# Patient Record
Sex: FEMALE | Race: WHITE | NOT HISPANIC OR LATINO | Employment: OTHER | URBAN - NONMETROPOLITAN AREA
[De-identification: names, ages, dates, MRNs, and addresses within clinical notes are randomized per-mention and may not be internally consistent; named-entity substitution may affect disease eponyms.]

---

## 2018-04-16 ENCOUNTER — HOSPITAL ENCOUNTER (OUTPATIENT)
Facility: HOSPITAL | Age: 58
Setting detail: SURGERY ADMIT
End: 2018-04-16
Attending: ORTHOPAEDIC SURGERY | Admitting: ORTHOPAEDIC SURGERY

## 2018-04-23 ENCOUNTER — TELEPHONE (OUTPATIENT)
Dept: VASCULAR SURGERY | Facility: CLINIC | Age: 58
End: 2018-04-23

## 2018-04-23 NOTE — TELEPHONE ENCOUNTER
Spoke with Ms Jiménez and reminded her of her appointment tomorrow at 945 am with Dr Haley, she stated she would be here.

## 2018-04-23 NOTE — PROGRESS NOTES
04/24/2018      LUIS CARLOS Zheng MD  [address] [place] [address]    Arminda Jiménez  1960    Chief Complaint   Patient presents with   • Pre-op Exam     ALIF consult per DR Zheng       Dear LUIS CARLOS Zheng MD:      HPI  I had the pleasure of seeing your patient Arminda Jiménez in the office today.  Thank you kindly for this consultation.  As you recall, Arminda Jiménez is a 57 y.o.  female who you are currently following for chronic back pain.  She complaints of pain to her tailbone, her right leg is burning, and numbness. She is scheduled with Dr. Zheng on 5/7/18.      Past Medical History:   Diagnosis Date   • Arthritis    • Asthma    • Cancer     right lung,abd,and pelvic area   • CHF (congestive heart failure)    • Chronic back pain    • COPD (chronic obstructive pulmonary disease)    • Depression    • Facet arthropathy    • Foraminal stenosis of lumbosacral region    • GERD (gastroesophageal reflux disease)    • H/O degenerative disc disease    • History of blood transfusion    • PVD (peripheral vascular disease)    • Radiculopathy        Past Surgical History:   Procedure Laterality Date   • APPENDECTOMY     • CARPAL TUNNEL RELEASE Bilateral    • OTHER SURGICAL HISTORY Bilateral 2017    Stent implants BLE [place] [facility] [place]       Family History   Problem Relation Age of Onset   • Heart disease Other    • Arthritis Other    • Cancer Other    • Alcohol abuse Other    • Stroke Other    • Migraines Other    • COPD Other    • Heart failure Other    • Depression Other    • Asthma Other        Social History     Social History   • Marital status:      Spouse name: N/A   • Number of children: N/A   • Years of education: N/A     Occupational History   • Not on file.     Social History Main Topics   • Smoking status: Former Smoker   • Smokeless tobacco: Never Used      Comment: quit 2010   • Alcohol use No   • Drug use: No   • Sexual activity: Not on file     Other Topics  "Concern   • Not on file     Social History Narrative   • No narrative on file       Allergies   Allergen Reactions   • Aspirin Nausea And Vomiting     Unknown reaction   • Keflex [Cephalexin] Other (See Comments)     Blisters to nose and mouth       Prior to Admission medications    Not on File       Review of Systems   Constitutional: Negative.    HENT: Negative.    Eyes: Negative.    Respiratory: Negative.    Cardiovascular: Negative.    Gastrointestinal: Negative.    Endocrine: Negative.    Genitourinary: Negative.    Musculoskeletal: Positive for back pain.   Skin: Negative.    Allergic/Immunologic: Negative.    Neurological: Negative.    Hematological: Negative.    Psychiatric/Behavioral: Negative.        /76   Pulse 65   Ht 154.9 cm (61\")   Wt 73 kg (161 lb)   BMI 30.42 kg/m²   Physical Exam   Constitutional: She is oriented to person, place, and time. She appears well-developed and well-nourished. No distress.   HENT:   Head: Normocephalic and atraumatic.   Mouth/Throat: Oropharynx is clear and moist.   Eyes: Pupils are equal, round, and reactive to light. No scleral icterus.   Neck: Normal range of motion. Neck supple. No JVD present. Carotid bruit is not present. No thyromegaly present.   Cardiovascular: Normal rate, regular rhythm, S2 normal, normal heart sounds, intact distal pulses and normal pulses.  Exam reveals no gallop and no friction rub.    No murmur heard.  Pulmonary/Chest: Effort normal and breath sounds normal.   Abdominal: Soft. Normal aorta and bowel sounds are normal. There is no hepatosplenomegaly.   Musculoskeletal:        Lumbar back: She exhibits decreased range of motion and tenderness.   Neurological: She is alert and oriented to person, place, and time. No cranial nerve deficit.   Skin: Skin is warm and dry. She is not diaphoretic.   Psychiatric: She has a normal mood and affect. Her behavior is normal. Judgment and thought content normal.   Nursing note and vitals " reviewed.      Xr Chest Pa & Lateral    Result Date: 4/24/2018  Narrative: XR CHEST PA AND LATERAL- 4/24/2018 9:06 AM CDT  HISTORY: pre op/ hx of lung ca; Z85.118-Personal history of other malignant neoplasm of bronchus and lung   COMPARISON: None.  FINDINGS: Upright frontal and lateral radiographs of the chest were obtained.  The lungs are clear. The cardiomediastinal silhouette and pulmonary vascularity are within normal limits. The osseous structures and surrounding soft tissues demonstrate no acute abnormality.      Impression: 1. No radiographic evidence of acute cardiopulmonary process.   This report was finalized on 04/24/2018 09:14 by Dr. Tay Sanchez MD.      Patient Active Problem List   Diagnosis   • Foraminal stenosis of lumbosacral region   • Chronic back pain   • Radiculopathy         ICD-10-CM ICD-9-CM   1. Foraminal stenosis of lumbosacral region M99.83 724.02   2. Lumbar radiculopathy M54.16 724.4           Plan: After thoroughly evaluating Arminda Jiménez, I believe the best course of action is to proceed with anterior lumbar exposure for anterior lumbar interbody fusion.  Risks of ALIF were discussed and include, but are not limited to, bleeding, infection, nerve damage, vessel damage, bowel damage, ureteral damage, DVT, MI, stroke, and death.  The patient understands these risks and wishes to proceed with procedure.  The patient can continue taking her current medication regimen as previously planned.  This was all discussed in full with complete understanding.    Thank you for allowing me to participate in the care of your patient.  Please do not hesitate with any questions or concerns.  I will keep you aware of any further encounters with Arminda Jiménez.        Sincerely yours,         Chris Haley, DO Bam Landis MD

## 2018-04-24 ENCOUNTER — HOSPITAL ENCOUNTER (OUTPATIENT)
Dept: GENERAL RADIOLOGY | Facility: HOSPITAL | Age: 58
Discharge: HOME | End: 2018-04-24
Admitting: ORTHOPAEDIC SURGERY

## 2018-04-24 ENCOUNTER — OFFICE VISIT (OUTPATIENT)
Dept: VASCULAR SURGERY | Facility: CLINIC | Age: 58
End: 2018-04-24

## 2018-04-24 ENCOUNTER — APPOINTMENT (OUTPATIENT)
Dept: PREADMISSION TESTING | Facility: HOSPITAL | Age: 58
End: 2018-04-24

## 2018-04-24 VITALS
OXYGEN SATURATION: 96 % | BODY MASS INDEX: 29.78 KG/M2 | HEIGHT: 62 IN | WEIGHT: 161.82 LBS | HEART RATE: 72 BPM | RESPIRATION RATE: 16 BRPM | SYSTOLIC BLOOD PRESSURE: 144 MMHG | DIASTOLIC BLOOD PRESSURE: 76 MMHG

## 2018-04-24 VITALS
BODY MASS INDEX: 30.4 KG/M2 | HEIGHT: 61 IN | SYSTOLIC BLOOD PRESSURE: 142 MMHG | HEART RATE: 65 BPM | DIASTOLIC BLOOD PRESSURE: 76 MMHG | WEIGHT: 161 LBS

## 2018-04-24 DIAGNOSIS — M48.07 FORAMINAL STENOSIS OF LUMBOSACRAL REGION: Primary | ICD-10-CM

## 2018-04-24 DIAGNOSIS — M54.16 LUMBAR RADICULOPATHY: ICD-10-CM

## 2018-04-24 LAB
ALBUMIN SERPL-MCNC: 4.3 G/DL (ref 3.5–5)
ALBUMIN/GLOB SERPL: 1.3 G/DL (ref 1.1–2.5)
ALP SERPL-CCNC: 44 U/L (ref 24–120)
ALT SERPL W P-5'-P-CCNC: 25 U/L (ref 0–54)
ANION GAP SERPL CALCULATED.3IONS-SCNC: 9 MMOL/L (ref 4–13)
APTT PPP: 27.5 SECONDS (ref 24.1–34.8)
AST SERPL-CCNC: 27 U/L (ref 7–45)
BASOPHILS # BLD AUTO: 0.09 10*3/MM3 (ref 0–0.2)
BASOPHILS NFR BLD AUTO: 1.2 % (ref 0–2)
BILIRUB SERPL-MCNC: 0.3 MG/DL (ref 0.1–1)
BILIRUB UR QL STRIP: NEGATIVE
BUN BLD-MCNC: 9 MG/DL (ref 5–21)
BUN/CREAT SERPL: 12.3 (ref 7–25)
CALCIUM SPEC-SCNC: 9.4 MG/DL (ref 8.4–10.4)
CHLORIDE SERPL-SCNC: 100 MMOL/L (ref 98–110)
CLARITY UR: CLEAR
CO2 SERPL-SCNC: 28 MMOL/L (ref 24–31)
COLOR UR: YELLOW
CREAT BLD-MCNC: 0.73 MG/DL (ref 0.5–1.4)
DEPRECATED RDW RBC AUTO: 40.7 FL (ref 40–54)
EOSINOPHIL # BLD AUTO: 0.23 10*3/MM3 (ref 0–0.7)
EOSINOPHIL NFR BLD AUTO: 3.1 % (ref 0–4)
ERYTHROCYTE [DISTWIDTH] IN BLOOD BY AUTOMATED COUNT: 12 % (ref 12–15)
GFR SERPL CREATININE-BSD FRML MDRD: 82 ML/MIN/1.73
GLOBULIN UR ELPH-MCNC: 3.4 GM/DL
GLUCOSE BLD-MCNC: 97 MG/DL (ref 70–100)
GLUCOSE UR STRIP-MCNC: NEGATIVE MG/DL
HCT VFR BLD AUTO: 35.9 % (ref 37–47)
HGB BLD-MCNC: 11.8 G/DL (ref 12–16)
HGB UR QL STRIP.AUTO: NEGATIVE
IMM GRANULOCYTES # BLD: 0.03 10*3/MM3 (ref 0–0.03)
IMM GRANULOCYTES NFR BLD: 0.4 % (ref 0–5)
INR PPP: 0.92 (ref 0.91–1.09)
KETONES UR QL STRIP: NEGATIVE
LEUKOCYTE ESTERASE UR QL STRIP.AUTO: NEGATIVE
LYMPHOCYTES # BLD AUTO: 2.59 10*3/MM3 (ref 0.72–4.86)
LYMPHOCYTES NFR BLD AUTO: 34.4 % (ref 15–45)
MCH RBC QN AUTO: 30.3 PG (ref 28–32)
MCHC RBC AUTO-ENTMCNC: 32.9 G/DL (ref 33–36)
MCV RBC AUTO: 92.3 FL (ref 82–98)
MONOCYTES # BLD AUTO: 0.61 10*3/MM3 (ref 0.19–1.3)
MONOCYTES NFR BLD AUTO: 8.1 % (ref 4–12)
NEUTROPHILS # BLD AUTO: 3.97 10*3/MM3 (ref 1.87–8.4)
NEUTROPHILS NFR BLD AUTO: 52.8 % (ref 39–78)
NITRITE UR QL STRIP: NEGATIVE
NRBC BLD MANUAL-RTO: 0 /100 WBC (ref 0–0)
PH UR STRIP.AUTO: 6 [PH] (ref 5–8)
PLATELET # BLD AUTO: 244 10*3/MM3 (ref 130–400)
PMV BLD AUTO: 10.1 FL (ref 6–12)
POTASSIUM BLD-SCNC: 4 MMOL/L (ref 3.5–5.3)
PROT SERPL-MCNC: 7.7 G/DL (ref 6.3–8.7)
PROT UR QL STRIP: NEGATIVE
PROTHROMBIN TIME: 12.6 SECONDS (ref 11.9–14.6)
RBC # BLD AUTO: 3.89 10*6/MM3 (ref 4.2–5.4)
SODIUM BLD-SCNC: 137 MMOL/L (ref 135–145)
SP GR UR STRIP: 1.02 (ref 1–1.03)
UROBILINOGEN UR QL STRIP: NORMAL
WBC NRBC COR # BLD: 7.52 10*3/MM3 (ref 4.8–10.8)

## 2018-04-24 PROCEDURE — 85025 COMPLETE CBC W/AUTO DIFF WBC: CPT | Performed by: ORTHOPAEDIC SURGERY

## 2018-04-24 PROCEDURE — 81003 URINALYSIS AUTO W/O SCOPE: CPT | Performed by: ORTHOPAEDIC SURGERY

## 2018-04-24 PROCEDURE — 80053 COMPREHEN METABOLIC PANEL: CPT | Performed by: ORTHOPAEDIC SURGERY

## 2018-04-24 PROCEDURE — 93010 ELECTROCARDIOGRAM REPORT: CPT | Performed by: INTERNAL MEDICINE

## 2018-04-24 PROCEDURE — 71046 X-RAY EXAM CHEST 2 VIEWS: CPT

## 2018-04-24 PROCEDURE — 99204 OFFICE O/P NEW MOD 45 MIN: CPT | Performed by: SURGERY

## 2018-04-24 PROCEDURE — 85610 PROTHROMBIN TIME: CPT | Performed by: ORTHOPAEDIC SURGERY

## 2018-04-24 PROCEDURE — 85730 THROMBOPLASTIN TIME PARTIAL: CPT | Performed by: ORTHOPAEDIC SURGERY

## 2018-04-24 PROCEDURE — 36415 COLL VENOUS BLD VENIPUNCTURE: CPT

## 2018-04-24 PROCEDURE — 93005 ELECTROCARDIOGRAM TRACING: CPT

## 2018-04-24 RX ORDER — MELATONIN
1000 DAILY
COMMUNITY

## 2018-04-24 NOTE — DISCHARGE INSTRUCTIONS
DAY OF SURGERY INSTRUCTIONS        YOUR SURGEON: ***    PROCEDURE: ***    DATE OF SURGERY: ***    ARRIVAL TIME: AS DIRECTED BY OFFICE    DAY OF SURGERY TAKE ONLY THESE MEDICATIONS: ***        MANAGING PAIN AFTER SURGERY    We know you are probably wondering what your pain will be like after surgery.  Following surgery it is unrealistic to expect you will not have pain.   Pain is how our bodies let us know that something is wrong or cautions us to be careful.  That said, our goal is to make your pain tolerable.    Methods we may use to treat your pain include (oral or IV medications, PCAs, epidurals, nerve blocks, etc.)   While some procedures require IV pain medications for a short time after surgery, transitioning to pain medications by mouth allows for better management of pain.   Your nurse will encourage you to take oral pain medications whenever possible.  IV medications work almost immediately, but only last a short while.  Taking medications by mouth allows for a more constant level of medication in your blood stream for a longer period of time.      Once your pain is out of control it is harder to get back under control.  It is important you are aware when your next dose of pain medication is due.  If you are admitted, your nurse may write the time of your next dose on the white board in your room to help you remember.      We are interested in your pain and encourage you to inform us about aggravating factors during your visit.   Many times a simple repositioning every few hours can make a big difference.    If your physician says it is okay, do not let your pain prevent you from getting out of bed. Be sure to call your nurse for assistance prior to getting up so you do not fall.      Before surgery, please decide your tolerable pain goal.  These faces help describe the pain ratings we use on a 0-10 scale.   Be prepared to tell us your goal and whether or not you take pain or anxiety medications at  home.          BEFORE YOU COME TO THE HOSPITAL  (Pre-op instructions)  • Do not eat, drink, smoke or chew gum after midnight the night before surgery.  This also includes no mints.  • Morning of surgery take only the medicines you have been instructed with a sip of water unless otherwise instructed  by your physician.  • Do not shave, wear makeup or dark nail polish.  • Remove all jewelry including rings.  • Leave anything you consider valuable at home.  • Leave your suitcase in the car until after your surgery.  • Bring the following with you if applicable:  o Picture ID and insurance, Medicare or Medicaid cards  o Co-pay/deductible required by insurance (cash, check, credit card)  o Copy of advance directive, living will or power-of- documents if not brought to PAT  o CPAP or BIPAP mask and tubing  o Relaxation aids (MP3 player, book, magazine)  • On the day of surgery check in at registration located at the main entrance of the hospital.       Outpatient Surgery Guidelines, Adult  Outpatient procedures are those for which the person having the procedure is allowed to go home the same day as the procedure. Various procedures are done on an outpatient basis. You should follow some general guidelines if you will be having an outpatient procedure.  LET YOUR HEALTH CARE PROVIDER KNOW ABOUT:  · Any allergies you have.  · All medicines you are taking, including vitamins, herbs, eye drops, creams, and over-the-counter medicines.  · Previous problems you or members of your family have had with the use of anesthetics.  · Any blood disorders you have.  · Previous surgeries you have had.  · Medical conditions you have.  RISKS AND COMPLICATIONS  Your health care provider will discuss possible risks and complications with you before surgery. Common risks and complications include:    · Problems due to the use of anesthetics.  · Blood loss and replacement (does not apply to minor surgical procedures).  · Temporary  increase in pain due to surgery.  · Uncorrected pain or problems that the surgery was meant to correct.  · Infection.  · New damage.  BEFORE THE PROCEDURE  · Ask your health care provider about changing or stopping your regular medicines. You may need to stop taking certain medicines in the days or weeks before the procedure.  · Stop smoking at least 2 weeks before surgery. This lowers your risk for complications during and after surgery. Ask your health care provider for help with this if needed.  · Eat your usual meals and a light supper the day before surgery. Continue fluid intake. Do not drink alcohol.  · Do not eat or drink after midnight the night before your surgery.   · Arrange for someone to take you home and to stay with you for 24 hours after the procedure. Medicine given for your procedure may affect your ability to drive or to care for yourself.  · Call your health care provider's office if you develop an illness or problem that may prevent you from safely having your procedure.  AFTER THE PROCEDURE  After surgery, you will be taken to a recovery area, where your progress will be monitored. If there are no complications, you will be allowed to go home when you are awake, stable, and taking fluids well. You may have numbness around the surgical site. Healing will take some time. You will have tenderness at the surgical site and may have some swelling and bruising. You may also have some nausea.  HOME CARE INSTRUCTIONS  · Do not drive for 24 hours, or as directed by your health care provider. Do not drive while taking prescription pain medicines.  · Do not drink alcohol for 24 hours.  · Do not make important decisions or sign legal documents for 24 hours.  · You may resume a normal diet and activities as directed.  · Do not lift anything heavier than 10 pounds (4.5 kg) or play contact sports until your health care provider says it is okay.  · Change your bandages (dressings) as directed.  · Only take  over-the-counter or prescription medicines as directed by your health care provider.  · Follow up with your health care provider as directed.  SEEK MEDICAL CARE IF:  · You have increased bleeding (more than a small spot) from the surgical site.  · You have redness, swelling, or increasing pain in the wound.  · You see pus coming from the wound.  · You have a fever.  · You notice a bad smell coming from the wound or dressing.  · You feel lightheaded or faint.  · You develop a rash.  · You have trouble breathing.  · You develop allergies.  MAKE SURE YOU:  · Understand these instructions.  · Will watch your condition.  · Will get help right away if you are not doing well or get worse.     This information is not intended to replace advice given to you by your health care provider. Make sure you discuss any questions you have with your health care provider.     Document Released: 09/12/2002 Document Revised: 05/03/2016 Document Reviewed: 05/22/2014  [employer] Interactive Patient Education ©2016 [employer]       Fall Prevention in Hospitals, Adult  As a hospital patient, your condition and the treatments you receive can increase your risk for falls. Some additional risk factors for falls in a hospital include:  · Being in an unfamiliar environment.  · Being on bed rest.  · Your surgery.  · Taking certain medicines.  · Your tubing requirements, such as intravenous (IV) therapy or catheters.  It is important that you learn how to decrease fall risks while at the hospital. Below are important tips that can help prevent falls.  SAFETY TIPS FOR PREVENTING FALLS  Talk about your risk of falling.  · Ask your health care provider why you are at risk for falling. Is it your medicine, illness, tubing placement, or something else?  · Make a plan with your health care provider to keep you safe from falls.  · Ask your health care provider or pharmacist about side effects of your medicines. Some medicines can make you dizzy or  affect your coordination.  Ask for help.  · Ask for help before getting out of bed. You may need to press your call button.  · Ask for assistance in getting safely to the toilet.  · Ask for a walker or cane to be put at your bedside. Ask that most of the side rails on your bed be placed up before your health care provider leaves the room.  · Ask family or friends to sit with you.  · Ask for things that are out of your reach, such as your glasses, hearing aids, telephone, bedside table, or call button.  Follow these tips to avoid falling:  · Stay lying or seated, rather than standing, while waiting for help.  · Wear rubber-soled slippers or shoes whenever you walk in the hospital.  · Avoid quick, sudden movements.  ¨ Change positions slowly.  ¨ Sit on the side of your bed before standing.  ¨ Stand up slowly and wait before you start to walk.  · Let your health care provider know if there is a spill on the floor.  · Pay careful attention to the medical equipment, electrical cords, and tubes around you.  · When you need help, use your call button by your bed or in the bathroom. Wait for one of your health care providers to help you.  · If you feel dizzy or unsure of your footing, return to bed and wait for assistance.  · Avoid being distracted by the TV, telephone, or another person in your room.  · Do not lean or support yourself on rolling objects, such as IV poles or bedside tables.     This information is not intended to replace advice given to you by your health care provider. Make sure you discuss any questions you have with your health care provider.     Document Released: 12/15/2001 Document Revised: 01/08/2016 Document Reviewed: 08/25/2013  [employer] Interactive Patient Education ©2016 [employer]       Surgical Site Infections FAQs  What is a Surgical Site Infection (SSI)?  A surgical site infection is an infection that occurs after surgery in the part of the body where the surgery took place. Most patients  who have surgery do not develop an infection. However, infections develop in about 1 to 3 out of every 100 patients who have surgery.  Some of the common symptoms of a surgical site infection are:  · Redness and pain around the area where you had surgery  · Drainage of cloudy fluid from your surgical wound  · Fever  Can SSIs be treated?  Yes. Most surgical site infections can be treated with antibiotics. The antibiotic given to you depends on the bacteria (germs) causing the infection. Sometimes patients with SSIs also need another surgery to treat the infection.  What are some of the things that hospitals are doing to prevent SSIs?  To prevent SSIs, doctors, nurses, and other healthcare providers:  · Clean their hands and arms up to their elbows with an antiseptic agent just before the surgery.  · Clean their hands with soap and water or an alcohol-based hand rub before and after caring for each patient.  · May remove some of your hair immediately before your surgery using electric clippers if the hair is in the same area where the procedure will occur. They should not shave you with a razor.  · Wear special hair covers, masks, gowns, and gloves during surgery to keep the surgery area clean.  · Give you antibiotics before your surgery starts. In most cases, you should get antibiotics within 60 minutes before the surgery starts and the antibiotics should be stopped within 24 hours after surgery.  · Clean the skin at the site of your surgery with a special soap that kills germs.  What can I do to help prevent SSIs?  Before your surgery:  · Tell your doctor about other medical problems you may have. Health problems such as allergies, diabetes, and obesity could affect your surgery and your treatment.  · Quit smoking. Patients who smoke get more infections. Talk to your doctor about how you can quit before your surgery.  · Do not shave near where you will have surgery. Shaving with a razor can irritate your skin and  make it easier to develop an infection.  At the time of your surgery:  · Speak up if someone tries to shave you with a razor before surgery. Ask why you need to be shaved and talk with your surgeon if you have any concerns.  · Ask if you will get antibiotics before surgery.  After your surgery:  · Make sure that your healthcare providers clean their hands before examining you, either with soap and water or an alcohol-based hand rub.  · If you do not see your providers clean their hands, please ask them to do so.  · Family and friends who visit you should not touch the surgical wound or dressings.  · Family and friends should clean their hands with soap and water or an alcohol-based hand rub before and after visiting you. If you do not see them clean their hands, ask them to clean their hands.  What do I need to do when I go home from the hospital?  · Before you go home, your doctor or nurse should explain everything you need to know about taking care of your wound. Make sure you understand how to care for your wound before you leave the hospital.  · Always clean your hands before and after caring for your wound.  · Before you go home, make sure you know who to contact if you have questions or problems after you get home.  · If you have any symptoms of an infection, such as redness and pain at the surgery site, drainage, or fever, call your doctor immediately.  If you have additional questions, please ask your doctor or nurse.  Developed and co-sponsored by The Society for Healthcare Epidemiology of [place] (SHEA); Infectious Diseases Society of [place] (IDSA); American Hospital Association; Association for Professionals in Infection Control and Epidemiology (APIC); Centers for Disease Control and Prevention (CDC); and The Joint Commission.     This information is not intended to replace advice given to you by your health care provider. Make sure you discuss any questions you have with your health care  provider.     Document Released: 12/23/2014 Document Revised: 01/08/2016 Document Reviewed: 03/02/2016  [employer] Interactive Patient Education ©2016 [employer].       [facility]  CHG 4% Patient Instruction Sheet    Preparing the Skin Before Surgery  Preparing or “prepping” skin before surgery can reduce the risk of infection at the surgical site. To make the process easier,[facility] has chosen 4% Chlorhexidine Gluconate (CHG) antiseptic solution.   The steps below outline the prepping process and should be carefully followed.                                                                                                                                                      Prep the skin at the following time(s):                                                      We recommend you shower the night before surgery, and again the morning of surgery with the 4% CHG antiseptic solution using half of the bottle and a cloth each time.  Dress in clean clothes/sleepwear after showering.  See instructions below for application.          Do not apply any lotions or moisturizers.       Do not shave the area to be prepped for at least 2 days prior to surgery.    Clipping the hair may be done immediately prior to your surgery at the hospital    if needed.    Directions:  Thoroughly rinse your body with water.  Apply 4% CHG to a cloth and wash skin gently, paying special attention to the operative site.  Rinse again thoroughly.  Once you have begun using this product do not apply anything else to your skin. If itching or redness persists, rinse affected areas and discontinue use.    When using this product:  • Keep out of eyes, ears, and mouth.  • If solution should contact these areas, rinse out promptly and thoroughly with water.  • For external use only.  • Do not use in genital area, on your face or head.      PATIENT/FAMILY/RESPONSIBLE PARTY VERBALIZES UNDERSTANDING OF ABOVE EDUCATION.  COPY OF PAIN SCALE GIVEN AND  REVIEWED WITH VERBALIZED UNDERSTANDING.

## 2018-05-04 ENCOUNTER — ANESTHESIA EVENT (OUTPATIENT)
Dept: PERIOP | Facility: HOSPITAL | Age: 58
End: 2018-05-04

## 2018-05-07 ENCOUNTER — APPOINTMENT (OUTPATIENT)
Dept: GENERAL RADIOLOGY | Facility: HOSPITAL | Age: 58
End: 2018-05-07

## 2018-05-07 ENCOUNTER — ANESTHESIA (OUTPATIENT)
Dept: PERIOP | Facility: HOSPITAL | Age: 58
End: 2018-05-07

## 2018-05-07 ENCOUNTER — HOSPITAL ENCOUNTER (INPATIENT)
Facility: HOSPITAL | Age: 58
LOS: 3 days | Discharge: HOME | End: 2018-05-10
Attending: ORTHOPAEDIC SURGERY | Admitting: ORTHOPAEDIC SURGERY

## 2018-05-07 DIAGNOSIS — Z74.09 IMPAIRED MOBILITY AND ADLS: ICD-10-CM

## 2018-05-07 DIAGNOSIS — Z74.09 IMPAIRED FUNCTIONAL MOBILITY, BALANCE, AND ENDURANCE: ICD-10-CM

## 2018-05-07 DIAGNOSIS — Z78.9 IMPAIRED MOBILITY AND ADLS: ICD-10-CM

## 2018-05-07 PROBLEM — T40.2X5A CONSTIPATION DUE TO OPIOID THERAPY: Chronic | Status: ACTIVE | Noted: 2018-05-07

## 2018-05-07 PROBLEM — J43.1 PANLOBULAR EMPHYSEMA (HCC): Chronic | Status: ACTIVE | Noted: 2018-05-07

## 2018-05-07 PROBLEM — M48.061 LUMBAR STENOSIS: Status: ACTIVE | Noted: 2018-05-07

## 2018-05-07 PROBLEM — M15.9 PRIMARY OSTEOARTHRITIS INVOLVING MULTIPLE JOINTS: Chronic | Status: ACTIVE | Noted: 2018-05-07

## 2018-05-07 PROBLEM — M15.0 PRIMARY OSTEOARTHRITIS INVOLVING MULTIPLE JOINTS: Chronic | Status: ACTIVE | Noted: 2018-05-07

## 2018-05-07 PROBLEM — K21.9 GASTROESOPHAGEAL REFLUX DISEASE WITHOUT ESOPHAGITIS: Chronic | Status: ACTIVE | Noted: 2018-05-07

## 2018-05-07 PROBLEM — K59.03 CONSTIPATION DUE TO OPIOID THERAPY: Chronic | Status: ACTIVE | Noted: 2018-05-07

## 2018-05-07 PROBLEM — I73.9 PAD (PERIPHERAL ARTERY DISEASE): Chronic | Status: ACTIVE | Noted: 2018-05-07

## 2018-05-07 LAB
ABO GROUP BLD: NORMAL
BLD GP AB SCN SERPL QL: NEGATIVE
RH BLD: NEGATIVE
T&S EXPIRATION DATE: NORMAL

## 2018-05-07 PROCEDURE — 0SB40ZZ EXCISION OF LUMBOSACRAL DISC, OPEN APPROACH: ICD-10-PCS | Performed by: ORTHOPAEDIC SURGERY

## 2018-05-07 PROCEDURE — 4A11X4G MONITORING OF PERIPHERAL NERVOUS ELECTRICAL ACTIVITY, INTRAOPERATIVE, EXTERNAL APPROACH: ICD-10-PCS | Performed by: ORTHOPAEDIC SURGERY

## 2018-05-07 PROCEDURE — 25010000002 FENTANYL CITRATE (PF) 250 MCG/5ML SOLUTION: Performed by: NURSE ANESTHETIST, CERTIFIED REGISTERED

## 2018-05-07 PROCEDURE — 25010000002 DEXAMETHASONE PER 1 MG: Performed by: ANESTHESIOLOGY

## 2018-05-07 PROCEDURE — 94799 UNLISTED PULMONARY SVC/PX: CPT

## 2018-05-07 PROCEDURE — 22558 ARTHRD ANT NTRBD MIN DSC LUM: CPT | Performed by: SURGERY

## 2018-05-07 PROCEDURE — G8988 SELF CARE GOAL STATUS: HCPCS | Performed by: OCCUPATIONAL THERAPIST

## 2018-05-07 PROCEDURE — 25010000002 MIDAZOLAM PER 1 MG: Performed by: ANESTHESIOLOGY

## 2018-05-07 PROCEDURE — 86901 BLOOD TYPING SEROLOGIC RH(D): CPT | Performed by: ORTHOPAEDIC SURGERY

## 2018-05-07 PROCEDURE — C1713 ANCHOR/SCREW BN/BN,TIS/BN: HCPCS | Performed by: ORTHOPAEDIC SURGERY

## 2018-05-07 PROCEDURE — L0637 LSO SC R ANT/POS PNL PRE CST: HCPCS

## 2018-05-07 PROCEDURE — 74018 RADEX ABDOMEN 1 VIEW: CPT

## 2018-05-07 PROCEDURE — 97165 OT EVAL LOW COMPLEX 30 MIN: CPT | Performed by: OCCUPATIONAL THERAPIST

## 2018-05-07 PROCEDURE — 25010000002 ONDANSETRON PER 1 MG: Performed by: NURSE ANESTHETIST, CERTIFIED REGISTERED

## 2018-05-07 PROCEDURE — 94760 N-INVAS EAR/PLS OXIMETRY 1: CPT

## 2018-05-07 PROCEDURE — 25010000002 MORPHINE SULFATE (PF) 2 MG/ML SOLUTION: Performed by: ANESTHESIOLOGY

## 2018-05-07 PROCEDURE — 0SG30A0 FUSION OF LUMBOSACRAL JOINT WITH INTERBODY FUSION DEVICE, ANTERIOR APPROACH, ANTERIOR COLUMN, OPEN APPROACH: ICD-10-PCS | Performed by: ORTHOPAEDIC SURGERY

## 2018-05-07 PROCEDURE — 72100 X-RAY EXAM L-S SPINE 2/3 VWS: CPT

## 2018-05-07 PROCEDURE — 25010000002 PROPOFOL 10 MG/ML EMULSION: Performed by: NURSE ANESTHETIST, CERTIFIED REGISTERED

## 2018-05-07 PROCEDURE — 76000 FLUOROSCOPY <1 HR PHYS/QHP: CPT

## 2018-05-07 PROCEDURE — 86900 BLOOD TYPING SEROLOGIC ABO: CPT | Performed by: ORTHOPAEDIC SURGERY

## 2018-05-07 PROCEDURE — 86850 RBC ANTIBODY SCREEN: CPT | Performed by: ORTHOPAEDIC SURGERY

## 2018-05-07 PROCEDURE — 25010000002 DEXAMETHASONE PER 1 MG: Performed by: NURSE ANESTHETIST, CERTIFIED REGISTERED

## 2018-05-07 PROCEDURE — G8987 SELF CARE CURRENT STATUS: HCPCS | Performed by: OCCUPATIONAL THERAPIST

## 2018-05-07 PROCEDURE — 25010000002 NEOSTIGMINE PER 0.5 MG: Performed by: NURSE ANESTHETIST, CERTIFIED REGISTERED

## 2018-05-07 PROCEDURE — 3E0U0GB INTRODUCTION OF RECOMBINANT BONE MORPHOGENETIC PROTEIN INTO JOINTS, OPEN APPROACH: ICD-10-PCS | Performed by: ORTHOPAEDIC SURGERY

## 2018-05-07 DEVICE — IMPLANTABLE DEVICE: Type: IMPLANTABLE DEVICE | Status: FUNCTIONAL

## 2018-05-07 DEVICE — BONE GRAFT KIT 7510400 INFUSE MEDIUM
Type: IMPLANTABLE DEVICE | Status: FUNCTIONAL
Brand: INFUSE® BONE GRAFT

## 2018-05-07 DEVICE — HEMO ABS GELFOAM PWDR PORCN 1GM: Type: IMPLANTABLE DEVICE | Status: FUNCTIONAL

## 2018-05-07 DEVICE — BONE CANC CRUSHED 10CC: Type: IMPLANTABLE DEVICE | Status: FUNCTIONAL

## 2018-05-07 RX ORDER — MEPERIDINE HYDROCHLORIDE 25 MG/ML
12.5 INJECTION INTRAMUSCULAR; INTRAVENOUS; SUBCUTANEOUS
Status: DISCONTINUED | OUTPATIENT
Start: 2018-05-07 | End: 2018-05-07 | Stop reason: HOSPADM

## 2018-05-07 RX ORDER — HYDRALAZINE HYDROCHLORIDE 20 MG/ML
5 INJECTION INTRAMUSCULAR; INTRAVENOUS
Status: DISCONTINUED | OUTPATIENT
Start: 2018-05-07 | End: 2018-05-07 | Stop reason: HOSPADM

## 2018-05-07 RX ORDER — PROPOFOL 10 MG/ML
VIAL (ML) INTRAVENOUS AS NEEDED
Status: DISCONTINUED | OUTPATIENT
Start: 2018-05-07 | End: 2018-05-07 | Stop reason: SURG

## 2018-05-07 RX ORDER — ACETAMINOPHEN 500 MG
1000 TABLET ORAL ONCE
Status: COMPLETED | OUTPATIENT
Start: 2018-05-07 | End: 2018-05-07

## 2018-05-07 RX ORDER — GABAPENTIN 400 MG/1
800 CAPSULE ORAL EVERY 8 HOURS SCHEDULED
Status: DISCONTINUED | OUTPATIENT
Start: 2018-05-07 | End: 2018-05-10 | Stop reason: HOSPADM

## 2018-05-07 RX ORDER — ONDANSETRON 4 MG/1
4 TABLET, FILM COATED ORAL EVERY 6 HOURS PRN
Status: DISCONTINUED | OUTPATIENT
Start: 2018-05-07 | End: 2018-05-09 | Stop reason: SDUPTHER

## 2018-05-07 RX ORDER — CLINDAMYCIN PHOSPHATE 900 MG/50ML
900 INJECTION INTRAVENOUS ONCE
Status: COMPLETED | OUTPATIENT
Start: 2018-05-07 | End: 2018-05-07

## 2018-05-07 RX ORDER — CLINDAMYCIN PHOSPHATE 900 MG/50ML
900 INJECTION INTRAVENOUS EVERY 8 HOURS
Status: DISCONTINUED | OUTPATIENT
Start: 2018-05-07 | End: 2018-05-07

## 2018-05-07 RX ORDER — ONDANSETRON 2 MG/ML
4 INJECTION INTRAMUSCULAR; INTRAVENOUS EVERY 6 HOURS PRN
Status: DISCONTINUED | OUTPATIENT
Start: 2018-05-07 | End: 2018-05-09 | Stop reason: SDUPTHER

## 2018-05-07 RX ORDER — ONDANSETRON 4 MG/1
4 TABLET, ORALLY DISINTEGRATING ORAL EVERY 6 HOURS PRN
Status: DISCONTINUED | OUTPATIENT
Start: 2018-05-07 | End: 2018-05-09 | Stop reason: SDUPTHER

## 2018-05-07 RX ORDER — SODIUM CHLORIDE 0.9 % (FLUSH) 0.9 %
1-10 SYRINGE (ML) INJECTION AS NEEDED
Status: DISCONTINUED | OUTPATIENT
Start: 2018-05-07 | End: 2018-05-10 | Stop reason: SDUPTHER

## 2018-05-07 RX ORDER — MIDAZOLAM HYDROCHLORIDE 1 MG/ML
2 INJECTION INTRAMUSCULAR; INTRAVENOUS
Status: DISCONTINUED | OUTPATIENT
Start: 2018-05-07 | End: 2018-05-09 | Stop reason: HOSPADM

## 2018-05-07 RX ORDER — SODIUM CHLORIDE 9 MG/ML
75 INJECTION, SOLUTION INTRAVENOUS CONTINUOUS
Status: DISPENSED | OUTPATIENT
Start: 2018-05-07 | End: 2018-05-09

## 2018-05-07 RX ORDER — SODIUM CHLORIDE, SODIUM LACTATE, POTASSIUM CHLORIDE, CALCIUM CHLORIDE 600; 310; 30; 20 MG/100ML; MG/100ML; MG/100ML; MG/100ML
30 INJECTION, SOLUTION INTRAVENOUS CONTINUOUS
Status: DISCONTINUED | OUTPATIENT
Start: 2018-05-07 | End: 2018-05-10 | Stop reason: SDUPTHER

## 2018-05-07 RX ORDER — ESCITALOPRAM OXALATE 10 MG/1
10 TABLET ORAL DAILY
Status: DISCONTINUED | OUTPATIENT
Start: 2018-05-07 | End: 2018-05-10 | Stop reason: HOSPADM

## 2018-05-07 RX ORDER — GLYCOPYRROLATE 0.2 MG/ML
INJECTION INTRAMUSCULAR; INTRAVENOUS AS NEEDED
Status: DISCONTINUED | OUTPATIENT
Start: 2018-05-07 | End: 2018-05-07 | Stop reason: SURG

## 2018-05-07 RX ORDER — SODIUM CHLORIDE 0.9 % (FLUSH) 0.9 %
1-10 SYRINGE (ML) INJECTION AS NEEDED
Status: DISCONTINUED | OUTPATIENT
Start: 2018-05-07 | End: 2018-05-09 | Stop reason: HOSPADM

## 2018-05-07 RX ORDER — FENTANYL CITRATE 50 UG/ML
25 INJECTION, SOLUTION INTRAMUSCULAR; INTRAVENOUS AS NEEDED
Status: DISCONTINUED | OUTPATIENT
Start: 2018-05-07 | End: 2018-05-07 | Stop reason: HOSPADM

## 2018-05-07 RX ORDER — FLUMAZENIL 0.1 MG/ML
0.2 INJECTION INTRAVENOUS AS NEEDED
Status: DISCONTINUED | OUTPATIENT
Start: 2018-05-07 | End: 2018-05-07 | Stop reason: HOSPADM

## 2018-05-07 RX ORDER — FAMOTIDINE 10 MG/ML
20 INJECTION, SOLUTION INTRAVENOUS EVERY 12 HOURS SCHEDULED
Status: DISCONTINUED | OUTPATIENT
Start: 2018-05-07 | End: 2018-05-09 | Stop reason: SDUPTHER

## 2018-05-07 RX ORDER — LIDOCAINE HYDROCHLORIDE 20 MG/ML
INJECTION, SOLUTION INFILTRATION; PERINEURAL AS NEEDED
Status: DISCONTINUED | OUTPATIENT
Start: 2018-05-07 | End: 2018-05-07 | Stop reason: SURG

## 2018-05-07 RX ORDER — LABETALOL HYDROCHLORIDE 5 MG/ML
5 INJECTION, SOLUTION INTRAVENOUS
Status: DISCONTINUED | OUTPATIENT
Start: 2018-05-07 | End: 2018-05-07 | Stop reason: HOSPADM

## 2018-05-07 RX ORDER — IPRATROPIUM BROMIDE AND ALBUTEROL SULFATE 2.5; .5 MG/3ML; MG/3ML
3 SOLUTION RESPIRATORY (INHALATION) ONCE AS NEEDED
Status: DISCONTINUED | OUTPATIENT
Start: 2018-05-07 | End: 2018-05-07 | Stop reason: HOSPADM

## 2018-05-07 RX ORDER — SODIUM CHLORIDE, SODIUM LACTATE, POTASSIUM CHLORIDE, CALCIUM CHLORIDE 600; 310; 30; 20 MG/100ML; MG/100ML; MG/100ML; MG/100ML
100 INJECTION, SOLUTION INTRAVENOUS CONTINUOUS
Status: DISCONTINUED | OUTPATIENT
Start: 2018-05-07 | End: 2018-05-09 | Stop reason: SDUPTHER

## 2018-05-07 RX ORDER — FAMOTIDINE 20 MG/1
20 TABLET, FILM COATED ORAL EVERY 12 HOURS SCHEDULED
Status: DISCONTINUED | OUTPATIENT
Start: 2018-05-07 | End: 2018-05-09 | Stop reason: SDUPTHER

## 2018-05-07 RX ORDER — KETAMINE HYDROCHLORIDE 50 MG/ML
INJECTION, SOLUTION, CONCENTRATE INTRAMUSCULAR; INTRAVENOUS AS NEEDED
Status: DISCONTINUED | OUTPATIENT
Start: 2018-05-07 | End: 2018-05-07 | Stop reason: SURG

## 2018-05-07 RX ORDER — DEXAMETHASONE SODIUM PHOSPHATE 4 MG/ML
4 INJECTION, SOLUTION INTRA-ARTICULAR; INTRALESIONAL; INTRAMUSCULAR; INTRAVENOUS; SOFT TISSUE ONCE AS NEEDED
Status: COMPLETED | OUTPATIENT
Start: 2018-05-07 | End: 2018-05-07

## 2018-05-07 RX ORDER — DIPHENHYDRAMINE HCL 25 MG
25 CAPSULE ORAL NIGHTLY PRN
Status: DISCONTINUED | OUTPATIENT
Start: 2018-05-07 | End: 2018-05-09 | Stop reason: SDUPTHER

## 2018-05-07 RX ORDER — ONDANSETRON 2 MG/ML
INJECTION INTRAMUSCULAR; INTRAVENOUS AS NEEDED
Status: DISCONTINUED | OUTPATIENT
Start: 2018-05-07 | End: 2018-05-07 | Stop reason: SURG

## 2018-05-07 RX ORDER — TIZANIDINE 4 MG/1
4 TABLET ORAL EVERY 8 HOURS PRN
Status: DISCONTINUED | OUTPATIENT
Start: 2018-05-07 | End: 2018-05-10 | Stop reason: HOSPADM

## 2018-05-07 RX ORDER — IPRATROPIUM BROMIDE AND ALBUTEROL SULFATE 2.5; .5 MG/3ML; MG/3ML
3 SOLUTION RESPIRATORY (INHALATION) ONCE
Status: COMPLETED | OUTPATIENT
Start: 2018-05-07 | End: 2018-05-07

## 2018-05-07 RX ORDER — ROCURONIUM BROMIDE 10 MG/ML
INJECTION, SOLUTION INTRAVENOUS AS NEEDED
Status: DISCONTINUED | OUTPATIENT
Start: 2018-05-07 | End: 2018-05-07 | Stop reason: SURG

## 2018-05-07 RX ORDER — MIDAZOLAM HYDROCHLORIDE 1 MG/ML
1 INJECTION INTRAMUSCULAR; INTRAVENOUS
Status: DISCONTINUED | OUTPATIENT
Start: 2018-05-07 | End: 2018-05-09 | Stop reason: HOSPADM

## 2018-05-07 RX ORDER — SODIUM CHLORIDE, SODIUM LACTATE, POTASSIUM CHLORIDE, CALCIUM CHLORIDE 600; 310; 30; 20 MG/100ML; MG/100ML; MG/100ML; MG/100ML
1000 INJECTION, SOLUTION INTRAVENOUS CONTINUOUS
Status: DISPENSED | OUTPATIENT
Start: 2018-05-07 | End: 2018-05-08

## 2018-05-07 RX ORDER — CLINDAMYCIN PHOSPHATE 900 MG/50ML
900 INJECTION INTRAVENOUS EVERY 8 HOURS
Status: COMPLETED | OUTPATIENT
Start: 2018-05-07 | End: 2018-05-08

## 2018-05-07 RX ORDER — NALOXONE HCL 0.4 MG/ML
0.04 VIAL (ML) INJECTION AS NEEDED
Status: DISCONTINUED | OUTPATIENT
Start: 2018-05-07 | End: 2018-05-07 | Stop reason: HOSPADM

## 2018-05-07 RX ORDER — OXYCODONE HCL 20 MG/1
20 TABLET, FILM COATED, EXTENDED RELEASE ORAL ONCE
Status: COMPLETED | OUTPATIENT
Start: 2018-05-07 | End: 2018-05-07

## 2018-05-07 RX ORDER — OXYCODONE AND ACETAMINOPHEN 10; 325 MG/1; MG/1
2 TABLET ORAL EVERY 4 HOURS PRN
Status: DISCONTINUED | OUTPATIENT
Start: 2018-05-07 | End: 2018-05-10 | Stop reason: HOSPADM

## 2018-05-07 RX ORDER — DEXAMETHASONE SODIUM PHOSPHATE 4 MG/ML
INJECTION, SOLUTION INTRA-ARTICULAR; INTRALESIONAL; INTRAMUSCULAR; INTRAVENOUS; SOFT TISSUE AS NEEDED
Status: DISCONTINUED | OUTPATIENT
Start: 2018-05-07 | End: 2018-05-07 | Stop reason: SURG

## 2018-05-07 RX ORDER — ONDANSETRON 2 MG/ML
4 INJECTION INTRAMUSCULAR; INTRAVENOUS AS NEEDED
Status: DISCONTINUED | OUTPATIENT
Start: 2018-05-07 | End: 2018-05-07 | Stop reason: HOSPADM

## 2018-05-07 RX ORDER — FENTANYL CITRATE 50 UG/ML
INJECTION, SOLUTION INTRAMUSCULAR; INTRAVENOUS AS NEEDED
Status: DISCONTINUED | OUTPATIENT
Start: 2018-05-07 | End: 2018-05-07 | Stop reason: SURG

## 2018-05-07 RX ORDER — SODIUM CHLORIDE 0.9 % (FLUSH) 0.9 %
3 SYRINGE (ML) INJECTION AS NEEDED
Status: DISCONTINUED | OUTPATIENT
Start: 2018-05-07 | End: 2018-05-09 | Stop reason: HOSPADM

## 2018-05-07 RX ORDER — MORPHINE SULFATE 2 MG/ML
2 INJECTION, SOLUTION INTRAMUSCULAR; INTRAVENOUS
Status: DISCONTINUED | OUTPATIENT
Start: 2018-05-07 | End: 2018-05-07 | Stop reason: HOSPADM

## 2018-05-07 RX ORDER — HYDROMORPHONE HCL 110MG/55ML
1 PATIENT CONTROLLED ANALGESIA SYRINGE INTRAVENOUS
Status: DISCONTINUED | OUTPATIENT
Start: 2018-05-07 | End: 2018-05-10 | Stop reason: RX

## 2018-05-07 RX ORDER — OXYCODONE AND ACETAMINOPHEN 10; 325 MG/1; MG/1
1 TABLET ORAL ONCE AS NEEDED
Status: DISCONTINUED | OUTPATIENT
Start: 2018-05-07 | End: 2018-05-07 | Stop reason: HOSPADM

## 2018-05-07 RX ORDER — SODIUM CHLORIDE 9 MG/ML
75 INJECTION, SOLUTION INTRAVENOUS CONTINUOUS
Status: DISPENSED | OUTPATIENT
Start: 2018-05-07 | End: 2018-05-08

## 2018-05-07 RX ORDER — METOCLOPRAMIDE HYDROCHLORIDE 5 MG/ML
5 INJECTION INTRAMUSCULAR; INTRAVENOUS
Status: DISCONTINUED | OUTPATIENT
Start: 2018-05-07 | End: 2018-05-07 | Stop reason: HOSPADM

## 2018-05-07 RX ADMIN — GABAPENTIN 800 MG: 400 CAPSULE ORAL at 21:06

## 2018-05-07 RX ADMIN — FAMOTIDINE 20 MG: 10 INJECTION INTRAVENOUS at 21:06

## 2018-05-07 RX ADMIN — SODIUM CHLORIDE, POTASSIUM CHLORIDE, SODIUM LACTATE AND CALCIUM CHLORIDE 1000 ML: 600; 310; 30; 20 INJECTION, SOLUTION INTRAVENOUS at 06:02

## 2018-05-07 RX ADMIN — ESCITALOPRAM 10 MG: 10 TABLET, FILM COATED ORAL at 14:27

## 2018-05-07 RX ADMIN — CLINDAMYCIN PHOSPHATE 900 MG: 18 INJECTION, SOLUTION INTRAVENOUS at 15:48

## 2018-05-07 RX ADMIN — OXYCODONE HYDROCHLORIDE AND ACETAMINOPHEN 2 TABLET: 10; 325 TABLET ORAL at 21:12

## 2018-05-07 RX ADMIN — GABAPENTIN 800 MG: 400 CAPSULE ORAL at 14:27

## 2018-05-07 RX ADMIN — FENTANYL CITRATE 250 MCG: 50 INJECTION INTRAMUSCULAR; INTRAVENOUS at 07:25

## 2018-05-07 RX ADMIN — FAMOTIDINE 20 MG: 10 INJECTION INTRAVENOUS at 14:28

## 2018-05-07 RX ADMIN — CLINDAMYCIN PHOSPHATE 900 MG: 18 INJECTION, SOLUTION INTRAVENOUS at 07:30

## 2018-05-07 RX ADMIN — DEXAMETHASONE SODIUM PHOSPHATE 4 MG: 4 INJECTION, SOLUTION INTRAMUSCULAR; INTRAVENOUS at 09:40

## 2018-05-07 RX ADMIN — ONDANSETRON HYDROCHLORIDE 4 MG: 2 SOLUTION INTRAMUSCULAR; INTRAVENOUS at 09:40

## 2018-05-07 RX ADMIN — PROPOFOL 50 MG: 10 INJECTION, EMULSION INTRAVENOUS at 07:25

## 2018-05-07 RX ADMIN — OXYCODONE HYDROCHLORIDE AND ACETAMINOPHEN 2 TABLET: 10; 325 TABLET ORAL at 14:34

## 2018-05-07 RX ADMIN — KETAMINE HYDROCHLORIDE 100 MG: 50 INJECTION, SOLUTION INTRAMUSCULAR; INTRAVENOUS at 07:25

## 2018-05-07 RX ADMIN — LIDOCAINE HYDROCHLORIDE 0.5 ML: 10 INJECTION, SOLUTION EPIDURAL; INFILTRATION; INTRACAUDAL; PERINEURAL at 06:03

## 2018-05-07 RX ADMIN — SODIUM CHLORIDE 75 ML/HR: 9 INJECTION, SOLUTION INTRAVENOUS at 13:41

## 2018-05-07 RX ADMIN — FENTANYL CITRATE 250 MCG: 50 INJECTION INTRAMUSCULAR; INTRAVENOUS at 08:30

## 2018-05-07 RX ADMIN — SODIUM CHLORIDE, POTASSIUM CHLORIDE, SODIUM LACTATE AND CALCIUM CHLORIDE 30 ML/HR: 600; 310; 30; 20 INJECTION, SOLUTION INTRAVENOUS at 06:03

## 2018-05-07 RX ADMIN — DEXAMETHASONE SODIUM PHOSPHATE 4 MG: 4 INJECTION, SOLUTION INTRA-ARTICULAR; INTRALESIONAL; INTRAMUSCULAR; INTRAVENOUS; SOFT TISSUE at 06:35

## 2018-05-07 RX ADMIN — CLINDAMYCIN PHOSPHATE 900 MG: 18 INJECTION, SOLUTION INTRAVENOUS at 23:05

## 2018-05-07 RX ADMIN — MORPHINE SULFATE 2 MG: 2 INJECTION, SOLUTION INTRAMUSCULAR; INTRAVENOUS at 11:30

## 2018-05-07 RX ADMIN — ROCURONIUM BROMIDE 50 MG: 10 INJECTION INTRAVENOUS at 07:25

## 2018-05-07 RX ADMIN — ACETAMINOPHEN 1000 MG: 500 TABLET, FILM COATED ORAL at 06:35

## 2018-05-07 RX ADMIN — IPRATROPIUM BROMIDE AND ALBUTEROL SULFATE 3 ML: 2.5; .5 SOLUTION RESPIRATORY (INHALATION) at 06:35

## 2018-05-07 RX ADMIN — OXYCODONE HYDROCHLORIDE 20 MG: 20 TABLET, FILM COATED, EXTENDED RELEASE ORAL at 06:01

## 2018-05-07 RX ADMIN — ROCURONIUM BROMIDE 50 MG: 10 INJECTION INTRAVENOUS at 08:30

## 2018-05-07 RX ADMIN — GLYCOPYRROLATE 0.4 MG: 0.2 INJECTION, SOLUTION INTRAMUSCULAR; INTRAVENOUS at 09:45

## 2018-05-07 RX ADMIN — LIDOCAINE HYDROCHLORIDE 100 MG: 20 INJECTION, SOLUTION INFILTRATION; PERINEURAL at 07:25

## 2018-05-07 RX ADMIN — EPHEDRINE SULFATE 15 MG: 50 INJECTION INTRAMUSCULAR; INTRAVENOUS; SUBCUTANEOUS at 07:50

## 2018-05-07 RX ADMIN — Medication 5 MG: at 09:45

## 2018-05-07 RX ADMIN — MIDAZOLAM HYDROCHLORIDE 2 MG: 1 INJECTION, SOLUTION INTRAMUSCULAR; INTRAVENOUS at 06:35

## 2018-05-07 RX ADMIN — MORPHINE SULFATE 2 MG: 2 INJECTION, SOLUTION INTRAMUSCULAR; INTRAVENOUS at 11:20

## 2018-05-07 NOTE — OP NOTE
Arminda Jiménez  5/7/2018     PREOPERATIVE DIAGNOSIS: M54.16     POSTOPERATIVE DIAGNOSIS: same     PROCEDURE PERFORMED:   1.  Anterior lumbar interbody fusion of L5-S1 with instrumentation     SURGEON: Chris Haley DO   COSURGEON: Kevon Zheng MD    ANESTHESIA: General.    PREPARATION: Routine.    STAFF: Circulator: Jose Farah RN  Physician Assistant: Iftikhar Ochoa PA-C  Scrub Person: Henna Valero; Dominic Wood  Vendor Representative: Shaan Preston    ESTIMATED BLOOD LOSS: 50ml    SPECIMENS: None    COMPLICATIONS: None    INDICATIONS: Arminda Jiménez is a 57 y.o. female who has chronic back pain.  She complaints of pain to her tailbone, her right leg is burning, and numbness. She is scheduled with Dr. Zheng on 5/7/18.  The indications, risks, and possible complications of the procedure were explained to the patient, who voiced understanding and wished to proceed with surgery.     PROCEDURE IN DETAIL: The patient was taken to the operating room and placed on the operating table in a supine position. After general anesthesia was obtained, the abdomen was prepped and draped in a standard sterile fashion.  A transverse incision was then made in the left lower quadrant.  Careful dissection was made down through the subcutaneous tissues using the Bovie cautery to ensure hemostasis.  The rectus fascia was quickly identified.  It was incised with the Bovie cautery.  Kandice clamps were placed on each side of the rectus fascia and subfascial planes were established.  This was done in a cephalad and caudad direction.  Once the subfascial planes were established with the Bovie cautery and blunt finger dissection the attention was then turned to the left rectus muscle.  Blunt mobilization of the left rectus muscle including its blood supply was performed medially and to the right.  Once it was fully mobilized the attention was then turned laterally to the peritoneal reflection.  Entrance  into the retroperitoneal space was established with the use of Bovie cautery and sponge stick.  Continued blunt mobilization was made of the peritoneal sac and the sacral fat pad using a finger sweeping motion moving the contents medially and to the right.  Once it was fully mobilized the Brau-Villela retractor system was set up.  The retractor blades were set in place.  The left iliac vein was very carefully dissected free with the right angle and [place].  One small side branch was ligated with medium hemoclips.  Once the vein was fully mobilized it was placed safely behind the retractor blades.  The sacral vessels were then taken down with hemoclips as well.  At this point full exposure was safely established of the L5-S1 disc space.  The next part of the case will be dictated by Dr. Zheng.  Upon completion of Dr. Zheng's part of the case the wound bed was irrigated with antibiotic saline and hemostasis was observed.  The retractor blades were then carefully taken out one at a time placing the structures back in the normal anatomic positions.  The rectus fascia was then closed with a #1 PDS to meet in the midline.  Archana's fascia was closed with a 2-0 Vicryl in a running fashion.  The subcutaneous layers were closed with a 3-0 Vicryl in a running fashion.  The skin was then reapproximated using a 4-0 Monocryl in a subcuticular fashion.  The wound was then cleaned.  Sterile dressings were applied. The patient tolerated the procedure well. Sponge and needle counts were correct. The patient was then awakened and extubated in the operating room and taken to the recovery room in good condition.  Chris Haley DO  Date: 5/7/2018 Time: 9:58 AM     CC:Bam Landis MD

## 2018-05-07 NOTE — ANESTHESIA POSTPROCEDURE EVALUATION
Patient: Arminda Jiménez    Procedure Summary     Date:  05/07/18 Room / Location:  [facility] OR  /  [facility] OR    Anesthesia Start:  0723 Anesthesia Stop:  1010    Procedures:       ANTERIOR DECOMPRESSION, ANTERIOR LUMBAR INTERBODY FUSION WITH INSTRUMENTATION L5-S1 (N/A Spine Lumbar)      ANTERIOR LUMBAR EXPOSURE (N/A Abdomen) Diagnosis:  (M54.16)    Surgeon:  LUIS CARLOS Zheng MD; Chris Haley DO Provider:  TL Stahl CRNA    Anesthesia Type:  general ASA Status:  3          Anesthesia Type: general  Last vitals  BP   (!) 87/64 (05/07/18 1110)   Temp   97.3 °F (36.3 °C) (05/07/18 1055)   Pulse   82 (05/07/18 1110)   Resp   12 (05/07/18 1110)     SpO2   94 % (05/07/18 1110)     Post Anesthesia Care and Evaluation    Patient location during evaluation: PACU  Patient participation: complete - patient participated  Level of consciousness: awake and alert  Pain score: 0  Pain management: adequate  Airway patency: patent  Anesthetic complications: No anesthetic complications    Cardiovascular status: acceptable and stable  Respiratory status: acceptable and unassisted  Hydration status: acceptable

## 2018-05-07 NOTE — PLAN OF CARE
Problem: Fall Risk (Adult)  Goal: Identify Related Risk Factors and Signs and Symptoms  Outcome: Outcome(s) achieved Date Met: 05/07/18 05/07/18 1419   Fall Risk (Adult)   Related Risk Factors (Fall Risk) fear of falling;environment unfamiliar   Signs and Symptoms (Fall Risk) presence of risk factors       Problem: Laminectomy/Foraminotomy/Discectomy (Adult)  Goal: Signs and Symptoms of Listed Potential Problems Will be Absent, Minimized or Managed (Laminectomy/Foraminotomy/Discectomy)  Outcome: Outcome(s) achieved Date Met: 05/07/18 05/07/18 1419   Goal/Outcome Evaluation   Problems Assessed (Laminectomy/Laminotomy/Discectomy) pain   Problems Present (Laminectomy/otomy) pain

## 2018-05-07 NOTE — ANESTHESIA PREPROCEDURE EVALUATION
Anesthesia Evaluation     Patient summary reviewed   no history of anesthetic complications:  NPO Solid Status: > 8 hours  NPO Liquid Status: > 2 hours           Airway   Mallampati: II  TM distance: >3 FB  Neck ROM: full  Dental          Pulmonary - normal exam    breath sounds clear to auscultation  (+) a smoker (quit 2010) Former, COPD, asthma,   (-) recent URI  Cardiovascular - normal exam  Exercise tolerance: poor (<4 METS) (Limited by back and leg pain, some SOB, but denies chest pain)    ECG reviewed  Rhythm: regular  Rate: normal    (+) CHF (2010, during bout with cancer),   (-) pacemaker, hypertension, past MI, cardiac stents      Neuro/Psych  (+) TIA (2010),     (-) seizures, CVA  GI/Hepatic/Renal/Endo    (+)  GERD,    (-) liver disease, no renal disease, diabetes, hypothyroidism, hyperthyroidism    Musculoskeletal     Abdominal    Substance History      OB/GYN          Other      history of cancer (Per patient, unknown primary in 2010, reports it was in her right lung, abdominal, and pelvic areas, underwent chemotherapy, no further issues)                  Anesthesia Plan    ASA 3     general     intravenous induction   Anesthetic plan and risks discussed with patient.

## 2018-05-07 NOTE — ANESTHESIA PROCEDURE NOTES
Airway  Urgency: elective    Airway not difficult    General Information and Staff    Patient location during procedure: OR  CRNA: TL GANDHI    Indications and Patient Condition  Indications for airway management: airway protection    Preoxygenated: yes  MILS maintained throughout  Mask difficulty assessment: 1 - vent by mask    Final Airway Details  Final airway type: endotracheal airway      Successful airway: ETT  Cuffed: yes   Successful intubation technique: direct laryngoscopy  Facilitating devices/methods: intubating stylet  Endotracheal tube insertion site: oral  Blade: [place]  Blade size: #2  ETT size: 8.0 mm  Cormack-Lehane Classification: grade I - full view of glottis  Placement verified by: chest auscultation and capnometry   Measured from: lips  ETT to lips (cm): 21  Number of attempts at approach: 1

## 2018-05-07 NOTE — THERAPY EVALUATION
Acute Care - Occupational Therapy Initial Evaluation  [facility]     Patient Name: Arminda Jiménez  : 1960  MRN: [id number]  Today's Date: 2018  Onset of Illness/Injury or Date of Surgery: 18  Date of Referral to OT: 18  Referring Physician: Dr. Zheng    Admit Date: 2018       ICD-10-CM ICD-9-CM   1. Impaired mobility and ADLs Z74.09 799.89     Patient Active Problem List   Diagnosis   • Foraminal stenosis of lumbosacral region   • Chronic back pain   • Radiculopathy   • Lumbar stenosis     Past Medical History:   Diagnosis Date   • Arthritis    • Asthma    • Cancer     right lung,abd,and pelvic area   • CHF (congestive heart failure)    • Chronic back pain    • COPD (chronic obstructive pulmonary disease)    • Depression    • Facet arthropathy    • Foraminal stenosis of lumbosacral region    • GERD (gastroesophageal reflux disease)    • H/O degenerative disc disease    • History of blood transfusion    • PVD (peripheral vascular disease)    • Radiculopathy      Past Surgical History:   Procedure Laterality Date   • APPENDECTOMY     • CARPAL TUNNEL RELEASE Bilateral    • OTHER SURGICAL HISTORY Bilateral 2017    Stent implants [facility] [address] [place]          OT ASSESSMENT FLOWSHEET (last 72 hours)      Occupational Therapy Evaluation     Row Name 18 1445                   OT Evaluation Time/Intention    Subjective Information complains of;pain;fatigue;weakness  -MM        Document Type evaluation   see MAR  -MM        Mode of Treatment occupational therapy  -MM           General Information    Patient Profile Reviewed? yes  -MM        Onset of Illness/Injury or Date of Surgery 18  -MM        Referring Physician Dr. Zheng  -MM        Patient Observations alert;agree to therapy;cooperative  -MM        Patient/Family Observations no family present  -MM        General Observations of Patient sitting EOB, brace donned, capps, IV  -MM        Prior Level of Function  independent:;all household mobility;community mobility;transfer;bed mobility;ADL's;driving;cleaning;cooking;home management   limited by pain at times  -MM        Equipment Currently Used at Home none  -MM        Pertinent History of Current Functional Problem Increasing back and radicular pain R >L. 5/7 s/p discectomy with fusion L5-S1. 2nd part sx scheduled for Wednesday 5/9.  -MM        Existing Precautions/Restrictions brace worn when out of bed;fall;spinal  -MM        Risks Reviewed patient:;LOB;nausea/vomiting;dizziness;increased discomfort  -MM        Benefits Reviewed patient:;improve function;increase independence;increase strength;increase balance;decrease pain  -MM        Barriers to Rehab physical barrier  -MM           Relationship/Environment    Lives With alone  -MM        Family Caregiver if Needed child(leno), adult   daughter will be staying with pt at d/c  -MM           Resource/Environmental Concerns    Current Living Arrangements home/apartment/condo  -MM        Resource/Environmental Concerns home accessibility  -MM           Home Main Entrance    Number of Stairs, Main Entrance one  -MM        Stair Railings, Main Entrance none  -MM           Cognitive Assessment/Interventions    Additional Documentation Cognitive Assessment/Intervention (Group)  -MM           Cognitive Assessment/Intervention- PT/OT    Affect/Mental Status (Cognitive) WNL  -MM        Orientation Status (Cognition) oriented x 4  -MM        Follows Commands (Cognition) WFL  -MM        Cognitive Function (Cognitive) WFL  -MM        Personal Safety Interventions fall prevention program maintained;muscle strengthening facilitated;nonskid shoes/slippers when out of bed;supervised activity  -MM           Bed Mobility Assessment/Treatment    Bed Mobility Assessment/Treatment supine-sit;sit-supine  -MM        Supine-Sit [place] (Bed Mobility) not tested   sitting EOB upon entering room  -MM        Sit-Supine [place] (Bed  "Mobility) supervision  -MM        Bed Mobility, Safety Issues impaired trunk control for bed mobility  -MM        Assistive Device (Bed Mobility) head of bed elevated  -MM           Functional Mobility    Functional Mobility- Ind. Level contact guard assist  -MM        Functional Mobility- Device --   HHA x1`  -MM        Functional Mobility- Comment to BR door and back to bed  -MM           Transfer Assessment/Treatment    Transfer Assessment/Treatment sit-stand transfer;stand-sit transfer  -MM        Sit-Stand [place] (Transfers) contact guard  -MM        Stand-Sit [place] (Transfers) contact guard  -MM           ADL Assessment/Intervention    BADL Assessment/Intervention upper body dressing  -MM           Upper Body Dressing Assessment/Training    Upper Body Dressing [place] Level doff;maximum assist (25% patient effort)   LSO  -MM        Upper Body Dressing Position edge of bed sitting  -MM        Comment (Upper Body Dressing) while education was being completed  -MM           BADL Safety/Performance    Impairments, BADL Safety/Performance pain  -MM           Sensory Assessment/Intervention    Sensory General Assessment no sensation deficits identified  -MM           Positioning and Restraints    Pre-Treatment Position in bed  -MM        Post Treatment Position bed  -MM        In Bed fowlers;call light within reach;encouraged to call for assist;side rails up x2  -MM           Pain Assessment    Additional Documentation Pain Scale: FACES Pre/Post-Treatment (Group)  -MM           Pain Scale: Numbers Pre/Post-Treatment    Pain Location - Side Left  -MM        Pain Location hip  -MM        Pain Intervention(s) Repositioned;Ambulation/increased activity   brace  -MM           Pain Scale: FACES Pre/Post-Treatment    Pain: FACES Scale, Pretreatment 4-->hurts little more  -MM        Pain: FACES Scale, Post-Treatment 4-->hurts little more  -MM        Pre/Post Treatment Pain Comment \"stiffness\"  -MM        "    Orthotics & Prosthetics Management    Orthosis Location spinal orthosis  -MM        Additional Documentation Orthosis Location (Row)  -MM           Spinal Orthosis Management    Type (Spinal Orthosis) LSO (lumbar sacral orthosis)  -MM        Functional Design (Spinal Orthosis) static orthosis  -MM        Therapeutic Indications (Spinal Orthosis) post-op positioning/protection;rest/inflammation reduction  -MM        Wearing Schedule (Spinal Orthosis) wear when out of bed only  -MM        Orthosis Training (Spinal Orthosis) patient;activity limitations/precautions;donning/doffing orthosis;purpose/goals of orthosis  -MM        Compliance/Wearing Issues (Spinal Orthosis) follow-up training required  -MM           Wound 05/07/18 0946 Other (See comments) abdomen incision    Wound - Properties Group Date first assessed: 05/07/18  -RS Time first assessed: 0946  -RS Side: Other (See comments)  -RS Location: abdomen  -RS Type: incision  -RS       Plan of Care Review    Plan of Care Reviewed With patient  -MM           Clinical Impression (OT)    Date of Referral to OT 05/07/18  -MM        OT Diagnosis impaired mobility and adls  -MM        Functional Level at Time of Evaluation (OT Eval) good  -MM        Patient/Family Goals Statement (OT Eval) regain independence  -MM        Criteria for Skilled Therapeutic Interventions Met (OT Eval) yes;treatment indicated  -MM        Rehab Potential (OT Eval) good, to achieve stated therapy goals  -MM        Therapy Frequency (OT Eval) daily  -MM        Predicted Duration of Therapy Intervention (OT Eval) until dc  -MM        Care Plan Review (OT) evaluation/treatment results reviewed;care plan/treatment goals reviewed;risks/benefits reviewed;current/potential barriers reviewed;patient/other agree to care plan  -MM        Anticipated Discharge Disposition (OT) home or self care  -MM           Vital Signs    Pre SpO2 (%) 96  -MM        O2 Delivery Pre Treatment supplemental O2  -MM         Post SpO2 (%) 92  -MM        O2 Delivery Post Treatment room air   supplemental donned after reading was taken  -MM        Pre Patient Position Sitting  -MM        Intra Patient Position Standing  -MM        Post Patient Position Sitting  -MM           Planned OT Interventions    Planned Therapy Interventions (OT Eval) activity tolerance training;adaptive equipment training;BADL retraining;functional balance retraining;occupation/activity based interventions;orthotic fabrication/fitting/training;patient/caregiver education/training;transfer/mobility retraining;ROM/therapeutic exercise  -MM           OT Goals    Bed Mobility Goal Selection (OT) bed mobility, OT goal 1  -MM        Dressing Goal Selection (OT) dressing, OT goal 1;dressing, OT goal 2  -MM           Bed Mobility Goal 1 (OT)    Activity/Assistive Device (Bed Mobility Goal 1, OT) sit to supine/supine to sit  -MM        [place] Level/Cues Needed (Bed Mobility Goal 1, OT) independent   with log rolling technique  -MM        Time Frame (Bed Mobility Goal 1, OT) 10 days  -MM        Progress/Outcomes (Bed Mobility Goal 1, OT) goal ongoing  -MM           Dressing Goal 1 (OT)    Activity/Assistive Device (Dressing Goal 1, OT) upper body dressing  -MM        [place]/Cues Needed (Dressing Goal 1, OT) set-up required;conditional independence  -MM        Time Frame (Dressing Goal 1, OT) 10 days  -MM        Barriers (Dressing Goal 1, OT) LSO  -MM        Progress/Outcome (Dressing Goal 1, OT) goal ongoing  -MM           Dressing Goal 2 (OT)    Activity/Assistive Device (Dressing Goal 2, OT) lower body dressing   AE PRN  -MM        [place]/Cues Needed (Dressing Goal 2, OT) conditional independence;set-up required  -MM        Time Frame (Dressing Goal 2, OT) 10 days  -MM        Progress/Outcome (Dressing Goal 2, OT) goal ongoing  -MM           Patient Education Goal (OT)    Activity (Patient Education Goal, OT) spinal precautions, log rolling, bracing   -MM        [place]/Cues/Accuracy (Memory Goal 2, OT) demonstrates adequately;independent;verbalizes understanding  -MM        Time Frame (Patient Education Goal, OT) 10 days  -MM        Progress/Outcome (Patient Education Goal, OT) goal ongoing  -MM           Living Environment    Home Accessibility stairs to enter home;tub/shower is not walk in   walk in shower and bath tub  -MM          User Key  (r) = Recorded By, (t) = Taken By, (c) = Cosigned By    Initials Name Effective Dates    RS Jose Farah RN 07/26/16 -     MM Sascha Martin OTR/ABDOULAYE 04/03/18 -            Occupational Therapy Education     Title: PT OT SLP Therapies (Active)     Topic: Occupational Therapy (Active)     Point: ADL training (Active)     Description: Instruct learner(s) on proper safety adaptation and remediation techniques during self care or transfers.   Instruct in proper use of assistive devices.   Learning Progress Summary     Learner Status Readiness Method Response Comment Documented by    Patient Active Acceptance E NR OT role, benefits, POC, bracing and spinal precatuions, home safety  05/07/18 1632          Point: Precautions (Active)     Description: Instruct learner(s) on prescribed precautions during self-care and functional transfers.   Learning Progress Summary     Learner Status Readiness Method Response Comment Documented by    Patient Active Acceptance E NR OT role, benefits, POC, bracing and spinal precatuions, home safety  05/07/18 1632                      User Key     Initials Effective Dates Name Provider Type Discipline     04/03/18 -  Sascha Martin OTR/L Occupational Therapist OT                  OT Recommendation and Plan  Outcome Summary/Treatment Plan (OT)  Anticipated Discharge Disposition (OT): home or self care  Planned Therapy Interventions (OT Eval): activity tolerance training, adaptive equipment training, BADL retraining, functional balance retraining, occupation/activity based  "interventions, orthotic fabrication/fitting/training, patient/caregiver education/training, transfer/mobility retraining, ROM/therapeutic exercise  Therapy Frequency (OT Eval): daily  Plan of Care Review  Plan of Care Reviewed With: patient  Plan of Care Reviewed With: patient  Outcome Summary: OT eval completed. Pt reports mild \"stiffness in L hip\". Pt sitting EOB in brace upon entering the room. Pt was supervision to CGA for sit to supine, t/f and functional mobility within room. pt was max A to doff LSO while education was being completed. Pt would benefit from continued OT for education, adls and functional mobilty. Recommended d/c home with assist pending pt progress after 2nd sx.          Outcome Measures     Row Name 05/07/18 1600             How much help from another is currently needed...    Putting on and taking off regular lower body clothing? 2  -MM      Bathing (including washing, rinsing, and drying) 2  -MM      Toileting (which includes using toilet bed pan or urinal) 2  -MM      Putting on and taking off regular upper body clothing 2  -MM      Taking care of personal grooming (such as brushing teeth) 3  -MM      Eating meals 3  -MM      Score 14  -MM         Functional Assessment    Outcome Measure Options AM-PAC 6 Clicks Daily Activity (OT)  -MM        User Key  (r) = Recorded By, (t) = Taken By, (c) = Cosigned By    Initials Name Provider Type    MICHELLE Martin OTR/L Occupational Therapist          Time Calculation:   OT Start Time: 1445  OT Stop Time: 1524  OT Time Calculation (min): 39 min    Therapy Charges for Today     Code Description Service Date Service Provider Modifiers Qty    [id number]  OT SELFCARE CURRENT 5/7/2018 BACILIO Weber/L GO, CK 1    [id number] HC OT SELFCARE PROJECTED 5/7/2018 BACILIO Weber/L GO, CI 1    [id number]  OT EVAL LOW COMPLEXITY 3 5/7/2018 BACILIO Weber/L GO, KX 1          OT G-codes  OT Professional Judgement Used?: Yes  OT " Functional Scales Options: AM-PAC 6 Clicks Daily Activity (OT)  Score: 14  Functional Limitation: Self care  Self Care Current Status (): At least 40 percent but less than 60 percent impaired, limited or restricted  Self Care Goal Status (): At least 1 percent but less than 20 percent impaired, limited or restricted    Sascha Martin, OTR/L  5/7/2018

## 2018-05-07 NOTE — PLAN OF CARE
"Problem: Patient Care Overview  Goal: Plan of Care Review  Outcome: Ongoing (interventions implemented as appropriate)   05/07/18 3106   Coping/Psychosocial   Plan of Care Reviewed With patient   Plan of Care Review   Progress no change   OTHER   Outcome Summary OT eval completed. Pt reports mild \"stiffness in L hip\". Pt sitting EOB in brace upon entering the room. Pt was supervision to CGA for sit to supine, t/f and functional mobility within room. pt was max A to doff LSO while education was being completed. Pt would benefit from continued OT for education, adls and functional mobilty. Recommended d/c home with assist pending pt progress after 2nd sx.         "

## 2018-05-07 NOTE — BRIEF OP NOTE
ANTERIOR LUMBAR EXPOSURE  Progress Note    Arminda Jiménez  5/7/2018    Pre-op Diagnosis:   M54.16       Post-Op Diagnosis Codes:   same    Procedure/CPT® Codes:      Procedure(s):  ANTERIOR DECOMPRESSION, ANTERIOR LUMBAR INTERBODY FUSION WITH INSTRUMENTATION L5-S1  ANTERIOR LUMBAR EXPOSURE    Surgeon(s):  MD Chris Goins DO Griffin K Bicking, DO    Anesthesia: Choice    Staff:   Circulator: Jose Farah RN  Physician Assistant: Iftikhar Ochoa PA-C  Scrub Person: Henna Valero; Dominic Wood  Vendor Representative: Shaan Preston    Estimated Blood Loss: 50ml    Urine Voided: * No values recorded between 5/7/2018  7:21 AM and 5/7/2018  9:58 AM *    Specimens:                None      Complications: none      Chris Haley DO     Date: 5/7/2018  Time: 9:58 AM

## 2018-05-08 ENCOUNTER — ANESTHESIA EVENT (OUTPATIENT)
Dept: PERIOP | Facility: HOSPITAL | Age: 58
End: 2018-05-08

## 2018-05-08 LAB
ANION GAP SERPL CALCULATED.3IONS-SCNC: 11 MMOL/L (ref 4–13)
BASOPHILS # BLD AUTO: 0.03 10*3/MM3 (ref 0–0.2)
BASOPHILS NFR BLD AUTO: 0.2 % (ref 0–2)
BUN BLD-MCNC: 10 MG/DL (ref 5–21)
BUN/CREAT SERPL: 16.1 (ref 7–25)
CALCIUM SPEC-SCNC: 8.1 MG/DL (ref 8.4–10.4)
CHLORIDE SERPL-SCNC: 105 MMOL/L (ref 98–110)
CO2 SERPL-SCNC: 25 MMOL/L (ref 24–31)
CREAT BLD-MCNC: 0.62 MG/DL (ref 0.5–1.4)
DEPRECATED RDW RBC AUTO: 42.3 FL (ref 40–54)
EOSINOPHIL # BLD AUTO: 0.01 10*3/MM3 (ref 0–0.7)
EOSINOPHIL NFR BLD AUTO: 0.1 % (ref 0–4)
ERYTHROCYTE [DISTWIDTH] IN BLOOD BY AUTOMATED COUNT: 12.3 % (ref 12–15)
GFR SERPL CREATININE-BSD FRML MDRD: 99 ML/MIN/1.73
GLUCOSE BLD-MCNC: 128 MG/DL (ref 70–100)
HCT VFR BLD AUTO: 29.7 % (ref 37–47)
HGB BLD-MCNC: 9.8 G/DL (ref 12–16)
IMM GRANULOCYTES # BLD: 0.04 10*3/MM3 (ref 0–0.03)
IMM GRANULOCYTES NFR BLD: 0.3 % (ref 0–5)
LYMPHOCYTES # BLD AUTO: 2.06 10*3/MM3 (ref 0.72–4.86)
LYMPHOCYTES NFR BLD AUTO: 15.5 % (ref 15–45)
MCH RBC QN AUTO: 30.6 PG (ref 28–32)
MCHC RBC AUTO-ENTMCNC: 33 G/DL (ref 33–36)
MCV RBC AUTO: 92.8 FL (ref 82–98)
MONOCYTES # BLD AUTO: 0.99 10*3/MM3 (ref 0.19–1.3)
MONOCYTES NFR BLD AUTO: 7.4 % (ref 4–12)
NEUTROPHILS # BLD AUTO: 10.18 10*3/MM3 (ref 1.87–8.4)
NEUTROPHILS NFR BLD AUTO: 76.5 % (ref 39–78)
NRBC BLD MANUAL-RTO: 0 /100 WBC (ref 0–0)
PLATELET # BLD AUTO: 196 10*3/MM3 (ref 130–400)
PMV BLD AUTO: 10.1 FL (ref 6–12)
POTASSIUM BLD-SCNC: 4 MMOL/L (ref 3.5–5.3)
RBC # BLD AUTO: 3.2 10*6/MM3 (ref 4.2–5.4)
SODIUM BLD-SCNC: 141 MMOL/L (ref 135–145)
WBC NRBC COR # BLD: 13.31 10*3/MM3 (ref 4.8–10.8)

## 2018-05-08 PROCEDURE — 97161 PT EVAL LOW COMPLEX 20 MIN: CPT | Performed by: PHYSICAL THERAPIST

## 2018-05-08 PROCEDURE — 94799 UNLISTED PULMONARY SVC/PX: CPT

## 2018-05-08 PROCEDURE — 94640 AIRWAY INHALATION TREATMENT: CPT

## 2018-05-08 PROCEDURE — 85025 COMPLETE CBC W/AUTO DIFF WBC: CPT | Performed by: ORTHOPAEDIC SURGERY

## 2018-05-08 PROCEDURE — G8979 MOBILITY GOAL STATUS: HCPCS | Performed by: PHYSICAL THERAPIST

## 2018-05-08 PROCEDURE — G8978 MOBILITY CURRENT STATUS: HCPCS | Performed by: PHYSICAL THERAPIST

## 2018-05-08 PROCEDURE — 94760 N-INVAS EAR/PLS OXIMETRY 1: CPT

## 2018-05-08 PROCEDURE — 97116 GAIT TRAINING THERAPY: CPT

## 2018-05-08 PROCEDURE — 80048 BASIC METABOLIC PNL TOTAL CA: CPT | Performed by: ORTHOPAEDIC SURGERY

## 2018-05-08 RX ORDER — CLINDAMYCIN PHOSPHATE 900 MG/50ML
900 INJECTION INTRAVENOUS ONCE
Status: COMPLETED | OUTPATIENT
Start: 2018-05-08 | End: 2018-05-09

## 2018-05-08 RX ORDER — IPRATROPIUM BROMIDE AND ALBUTEROL SULFATE 2.5; .5 MG/3ML; MG/3ML
3 SOLUTION RESPIRATORY (INHALATION)
Status: DISCONTINUED | OUTPATIENT
Start: 2018-05-08 | End: 2018-05-10 | Stop reason: HOSPADM

## 2018-05-08 RX ADMIN — IPRATROPIUM BROMIDE AND ALBUTEROL SULFATE 3 ML: 2.5; .5 SOLUTION RESPIRATORY (INHALATION) at 07:20

## 2018-05-08 RX ADMIN — CLINDAMYCIN PHOSPHATE 900 MG: 18 INJECTION, SOLUTION INTRAVENOUS at 06:52

## 2018-05-08 RX ADMIN — FAMOTIDINE 20 MG: 20 TABLET, FILM COATED ORAL at 21:11

## 2018-05-08 RX ADMIN — ESCITALOPRAM 10 MG: 10 TABLET, FILM COATED ORAL at 10:28

## 2018-05-08 RX ADMIN — GABAPENTIN 800 MG: 400 CAPSULE ORAL at 21:11

## 2018-05-08 RX ADMIN — IPRATROPIUM BROMIDE AND ALBUTEROL SULFATE 3 ML: 2.5; .5 SOLUTION RESPIRATORY (INHALATION) at 11:25

## 2018-05-08 RX ADMIN — POLYETHYLENE GLYCOL (3350) 17 G: 17 POWDER, FOR SOLUTION ORAL at 10:28

## 2018-05-08 RX ADMIN — POLYETHYLENE GLYCOL (3350) 17 G: 17 POWDER, FOR SOLUTION ORAL at 21:11

## 2018-05-08 RX ADMIN — OXYCODONE HYDROCHLORIDE AND ACETAMINOPHEN 2 TABLET: 10; 325 TABLET ORAL at 03:53

## 2018-05-08 RX ADMIN — OXYCODONE HYDROCHLORIDE AND ACETAMINOPHEN 2 TABLET: 10; 325 TABLET ORAL at 21:11

## 2018-05-08 RX ADMIN — OXYCODONE HYDROCHLORIDE AND ACETAMINOPHEN 1 TABLET: 10; 325 TABLET ORAL at 11:19

## 2018-05-08 RX ADMIN — SODIUM CHLORIDE 75 ML/HR: 9 INJECTION, SOLUTION INTRAVENOUS at 04:52

## 2018-05-08 RX ADMIN — GABAPENTIN 800 MG: 400 CAPSULE ORAL at 14:44

## 2018-05-08 RX ADMIN — GABAPENTIN 800 MG: 400 CAPSULE ORAL at 06:52

## 2018-05-08 RX ADMIN — OXYCODONE HYDROCHLORIDE AND ACETAMINOPHEN 1 TABLET: 10; 325 TABLET ORAL at 15:42

## 2018-05-08 RX ADMIN — FAMOTIDINE 20 MG: 20 TABLET, FILM COATED ORAL at 10:28

## 2018-05-08 NOTE — THERAPY TREATMENT NOTE
Acute Care - Physical Therapy Treatment Note  [facility]     Patient Name: Arminda Jiménez  : 1960  MRN: [id number]  Today's Date: 2018  Onset of Illness/Injury or Date of Surgery: 18  Date of Referral to PT: 18  Referring Physician: Dr. Zheng    Admit Date: 2018    Visit Dx:    ICD-10-CM ICD-9-CM   1. Impaired mobility and ADLs Z74.09 799.89   2. Impaired functional mobility, balance, and endurance Z74.09 V49.89     Patient Active Problem List   Diagnosis   • Foraminal stenosis of lumbosacral region   • Chronic back pain   • Radiculopathy   • Lumbar stenosis   • Panlobular emphysema   • Gastroesophageal reflux disease without esophagitis   • Constipation due to opioid therapy   • PAD (peripheral artery disease)   • Primary osteoarthritis involving multiple joints       Therapy Treatment          Rehabilitation Treatment Summary     Row Name 18 1300 18 0918          Treatment Time/Intention    Discipline physical therapy assistant  - --  -TS     Document Type therapy note (daily note)  - --  -TS     Subjective Information complains of;pain  -  --     Existing Precautions/Restrictions fall;spinal;brace worn when out of bed  -  --     Recorded by [] Alexandra Odonnell, SUSI 18 1321 18 1321 [TS] FELIZ Ramsey 18 0919 18 0919     Row Name 18 1300             Bed Mobility Assessment/Treatment    Bed Mobility Assessment/Treatment sidelying-sit;sit-sidelying  -      Sidelying-Sit [place] (Bed Mobility) contact guard;minimum assist (75% patient effort);verbal cues  -      Sit-Sidelying [place] (Bed Mobility) contact guard;minimum assist (75% patient effort);verbal cues  -      Recorded by [] Alexandra Odonnell, SUSI 18 1321 18 1321      Row Name 18 1300             Transfer Assessment/Treatment    Sit-Stand [place] (Transfers) contact guard;verbal cues  -      Stand-Sit [place] (Transfers)  contact guard;verbal cues  -      Recorded by [] Alexandra Odonnell, PTA 05/08/18 1321 05/08/18 1321      Row Name 05/08/18 1300             Gait/Stairs Assessment/Training    [place] Level (Gait) contact guard  -      Assistive Device (Gait) --   HHA  -      Distance in Feet (Gait) 125  -      Recorded by [] Alexandra Odonnell, PTA 05/08/18 1321 05/08/18 1321      Row Name 05/08/18 1300             Positioning and Restraints    Pre-Treatment Position in bed  -      Post Treatment Position bed  -AH      In Bed fowlers;call light within reach;encouraged to call for assist;SCD pump applied;with family/caregiver  -      Recorded by [] Alexandra Odonnell, [facility] 05/08/18 1321 05/08/18 132      Row Name 05/08/18 1300             Pain Assessment    Additional Documentation Pain Scale: Numbers Pre/Post-Treatment (Group)  -      Recorded by [] Alexandra Odonnell, [facility] 05/08/18 132 05/08/18 132      Row Name 05/08/18 1300             Pain Scale: Numbers Pre/Post-Treatment    Pain Scale: Numbers, Pretreatment 8/10  -      Pain Location abdomen  -      Pain Intervention(s) Medication (See MAR);Repositioned;Ambulation/increased activity  -      Recorded by [] Alexandra Odonnell, PTA 05/08/18 1321 05/08/18 1321      Row Name 05/08/18 1300             Spinal Orthosis Management    Type (Spinal Orthosis) LSO (lumbar sacral orthosis)  -      Wearing Schedule (Spinal Orthosis) wear when out of bed only  -      Recorded by [] Alexandra Odonnell, PTA 05/08/18 1321 05/08/18 1321      Row Name                Wound 05/07/18 0946 Other (See comments) abdomen incision    Wound - Properties Group Date first assessed: 05/07/18 [RS] Time first assessed: 0946 [RS] Side: Other (See comments) [RS] Location: abdomen [RS] Type: incision [RS] Recorded by:  [RS] Jose Farah RN 05/07/18 0946 05/07/18 0946      User Key  (r) = Recorded By, (t) = Taken By, (c) = Cosigned By    Initials Name Effective Dates Discipline      Alexandra Odonnell, PTA 08/02/16 -  PT    TS Stacy Ogden, MCELROY/L 08/02/16 -  OT    RS Jose Farah, RN 07/26/16 -  Nurse          Wound 05/07/18 0946 Other (See comments) abdomen incision (Active)   Dressing Appearance dry;intact 5/8/2018 10:00 AM   Dressing Care, Wound gauze;transparent film 5/8/2018 10:00 AM               PT Rehab Goals     Row Name 05/08/18 1000             Bed Mobility Goal 1 (PT)    Activity/Assistive Device (Bed Mobility Goal 1, PT) bed mobility activities, all  -MS      [place] Level/Cues Needed (Bed Mobility Goal 1, PT) independent  -MS      Time Frame (Bed Mobility Goal 1, PT) long term goal (LTG);by discharge  -MS      Progress/Outcomes (Bed Mobility Goal 1, PT) goal ongoing  -MS         Transfer Goal 1 (PT)    Activity/Assistive Device (Transfer Goal 1, PT) transfers, all  -MS      [place] Level/Cues Needed (Transfer Goal 1, PT) independent  -MS      Time Frame (Transfer Goal 1, PT) long term goal (LTG);by discharge  -MS      Progress/Outcome (Transfer Goal 1, PT) goal ongoing  -MS         Gait Training Goal 1 (PT)    Activity/Assistive Device (Gait Training Goal 1, PT) gait (walking locomotion)  -MS      [place] Level (Gait Training Goal 1, PT) independent  -MS      Distance (Gait Goal 1, PT) 200  -MS      Time Frame (Gait Training Goal 1, PT) long term goal (LTG);by discharge  -MS      Progress/Outcome (Gait Training Goal 1, PT) goal ongoing  -MS        User Key  (r) = Recorded By, (t) = Taken By, (c) = Cosigned By    Initials Name Provider Type Discipline    MS Betty Kuhn, PT DPT Physical Therapist PT          Physical Therapy Education     Title: PT OT SLP Therapies (Active)     Topic: Physical Therapy (Active)     Point: Precautions (Done)    Learning Progress Summary     Learner Status Readiness Method Response Comment Documented by    Patient Done Eager TB VU surgery precautions, use of brace MS 05/08/18 1051                      User Key     Initials  Effective Dates Name Provider Type Discipline    MS 08/02/16 -  Betty Kuhn, PT DPT Physical Therapist PT                    PT Recommendation and Plan                Outcome Measures     Row Name 05/08/18 1000 05/07/18 1600          How much help from another person do you currently need...    Turning from your back to your side while in flat bed without using bedrails? 3  -MS  --     Moving from lying on back to sitting on the side of a flat bed without bedrails? 3  -MS  --     Moving to and from a bed to a chair (including a wheelchair)? 3  -MS  --     Standing up from a chair using your arms (e.g., wheelchair, bedside chair)? 3  -MS  --     Climbing 3-5 steps with a railing? 3  -MS  --     To walk in hospital room? 3  -MS  --     AM-PAC 6 Clicks Score 18  -MS  --        How much help from another is currently needed...    Putting on and taking off regular lower body clothing?  -- 2  -MM     Bathing (including washing, rinsing, and drying)  -- 2  -MM     Toileting (which includes using toilet bed pan or urinal)  -- 2  -MM     Putting on and taking off regular upper body clothing  -- 2  -MM     Taking care of personal grooming (such as brushing teeth)  -- 3  -MM     Eating meals  -- 3  -MM     Score  -- 14  -MM        Functional Assessment    Outcome Measure Options AM-PAC 6 Clicks Basic Mobility (PT)  -MS AM-PAC 6 Clicks Daily Activity (OT)  -MM       User Key  (r) = Recorded By, (t) = Taken By, (c) = Cosigned By    Initials Name Provider Type    MS Betty Kuhn, PT DPT Physical Therapist    MM Sascha Martin, OTR/L Occupational Therapist           Time Calculation:         PT Charges     Row Name 05/08/18 1322 05/08/18 1056          Time Calculation    Start Time 1300  -AH 1000  -MS     Stop Time 1323  -AH 1025  -MS     Time Calculation (min) 23 min  -AH 25 min  -MS     PT Received On  -- 05/08/18  -MS     PT Goal Re-Cert Due Date  -- 05/18/18  -MS        Time Calculation- PT    Total Timed Code  Minutes- PT 23 minute(s)  -  --       User Key  (r) = Recorded By, (t) = Taken By, (c) = Cosigned By    Initials Name Provider Type     Alexandra Odonnell PTA Physical Therapy Assistant    MS Betty Kuhn, PT DPT Physical Therapist          Therapy Charges for Today     Code Description Service Date Service Provider Modifiers Qty    14386497002 HC GAIT TRAINING EA 15 MIN 5/8/2018 Alexandra Odonnell PTA GP, KX 2          PT G-Codes  Outcome Measure Options: AM-PAC 6 Clicks Basic Mobility (PT)  Score: 18  Functional Limitation: Mobility: Walking and moving around  Mobility: Walking and Moving Around Current Status (): At least 40 percent but less than 60 percent impaired, limited or restricted  Mobility: Walking and Moving Around Goal Status (): At least 20 percent but less than 40 percent impaired, limited or restricted    Alexandra Odonnell PTA  5/8/2018

## 2018-05-08 NOTE — PLAN OF CARE
Problem: Patient Care Overview  Goal: Plan of Care Review  Outcome: Ongoing (interventions implemented as appropriate)   05/08/18 0359   Coping/Psychosocial   Plan of Care Reviewed With patient   Plan of Care Review   Progress no change   OTHER   Outcome Summary Pain 8 abdomen, back and buttocks. Pain pills given, tolerated and help her pain. Pleasant and cooperative.        Problem: Fall Risk (Adult)  Goal: Absence of Fall  Outcome: Ongoing (interventions implemented as appropriate)      Problem: Laminectomy/Foraminotomy/Discectomy (Adult)  Goal: Anesthesia/Sedation Recovery  Outcome: Outcome(s) achieved Date Met: 05/08/18

## 2018-05-08 NOTE — THERAPY EVALUATION
Acute Care - Physical Therapy Initial Evaluation  [facility]     Patient Name: Arminda Jiménez  : 1960  MRN: [id number]  Today's Date: 2018   Onset of Illness/Injury or Date of Surgery: 18  Date of Referral to PT: 18  Referring Physician: Dr. Zheng      Admit Date: 2018    Visit Dx:     ICD-10-CM ICD-9-CM   1. Impaired mobility and ADLs Z74.09 799.89   2. Impaired functional mobility, balance, and endurance Z74.09 V49.89     Patient Active Problem List   Diagnosis   • Foraminal stenosis of lumbosacral region   • Chronic back pain   • Radiculopathy   • Lumbar stenosis   • Panlobular emphysema   • Gastroesophageal reflux disease without esophagitis   • Constipation due to opioid therapy   • PAD (peripheral artery disease)   • Primary osteoarthritis involving multiple joints     Past Medical History:   Diagnosis Date   • Arthritis    • Asthma    • Cancer     right lung,abd,and pelvic area   • CHF (congestive heart failure)    • Chronic back pain    • COPD (chronic obstructive pulmonary disease)    • Depression    • Facet arthropathy    • Foraminal stenosis of lumbosacral region    • GERD (gastroesophageal reflux disease)    • H/O degenerative disc disease    • History of blood transfusion    • PVD (peripheral vascular disease)    • Radiculopathy      Past Surgical History:   Procedure Laterality Date   • APPENDECTOMY     • CARPAL TUNNEL RELEASE Bilateral    • OTHER SURGICAL HISTORY Bilateral 2017    Stent implants [facility]        PT ASSESSMENT (last 12 hours)      Physical Therapy Evaluation     Row Name 18 1000 18 0920       PT Evaluation Time/Intention    Subjective Information complains of;dizziness  -MS  --    Document Type evaluation  -MS evaluation  -MS    Mode of Treatment physical therapy  -MS  --    Row Name 18 1000          General Information    Patient Profile Reviewed? yes  -MS     Onset of Illness/Injury or Date of Surgery 18  -MS      Referring Physician Dr. Zheng  -MS     Patient Observations alert;cooperative;agree to therapy  -MS     Patient/Family Observations  in room  -MS     General Observations of Patient pt sitting EOB, in no apparent distress  -MS     Prior Level of Function independent:;all household mobility;community mobility;ADL's  -MS     Pertinent History of Current Functional Problem Increasing back and radicular pain R >L. 5/7 s/p discectomy with fusion L5-S1. 2nd part sx scheduled for Wednesday 5/9.  -MS     Existing Precautions/Restrictions spinal  -MS     Risks Reviewed patient:;LOB;nausea/vomiting;dizziness;increased discomfort  -MS     Benefits Reviewed patient:;improve function;increase independence;increase strength;decrease pain;increase balance  -MS     Barriers to Rehab physical barrier  -MS     Row Name 05/08/18 1000          Cognitive Assessment/Intervention- PT/OT    Orientation Status (Cognition) oriented x 4  -MS     Row Name 05/08/18 1000          Bed Mobility Assessment/Treatment    Bed Mobility Assessment/Treatment sit-supine  -MS     Supine-Sit [place] (Bed Mobility) supervision  -MS     Assistive Device (Bed Mobility) head of bed elevated;bed rails  -MS     Row Name 05/08/18 1000          Transfer Assessment/Treatment    Transfer Assessment/Treatment sit-stand transfer  -MS     Sit-Stand [place] (Transfers) contact guard  -MS     Row Name 05/08/18 1000          Sit-Stand Transfer    Assistive Device (Sit-Stand Transfers) --   HHA  -MS     Row Name 05/08/18 1000          Gait/Stairs Assessment/Training    [place] Level (Gait) contact guard  -MS     Assistive Device (Gait) --   HHA  -MS     Distance in Feet (Gait) 100  -MS     Row Name 05/08/18 1000          General ROM    GENERAL ROM COMMENTS all WFL  -MS     Row Name 05/08/18 1000          General Assessment (Manual Muscle Testing)    Comment, General Manual Muscle Testing (MMT) Assessment grossly 5/5  -MS     Row Name 05/08/18 1000           Pain Assessment    Additional Documentation Pain Scale: Numbers Pre/Post-Treatment (Group)  -MS     Row Name 05/08/18 1000          Pain Scale: Numbers Pre/Post-Treatment    Pain Scale: Numbers, Pretreatment 6/10  -MS     Pain Scale: Numbers, Post-Treatment 6/10  -MS     Pain Location back  -MS     Row Name 05/08/18 1000          Orthotics & Prosthetics Management    Orthosis Location spinal orthosis  -MS     Row Name 05/08/18 1000          Spinal Orthosis Management    Type (Spinal Orthosis) LSO (lumbar sacral orthosis)  -MS     Functional Design (Spinal Orthosis) static orthosis  -MS     Therapeutic Indications (Spinal Orthosis) pain management;rest/inflammation reduction  -MS     Wearing Schedule (Spinal Orthosis) wear when out of bed only  -MS     Orthosis Training (Spinal Orthosis) patient;donning/doffing orthosis;purpose/goals of orthosis;able to verbalize training;able to teach back training  -MS     Compliance/Wearing Issues (Spinal Orthosis) patient/caregiver comprehend strategies  -MS     Row Name 05/08/18 1000          Health Promotion    Additional Documentation Plan of Care Review (Group)  -MS     Row Name             Wound 05/07/18 0946 Other (See comments) abdomen incision    Wound - Properties Group Date first assessed: 05/07/18  -RS Time first assessed: 0946  -RS Side: Other (See comments)  -RS Location: abdomen  -RS Type: incision  -RS    Row Name 05/08/18 1000          Plan of Care Review    Plan of Care Reviewed With patient  -MS     Row Name 05/08/18 1000          Physical Therapy Clinical Impression    Date of Referral to PT 05/07/18  -MS     Patient/Family Goals Statement (PT Clinical Impression) walk   -MS     Criteria for Skilled Interventions Met (PT Clinical Impression) yes;treatment indicated  -MS     Pathology/Pathophysiology Noted (Describe Specifically for Each System) musculoskeletal  -MS     Impairments Found (describe specific impairments) gait, locomotion, and balance  -MS      Rehab Potential (PT Clinical Summary) good, to achieve stated therapy goals  -MS     Predicted Duration of Therapy (PT) until discharge  -MS     Care Plan Review (PT) evaluation/treatment results reviewed;care plan/treatment goals reviewed;risks/benefits reviewed;current/potential barriers reviewed;patient/other agree to care plan  -MS     Row Name 05/08/18 1000          Vital Signs    Pre SpO2 (%) 98  -MS     O2 Delivery Pre Treatment supplemental O2  -MS     Post SpO2 (%) 98  -MS     O2 Delivery Post Treatment room air  -MS     Pre Patient Position Sitting  -MS     Post Patient Position Sitting  -MS     Row Name 05/08/18 1000          Physical Therapy Goals    Bed Mobility Goal Selection (PT) bed mobility, PT goal 1  -MS     Transfer Goal Selection (PT) transfer, PT goal 1  -MS     Gait Training Goal Selection (PT) gait training, PT goal 1  -MS     Row Name 05/08/18 1000          Bed Mobility Goal 1 (PT)    Activity/Assistive Device (Bed Mobility Goal 1, PT) bed mobility activities, all  -MS     [place] Level/Cues Needed (Bed Mobility Goal 1, PT) independent  -MS     Time Frame (Bed Mobility Goal 1, PT) long term goal (LTG);by discharge  -MS     Progress/Outcomes (Bed Mobility Goal 1, PT) goal ongoing  -MS     Row Name 05/08/18 1000          Transfer Goal 1 (PT)    Activity/Assistive Device (Transfer Goal 1, PT) transfers, all  -MS     [place] Level/Cues Needed (Transfer Goal 1, PT) independent  -MS     Time Frame (Transfer Goal 1, PT) long term goal (LTG);by discharge  -MS     Progress/Outcome (Transfer Goal 1, PT) goal ongoing  -MS     Row Name 05/08/18 1000          Gait Training Goal 1 (PT)    Activity/Assistive Device (Gait Training Goal 1, PT) gait (walking locomotion)  -MS     [place] Level (Gait Training Goal 1, PT) independent  -MS     Distance (Gait Goal 1, PT) 200  -MS     Time Frame (Gait Training Goal 1, PT) long term goal (LTG);by discharge  -MS     Progress/Outcome (Gait Training  Goal 1, PT) goal ongoing  -MS     Row Name 05/08/18 1000          Positioning and Restraints    Post Treatment Position bathroom  -MS     Bathroom --   with PTA  -MS       User Key  (r) = Recorded By, (t) = Taken By, (c) = Cosigned By    Initials Name Provider Type    MS Betty Kuhn, PT DPT Physical Therapist    THALIA Farah RN Registered Nurse          Physical Therapy Education     Title: PT OT SLP Therapies (Active)     Topic: Physical Therapy (Active)     Point: Precautions (Done)    Learning Progress Summary     Learner Status Readiness Method Response Comment Documented by    Patient Done Eager TB VU surgery precautions, use of brace MS 05/08/18 1051                      User Key     Initials Effective Dates Name Provider Type Discipline    MS 08/02/16 -  Betty Kuhn, PT DPT Physical Therapist PT                PT Recommendation and Plan  Anticipated Discharge Disposition (PT): home or self care  Planned Therapy Interventions (PT Eval): balance training, bed mobility training, gait training, orthotic fitting/training, patient/family education, transfer training  Therapy Frequency (PT Clinical Impression): 2 times/day  Outcome Summary/Treatment Plan (PT)  Anticipated Discharge Disposition (PT): home or self care  Plan of Care Reviewed With: patient  Progress: improving  Outcome Summary: PT evaluation completed. The patient reportsback pain from surgery. She requries CGA for most mobility at this time. PT will continue to work with the patient to improve her balance so she can safetly return home at discharge.           Outcome Measures     Row Name 05/08/18 1000 05/07/18 1600          How much help from another person do you currently need...    Turning from your back to your side while in flat bed without using bedrails? 3  -MS  --     Moving from lying on back to sitting on the side of a flat bed without bedrails? 3  -MS  --     Moving to and from a bed to a chair (including a wheelchair)?  3  -MS  --     Standing up from a chair using your arms (e.g., wheelchair, bedside chair)? 3  -MS  --     Climbing 3-5 steps with a railing? 3  -MS  --     To walk in hospital room? 3  -MS  --     AM-PAC 6 Clicks Score 18  -MS  --        How much help from another is currently needed...    Putting on and taking off regular lower body clothing?  -- 2  -MM     Bathing (including washing, rinsing, and drying)  -- 2  -MM     Toileting (which includes using toilet bed pan or urinal)  -- 2  -MM     Putting on and taking off regular upper body clothing  -- 2  -MM     Taking care of personal grooming (such as brushing teeth)  -- 3  -MM     Eating meals  -- 3  -MM     Score  -- 14  -MM        Functional Assessment    Outcome Measure Options AM-PAC 6 Clicks Basic Mobility (PT)  -MS AM-PAC 6 Clicks Daily Activity (OT)  -MM       User Key  (r) = Recorded By, (t) = Taken By, (c) = Cosigned By    Initials Name Provider Type    MS Betty Kuhn, PT DPT Physical Therapist    MM Sascha Martin, OTR/L Occupational Therapist           Time Calculation:         PT Charges     Row Name 05/08/18 1056             Time Calculation    Start Time 1000  -MS      Stop Time 1025  -MS      Time Calculation (min) 25 min  -MS      PT Received On 05/08/18  -MS      PT Goal Re-Cert Due Date 05/18/18  -MS        User Key  (r) = Recorded By, (t) = Taken By, (c) = Cosigned By    Initials Name Provider Type    MS Betty Kuhn, PT DPT Physical Therapist          Therapy Charges for Today     Code Description Service Date Service Provider Modifiers Qty    66362783816 HC PT MOBILITY CURRENT 5/8/2018 Betty Kuhn, PT DPT GP, CK 1    77836169516 HC PT MOBILITY PROJECTED 5/8/2018 Betty Kuhn, PT DPT GP, CJ 1    [id number] HC PT EVAL LOW COMPLEXITY 2 5/8/2018 Betty Kuhn, PT DPT GP, KX 1          PT G-Codes  Outcome Measure Options: AM-PAC 6 Clicks Basic Mobility (PT)  Score: 18  Functional Limitation: Mobility: Walking and moving  around  Mobility: Walking and Moving Around Current Status (): At least 40 percent but less than 60 percent impaired, limited or restricted  Mobility: Walking and Moving Around Goal Status (): At least 20 percent but less than 40 percent impaired, limited or restricted      Betty Kuhn, PT DPT  5/8/2018

## 2018-05-08 NOTE — PROGRESS NOTES
Discharge Planning Assessment  [facility]     Patient Name: Arminda Jiménez  MRN: [id number]  Today's Date: 5/8/2018    Admit Date: 5/7/2018          Discharge Needs Assessment     Row Name 05/08/18 1129       Living Environment    Lives With child(leno), adult;grandchild(leno)    Name(s) of Who Lives With Patient Anay daughter/ grandson, adult    Current Living Arrangements home/apartment/condo    Primary Care Provided by child(leno)    Provides Primary Care For no one, unable/limited ability to care for self    Family Caregiver if Needed none    Quality of Family Relationships helpful;involved;supportive    Able to Return to Prior Arrangements yes       Resource/Environmental Concerns    Resource/Environmental Concerns none       Transition Planning    Patient/Family Anticipated Services at Transition none    Transportation Anticipated family or friend will provide       Discharge Needs Assessment    Readmission Within the Last 30 Days no previous admission in last 30 days    Concerns to be Addressed no discharge needs identified    Equipment Currently Used at Home none    Anticipated Changes Related to Illness inability to care for self   family to assist during rehab s/p surgery    Equipment Needed After Discharge none    Discharge Coordination/Progress Plan for home with family to assist. Hopes to use outpatient therapy if needed. not using DME at present. Back brace in room. No needs at this time. Denies need for HH.             Discharge Plan    No documentation.       Destination     No service coordination in this encounter.      Durable Medical Equipment     No service coordination in this encounter.      Dialysis/Infusion     No service coordination in this encounter.      Home Medical Care     No service coordination in this encounter.      Social Care     No service coordination in this encounter.                Demographic Summary    No documentation.           Functional Status    No  documentation.           Psychosocial    No documentation.           Abuse/Neglect    No documentation.           Legal    No documentation.           Substance Abuse    No documentation.           Patient Forms    No documentation.         Traci Albert RN

## 2018-05-08 NOTE — PLAN OF CARE
Problem: Patient Care Overview  Goal: Plan of Care Review  Outcome: Ongoing (interventions implemented as appropriate)   05/08/18 1052   Coping/Psychosocial   Plan of Care Reviewed With patient   Plan of Care Review   Progress improving   OTHER   Outcome Summary PT evaluation completed. The patient reportsback pain from surgery. She requries CGA for most mobility at this time. PT will continue to work with the patient to improve her balance so she can safetly return home at discharge.

## 2018-05-08 NOTE — CONSULTS
Consults       Referring Provider: Dr. Zheng  Reason for Consultation: Medical management    Patient Care Team:  Bam Landis MD as PCP - General (Family Medicine)    Chief complaint back pain    Subjective .     History of present illness:  The patient presents today for surgical correction after failing conservative management.  They have been through anti-inflammatories, muscle relaxers, and pain medication.  The pain is progressed to the point in time where it is affecting their activities of daily living and after being explained all their options and elected to undergo surgical correction.  Their primary care physician does not attend here [facility]; therefore, I have been asked to take care of their primary medical needs in the perioperative period.  Postoperative pain is as expected.  There are no other precipitating or relieving factors.      REVIEW OF SYSTEMS:    CONSTITUTIONAL:  Negative for anorexia, chills, fevers, night sweats and weight loss  EYES:  negative for eye dryness, icterus and redness  HEENT:   negative for dental problems, epistaxis, facial trauma and thrush  RESPIRATORY:  negative for chest tightness, cough, dyspnea on exertion, pneumonia and sputum  CARDIOVASCULAR: negative for chest pain, dyspnea, exertional chest pressure/discomfort, irregular heart beat, palpitations, paroxysmal nocturnal dyspnea and syncope  GASTROINTESTINAL:  negative for abdominal pain, hematemesis, jaundice, melena and rectal bleeding  MUSCULOSKELETAL:  negative for muscle weakness, myalgias and neck pain  NEUROLOGICAL:   negative for dizziness, headaches, seizures, speech problems, tremors and vertigo  INTEGUMENT: negative for pruritus, rash, skin color change and skin lesion(s)         History    Past Medical History:   Diagnosis Date   • Arthritis    • Asthma    • Cancer     right lung,abd,and pelvic area   • CHF (congestive heart failure)    • Chronic back pain    • COPD (chronic obstructive  pulmonary disease)    • Depression    • Facet arthropathy    • Foraminal stenosis of lumbosacral region    • GERD (gastroesophageal reflux disease)    • H/O degenerative disc disease    • History of blood transfusion    • PVD (peripheral vascular disease)    • Radiculopathy      Past Surgical History:   Procedure Laterality Date   • APPENDECTOMY     • CARPAL TUNNEL RELEASE Bilateral    • OTHER SURGICAL HISTORY Bilateral 2017    Stent implants [facility] Dr Alvarado     Family History   Problem Relation Age of Onset   • Heart disease Other    • Arthritis Other    • Cancer Other    • Alcohol abuse Other    • Stroke Other    • Migraines Other    • COPD Other    • Heart failure Other    • Depression Other    • Asthma Other    • Heart disease Mother    • Kidney disease Mother    • Heart disease Father    • Heart disease Sister    • Kidney disease Sister    • Stroke Sister    • Heart disease Sister    • Cancer Brother      Social History   Substance Use Topics   • Smoking status: Former Smoker   • Smokeless tobacco: Never Used      Comment: quit 2010   • Alcohol use No     Prescriptions Prior to Admission   Medication Sig Dispense Refill Last Dose   • Albuterol Sulfate (PROAIR HFA IN) Inhale 2 puffs 4 (Four) Times a Day As Needed.   Past Week at Unknown time   • BREO ELLIPTA 200-25 MCG/INH aerosol powder  Inhale 200 mcg Daily.   5/7/2018 at 0500   • Calcium Citrate 200 MG tablet Take 600 mg by mouth 2 (Two) Times a Day.   5/6/2018 at 0900   • cholecalciferol (VITAMIN D3) 1000 units tablet Take 1,000 Units by mouth 2 (Two) Times a Day.   5/6/2018 at 0900   • escitalopram (LEXAPRO) 10 MG tablet Take 10 mg by mouth Daily.   5/6/2018 at 0900   • estrogen, conjugated,-medroxyprogesterone (PREMPRO) 0.3-1.5 MG per tablet Take 1 tablet/day by mouth Daily.   5/6/2018 at 0900   • gabapentin (NEURONTIN) 800 MG tablet 800 mg 3 (Three) Times a Day. Takes 2 at bedtime   5/7/2018 at 0500   • NEXIUM 40 MG capsule Take 40 mg by mouth 2  (Two) Times a Day As Needed.   5/6/2018 at 0900   • oxyCODONE-acetaminophen (PERCOCET)  MG per tablet Take 1 tablet by mouth Every 4 (Four) Hours As Needed for Moderate Pain .   5/7/2018 at 0230   • tiotropium (SPIRIVA HANDIHALER) 18 MCG per inhalation capsule Place 18 mcg into inhaler and inhale Daily. Takes at 5 pm   5/6/2018 at 1700   • clopidogrel (PLAVIX) 75 MG tablet Take 75 mg by mouth Daily.   4/26/2018       Allergies:  Aspirin and Keflex [cephalexin]    Objective     Vital Signs   Temp:  [97.3 °F (36.3 °C)-98.5 °F (36.9 °C)] 98 °F (36.7 °C)  Heart Rate:  [71-92] 71  Resp:  [10-20] 20  BP: ()/(37-66) 104/54          Physical Exam:  Constitutional: oriented to person, place, and time. appears well-developed.   HEENT:   Head: Normocephalic and atraumatic.   Eyes: Pupils are equal, round, and reactive to light.   Neck: Neck supple.   Cardiovascular: Regular rhythm and normal heart sounds.    Pulmonary/Chest: Effort normal and breath sounds normal. CTAB  Abdominal: Soft. Bowel sounds are normal. she exhibits no distension. There is no tenderness. There is no rebound and no guarding.   Musculoskeletal: Normal range of motion. she exhibits no edema or tenderness.   Neurological: she is alert and oriented to person, place, and time. she has normal reflexes.   Skin: Skin is warm and dry.     Results Review:   I reviewed the patient's new imaging results and agree with the interpretation.      Assessment/Plan     Active Problems:    Lumbar stenosis    Panlobular emphysema    Gastroesophageal reflux disease without esophagitis    Constipation due to opioid therapy    PAD (peripheral artery disease)    Primary osteoarthritis involving multiple joints      COPD/emphysema encourage discontinuation of all tobacco products.  Patient has been on Spiriva and Breo which are currently not available on hospital formulary.  Will initiate duo nebs 4 times a day and aggressive pulmonary toilet.  Anemia post-op  expected-check iron, B12,and folate. Replenish as needed.Transfuse at acceptable levels depending on clinical judgement and comorbidities. Recheck in AM    GERD-exacerbated by pain meds and anesthesia, will add PPI as needed and can step up to Carafate 1 gm po q AC and qhs.        Constipation-start with Miralax 1 capful BID until BM, then decrease to 1 x a day,then can step up to Amitizia 24 mcg po BID which can be used for opiod induced constipation,and ultimately end up with Relistor 12 mcg sub Q q 48 hours to block the effect of narcotics on the gut.        I discussed the patients findings and my recommendations with patient, nursing staff and consulting provider    Lizandro Burger MD  05/08/18  7:04 AM

## 2018-05-08 NOTE — PROGRESS NOTES
Orthopedic Spine Progress Note    Arminda Jiménez  58 y.o.  female  Subjective     Post-Operative Day # 1    Systemic or Specific Complaints:     Sore. Legs better. Ready for tomorrow    Objective     Vital signs in last 24 hours:  Temp:  [97.3 °F (36.3 °C)-98.5 °F (36.9 °C)] 98 °F (36.7 °C)  Heart Rate:  [71-92] 71  Resp:  [10-20] 20  BP: ()/(37-66) 104/54    Physical Exam:    Alert and oriented ×3, no acute distress, grossly neurovascularly intact, vital signs stable, dressing clean dry and intact, moving all extremities without focal deficit    Diagnostic Data:  CBC:  Results from last 7 days  Lab Units 05/08/18  0550   WBC 10*3/mm3 13.31*   RBC 10*6/mm3 3.20*   HEMOGLOBIN g/dL 9.8*   HEMATOCRIT % 29.7*   PLATELETS 10*3/mm3 196          Assessment/Plan     1. Status Post ALIF l5-s1 - 5/7/18    Plan:  1. Plan second stage surgery tomorrow  2. Npo after midnight  3. periops / PT/OT/Brace today      MIRA Lara    Date: 5/8/2018  Time: 7:22 AM

## 2018-05-09 ENCOUNTER — ANESTHESIA (OUTPATIENT)
Dept: PERIOP | Facility: HOSPITAL | Age: 58
End: 2018-05-09

## 2018-05-09 ENCOUNTER — APPOINTMENT (OUTPATIENT)
Dept: GENERAL RADIOLOGY | Facility: HOSPITAL | Age: 58
End: 2018-05-09

## 2018-05-09 LAB
FOLATE SERPL-MCNC: 5.28 NG/ML
IRON 24H UR-MRATE: 19 MCG/DL (ref 42–180)
IRON SATN MFR SERPL: 7 % (ref 20–45)
TIBC SERPL-MCNC: 254 MCG/DL (ref 225–420)
VIT B12 BLD-MCNC: 430 PG/ML (ref 239–931)

## 2018-05-09 PROCEDURE — 4A11X4G MONITORING OF PERIPHERAL NERVOUS ELECTRICAL ACTIVITY, INTRAOPERATIVE, EXTERNAL APPROACH: ICD-10-PCS | Performed by: ORTHOPAEDIC SURGERY

## 2018-05-09 PROCEDURE — 25010000002 HYDROMORPHONE PER 4 MG: Performed by: ORTHOPAEDIC SURGERY

## 2018-05-09 PROCEDURE — C1713 ANCHOR/SCREW BN/BN,TIS/BN: HCPCS | Performed by: ORTHOPAEDIC SURGERY

## 2018-05-09 PROCEDURE — 94760 N-INVAS EAR/PLS OXIMETRY 1: CPT

## 2018-05-09 PROCEDURE — 83540 ASSAY OF IRON: CPT | Performed by: FAMILY MEDICINE

## 2018-05-09 PROCEDURE — G8987 SELF CARE CURRENT STATUS: HCPCS | Performed by: OCCUPATIONAL THERAPIST

## 2018-05-09 PROCEDURE — 25010000002 ONDANSETRON PER 1 MG: Performed by: ANESTHESIOLOGY

## 2018-05-09 PROCEDURE — 94799 UNLISTED PULMONARY SVC/PX: CPT

## 2018-05-09 PROCEDURE — 97168 OT RE-EVAL EST PLAN CARE: CPT

## 2018-05-09 PROCEDURE — 25010000002 SUCCINYLCHOLINE PER 20 MG: Performed by: NURSE ANESTHETIST, CERTIFIED REGISTERED

## 2018-05-09 PROCEDURE — 72100 X-RAY EXAM L-S SPINE 2/3 VWS: CPT

## 2018-05-09 PROCEDURE — G8988 SELF CARE GOAL STATUS: HCPCS | Performed by: OCCUPATIONAL THERAPIST

## 2018-05-09 PROCEDURE — C1769 GUIDE WIRE: HCPCS | Performed by: ORTHOPAEDIC SURGERY

## 2018-05-09 PROCEDURE — 82746 ASSAY OF FOLIC ACID SERUM: CPT | Performed by: FAMILY MEDICINE

## 2018-05-09 PROCEDURE — 76000 FLUOROSCOPY <1 HR PHYS/QHP: CPT

## 2018-05-09 PROCEDURE — 83550 IRON BINDING TEST: CPT | Performed by: FAMILY MEDICINE

## 2018-05-09 PROCEDURE — 25010000002 PROPOFOL 10 MG/ML EMULSION: Performed by: NURSE ANESTHETIST, CERTIFIED REGISTERED

## 2018-05-09 PROCEDURE — 25010000002 FENTANYL CITRATE (PF) 250 MCG/5ML SOLUTION: Performed by: NURSE ANESTHETIST, CERTIFIED REGISTERED

## 2018-05-09 PROCEDURE — 82607 VITAMIN B-12: CPT | Performed by: FAMILY MEDICINE

## 2018-05-09 PROCEDURE — 0SG3071 FUSION OF LUMBOSACRAL JOINT WITH AUTOLOGOUS TISSUE SUBSTITUTE, POSTERIOR APPROACH, POSTERIOR COLUMN, OPEN APPROACH: ICD-10-PCS | Performed by: ORTHOPAEDIC SURGERY

## 2018-05-09 DEVICE — IMPLANTABLE DEVICE: Type: IMPLANTABLE DEVICE | Site: SPINE LUMBAR | Status: FUNCTIONAL

## 2018-05-09 DEVICE — 5500 SERIES SET SCREW
Type: IMPLANTABLE DEVICE | Site: SPINE LUMBAR | Status: FUNCTIONAL
Brand: TIGER SPINE SYSTEM

## 2018-05-09 RX ORDER — ONDANSETRON 4 MG/1
4 TABLET, FILM COATED ORAL EVERY 6 HOURS PRN
Status: DISCONTINUED | OUTPATIENT
Start: 2018-05-09 | End: 2018-05-10 | Stop reason: HOSPADM

## 2018-05-09 RX ORDER — HYDRALAZINE HYDROCHLORIDE 20 MG/ML
5 INJECTION INTRAMUSCULAR; INTRAVENOUS
Status: DISCONTINUED | OUTPATIENT
Start: 2018-05-09 | End: 2018-05-09 | Stop reason: HOSPADM

## 2018-05-09 RX ORDER — ONDANSETRON 2 MG/ML
4 INJECTION INTRAMUSCULAR; INTRAVENOUS EVERY 6 HOURS PRN
Status: DISCONTINUED | OUTPATIENT
Start: 2018-05-09 | End: 2018-05-09 | Stop reason: SDUPTHER

## 2018-05-09 RX ORDER — IPRATROPIUM BROMIDE AND ALBUTEROL SULFATE 2.5; .5 MG/3ML; MG/3ML
3 SOLUTION RESPIRATORY (INHALATION) ONCE AS NEEDED
Status: DISCONTINUED | OUTPATIENT
Start: 2018-05-09 | End: 2018-05-09 | Stop reason: HOSPADM

## 2018-05-09 RX ORDER — MAGNESIUM HYDROXIDE 1200 MG/15ML
LIQUID ORAL AS NEEDED
Status: DISCONTINUED | OUTPATIENT
Start: 2018-05-09 | End: 2018-05-09 | Stop reason: HOSPADM

## 2018-05-09 RX ORDER — SODIUM CHLORIDE, SODIUM LACTATE, POTASSIUM CHLORIDE, CALCIUM CHLORIDE 600; 310; 30; 20 MG/100ML; MG/100ML; MG/100ML; MG/100ML
100 INJECTION, SOLUTION INTRAVENOUS CONTINUOUS
Status: DISCONTINUED | OUTPATIENT
Start: 2018-05-09 | End: 2018-05-09 | Stop reason: SDUPTHER

## 2018-05-09 RX ORDER — SODIUM CHLORIDE 9 MG/ML
75 INJECTION, SOLUTION INTRAVENOUS CONTINUOUS
Status: DISCONTINUED | OUTPATIENT
Start: 2018-05-09 | End: 2018-05-09 | Stop reason: SDUPTHER

## 2018-05-09 RX ORDER — ONDANSETRON 2 MG/ML
4 INJECTION INTRAMUSCULAR; INTRAVENOUS AS NEEDED
Status: DISCONTINUED | OUTPATIENT
Start: 2018-05-09 | End: 2018-05-09 | Stop reason: HOSPADM

## 2018-05-09 RX ORDER — SODIUM CHLORIDE 0.9 % (FLUSH) 0.9 %
1-10 SYRINGE (ML) INJECTION AS NEEDED
Status: DISCONTINUED | OUTPATIENT
Start: 2018-05-09 | End: 2018-05-09 | Stop reason: HOSPADM

## 2018-05-09 RX ORDER — SUCCINYLCHOLINE CHLORIDE 20 MG/ML
INJECTION INTRAMUSCULAR; INTRAVENOUS AS NEEDED
Status: DISCONTINUED | OUTPATIENT
Start: 2018-05-09 | End: 2018-05-09 | Stop reason: SURG

## 2018-05-09 RX ORDER — FENTANYL CITRATE 50 UG/ML
INJECTION, SOLUTION INTRAMUSCULAR; INTRAVENOUS AS NEEDED
Status: DISCONTINUED | OUTPATIENT
Start: 2018-05-09 | End: 2018-05-09 | Stop reason: SURG

## 2018-05-09 RX ORDER — SODIUM CHLORIDE 9 MG/ML
75 INJECTION, SOLUTION INTRAVENOUS CONTINUOUS
Status: DISCONTINUED | OUTPATIENT
Start: 2018-05-09 | End: 2018-05-10 | Stop reason: HOSPADM

## 2018-05-09 RX ORDER — LIDOCAINE HYDROCHLORIDE 40 MG/ML
SOLUTION TOPICAL AS NEEDED
Status: DISCONTINUED | OUTPATIENT
Start: 2018-05-09 | End: 2018-05-09 | Stop reason: SURG

## 2018-05-09 RX ORDER — IPRATROPIUM BROMIDE AND ALBUTEROL SULFATE 2.5; .5 MG/3ML; MG/3ML
3 SOLUTION RESPIRATORY (INHALATION) ONCE
Status: DISCONTINUED | OUTPATIENT
Start: 2018-05-09 | End: 2018-05-09 | Stop reason: HOSPADM

## 2018-05-09 RX ORDER — FAMOTIDINE 10 MG/ML
20 INJECTION, SOLUTION INTRAVENOUS EVERY 12 HOURS SCHEDULED
Status: DISCONTINUED | OUTPATIENT
Start: 2018-05-09 | End: 2018-05-10 | Stop reason: HOSPADM

## 2018-05-09 RX ORDER — MIDAZOLAM HYDROCHLORIDE 1 MG/ML
1 INJECTION INTRAMUSCULAR; INTRAVENOUS
Status: DISCONTINUED | OUTPATIENT
Start: 2018-05-09 | End: 2018-05-09 | Stop reason: HOSPADM

## 2018-05-09 RX ORDER — FLUMAZENIL 0.1 MG/ML
0.2 INJECTION INTRAVENOUS AS NEEDED
Status: DISCONTINUED | OUTPATIENT
Start: 2018-05-09 | End: 2018-05-09 | Stop reason: HOSPADM

## 2018-05-09 RX ORDER — FENTANYL CITRATE 50 UG/ML
25 INJECTION, SOLUTION INTRAMUSCULAR; INTRAVENOUS AS NEEDED
Status: DISCONTINUED | OUTPATIENT
Start: 2018-05-09 | End: 2018-05-09 | Stop reason: HOSPADM

## 2018-05-09 RX ORDER — METOCLOPRAMIDE HYDROCHLORIDE 5 MG/ML
5 INJECTION INTRAMUSCULAR; INTRAVENOUS
Status: DISCONTINUED | OUTPATIENT
Start: 2018-05-09 | End: 2018-05-09 | Stop reason: HOSPADM

## 2018-05-09 RX ORDER — ONDANSETRON 2 MG/ML
4 INJECTION INTRAMUSCULAR; INTRAVENOUS EVERY 6 HOURS PRN
Status: DISCONTINUED | OUTPATIENT
Start: 2018-05-09 | End: 2018-05-10 | Stop reason: HOSPADM

## 2018-05-09 RX ORDER — LIDOCAINE HYDROCHLORIDE 20 MG/ML
INJECTION, SOLUTION INFILTRATION; PERINEURAL AS NEEDED
Status: DISCONTINUED | OUTPATIENT
Start: 2018-05-09 | End: 2018-05-09 | Stop reason: SURG

## 2018-05-09 RX ORDER — ROCURONIUM BROMIDE 10 MG/ML
INJECTION, SOLUTION INTRAVENOUS AS NEEDED
Status: DISCONTINUED | OUTPATIENT
Start: 2018-05-09 | End: 2018-05-09 | Stop reason: SURG

## 2018-05-09 RX ORDER — ONDANSETRON 4 MG/1
4 TABLET, ORALLY DISINTEGRATING ORAL EVERY 6 HOURS PRN
Status: DISCONTINUED | OUTPATIENT
Start: 2018-05-09 | End: 2018-05-10 | Stop reason: HOSPADM

## 2018-05-09 RX ORDER — LABETALOL HYDROCHLORIDE 5 MG/ML
5 INJECTION, SOLUTION INTRAVENOUS
Status: DISCONTINUED | OUTPATIENT
Start: 2018-05-09 | End: 2018-05-09 | Stop reason: HOSPADM

## 2018-05-09 RX ORDER — FAMOTIDINE 20 MG/1
20 TABLET, FILM COATED ORAL EVERY 12 HOURS SCHEDULED
Status: DISCONTINUED | OUTPATIENT
Start: 2018-05-09 | End: 2018-05-10 | Stop reason: HOSPADM

## 2018-05-09 RX ORDER — MEPERIDINE HYDROCHLORIDE 25 MG/ML
12.5 INJECTION INTRAMUSCULAR; INTRAVENOUS; SUBCUTANEOUS
Status: DISCONTINUED | OUTPATIENT
Start: 2018-05-09 | End: 2018-05-09 | Stop reason: HOSPADM

## 2018-05-09 RX ORDER — MORPHINE SULFATE 2 MG/ML
2 INJECTION, SOLUTION INTRAMUSCULAR; INTRAVENOUS
Status: DISCONTINUED | OUTPATIENT
Start: 2018-05-09 | End: 2018-05-09 | Stop reason: HOSPADM

## 2018-05-09 RX ORDER — CLINDAMYCIN PHOSPHATE 900 MG/50ML
900 INJECTION INTRAVENOUS EVERY 8 HOURS
Status: COMPLETED | OUTPATIENT
Start: 2018-05-09 | End: 2018-05-10

## 2018-05-09 RX ORDER — DIPHENHYDRAMINE HCL 25 MG
25 CAPSULE ORAL NIGHTLY PRN
Status: DISCONTINUED | OUTPATIENT
Start: 2018-05-09 | End: 2018-05-10 | Stop reason: HOSPADM

## 2018-05-09 RX ORDER — ACETAMINOPHEN 500 MG
1000 TABLET ORAL ONCE
Status: DISCONTINUED | OUTPATIENT
Start: 2018-05-09 | End: 2018-05-09 | Stop reason: HOSPADM

## 2018-05-09 RX ORDER — MIDAZOLAM HYDROCHLORIDE 1 MG/ML
2 INJECTION INTRAMUSCULAR; INTRAVENOUS
Status: DISCONTINUED | OUTPATIENT
Start: 2018-05-09 | End: 2018-05-09 | Stop reason: HOSPADM

## 2018-05-09 RX ORDER — OXYCODONE AND ACETAMINOPHEN 10; 325 MG/1; MG/1
1 TABLET ORAL ONCE AS NEEDED
Status: COMPLETED | OUTPATIENT
Start: 2018-05-09 | End: 2018-05-09

## 2018-05-09 RX ORDER — BISACODYL 10 MG
10 SUPPOSITORY, RECTAL RECTAL DAILY PRN
Status: DISCONTINUED | OUTPATIENT
Start: 2018-05-09 | End: 2018-05-10 | Stop reason: HOSPADM

## 2018-05-09 RX ORDER — PROPOFOL 10 MG/ML
VIAL (ML) INTRAVENOUS AS NEEDED
Status: DISCONTINUED | OUTPATIENT
Start: 2018-05-09 | End: 2018-05-09 | Stop reason: SURG

## 2018-05-09 RX ORDER — DEXAMETHASONE SODIUM PHOSPHATE 4 MG/ML
4 INJECTION, SOLUTION INTRA-ARTICULAR; INTRALESIONAL; INTRAMUSCULAR; INTRAVENOUS; SOFT TISSUE ONCE AS NEEDED
Status: DISCONTINUED | OUTPATIENT
Start: 2018-05-09 | End: 2018-05-09 | Stop reason: HOSPADM

## 2018-05-09 RX ORDER — SODIUM CHLORIDE 0.9 % (FLUSH) 0.9 %
1-10 SYRINGE (ML) INJECTION AS NEEDED
Status: DISCONTINUED | OUTPATIENT
Start: 2018-05-09 | End: 2018-05-10 | Stop reason: HOSPADM

## 2018-05-09 RX ORDER — NALOXONE HCL 0.4 MG/ML
0.04 VIAL (ML) INJECTION AS NEEDED
Status: DISCONTINUED | OUTPATIENT
Start: 2018-05-09 | End: 2018-05-09 | Stop reason: HOSPADM

## 2018-05-09 RX ADMIN — PROPOFOL 50 MG: 10 INJECTION, EMULSION INTRAVENOUS at 07:49

## 2018-05-09 RX ADMIN — Medication 1 CAPSULE: at 13:30

## 2018-05-09 RX ADMIN — FENTANYL CITRATE 150 MCG: 50 INJECTION INTRAMUSCULAR; INTRAVENOUS at 07:30

## 2018-05-09 RX ADMIN — LIDOCAINE HYDROCHLORIDE 1 EACH: 40 SOLUTION TOPICAL at 07:34

## 2018-05-09 RX ADMIN — FAMOTIDINE 20 MG: 20 TABLET, FILM COATED ORAL at 21:11

## 2018-05-09 RX ADMIN — OXYCODONE HYDROCHLORIDE AND ACETAMINOPHEN 1 TABLET: 10; 325 TABLET ORAL at 09:10

## 2018-05-09 RX ADMIN — LIDOCAINE HYDROCHLORIDE 50 MG: 20 INJECTION, SOLUTION INFILTRATION; PERINEURAL at 07:32

## 2018-05-09 RX ADMIN — ONDANSETRON 4 MG: 2 INJECTION, SOLUTION INTRAMUSCULAR; INTRAVENOUS at 09:06

## 2018-05-09 RX ADMIN — HYDROMORPHONE HYDROCHLORIDE 1 MG: 2 INJECTION, SOLUTION INTRAMUSCULAR; INTRAVENOUS; SUBCUTANEOUS at 20:00

## 2018-05-09 RX ADMIN — OXYCODONE HYDROCHLORIDE AND ACETAMINOPHEN 1 TABLET: 10; 325 TABLET ORAL at 13:25

## 2018-05-09 RX ADMIN — SODIUM CHLORIDE 75 ML/HR: 9 INJECTION, SOLUTION INTRAVENOUS at 11:25

## 2018-05-09 RX ADMIN — IPRATROPIUM BROMIDE AND ALBUTEROL SULFATE 3 ML: 2.5; .5 SOLUTION RESPIRATORY (INHALATION) at 11:40

## 2018-05-09 RX ADMIN — GABAPENTIN 800 MG: 400 CAPSULE ORAL at 21:11

## 2018-05-09 RX ADMIN — CLINDAMYCIN PHOSPHATE 900 MG: 18 INJECTION, SOLUTION INTRAVENOUS at 07:40

## 2018-05-09 RX ADMIN — CLINDAMYCIN IN 5 PERCENT DEXTROSE 900 MG: 18 INJECTION, SOLUTION INTRAVENOUS at 17:23

## 2018-05-09 RX ADMIN — SUCCINYLCHOLINE CHLORIDE 100 MG: 20 INJECTION, SOLUTION INTRAMUSCULAR; INTRAVENOUS at 07:33

## 2018-05-09 RX ADMIN — SODIUM CHLORIDE, SODIUM LACTATE, POTASSIUM CHLORIDE, AND CALCIUM CHLORIDE 100 ML/HR: 600; 310; 30; 20 INJECTION, SOLUTION INTRAVENOUS at 07:18

## 2018-05-09 RX ADMIN — PROPOFOL 150 MG: 10 INJECTION, EMULSION INTRAVENOUS at 07:33

## 2018-05-09 RX ADMIN — FAMOTIDINE 20 MG: 20 TABLET, FILM COATED ORAL at 12:18

## 2018-05-09 RX ADMIN — OXYCODONE HYDROCHLORIDE AND ACETAMINOPHEN 2 TABLET: 10; 325 TABLET ORAL at 06:06

## 2018-05-09 RX ADMIN — ROCURONIUM BROMIDE 10 MG: 10 INJECTION INTRAVENOUS at 07:32

## 2018-05-09 RX ADMIN — OXYCODONE HYDROCHLORIDE AND ACETAMINOPHEN 2 TABLET: 10; 325 TABLET ORAL at 22:07

## 2018-05-09 RX ADMIN — GABAPENTIN 800 MG: 400 CAPSULE ORAL at 13:30

## 2018-05-09 RX ADMIN — FENTANYL CITRATE 100 MCG: 50 INJECTION INTRAMUSCULAR; INTRAVENOUS at 07:35

## 2018-05-09 RX ADMIN — OXYCODONE HYDROCHLORIDE AND ACETAMINOPHEN 2 TABLET: 10; 325 TABLET ORAL at 17:27

## 2018-05-09 NOTE — PROGRESS NOTES
Arminda Jiménez is a 58 y.o. female patient.  Plan step #2 today  Pain as expected      Current Facility-Administered Medications   Medication Dose Route Frequency Provider Last Rate Last Dose   • acetaminophen (TYLENOL) tablet 1,000 mg  1,000 mg Oral Once Vasile Santana MD       • [MAR Hold] buffered lidocaine syringe 0.5 mL  0.5 mL Injection Once PRN Vasile Santana MD       • buffered lidocaine syringe 0.5 mL  0.5 mL Injection Once PRN Vasile Santana MD       • clindamycin (CLEOCIN) 900 mg in dextrose 5% 50 mL IVPB (premix)  900 mg Intravenous Once MIRA Lara       • dexamethasone (DECADRON) injection 4 mg  4 mg Intravenous Once PRN Vasile Santana MD       • [MAR Hold] diphenhydrAMINE (BENADRYL) capsule 25 mg  25 mg Oral Nightly PRN K Kevon Zheng MD       • [MAR Hold] escitalopram (LEXAPRO) tablet 10 mg  10 mg Oral Daily K Kevon Zheng MD   10 mg at 05/08/18 1028   • famotidine (PEPCID) injection 20 mg  20 mg Intravenous Q12H K Kevon Zheng MD   20 mg at 05/07/18 2106    Or   • famotidine (PEPCID) tablet 20 mg  20 mg Oral Q12H K Kevon Zheng MD   20 mg at 05/08/18 2111   • gabapentin (NEURONTIN) capsule 800 mg  800 mg Oral Q8H K Kevon Zheng MD   800 mg at 05/08/18 2111   • [MAR Hold] HYDROmorphone (DILAUDID) injection 1 mg  1 mg Intravenous Q3H PRN K Kevon Zheng MD       • [MAR Hold] ipratropium-albuterol (DUO-NEB) nebulizer solution 3 mL  3 mL Nebulization 4x Daily - RT Lizandro Burger MD   3 mL at 05/08/18 1125   • ipratropium-albuterol (DUO-NEB) nebulizer solution 3 mL  3 mL Nebulization Once Vasile Santana MD       • lactated ringers infusion  30 mL/hr Intravenous Continuous Vasile Santana MD 0 mL/hr at 05/07/18 0930     • lactated ringers infusion  100 mL/hr Intravenous Continuous Vasile Santana  mL/hr at 05/09/18 0718 100 mL/hr at 05/09/18 0718   • lactated ringers infusion  100 mL/hr Intravenous Continuous Vasile Maradiaga  MD Esther       • [MAR Hold] midazolam (VERSED) injection 1 mg  1 mg Intravenous Q5 Min PRN Vasile Santana MD        Or   • [MAR Hold] midazolam (VERSED) injection 2 mg  2 mg Intravenous Q5 Min PRN Vasile Santana MD   2 mg at 05/07/18 0635   • midazolam (VERSED) injection 1 mg  1 mg Intravenous Q5 Min PRN Vasile Santana MD        Or   • midazolam (VERSED) injection 2 mg  2 mg Intravenous Q5 Min PRN Vasile Santana MD       • [MAR Hold] ondansetron (ZOFRAN) tablet 4 mg  4 mg Oral Q6H PRN K Kevon Zheng MD        Or   • [MAR Hold] ondansetron ODT (ZOFRAN-ODT) disintegrating tablet 4 mg  4 mg Oral Q6H PRN K Kevon Zheng MD        Or   • [MAR Hold] ondansetron (ZOFRAN) injection 4 mg  4 mg Intravenous Q6H PRN K Kevon Zheng MD       • [MAR Hold] ondansetron (ZOFRAN) injection 4 mg  4 mg Intravenous Q6H PRN K Kevon Zheng MD       • [MAR Hold] oxyCODONE-acetaminophen (PERCOCET)  MG per tablet 2 tablet  2 tablet Oral Q4H PRN LUIS CARLOS Zheng MD   2 tablet at 05/09/18 0606   • [MAR Hold] polyethylene glycol 3350 powder (packet)  17 g Oral BID Lizandro Burger MD   17 g at 05/08/18 2111   • sodium chloride (NS) irrigation solution    PRN LUIS CARLOS Zheng MD   2,000 mL at 05/09/18 0743   • [MAR Hold] sodium chloride 0.9 % flush 1-10 mL  1-10 mL Intravenous PRN Vasile Santana MD       • [MAR Hold] sodium chloride 0.9 % flush 1-10 mL  1-10 mL Intravenous PRN Vasile Santana MD       • [MAR Hold] sodium chloride 0.9 % flush 1-10 mL  1-10 mL Intravenous PRN LUIS CARLOS Zheng MD       • sodium chloride 0.9 % flush 1-10 mL  1-10 mL Intravenous PRN Vasile Santana MD       • [MAR Hold] sodium chloride 0.9 % flush 3 mL  3 mL Intravenous PRN K Kevon Zheng MD       • sodium chloride 0.9 % infusion  75 mL/hr Intravenous Continuous K Kevon Zheng MD 75 mL/hr at 05/08/18 0452 75 mL/hr at 05/08/18 0452   • [MAR Hold] tiZANidine (ZANAFLEX) tablet 4 mg  4 mg Oral Q8H  "PRN K Kevon Zheng MD         Facility-Administered Medications Ordered in Other Encounters   Medication Dose Route Frequency Provider Last Rate Last Dose   • FentaNYL Citrate (PF) (SUBLIMAZE) injection    PRN Reymundo Olivares CRNA   150 mcg at 05/09/18 0730   • lidocaine (LTA KIT) 4 % laryngotracheal solution   Topical PRN Reymundo Olivares CRNA   1 each at 05/09/18 0734   • lidocaine (XYLOCAINE) 2% injection   Injection PRN Reymundo Olivares CRNA   50 mg at 05/09/18 0732   • Propofol (DIPRIVAN) injection    PRN Reymundo Olivares CRNA   50 mg at 05/09/18 0749   • rocuronium (ZEMURON) injection   Intravenous PRN Reymundo Olivares CRNA   10 mg at 05/09/18 0732   • succinylcholine (ANECTINE) injection   Intravenous PRN Reymundo Olivares CRNA   100 mg at 05/09/18 0733     ALLERGIES:    Allergies   Allergen Reactions   • Aspirin Nausea And Vomiting     Unknown reaction   • Keflex [Cephalexin] Other (See Comments)     Blisters to nose and mouth     Active Problems:    Lumbar stenosis    Panlobular emphysema    Gastroesophageal reflux disease without esophagitis    Constipation due to opioid therapy    PAD (peripheral artery disease)    Primary osteoarthritis involving multiple joints    Blood pressure 118/57, pulse 90, temperature 99.9 °F (37.7 °C), temperature source Oral, resp. rate 16, height 157.5 cm (62\"), weight 72.6 kg (160 lb 2 oz), SpO2 95 %.      Subjective:  Symptoms:  Stable.  She reports weakness.  No shortness of breath or chest pain.    Diet:  NPO (for step #2).    Activity level: Impaired due to pain.    Pain:  She complains of pain that is moderate.  She reports pain is improving.  Pain is partially controlled.      Review of Systems   Respiratory: Negative for shortness of breath.    Cardiovascular: Negative for chest pain.   Neurological: Positive for weakness.     Objective:  General Appearance:  Comfortable and well-appearing.    Vital signs: (most recent): Blood pressure 118/57, pulse 90, " "temperature 99.9 °F (37.7 °C), temperature source Oral, resp. rate 16, height 157.5 cm (62\"), weight 72.6 kg (160 lb 2 oz), SpO2 95 %.  Vital signs are normal.    Output: Producing urine and minimal stool output.    HEENT: Normal HEENT exam.    Lungs:  Normal effort and normal respiratory rate.    Heart: Normal rate.  Regular rhythm.    Abdomen: Abdomen is soft.  Bowel sounds are normal.   There is no abdominal tenderness.     Extremities: Decreased range of motion.  (In bilateral lower ext)  Pulses: Distal pulses are intact.    Neurological: Patient is alert and oriented to person, place and time.    Pupils:  Pupils are equal, round, and reactive to light.    Skin:  Warm and dry.              Labs:  Lab Results (last 72 hours)     Procedure Component Value Units Date/Time    Vitamin B12 [[id number]]  (Normal) Collected:  05/09/18 0418    Specimen:  Blood Updated:  05/09/18 0602     Vitamin B-12 430 pg/mL     Folate [[id number]] Collected:  05/09/18 0418    Specimen:  Blood Updated:  05/09/18 0602     Folate 5.28 ng/mL     Iron Profile [[id number]]  (Abnormal) Collected:  05/09/18 0418    Specimen:  Blood Updated:  05/09/18 0504     Iron 19 (L) mcg/dL      TIBC 254 mcg/dL      Iron Saturation 7 (L) %     Basic Metabolic Panel [[id number]]  (Abnormal) Collected:  05/08/18 0550    Specimen:  Blood Updated:  05/08/18 0622     Glucose 128 (H) mg/dL      BUN 10 mg/dL      Creatinine 0.62 mg/dL      Sodium 141 mmol/L      Potassium 4.0 mmol/L      Chloride 105 mmol/L      CO2 25.0 mmol/L      Calcium 8.1 (L) mg/dL      eGFR Non African Amer 99 mL/min/1.73      BUN/Creatinine Ratio 16.1     Anion Gap 11.0 mmol/L     Narrative:       GFR Normal >60  Chronic Kidney Disease <60  Kidney Failure <15    CBC & Differential [[id number]] Collected:  05/08/18 0550    Specimen:  Blood Updated:  05/08/18 0601    Narrative:       The following orders were created for panel order CBC & Differential.  Procedure                               " Abnormality         Status                     ---------                               -----------         ------                     CBC Auto Differential[[id number]]        Abnormal            Final result                 Please view results for these tests on the individual orders.    CBC Auto Differential [[id number]]  (Abnormal) Collected:  05/08/18 0550    Specimen:  Blood Updated:  05/08/18 0601     WBC 13.31 (H) 10*3/mm3      RBC 3.20 (L) 10*6/mm3      Hemoglobin 9.8 (L) g/dL      Hematocrit 29.7 (L) %      MCV 92.8 fL      MCH 30.6 pg      MCHC 33.0 g/dL      RDW 12.3 %      RDW-SD 42.3 fl      MPV 10.1 fL      Platelets 196 10*3/mm3      Neutrophil % 76.5 %      Lymphocyte % 15.5 %      Monocyte % 7.4 %      Eosinophil % 0.1 %      Basophil % 0.2 %      Immature Grans % 0.3 %      Neutrophils, Absolute 10.18 (H) 10*3/mm3      Lymphocytes, Absolute 2.06 10*3/mm3      Monocytes, Absolute 0.99 10*3/mm3      Eosinophils, Absolute 0.01 10*3/mm3      Basophils, Absolute 0.03 10*3/mm3      Immature Grans, Absolute 0.04 (H) 10*3/mm3      nRBC 0.0 /100 WBC           Imaging Results (last 72 hours)     Procedure Component Value Units Date/Time    XR Spine Lumbar AP & Lateral [[id number]] Collected:  05/07/18 0950     Updated:  05/07/18 1437    Narrative:       EXAMINATION: XR SPINE LUMBAR AP AND LATERAL-     5/7/2018 8:15 AM CDT     HISTORY: Lumbar fusion.     3 intraoperative spot images document L5-S1 discectomy with anterior  plate and screw fusion.     Fluoroscopy time = 18 seconds.  This report was finalized on 05/07/2018 09:50 by Dr. Venkat Cohen MD.    FL C Arm During Surgery [[id number]] Collected:  05/07/18 0950     Updated:  05/07/18 1437    Narrative:       EXAMINATION: XR SPINE LUMBAR AP AND LATERAL-     5/7/2018 8:15 AM CDT     HISTORY: Lumbar fusion.     3 intraoperative spot images document L5-S1 discectomy with anterior  plate and screw fusion.     Fluoroscopy time = 18 seconds.  This report was  finalized on 05/07/2018 09:50 by Dr. Venkat Cohen MD.    XR Abdomen 1 View [[id number]] Collected:  05/07/18 1006     Updated:  05/07/18 1010    Narrative:       EXAMINATION: XR ABDOMEN 1 VW-     5/7/2018 9:44 AM CDT     HISTORY: Lumbosacral fusion.     Iliac stents noted.  L5-S1 discectomy with plate and screw fusion.     No unexpected foreign body is seen.     This report was finalized on 05/07/2018 10:06 by Dr. Venkat Cohen MD.                Assessment:    Condition: In stable condition.  Improving.   (Patient Active Problem List:     Foraminal stenosis of lumbosacral region     Chronic back pain     Radiculopathy     Lumbar stenosis     Panlobular emphysema     Gastroesophageal reflux disease without esophagitis     Constipation due to opioid therapy     PAD (peripheral artery disease)     Primary osteoarthritis involving multiple joints    ).     Plan:   Encourage ambulation.  (Replace iron orally  Follow after step 2  Medically stable for surgery today.).     Patient Active Problem List   Diagnosis   • Foraminal stenosis of lumbosacral region   • Chronic back pain   • Radiculopathy   • Lumbar stenosis   • Panlobular emphysema   • Gastroesophageal reflux disease without esophagitis   • Constipation due to opioid therapy   • PAD (peripheral artery disease)   • Primary osteoarthritis involving multiple joints     Lizandro Burger MD  5/9/2018

## 2018-05-09 NOTE — OP NOTE
POSTERIOR LUMBAR FUSION  Procedure Note    Arminda Jiménez  5/7/2018 - 5/9/2018    Pre-op Diagnosis:     1.  Increasing chronic back pain  2.  Bilateral buttock, thigh, and leg radiculopathy, right worse than left  3.  Severe neurogenic claudication  4.  Degenerative disc disease L4 to S1, worse L5-S1  5.  Degenerative lumbar scoliosis, concave left L5-S1  6.  Facet arthropathy L4 to S1, worse L5-S1  7.  Central and foraminal stenosis, left L5-S1  8.  Status post anterior decompression, ALIF with instrumentation L5-S1, 5/7/2018    Post-op Diagnosis:    same    Procedure/CPT® Codes:    1.  Posterior spinal fusion L5-S1  2.  Posterior spinal instrumentation L5-S1 (CoreLink pedicle screws and rods)  3.  Use of locally obtained autograft bone for fusion  4.  Use of fluoroscopy for confirmation of surgical level and placement of instrumentation  5.  Intraoperative neural monitoring with pedicle screw stimulation    Anesthesia: General    Surgeon: SHIV Zheng MD    Assistant: José Luis Traore PA-C    Estimated Blood Loss: minimal    Complications: None    Condition: Stable to PACU.    Indications:    The patient is a 57-year-old who sees Dr. Bam Landis for medical issues.  She presented to the office with complaints of increasing chronic back pain as well as symptoms down both lower extremity is, with the right side worse than the left.  Her symptoms were consistent with severe neurogenic claudication.  Imaging studies revealed degenerative disc disease and facet arthropathy from L4 to S1, worse at L5-S1, where there was degenerative lumbar scoliosis concave to the left contributing to central and severe foraminal stenosis.  The L5-S1 level was thought to be contributing to the vast majority of her symptoms.     After failing all conservative measures, it was mutually decided that surgery would be the best option. Risks, benefits, and complications of surgery were discussed in detail. The patient appeared well  informed and wished to proceed. We specifically discussed the risk of infection, blood loss, nerve root injury, CSF leak, and the possibility of incomplete resolution of symptoms. We also discussed the possible risk of a nonunion and the potential need for additional surgery in the event of a pseudoarthrosis or hardware failure.      We elected proceed with a staged operation.  Previously on 5/7/2018, the patient underwent an anterior decompression and fusion with instrumentation of L5-S1.  Today we return to surgery for the second posterior stage of the procedure.     Operative Procedure:    After obtaining informed consent and verifying the correct operative site, the patient was brought to the operating room. A general anesthetic was provided by the anesthesia service with the assistance of an endotracheal tube. Once this was appropriately positioned and secured, the patient was carefully rotated prone onto a Grant frame. All bony processes were well-padded. The lumbar region was prepped and draped in usual sterile fashion. A surgical timeout was taken to confirm this was the correct patient, we were working at the correct levels, and that preoperative antibiotics were given in a timely fashion.    Using fluoroscopy for guidance, a left sided Wiltsey incision was created overlying the L5-S1 segment using a 10 blade scalpel.  Dissection was carried through subcutaneous tissues using Bovie cautery.  The lumbar fascia was divided in line with the incision and blunt dissection was carried between the multifidus and the longissimus down to the facet joint of L5-S1.  Dissection was carried laterally exposing the transverse processes of L5 and the sacral ala.  We then exposed the L5-S1 facet joint.  The capsule was destroyed using Bovie cautery exposing as much bone as possible.  The high-speed bur was then used to decorticate the facet joint, destroying as much of the facet cartilage as possible.  I also decorticated  the lateral pars region and the tranverse processes.  The locally obtained autograft bone was left in situ in the posterolateral gutter.  This constituted a posterior fusion of L5-S1.    Next under fluoroscopic guidance, Jamshidi needles were used to cannulate the pedicles of L5 and S1.  Once the needles were properly positioned in the pedicles, guidewires were placed to maintain position.  Over each of the guidewires, a CoreLink pedicle screw was placed.  We used 6.5 mm x 45 mm screws into each of the pedicles.  I then used a neuro monitoring probe to stimulate the screws themselves confirming appropriate position ensuring no breach of the pedicle.  After confirming the screws were properly positioned, an appropriate-sized sharif was chosen to span the pedicle screws.  The sharif was tightened into position using set screws.  The setscrews were tightened using the appropriate antitorque wrench and torque limiting screwdriver provided by CoreLink.    The wound was then thoroughly irrigated and an inspection was then undertaken to ensure that we had adequate hemostasis.  Bleeding at this point was controlled using thrombin with Gelfoam powder and bipolar cautery.  Final fluoroscopy imaging confirmed adequate position of the newly placed posterior instrumentation.    Wound closure was then accomplished by reapproximating the fascia with a #1 Vicryl area immediate subcutaneous tissues were closed using a 2-0 Vicryl and skin closure was augmented using Mastisol and Steri-Strips.  The incision was then washed and sterilely dressed with a Bioclusive dressing.    The patient was then carefully rotated supine onto a hospital gurney, extubated, and sent to the recovery room in good stable condition.  The patient tolerated the procedure well.  There were no complications.  We estimated blood loss to be minimal.    José Luis Traore PA-C provided critical assistance during the procedure.  His assistance was medically necessary in order  to allow the procedure to occur in the most safe and efficient manner.    SHIV Zheng MD     Date: 5/9/2018  Time: 9:51 AM

## 2018-05-09 NOTE — ANESTHESIA POSTPROCEDURE EVALUATION
"Patient: Arminda Jiménez    Procedure Summary     Date:  05/09/18 Room / Location:   [facility]  /  [facility]    Anesthesia Start:  0726 Anesthesia Stop:  0832    Procedure:  POSTERIOR SPINAL FUSION WITH INSTRUMENTATION L5-S1 (N/A Spine Lumbar) Diagnosis:  (M54.16)    Surgeon:  LUIS CARLOS Zheng MD Provider:  Reymundo Olivares CRNA    Anesthesia Type:  general ASA Status:  3          Anesthesia Type: general  Last vitals  BP   106/55 (05/09/18 1140)   Temp   98.9 °F (37.2 °C) (05/09/18 1140)   Pulse   81 (05/09/18 1146)   Resp   15 (05/09/18 1146)     SpO2   95 % (Simultaneous filing. User may be unaware of other data.) (05/09/18 1140)     Post Anesthesia Care and Evaluation    Patient location during evaluation: PACU  Patient participation: complete - patient participated  Level of consciousness: awake and alert  Pain management: adequate  Airway patency: patent  Anesthetic complications: No anesthetic complications  PONV Status: controlled  Cardiovascular status: acceptable and hemodynamically stable  Respiratory status: acceptable  Hydration status: acceptable    Comments: Patient discharged from PACU prior to anesthesia evaluation based on Chang Score.  For details, see RN note.     /55 (BP Location: Right arm, Patient Position: Lying)   Pulse 81   Temp 98.9 °F (37.2 °C) (Oral)   Resp 15   Ht 157.5 cm (62\")   Wt 72.6 kg (160 lb 2 oz)   LMP  (Within Years)   SpO2 95% Comment: Simultaneous filing. User may be unaware of other data.  BMI 29.29 kg/m²       "

## 2018-05-09 NOTE — ANESTHESIA PROCEDURE NOTES
Airway  Urgency: elective    Date/Time: 5/9/2018 7:34 AM  End Time:5/9/2018 7:34 AM  Airway not difficult    General Information and Staff    Patient location during procedure: OR  CRNA: GOLDEN SÁNCHEZ    Indications and Patient Condition  Indications for airway management: airway protection    Preoxygenated: yes  Mask difficulty assessment: 1 - vent by mask    Final Airway Details  Final airway type: endotracheal airway      Successful airway: ETT  Cuffed: yes   Successful intubation technique: direct laryngoscopy  Endotracheal tube insertion site: oral  Blade: [place]  Blade size: #2  ETT size: 7.5 mm  Cormack-Lehane Classification: grade IIa - partial view of glottis  Placement verified by: capnometry   Cuff volume (mL): 6  Measured from: lips  ETT to lips (cm): 22  Number of attempts at approach: 1

## 2018-05-09 NOTE — PLAN OF CARE
Problem: Patient Care Overview  Goal: Plan of Care Review  Outcome: Ongoing (interventions implemented as appropriate)   05/09/18 2748   Coping/Psychosocial   Plan of Care Reviewed With patient   Plan of Care Review   Progress no change   OTHER   Outcome Summary For second part surgery today. NPO since midnight. Up with minimal assist to bathroom. with back brace on. Pleasand and cooperative       Problem: Fall Risk (Adult)  Goal: Absence of Fall  Outcome: Ongoing (interventions implemented as appropriate)

## 2018-05-09 NOTE — PLAN OF CARE
Problem: Patient Care Overview  Goal: Plan of Care Review  Outcome: Ongoing (interventions implemented as appropriate)   05/09/18 9662   Coping/Psychosocial   Plan of Care Reviewed With patient   Plan of Care Review   Progress improving   OTHER   Outcome Summary Pt received back from surgery today. Pt. c/o pain throughout shift. Medicated with PO pain meds. Safety maintained. Dressing to lumbar CDI. N/v check WNL. Brace at bedside. Cont to monitor.     Goal: Individualization and Mutuality  Outcome: Ongoing (interventions implemented as appropriate)    Goal: Discharge Needs Assessment  Outcome: Ongoing (interventions implemented as appropriate)    Goal: Interprofessional Rounds/Family Conf  Outcome: Ongoing (interventions implemented as appropriate)      Problem: Fall Risk (Adult)  Goal: Absence of Fall  Outcome: Ongoing (interventions implemented as appropriate)

## 2018-05-09 NOTE — THERAPY RE-EVALUATION
Acute Care - Occupational Therapy Re-Evaluation  [facility]     Patient Name: Arminda Jiménez  : 1960  MRN: [id number]  Today's Date: 2018  Onset of Illness/Injury or Date of Surgery: (P) 18  Date of Referral to OT: (P) 18  Referring Physician: (P) José Luis Traore PA-C (Referred 18)    Admit Date: 2018       ICD-10-CM ICD-9-CM   1. Impaired mobility and ADLs Z74.09 799.89   2. Impaired functional mobility, balance, and endurance Z74.09 V49.89     Patient Active Problem List   Diagnosis   • Foraminal stenosis of lumbosacral region   • Chronic back pain   • Radiculopathy   • Lumbar stenosis   • Panlobular emphysema   • Gastroesophageal reflux disease without esophagitis   • Constipation due to opioid therapy   • PAD (peripheral artery disease)   • Primary osteoarthritis involving multiple joints     Past Medical History:   Diagnosis Date   • Arthritis    • Asthma    • Cancer     right lung,abd,and pelvic area   • CHF (congestive heart failure)    • Chronic back pain    • COPD (chronic obstructive pulmonary disease)    • Depression    • Facet arthropathy    • Foraminal stenosis of lumbosacral region    • GERD (gastroesophageal reflux disease)    • H/O degenerative disc disease    • History of blood transfusion    • PVD (peripheral vascular disease)    • Radiculopathy      Past Surgical History:   Procedure Laterality Date   • ANTERIOR LUMBAR EXPOSURE N/A 2018    Procedure: ANTERIOR LUMBAR EXPOSURE;  Surgeon: Chris Haley DO;  Location: [facility] OR;  Service: Vascular   • APPENDECTOMY     • CARPAL TUNNEL RELEASE Bilateral    • LUMBAR FUSION N/A 2018    Procedure: ANTERIOR DECOMPRESSION, ANTERIOR LUMBAR INTERBODY FUSION WITH INSTRUMENTATION L5-S1;  Surgeon: LUIS CARLOS Zheng MD;  Location: [facility] OR;  Service: Orthopedic Spine   • OTHER SURGICAL HISTORY Bilateral 2017    Stent implants BLE [address] [place]          OT ASSESSMENT FLOWSHEET (last 72 hours)       Occupational Therapy Evaluation     Row Name 05/09/18 1317 05/07/18 1445                OT Evaluation Time/Intention    Subjective Information (P)  complains of;pain   pain in L leg is jabbing 6-7/10, stomach is bloated  -KM complains of;pain;fatigue;weakness  -MM       Document Type (P)  re-evaluation   started 2:04  -KM evaluation   see MAR  -MM       Mode of Treatment (P)  occupational therapy   OTS  -KM occupational therapy  -MM       Patient Effort (P)  good  -KM  --          General Information    Patient Profile Reviewed? (P)  yes  -KM yes  -MM       Onset of Illness/Injury or Date of Surgery (P)  05/07/18  -KM 05/07/18  -MM       Referring Physician (P)  José Luis Traore PA-C   Referred 5/9/18  -KM Dr. Zheng  -MM       Patient Observations (P)  cooperative;agree to therapy  -KM alert;agree to therapy;cooperative  -MM       Patient/Family Observations (P)  no family present  -KM no family present  -MM       General Observations of Patient (P)  laying in bed, IV, Ice pack on back  -KM sitting EOB, brace jefry key, ANDREINA  -MM       Prior Level of Function (P)  independent:;all household mobility;community mobility;transfer;bed mobility;ADL's;driving;cleaning;cooking;home management   limited by pain at times   -KM independent:;all household mobility;community mobility;transfer;bed mobility;ADL's;driving;cleaning;cooking;home management   limited by pain at times  -MM       Equipment Currently Used at Home (P)  none  -KM none  -MM       Pertinent History of Current Functional Problem (P)  Increasing back and radicular pain R >L. 5/7 s/p discectomy with fusion L5-S1. 2nd part sx scheduled for Wednesday 5/9.  -KM Increasing back and radicular pain R >L. 5/7 s/p discectomy with fusion L5-S1. 2nd part sx scheduled for Wednesday 5/9.  -MM       Existing Precautions/Restrictions (P)  brace worn when out of bed;fall;spinal  -KM brace worn when out of bed;fall;spinal  -MM       Risks Reviewed (P)   patient:;LOB;nausea/vomiting;dizziness;increased discomfort  -KM patient:;LOB;nausea/vomiting;dizziness;increased discomfort  -MM       Benefits Reviewed (P)  patient:;improve function;increase independence;increase strength;increase balance;decrease pain  -KM patient:;improve function;increase independence;increase strength;increase balance;decrease pain  -MM       Barriers to Rehab (P)  physical barrier  -KM physical barrier  -MM          Relationship/Environment    Primary Source of Support/Comfort (P)  child(leno)  -KM  --       Lives With (P)  alone  -KM alone  -MM       Family Caregiver if Needed (P)  child(leno), adult   daughter will be staying with pt at d/c  -KM child(leno), adult   daughter will be staying with pt at d/c  -MM          Resource/Environmental Concerns    Current Living Arrangements (P)  home/apartment/condo  -KM home/apartment/condo  -MM       Resource/Environmental Concerns (P)  none  -KM home accessibility  -MM          Home Main Entrance    Number of Stairs, Main Entrance (P)  one  -KM one  -MM       Stair Railings, Main Entrance (P)  none  -KM none  -MM          Cognitive Assessment/Interventions    Additional Documentation (P)  Cognitive Assessment/Intervention (Group)  -KM Cognitive Assessment/Intervention (Group)  -MM          Cognitive Assessment/Intervention- PT/OT    Affect/Mental Status (Cognitive)  -- WNL  -MM       Orientation Status (Cognition) (P)  oriented to;person;place;situation  -KM oriented x 4  -MM       Follows Commands (Cognition) (P)  WFL  -KM WFL  -MM       Cognitive Function (Cognitive) (P)  WFL  -KM WFL  -MM       Personal Safety Interventions (P)  fall prevention program maintained;muscle strengthening facilitated;nonskid shoes/slippers when out of bed;supervised activity  -KM fall prevention program maintained;muscle strengthening facilitated;nonskid shoes/slippers when out of bed;supervised activity  -MM          Safety Issues, Functional Mobility    Safety  Issues Affecting Function (Mobility) (P)  other (see comments)   takes small steps  -KM  --       Impairments Affecting Function (Mobility) (P)  endurance/activity tolerance;pain  -KM  --          Bed Mobility Assessment/Treatment    Bed Mobility Assessment/Treatment (P)  rolling right;sidelying-sit-sidelying;other (see comments);rolling left;scooting/bridging  -KM supine-sit;sit-supine  -MM       Rolling Left [place] (Bed Mobility) (P)  supervision  -KM  --       Rolling Right [place] (Bed Mobility) (P)  supervision  -KM  --       Scooting/Bridging [place] (Bed Mobility) (P)  supervision  -KM  --       Supine-Sit [place] (Bed Mobility)  -- not tested   sitting EOB upon entering room  -MM       Sit-Supine [place] (Bed Mobility)  -- supervision  -MM       Sidelying-Sit-Sidelying [place] (Bed Mobility) (P)  contact guard  -KM  --       Bed Mobility, Safety Issues  -- impaired trunk control for bed mobility  -MM       Assistive Device (Bed Mobility) (P)  head of bed elevated;bed rails  -KM head of bed elevated  -MM          Functional Mobility    Functional Mobility- Ind. Level (P)  contact guard assist  -KM contact guard assist  -MM       Functional Mobility- Device  -- --   HHA x1`  -MM       Functional Mobility- Comment (P)  walked from BR to room by elevators in hallway  -KM to BR door and back to bed  -MM          Transfer Assessment/Treatment    Transfer Assessment/Treatment (P)  sit-stand transfer;stand-sit transfer  -KM sit-stand transfer;stand-sit transfer  -MM       Sit-Stand [place] (Transfers) (P)  contact guard  -KM contact guard  -MM       Stand-Sit [place] (Transfers) (P)  contact guard  -KM contact guard  -MM          ADL Assessment/Intervention    BADL Assessment/Intervention (P)  upper body dressing;grooming;lower body dressing  -KM upper body dressing  -MM          Upper Body Dressing Assessment/Training    Upper Body Dressing [place] Level (P)  set  up;minimum assist (75% patient effort);don;pajama/robe  -KM doff;maximum assist (25% patient effort)   LSO  -MM       Upper Body Dressing Position (P)  edge of bed sitting  -KM edge of bed sitting  -MM       Comment (Upper Body Dressing)  -- while education was being completed  -MM          Lower Body Dressing Assessment/Training    Lower Body Dressing [place] Level (P)  maximum assist (25% patient effort);don;socks  -KM  --       Lower Body Dressing Position (P)  edge of bed sitting  -KM  --       Comment (Lower Body Dressing) (P)  Unable to don socks d/t pain  -KM  --          Grooming Assessment/Training    [place] Level (Grooming) (P)  set up;supervision;wash face, hands  -KM  --       Grooming Position (P)  edge of bed sitting  -KM  --          BADL Safety/Performance    Impairments, BADL Safety/Performance (P)  pain  -KM pain  -MM          General ROM    GENERAL ROM COMMENTS (P)  AROM observed WFL in E  -  --          General Assessment (Manual Muscle Testing)    Comment, General Manual Muscle Testing (MMT) Assessment (P)  MMT observed 4+/5 E   -KM  --          Motor Assessment/Interventions    Additional Documentation (P)  Balance (Group)  -KM  --          Balance    Balance (P)  static sitting balance;static standing balance  -KM  --          Static Sitting Balance    Level of [place] (Unsupported Sitting, Static Balance) (P)  supervision  -KM  --       Sitting Position (Unsupported Sitting, Static Balance) (P)  sitting on edge of bed  -KM  --          Static Standing Balance    Level of [place] (Unsupported Standing, Static Balance) (P)  contact guard assist  -KM  --          Sensory Assessment/Intervention    Sensory General Assessment (P)  no sensation deficits identified  -KM no sensation deficits identified  -MM          Positioning and Restraints    Pre-Treatment Position (P)  in bed  -KM in bed  -MM       Post Treatment Position (P)  bed  -KM bed  -MM       In Bed (P)  side  "rails up x2;exit alarm on;notified nsg;supine;call light within reach;encouraged to call for assist;SCD pump applied  -KM fowlers;call light within reach;encouraged to call for assist;side rails up x2  -MM          Pain Assessment    Additional Documentation  -- Pain Scale: FACES Pre/Post-Treatment (Group)  -MM          Pain Scale: Numbers Pre/Post-Treatment    Pain Location - Side  -- Left  -MM       Pain Location  -- hip  -MM       Pain Intervention(s)  -- Repositioned;Ambulation/increased activity   brace  -MM          Pain Scale: FACES Pre/Post-Treatment    Pain: FACES Scale, Pretreatment (P)  6-->hurts even more  -KM 4-->hurts little more  -MM       Pain: FACES Scale, Post-Treatment (P)  6-->hurts even more  -KM 4-->hurts little more  -MM       Pre/Post Treatment Pain Comment (P)  jabbing pain  -KM \"stiffness\"  -MM          Orthotics & Prosthetics Management    Orthosis Location (P)  spinal orthosis  -KM spinal orthosis  -MM       Additional Documentation (P)  Orthosis Location (Row)  -KM Orthosis Location (Row)  -MM          Spinal Orthosis Management    Type (Spinal Orthosis) (P)  LSO (lumbar sacral orthosis)  -KM LSO (lumbar sacral orthosis)  -MM       Fabrication Comment (Spinal Orthosis) (P)  Pt had LSO prior to being seen  -KM  --       Functional Design (Spinal Orthosis) (P)  static orthosis  -KM static orthosis  -MM       Therapeutic Indications (Spinal Orthosis) (P)  stabilization and support;positioning for occupational performance/function;post-op positioning/protection  -KM post-op positioning/protection;rest/inflammation reduction  -MM       Wearing Schedule (Spinal Orthosis) (P)  wear when out of bed only;remove for hygiene/bathing  -KM wear when out of bed only  -MM       Orthosis Training (Spinal Orthosis) (P)  patient;donning/doffing orthosis;orthosis adjustment;purpose/goals of orthosis;wearing schedule;meets goals/outcomes  -KM patient;activity limitations/precautions;donning/doffing " orthosis;purpose/goals of orthosis  -MM       Compliance/Wearing Issues (Spinal Orthosis) (P)  patient/caregiver comprehend strategies;patient/caregiver comprehend rationale for orthosis  -KM follow-up training required  -MM          Wound 05/07/18 0946 Other (See comments) abdomen incision    Wound - Properties Group Date first assessed: 05/07/18  -RS Time first assessed: 0946  -RS Side: Other (See comments)  -RS Location: abdomen  -RS Type: incision  -RS       Wound 05/09/18 0752 Other (See comments) back incision    Wound - Properties Group Date first assessed: 05/09/18  -BC Time first assessed: 0752  -BC Side: Other (See comments)  -BC Location: back  -BC Type: incision  -BC       Coping    Observed Emotional State (P)  cooperative;accepting  -KM  --       Verbalized Emotional State (P)  acceptance  -KM  --          Plan of Care Review    Plan of Care Reviewed With (P)  patient  -KM patient  -MM          Clinical Impression (OT)    Date of Referral to OT (P)  05/09/18  -KM 05/07/18  -MM       OT Diagnosis (P)  impaired mobility and ADLs  -KM impaired mobility and adls  -MM       Functional Level at Time of Evaluation (OT Eval) (P)  good  -KM good  -MM       Patient/Family Goals Statement (OT Eval) (P)  to walk  -KM regain independence  -MM       Criteria for Skilled Therapeutic Interventions Met (OT Eval) (P)  yes;treatment indicated  -KM yes;treatment indicated  -MM       Rehab Potential (OT Eval) (P)  good, to achieve stated therapy goals  -KM good, to achieve stated therapy goals  -MM       Therapy Frequency (OT Eval) (P)  daily  -KM daily  -MM       Predicted Duration of Therapy Intervention (OT Eval) (P)  until dc  -KM until dc  -MM       Care Plan Review (OT) (P)  evaluation/treatment results reviewed;care plan/treatment goals reviewed;risks/benefits reviewed  -KM evaluation/treatment results reviewed;care plan/treatment goals reviewed;risks/benefits reviewed;current/potential barriers  reviewed;patient/other agree to care plan  -MM       Anticipated Discharge Disposition (OT) (P)  home or self care  -KM home or self care  -MM          Vital Signs    Pre SpO2 (%)  -- 96  -MM       O2 Delivery Pre Treatment  -- supplemental O2  -MM       Post SpO2 (%)  -- 92  -MM       O2 Delivery Post Treatment  -- room air   supplemental donned after reading was taken  -MM       Pre Patient Position  -- Sitting  -MM       Intra Patient Position  -- Standing  -MM       Post Patient Position  -- Sitting  -MM          Planned OT Interventions    Planned Therapy Interventions (OT Eval) (P)  activity tolerance training;adaptive equipment training;BADL retraining;functional balance retraining;occupation/activity based interventions;orthotic fabrication/fitting/training;patient/caregiver education/training;ROM/therapeutic exercise  -KM activity tolerance training;adaptive equipment training;BADL retraining;functional balance retraining;occupation/activity based interventions;orthotic fabrication/fitting/training;patient/caregiver education/training;transfer/mobility retraining;ROM/therapeutic exercise  -MM          OT Goals    Bed Mobility Goal Selection (OT)  -- bed mobility, OT goal 1  -MM       Dressing Goal Selection (OT)  -- dressing, OT goal 1;dressing, OT goal 2  -MM          Bed Mobility Goal 1 (OT)    Activity/Assistive Device (Bed Mobility Goal 1, OT) sit to supine/supine to sit  -CH sit to supine/supine to sit  -MM       [place] Level/Cues Needed (Bed Mobility Goal 1, OT) independent  -CH independent   with log rolling technique  -MM       Time Frame (Bed Mobility Goal 1, OT) 10 days  -CH 10 days  -MM       Barriers (Bed Mobility Goal 1, OT) .  -CH  --       Progress/Outcomes (Bed Mobility Goal 1, OT)  -- goal ongoing  -MM          Dressing Goal 1 (OT)    Activity/Assistive Device (Dressing Goal 1, OT) upper body dressing  -CH upper body dressing  -MM       [place]/Cues Needed (Dressing Goal 1, OT)  set-up required;conditional independence  -CH set-up required;conditional independence  -MM       Time Frame (Dressing Goal 1, OT) 10 days  -CH 10 days  -MM       Barriers (Dressing Goal 1, OT) LSO  -CH LSO  -MM       Progress/Outcome (Dressing Goal 1, OT)  -- goal ongoing  -MM          Dressing Goal 2 (OT)    Activity/Assistive Device (Dressing Goal 2, OT) lower body dressing  -CH lower body dressing   AE PRN  -MM       [place]/Cues Needed (Dressing Goal 2, OT) conditional independence;set-up required  -CH conditional independence;set-up required  -MM       Time Frame (Dressing Goal 2, OT) 10 days  -CH 10 days  -MM       Barriers (Dressing Goal 2, OT) .  -CH  --       Progress/Outcome (Dressing Goal 2, OT)  -- goal ongoing  -MM          Patient Education Goal (OT)    Activity (Patient Education Goal, OT) spinal precautions, log rolling, bracing  -CH spinal precautions, log rolling, bracing  -MM       [place]/Cues/Accuracy (Memory Goal 2, OT) demonstrates adequately;independent;verbalizes understanding  -CH demonstrates adequately;independent;verbalizes understanding  -MM       Time Frame (Patient Education Goal, OT) 10 days  -CH 10 days  -MM       Barriers (Patient Education Goal, OT) .  -CH  --       Progress/Outcome (Patient Education Goal, OT)  -- goal ongoing  -MM          Living Environment    Home Accessibility (P)  stairs to enter home;tub/shower is not walk in   walk in shower and bath tub  -KM stairs to enter home;tub/shower is not walk in   walk in shower and bath tub  -MM         User Key  (r) = Recorded By, (t) = Taken By, (c) = Cosigned By    Initials Name Effective Dates    CH Rajwinder Kincaid, OTR/L 08/02/16 -     BC Leland Ventura, RN 08/02/16 -     RS Jose Farah, RN 07/26/16 -     MM Sascha Martin, OTR/L 04/03/18 -     KM Lizzette Cartagena OT Student 04/05/18 -            Occupational Therapy Education     Title: PT OT SLP Therapies (Active)     Topic: Occupational Therapy (Active)      Point: ADL training (Done)     Description: Instruct learner(s) on proper safety adaptation and remediation techniques during self care or transfers.   Instruct in proper use of assistive devices.   Learning Progress Summary     Learner Status Readiness Method Response Comment Documented by    Patient Done Acceptance E VU,NR Pt educated on spinal precautions and body mechanics. Needs reinforcement with spinal precautions.  05/09/18 1404     Active Acceptance E NR OT role, benefits, POC, bracing and spinal precatuions, home safety  05/07/18 1632          Point: Precautions (Done)     Description: Instruct learner(s) on prescribed precautions during self-care and functional transfers.   Learning Progress Summary     Learner Status Readiness Method Response Comment Documented by    Patient Done Acceptance E VU,NR Pt educated on spinal precautions and body mechanics. Needs reinforcement with spinal precautions.  05/09/18 1404     Active Acceptance E NR OT role, benefits, POC, bracing and spinal precatuions, home safety  05/07/18 1632          Point: Body mechanics (Done)     Description: Instruct learner(s) on proper positioning and spine alignment during self-care, functional mobility activities and/or exercises.   Learning Progress Summary     Learner Status Readiness Method Response Comment Documented by    Patient Done Acceptance E VU,NR Pt educated on spinal precautions and body mechanics. Needs reinforcement with spinal precautions.  05/09/18 1404                      User Key     Initials Effective Dates Name Provider Type Discipline     04/03/18 -  Sascha Martin, OTR/L Occupational Therapist OT     04/05/18 -  Lizzette Cartagena OT Student OT Student OT                  OT Recommendation and Plan  Outcome Summary/Treatment Plan (OT)  Anticipated Discharge Disposition (OT): (P) home or self care  Planned Therapy Interventions (OT Eval): (P) activity tolerance training, adaptive equipment  training, BADL retraining, functional balance retraining, occupation/activity based interventions, orthotic fabrication/fitting/training, patient/caregiver education/training, ROM/therapeutic exercise  Therapy Frequency (OT Eval): (P) daily  Plan of Care Review  Plan of Care Reviewed With: (P) patient  Plan of Care Reviewed With: (P) patient  Outcome Summary: (P) OT re-eval complete. Pt reported 6-7 out of 10 pain and was agreeable to therapy. Pt was supervision with rolling and scooting in bed and CGA  for sideline <> sit. Pt donned/doffed brace with supervision. Pt walked from room to hallway with CGA. Pt was fatigued with walking d/t pain. Pt could not recall spinal precautions and was educated on them. Skilled services will be continued to decrease pain and address activity tolerance for engaging in functional tasks and ADLs. Recommended dc at this time is inpatient rehab or home with home health services pending progress.           Outcome Measures     Row Name 05/09/18 1500 05/08/18 1000 05/07/18 1600       How much help from another person do you currently need...    Turning from your back to your side while in flat bed without using bedrails?  -- 3  -MS  --    Moving from lying on back to sitting on the side of a flat bed without bedrails?  -- 3  -MS  --    Moving to and from a bed to a chair (including a wheelchair)?  -- 3  -MS  --    Standing up from a chair using your arms (e.g., wheelchair, bedside chair)?  -- 3  -MS  --    Climbing 3-5 steps with a railing?  -- 3  -MS  --    To walk in hospital room?  -- 3  -MS  --    AM-PAC 6 Clicks Score  -- 18  -MS  --       How much help from another is currently needed...    Putting on and taking off regular lower body clothing? (P)  1  -KM  -- 2  -MM    Bathing (including washing, rinsing, and drying) (P)  2  -KM  -- 2  -MM    Toileting (which includes using toilet bed pan or urinal) (P)  2  -KM  -- 2  -MM    Putting on and taking off regular upper body clothing  (P)  3  -KM  -- 2  -MM    Taking care of personal grooming (such as brushing teeth) (P)  3  -KM  -- 3  -MM    Eating meals (P)  4  -KM  -- 3  -MM    Score (P)  15  -KM  -- 14  -MM       Functional Assessment    Outcome Measure Options (P)  AM-PAC 6 Clicks Daily Activity (OT)  -KM AM-PAC 6 Clicks Basic Mobility (PT)  -MS AM-PAC 6 Clicks Daily Activity (OT)  -MM      User Key  (r) = Recorded By, (t) = Taken By, (c) = Cosigned By    Initials Name Provider Type    MS Betty Kuhn, PT DPT Physical Therapist    MM Sascha Martin, OTR/L Occupational Therapist     Lizzette Cartagena, OT Student OT Student          Time Calculation:   OT Start Time: (P) 1404 (Chart Review 9 minutes)  OT Stop Time: (P) 1427  OT Time Calculation (min): (P) 23 min    Therapy Charges for Today     Code Description Service Date Service Provider Modifiers Qty    [id number]  OT RE-EVAL 2 5/9/2018 Lizzette Cartagena, OT Student GO, KX 1          OT G-codes  OT Professional Judgement Used?: (P) Yes  OT Functional Scales Options: (P) AM-PAC 6 Clicks Daily Activity (OT)  Score: (P) 15  Functional Limitation: (P) Self care  Self Care Current Status (): (P) At least 40 percent but less than 60 percent impaired, limited or restricted  Self Care Goal Status (): (P) At least 20 percent but less than 40 percent impaired, limited or restricted    Lizzette Cartagena OT Student  5/9/2018

## 2018-05-09 NOTE — PLAN OF CARE
Problem: Patient Care Overview  Goal: Plan of Care Review   05/09/18 8251   Coping/Psychosocial   Plan of Care Reviewed With patient   Plan of Care Review   Progress no change   OTHER   Outcome Summary OT re-eval complete. Pt reported 6-7 out of 10 pain and was agreeable to therapy. Pt was supervision with rolling and scooting in bed and CGA for sideline <> sit. Pt donned/doffed brace with supervision. Pt walked from room to hallway with CGA. Pt was fatigued with walking d/t pain. Pt could not recall spinal precautions and was educated on them. Skilled services will be continued to decrease pain and address activity tolerance for engaging in functional tasks and ADLs. Recommended dc at this time is inpatient rehab or home with home health services pending progress.

## 2018-05-10 VITALS
BODY MASS INDEX: 29.47 KG/M2 | HEART RATE: 83 BPM | RESPIRATION RATE: 20 BRPM | SYSTOLIC BLOOD PRESSURE: 116 MMHG | HEIGHT: 62 IN | DIASTOLIC BLOOD PRESSURE: 64 MMHG | TEMPERATURE: 98.9 F | OXYGEN SATURATION: 96 % | WEIGHT: 160.13 LBS

## 2018-05-10 LAB
ANION GAP SERPL CALCULATED.3IONS-SCNC: 5 MMOL/L (ref 4–13)
BASOPHILS # BLD AUTO: 0.05 10*3/MM3 (ref 0–0.2)
BASOPHILS NFR BLD AUTO: 0.5 % (ref 0–2)
BUN BLD-MCNC: 9 MG/DL (ref 5–21)
BUN/CREAT SERPL: 13.8 (ref 7–25)
CALCIUM SPEC-SCNC: 8.1 MG/DL (ref 8.4–10.4)
CHLORIDE SERPL-SCNC: 101 MMOL/L (ref 98–110)
CO2 SERPL-SCNC: 29 MMOL/L (ref 24–31)
CREAT BLD-MCNC: 0.65 MG/DL (ref 0.5–1.4)
DEPRECATED RDW RBC AUTO: 43.6 FL (ref 40–54)
EOSINOPHIL # BLD AUTO: 0.24 10*3/MM3 (ref 0–0.7)
EOSINOPHIL NFR BLD AUTO: 2.4 % (ref 0–4)
ERYTHROCYTE [DISTWIDTH] IN BLOOD BY AUTOMATED COUNT: 12.4 % (ref 12–15)
GFR SERPL CREATININE-BSD FRML MDRD: 94 ML/MIN/1.73
GLUCOSE BLD-MCNC: 102 MG/DL (ref 70–100)
HCT VFR BLD AUTO: 28.6 % (ref 37–47)
HGB BLD-MCNC: 9.1 G/DL (ref 12–16)
IMM GRANULOCYTES # BLD: 0.06 10*3/MM3 (ref 0–0.03)
IMM GRANULOCYTES NFR BLD: 0.6 % (ref 0–5)
LYMPHOCYTES # BLD AUTO: 1.74 10*3/MM3 (ref 0.72–4.86)
LYMPHOCYTES NFR BLD AUTO: 17.5 % (ref 15–45)
MCH RBC QN AUTO: 30.2 PG (ref 28–32)
MCHC RBC AUTO-ENTMCNC: 31.8 G/DL (ref 33–36)
MCV RBC AUTO: 95 FL (ref 82–98)
MONOCYTES # BLD AUTO: 0.78 10*3/MM3 (ref 0.19–1.3)
MONOCYTES NFR BLD AUTO: 7.9 % (ref 4–12)
NEUTROPHILS # BLD AUTO: 7.06 10*3/MM3 (ref 1.87–8.4)
NEUTROPHILS NFR BLD AUTO: 71.1 % (ref 39–78)
NRBC BLD MANUAL-RTO: 0 /100 WBC (ref 0–0)
PLATELET # BLD AUTO: 170 10*3/MM3 (ref 130–400)
PMV BLD AUTO: 10.7 FL (ref 6–12)
POTASSIUM BLD-SCNC: 3.6 MMOL/L (ref 3.5–5.3)
RBC # BLD AUTO: 3.01 10*6/MM3 (ref 4.2–5.4)
SODIUM BLD-SCNC: 135 MMOL/L (ref 135–145)
WBC NRBC COR # BLD: 9.93 10*3/MM3 (ref 4.8–10.8)

## 2018-05-10 PROCEDURE — 80048 BASIC METABOLIC PNL TOTAL CA: CPT | Performed by: PHYSICIAN ASSISTANT

## 2018-05-10 PROCEDURE — 85025 COMPLETE CBC W/AUTO DIFF WBC: CPT | Performed by: PHYSICIAN ASSISTANT

## 2018-05-10 RX ORDER — BISACODYL 10 MG
10 SUPPOSITORY, RECTAL RECTAL DAILY
Status: DISCONTINUED | OUTPATIENT
Start: 2018-05-10 | End: 2018-05-10 | Stop reason: HOSPADM

## 2018-05-10 RX ORDER — OXYCODONE AND ACETAMINOPHEN 10; 325 MG/1; MG/1
2 TABLET ORAL EVERY 4 HOURS PRN
Start: 2018-05-10 | End: 2018-05-17

## 2018-05-10 RX ADMIN — OXYCODONE HYDROCHLORIDE AND ACETAMINOPHEN 2 TABLET: 10; 325 TABLET ORAL at 08:54

## 2018-05-10 RX ADMIN — CLINDAMYCIN IN 5 PERCENT DEXTROSE 900 MG: 18 INJECTION, SOLUTION INTRAVENOUS at 08:55

## 2018-05-10 RX ADMIN — CLINDAMYCIN IN 5 PERCENT DEXTROSE 900 MG: 18 INJECTION, SOLUTION INTRAVENOUS at 00:50

## 2018-05-10 RX ADMIN — GABAPENTIN 800 MG: 400 CAPSULE ORAL at 05:50

## 2018-05-10 RX ADMIN — BISACODYL 10 MG: 10 SUPPOSITORY RECTAL at 07:51

## 2018-05-10 RX ADMIN — SODIUM CHLORIDE 75 ML/HR: 9 INJECTION, SOLUTION INTRAVENOUS at 00:50

## 2018-05-10 RX ADMIN — ESCITALOPRAM 10 MG: 10 TABLET, FILM COATED ORAL at 08:54

## 2018-05-10 RX ADMIN — Medication 1 CAPSULE: at 08:54

## 2018-05-10 RX ADMIN — FAMOTIDINE 20 MG: 20 TABLET, FILM COATED ORAL at 08:54

## 2018-05-10 RX ADMIN — OXYCODONE HYDROCHLORIDE AND ACETAMINOPHEN 2 TABLET: 10; 325 TABLET ORAL at 04:03

## 2018-05-10 NOTE — PROGRESS NOTES
Arminda Jiménez is a 58 y.o. female patient.  Doing well after 2 nd step  Pain as expected      Current Facility-Administered Medications   Medication Dose Route Frequency Provider Last Rate Last Dose   • bisacodyl (DULCOLAX) suppository 10 mg  10 mg Rectal Daily PRN Lizandro Burger MD       • clindamycin (CLEOCIN) 900 mg in dextrose 5% 50 mL IVPB (premix)  900 mg Intravenous Q8H José Luis Traore PA-C 50 mL/hr at 05/10/18 0050 900 mg at 05/10/18 0050   • diphenhydrAMINE (BENADRYL) capsule 25 mg  25 mg Oral Nightly PRN José Luis Traore PA-C       • escitalopram (LEXAPRO) tablet 10 mg  10 mg Oral Daily K Kevon Zheng MD   10 mg at 05/08/18 1028   • famotidine (PEPCID) injection 20 mg  20 mg Intravenous Q12H José Luis Traore PA-C        Or   • famotidine (PEPCID) tablet 20 mg  20 mg Oral Q12H José Luis Traore PA-C   20 mg at 05/09/18 2111   • gabapentin (NEURONTIN) capsule 800 mg  800 mg Oral Q8H LUIS CARLOS Zheng MD   800 mg at 05/10/18 0550   • HYDROmorphone (DILAUDID) injection 1 mg  1 mg Intravenous Q3H PRN K Kevon Zheng MD   1 mg at 05/09/18 2000   • ipratropium-albuterol (DUO-NEB) nebulizer solution 3 mL  3 mL Nebulization 4x Daily - RT Lizandro Burger MD   3 mL at 05/09/18 1140   • iron polysacch complex-B12-FA (FERREX 150 FORTE) capsule 1 capsule  1 capsule Oral Daily Lizandro Burger MD   1 capsule at 05/09/18 1330   • lactated ringers infusion  30 mL/hr Intravenous Continuous Vasile Santana MD   Stopped at 05/09/18 1127   • ondansetron (ZOFRAN) tablet 4 mg  4 mg Oral Q6H PRN José Luis Traore PA-C        Or   • ondansetron ODT (ZOFRAN-ODT) disintegrating tablet 4 mg  4 mg Oral Q6H PRN José Luis Traore PA-C        Or   • ondansetron (ZOFRAN) injection 4 mg  4 mg Intravenous Q6H PRN José Luis Traore PA-C       • oxyCODONE-acetaminophen (PERCOCET)  MG per tablet 2 tablet  2 tablet Oral Q4H PRN K Kevon Zheng MD   2 tablet at 05/10/18 3649   • polyethylene glycol 3350  "powder (packet)  17 g Oral BID Lizandro Burger MD   17 g at 05/08/18 2111   • sodium chloride 0.9 % flush 1-10 mL  1-10 mL Intravenous PRN Vasile Santana MD       • sodium chloride 0.9 % flush 1-10 mL  1-10 mL Intravenous PRN LUIS CARLOS Zheng MD       • sodium chloride 0.9 % flush 1-10 mL  1-10 mL Intravenous PRN José Luis Traore PA-C       • sodium chloride 0.9 % infusion  75 mL/hr Intravenous Continuous José Luis Traore PA-C 75 mL/hr at 05/10/18 0050 75 mL/hr at 05/10/18 0050   • tiZANidine (ZANAFLEX) tablet 4 mg  4 mg Oral Q8H PRN K Kevon Zheng MD         ALLERGIES:    Allergies   Allergen Reactions   • Aspirin Nausea And Vomiting     Unknown reaction   • Keflex [Cephalexin] Other (See Comments)     Blisters to nose and mouth     Active Problems:    Lumbar stenosis    Panlobular emphysema    Gastroesophageal reflux disease without esophagitis    Constipation due to opioid therapy    PAD (peripheral artery disease)    Primary osteoarthritis involving multiple joints    Blood pressure 101/56, pulse 81, temperature 98.1 °F (36.7 °C), temperature source Oral, resp. rate 18, height 157.5 cm (62\"), weight 72.6 kg (160 lb 2 oz), SpO2 96 %.      Subjective:  Symptoms:  Stable.  She reports weakness.  No shortness of breath or chest pain.    Diet:  Adequate intake.    Activity level: Returning to normal.    Pain:  She complains of pain that is moderate.  She reports pain is improving.  Pain is requiring pain medication.      Review of Systems   Respiratory: Negative for shortness of breath.    Cardiovascular: Negative for chest pain.   Neurological: Positive for weakness.     Objective:  General Appearance:  Comfortable and well-appearing.    Vital signs: (most recent): Blood pressure 101/56, pulse 81, temperature 98.1 °F (36.7 °C), temperature source Oral, resp. rate 18, height 157.5 cm (62\"), weight 72.6 kg (160 lb 2 oz), SpO2 96 %.  Vital signs are normal.    Output: Producing urine and producing " stool.    HEENT: Normal HEENT exam.    Lungs:  Normal effort and normal respiratory rate.    Heart: Normal rate.  Regular rhythm.    Abdomen: Abdomen is soft.  Bowel sounds are normal.     Extremities: Decreased range of motion.    Pupils:  Pupils are equal, round, and reactive to light.    Skin:  Warm and dry.              Labs:  Lab Results (last 72 hours)     Procedure Component Value Units Date/Time    CBC & Differential [[id number]] Collected:  05/10/18 0616    Specimen:  Blood Updated:  05/10/18 0648    Narrative:       The following orders were created for panel order CBC & Differential.  Procedure                               Abnormality         Status                     ---------                               -----------         ------                     CBC Auto Differential[[id number]]        Abnormal            Final result                 Please view results for these tests on the individual orders.    CBC Auto Differential [[id number]]  (Abnormal) Collected:  05/10/18 0616    Specimen:  Blood Updated:  05/10/18 0648     WBC 9.93 10*3/mm3      RBC 3.01 (L) 10*6/mm3      Hemoglobin 9.1 (L) g/dL      Hematocrit 28.6 (L) %      MCV 95.0 fL      MCH 30.2 pg      MCHC 31.8 (L) g/dL      RDW 12.4 %      RDW-SD 43.6 fl      MPV 10.7 fL      Platelets 170 10*3/mm3      Neutrophil % 71.1 %      Lymphocyte % 17.5 %      Monocyte % 7.9 %      Eosinophil % 2.4 %      Basophil % 0.5 %      Immature Grans % 0.6 %      Neutrophils, Absolute 7.06 10*3/mm3      Lymphocytes, Absolute 1.74 10*3/mm3      Monocytes, Absolute 0.78 10*3/mm3      Eosinophils, Absolute 0.24 10*3/mm3      Basophils, Absolute 0.05 10*3/mm3      Immature Grans, Absolute 0.06 (H) 10*3/mm3      nRBC 0.0 /100 WBC     Basic Metabolic Panel [[id number]] Collected:  05/10/18 0616    Specimen:  Blood Updated:  05/10/18 0643    Vitamin B12 [[id number]]  (Normal) Collected:  05/09/18 0418    Specimen:  Blood Updated:  05/09/18 0602     Vitamin B-12 430 pg/mL      Folate [[id number]] Collected:  05/09/18 0418    Specimen:  Blood Updated:  05/09/18 0602     Folate 5.28 ng/mL     Iron Profile [[id number]]  (Abnormal) Collected:  05/09/18 0418    Specimen:  Blood Updated:  05/09/18 0504     Iron 19 (L) mcg/dL      TIBC 254 mcg/dL      Iron Saturation 7 (L) %     Basic Metabolic Panel [[id number]]  (Abnormal) Collected:  05/08/18 0550    Specimen:  Blood Updated:  05/08/18 0622     Glucose 128 (H) mg/dL      BUN 10 mg/dL      Creatinine 0.62 mg/dL      Sodium 141 mmol/L      Potassium 4.0 mmol/L      Chloride 105 mmol/L      CO2 25.0 mmol/L      Calcium 8.1 (L) mg/dL      eGFR Non African Amer 99 mL/min/1.73      BUN/Creatinine Ratio 16.1     Anion Gap 11.0 mmol/L     Narrative:       GFR Normal >60  Chronic Kidney Disease <60  Kidney Failure <15    CBC & Differential [[id number]] Collected:  05/08/18 0550    Specimen:  Blood Updated:  05/08/18 0601    Narrative:       The following orders were created for panel order CBC & Differential.  Procedure                               Abnormality         Status                     ---------                               -----------         ------                     CBC Auto Differential[[id number]]        Abnormal            Final result                 Please view results for these tests on the individual orders.    CBC Auto Differential [[id number]]  (Abnormal) Collected:  05/08/18 0550    Specimen:  Blood Updated:  05/08/18 0601     WBC 13.31 (H) 10*3/mm3      RBC 3.20 (L) 10*6/mm3      Hemoglobin 9.8 (L) g/dL      Hematocrit 29.7 (L) %      MCV 92.8 fL      MCH 30.6 pg      MCHC 33.0 g/dL      RDW 12.3 %      RDW-SD 42.3 fl      MPV 10.1 fL      Platelets 196 10*3/mm3      Neutrophil % 76.5 %      Lymphocyte % 15.5 %      Monocyte % 7.4 %      Eosinophil % 0.1 %      Basophil % 0.2 %      Immature Grans % 0.3 %      Neutrophils, Absolute 10.18 (H) 10*3/mm3      Lymphocytes, Absolute 2.06 10*3/mm3      Monocytes, Absolute 0.99  10*3/mm3      Eosinophils, Absolute 0.01 10*3/mm3      Basophils, Absolute 0.03 10*3/mm3      Immature Grans, Absolute 0.04 (H) 10*3/mm3      nRBC 0.0 /100 WBC           Imaging Results (last 72 hours)     Procedure Component Value Units Date/Time    XR Spine Lumbar AP & Lateral [[id number]] Collected:  05/09/18 1011     Updated:  05/09/18 1454    Narrative:       XR SPINE LUMBAR AP AND LATERAL- 5/9/2018 7:35 AM CDT     HISTORY: lumbar fusion; Z74.09-Other reduced mobility; Z74.09-Other  reduced mobility     COMPARISON: None     FLUOROSCOPY TIME: 27 seconds     NUMBER OF IMAGES: 3       Impression:          Intraoperative fluoroscopic images during lumbar fusion.     Please refer to the operative note for more details.  This report was finalized on 05/09/2018 10:11 by Dr Iftikhar Felix, .    FL C Arm During Surgery [[id number]] Collected:  05/09/18 1011     Updated:  05/09/18 1454    Narrative:       XR SPINE LUMBAR AP AND LATERAL- 5/9/2018 7:35 AM CDT     HISTORY: lumbar fusion; Z74.09-Other reduced mobility; Z74.09-Other  reduced mobility     COMPARISON: None     FLUOROSCOPY TIME: 27 seconds     NUMBER OF IMAGES: 3       Impression:          Intraoperative fluoroscopic images during lumbar fusion.     Please refer to the operative note for more details.  This report was finalized on 05/09/2018 10:11 by Dr Iftikhar Felix, .    XR Spine Lumbar AP & Lateral [[id number]] Collected:  05/07/18 0950     Updated:  05/07/18 1437    Narrative:       EXAMINATION: XR SPINE LUMBAR AP AND LATERAL-     5/7/2018 8:15 AM CDT     HISTORY: Lumbar fusion.     3 intraoperative spot images document L5-S1 discectomy with anterior  plate and screw fusion.     Fluoroscopy time = 18 seconds.  This report was finalized on 05/07/2018 09:50 by Dr. Venkat Cohen MD.    FL C Arm During Surgery [[id number]] Collected:  05/07/18 0950     Updated:  05/07/18 1437    Narrative:       EXAMINATION: XR SPINE LUMBAR AP AND LATERAL-     5/7/2018 8:15 AM CDT      HISTORY: Lumbar fusion.     3 intraoperative spot images document L5-S1 discectomy with anterior  plate and screw fusion.     Fluoroscopy time = 18 seconds.  This report was finalized on 05/07/2018 09:50 by Dr. Venkat Cohen MD.    XR Abdomen 1 View [[id number]] Collected:  05/07/18 1006     Updated:  05/07/18 1010    Narrative:       EXAMINATION: XR ABDOMEN 1 VW-     5/7/2018 9:44 AM CDT     HISTORY: Lumbosacral fusion.     Iliac stents noted.  L5-S1 discectomy with plate and screw fusion.     No unexpected foreign body is seen.     This report was finalized on 05/07/2018 10:06 by Dr. Venkat Cohen MD.                Assessment:    Condition: In stable condition.  Improving.   (Patient Active Problem List:     Foraminal stenosis of lumbosacral region     Chronic back pain     Radiculopathy     Lumbar stenosis     Panlobular emphysema     Gastroesophageal reflux disease without esophagitis     Constipation due to opioid therapy     PAD (peripheral artery disease)     Primary osteoarthritis involving multiple joints    ).     Plan:   Discharge home (when cleared by spine surgery).  Encourage ambulation and per physical therapy.  Regular diet.  (Explained to patient and staff that we were consulted for medical management during their acute care hospitalization. They need to f/u with their regular primary care provider concerning any further treatment and review of abnormalities found during their hospitalization at [facility] and they agree with that treatment plan.     Iron deficiency anemia-replace iron orally.  Medically stable for discharge home when cleared by spine.).     Patient Active Problem List   Diagnosis   • Foraminal stenosis of lumbosacral region   • Chronic back pain   • Radiculopathy   • Lumbar stenosis   • Panlobular emphysema   • Gastroesophageal reflux disease without esophagitis   • Constipation due to opioid therapy   • PAD (peripheral artery disease)   • Primary osteoarthritis  involving multiple joints     Lizandro Burger MD  5/10/2018

## 2018-05-10 NOTE — PLAN OF CARE
Problem: Patient Care Overview  Goal: Plan of Care Review  Outcome: Ongoing (interventions implemented as appropriate)   05/10/18 0606   Coping/Psychosocial   Plan of Care Reviewed With patient   Plan of Care Review   Progress no change   OTHER   Outcome Summary c/o back pain, prn PO pain meds given with relief, states baseline neuropathy bilateral feet, no new numbness or tingling, dressing D/I, hemovac in place with small amount sanguinous drainage       Problem: Fall Risk (Adult)  Goal: Absence of Fall  Outcome: Ongoing (interventions implemented as appropriate)

## 2018-05-10 NOTE — PROGRESS NOTES
Continued Stay Note  [facility]     Patient Name: Arminda Jiménez  MRN: [id number]  Today's Date: 5/10/2018    Admit Date: 5/7/2018          Discharge Plan     Row Name 05/10/18 0817       Plan    Plan Home    Patient/Family in Agreement with Plan yes    Final Discharge Disposition Code 01 - home or self-care    Final Note Patient is discharged home with no discharge planning needs / orders.             Expected Discharge Date and Time     Expected Discharge Date Expected Discharge Time    May 10, 2018             MARY Reyes

## 2018-05-10 NOTE — ACP (ADVANCE CARE PLANNING)
Date of First Steps ACP interview: 5/10/2018  Location of interview: Pt's room  First Steps ACP Facilitator: Lauren Yanez RN  Referral Source: Nurse  Present for facilitation: Patient    SUMMARY OF ADVANCE CARE PLANNING DISCUSSION:  Arminda visited  for First Steps facilitation. We reviewed purpose and goals for advance care planning.    Arminda states she has a Healthcare POA already completed and is kept at home. Discussed importance of having a copy here in her medical records. She states she will attempt to get a copy here.      Advance care/living will document finished during this facilitation? no    Time spent on preparation, facilitation and documentation was under 30 minutes.    RECOMMENDATIONS/FOLLOW-UP:  Encouraged pt to bring copy of completed document to be entered into her EMR.    CONSULT/NOTE ROUTED  yes    Lauren Yanez, RN

## 2018-05-10 NOTE — THERAPY DISCHARGE NOTE
Acute Care - Physical Therapy Discharge Summary  [facility]       Patient Name: Arminda Jiménez  : 1960  MRN: [id number]    Today's Date: 5/10/2018  Onset of Illness/Injury or Date of Surgery: 18    Date of Referral to PT: 18  Referring Physician: José Luis Traore PA-C (Referred 18)      Admit Date: 2018      PT Recommendation and Plan    Visit Dx:    ICD-10-CM ICD-9-CM   1. Impaired mobility and ADLs Z74.09 799.89   2. Impaired functional mobility, balance, and endurance Z74.09 V49.89             Outcome Measures     Row Name 18 1500 18 1000          How much help from another person do you currently need...    Turning from your back to your side while in flat bed without using bedrails?  -- 3  -MS     Moving from lying on back to sitting on the side of a flat bed without bedrails?  -- 3  -MS     Moving to and from a bed to a chair (including a wheelchair)?  -- 3  -MS     Standing up from a chair using your arms (e.g., wheelchair, bedside chair)?  -- 3  -MS     Climbing 3-5 steps with a railing?  -- 3  -MS     To walk in hospital room?  -- 3  -MS     AM-PAC 6 Clicks Score  -- 18  -MS        How much help from another is currently needed...    Putting on and taking off regular lower body clothing? 1  -CH (r) KM (t) CH (c)  --     Bathing (including washing, rinsing, and drying) 2  -CH (r) KM (t) CH (c)  --     Toileting (which includes using toilet bed pan or urinal) 2  -CH (r) KM (t) CH (c)  --     Putting on and taking off regular upper body clothing 3  -CH (r) KM (t) CH (c)  --     Taking care of personal grooming (such as brushing teeth) 3  -CH (r) KM (t) CH (c)  --     Eating meals 4  -CH (r) KM (t) CH (c)  --     Score 15  -CH (r) KM (t)  --        Functional Assessment    Outcome Measure Options AM-PAC 6 Clicks Daily Activity (OT)  -CH (r) KM (t) CH (c) AM-PAC 6 Clicks Basic Mobility (PT)  -MS       User Key  (r) = Recorded By, (t) = Taken By, (c) = Cosigned By     Initials Name Provider Type     Rajwinder Kincaid, OTR/L Occupational Therapist    MS Betty Kuhn, PT DPT Physical Therapist    SHANE Cartagena, OT Student OT Student                      PT Rehab Goals     Row Name 05/10/18 1555             Bed Mobility Goal 1 (PT)    Activity/Assistive Device (Bed Mobility Goal 1, PT) bed mobility activities, all  -AH      [place] Level/Cues Needed (Bed Mobility Goal 1, PT) independent  -AH      Time Frame (Bed Mobility Goal 1, PT) long term goal (LTG);by discharge  -AH      Progress/Outcomes (Bed Mobility Goal 1, PT) goal not met  -AH         Transfer Goal 1 (PT)    Activity/Assistive Device (Transfer Goal 1, PT) transfers, all  -AH      [place] Level/Cues Needed (Transfer Goal 1, PT) independent  -AH      Time Frame (Transfer Goal 1, PT) long term goal (LTG);by discharge  -AH      Progress/Outcome (Transfer Goal 1, PT) goal not met  -AH         Gait Training Goal 1 (PT)    Activity/Assistive Device (Gait Training Goal 1, PT) gait (walking locomotion)  -AH      [place] Level (Gait Training Goal 1, PT) independent  -AH      Distance (Gait Goal 1, PT) 200  -AH      Time Frame (Gait Training Goal 1, PT) long term goal (LTG);by discharge  -AH      Progress/Outcome (Gait Training Goal 1, PT) goal not met  -AH        User Key  (r) = Recorded By, (t) = Taken By, (c) = Cosigned By    Initials Name Provider Type [facility] Alexandra Odonnell, PTA Physical Therapy Assistant PT              PT Discharge Summary  Reason for Discharge: Discharge from facility  Outcomes Achieved: Refer to plan of care for updates on goals achieved  Discharge Destination: Home      Alexandra Odonnell PTA   5/10/2018

## 2018-05-10 NOTE — PLAN OF CARE
Problem: Patient Care Overview  Goal: Individualization and Mutuality  Outcome: Outcome(s) achieved Date Met: 05/10/18   05/10/18 1100   Individualization   Patient Specific Preferences Patient likes to sit on side of bed.   Patient Specific Goals (Include Timeframe) Discharge home today   Patient Specific Interventions Education on s/s infection   Mutuality/Individual Preferences   What Anxieties, Fears, Concerns, or Questions Do You Have About Your Care? None   What Information Would Help Us Give You More Personalized Care? None   How Would You and/or Your Support Person Like to Participate in Your Care? None   Mutuality/Individual Preferences   How to Address Anxieties/Fears None     Goal: Discharge Needs Assessment  Outcome: Outcome(s) achieved Date Met: 05/10/18    Goal: Interprofessional Rounds/Family Conf  Outcome: Outcome(s) achieved Date Met: 05/10/18      Problem: Fall Risk (Adult)  Goal: Absence of Fall  Outcome: Outcome(s) achieved Date Met: 05/10/18

## 2018-05-10 NOTE — DISCHARGE SUMMARY
Date of Discharge:  5/10/2018    Admission Diagnosis: M54.16    Discharge Diagnosis:   1.  Increasing chronic back pain  2.  Bilateral buttock, thigh, and leg radiculopathy, right worse than left  3.  Severe neurogenic claudication  4.  Degenerative disc disease L4 to S1, worse L5-S1  5.  Degenerative lumbar scoliosis, concave left L5-S1  6.  Facet arthropathy L4 to S1, worse L5-S1  7.  Central and foraminal stenosis, left L5-S1  8.  Status post anterior decompression, ALIF with instrumentation L5-S1, 5/7/2018  9. Status post Posterior spinal fusion L5-S1, Posterior spinal instrumentation L5-S1 (CoreLink pedicle screws and rods)-5/9/18  Consults During Admission: none      Hospital Course  Patient is a 58 y.o. female Known to our practice. Admitted for the above lumbar intervention.  This has been well tolerated and the patient will be discharged home today in good stable condition with instructions for brace when out of bed.  No driving until directed.  Patient will follow-up with Dr. Zheng's clinic in two weeks. They will call if problems arise.     Condition on Discharge:  STABLE    Vital Signs  Temp:  [98.1 °F (36.7 °C)-99.4 °F (37.4 °C)] 98.9 °F (37.2 °C)  Heart Rate:  [81-99] 83  Resp:  [11-20] 20  BP: ()/(47-64) 116/64    Physical Exam:   Alert and oriented ×3, no acute distress, grossly neurovascularly intact, vital signs stable, dressing clean dry and intact, moving all extremities without focal deficit    Discharge Disposition  Home or Self Care    Discharge Medications   Arminda Jiménez   Home Medication Instructions TUYET:[id number]    Printed on:05/10/18 0739   Medication Information                      Albuterol Sulfate (PROAIR HFA IN)  Inhale 2 puffs 4 (Four) Times a Day As Needed.             BREO ELLIPTA 200-25 MCG/INH aerosol powder   Inhale 200 mcg Daily.             Calcium Citrate 200 MG tablet  Take 600 mg by mouth 2 (Two) Times a Day.             cholecalciferol (VITAMIN D3)  1000 units tablet  Take 1,000 Units by mouth 2 (Two) Times a Day.             clopidogrel (PLAVIX) 75 MG tablet  Take 75 mg by mouth Daily.             escitalopram (LEXAPRO) 10 MG tablet  Take 10 mg by mouth Daily.             estrogen, conjugated,-medroxyprogesterone (PREMPRO) 0.3-1.5 MG per tablet  Take 1 tablet/day by mouth Daily.             gabapentin (NEURONTIN) 800 MG tablet  800 mg 3 (Three) Times a Day. Takes 2 at bedtime             NEXIUM 40 MG capsule  Take 40 mg by mouth 2 (Two) Times a Day As Needed.             oxyCODONE-acetaminophen (PERCOCET)  MG per tablet  Take 2 tablets by mouth Every 4 (Four) Hours As Needed for Severe Pain  for up to 7 days.             tiotropium (SPIRIVA HANDIHALER) 18 MCG per inhalation capsule  Place 18 mcg into inhaler and inhale Daily. Takes at 5 pm                 Discharge Diet: Resume Home diet, advance as tolerated    Activity at Discharge: Resume home activity advace as tolerated, no lifting, no twisting, no bending, brace as directed, no driving until directed.     Follow-up Appointments  Followup with PCP within one week  Followup [facility] at 2weeks post-op         MIRA Lara  05/10/18  7:39 AM

## 2018-05-10 NOTE — PLAN OF CARE
Problem: Patient Care Overview  Goal: Plan of Care Review   05/10/18 0646   OTHER   Outcome Summary previously noted that patient had hemovac, this patient does not have a hemovac

## 2018-05-10 NOTE — THERAPY DISCHARGE NOTE
Acute Care - Occupational Therapy Discharge Summary  [facility]     Patient Name: Arminda Jiménez  : 1960  MRN: [id number]    Today's Date: 5/10/2018  Onset of Illness/Injury or Date of Surgery: 18    Date of Referral to OT: 18  Referring Physician: José Luis Traore PA-C (Referred 18)      Admit Date: 2018        OT Recommendation and Plan    Visit Dx:    ICD-10-CM ICD-9-CM   1. Impaired mobility and ADLs Z74.09 799.89   2. Impaired functional mobility, balance, and endurance Z74.09 V49.89                     OT Rehab Goals     Row Name 05/10/18 1448             Bed Mobility Goal 1 (OT)    Activity/Assistive Device (Bed Mobility Goal 1, OT) sit to supine/supine to sit  -TS      Towner Level/Cues Needed (Bed Mobility Goal 1, OT) independent  -TS      Time Frame (Bed Mobility Goal 1, OT) 10 days  -TS      Progress/Outcomes (Bed Mobility Goal 1, OT) goal not met  -TS         Dressing Goal 1 (OT)    Activity/Assistive Device (Dressing Goal 1, OT) upper body dressing  -TS      Towner/Cues Needed (Dressing Goal 1, OT) set-up required;conditional independence  -TS      Time Frame (Dressing Goal 1, OT) 10 days  -TS      Progress/Outcome (Dressing Goal 1, OT) goal not met  -TS         Dressing Goal 2 (OT)    Activity/Assistive Device (Dressing Goal 2, OT) lower body dressing  -TS      Towner/Cues Needed (Dressing Goal 2, OT) conditional independence;set-up required  -TS      Time Frame (Dressing Goal 2, OT) 10 days  -TS      Progress/Outcome (Dressing Goal 2, OT) goal not met  -TS         Patient Education Goal (OT)    Activity (Patient Education Goal, OT) spinal precautions, log rolling, bracing  -TS      Towner/Cues/Accuracy (Memory Goal 2, OT) demonstrates adequately;independent;verbalizes understanding  -TS      Time Frame (Patient Education Goal, OT) 10 days  -TS      Progress/Outcome (Patient Education Goal, OT) goal not met  -TS        User Key  (r) = Recorded By,  (t) = Taken By, (c) = Cosigned By    Initials Name Provider Type Discipline     FELIZ Ramsey Occupational Therapy Assistant OT                Outcome Measures     Row Name 05/09/18 1500 05/08/18 1000 05/07/18 1600       How much help from another person do you currently need...    Turning from your back to your side while in flat bed without using bedrails?  -- 3  -MS  --    Moving from lying on back to sitting on the side of a flat bed without bedrails?  -- 3  -MS  --    Moving to and from a bed to a chair (including a wheelchair)?  -- 3  -MS  --    Standing up from a chair using your arms (e.g., wheelchair, bedside chair)?  -- 3  -MS  --    Climbing 3-5 steps with a railing?  -- 3  -MS  --    To walk in hospital room?  -- 3  -MS  --    AM-PAC 6 Clicks Score  -- 18  -MS  --       How much help from another is currently needed...    Putting on and taking off regular lower body clothing? 1  -CH (r) KM (t) CH (c)  -- 2  -MM    Bathing (including washing, rinsing, and drying) 2  -CH (r) KM (t) CH (c)  -- 2  -MM    Toileting (which includes using toilet bed pan or urinal) 2  -CH (r) KM (t) CH (c)  -- 2  -MM    Putting on and taking off regular upper body clothing 3  -CH (r) KM (t) CH (c)  -- 2  -MM    Taking care of personal grooming (such as brushing teeth) 3  -CH (r) KM (t) CH (c)  -- 3  -MM    Eating meals 4  -CH (r) KM (t) CH (c)  -- 3  -MM    Score 15  -CH (r) KM (t)  -- 14  -MM       Functional Assessment    Outcome Measure Options AM-PAC 6 Clicks Daily Activity (OT)  -CH (r) KM (t) CH (c) AM-PAC 6 Clicks Basic Mobility (PT)  -MS AM-PAC 6 Clicks Daily Activity (OT)  -MM      User Key  (r) = Recorded By, (t) = Taken By, (c) = Cosigned By    Initials Name Provider Type    CH Rajwinder Kincaid, OTR/L Occupational Therapist    MS Betty Kuhn, PT DPT Physical Therapist    MM Sascha Martin, OTR/L Occupational Therapist    SHANE Cartagena, OT Student OT Student              OT Discharge  Summary  Reason for Discharge: Discharge from facility  Outcomes Achieved: Refer to plan of care for updates on goals achieved  Discharge Destination: Home with assist      NAVI Murphy/ABDOULAYE  5/10/2018

## 2018-11-14 ENCOUNTER — TRANSCRIBE ORDERS (OUTPATIENT)
Dept: ADMINISTRATIVE | Facility: HOSPITAL | Age: 58
End: 2018-11-14

## 2018-11-14 DIAGNOSIS — M54.5 LOW BACK PAIN, UNSPECIFIED BACK PAIN LATERALITY, UNSPECIFIED CHRONICITY, WITH SCIATICA PRESENCE UNSPECIFIED: Primary | ICD-10-CM

## 2018-11-19 ENCOUNTER — APPOINTMENT (OUTPATIENT)
Dept: CT IMAGING | Facility: HOSPITAL | Age: 58
End: 2018-11-19
Attending: PHYSICIAN ASSISTANT

## 2018-11-21 ENCOUNTER — HOSPITAL ENCOUNTER (OUTPATIENT)
Dept: CT IMAGING | Facility: HOSPITAL | Age: 58
Discharge: HOME | End: 2018-11-21
Attending: PHYSICIAN ASSISTANT | Admitting: PHYSICIAN ASSISTANT

## 2018-11-21 DIAGNOSIS — M54.5 LOW BACK PAIN, UNSPECIFIED BACK PAIN LATERALITY, UNSPECIFIED CHRONICITY, WITH SCIATICA PRESENCE UNSPECIFIED: ICD-10-CM

## 2018-11-21 PROCEDURE — 72131 CT LUMBAR SPINE W/O DYE: CPT

## 2018-12-21 ENCOUNTER — HOSPITAL ENCOUNTER (OUTPATIENT)
Facility: HOSPITAL | Age: 58
Setting detail: SURGERY ADMIT
End: 2018-12-21
Attending: ORTHOPAEDIC SURGERY | Admitting: ORTHOPAEDIC SURGERY

## 2018-12-26 ENCOUNTER — HOSPITAL ENCOUNTER (OUTPATIENT)
Dept: GENERAL RADIOLOGY | Facility: HOSPITAL | Age: 58
Discharge: HOME | End: 2018-12-26
Admitting: ORTHOPAEDIC SURGERY

## 2018-12-26 ENCOUNTER — APPOINTMENT (OUTPATIENT)
Dept: PREADMISSION TESTING | Facility: HOSPITAL | Age: 58
End: 2018-12-26

## 2018-12-26 VITALS
BODY MASS INDEX: 30.43 KG/M2 | OXYGEN SATURATION: 97 % | HEART RATE: 70 BPM | WEIGHT: 165.34 LBS | HEIGHT: 62 IN | DIASTOLIC BLOOD PRESSURE: 65 MMHG | SYSTOLIC BLOOD PRESSURE: 108 MMHG | RESPIRATION RATE: 18 BRPM

## 2018-12-26 LAB
ALBUMIN SERPL-MCNC: 4.3 G/DL (ref 3.5–5)
ALBUMIN/GLOB SERPL: 1.3 G/DL (ref 1.1–2.5)
ALP SERPL-CCNC: 45 U/L (ref 24–120)
ALT SERPL W P-5'-P-CCNC: 21 U/L (ref 0–54)
ANION GAP SERPL CALCULATED.3IONS-SCNC: 10 MMOL/L (ref 4–13)
APTT PPP: 28.5 SECONDS (ref 24.1–34.8)
AST SERPL-CCNC: 31 U/L (ref 7–45)
BASOPHILS # BLD AUTO: 0.09 10*3/MM3 (ref 0–0.2)
BASOPHILS NFR BLD AUTO: 1.1 % (ref 0–2)
BILIRUB SERPL-MCNC: 0.3 MG/DL (ref 0.1–1)
BILIRUB UR QL STRIP: NEGATIVE
BUN BLD-MCNC: 11 MG/DL (ref 5–21)
BUN/CREAT SERPL: 16.9 (ref 7–25)
CALCIUM SPEC-SCNC: 9.5 MG/DL (ref 8.4–10.4)
CHLORIDE SERPL-SCNC: 99 MMOL/L (ref 98–110)
CLARITY UR: CLEAR
CO2 SERPL-SCNC: 30 MMOL/L (ref 24–31)
COLOR UR: YELLOW
CREAT BLD-MCNC: 0.65 MG/DL (ref 0.5–1.4)
DEPRECATED RDW RBC AUTO: 41.5 FL (ref 40–54)
EOSINOPHIL # BLD AUTO: 0.24 10*3/MM3 (ref 0–0.7)
EOSINOPHIL NFR BLD AUTO: 3 % (ref 0–4)
ERYTHROCYTE [DISTWIDTH] IN BLOOD BY AUTOMATED COUNT: 12.2 % (ref 12–15)
GFR SERPL CREATININE-BSD FRML MDRD: 94 ML/MIN/1.73
GLOBULIN UR ELPH-MCNC: 3.4 GM/DL
GLUCOSE BLD-MCNC: 89 MG/DL (ref 70–100)
GLUCOSE UR STRIP-MCNC: NEGATIVE MG/DL
HCT VFR BLD AUTO: 36.2 % (ref 37–47)
HGB BLD-MCNC: 11.6 G/DL (ref 12–16)
HGB UR QL STRIP.AUTO: NEGATIVE
IMM GRANULOCYTES # BLD AUTO: 0.02 10*3/MM3 (ref 0–0.03)
IMM GRANULOCYTES NFR BLD AUTO: 0.3 % (ref 0–5)
INR PPP: 0.9 (ref 0.91–1.09)
KETONES UR QL STRIP: ABNORMAL
LEUKOCYTE ESTERASE UR QL STRIP.AUTO: NEGATIVE
LYMPHOCYTES # BLD AUTO: 3.3 10*3/MM3 (ref 0.72–4.86)
LYMPHOCYTES NFR BLD AUTO: 41.5 % (ref 15–45)
MCH RBC QN AUTO: 30 PG (ref 28–32)
MCHC RBC AUTO-ENTMCNC: 32 G/DL (ref 33–36)
MCV RBC AUTO: 93.5 FL (ref 82–98)
MONOCYTES # BLD AUTO: 0.62 10*3/MM3 (ref 0.19–1.3)
MONOCYTES NFR BLD AUTO: 7.8 % (ref 4–12)
NEUTROPHILS # BLD AUTO: 3.68 10*3/MM3 (ref 1.87–8.4)
NEUTROPHILS NFR BLD AUTO: 46.3 % (ref 39–78)
NITRITE UR QL STRIP: NEGATIVE
NRBC BLD AUTO-RTO: 0 /100 WBC (ref 0–0)
PH UR STRIP.AUTO: 7.5 [PH] (ref 5–8)
PLATELET # BLD AUTO: 258 10*3/MM3 (ref 130–400)
PMV BLD AUTO: 10.6 FL (ref 6–12)
POTASSIUM BLD-SCNC: 4.3 MMOL/L (ref 3.5–5.3)
PROT SERPL-MCNC: 7.7 G/DL (ref 6.3–8.7)
PROT UR QL STRIP: NEGATIVE
PROTHROMBIN TIME: 12.4 SECONDS (ref 11.9–14.6)
RBC # BLD AUTO: 3.87 10*6/MM3 (ref 4.2–5.4)
SODIUM BLD-SCNC: 139 MMOL/L (ref 135–145)
SP GR UR STRIP: 1.02 (ref 1–1.03)
UROBILINOGEN UR QL STRIP: ABNORMAL
WBC NRBC COR # BLD: 7.95 10*3/MM3 (ref 4.8–10.8)

## 2018-12-26 PROCEDURE — 71046 X-RAY EXAM CHEST 2 VIEWS: CPT

## 2018-12-26 PROCEDURE — 93010 ELECTROCARDIOGRAM REPORT: CPT | Performed by: INTERNAL MEDICINE

## 2018-12-26 PROCEDURE — 85730 THROMBOPLASTIN TIME PARTIAL: CPT | Performed by: ORTHOPAEDIC SURGERY

## 2018-12-26 PROCEDURE — 85610 PROTHROMBIN TIME: CPT | Performed by: ORTHOPAEDIC SURGERY

## 2018-12-26 PROCEDURE — 36415 COLL VENOUS BLD VENIPUNCTURE: CPT

## 2018-12-26 PROCEDURE — 81003 URINALYSIS AUTO W/O SCOPE: CPT | Performed by: ORTHOPAEDIC SURGERY

## 2018-12-26 PROCEDURE — 80053 COMPREHEN METABOLIC PANEL: CPT | Performed by: ORTHOPAEDIC SURGERY

## 2018-12-26 PROCEDURE — 85025 COMPLETE CBC W/AUTO DIFF WBC: CPT | Performed by: ORTHOPAEDIC SURGERY

## 2018-12-26 PROCEDURE — 93005 ELECTROCARDIOGRAM TRACING: CPT

## 2018-12-26 NOTE — DISCHARGE INSTRUCTIONS
DAY OF SURGERY INSTRUCTIONS        YOUR SURGEON: JOSTIN DUEÑAS    PROCEDURE: RIGHT LATERAL INTERBODY FUSION LUMBAR 3-5    DATE OF SURGERY: 1/2/2018    ARRIVAL TIME: AS DIRECTED BY OFFICE    YOU MAY TAKE THE FOLLOWING MEDICATION(S) THE MORNING OF SURGERY WITH A SIP OF WATER: NEURONTIN, INHALERS AS NEEDED                MANAGING PAIN AFTER SURGERY    We know you are probably wondering what your pain will be like after surgery.  Following surgery it is unrealistic to expect you will not have pain.   Pain is how our bodies let us know that something is wrong or cautions us to be careful.  That said, our goal is to make your pain tolerable.    Methods we may use to treat your pain include (oral or IV medications, PCAs, epidurals, nerve blocks, etc.)   While some procedures require IV pain medications for a short time after surgery, transitioning to pain medications by mouth allows for better management of pain.   Your nurse will encourage you to take oral pain medications whenever possible.  IV medications work almost immediately, but only last a short while.  Taking medications by mouth allows for a more constant level of medication in your blood stream for a longer period of time.      Once your pain is out of control it is harder to get back under control.  It is important you are aware when your next dose of pain medication is due.  If you are admitted, your nurse may write the time of your next dose on the white board in your room to help you remember.      We are interested in your pain and encourage you to inform us about aggravating factors during your visit.   Many times a simple repositioning every few hours can make a big difference.    If your physician says it is okay, do not let your pain prevent you from getting out of bed. Be sure to call your nurse for assistance prior to getting up so you do not fall.      Before surgery, please decide your tolerable pain goal.  These faces help describe the pain  ratings we use on a 0-10 scale.   Be prepared to tell us your goal and whether or not you take pain or anxiety medications at home.          BEFORE YOU COME TO THE HOSPITAL  (Pre-op instructions)  • Do not eat, drink, smoke or chew gum after midnight the night before surgery.  This also includes no mints.  • Morning of surgery take only the medicines you have been instructed with a sip of water unless otherwise instructed  by your physician.  • Do not shave, wear makeup or dark nail polish.  • Remove all jewelry including rings.  • Leave anything you consider valuable at home.  • Leave your suitcase in the car until after your surgery.  • Bring the following with you if applicable:  o Picture ID and insurance, Medicare or Medicaid cards  o Co-pay/deductible required by insurance (cash, check, credit card)  o Copy of advance directive, living will or power-of- documents if not brought to PAT  o CPAP or BIPAP mask and tubing  o Relaxation aids (MP3 player, book, magazine)  • On the day of surgery check in at registration located at the main entrance of the hospital.       Outpatient Surgery Guidelines, Adult  Outpatient procedures are those for which the person having the procedure is allowed to go home the same day as the procedure. Various procedures are done on an outpatient basis. You should follow some general guidelines if you will be having an outpatient procedure.  LET YOUR HEALTH CARE PROVIDER KNOW ABOUT:  · Any allergies you have.  · All medicines you are taking, including vitamins, herbs, eye drops, creams, and over-the-counter medicines.  · Previous problems you or members of your family have had with the use of anesthetics.  · Any blood disorders you have.  · Previous surgeries you have had.  · Medical conditions you have.  RISKS AND COMPLICATIONS  Your health care provider will discuss possible risks and complications with you before surgery. Common risks and complications include:    · Problems  due to the use of anesthetics.  · Blood loss and replacement (does not apply to minor surgical procedures).  · Temporary increase in pain due to surgery.  · Uncorrected pain or problems that the surgery was meant to correct.  · Infection.  · New damage.  BEFORE THE PROCEDURE  · Ask your health care provider about changing or stopping your regular medicines. You may need to stop taking certain medicines in the days or weeks before the procedure.  · Stop smoking at least 2 weeks before surgery. This lowers your risk for complications during and after surgery. Ask your health care provider for help with this if needed.  · Eat your usual meals and a light supper the day before surgery. Continue fluid intake. Do not drink alcohol.  · Do not eat or drink after midnight the night before your surgery.   · Arrange for someone to take you home and to stay with you for 24 hours after the procedure. Medicine given for your procedure may affect your ability to drive or to care for yourself.  · Call your health care provider's office if you develop an illness or problem that may prevent you from safely having your procedure.  AFTER THE PROCEDURE  After surgery, you will be taken to a recovery area, where your progress will be monitored. If there are no complications, you will be allowed to go home when you are awake, stable, and taking fluids well. You may have numbness around the surgical site. Healing will take some time. You will have tenderness at the surgical site and may have some swelling and bruising. You may also have some nausea.  HOME CARE INSTRUCTIONS  · Do not drive for 24 hours, or as directed by your health care provider. Do not drive while taking prescription pain medicines.  · Do not drink alcohol for 24 hours.  · Do not make important decisions or sign legal documents for 24 hours.  · You may resume a normal diet and activities as directed.  · Do not lift anything heavier than 10 pounds (4.5 kg) or play contact  sports until your health care provider says it is okay.  · Change your bandages (dressings) as directed.  · Only take over-the-counter or prescription medicines as directed by your health care provider.  · Follow up with your health care provider as directed.  SEEK MEDICAL CARE IF:  · You have increased bleeding (more than a small spot) from the surgical site.  · You have redness, swelling, or increasing pain in the wound.  · You see pus coming from the wound.  · You have a fever.  · You notice a bad smell coming from the wound or dressing.  · You feel lightheaded or faint.  · You develop a rash.  · You have trouble breathing.  · You develop allergies.  MAKE SURE YOU:  · Understand these instructions.  · Will watch your condition.  · Will get help right away if you are not doing well or get worse.     This information is not intended to replace advice given to you by your health care provider. Make sure you discuss any questions you have with your health care provider.     Document Released: 09/12/2002 Document Revised: 05/03/2016 Document Reviewed: 05/22/2014  [employer] Interactive Patient Education ©2016 [employer]       Fall Prevention in Hospitals, Adult  As a hospital patient, your condition and the treatments you receive can increase your risk for falls. Some additional risk factors for falls in a hospital include:  · Being in an unfamiliar environment.  · Being on bed rest.  · Your surgery.  · Taking certain medicines.  · Your tubing requirements, such as intravenous (IV) therapy or catheters.  It is important that you learn how to decrease fall risks while at the hospital. Below are important tips that can help prevent falls.  SAFETY TIPS FOR PREVENTING FALLS  Talk about your risk of falling.  · Ask your health care provider why you are at risk for falling. Is it your medicine, illness, tubing placement, or something else?  · Make a plan with your health care provider to keep you safe from falls.  · Ask  your health care provider or pharmacist about side effects of your medicines. Some medicines can make you dizzy or affect your coordination.  Ask for help.  · Ask for help before getting out of bed. You may need to press your call button.  · Ask for assistance in getting safely to the toilet.  · Ask for a walker or cane to be put at your bedside. Ask that most of the side rails on your bed be placed up before your health care provider leaves the room.  · Ask family or friends to sit with you.  · Ask for things that are out of your reach, such as your glasses, hearing aids, telephone, bedside table, or call button.  Follow these tips to avoid falling:  · Stay lying or seated, rather than standing, while waiting for help.  · Wear rubber-soled slippers or shoes whenever you walk in the hospital.  · Avoid quick, sudden movements.  ¨ Change positions slowly.  ¨ Sit on the side of your bed before standing.  ¨ Stand up slowly and wait before you start to walk.  · Let your health care provider know if there is a spill on the floor.  · Pay careful attention to the medical equipment, electrical cords, and tubes around you.  · When you need help, use your call button by your bed or in the bathroom. Wait for one of your health care providers to help you.  · If you feel dizzy or unsure of your footing, return to bed and wait for assistance.  · Avoid being distracted by the TV, telephone, or another person in your room.  · Do not lean or support yourself on rolling objects, such as IV poles or bedside tables.     This information is not intended to replace advice given to you by your health care provider. Make sure you discuss any questions you have with your health care provider.     Document Released: 12/15/2001 Document Revised: 01/08/2016 Document Reviewed: 08/25/2013  [employer] Interactive Patient Education ©2016 [employer]       Surgical Site Infections FAQs  What is a Surgical Site Infection (SSI)?  A surgical site  infection is an infection that occurs after surgery in the part of the body where the surgery took place. Most patients who have surgery do not develop an infection. However, infections develop in about 1 to 3 out of every 100 patients who have surgery.  Some of the common symptoms of a surgical site infection are:  · Redness and pain around the area where you had surgery  · Drainage of cloudy fluid from your surgical wound  · Fever  Can SSIs be treated?  Yes. Most surgical site infections can be treated with antibiotics. The antibiotic given to you depends on the bacteria (germs) causing the infection. Sometimes patients with SSIs also need another surgery to treat the infection.  What are some of the things that hospitals are doing to prevent SSIs?  To prevent SSIs, doctors, nurses, and other healthcare providers:  · Clean their hands and arms up to their elbows with an antiseptic agent just before the surgery.  · Clean their hands with soap and water or an alcohol-based hand rub before and after caring for each patient.  · May remove some of your hair immediately before your surgery using electric clippers if the hair is in the same area where the procedure will occur. They should not shave you with a razor.  · Wear special hair covers, masks, gowns, and gloves during surgery to keep the surgery area clean.  · Give you antibiotics before your surgery starts. In most cases, you should get antibiotics within 60 minutes before the surgery starts and the antibiotics should be stopped within 24 hours after surgery.  · Clean the skin at the site of your surgery with a special soap that kills germs.  What can I do to help prevent SSIs?  Before your surgery:  · Tell your doctor about other medical problems you may have. Health problems such as allergies, diabetes, and obesity could affect your surgery and your treatment.  · Quit smoking. Patients who smoke get more infections. Talk to your doctor about how you can quit  before your surgery.  · Do not shave near where you will have surgery. Shaving with a razor can irritate your skin and make it easier to develop an infection.  At the time of your surgery:  · Speak up if someone tries to shave you with a razor before surgery. Ask why you need to be shaved and talk with your surgeon if you have any concerns.  · Ask if you will get antibiotics before surgery.  After your surgery:  · Make sure that your healthcare providers clean their hands before examining you, either with soap and water or an alcohol-based hand rub.  · If you do not see your providers clean their hands, please ask them to do so.  · Family and friends who visit you should not touch the surgical wound or dressings.  · Family and friends should clean their hands with soap and water or an alcohol-based hand rub before and after visiting you. If you do not see them clean their hands, ask them to clean their hands.  What do I need to do when I go home from the hospital?  · Before you go home, your doctor or nurse should explain everything you need to know about taking care of your wound. Make sure you understand how to care for your wound before you leave the hospital.  · Always clean your hands before and after caring for your wound.  · Before you go home, make sure you know who to contact if you have questions or problems after you get home.  · If you have any symptoms of an infection, such as redness and pain at the surgery site, drainage, or fever, call your doctor immediately.  If you have additional questions, please ask your doctor or nurse.  Developed and co-sponsored by The Society for Healthcare Epidemiology of [place] (SHEA); Infectious Diseases Society of [place] (IDSA); American Hospital Association; Association for Professionals in Infection Control and Epidemiology (APIC); Centers for Disease Control and Prevention (CDC); and The Joint Commission.     This information is not intended to replace advice given  to you by your health care provider. Make sure you discuss any questions you have with your health care provider.     Document Released: 12/23/2014 Document Revised: 01/08/2016 Document Reviewed: 03/02/2016  [employer] Interactive Patient Education ©2016 [employer]       [facility]  CHG 4% Patient Instruction Sheet    Preparing the Skin Before Surgery  Preparing or “prepping” skin before surgery can reduce the risk of infection at the surgical site. To make the process easier,[facility] has chosen 4% Chlorhexidine Gluconate (CHG) antiseptic solution.   The steps below outline the prepping process and should be carefully followed.                                                                                                                                                      Prep the skin at the following time(s):                                                      We recommend you shower the night before surgery, and again the morning of surgery with the 4% CHG antiseptic solution using half of the bottle and a cloth each time.  Dress in clean clothes/sleepwear after showering.  See instructions below for application.          Do not apply any lotions or moisturizers.       Do not shave the area to be prepped for at least 2 days prior to surgery.    Clipping the hair may be done immediately prior to your surgery at the hospital    if needed.    Directions:  Thoroughly rinse your body with water.  Apply 4% CHG to a cloth and wash skin gently, paying special attention to the operative site.  Rinse again thoroughly.  Once you have begun using this product do not apply anything else to your skin. If itching or redness persists, rinse affected areas and discontinue use.    When using this product:  • Keep out of eyes, ears, and mouth.  • If solution should contact these areas, rinse out promptly and thoroughly with water.  • For external use only.  • Do not use in genital area, on your face or  head.      PATIENT/FAMILY/RESPONSIBLE PARTY VERBALIZES UNDERSTANDING OF ABOVE EDUCATION.  COPY OF PAIN SCALE GIVEN AND REVIEWED WITH VERBALIZED UNDERSTANDING.

## 2018-12-28 ENCOUNTER — TRANSCRIBE ORDERS (OUTPATIENT)
Dept: GENERAL RADIOLOGY | Facility: HOSPITAL | Age: 58
End: 2018-12-28

## 2018-12-28 ENCOUNTER — HOSPITAL ENCOUNTER (OUTPATIENT)
Dept: GENERAL RADIOLOGY | Facility: HOSPITAL | Age: 58
Discharge: HOME | End: 2018-12-28
Attending: ORTHOPAEDIC SURGERY | Admitting: ORTHOPAEDIC SURGERY

## 2018-12-28 DIAGNOSIS — M54.5 LOW BACK PAIN, UNSPECIFIED BACK PAIN LATERALITY, UNSPECIFIED CHRONICITY, WITH SCIATICA PRESENCE UNSPECIFIED: Primary | ICD-10-CM

## 2018-12-28 PROCEDURE — 71046 X-RAY EXAM CHEST 2 VIEWS: CPT

## 2018-12-31 ENCOUNTER — ANESTHESIA EVENT (OUTPATIENT)
Dept: PERIOP | Facility: HOSPITAL | Age: 58
End: 2018-12-31

## 2018-12-31 RX ORDER — SODIUM CHLORIDE 0.9 % (FLUSH) 0.9 %
3-10 SYRINGE (ML) INJECTION AS NEEDED
Status: DISCONTINUED | OUTPATIENT
Start: 2018-12-31 | End: 2019-01-02

## 2018-12-31 RX ORDER — SODIUM CHLORIDE, SODIUM LACTATE, POTASSIUM CHLORIDE, CALCIUM CHLORIDE 600; 310; 30; 20 MG/100ML; MG/100ML; MG/100ML; MG/100ML
30 INJECTION, SOLUTION INTRAVENOUS CONTINUOUS
Status: DISCONTINUED | OUTPATIENT
Start: 2018-12-31 | End: 2019-01-02

## 2018-12-31 RX ORDER — SODIUM CHLORIDE 0.9 % (FLUSH) 0.9 %
3 SYRINGE (ML) INJECTION EVERY 12 HOURS SCHEDULED
Status: DISCONTINUED | OUTPATIENT
Start: 2018-12-31 | End: 2019-01-02

## 2019-01-02 ENCOUNTER — HOSPITAL ENCOUNTER (INPATIENT)
Facility: HOSPITAL | Age: 59
LOS: 6 days | Discharge: NURSING, REHAB OR LONG-TERM CARE | End: 2019-01-08
Attending: ORTHOPAEDIC SURGERY | Admitting: ORTHOPAEDIC SURGERY

## 2019-01-02 ENCOUNTER — APPOINTMENT (OUTPATIENT)
Dept: GENERAL RADIOLOGY | Facility: HOSPITAL | Age: 59
End: 2019-01-02

## 2019-01-02 ENCOUNTER — ANESTHESIA (OUTPATIENT)
Dept: PERIOP | Facility: HOSPITAL | Age: 59
End: 2019-01-02

## 2019-01-02 DIAGNOSIS — Z74.09 IMPAIRED FUNCTIONAL MOBILITY, BALANCE, GAIT, AND ENDURANCE: ICD-10-CM

## 2019-01-02 DIAGNOSIS — Z78.9 DECREASED ACTIVITIES OF DAILY LIVING (ADL): ICD-10-CM

## 2019-01-02 PROCEDURE — 25010000002 ONDANSETRON PER 1 MG: Performed by: ANESTHESIOLOGY

## 2019-01-02 PROCEDURE — 25010000002 SUCCINYLCHOLINE PER 20 MG: Performed by: NURSE ANESTHETIST, CERTIFIED REGISTERED

## 2019-01-02 PROCEDURE — 25010000002 MIDAZOLAM PER 1 MG: Performed by: ANESTHESIOLOGY

## 2019-01-02 PROCEDURE — 25010000002 PROPOFOL 10 MG/ML EMULSION: Performed by: NURSE ANESTHETIST, CERTIFIED REGISTERED

## 2019-01-02 PROCEDURE — 25010000002 HEPARIN (PORCINE) PER 1000 UNITS: Performed by: ORTHOPAEDIC SURGERY

## 2019-01-02 PROCEDURE — 25010000002 HYDROMORPHONE 1 MG/ML SOLUTION: Performed by: ORTHOPAEDIC SURGERY

## 2019-01-02 PROCEDURE — C1713 ANCHOR/SCREW BN/BN,TIS/BN: HCPCS | Performed by: ORTHOPAEDIC SURGERY

## 2019-01-02 PROCEDURE — 0SG10A0 FUSION OF 2 OR MORE LUMBAR VERTEBRAL JOINTS WITH INTERBODY FUSION DEVICE, ANTERIOR APPROACH, ANTERIOR COLUMN, OPEN APPROACH: ICD-10-PCS | Performed by: ORTHOPAEDIC SURGERY

## 2019-01-02 PROCEDURE — 94799 UNLISTED PULMONARY SVC/PX: CPT

## 2019-01-02 PROCEDURE — 07DR3ZZ EXTRACTION OF ILIAC BONE MARROW, PERCUTANEOUS APPROACH: ICD-10-PCS | Performed by: ORTHOPAEDIC SURGERY

## 2019-01-02 PROCEDURE — 94640 AIRWAY INHALATION TREATMENT: CPT

## 2019-01-02 PROCEDURE — 86850 RBC ANTIBODY SCREEN: CPT | Performed by: ORTHOPAEDIC SURGERY

## 2019-01-02 PROCEDURE — 86900 BLOOD TYPING SEROLOGIC ABO: CPT | Performed by: ORTHOPAEDIC SURGERY

## 2019-01-02 PROCEDURE — 97165 OT EVAL LOW COMPLEX 30 MIN: CPT | Performed by: OCCUPATIONAL THERAPIST

## 2019-01-02 PROCEDURE — 0ST20ZZ RESECTION OF LUMBAR VERTEBRAL DISC, OPEN APPROACH: ICD-10-PCS | Performed by: ORTHOPAEDIC SURGERY

## 2019-01-02 PROCEDURE — 86901 BLOOD TYPING SEROLOGIC RH(D): CPT | Performed by: ORTHOPAEDIC SURGERY

## 2019-01-02 PROCEDURE — 25010000002 FENTANYL CITRATE (PF) 100 MCG/2ML SOLUTION: Performed by: ANESTHESIOLOGY

## 2019-01-02 PROCEDURE — 72100 X-RAY EXAM L-S SPINE 2/3 VWS: CPT

## 2019-01-02 PROCEDURE — 76000 FLUOROSCOPY <1 HR PHYS/QHP: CPT

## 2019-01-02 DEVICE — LLIF AMP, 5.5MM X 35MM BONE SCREW
Type: IMPLANTABLE DEVICE | Status: FUNCTIONAL
Brand: AMP

## 2019-01-02 DEVICE — LLIF AMP, CENTER SCREW, 15MM
Type: IMPLANTABLE DEVICE | Status: FUNCTIONAL
Brand: AMP

## 2019-01-02 DEVICE — KT HEMOST ABS SURGIFOAM PWDR 1GRAM: Type: IMPLANTABLE DEVICE | Status: FUNCTIONAL

## 2019-01-02 DEVICE — LLIF AMP, 5.5MM X 50MM BONE SCREW
Type: IMPLANTABLE DEVICE | Status: FUNCTIONAL
Brand: AMP

## 2019-01-02 DEVICE — LLIF AMP, TWO SCREW PLATE, 08
Type: IMPLANTABLE DEVICE | Status: FUNCTIONAL
Brand: AMP

## 2019-01-02 DEVICE — BATTALION LATERAL SPACER, PEEK, 10°, 22 MM WIDE, 10 X 55 MM
Type: IMPLANTABLE DEVICE | Status: FUNCTIONAL
Brand: BATTALION

## 2019-01-02 DEVICE — BONE FILLER VOID NANOSS BIOACTIVE 3D 20CC: Type: IMPLANTABLE DEVICE | Status: FUNCTIONAL

## 2019-01-02 RX ORDER — IPRATROPIUM BROMIDE AND ALBUTEROL SULFATE 2.5; .5 MG/3ML; MG/3ML
3 SOLUTION RESPIRATORY (INHALATION) ONCE AS NEEDED
Status: DISCONTINUED | OUTPATIENT
Start: 2019-01-02 | End: 2019-01-02 | Stop reason: HOSPADM

## 2019-01-02 RX ORDER — MIDAZOLAM HYDROCHLORIDE 1 MG/ML
1 INJECTION INTRAMUSCULAR; INTRAVENOUS
Status: DISCONTINUED | OUTPATIENT
Start: 2019-01-02 | End: 2019-01-02

## 2019-01-02 RX ORDER — FLUMAZENIL 0.1 MG/ML
0.2 INJECTION INTRAVENOUS AS NEEDED
Status: DISCONTINUED | OUTPATIENT
Start: 2019-01-02 | End: 2019-01-02 | Stop reason: HOSPADM

## 2019-01-02 RX ORDER — ROCURONIUM BROMIDE 10 MG/ML
INJECTION, SOLUTION INTRAVENOUS AS NEEDED
Status: DISCONTINUED | OUTPATIENT
Start: 2019-01-02 | End: 2019-01-02 | Stop reason: SURG

## 2019-01-02 RX ORDER — SODIUM CHLORIDE 9 MG/ML
75 INJECTION, SOLUTION INTRAVENOUS CONTINUOUS
Status: DISPENSED | OUTPATIENT
Start: 2019-01-02 | End: 2019-01-03

## 2019-01-02 RX ORDER — GABAPENTIN 400 MG/1
1600 CAPSULE ORAL NIGHTLY
Status: DISCONTINUED | OUTPATIENT
Start: 2019-01-02 | End: 2019-01-08 | Stop reason: HOSPADM

## 2019-01-02 RX ORDER — SUFENTANIL CITRATE 50 UG/ML
INJECTION EPIDURAL; INTRAVENOUS AS NEEDED
Status: DISCONTINUED | OUTPATIENT
Start: 2019-01-02 | End: 2019-01-02 | Stop reason: SURG

## 2019-01-02 RX ORDER — OXYCODONE AND ACETAMINOPHEN 10; 325 MG/1; MG/1
1 TABLET ORAL ONCE AS NEEDED
Status: COMPLETED | OUTPATIENT
Start: 2019-01-02 | End: 2019-01-02

## 2019-01-02 RX ORDER — ALBUTEROL SULFATE 2.5 MG/3ML
2.5 SOLUTION RESPIRATORY (INHALATION) EVERY 4 HOURS PRN
Status: DISCONTINUED | OUTPATIENT
Start: 2019-01-02 | End: 2019-01-05 | Stop reason: SDUPTHER

## 2019-01-02 RX ORDER — BUDESONIDE AND FORMOTEROL FUMARATE DIHYDRATE 80; 4.5 UG/1; UG/1
2 AEROSOL RESPIRATORY (INHALATION)
Status: DISCONTINUED | OUTPATIENT
Start: 2019-01-03 | End: 2019-01-05

## 2019-01-02 RX ORDER — ONDANSETRON 4 MG/1
4 TABLET, ORALLY DISINTEGRATING ORAL EVERY 6 HOURS PRN
Status: DISCONTINUED | OUTPATIENT
Start: 2019-01-02 | End: 2019-01-08 | Stop reason: HOSPADM

## 2019-01-02 RX ORDER — GABAPENTIN 400 MG/1
800 CAPSULE ORAL 2 TIMES DAILY
Status: DISCONTINUED | OUTPATIENT
Start: 2019-01-02 | End: 2019-01-08 | Stop reason: HOSPADM

## 2019-01-02 RX ORDER — MIDAZOLAM HYDROCHLORIDE 1 MG/ML
2 INJECTION INTRAMUSCULAR; INTRAVENOUS
Status: DISCONTINUED | OUTPATIENT
Start: 2019-01-02 | End: 2019-01-02

## 2019-01-02 RX ORDER — ONDANSETRON 2 MG/ML
4 INJECTION INTRAMUSCULAR; INTRAVENOUS AS NEEDED
Status: DISCONTINUED | OUTPATIENT
Start: 2019-01-02 | End: 2019-01-02 | Stop reason: HOSPADM

## 2019-01-02 RX ORDER — HEPARIN SODIUM 10000 [USP'U]/ML
INJECTION, SOLUTION INTRAVENOUS; SUBCUTANEOUS AS NEEDED
Status: DISCONTINUED | OUTPATIENT
Start: 2019-01-02 | End: 2019-01-02 | Stop reason: HOSPADM

## 2019-01-02 RX ORDER — OXYCODONE AND ACETAMINOPHEN 10; 325 MG/1; MG/1
1 TABLET ORAL EVERY 4 HOURS PRN
COMMUNITY
End: 2019-01-08 | Stop reason: HOSPADM

## 2019-01-02 RX ORDER — OXYCODONE AND ACETAMINOPHEN 10; 325 MG/1; MG/1
2 TABLET ORAL EVERY 4 HOURS PRN
Status: DISCONTINUED | OUTPATIENT
Start: 2019-01-02 | End: 2019-01-08 | Stop reason: HOSPADM

## 2019-01-02 RX ORDER — PROPOFOL 10 MG/ML
VIAL (ML) INTRAVENOUS AS NEEDED
Status: DISCONTINUED | OUTPATIENT
Start: 2019-01-02 | End: 2019-01-02 | Stop reason: SURG

## 2019-01-02 RX ORDER — SUCCINYLCHOLINE CHLORIDE 20 MG/ML
INJECTION INTRAMUSCULAR; INTRAVENOUS AS NEEDED
Status: DISCONTINUED | OUTPATIENT
Start: 2019-01-02 | End: 2019-01-02 | Stop reason: SURG

## 2019-01-02 RX ORDER — CLINDAMYCIN PHOSPHATE 900 MG/50ML
900 INJECTION INTRAVENOUS EVERY 8 HOURS
Status: COMPLETED | OUTPATIENT
Start: 2019-01-02 | End: 2019-01-03

## 2019-01-02 RX ORDER — FENTANYL CITRATE 50 UG/ML
25 INJECTION, SOLUTION INTRAMUSCULAR; INTRAVENOUS AS NEEDED
Status: DISCONTINUED | OUTPATIENT
Start: 2019-01-02 | End: 2019-01-02 | Stop reason: HOSPADM

## 2019-01-02 RX ORDER — SODIUM CHLORIDE 0.9 % (FLUSH) 0.9 %
3-10 SYRINGE (ML) INJECTION AS NEEDED
Status: DISCONTINUED | OUTPATIENT
Start: 2019-01-02 | End: 2019-01-08 | Stop reason: HOSPADM

## 2019-01-02 RX ORDER — SODIUM CHLORIDE 9 MG/ML
75 INJECTION, SOLUTION INTRAVENOUS CONTINUOUS
Status: DISPENSED | OUTPATIENT
Start: 2019-01-02 | End: 2019-01-04

## 2019-01-02 RX ORDER — NALOXONE HCL 0.4 MG/ML
0.04 VIAL (ML) INJECTION AS NEEDED
Status: DISCONTINUED | OUTPATIENT
Start: 2019-01-02 | End: 2019-01-02 | Stop reason: HOSPADM

## 2019-01-02 RX ORDER — MEPERIDINE HYDROCHLORIDE 25 MG/ML
12.5 INJECTION INTRAMUSCULAR; INTRAVENOUS; SUBCUTANEOUS
Status: DISCONTINUED | OUTPATIENT
Start: 2019-01-02 | End: 2019-01-02 | Stop reason: HOSPADM

## 2019-01-02 RX ORDER — SODIUM CHLORIDE 0.9 % (FLUSH) 0.9 %
1-10 SYRINGE (ML) INJECTION AS NEEDED
Status: DISCONTINUED | OUTPATIENT
Start: 2019-01-02 | End: 2019-01-02

## 2019-01-02 RX ORDER — SODIUM CHLORIDE, SODIUM LACTATE, POTASSIUM CHLORIDE, CALCIUM CHLORIDE 600; 310; 30; 20 MG/100ML; MG/100ML; MG/100ML; MG/100ML
30 INJECTION, SOLUTION INTRAVENOUS CONTINUOUS
Status: DISCONTINUED | OUTPATIENT
Start: 2019-01-02 | End: 2019-01-07

## 2019-01-02 RX ORDER — LIDOCAINE HYDROCHLORIDE 20 MG/ML
INJECTION, SOLUTION INFILTRATION; PERINEURAL AS NEEDED
Status: DISCONTINUED | OUTPATIENT
Start: 2019-01-02 | End: 2019-01-02 | Stop reason: SURG

## 2019-01-02 RX ORDER — LABETALOL HYDROCHLORIDE 5 MG/ML
5 INJECTION, SOLUTION INTRAVENOUS
Status: DISCONTINUED | OUTPATIENT
Start: 2019-01-02 | End: 2019-01-02 | Stop reason: HOSPADM

## 2019-01-02 RX ORDER — FAMOTIDINE 10 MG/ML
20 INJECTION, SOLUTION INTRAVENOUS EVERY 12 HOURS SCHEDULED
Status: DISCONTINUED | OUTPATIENT
Start: 2019-01-02 | End: 2019-01-06

## 2019-01-02 RX ORDER — MAGNESIUM HYDROXIDE 1200 MG/15ML
LIQUID ORAL AS NEEDED
Status: DISCONTINUED | OUTPATIENT
Start: 2019-01-02 | End: 2019-01-02 | Stop reason: HOSPADM

## 2019-01-02 RX ORDER — ONDANSETRON 2 MG/ML
4 INJECTION INTRAMUSCULAR; INTRAVENOUS EVERY 6 HOURS PRN
Status: DISCONTINUED | OUTPATIENT
Start: 2019-01-02 | End: 2019-01-08 | Stop reason: HOSPADM

## 2019-01-02 RX ORDER — BUDESONIDE AND FORMOTEROL FUMARATE DIHYDRATE 80; 4.5 UG/1; UG/1
2 AEROSOL RESPIRATORY (INHALATION)
Status: DISCONTINUED | OUTPATIENT
Start: 2019-01-02 | End: 2019-01-02 | Stop reason: SDUPTHER

## 2019-01-02 RX ORDER — SODIUM CHLORIDE 0.9 % (FLUSH) 0.9 %
3 SYRINGE (ML) INJECTION EVERY 12 HOURS SCHEDULED
Status: DISCONTINUED | OUTPATIENT
Start: 2019-01-02 | End: 2019-01-02

## 2019-01-02 RX ORDER — ESCITALOPRAM OXALATE 10 MG/1
20 TABLET ORAL DAILY
Status: DISCONTINUED | OUTPATIENT
Start: 2019-01-02 | End: 2019-01-08 | Stop reason: HOSPADM

## 2019-01-02 RX ORDER — HYDRALAZINE HYDROCHLORIDE 20 MG/ML
5 INJECTION INTRAMUSCULAR; INTRAVENOUS
Status: DISCONTINUED | OUTPATIENT
Start: 2019-01-02 | End: 2019-01-02 | Stop reason: HOSPADM

## 2019-01-02 RX ORDER — ACETAMINOPHEN 500 MG
1000 TABLET ORAL ONCE
Status: COMPLETED | OUTPATIENT
Start: 2019-01-02 | End: 2019-01-02

## 2019-01-02 RX ORDER — DIPHENHYDRAMINE HCL 25 MG
25 CAPSULE ORAL NIGHTLY PRN
Status: DISCONTINUED | OUTPATIENT
Start: 2019-01-02 | End: 2019-01-08 | Stop reason: HOSPADM

## 2019-01-02 RX ORDER — MORPHINE SULFATE 2 MG/ML
2 INJECTION, SOLUTION INTRAMUSCULAR; INTRAVENOUS
Status: DISCONTINUED | OUTPATIENT
Start: 2019-01-02 | End: 2019-01-02 | Stop reason: HOSPADM

## 2019-01-02 RX ORDER — ESCITALOPRAM OXALATE 20 MG/1
20 TABLET ORAL DAILY
COMMUNITY

## 2019-01-02 RX ORDER — FAMOTIDINE 20 MG/1
20 TABLET, FILM COATED ORAL EVERY 12 HOURS SCHEDULED
Status: DISCONTINUED | OUTPATIENT
Start: 2019-01-02 | End: 2019-01-06

## 2019-01-02 RX ORDER — SODIUM CHLORIDE 0.9 % (FLUSH) 0.9 %
3 SYRINGE (ML) INJECTION EVERY 12 HOURS SCHEDULED
Status: DISCONTINUED | OUTPATIENT
Start: 2019-01-02 | End: 2019-01-08 | Stop reason: HOSPADM

## 2019-01-02 RX ORDER — ALBUTEROL SULFATE 90 UG/1
2 AEROSOL, METERED RESPIRATORY (INHALATION) EVERY 4 HOURS PRN
Status: DISCONTINUED | OUTPATIENT
Start: 2019-01-02 | End: 2019-01-02 | Stop reason: SDUPTHER

## 2019-01-02 RX ORDER — LIDOCAINE HYDROCHLORIDE 40 MG/ML
SOLUTION TOPICAL AS NEEDED
Status: DISCONTINUED | OUTPATIENT
Start: 2019-01-02 | End: 2019-01-02 | Stop reason: SURG

## 2019-01-02 RX ORDER — TIZANIDINE 4 MG/1
4 TABLET ORAL EVERY 8 HOURS PRN
Status: DISCONTINUED | OUTPATIENT
Start: 2019-01-02 | End: 2019-01-08 | Stop reason: HOSPADM

## 2019-01-02 RX ORDER — METOCLOPRAMIDE HYDROCHLORIDE 5 MG/ML
5 INJECTION INTRAMUSCULAR; INTRAVENOUS
Status: DISCONTINUED | OUTPATIENT
Start: 2019-01-02 | End: 2019-01-02 | Stop reason: HOSPADM

## 2019-01-02 RX ORDER — CLINDAMYCIN PHOSPHATE 900 MG/50ML
900 INJECTION INTRAVENOUS ONCE
Status: COMPLETED | OUTPATIENT
Start: 2019-01-02 | End: 2019-01-02

## 2019-01-02 RX ORDER — ONDANSETRON 4 MG/1
4 TABLET, FILM COATED ORAL EVERY 6 HOURS PRN
Status: DISCONTINUED | OUTPATIENT
Start: 2019-01-02 | End: 2019-01-08 | Stop reason: HOSPADM

## 2019-01-02 RX ORDER — ONDANSETRON 2 MG/ML
4 INJECTION INTRAMUSCULAR; INTRAVENOUS EVERY 6 HOURS PRN
Status: DISCONTINUED | OUTPATIENT
Start: 2019-01-02 | End: 2019-01-02 | Stop reason: SDUPTHER

## 2019-01-02 RX ORDER — PHENYLEPHRINE HCL IN 0.9% NACL 0.8MG/10ML
SYRINGE (ML) INTRAVENOUS AS NEEDED
Status: DISCONTINUED | OUTPATIENT
Start: 2019-01-02 | End: 2019-01-02 | Stop reason: SURG

## 2019-01-02 RX ORDER — SODIUM CHLORIDE, SODIUM LACTATE, POTASSIUM CHLORIDE, CALCIUM CHLORIDE 600; 310; 30; 20 MG/100ML; MG/100ML; MG/100ML; MG/100ML
100 INJECTION, SOLUTION INTRAVENOUS CONTINUOUS
Status: DISCONTINUED | OUTPATIENT
Start: 2019-01-02 | End: 2019-01-02

## 2019-01-02 RX ORDER — OXYCODONE HCL 20 MG/1
20 TABLET, FILM COATED, EXTENDED RELEASE ORAL ONCE
Status: COMPLETED | OUTPATIENT
Start: 2019-01-02 | End: 2019-01-02

## 2019-01-02 RX ADMIN — Medication 160 MCG: at 09:40

## 2019-01-02 RX ADMIN — HYDROMORPHONE HYDROCHLORIDE 1 MG: 1 INJECTION, SOLUTION INTRAMUSCULAR; INTRAVENOUS; SUBCUTANEOUS at 13:00

## 2019-01-02 RX ADMIN — OXYCODONE HYDROCHLORIDE AND ACETAMINOPHEN 1 TABLET: 10; 325 TABLET ORAL at 20:57

## 2019-01-02 RX ADMIN — ESCITALOPRAM 20 MG: 10 TABLET, FILM COATED ORAL at 15:14

## 2019-01-02 RX ADMIN — ONDANSETRON 4 MG: 2 INJECTION INTRAMUSCULAR; INTRAVENOUS at 11:05

## 2019-01-02 RX ADMIN — CLINDAMYCIN IN 5 PERCENT DEXTROSE 900 MG: 18 INJECTION, SOLUTION INTRAVENOUS at 17:30

## 2019-01-02 RX ADMIN — OXYCODONE HYDROCHLORIDE AND ACETAMINOPHEN 1 TABLET: 10; 325 TABLET ORAL at 11:20

## 2019-01-02 RX ADMIN — LIDOCAINE HYDROCHLORIDE 1 EACH: 40 SOLUTION TOPICAL at 09:11

## 2019-01-02 RX ADMIN — LIDOCAINE HYDROCHLORIDE 0.5 ML: 10 INJECTION, SOLUTION EPIDURAL; INFILTRATION; INTRACAUDAL; PERINEURAL at 07:05

## 2019-01-02 RX ADMIN — MIDAZOLAM HYDROCHLORIDE 2 MG: 1 INJECTION, SOLUTION INTRAMUSCULAR; INTRAVENOUS at 08:51

## 2019-01-02 RX ADMIN — ACETAMINOPHEN 1000 MG: 500 TABLET ORAL at 08:50

## 2019-01-02 RX ADMIN — GABAPENTIN 1600 MG: 400 CAPSULE ORAL at 22:53

## 2019-01-02 RX ADMIN — Medication 160 MCG: at 10:39

## 2019-01-02 RX ADMIN — FAMOTIDINE 20 MG: 20 TABLET, FILM COATED ORAL at 20:56

## 2019-01-02 RX ADMIN — SODIUM CHLORIDE, POTASSIUM CHLORIDE, SODIUM LACTATE AND CALCIUM CHLORIDE 30 ML/HR: 600; 310; 30; 20 INJECTION, SOLUTION INTRAVENOUS at 11:48

## 2019-01-02 RX ADMIN — Medication 160 MCG: at 10:06

## 2019-01-02 RX ADMIN — SUFENTANIL CITRATE 50 MCG: 50 INJECTION, SOLUTION EPIDURAL; INTRAVENOUS at 09:10

## 2019-01-02 RX ADMIN — Medication 160 MCG: at 09:46

## 2019-01-02 RX ADMIN — FENTANYL CITRATE 25 MCG: 50 INJECTION, SOLUTION INTRAMUSCULAR; INTRAVENOUS at 11:06

## 2019-01-02 RX ADMIN — IPRATROPIUM BROMIDE 0.5 MG: 0.5 SOLUTION RESPIRATORY (INHALATION) at 15:36

## 2019-01-02 RX ADMIN — GABAPENTIN 800 MG: 400 CAPSULE ORAL at 15:14

## 2019-01-02 RX ADMIN — Medication 160 MCG: at 10:31

## 2019-01-02 RX ADMIN — Medication 1 TABLET: at 17:43

## 2019-01-02 RX ADMIN — CLINDAMYCIN PHOSPHATE 900 MG: 18 INJECTION, SOLUTION INTRAVENOUS at 09:18

## 2019-01-02 RX ADMIN — Medication 80 MCG: at 09:30

## 2019-01-02 RX ADMIN — FENTANYL CITRATE 25 MCG: 50 INJECTION, SOLUTION INTRAMUSCULAR; INTRAVENOUS at 11:07

## 2019-01-02 RX ADMIN — OXYCODONE HYDROCHLORIDE AND ACETAMINOPHEN 1 TABLET: 10; 325 TABLET ORAL at 22:52

## 2019-01-02 RX ADMIN — ROCURONIUM BROMIDE 10 MG: 10 INJECTION INTRAVENOUS at 09:10

## 2019-01-02 RX ADMIN — OXYCODONE HYDROCHLORIDE 20 MG: 20 TABLET, FILM COATED, EXTENDED RELEASE ORAL at 07:28

## 2019-01-02 RX ADMIN — SODIUM CHLORIDE 75 ML/HR: 9 INJECTION, SOLUTION INTRAVENOUS at 12:44

## 2019-01-02 RX ADMIN — IPRATROPIUM BROMIDE 0.5 MG: 0.5 SOLUTION RESPIRATORY (INHALATION) at 20:06

## 2019-01-02 RX ADMIN — FENTANYL CITRATE 25 MCG: 50 INJECTION, SOLUTION INTRAMUSCULAR; INTRAVENOUS at 11:08

## 2019-01-02 RX ADMIN — PROPOFOL 20 MG: 10 INJECTION, EMULSION INTRAVENOUS at 09:10

## 2019-01-02 RX ADMIN — SUCCINYLCHOLINE CHLORIDE 120 MG: 20 INJECTION, SOLUTION INTRAMUSCULAR; INTRAVENOUS at 09:10

## 2019-01-02 RX ADMIN — Medication 160 MCG: at 10:45

## 2019-01-02 RX ADMIN — SODIUM CHLORIDE, POTASSIUM CHLORIDE, SODIUM LACTATE AND CALCIUM CHLORIDE 30 ML/HR: 600; 310; 30; 20 INJECTION, SOLUTION INTRAVENOUS at 07:15

## 2019-01-02 RX ADMIN — OXYCODONE HYDROCHLORIDE AND ACETAMINOPHEN 2 TABLET: 10; 325 TABLET ORAL at 15:48

## 2019-01-02 RX ADMIN — SODIUM CHLORIDE, PRESERVATIVE FREE 3 ML: 5 INJECTION INTRAVENOUS at 15:51

## 2019-01-02 RX ADMIN — SODIUM CHLORIDE, POTASSIUM CHLORIDE, SODIUM LACTATE AND CALCIUM CHLORIDE 100 ML/HR: 600; 310; 30; 20 INJECTION, SOLUTION INTRAVENOUS at 07:05

## 2019-01-02 RX ADMIN — SODIUM CHLORIDE, PRESERVATIVE FREE 3 ML: 5 INJECTION INTRAVENOUS at 20:58

## 2019-01-02 RX ADMIN — FENTANYL CITRATE 25 MCG: 50 INJECTION, SOLUTION INTRAMUSCULAR; INTRAVENOUS at 11:05

## 2019-01-02 RX ADMIN — LIDOCAINE HYDROCHLORIDE 100 MG: 20 INJECTION, SOLUTION INFILTRATION; PERINEURAL at 09:10

## 2019-01-02 NOTE — PLAN OF CARE
Problem: Patient Care Overview  Goal: Plan of Care Review  Outcome: Ongoing (interventions implemented as appropriate)   01/02/19 5204   Coping/Psychosocial   Plan of Care Reviewed With patient   Plan of Care Review   Progress improving   OTHER   Outcome Summary OT eval completed. Pt educated on spinal precautions, log rolling technqiue, and LSO wear schedule/fitting/mgt. Pt able to complete bed mobility while demo'ing log rolling with Mod I. Donned LSO independently while seated at EOB. Pt required Max A for donning socks. Sit <> stand t/f from EOB with CGA. Amb from bed to bathroom and back to bed with CGA and straight cane. Pt able to complete all aspects of toileting with CGA. Pt would benefit from skilled OT services for continued education on LSO wear and spinal precuations, increase independent with adls and mobility. OT recommends home with assist upon d/c from facility.

## 2019-01-02 NOTE — ANESTHESIA PREPROCEDURE EVALUATION
Anesthesia Evaluation     Patient summary reviewed and Nursing notes reviewed   no history of anesthetic complications:  NPO Solid Status: > 8 hours  NPO Liquid Status: > 8 hours           Airway   Mallampati: II  TM distance: >3 FB  Neck ROM: full  No difficulty expected  Dental    (+) upper dentures    Pulmonary     breath sounds clear to auscultation  (+) a smoker (quit 2010) Former, COPD, asthma,   Cardiovascular   Exercise tolerance: poor (<4 METS)    ECG reviewed  Rhythm: regular  Rate: normal    (+) CHF (no echo available for review. Pt reports echo was done in 2010. No recent issues), PVD (stents in legs, on plavix),       Neuro/Psych  (+) TIA, psychiatric history Depression,     (-) seizures, CVA  GI/Hepatic/Renal/Endo    (+) obesity,  GERD,    (-) no renal disease, diabetes    Musculoskeletal     Abdominal    Substance History      OB/GYN          Other   (+) arthritis   history of cancer (cancer of unknown primary lung, abdomen, pelvis, in remisison with chemo) remission                    Anesthesia Plan    ASA 3     general     intravenous induction   Anesthetic plan, all risks, benefits, and alternatives have been provided, discussed and informed consent has been obtained with: patient.

## 2019-01-02 NOTE — ANESTHESIA PROCEDURE NOTES
ANESTHESIA INTUBATION  Urgency: elective    Airway not difficult    General Information and Staff    Patient location during procedure: OR  CRNA: Agustin Otto CRNA    Indications and Patient Condition  Indications for airway management: airway protection    Preoxygenated: yes  Mask difficulty assessment: 1 - vent by mask    Final Airway Details  Final airway type: endotracheal airway      Successful airway: ETT  Cuffed: yes   Successful intubation technique: direct laryngoscopy  Facilitating devices/methods: intubating stylet  Endotracheal tube insertion site: oral  Blade: [place]  Blade size: 2  ETT size (mm): 7.5  Cormack-Lehane Classification: grade I - full view of glottis  Placement verified by: chest auscultation and capnometry   Cuff volume (mL): 8  Measured from: teeth  ETT to teeth (cm): 21  Number of attempts at approach: 1

## 2019-01-02 NOTE — ANESTHESIA POSTPROCEDURE EVALUATION
"Patient: Arminda Jiménez    Procedure Summary     Date:  01/02/19 Room / Location:   [facility]  /  [facility]    Anesthesia Start:  0908 Anesthesia Stop:  1102    Procedure:  RIGHT LATERAL LUMBAR INTERBODY FUSION L3-5 (Right Spine Lumbar) Diagnosis:  (M54.16)    Surgeon:  LUIS CARLOS Zheng MD Provider:  Agustin Otto CRNA    Anesthesia Type:  general ASA Status:  3          Anesthesia Type: general  Last vitals  BP   122/65 (01/02/19 1200)   Temp   97.4 °F (36.3 °C) (01/02/19 1145)   Pulse   75 (01/02/19 1200)   Resp   14 (01/02/19 1200)     SpO2   100 % (01/02/19 1200)     Post Anesthesia Care and Evaluation    Patient location during evaluation: PACU  Patient participation: complete - patient participated  Level of consciousness: awake and alert  Pain management: adequate  Airway patency: patent  Anesthetic complications: No anesthetic complications  PONV Status: none  Cardiovascular status: acceptable and hemodynamically stable  Respiratory status: acceptable  Hydration status: acceptable    Comments: Blood pressure 122/65, pulse 75, temperature 97.4 °F (36.3 °C), temperature source Temporal, resp. rate 14, height 157.5 cm (62.01\"), weight 75 kg (165 lb 5.5 oz), SpO2 100 %.    Patient discharged from PACU based upon Chang score. Please see RN notes for further details      "

## 2019-01-02 NOTE — THERAPY EVALUATION
Acute Care - Occupational Therapy Initial Evaluation  [facility]     Patient Name: Arminda Jiménez  : 1960  MRN: [id number]  Today's Date: 2019  Onset of Illness/Injury or Date of Surgery: 19  Date of Referral to OT: 19  Referring Physician: Dr. Zheng    Admit Date: 2019       ICD-10-CM ICD-9-CM   1. Decreased activities of daily living (ADL) R68.89 780.99     Patient Active Problem List   Diagnosis   • Foraminal stenosis of lumbosacral region   • Chronic back pain   • Radiculopathy   • Lumbar stenosis   • Panlobular emphysema (CMS/HCC)   • Gastroesophageal reflux disease without esophagitis   • Constipation due to opioid therapy   • PAD (peripheral artery disease) (CMS/HCC)   • Primary osteoarthritis involving multiple joints     Past Medical History:   Diagnosis Date   • Arthritis    • Asthma    • Cancer (CMS/HCC)     right lung,abd,and pelvic area   • CHF (congestive heart failure) (CMS/HCC)    • Chronic back pain    • COPD (chronic obstructive pulmonary disease) (CMS/HCC)    • Depression    • Facet arthropathy    • Foraminal stenosis of lumbosacral region    • GERD (gastroesophageal reflux disease)    • H/O degenerative disc disease    • History of blood transfusion    • PVD (peripheral vascular disease) (CMS/[facility])    • Radiculopathy      Past Surgical History:   Procedure Laterality Date   • ANTERIOR LUMBAR EXPOSURE N/A 2018    Procedure: ANTERIOR LUMBAR EXPOSURE;  Surgeon: Chris Haley DO;  Location: [facility] OR;  Service: Vascular   • APPENDECTOMY     • CARPAL TUNNEL RELEASE Bilateral    • LUMBAR FUSION N/A 2018    Procedure: ANTERIOR DECOMPRESSION, ANTERIOR LUMBAR INTERBODY FUSION WITH INSTRUMENTATION L5-S1;  Surgeon: LUIS CARLOS Zheng MD;  Location:  [facility] OR;  Service: Orthopedic Spine   • LUMBAR LAMINECTOMY WITH FUSION N/A 2018    Procedure: POSTERIOR SPINAL FUSION WITH INSTRUMENTATION L5-S1;  Surgeon: LUIS CARLOS Zheng MD;  Location:  [facility] OR;  Service:  Orthopedic Spine   • OTHER SURGICAL HISTORY Bilateral 2017    Stent implants BLE [place] [address] [place]          OT ASSESSMENT FLOWSHEET (last 72 hours)      Occupational Therapy Evaluation     Row Name 01/02/19 2074                   OT Evaluation Time/Intention    Subjective Information  complains of;fatigue;pain;dizziness tingling in R foot   -JJ        Document Type  evaluation see MAR  -J        Mode of Treatment  occupational therapy  -JJ        Patient Effort  good  -JJ           General Information    Patient Profile Reviewed?  yes  -JJ        Onset of Illness/Injury or Date of Surgery  01/02/19  -JJ        Referring Physician  Dr. Zheng  -JJ        Patient Observations  alert;cooperative;agree to therapy  -JJ        Patient/Family Observations  sister at bedside   -JJ        General Observations of Patient  sitting up in bed, IV infusing, no apparent distress.   -JJ        Prior Level of Function  independent:;all household mobility;transfer;bed mobility;ADL's;driving cane for mobility   -JJ        Equipment Currently Used at Home  cane, straight  -JJ        Pertinent History of Current Functional Problem  Pt s/p R LLIF at L3-5 on 1/2/19. Dx: Lumbar stenosis. Second sx scheduled for 1/4.   -JJ        Existing Precautions/Restrictions  spinal;fall;brace worn when out of bed  -JJ        Equipment Issued to Patient  gait belt  -JJ        Risks Reviewed  patient:;LOB;nausea/vomiting;increased discomfort;dizziness;change in vital signs;increased drainage;lines disloged  -JJ        Benefits Reviewed  patient:;improve function;increase independence;increase strength;increase balance;decrease pain;decrease risk of DVT;improve skin integrity;increase knowledge  -JJ           Relationship/Environment    Primary Source of Support/Comfort  child(leno)  -JJ        Lives With  alone  -JJ        Family Caregiver if Needed  child(leno), adult  -JJ        Primary Roles/Responsibilities  disabled  -JJ            Resource/Environmental Concerns    Current Living Arrangements  home/apartment/condo  -        Resource/Environmental Concerns  none  -           Home Main Entrance    Number of Stairs, Main Entrance  two  -        Stair Railings, Main Entrance  railing on right side (ascending)  -           Cognitive Assessment/Interventions    Additional Documentation  Cognitive Assessment/Intervention (Group)  -           Cognitive Assessment/Intervention- PT/OT    Affect/Mental Status (Cognitive)  [facility]  -        Orientation Status (Cognition)  oriented x 4  -        Follows Commands (Cognition)  WFL  -        Cognitive Function (Cognitive)  [facility]  -        Personal Safety Interventions  fall prevention program maintained;gait belt;muscle strengthening facilitated;nonskid shoes/slippers when out of bed;supervised activity  -           Safety Issues, Functional Mobility    Impairments Affecting Function (Mobility)  balance;pain  -           Bed Mobility Assessment/Treatment    Bed Mobility Assessment/Treatment  rolling right;sidelying-sit;sit-sidelying  -        Rolling Right [place] (Bed Mobility)  independent  -        Sidelying-Sit [place] (Bed Mobility)  independent  -        Sit-Sidelying [place] (Bed Mobility)  independent  -        Bed Mobility, Safety Issues  decreased use of arms for pushing/pulling  -        Assistive Device (Bed Mobility)  bed rails  -           Functional Mobility    Functional Mobility- Ind. Level  contact guard assist  -        Functional Mobility- Device  straight cane  -        Functional Mobility- Comment  Amb from bed to bathroom and back to bed with CGA and cane.   -           Transfer Assessment/Treatment    Transfer Assessment/Treatment  sit-stand transfer;stand-sit transfer;toilet transfer  -           Sit-Stand Transfer    Sit-Stand [place] (Transfers)  contact guard  -        Assistive Device (Sit-Stand Transfers)  cane, straight   -J           Stand-Sit Transfer    Stand-Sit [place] (Transfers)  contact guard  -        Assistive Device (Stand-Sit Transfers)  cane, straight  -JJ           Toilet Transfer    Type (Toilet Transfer)  sit-stand;stand-sit  -        [place] Level (Toilet Transfer)  contact guard  -J        Assistive Device (Toilet Transfer)  cane, straight  -JJ           ADL Assessment/Intervention    BADL Assessment/Intervention  lower body dressing;toileting  -           Lower Body Dressing Assessment/Training    Lower Body Dressing [place] Level  don;socks;maximum assist (25% patient effort)  -        Lower Body Dressing Position  edge of bed sitting  -           Toileting Assessment/Training    [place] Level (Toileting)  toileting skills;adjust/manage clothing;perform perineal hygiene;contact guard assist  -        Assistive Devices (Toileting)  commode  -        Toileting Position  supported sitting  -           BADL Safety/Performance    Impairments, BADL Safety/Performance  balance;pain  -        Skilled BADL Treatment/Intervention  BADL process/adaptation training;adaptive equipment training  -           General ROM    GENERAL ROM COMMENTS  BUE and BLE AROM WFL   -           MMT (Manual Muscle Testing)    General MMT Comments  BUE strength functionally 4+/5, BLE strength functionally 4/5  -           Motor Assessment/Interventions    Additional Documentation  Balance (Group)  -           Balance    Balance  static sitting balance;static standing balance;dynamic sitting balance  -           Static Sitting Balance    Level of [place] (Unsupported Sitting, Static Balance)  independent  -        Sitting Position (Unsupported Sitting, Static Balance)  sitting on edge of bed  -           Dynamic Sitting Balance    Level of [place], Reaches Outside Midline (Sitting, Dynamic Balance)  contact guard assist  -        Sitting Position, Reaches Outside Midline (Sitting,  Dynamic Balance)  sitting on edge of bed  -JJ           Static Standing Balance    Level of [place] (Supported Standing, Static Balance)  contact guard assist  -JJ        Assistive Device Utilized (Supported Standing, Static Balance)  straight cane  -JJ           Sensory Assessment/Intervention    Sensory General Assessment  other (see comments) R foot tingling   -JJ           Positioning and Restraints    Pre-Treatment Position  in bed  -JJ        Post Treatment Position  bed  -JJ        In Bed  fowlers;call light within reach;encouraged to call for assist;exit alarm on;with family/caregiver;side rails up x2;SCD pump applied  -JJ           Pain Assessment    Additional Documentation  Pain Scale: Numbers Pre/Post-Treatment (Group)  -J           Pain Scale: Numbers Pre/Post-Treatment    Pain Scale: Numbers, Pretreatment  4/10  -JJ        Pain Scale: Numbers, Post-Treatment  3/10  -JJ        Pain Location  back  -JJ        Pain Intervention(s)  Medication (See MAR);Repositioned;Ambulation/increased activity  -JJ           Orthotics & Prosthetics Management    Orthosis Location  spinal orthosis  -JJ        Additional Documentation  Orthosis Location (Row)  -J           Spinal Orthosis Management    Type (Spinal Orthosis)  LSO (lumbar sacral orthosis)  -JJ        Functional Design (Spinal Orthosis)  static orthosis  -J        Therapeutic Indications (Spinal Orthosis)  post-op positioning/protection;rest/inflammation reduction;stabilization and support  -JJ        Wearing Schedule (Spinal Orthosis)  wear when out of bed only  -JJ        Orthosis Training (Spinal Orthosis)  patient;all orthosis skills  -J        Compliance/Wearing Issues (Spinal Orthosis)  patient/caregiver comprehend strategies  -JJ           Wound 01/02/19 1013 Right flank incision    Wound - Properties Group Date first assessed: 01/02/19  -JR Time first assessed: 1013  -JR Side: Right  -JR Location: flank  -JR Type: incision  -JR       Coping     Verbalized Emotional State  acceptance  -J           Plan of Care Review    Plan of Care Reviewed With  patient;sibling  -J           Clinical Impression (OT)    Date of Referral to OT  01/02/19  -JJ        OT Diagnosis  decreased adls   -JJ        Prognosis (OT Eval)  good  -JJ        Patient/Family Goals Statement (OT Eval)  go home   -J        Criteria for Skilled Therapeutic Interventions Met (OT Eval)  yes;treatment indicated  -JJ        Rehab Potential (OT Eval)  good, to achieve stated therapy goals  -JJ        Therapy Frequency (OT Eval)  5 times/wk  -JJ        Predicted Duration of Therapy Intervention (Therapy Eval)  until d/c from facility   -J        Care Plan Review (OT)  evaluation/treatment results reviewed;care plan/treatment goals reviewed;risks/benefits reviewed;current/potential barriers reviewed;patient/other agree to care plan  -J        Anticipated Discharge Disposition (OT)  home with assist  -J           Vital Signs    Pre SpO2 (%)  91  -JJ        Post SpO2 (%)  84  -JJ        Pre Patient Position  -- fowlers   -JJ        Post Patient Position  -- [place]   -JJ           Planned OT Interventions    Planned Therapy Interventions (OT Eval)  adaptive equipment training;BADL retraining;functional balance retraining;occupation/activity based interventions;patient/caregiver education/training  -JJ           OT Goals    Dressing Goal Selection (OT)  dressing, OT goal 1  -JJ           Dressing Goal 1 (OT)    Activity/Assistive Device (Dressing Goal 1, OT)  dressing skills, all;lower body dressing  -JJ        [place]/Cues Needed (Dressing Goal 1, OT)  conditional independence  -JJ        Time Frame (Dressing Goal 1, OT)  long term goal (LTG);by discharge  -JJ        Progress/Outcome (Dressing Goal 1, OT)  goal ongoing  -JJ           Patient Education Goal (OT)    Activity (Patient Education Goal, OT)  LSO wear schedule/fitting/mgt, spinal precuations.   -JJ         [place]/Cues/Accuracy (Memory Goal 2, OT)  verbalizes understanding  -J        Time Frame (Patient Education Goal, OT)  long term goal (LTG);by discharge  -        Progress/Outcome (Patient Education Goal, OT)  goal ongoing  -           Living Environment    Home Accessibility  stairs to enter home walk-in shower   -          User Key  (r) = Recorded By, (t) = Taken By, (c) = Cosigned By    Initials Name Effective Dates    Betsy Benavides RN 08/02/16 -     JBetsy Vidal OTR/L 10/12/18 -          Occupational Therapy Education     Title: PT OT SLP Therapies (Not Started)     Topic: Occupational Therapy (In Progress)     Point: ADL training (Done)     Description: Instruct learner(s) on proper safety adaptation and remediation techniques during self care or transfers.   Instruct in proper use of assistive devices.    Learning Progress Summary           Patient Acceptance, E, VU by JOSE at 1/2/2019  2:43 PM    Comment:  Pt educated on the benefits and role of OT, POC, spinal precuations, LSO wear schedule/fitting/mgt, adl modifications, t/fs, bed mobility, assistive device use.                   Point: Precautions (Done)     Description: Instruct learner(s) on prescribed precautions during self-care and functional transfers.    Learning Progress Summary           Patient Acceptance, E, VU by JOSE at 1/2/2019  2:43 PM    Comment:  Pt educated on the benefits and role of OT, POC, spinal precuations, LSO wear schedule/fitting/mgt, adl modifications, t/fs, bed mobility, assistive device use.                   Point: Body mechanics (Done)     Description: Instruct learner(s) on proper positioning and spine alignment during self-care, functional mobility activities and/or exercises.    Learning Progress Summary           Patient Acceptance, E, VU by JOSE at 1/2/2019  2:43 PM    Comment:  Pt educated on the benefits and role of OT, POC, spinal precuations, LSO wear schedule/fitting/mgt, adl  modifications, t/fs, bed mobility, assistive device use.                               User Key     Initials Effective Dates Name Provider Type Discipline     10/12/18 -  Betsy Renee OTR/L Occupational Therapist OT                  OT Recommendation and Plan  Outcome Summary/Treatment Plan (OT)  Anticipated Discharge Disposition (OT): home with assist  Planned Therapy Interventions (OT Eval): adaptive equipment training, BADL retraining, functional balance retraining, occupation/activity based interventions, patient/caregiver education/training  Therapy Frequency (OT Eval): 5 times/wk  Plan of Care Review  Plan of Care Reviewed With: patient  Plan of Care Reviewed With: patient  Outcome Summary: OT eval completed. Pt educated on spinal precautions, log rolling technqiue, and LSO wear schedule/fitting/mgt. Pt able to complete bed mobility while demo'ing log rolling with Mod I. Donned LSO independently while seated at EOB. Pt required Max A for donning socks. Sit <> stand t/f from EOB with CGA. Amb from bed to bathroom and back to bed with CGA and straight cane. Pt able to complete all aspects of toileting with CGA. Pt would benefit from skilled OT services for continued education on LSO wear and spinal precuations, increase independent with adls and mobility. OT recommends home with assist upon d/c from facility.     Outcome Measures     Row Name 01/02/19 1400             How much help from another is currently needed...    Putting on and taking off regular lower body clothing?  2  -JJ      Bathing (including washing, rinsing, and drying)  3  -JJ      Toileting (which includes using toilet bed pan or urinal)  4  -JJ      Putting on and taking off regular upper body clothing  4  -JJ      Taking care of personal grooming (such as brushing teeth)  4  -JJ      Eating meals  4  -JJ      Score  21  -JJ         Functional Assessment    Outcome Measure Options  AM-PAC 6 Clicks Daily Activity (OT)  -JJ        User  Key  (r) = Recorded By, (t) = Taken By, (c) = Cosigned By    Initials Name Provider Type    Betsy Rodriguez OTR/L Occupational Therapist          Time Calculation:   Time Calculation- OT     Row Name 01/02/19 1442             Time Calculation- OT    OT Start Time  1345  -      OT Stop Time  1441  -      OT Time Calculation (min)  56 min  -      OT Received On  01/02/19  -      OT Goal Re-Cert Due Date  01/12/19  -        User Key  (r) = Recorded By, (t) = Taken By, (c) = Cosigned By    Initials Name Provider Type    Betsy Rodriguez OTR/L Occupational Therapist        Therapy Suggested Charges     Code   Minutes Charges    None           Therapy Charges for Today     Code Description Service Date Service Provider Modifiers Qty    [id number] HC OT EVAL LOW COMPLEXITY 4 1/2/2019 Betsy Renee OTR/L GO 1               BACILIO Lam/ABDOULAYE  1/2/2019

## 2019-01-02 NOTE — OP NOTE
Lateral lumbar interbody fusion Procedure Note    Arminda Jiménez  1/2/2019    Pre-op Diagnosis:     1.  Status post anterior decompression, ALIF with instrumentation L5-S1, 5/7/2018  2.  Status post PSF with instrumentation left L5-S1, 5/9/2018  3.  Recurrent back pain  4.  Bilateral buttock, thigh, and leg radiculopathy, right worse than left  5.  Recurrent neurogenic claudication  6.  Adjacent level degenerative disc disease L3 to 5  7.  Degenerative lumbar scoliosis L3 to 5  8.  Facet arthropathy L3 to 5  9.  Central and bilateral foraminal stenosis L3 to 5, worse right L3-4  10.  Osteoporosis, T score -2.5, 4/11/2018    Post-op Diagnosis:    same    Procedure/CPT® Codes:    1.  Right Lateral Lumbar Interbody Fusion L3-4, L4-5  2.  Anterior spinal instrumentation L3-4 (ATEC lateral plate and screws)  3.  Use of PEEK interbody biomechanical device for fusion L3-4, L4-5 (ATEC PEEK spacer x 2)  4.  Use of bone marrow aspirate obtained through separate incision for fusion  5.  Use of allograft bone chips for fusion  6.  Use of fluoroscopy for confirmation of surgical level, placement of PEEK spacers and instrumentation  7.  Intraoperative neural monitoring    Anesthesia: General    Surgeon: SHIV Zheng MD    Assistant: José Luis Traore PA-C    Estimated Blood Loss: Minimal    Complications: None    Condition: Stable to PACU.    Indications:    The patient is a 58-year-old who sees Dr. Bam Landis for medical issues.  She had a decompression and fusion of L5-S1 performed in a staged fashion in May 2018.  Unfortunately, she has had continued complaints of back pain as well as symptoms down both lower extremities, with the right side worse than the left.  She continued to have complaints consistent with neurogenic claudication.  Repeat imaging studies revealed adjacent level degenerative disc disease at both L3-4 and L4-5, where there was a degenerative lumbar scoliosis and facet arthropathy causing central  and bilateral foraminal stenosis.  While we were hopeful that surgery only at L5-S1 would alleviate the vast majority of her symptoms, unfortunately at this point, it appeared the degenerative changes and stenosis from L3 to 5 will also require surgery.     After failing conservative measures, it was mutually decided that surgery would be the best option. Risks, benefits, and complications of surgery were discussed with the patient. The patient appeared well informed and wished to proceed. We specifically discussed the risk of infection, blood loss, nerve root injury, CSF leak, and the possibility of incomplete resolution of symptoms. We also discussed the possible risk of a nonunion and the potential need for additional surgery in the event of a pseudoarthrosis or hardware failure.     We elected proceed with a staged operation.  It is planned that we will be returning to surgery for a second posterior procedure at a later date.    Operative Procedure:    After obtaining informed consent and verifying the correct operative site, the patient was brought to the operating room and placed supine on operating table.  A general anesthetic was provided by the anesthesia service with the assistance of an endotracheal tube.  Once this was appropriately positioned and secured, the patient was carefully rotated into the lateral decubitus position with the right side up.  All bony prominences were well-padded.  3 inch wide cloth tape was used to maintain the patient's position.  It was also used to tip open the pelvis slightly allowing better access to the lumbar spine through a lateral approach.  Fluoroscopy was then used to identify the L3-4 and L4-5 levels, and the skin was marked for our planned incision.  The right flank was then prepped and draped in the usual sterile fashion.  A surgical timeout was taken to confirm this was the correct patient, we were working at the correct level, and that preoperative antibiotics  were given in a timely fashion.    A small stab incision was created over the right anterior iliac crest using a 15 blade scalpel.  Through this stab incision, a Jamshidi needle was advanced into the iliac crest.  Through the needle we aspirated approximately 50-60 mL of bone marrow from the iliac crest.  This bone marrow aspirate was then passed off in a sterile fashion for processing and concentration to be later mixed with allograft bone chips to assist with obtaining a fusion.    An oblique incision was created of the right flank using a 10 blade scalpel directly centered between the L3-4 and L4-5 segments. Dissection was carried bluntly through subcutaneous tissues. Blunt dissection was also carried between the external oblique and internal oblique musculature. The transversalis fascia was pierced allowing access to the retro-peroneal space. At this point, a series of neuro monitoring probes were used to safely access the L4-5 level. We used stimulated and free run EMG for this purpose. Once nerve roots were confirmed to be out of harm's way, self retaining retractors were placed for continuous exposure.     An annulotomy was then created at the L4-5 area using a 15 blade scalpel on a long handle. A Zapata elevator was used to remove disc material off of the endplates. Disc material was removed using Kerrisons, pituitaries, and forward angled curettes. Once all disc material had been retrieved, I used the Zapata elevator to divide the contralateral annulus. This assisted with mobilization of the vertebral bodies. We then used a series of endplate scrapers to prepare the endplates for interbody fusion. Trial spacers from [facility] were malleted into position and for the disc space it was felt that a 10 mm x 55 mm implant would be the best fit to restore disc height. The disc space was then thoroughly irrigated with saline solution.     A PEEK spacer from the [facility] instrumentation set measuring 10 mm x 55 mm x 22 mm was  then packed as tightly as possible with a combination of the previously obtained bone marrow aspirate and allograft bone chips. This PEEK spacer was then malleted into the L4-5 disc space under fluoroscopic guidance. It was placed as a PEEK interbody biomechanical device to assist with interbody fusion.  After confirming the spacer was properly positioned in both the AP and lateral projections, next attention was turned to the L3-4 segment.    The neuro monitoring probes were once again used this time to access L3-4.  Once nerve roots were confirmed to be out of harm's way, self retaining retractors were placed for continuous exposure of L3-4.  This disc space was prepared in an identical fashion as L4-5.  We used first the 15 blade scalpel to create an annulotomy.  A Zapata elevators were used to remove disc material off of the endplates.  Disc material was removed using Kerrisons, pituitaries, and forward angled curettes.  Endplate scrapers were used to prepare the endplates for interbody fusion and the contralateral annulus was divided with the Zapata elevator.  Trial spacers were then malleted into position across L3-4.  A second PEEK spacer was then chosen matching the final trial.  In this case again we used a 10 mm x 55 mm implant.  This spacer was packed as tightly as possible with a combination of allograft bone and bone marrow aspirate.  This spacer was malleted into the L3-4 disc space under fluoroscopic guidance as a PEEK interbody biomechanical device to assist with interbody fusion.    Once this spacer was confirmed to be properly positioned, I chose a 2-hole plate to help augment the fusion of L3-4. This plate was held into position using screws. I placed a 50 mm screw into L3 and a shorter 35 mm screw into L4.     The wound was then irrigated thoroughly. A thorough inspection was then undertaken to ensure that we had adequate hemostasis. Bleeding at this point was controlled using FloSeal and bipolar  cautery. Closure was then accomplished with a #1 Vicryl reapproximating the transversalis fascia as well as the internal and external oblique musculature. Immediate subcutaneous tissues were closed with a 3-0 Vicryl and skin closure was accomplished using Mastisol and Steri-Strips. The wound was then sterilely dressed. The patient was then carefully rotated supine onto a hospital gurney, extubated, and sent to the recovery room in good stable condition.     The patient tolerated the procedure well. There were no complications. We estimated blood loss to be minimal. The patient remained hemodynamically stable.     José Luis Traore PA-C provided critical assistance during the procedure. His assistance was medically necessary in order to allow the procedure to occur in the most safe and efficient manner.    SHIV Zheng MD     Date: 1/2/2019  Time: 11:45 AM

## 2019-01-03 LAB
ANION GAP SERPL CALCULATED.3IONS-SCNC: 8 MMOL/L (ref 4–13)
BASOPHILS # BLD AUTO: 0.05 10*3/MM3 (ref 0–0.2)
BASOPHILS NFR BLD AUTO: 0.4 % (ref 0–2)
BUN BLD-MCNC: 14 MG/DL (ref 5–21)
BUN/CREAT SERPL: 18.7 (ref 7–25)
CALCIUM SPEC-SCNC: 8 MG/DL (ref 8.4–10.4)
CHLORIDE SERPL-SCNC: 99 MMOL/L (ref 98–110)
CO2 SERPL-SCNC: 28 MMOL/L (ref 24–31)
CREAT BLD-MCNC: 0.75 MG/DL (ref 0.5–1.4)
DEPRECATED RDW RBC AUTO: 42.2 FL (ref 40–54)
EOSINOPHIL # BLD AUTO: 0.18 10*3/MM3 (ref 0–0.7)
EOSINOPHIL NFR BLD AUTO: 1.5 % (ref 0–4)
ERYTHROCYTE [DISTWIDTH] IN BLOOD BY AUTOMATED COUNT: 12.1 % (ref 12–15)
GFR SERPL CREATININE-BSD FRML MDRD: 79 ML/MIN/1.73
GLUCOSE BLD-MCNC: 103 MG/DL (ref 70–100)
HCT VFR BLD AUTO: 29.9 % (ref 37–47)
HGB BLD-MCNC: 9.7 G/DL (ref 12–16)
IMM GRANULOCYTES # BLD AUTO: 0.05 10*3/MM3 (ref 0–0.03)
IMM GRANULOCYTES NFR BLD AUTO: 0.4 % (ref 0–5)
LYMPHOCYTES # BLD AUTO: 2.09 10*3/MM3 (ref 0.72–4.86)
LYMPHOCYTES NFR BLD AUTO: 17.6 % (ref 15–45)
MCH RBC QN AUTO: 31.2 PG (ref 28–32)
MCHC RBC AUTO-ENTMCNC: 32.4 G/DL (ref 33–36)
MCV RBC AUTO: 96.1 FL (ref 82–98)
MONOCYTES # BLD AUTO: 0.77 10*3/MM3 (ref 0.19–1.3)
MONOCYTES NFR BLD AUTO: 6.5 % (ref 4–12)
NEUTROPHILS # BLD AUTO: 8.75 10*3/MM3 (ref 1.87–8.4)
NEUTROPHILS NFR BLD AUTO: 73.6 % (ref 39–78)
NRBC BLD AUTO-RTO: 0 /100 WBC (ref 0–0)
PLATELET # BLD AUTO: 167 10*3/MM3 (ref 130–400)
PMV BLD AUTO: 10.5 FL (ref 6–12)
POTASSIUM BLD-SCNC: 4.5 MMOL/L (ref 3.5–5.3)
RBC # BLD AUTO: 3.11 10*6/MM3 (ref 4.2–5.4)
SODIUM BLD-SCNC: 135 MMOL/L (ref 135–145)
WBC NRBC COR # BLD: 11.89 10*3/MM3 (ref 4.8–10.8)

## 2019-01-03 PROCEDURE — 25010000002 HYDROMORPHONE 1 MG/ML SOLUTION: Performed by: ORTHOPAEDIC SURGERY

## 2019-01-03 PROCEDURE — 94799 UNLISTED PULMONARY SVC/PX: CPT

## 2019-01-03 PROCEDURE — 97162 PT EVAL MOD COMPLEX 30 MIN: CPT

## 2019-01-03 PROCEDURE — 85025 COMPLETE CBC W/AUTO DIFF WBC: CPT | Performed by: ORTHOPAEDIC SURGERY

## 2019-01-03 PROCEDURE — 80048 BASIC METABOLIC PNL TOTAL CA: CPT | Performed by: ORTHOPAEDIC SURGERY

## 2019-01-03 PROCEDURE — 97535 SELF CARE MNGMENT TRAINING: CPT

## 2019-01-03 RX ORDER — FLUCONAZOLE 150 MG/1
150 TABLET ORAL DAILY PRN
Status: ACTIVE | OUTPATIENT
Start: 2019-01-03 | End: 2019-01-06

## 2019-01-03 RX ORDER — CLINDAMYCIN PHOSPHATE 900 MG/50ML
900 INJECTION INTRAVENOUS ONCE
Status: COMPLETED | OUTPATIENT
Start: 2019-01-04 | End: 2019-01-04

## 2019-01-03 RX ADMIN — Medication 1 TABLET: at 17:14

## 2019-01-03 RX ADMIN — POLYETHYLENE GLYCOL (3350) 17 G: 17 POWDER, FOR SOLUTION ORAL at 08:35

## 2019-01-03 RX ADMIN — CLINDAMYCIN IN 5 PERCENT DEXTROSE 900 MG: 18 INJECTION, SOLUTION INTRAVENOUS at 08:35

## 2019-01-03 RX ADMIN — GABAPENTIN 1600 MG: 400 CAPSULE ORAL at 22:12

## 2019-01-03 RX ADMIN — OXYCODONE HYDROCHLORIDE AND ACETAMINOPHEN 2 TABLET: 10; 325 TABLET ORAL at 13:19

## 2019-01-03 RX ADMIN — FAMOTIDINE 20 MG: 20 TABLET, FILM COATED ORAL at 22:11

## 2019-01-03 RX ADMIN — OXYCODONE HYDROCHLORIDE AND ACETAMINOPHEN 2 TABLET: 10; 325 TABLET ORAL at 08:34

## 2019-01-03 RX ADMIN — SODIUM CHLORIDE, PRESERVATIVE FREE 3 ML: 5 INJECTION INTRAVENOUS at 22:12

## 2019-01-03 RX ADMIN — OXYCODONE HYDROCHLORIDE AND ACETAMINOPHEN 2 TABLET: 10; 325 TABLET ORAL at 22:13

## 2019-01-03 RX ADMIN — GABAPENTIN 800 MG: 400 CAPSULE ORAL at 13:19

## 2019-01-03 RX ADMIN — CLINDAMYCIN IN 5 PERCENT DEXTROSE 900 MG: 18 INJECTION, SOLUTION INTRAVENOUS at 00:01

## 2019-01-03 RX ADMIN — SODIUM CHLORIDE, PRESERVATIVE FREE 3 ML: 5 INJECTION INTRAVENOUS at 08:36

## 2019-01-03 RX ADMIN — Medication 1 TABLET: at 08:36

## 2019-01-03 RX ADMIN — OXYCODONE HYDROCHLORIDE AND ACETAMINOPHEN 2 TABLET: 10; 325 TABLET ORAL at 17:14

## 2019-01-03 RX ADMIN — HYDROMORPHONE HYDROCHLORIDE 1 MG: 1 INJECTION, SOLUTION INTRAMUSCULAR; INTRAVENOUS; SUBCUTANEOUS at 00:13

## 2019-01-03 RX ADMIN — OXYCODONE HYDROCHLORIDE AND ACETAMINOPHEN 2 TABLET: 10; 325 TABLET ORAL at 04:25

## 2019-01-03 RX ADMIN — ESCITALOPRAM 10 MG: 10 TABLET, FILM COATED ORAL at 08:34

## 2019-01-03 RX ADMIN — GABAPENTIN 800 MG: 400 CAPSULE ORAL at 05:52

## 2019-01-03 RX ADMIN — SODIUM CHLORIDE 75 ML/HR: 9 INJECTION, SOLUTION INTRAVENOUS at 03:09

## 2019-01-03 RX ADMIN — POLYETHYLENE GLYCOL (3350) 17 G: 17 POWDER, FOR SOLUTION ORAL at 22:12

## 2019-01-03 RX ADMIN — FAMOTIDINE 20 MG: 20 TABLET, FILM COATED ORAL at 08:34

## 2019-01-03 NOTE — PLAN OF CARE
Problem: Patient Care Overview  Goal: Plan of Care Review   01/03/19 1137   Coping/Psychosocial   Plan of Care Reviewed With patient   Plan of Care Review   Progress improving   OTHER   Outcome Summary Pt a& o x4. Continues to have c/o mod to severe pain. Relief noted with prn pain medication. Up with assist x1. LSO brace when up. VSS. Will have part II of surgery tomorrow.

## 2019-01-03 NOTE — PLAN OF CARE
Problem: Patient Care Overview  Goal: Plan of Care Review  Outcome: Ongoing (interventions implemented as appropriate)   01/02/19 8412   Coping/Psychosocial   Plan of Care Reviewed With patient   Plan of Care Review   Progress no change   OTHER   Outcome Summary Pt a& o x4. s/p lumbar joiner part I today. Medicated for c/o low back pain. Dressing to right flank CDI. VSS. Will cont to monitor.        Problem: Laminectomy/Foraminotomy/Discectomy (Adult)  Goal: Signs and Symptoms of Listed Potential Problems Will be Absent, Minimized or Managed (Laminectomy/Foraminotomy/Discectomy)  Outcome: Ongoing (interventions implemented as appropriate)    Goal: Anesthesia/Sedation Recovery  Outcome: Ongoing (interventions implemented as appropriate)

## 2019-01-03 NOTE — CONSULTS
Referring Provider: Dr. Zheng  Reason for Consultation: Medical management    Patient Care Team:  Bam Landis MD as PCP - General (Family Medicine)    Chief complaint low back pain    Subjective .     History of present illness:  The patient presents today for surgical correction after failing conservative management.  They have been through anti-inflammatories, muscle relaxers, and pain medication.  The pain has progressed to the point in time where it is affecting their activities of daily living and after being explained all their options, elected to undergo surgical correction.  Their primary care physician does not attend here at [facility]; therefore, I have been asked to take care of their primary medical needs in the perioperative period.  Postoperative pain is as expected.  There are no other precipitating or relieving factors.    Patient had a similar procedure done in May 2018 on level L5/S1.  She was initially pain-free.  Her pain returned to where she was taking Percocet 10 4 times a day.  She is now currently admitted for surgical correction L4-5 and L3 -4      REVIEW OF SYSTEMS:    CONSTITUTIONAL:  Negative for anorexia, chills, fevers, night sweats and weight loss  EYES:  negative for eye dryness, icterus and redness  HEENT:   negative for dental problems, epistaxis, facial trauma and thrush  RESPIRATORY:  negative for chest tightness, cough, dyspnea on exertion, pneumonia and sputum  CARDIOVASCULAR: negative for chest pain, dyspnea, exertional chest pressure/discomfort, irregular heart beat, palpitations, paroxysmal nocturnal dyspnea and syncope  GASTROINTESTINAL:  negative for abdominal pain, hematemesis, jaundice, melena and rectal bleeding  MUSCULOSKELETAL:  negative for muscle weakness, myalgias and neck pain  NEUROLOGICAL:   negative for dizziness, headaches, seizures, speech problems, tremors and vertigo  INTEGUMENT: negative for pruritus, rash, skin color change and  skin lesion(s)         History    Past Medical History:   Diagnosis Date   • Arthritis    • Asthma    • Cancer (CMS/HCC)     right lung,abd,and pelvic area   • CHF (congestive heart failure) (CMS/HCC)    • Chronic back pain    • COPD (chronic obstructive pulmonary disease) (CMS/HCC)    • Depression    • Facet arthropathy    • Foraminal stenosis of lumbosacral region    • GERD (gastroesophageal reflux disease)    • H/O degenerative disc disease    • History of blood transfusion    • PVD (peripheral vascular disease) (CMS/[facility])    • Radiculopathy      Past Surgical History:   Procedure Laterality Date   • ANTERIOR LUMBAR EXPOSURE N/A 5/7/2018    Procedure: ANTERIOR LUMBAR EXPOSURE;  Surgeon: Chris Haley DO;  Location:  [facility] OR;  Service: Vascular   • APPENDECTOMY     • CARPAL TUNNEL RELEASE Bilateral    • LUMBAR FUSION N/A 5/7/2018    Procedure: ANTERIOR DECOMPRESSION, ANTERIOR LUMBAR INTERBODY FUSION WITH INSTRUMENTATION L5-S1;  Surgeon: LUIS CARLOS Zheng MD;  Location:  [facility] OR;  Service: Orthopedic Spine   • LUMBAR LAMINECTOMY WITH FUSION N/A 5/9/2018    Procedure: POSTERIOR SPINAL FUSION WITH INSTRUMENTATION L5-S1;  Surgeon: LUIS CARLOS Zheng MD;  Location:  [facility] OR;  Service: Orthopedic Spine   • OTHER SURGICAL HISTORY Bilateral 2017    Stent implants [facility] Dr Alvarado     Family History   Problem Relation Age of Onset   • Heart disease Other    • Arthritis Other    • Cancer Other    • Alcohol abuse Other    • Stroke Other    • Migraines Other    • COPD Other    • Heart failure Other    • Depression Other    • Asthma Other    • Heart disease Mother    • Kidney disease Mother    • Heart disease Father    • Heart disease Sister    • Kidney disease Sister    • Stroke Sister    • Heart disease Sister    • Cancer Brother      Social History     Tobacco Use   • Smoking status: Former Smoker   • Smokeless tobacco: Never Used   • Tobacco comment: quit 2010   Substance Use Topics   • Alcohol use: No   •  Drug use: No     Medications Prior to Admission   Medication Sig Dispense Refill Last Dose   • BREO ELLIPTA 200-25 MCG/INH aerosol powder  Inhale 200 mcg Daily.   1/2/2019 at 0500   • cholecalciferol (VITAMIN D3) 1000 units tablet Take 1,000 Units by mouth 2 (Two) Times a Day.   1/1/2019 at 2130   • escitalopram (LEXAPRO) 20 MG tablet Take 20 mg by mouth Daily.   1/1/2019 at 0900   • estrogen, conjugated,-medroxyprogesterone (PREMPRO) 0.3-1.5 MG per tablet Take 1 tablet/day by mouth Daily.   1/1/2019 at 0900   • gabapentin (NEURONTIN) 800 MG tablet 800 mg 3 (Three) Times a Day. Takes 2 at bedtime   1/2/2019 at 0500   • NEXIUM 40 MG capsule Take 40 mg by mouth 2 (Two) Times a Day As Needed.   1/1/2019 at 0900   • oxyCODONE-acetaminophen (PERCOCET)  MG per tablet Take 1 tablet by mouth Every 4 (Four) Hours As Needed for Moderate Pain .   1/2/2019 at 0330   • tiotropium (SPIRIVA HANDIHALER) 18 MCG per inhalation capsule Place 18 mcg into inhaler and inhale Daily. Takes at 5 pm   1/1/2019 at 1800   • Unable to find 1 each 1 (One) Time. Med Name: Calcium Citrate 600 mg, Take 1 tablet by mouth twice daily.   1/1/2019 at 2130   • Albuterol Sulfate (PROAIR HFA IN) Inhale 2 puffs 4 (Four) Times a Day As Needed (Wheezing/Shortness of breath).   12/26/2018 at Unknown Time   • clopidogrel (PLAVIX) 75 MG tablet Take 75 mg by mouth Daily. HOLD STARTING 12/23/18 12/21/2018       Allergies:  Aspirin and Keflex [cephalexin]    Objective     Vital Signs   Temp:  [97.4 °F (36.3 °C)-100.5 °F (38.1 °C)] 99.1 °F (37.3 °C)  Heart Rate:  [65-91] 88  Resp:  [14-20] 18  BP: ()/(47-71) 99/63          Physical Exam:  Constitutional: oriented to person, place, and time. appears well-developed.   HEENT:   Head: Normocephalic and atraumatic.   Eyes: Pupils are equal, round, and reactive to light.   Neck: Neck supple.   Cardiovascular: Regular rhythm and normal heart sounds.    Pulmonary/Chest: Effort normal and breath sounds normal.  CTAB  Abdominal: Soft. Bowel sounds are normal. she exhibits no distension. There is no tenderness. There is no rebound and no guarding.   Musculoskeletal: Normal range of motion. she exhibits no edema or tenderness.   Neurological: He is alert and oriented to person, place, and time. she has normal reflexes.   Skin: Skin is warm and dry.     Results Review:   I reviewed the patient's new imaging results and agree with the interpretation.      Assessment/Plan       Lumbar stenosis    Panlobular emphysema (CMS/HCC)    Gastroesophageal reflux disease without esophagitis    Constipation due to opioid therapy    PAD (peripheral artery disease) (CMS/HCC)    Primary osteoarthritis involving multiple joints      Constipation-start with Miralax 1 capful BID until BM, then decrease to 1 x a day,then can step up to Amitiza 24 mcg po BID which can be used for opioid induced constipation,and ultimately end up with Relistor 12 mcg subq every 48 hours to block the effect of narcotics on the gut.      Anemia post-op expected-check iron, B12,and folate. Replenish as needed.Transfuse at acceptable levels depending on clinical judgement and comorbidities. Recheck in AM      Hypertension-review pre and post BP's,will restart home BP meds when BP allows. Add clonidine 0.1 mg every 4 hours prn if bp> 140/90,monitor BP and adjust meds as necessary.    Peripheral arterial disease-on Plavix.  She is not on statin therapy.  Defer to her primary.    Patient states that antibiotics always give her a yeast infection.  Will order Diflucan when necessary.      I discussed the patient's findings and my recommendations with patient, family, nursing staff and consulting provider    Lizandro Burger MD  01/03/19  7:18 AM

## 2019-01-03 NOTE — PLAN OF CARE
Problem: Patient Care Overview  Goal: Plan of Care Review  Outcome: Ongoing (interventions implemented as appropriate)   01/03/19 0219   Coping/Psychosocial   Plan of Care Reviewed With patient   Plan of Care Review   Progress no change   OTHER   Outcome Summary Patient c/o pain, prn pain meds given as ordered. dressing to right flank CDI, no neuro changes A&O x4, SANCHEZ, VSS, Safety maintained.       Problem: Laminectomy/Foraminotomy/Discectomy (Adult)  Goal: Signs and Symptoms of Listed Potential Problems Will be Absent, Minimized or Managed (Laminectomy/Foraminotomy/Discectomy)  Outcome: Ongoing (interventions implemented as appropriate)

## 2019-01-03 NOTE — PROGRESS NOTES
Discharge Planning Assessment  [facility]     Patient Name: Arminda Jiménez  MRN: [id number]  Today's Date: 1/3/2019    Admit Date: 1/2/2019    Discharge Needs Assessment     Row Name 01/03/19 1458       Living Environment    Lives With  alone    Current Living Arrangements  home/apartment/condo    Primary Care Provided by  self    Provides Primary Care For  no one, unable/limited ability to care for self    Family Caregiver if Needed  child(leno), adult    Quality of Family Relationships  helpful;involved;stressful    Able to Return to Prior Arrangements  yes       Resource/Environmental Concerns    Resource/Environmental Concerns  none    Transportation Concerns  car, none       Transition Planning    Patient/Family Anticipates Transition to  home    Patient/Family Anticipated Services at Transition  none    Transportation Anticipated  family or friend will provide       Discharge Needs Assessment    Readmission Within the Last 30 Days  no previous admission in last 30 days    Concerns to be Addressed  denies needs/concerns at this time    Equipment Currently Used at Home  cane, straight    Anticipated Changes Related to Illness  none    Equipment Needed After Discharge  commode    Offered/Gave Vendor List  yes        Discharge Plan     Row Name 01/03/19 1459       Plan    Plan  Home    Patient/Family in Agreement with Plan  yes    Plan Comments  PT resides at home and plans to dc to the same. PT has a daughter and grandson who are able to assist daily. PT is requesting an order for a bedside commode for home use upon dc. PT plans ot  DME at [facility] on her way home. PT declines HH at this time. PT has PCP and Rx coverage. PT denies any other needs at this time. Will follow.         Destination      No service coordination in this encounter.      Durable Medical Equipment      No service coordination in this encounter.      Dialysis/Infusion      No service coordination in this encounter.       Home Medical Care      No service coordination in this encounter.      Community Resources      No service coordination in this encounter.          Demographic Summary    No documentation.       Functional Status    No documentation.       Psychosocial    No documentation.       Abuse/Neglect    No documentation.       Legal    No documentation.       Substance Abuse    No documentation.       Patient Forms    No documentation.           WOODROW Lopez

## 2019-01-03 NOTE — PLAN OF CARE
Problem: Patient Care Overview  Goal: Plan of Care Review  Outcome: Ongoing (interventions implemented as appropriate)      Problem: Laminectomy/Foraminotomy/Discectomy (Adult)  Goal: Signs and Symptoms of Listed Potential Problems Will be Absent, Minimized or Managed (Laminectomy/Foraminotomy/Discectomy)  Outcome: Ongoing (interventions implemented as appropriate)    Goal: Anesthesia/Sedation Recovery  Outcome: Ongoing (interventions implemented as appropriate)

## 2019-01-03 NOTE — PLAN OF CARE
Problem: Patient Care Overview  Goal: Plan of Care Review  Outcome: Ongoing (interventions implemented as appropriate)   01/03/19 3901   Coping/Psychosocial   Plan of Care Reviewed With patient   Plan of Care Review   Progress improving   OTHER   Outcome Summary PT eval completed. Pt with reported pain in her back, hips, pelvis, and thighs as 7/10 increasing to reported burning type pain rated 10/10 with gait. Pt sitting up in chair upon therapist entering her room with LSO brace in place. Pt assisted to standing with CGA. Gait with single point cane ~ 90 ft with CGA and assist for proper use of cane with education for proper sequencing. Pt performed gait using a 3 point gait pattern. Transfers stand to sit on edge of bed with min to CGA and sit to L sidelying with CGA 2 and logroll with SB to CGA. Reported decrease in pain from 10 to 7/10 upon return to bed. Pt will benefit from continued PT services to improve knowledge of spinal precautions, to ensure I with rich/doffing and proper use of LSO brace, to improve I with bed mobility utilizing logroll and to improve I with transfers and gait improving balance and reducing fall risk. Pt motivated to improve, however gaurded due to pain. Pt has family that can assist her at home upon discharge. If pt continues to progress with mobility, anticipate discharge home with assist and possibly  services to ensure knowledge of safety in the home. However will follow for progress with a 2nd surgery scheduled for 1/4/18.

## 2019-01-03 NOTE — THERAPY TREATMENT NOTE
Acute Care - Occupational Therapy Treatment Note  [facility]     Patient Name: Arminda Jiménez  : 1960  MRN: [id number]  Today's Date: 1/3/2019  Onset of Illness/Injury or Date of Surgery: 19  Date of Referral to OT: 19  Referring Physician: Dr. Zheng    Admit Date: 2019       ICD-10-CM ICD-9-CM   1. Decreased activities of daily living (ADL) R68.89 780.99   2. Impaired functional mobility, balance, gait, and endurance Z74.09 V49.89     Patient Active Problem List   Diagnosis   • Foraminal stenosis of lumbosacral region   • Chronic back pain   • Radiculopathy   • Lumbar stenosis   • Panlobular emphysema (CMS/HCC)   • Gastroesophageal reflux disease without esophagitis   • Constipation due to opioid therapy   • PAD (peripheral artery disease) (CMS/[facility])   • Primary osteoarthritis involving multiple joints     Past Medical History:   Diagnosis Date   • Arthritis    • Asthma    • Cancer (CMS/HCC)     right lung,abd,and pelvic area   • CHF (congestive heart failure) (CMS/HCC)    • Chronic back pain    • COPD (chronic obstructive pulmonary disease) (CMS/HCC)    • Depression    • Facet arthropathy    • Foraminal stenosis of lumbosacral region    • GERD (gastroesophageal reflux disease)    • H/O degenerative disc disease    • History of blood transfusion    • PVD (peripheral vascular disease) (CMS/[facility])    • Radiculopathy      Past Surgical History:   Procedure Laterality Date   • ANTERIOR LUMBAR EXPOSURE N/A 2018    Procedure: ANTERIOR LUMBAR EXPOSURE;  Surgeon: Chris Haley DO;  Location: [facility] OR;  Service: Vascular   • APPENDECTOMY     • CARPAL TUNNEL RELEASE Bilateral    • LUMBAR FUSION N/A 2018    Procedure: ANTERIOR DECOMPRESSION, ANTERIOR LUMBAR INTERBODY FUSION WITH INSTRUMENTATION L5-S1;  Surgeon: LUIS CARLOS Zheng MD;  Location: [facility] OR;  Service: Orthopedic Spine   • LUMBAR LAMINECTOMY WITH FUSION N/A 2018    Procedure: POSTERIOR SPINAL FUSION WITH  INSTRUMENTATION L5-S1;  Surgeon: LUIS CARLOS Zheng MD;  Location: [facility] OR;  Service: Orthopedic Spine   • OTHER SURGICAL HISTORY Bilateral 2017    Stent implants BLE [place] [address] [place]       Therapy Treatment    Rehabilitation Treatment Summary     Row Name 01/03/19 1003             Treatment Time/Intention    Discipline  occupational therapy assistant  -TS      Document Type  therapy note (daily note)  -TS      Subjective Information  complains of;pain  -TS2      Patient/Family Observations  sister present   -TS2      Patient Effort  good  -TS2      Comment  3rd sx 1/4  -TS2      Existing Precautions/Restrictions  brace worn when out of bed;spinal;fall  -TS2      Recorded by [TS] Stacy Ogden COTA/L 01/03/19 1003  [TS2] Stacy Ogden COTA/L 01/03/19 1314      Row Name 01/03/19 1003             Cognitive Assessment/Intervention- PT/OT    Personal Safety Interventions  fall prevention program maintained;gait belt;nonskid shoes/slippers when out of bed  -TS      Recorded by [TS] Stacy Ogden COTA/L 01/03/19 1314      Row Name 01/03/19 1003             Bed Mobility Assessment/Treatment    Sidelying-Sit [place] (Bed Mobility)  conditional independence  -TS      Assistive Device (Bed Mobility)  bed rails  -TS      Recorded by [TS] Stacy Ogden COTA/L 01/03/19 1314      Row Name 01/03/19 1003             Functional Mobility    Functional Mobility- Ind. Level  -- SBA  -TS      Functional Mobility- Device  straight cane  -TS      Functional Mobility- Comment  in room  -TS      Recorded by [TS] Stacy Ogden COTA/L 01/03/19 1314      Row Name 01/03/19 1003             Transfer Assessment/Treatment    Transfer Assessment/Treatment  sit-stand transfer;stand-sit transfer  -TS      Comment (Transfers)  discussed shower chair for home safety along [place] [facility] [place] increased independence and safety with commode transfer  -TS      Recorded by [TS] Stacy Ogden COTA/ABDOULAYE  01/03/19 1314      Row Name 01/03/19 1003             Sit-Stand Transfer    Sit-Stand [place] (Transfers)  stand by assist  -TS      Assistive Device (Sit-Stand Transfers)  cane, straight  -TS      Recorded by [TS] Stacy Ogden COTA/L 01/03/19 1314      Row Name 01/03/19 1003             Stand-Sit Transfer    Stand-Sit [place] (Transfers)  stand by assist  -TS      Assistive Device (Stand-Sit Transfers)  cane straight  -TS      Recorded by [TS] Stacy Ogden COTA/L 01/03/19 1314      Row Name 01/03/19 1003             ADL Assessment/Intervention    BADL Assessment/Intervention  lower body dressing;upper body dressing  -TS      Recorded by [TS] Stacy Ogden COTA/L 01/03/19 1314      Row Name 01/03/19 1003             Upper Body Dressing Assessment/Training    Upper Body Dressing [place] Level  don;independent LSO  -TS      Upper Body Dressing Position  edge of bed sitting  -TS      Recorded by [TS] Stacy Ogden COTA/L 01/03/19 1314      Row Name 01/03/19 1003             Lower Body Dressing Assessment/Training    Comment (Lower Body Dressing)  pt states she was able to don/doff pants maintaining precautions with previous back surgery  -TS      Recorded by [TS] Stacy Ogden COTA/L 01/03/19 1314      Row Name 01/03/19 1003             Positioning and Restraints    Pre-Treatment Position  in bed  -TS      Post Treatment Position  chair  -TS      In Chair  sitting;call light within reach;encouraged to call for assist;with family/caregiver;with brace  -TS      Recorded by [TS] Stacy Ogden COTA/L 01/03/19 1314      Row Name 01/03/19 1003             Pain Scale: Numbers Pre/Post-Treatment    Pain Scale: Numbers, Pretreatment  4/10  -TS      Pain Location  back  -TS      Pain Intervention(s)  Repositioned  -TS      Recorded by [TS] Stacy Ogden MCELROY/L 01/03/19 1314      Row Name                Wound 01/02/19 1013 Right flank incision    Wound -  Properties Group Date first assessed: 01/02/19 [JR] Time first assessed: 1013 [JR] Side: Right [JR] Location: flank [JR] Type: incision [JR] Recorded by:  [JR] Betsy Chin RN 01/02/19 1013    Row Name 01/03/19 1003             Outcome Summary/Treatment Plan (OT)    Daily Summary of Progress (OT)  progress towards functional goals is fair  -TS      Recorded by [TS] Stacy Ogden MCELROY/L 01/03/19 1314        User Key  (r) = Recorded By, (t) = Taken By, (c) = Cosigned By    Initials Name Effective Dates Discipline    TS Stacy Ogden MCELROY/L 08/02/16 -  OT    JR Betsy Chin RN 08/02/16 -  Nurse        Wound 01/02/19 1013 Right flank incision (Active)   Dressing Appearance dry;intact;no drainage 1/3/2019  8:34 AM   Closure IVAN 1/3/2019  8:34 AM   Base dressing in place, unable to visualize 1/3/2019  8:34 AM   Drainage Amount none 1/3/2019  8:34 AM   Dressing Care, Wound foam 1/3/2019  8:34 AM     Rehab Goal Summary     Row Name 01/03/19 1043             Physical Therapy Goals    Bed Mobility Goal Selection (PT)  bed mobility, PT goal 1  -JE      Transfer Goal Selection (PT)  transfer, PT goal 1  -JE      Gait Training Goal Selection (PT)  gait training, PT goal 1  -JE      Stairs Goal Selection (PT)  stairs, PT goal 1  -JE      Precaution Goal Selection (PT)  precaution, PT goal 1  -JE         Bed Mobility Goal 1 (PT)    Activity/Assistive Device (Bed Mobility Goal 1, PT)  rolling to left;rolling to right;scooting;sit to supine/supine to sit  -JE      [place] Level/Cues Needed (Bed Mobility Goal 1, PT)  standby assist  -JE      Time Frame (Bed Mobility Goal 1, PT)  long term goal (LTG);10 days  -      Progress/Outcomes (Bed Mobility Goal 1, PT)  goal ongoing  -JE         Transfer Goal 1 (PT)    Activity/Assistive Device (Transfer Goal 1, PT)  sit-to-stand/stand-to-sit;bed-to-chair/chair-to-bed;other (see comments) with LSO brace donned  -JE      [place] Level/Cues Needed  (Transfer Goal 1, PT)  standby assist  -JE      Time Frame (Transfer Goal 1, PT)  long term goal (LTG);10 days  -JE      Progress/Outcome (Transfer Goal 1, PT)  goal ongoing  -JE         Gait Training Goal 1 (PT)    Activity/Assistive Device (Gait Training Goal 1, PT)  gait (walking locomotion);decrease fall risk;forward stepping;backward stepping;improve balance and speed;increase endurance/gait distance;normalize weight shifts;other (see comments) with LSO brace donned  -JE      [place] Level (Gait Training Goal 1, PT)  standby assist  -JE      Distance (Gait Goal 1, PT)  200 ft  -JE      Time Frame (Gait Training Goal 1, PT)  long term goal (LTG);10 days  -JE         Stairs Goal 1 (PT)    Activity/Assistive Device (Stairs Goal 1, PT)  ascending stairs;descending stairs;using handrail, right;step-to-step;decrease fall risk;improve balance and speed  -JE      [place] Level/Cues Needed (Stairs Goal 1, PT)  contact guard assist  -JE      Number of Stairs (Stairs Goal 1, PT)  2  -JE      Time Frame (Stairs Goal 1, PT)  long term goal (LTG);10 days  -JE      Progress/Outcome (Stairs Goal 1, PT)  goal ongoing  -JE         Precaution Goal 1 (PT)    Activity (Precaution Goal 1, PT)  spinal precautions;demonstrates compliance;demonstrates understanding;during mobility tasks  -JE      [place] Level/Cues Needed (Precautions Goal 1, PT)  independently  -JE      Time Frame (Precaution Goal 1, PT)  long term goal (LTG);other (see comments) 10 days  -JE      Progress/Outcome (Precaution Goal 1, PT)  goal ongoing  -JE         Patient Education Goal (PT)    Activity (Patient Education Goal, PT)  safety/falls prevention  -JE      [place]/Cues/Accuracy (Memory Goal 2, PT)  demonstrates adequately;verbalizes understanding  -JE      Time Frame (Patient Education Goal, PT)  long term goal (LTG);10 days  -JE      Progress/Outcome (Patient Education Goal, PT)  goal ongoing  -JE        User Key  (r) = Recorded By, (t)  = Taken By, (c) = Cosigned By    Initials Name Provider Type Discipline    Marimar Christensen, PT Physical Therapist PT        Occupational Therapy Education     Title: PT OT SLP Therapies (Not Started)     Topic: Occupational Therapy (In Progress)     Point: ADL training (Done)     Description: Instruct learner(s) on proper safety adaptation and remediation techniques during self care or transfers.   Instruct in proper use of assistive devices.    Learning Progress Summary           Patient Acceptance, E, VU by JODI at 1/3/2019  1:14 PM    Comment:  transfers, back precautions, ADL modification, DME    Acceptance, E, VU by JOSE at 1/2/2019  2:43 PM    Comment:  Pt educated on the benefits and role of OT, POC, spinal precuations, LSO wear schedule/fitting/mgt, adl modifications, t/fs, bed mobility, assistive device use.                   Point: Precautions (Done)     Description: Instruct learner(s) on prescribed precautions during self-care and functional transfers.    Learning Progress Summary           Patient Acceptance, E, VU by JODI at 1/3/2019  1:14 PM    Comment:  transfers, back precautions, ADL modification, DME    Acceptance, E, VU by JOSE at 1/2/2019  2:43 PM    Comment:  Pt educated on the benefits and role of OT, POC, spinal precuations, LSO wear schedule/fitting/mgt, adl modifications, t/fs, bed mobility, assistive device use.                   Point: Body mechanics (Done)     Description: Instruct learner(s) on proper positioning and spine alignment during self-care, functional mobility activities and/or exercises.    Learning Progress Summary           Patient Acceptance, E, VU by JOSE at 1/2/2019  2:43 PM    Comment:  Pt educated on the benefits and role of OT, POC, spinal precuations, LSO wear schedule/fitting/mgt, adl modifications, t/fs, bed mobility, assistive device use.                               User Key     Initials Effective Dates Name Provider Type Discipline     08/02/16 -  Alin  NAVI Garg/L Occupational Therapy Assistant ALANA GARCIA 10/12/18 -  Betsy Renee OTR/L Occupational Therapist OT                OT Recommendation and Plan  Outcome Summary/Treatment Plan (OT)  Daily Summary of Progress (OT): progress towards functional goals is fair  Daily Summary of Progress (OT): progress towards functional goals is fair  Outcome Measures     Row Name 01/03/19 1314 01/03/19 1300 01/02/19 1400       How much help from another person do you currently need...    Turning from your back to your side while in flat bed without using bedrails?  --  3  -JE  --    Moving from lying on back to sitting on the side of a flat bed without bedrails?  --  3  -JE  --    Moving to and from a bed to a chair (including a wheelchair)?  --  3  -JE  --    Standing up from a chair using your arms (e.g., wheelchair, bedside chair)?  --  3  -JE  --    Climbing 3-5 steps with a railing?  --  3  -JE  --    To walk in hospital room?  --  3  -JE  --    AM-PAC 6 Clicks Score  --  18  -JE  --       How much help from another is currently needed...    Putting on and taking off regular lower body clothing?  2  -TS  --  2  -JJ    Bathing (including washing, rinsing, and drying)  3  -TS  --  3  -JJ    Toileting (which includes using toilet bed pan or urinal)  4  -TS  --  4  -JJ    Putting on and taking off regular upper body clothing  4  -TS  --  4  -JJ    Taking care of personal grooming (such as brushing teeth)  4  -TS  --  4  -JJ    Eating meals  4  -TS  --  4  -JJ    Score  21  -TS  --  21  -JJ       Functional Assessment    Outcome Measure Options  AM-PAC 6 Clicks Daily Activity (OT)  -TS  AM-PAC 6 Clicks Basic Mobility (PT)  -JE  AM-PAC 6 Clicks Daily Activity (OT)  -JJ      User Key  (r) = Recorded By, (t) = Taken By, (c) = Cosigned By    Initials Name Provider Type    Stacy Kimble MCELROY/L Occupational Therapy Assistant    Marimar Christensen, PT Physical Therapist    Betsy Rodriguez OTR/ABDOULAYE  Occupational Therapist           Time Calculation:   Time Calculation- OT     Row Name 01/03/19 1315             Time Calculation- OT    OT Start Time  1003  -TS      OT Stop Time  1030  -TS      OT Time Calculation (min)  27 min  -TS      Total Timed Code Minutes- OT  27 minute(s)  -TS      OT Goal Re-Cert Due Date  01/03/19  -TS         Timed Charges    49688 - OT Self Care/Mgmt Minutes  27  -TS        User Key  (r) = Recorded By, (t) = Taken By, (c) = Cosigned By    Initials Name Provider Type    TS Stacy Ogden COTA/L Occupational Therapy Assistant           Therapy Suggested Charges     Code   Minutes Charges    63125 (CPT®) Hc Ot Neuromusc Re Education Ea 15 Min      13955 (CPT®) Hc Ot Ther Proc Ea 15 Min      53209 (CPT®) Hc Ot Therapeutic Act Ea 15 Min      22196 (CPT®) Hc Ot Manual Therapy Ea 15 Min      33965 (CPT®) Hc Ot Iontophoresis Ea 15 Min      38910 (CPT®) Hc Ot Elec Stim Ea-Per 15 Min      67690 (CPT®) Hc Ot Ultrasound Ea 15 Min      26368 (CPT®) Hc Ot Self Care/Mgmt/Train Ea 15 Min 27 2    Total  27 2        Therapy Charges for Today     Code Description Service Date Service Provider Modifiers Qty    [id number] HC OT SELF CARE/MGMT/TRAIN EA 15 MIN 1/3/2019 Stacy Ogden COTA/L GO 2               Stacy ESTES. FELIZ Ogden  1/3/2019

## 2019-01-03 NOTE — THERAPY EVALUATION
Acute Care - Physical Therapy Initial Evaluation  [facility]     Patient Name: Arminda Jiménez  : 1960  MRN: [id number]  Today's Date: 1/3/2019   Onset of Illness/Injury or Date of Surgery: 19  Date of Referral to PT: 19  Referring Physician: Dr. Zheng      Admit Date: 2019    Visit Dx:     ICD-10-CM ICD-9-CM   1. Decreased activities of daily living (ADL) R68.89 780.99   2. Impaired functional mobility, balance, gait, and endurance Z74.09 V49.89     Patient Active Problem List   Diagnosis   • Foraminal stenosis of lumbosacral region   • Chronic back pain   • Radiculopathy   • Lumbar stenosis   • Panlobular emphysema (CMS/HCC)   • Gastroesophageal reflux disease without esophagitis   • Constipation due to opioid therapy   • PAD (peripheral artery disease) (CMS/[facility])   • Primary osteoarthritis involving multiple joints     Past Medical History:   Diagnosis Date   • Arthritis    • Asthma    • Cancer (CMS/HCC)     right lung,abd,and pelvic area   • CHF (congestive heart failure) (CMS/HCC)    • Chronic back pain    • COPD (chronic obstructive pulmonary disease) (CMS/[facility])    • Depression    • Facet arthropathy    • Foraminal stenosis of lumbosacral region    • GERD (gastroesophageal reflux disease)    • H/O degenerative disc disease    • History of blood transfusion    • PVD (peripheral vascular disease) (CMS/[facility])    • Radiculopathy      Past Surgical History:   Procedure Laterality Date   • ANTERIOR LUMBAR EXPOSURE N/A 2018    Procedure: ANTERIOR LUMBAR EXPOSURE;  Surgeon: Crhis Haley DO;  Location: [facility] OR;  Service: Vascular   • APPENDECTOMY     • CARPAL TUNNEL RELEASE Bilateral    • LUMBAR FUSION N/A 2018    Procedure: ANTERIOR DECOMPRESSION, ANTERIOR LUMBAR INTERBODY FUSION WITH INSTRUMENTATION L5-S1;  Surgeon: LUIS CARLOS Zheng MD;  Location: [facility] OR;  Service: Orthopedic Spine   • LUMBAR LAMINECTOMY WITH FUSION N/A 2018    Procedure: POSTERIOR SPINAL FUSION WITH  INSTRUMENTATION L5-S1;  Surgeon: LUIS CARLOS Zheng MD;  Location: [facility] OR;  Service: Orthopedic Spine   • OTHER SURGICAL HISTORY Bilateral 2017    Stent implants BLE [place] [address] [place]        PT ASSESSMENT (last 12 hours)      Physical Therapy Evaluation     Row Name 01/03/19 1043          PT Evaluation Time/Intention    Subjective Information  complains of;pain  -JE     Document Type  evaluation;other (see comments) see MAR  -JE     Mode of Treatment  physical therapy  -JE     Patient Effort  good  -JE     Symptoms Noted During/After Treatment  increased pain;fatigue  -JE     Comment  2nd surgery scheduled for tomorrow 1/4/18  -JE     Row Name 01/03/19 1043          General Information    Patient Profile Reviewed?  yes  -JE     Onset of Illness/Injury or Date of Surgery  01/02/19  -JE     Referring Physician  Dr. Zheng  -JE     Patient Observations  alert;cooperative;agree to therapy  -JE     Patient/Family Observations  sister present  -JE     General Observations of Patient  sitting up in chair, IV infusing, LSO brace present, eyeglasses in place  -JE     Prior Level of Function  independent:;all household mobility;transfer;bed mobility;ADL's;driving  -JE     Equipment Currently Used at Home  cane, straight  -JE     Pertinent History of Current Functional Problem  Pt s/p anterior decompression ALIF w/ instrumentation L5-S1 5/7/18, and PSF w/ instrumentation L5- S1 5/9/18, now with recurrent back pain, B buttock, thigh, and leg radiculopathy, recurrent neurogenic claudication, adjacent level DDD L3-5, deg lumbar scoliosis L3-5, facet arthropathy L3-5, central and B foraminal stenosis L3-5 worse R L3-4.  S/p R lateral lumbar interbody fusion L3-5 1/2/18.  -JE     Existing Precautions/Restrictions  spinal;fall;brace worn when out of bed  -JE     Limitations/Impairments  other (see comments) pain  -JE     Equipment Issued to Patient  gait belt  -JE     Risks Reviewed  patient and  family:;LOB;nausea/vomiting;dizziness;increased discomfort;increased drainage;lines disloged;other (comment) fall risk, spinal precautions  -     Benefits Reviewed  patient and family:;improve function;increase independence;increase balance;increase strength;decrease pain;decrease risk of DVT;improve skin integrity;increase knowledge;other (comment) safety/falls prevention  -     Barriers to Rehab  none identified  -     Row Name 01/03/19 1043          Relationship/Environment    Primary Source of Support/Comfort  child(leno);sibling(s)  -     Lives With  -- dtr will assist upon returning home  -     Family Caregiver if Needed  child(leno), adult;sibling(s)  -     Row Name 01/03/19 1043          Resource/Environmental Concerns    Current Living Arrangements  home/apartment/condo  -     Row Name 01/03/19 1043          Home Main Entrance    Number of Stairs, Main Entrance  two  -     Stair Railings, Main Entrance  railing on right side (ascending)  -     Row Name 01/03/19 1043          Cognitive Assessment/Intervention- PT/OT    Orientation Status (Cognition)  oriented x 4  -     Follows Commands (Cognition)  WNL  -     Cognitive Function (Cognitive)  WNL  -     Safety Deficit (Cognitive)  safety precautions awareness  -     Personal Safety Interventions  fall prevention program maintained;gait belt;muscle strengthening facilitated;nonskid shoes/slippers when out of bed;supervised activity  -     Row Name 01/03/19 1043          Safety Issues, Functional Mobility    Safety Issues Affecting Function (Mobility)  safety precaution awareness;friction/shear risk  -     Impairments Affecting Function (Mobility)  balance;endurance/activity tolerance;pain;range of motion (ROM);strength  -     Row Name 01/03/19 1043          Bed Mobility Assessment/Treatment    Bed Mobility Assessment/Treatment  rolling right;sit-supine  -     Rolling Right [place] (Bed Mobility)  other (see  comments);contact guard;verbal cues SBA  -     Sit-Supine [place] (Bed Mobility)  contact guard;2 person assist;verbal cues to L sidelying  -     Bed Mobility, Safety Issues  decreased use of arms for pushing/pulling  -     Assistive Device (Bed Mobility)  bed rails  -     Row Name 01/03/19 1043          Transfer Assessment/Treatment    Transfer Assessment/Treatment  sit-stand transfer;stand-sit transfer  -     Sit-Stand [place] (Transfers)  contact guard;verbal cues  -     Stand-Sit [place] (Transfers)  minimum assist (75% patient effort);contact guard;verbal cues  -     Row Name 01/03/19 1043          Gait/Stairs Assessment/Training    Gait/Stairs Assessment/Training  gait/ambulation independence;gait/ambulation assistive device;distance ambulated;gait pattern;gait deviations  -     [place] Level (Gait)  contact guard;2 person assist  -     Assistive Device (Gait)  cane, straight  -     Distance in Feet (Gait)  90 ft  -     Pattern (Gait)  step-through;3-point  -     Deviations/Abnormal Patterns (Gait)  base of support, wide;gait speed decreased;stride length decreased  -     Bilateral Gait Deviations  forward flexed posture;weight shift ability decreased  -     Comment (Gait/Stairs)  decrease HS B, required constant Verbal and tactile cues for gait sequencing  -     Row Name 01/03/19 1043          General ROM    GENERAL ROM COMMENTS  AROM all 4 extremities WFLS  -[facility] Name 01/03/19 1043          MMT (Manual Muscle Testing)    General MMT Comments  no formal assessment due to recent surgery, however, pt able to move all 4 extremities without difficulty against gravity  -[facility] Name 01/03/19 1043          Motor Assessment/Intervention    Additional Documentation  Balance (Group)  -[facility] Name 01/03/19 1043          Balance    Balance  static sitting balance;static standing balance  -[facility] Name 01/03/19 1043          Static Sitting Balance    Level  "of [place] (Supported Sitting, Static Balance)  supervision  -     Sitting Position (Supported Sitting, Static Balance)  sitting on edge of bed;sitting in chair  -     Row Name 01/03/19 1043          Static Standing Balance    Level of [place] (Supported Standing, Static Balance)  contact guard assist  -     Assistive Device Utilized (Supported Standing, Static Balance)  straight cane  -     Row Name 01/03/19 1043          Sensory Assessment/Intervention    Sensory General Assessment  other (see comments) R leg goes numb \"comes/goes\", neuropathy B LEs  -     Row Name 01/03/19 1043          Pain Assessment    Additional Documentation  Pain Scale: Numbers Pre/Post-Treatment (Group)  -     Row Name 01/03/19 1043          Pain Scale: Numbers Pre/Post-Treatment    Pain Scale: Numbers, Pretreatment  7/10  -     Pain Scale: Numbers, Post-Treatment  7/10  -     Pain Location  back hips, pelvis, thighs  -     Pre/Post Treatment Pain Comment  reported increase in pain to 10/10 with gait  -     Pain Intervention(s)  Medication (See MAR);Repositioned;Ambulation/increased activity;Rest  -     Row Name 01/03/19 1043          Orthotics & Prosthetics Management    Orthosis Location  spinal orthosis  -     Row Name 01/03/19 1043          Spinal Orthosis Management    Type (Spinal Orthosis)  LSO (lumbar sacral orthosis)  -     Functional Design (Spinal Orthosis)  static orthosis  -     Therapeutic Indications (Spinal Orthosis)  pain management;post-op positioning/protection;rest/inflammation reduction;stabilization and support  -     Wearing Schedule (Spinal Orthosis)  wear when out of bed only  -     Orthosis Training (Spinal Orthosis)  patient;activity limitations/precautions;donning/doffing orthosis;purpose/goals of orthosis;wearing schedule;requires cues  -     Compliance/Wearing Issues (Spinal Orthosis)  patient/caregiver comprehend strategies;follow-up training required  -     Row " Name             Wound 01/02/19 1013 Right flank incision    Wound - Properties Group Date first assessed: 01/02/19  -JR Time first assessed: 1013  -JR Side: Right  -JR Location: flank  -JR Type: incision  -JR    Row Name 01/03/19 1043          Coping    Observed Emotional State  accepting;calm;cooperative  -     Verbalized Emotional State  acceptance  -     Row Name 01/03/19 1043          Plan of Care Review    Plan of Care Reviewed With  patient  -     Row Name 01/03/19 1043          Physical Therapy Clinical Impression    Date of Referral to PT  01/02/19  -     Patient/Family Goals Statement (PT Clinical Impression)  improve mobility and decrease pain  -     Criteria for Skilled Interventions Met (PT Clinical Impression)  yes;treatment indicated;other (see comments) increase pain with decrease I with mobility  -     Rehab Potential (PT Clinical Summary)  good, to achieve stated therapy goals  -     Predicted Duration of Therapy (PT)  until discharge or goals achieved  -     Care Plan Review (PT)  evaluation/treatment results reviewed;care plan/treatment goals reviewed;risks/benefits reviewed;patient/other agree to care plan  -     Care Plan Review, Other Participant (PT Clinical Impression)  sibling  -MARTINA     Row Name 01/03/19 1043          Physical Therapy Goals    Bed Mobility Goal Selection (PT)  bed mobility, PT goal 1  -     Transfer Goal Selection (PT)  transfer, PT goal 1  -     Gait Training Goal Selection (PT)  gait training, PT goal 1  -     Stairs Goal Selection (PT)  stairs, PT goal 1  -     Precaution Goal Selection (PT)  precaution, PT goal 1  -     Additional Documentation  Precaution Goal Selection (PT) (Row);Stairs Goal Selection (PT) (Row)  -     Row Name 01/03/19 1043          Bed Mobility Goal 1 (PT)    Activity/Assistive Device (Bed Mobility Goal 1, PT)  rolling to left;rolling to right;scooting;sit to supine/supine to sit  -     [place] Level/Cues Needed  (Bed Mobility Goal 1, PT)  standby assist  -JE     Time Frame (Bed Mobility Goal 1, PT)  long term goal (LTG);10 days  -JE     Progress/Outcomes (Bed Mobility Goal 1, PT)  goal ongoing  -     Row Name 01/03/19 1043          Transfer Goal 1 (PT)    Activity/Assistive Device (Transfer Goal 1, PT)  sit-to-stand/stand-to-sit;bed-to-chair/chair-to-bed;other (see comments) with LSO brace donned  -JE     [place] Level/Cues Needed (Transfer Goal 1, PT)  standby assist  -JE     Time Frame (Transfer Goal 1, PT)  long term goal (LTG);10 days  -JE     Progress/Outcome (Transfer Goal 1, PT)  goal ongoing  -     Row Name 01/03/19 1043          Gait Training Goal 1 (PT)    Activity/Assistive Device (Gait Training Goal 1, PT)  gait (walking locomotion);decrease fall risk;forward stepping;backward stepping;improve balance and speed;increase endurance/gait distance;normalize weight shifts;other (see comments) with LSO brace donned  -JE     [place] Level (Gait Training Goal 1, PT)  standby assist  -JE     Distance (Gait Goal 1, PT)  200 ft  -JE     Time Frame (Gait Training Goal 1, PT)  long term goal (LTG);10 days  -JE     Row Name 01/03/19 1043          Stairs Goal 1 (PT)    Activity/Assistive Device (Stairs Goal 1, PT)  ascending stairs;descending stairs;using handrail, right;step-to-step;decrease fall risk;improve balance and speed  -JE     [place] Level/Cues Needed (Stairs Goal 1, PT)  contact guard assist  -JE     Number of Stairs (Stairs Goal 1, PT)  2  -JE     Time Frame (Stairs Goal 1, PT)  long term goal (LTG);10 days  -JE     Progress/Outcome (Stairs Goal 1, PT)  goal ongoing  -     Row Name 01/03/19 1043          Precaution Goal 1 (PT)    Activity (Precaution Goal 1, PT)  spinal precautions;demonstrates compliance;demonstrates understanding;during mobility tasks  -JE     [place] Level/Cues Needed (Precautions Goal 1, PT)  independently  -JE     Time Frame (Precaution Goal 1, PT)  long term goal  (LTG);other (see comments) 10 days  -JE     Progress/Outcome (Precaution Goal 1, PT)  goal ongoing  -MARTINA     Row Name 01/03/19 1043          Patient Education Goal (PT)    Activity (Patient Education Goal, PT)  safety/falls prevention  -JE     [place]/Cues/Accuracy (Memory Goal 2, PT)  demonstrates adequately;verbalizes understanding  -JE     Time Frame (Patient Education Goal, PT)  long term goal (LTG);10 days  -JE     Progress/Outcome (Patient Education Goal, PT)  goal ongoing  -MARTINA     Row Name 01/03/19 1043          Positioning and Restraints    Post Treatment Position  bed  -JE     In Bed  fowlers;call light within reach;encouraged to call for assist;with family/caregiver;side rails up x2;SCD pump applied;legs elevated  -MARTINA     Row Name 01/03/19 1043          Living Environment    Home Accessibility  stairs to enter home  -       User Key  (r) = Recorded By, (t) = Taken By, (c) = Cosigned By    Initials Name Provider Type    Betsy Benavides, RN Registered Nurse    Marimar Christensen, PT Physical Therapist          PT Recommendation and Plan  Anticipated Discharge Disposition (PT): home with assist, home with home health  Planned Therapy Interventions (PT Eval): balance training, bed mobility training, gait training, patient/family education, postural re-education, stair training, strengthening, transfer training, other (see comments)(safety/falls prevention)  Therapy Frequency (PT Clinical Impression): 2 times/day  Outcome Summary/Treatment Plan (PT)  Anticipated Discharge Disposition (PT): home with assist, home with home health  Plan of Care Reviewed With: patient  Progress: improving  Outcome Summary: PT eval completed.  Pt with reported pain in her back, hips, pelvis, and thighs as 7/10 increasing to reported burning type pain rated 10/10 with gait.  Pt sitting up in chair upon therapist entering her room with LSO brace in place.  Pt assisted to standing with CGA.  Gait with single point cane ~  90 ft with CGA and assist for proper use of cane with education for proper sequencing.  Pt performed gait using a 3 point gait pattern.  Transfers stand to sit on edge of bed with CGA and sit to L sidelying with CGA 2 and logroll with SB to CGA.  Reported decrease in pain from 10 to 7/10 upon return to bed.  Pt will benefit from continued PT services to improve knowledge of spinal precautions, to ensure I with rich/doffing and proper use of LSO brace, to improve I with bed mobility utilizing logroll and to improve I with transfers and gait improving balance and reducing fall risk.  Pt motivated to improve, however gaurded due to pain.  Pt has family that can assist her at home upon discharge.  If pt continues to progress with mobility, anticipate discharge home with assist and possibly HH services to ensure knowledge of safety in the home.  However will follow for progress with a 2nd surgery scheduled for 1/4/18.    Outcome Measures     Row Name 01/03/19 1314 01/03/19 1300 01/02/19 1400       How much help from another person do you currently need...    Turning from your back to your side while in flat bed without using bedrails?  --  3  -JE  --    Moving from lying on back to sitting on the side of a flat bed without bedrails?  --  3  -JE  --    Moving to and from a bed to a chair (including a wheelchair)?  --  3  -JE  --    Standing up from a chair using your arms (e.g., wheelchair, bedside chair)?  --  3  -JE  --    Climbing 3-5 steps with a railing?  --  3  -JE  --    To walk in hospital room?  --  3  -JE  --    AM-PAC 6 Clicks Score  --  18  -JE  --       How much help from another is currently needed...    Putting on and taking off regular lower body clothing?  2  -TS  --  2  -JJ    Bathing (including washing, rinsing, and drying)  3  -TS  --  3  -JJ    Toileting (which includes using toilet bed pan or urinal)  4  -TS  --  4  -JJ    Putting on and taking off regular upper body clothing  4  -TS  --  4  -JJ     Taking care of personal grooming (such as brushing teeth)  4  -TS  --  4  -JJ    Eating meals  4  -TS  --  4  -JJ    Score  21  -TS  --  21  -JJ       Functional Assessment    Outcome Measure Options  AM-PAC 6 Clicks Daily Activity (OT)  -  AM-PAC 6 Clicks Basic Mobility (PT)  -JE  AM-PAC 6 Clicks Daily Activity (OT)  -JJ      User Key  (r) = Recorded By, (t) = Taken By, (c) = Cosigned By    Initials Name Provider Type    Stacy Kimble, MCELROY/L Occupational Therapy Assistant    Marimar Christensen, PT Physical Therapist    Betsy Rodriguez, OTR/L Occupational Therapist         Time Calculation:   PT Charges     Row Name 01/03/19 1310             Time Calculation    Start Time  1043  -      Stop Time  1108  -      Time Calculation (min)  25 min  -      PT Received On  01/03/19  -      PT Goal Re-Cert Due Date  01/13/19  -        User Key  (r) = Recorded By, (t) = Taken By, (c) = Cosigned By    Initials Name Provider Type    Marimar Christensen, PT Physical Therapist        Therapy Suggested Charges     Code   Minutes Charges    None           Therapy Charges for Today     Code Description Service Date Service Provider Modifiers Qty    88892592152 HC PT MOBILITY CURRENT 1/3/2019 Marimar Mcclure, PT  1    01033649122 HC PT MOBILITY PROJECTED 1/3/2019 Marimar Mcclure, PT  1    09449678584 HC PT EVAL MOD COMPLEXITY 2 1/3/2019 Marimar Mcclure, PT GP 1          PT G-Codes  PT Professional Judgement Used?: Yes  Outcome Measure Options: AM-PAC 6 Clicks Daily Activity (OT)  AM-PAC 6 Clicks Score: 18  Score: 21  Functional Limitation: Mobility: Walking and moving around  Mobility: Walking and Moving Around Current Status (): At least 40 percent but less than 60 percent impaired, limited or restricted  Mobility: Walking and Moving Around Goal Status (): At least 20 percent but less than 40 percent impaired, limited or restricted      Marimar Mcclure, PT  1/3/2019

## 2019-01-03 NOTE — PROGRESS NOTES
"Orthopedic Spine Progress Note    Arminda Jiménez  58 y.o.  female  Subjective     Post-Operative Day # 1    Systemic or Specific Complaints:     Sore, havent walked yet. Pain medicine helping, \"ready for tomorrow\"    Objective     Vital signs in last 24 hours:  Temp:  [97.4 °F (36.3 °C)-100.5 °F (38.1 °C)] 99.1 °F (37.3 °C)  Heart Rate:  [65-91] 88  Resp:  [14-20] 18  BP: ()/(47-71) 99/63    Physical Exam:    Alert and oriented ×3, no acute distress, grossly neurovascularly intact, vital signs stable, dressing clean dry and intact, moving all extremities with some mild hip flexor weakness    Diagnostic Data:  CBC:  Results from last 7 days   Lab Units  01/03/19   0508   WBC 10*3/mm3  11.89*   RBC 10*6/mm3  3.11*   HEMOGLOBIN g/dL  9.7*   HEMATOCRIT %  29.9*   PLATELETS 10*3/mm3  167          Assessment/Plan     1. Status Post Right Lateral Lumbar Interbody Fusion L3-4, L4-5 - 1/2/19          Plan:  1. Plan second stage surgery tomorrow  2. Npo after midnight  3. periops / PT/OT/Brace today      MIRA Lara    Date: 1/3/2019  Time: 7:28 AM  "

## 2019-01-04 ENCOUNTER — ANESTHESIA (OUTPATIENT)
Dept: PERIOP | Facility: HOSPITAL | Age: 59
End: 2019-01-04

## 2019-01-04 ENCOUNTER — ANESTHESIA EVENT (OUTPATIENT)
Dept: PERIOP | Facility: HOSPITAL | Age: 59
End: 2019-01-04

## 2019-01-04 ENCOUNTER — APPOINTMENT (OUTPATIENT)
Dept: GENERAL RADIOLOGY | Facility: HOSPITAL | Age: 59
End: 2019-01-04

## 2019-01-04 PROBLEM — I50.32 CHRONIC DIASTOLIC CHF (CONGESTIVE HEART FAILURE) (HCC): Chronic | Status: ACTIVE | Noted: 2019-01-04

## 2019-01-04 LAB
IRON 24H UR-MRATE: <10 MCG/DL (ref 42–180)
VIT B12 BLD-MCNC: 428 PG/ML (ref 239–931)

## 2019-01-04 PROCEDURE — 83540 ASSAY OF IRON: CPT | Performed by: FAMILY MEDICINE

## 2019-01-04 PROCEDURE — C1713 ANCHOR/SCREW BN/BN,TIS/BN: HCPCS | Performed by: ORTHOPAEDIC SURGERY

## 2019-01-04 PROCEDURE — 94799 UNLISTED PULMONARY SVC/PX: CPT

## 2019-01-04 PROCEDURE — 25010000002 MORPHINE PER 10 MG: Performed by: ANESTHESIOLOGY

## 2019-01-04 PROCEDURE — 25010000002 FENTANYL CITRATE (PF) 100 MCG/2ML SOLUTION: Performed by: ANESTHESIOLOGY

## 2019-01-04 PROCEDURE — 25010000002 MIDAZOLAM PER 1 MG: Performed by: ANESTHESIOLOGY

## 2019-01-04 PROCEDURE — 94760 N-INVAS EAR/PLS OXIMETRY 1: CPT

## 2019-01-04 PROCEDURE — 76000 FLUOROSCOPY <1 HR PHYS/QHP: CPT

## 2019-01-04 PROCEDURE — 82607 VITAMIN B-12: CPT | Performed by: FAMILY MEDICINE

## 2019-01-04 PROCEDURE — C1769 GUIDE WIRE: HCPCS | Performed by: ORTHOPAEDIC SURGERY

## 2019-01-04 PROCEDURE — 25010000002 PROPOFOL 10 MG/ML EMULSION: Performed by: NURSE ANESTHETIST, CERTIFIED REGISTERED

## 2019-01-04 PROCEDURE — 0SG3071 FUSION OF LUMBOSACRAL JOINT WITH AUTOLOGOUS TISSUE SUBSTITUTE, POSTERIOR APPROACH, POSTERIOR COLUMN, OPEN APPROACH: ICD-10-PCS | Performed by: ORTHOPAEDIC SURGERY

## 2019-01-04 PROCEDURE — 72100 X-RAY EXAM L-S SPINE 2/3 VWS: CPT

## 2019-01-04 PROCEDURE — 0SG1071 FUSION OF 2 OR MORE LUMBAR VERTEBRAL JOINTS WITH AUTOLOGOUS TISSUE SUBSTITUTE, POSTERIOR APPROACH, POSTERIOR COLUMN, OPEN APPROACH: ICD-10-PCS | Performed by: ORTHOPAEDIC SURGERY

## 2019-01-04 PROCEDURE — 25010000002 FENTANYL CITRATE (PF) 250 MCG/5ML SOLUTION: Performed by: NURSE ANESTHETIST, CERTIFIED REGISTERED

## 2019-01-04 DEVICE — CANNULATED EXTENDED TAB POLYAXIAL REDUCTION SCREW, 6.5 MM X 45 MM
Type: IMPLANTABLE DEVICE | Status: FUNCTIONAL
Brand: INVICTUS

## 2019-01-04 DEVICE — SET SCREW
Type: IMPLANTABLE DEVICE | Status: FUNCTIONAL
Brand: INVICTUS

## 2019-01-04 DEVICE — TI MIS LORDOTIC ROD, 5.5 MM X 90 MM
Type: IMPLANTABLE DEVICE | Status: FUNCTIONAL
Brand: INVICTUS

## 2019-01-04 RX ORDER — SODIUM CHLORIDE 0.9 % (FLUSH) 0.9 %
3 SYRINGE (ML) INJECTION EVERY 12 HOURS SCHEDULED
Status: DISCONTINUED | OUTPATIENT
Start: 2019-01-04 | End: 2019-01-04 | Stop reason: HOSPADM

## 2019-01-04 RX ORDER — IPRATROPIUM BROMIDE AND ALBUTEROL SULFATE 2.5; .5 MG/3ML; MG/3ML
3 SOLUTION RESPIRATORY (INHALATION) ONCE AS NEEDED
Status: DISCONTINUED | OUTPATIENT
Start: 2019-01-04 | End: 2019-01-04 | Stop reason: HOSPADM

## 2019-01-04 RX ORDER — METOCLOPRAMIDE HYDROCHLORIDE 5 MG/ML
5 INJECTION INTRAMUSCULAR; INTRAVENOUS
Status: DISCONTINUED | OUTPATIENT
Start: 2019-01-04 | End: 2019-01-04 | Stop reason: HOSPADM

## 2019-01-04 RX ORDER — MIDAZOLAM HYDROCHLORIDE 1 MG/ML
1 INJECTION INTRAMUSCULAR; INTRAVENOUS
Status: DISCONTINUED | OUTPATIENT
Start: 2019-01-04 | End: 2019-01-04 | Stop reason: HOSPADM

## 2019-01-04 RX ORDER — MIDAZOLAM HYDROCHLORIDE 1 MG/ML
2 INJECTION INTRAMUSCULAR; INTRAVENOUS
Status: DISCONTINUED | OUTPATIENT
Start: 2019-01-04 | End: 2019-01-04 | Stop reason: HOSPADM

## 2019-01-04 RX ORDER — LIDOCAINE HYDROCHLORIDE 20 MG/ML
INJECTION, SOLUTION INFILTRATION; PERINEURAL AS NEEDED
Status: DISCONTINUED | OUTPATIENT
Start: 2019-01-04 | End: 2019-01-04 | Stop reason: SURG

## 2019-01-04 RX ORDER — LABETALOL HYDROCHLORIDE 5 MG/ML
5 INJECTION, SOLUTION INTRAVENOUS
Status: DISCONTINUED | OUTPATIENT
Start: 2019-01-04 | End: 2019-01-04 | Stop reason: HOSPADM

## 2019-01-04 RX ORDER — FENTANYL CITRATE 50 UG/ML
INJECTION, SOLUTION INTRAMUSCULAR; INTRAVENOUS AS NEEDED
Status: DISCONTINUED | OUTPATIENT
Start: 2019-01-04 | End: 2019-01-04 | Stop reason: SURG

## 2019-01-04 RX ORDER — FENTANYL CITRATE 50 UG/ML
25 INJECTION, SOLUTION INTRAMUSCULAR; INTRAVENOUS AS NEEDED
Status: DISCONTINUED | OUTPATIENT
Start: 2019-01-04 | End: 2019-01-04 | Stop reason: HOSPADM

## 2019-01-04 RX ORDER — MORPHINE SULFATE 2 MG/ML
2 INJECTION, SOLUTION INTRAMUSCULAR; INTRAVENOUS
Status: DISCONTINUED | OUTPATIENT
Start: 2019-01-04 | End: 2019-01-04 | Stop reason: HOSPADM

## 2019-01-04 RX ORDER — SODIUM CHLORIDE, SODIUM LACTATE, POTASSIUM CHLORIDE, CALCIUM CHLORIDE 600; 310; 30; 20 MG/100ML; MG/100ML; MG/100ML; MG/100ML
100 INJECTION, SOLUTION INTRAVENOUS CONTINUOUS
Status: DISCONTINUED | OUTPATIENT
Start: 2019-01-04 | End: 2019-01-04 | Stop reason: HOSPADM

## 2019-01-04 RX ORDER — IPRATROPIUM BROMIDE AND ALBUTEROL SULFATE 2.5; .5 MG/3ML; MG/3ML
3 SOLUTION RESPIRATORY (INHALATION) ONCE
Status: COMPLETED | OUTPATIENT
Start: 2019-01-04 | End: 2019-01-04

## 2019-01-04 RX ORDER — HYDRALAZINE HYDROCHLORIDE 20 MG/ML
5 INJECTION INTRAMUSCULAR; INTRAVENOUS
Status: DISCONTINUED | OUTPATIENT
Start: 2019-01-04 | End: 2019-01-04 | Stop reason: HOSPADM

## 2019-01-04 RX ORDER — VASOPRESSIN 20 U/ML
INJECTION PARENTERAL AS NEEDED
Status: DISCONTINUED | OUTPATIENT
Start: 2019-01-04 | End: 2019-01-04 | Stop reason: SURG

## 2019-01-04 RX ORDER — ONDANSETRON 2 MG/ML
4 INJECTION INTRAMUSCULAR; INTRAVENOUS AS NEEDED
Status: DISCONTINUED | OUTPATIENT
Start: 2019-01-04 | End: 2019-01-04 | Stop reason: HOSPADM

## 2019-01-04 RX ORDER — OXYCODONE AND ACETAMINOPHEN 10; 325 MG/1; MG/1
1 TABLET ORAL ONCE AS NEEDED
Status: DISCONTINUED | OUTPATIENT
Start: 2019-01-04 | End: 2019-01-04 | Stop reason: HOSPADM

## 2019-01-04 RX ORDER — SODIUM CHLORIDE 0.9 % (FLUSH) 0.9 %
1-10 SYRINGE (ML) INJECTION AS NEEDED
Status: DISCONTINUED | OUTPATIENT
Start: 2019-01-04 | End: 2019-01-04 | Stop reason: HOSPADM

## 2019-01-04 RX ORDER — OXYCODONE HCL 20 MG/1
20 TABLET, FILM COATED, EXTENDED RELEASE ORAL ONCE
Status: COMPLETED | OUTPATIENT
Start: 2019-01-04 | End: 2019-01-04

## 2019-01-04 RX ORDER — ROCURONIUM BROMIDE 10 MG/ML
INJECTION, SOLUTION INTRAVENOUS AS NEEDED
Status: DISCONTINUED | OUTPATIENT
Start: 2019-01-04 | End: 2019-01-04 | Stop reason: SURG

## 2019-01-04 RX ORDER — PROPOFOL 10 MG/ML
VIAL (ML) INTRAVENOUS AS NEEDED
Status: DISCONTINUED | OUTPATIENT
Start: 2019-01-04 | End: 2019-01-04 | Stop reason: SURG

## 2019-01-04 RX ORDER — SODIUM CHLORIDE 9 MG/ML
75 INJECTION, SOLUTION INTRAVENOUS CONTINUOUS
Status: DISPENSED | OUTPATIENT
Start: 2019-01-04 | End: 2019-01-05

## 2019-01-04 RX ORDER — MEPERIDINE HYDROCHLORIDE 25 MG/ML
12.5 INJECTION INTRAMUSCULAR; INTRAVENOUS; SUBCUTANEOUS
Status: DISCONTINUED | OUTPATIENT
Start: 2019-01-04 | End: 2019-01-04 | Stop reason: HOSPADM

## 2019-01-04 RX ORDER — MAGNESIUM HYDROXIDE 1200 MG/15ML
LIQUID ORAL AS NEEDED
Status: DISCONTINUED | OUTPATIENT
Start: 2019-01-04 | End: 2019-01-04 | Stop reason: HOSPADM

## 2019-01-04 RX ORDER — FLUMAZENIL 0.1 MG/ML
0.2 INJECTION INTRAVENOUS AS NEEDED
Status: DISCONTINUED | OUTPATIENT
Start: 2019-01-04 | End: 2019-01-04 | Stop reason: HOSPADM

## 2019-01-04 RX ORDER — SODIUM CHLORIDE, SODIUM LACTATE, POTASSIUM CHLORIDE, CALCIUM CHLORIDE 600; 310; 30; 20 MG/100ML; MG/100ML; MG/100ML; MG/100ML
100 INJECTION, SOLUTION INTRAVENOUS CONTINUOUS
Status: DISCONTINUED | OUTPATIENT
Start: 2019-01-04 | End: 2019-01-07

## 2019-01-04 RX ORDER — GABAPENTIN 800 MG/1
1600 TABLET ORAL NIGHTLY
Status: ON HOLD | COMMUNITY
End: 2022-05-12

## 2019-01-04 RX ORDER — NALOXONE HCL 0.4 MG/ML
0.04 VIAL (ML) INJECTION AS NEEDED
Status: DISCONTINUED | OUTPATIENT
Start: 2019-01-04 | End: 2019-01-04 | Stop reason: HOSPADM

## 2019-01-04 RX ORDER — CLINDAMYCIN PHOSPHATE 900 MG/50ML
900 INJECTION INTRAVENOUS EVERY 8 HOURS
Status: DISCONTINUED | OUTPATIENT
Start: 2019-01-04 | End: 2019-01-05

## 2019-01-04 RX ADMIN — SODIUM CHLORIDE, POTASSIUM CHLORIDE, SODIUM LACTATE AND CALCIUM CHLORIDE 100 ML/HR: 600; 310; 30; 20 INJECTION, SOLUTION INTRAVENOUS at 09:56

## 2019-01-04 RX ADMIN — VASOPRESSIN 0.5 UNITS: 20 INJECTION INTRAVENOUS at 11:35

## 2019-01-04 RX ADMIN — FAMOTIDINE 20 MG: 20 TABLET, FILM COATED ORAL at 21:52

## 2019-01-04 RX ADMIN — CLINDAMYCIN PHOSPHATE 900 MG: 18 INJECTION, SOLUTION INTRAVENOUS at 10:50

## 2019-01-04 RX ADMIN — MIDAZOLAM HYDROCHLORIDE 2 MG: 1 INJECTION, SOLUTION INTRAMUSCULAR; INTRAVENOUS at 09:40

## 2019-01-04 RX ADMIN — ROCURONIUM BROMIDE 20 MG: 10 INJECTION INTRAVENOUS at 10:41

## 2019-01-04 RX ADMIN — POLYETHYLENE GLYCOL (3350) 17 G: 17 POWDER, FOR SOLUTION ORAL at 21:52

## 2019-01-04 RX ADMIN — FENTANYL CITRATE 25 MCG: 50 INJECTION, SOLUTION INTRAMUSCULAR; INTRAVENOUS at 12:17

## 2019-01-04 RX ADMIN — OXYCODONE HYDROCHLORIDE AND ACETAMINOPHEN 2 TABLET: 10; 325 TABLET ORAL at 04:21

## 2019-01-04 RX ADMIN — CLINDAMYCIN IN 5 PERCENT DEXTROSE 900 MG: 18 INJECTION, SOLUTION INTRAVENOUS at 18:21

## 2019-01-04 RX ADMIN — ROCURONIUM BROMIDE 10 MG: 10 INJECTION INTRAVENOUS at 11:05

## 2019-01-04 RX ADMIN — LIDOCAINE HYDROCHLORIDE 80 MG: 20 INJECTION, SOLUTION INFILTRATION; PERINEURAL at 10:41

## 2019-01-04 RX ADMIN — Medication 1 TABLET: at 18:21

## 2019-01-04 RX ADMIN — SODIUM CHLORIDE, POTASSIUM CHLORIDE, SODIUM LACTATE AND CALCIUM CHLORIDE 100 ML/HR: 600; 310; 30; 20 INJECTION, SOLUTION INTRAVENOUS at 15:15

## 2019-01-04 RX ADMIN — MORPHINE SULFATE 2 MG: 2 INJECTION, SOLUTION INTRAMUSCULAR; INTRAVENOUS at 12:26

## 2019-01-04 RX ADMIN — GABAPENTIN 1600 MG: 400 CAPSULE ORAL at 21:52

## 2019-01-04 RX ADMIN — GABAPENTIN 800 MG: 400 CAPSULE ORAL at 16:40

## 2019-01-04 RX ADMIN — SUGAMMADEX 200 MG: 100 INJECTION, SOLUTION INTRAVENOUS at 11:22

## 2019-01-04 RX ADMIN — FENTANYL CITRATE 250 MCG: 50 INJECTION INTRAMUSCULAR; INTRAVENOUS at 10:41

## 2019-01-04 RX ADMIN — IPRATROPIUM BROMIDE AND ALBUTEROL SULFATE 3 ML: 2.5; .5 SOLUTION RESPIRATORY (INHALATION) at 09:40

## 2019-01-04 RX ADMIN — IPRATROPIUM BROMIDE 0.5 MG: 0.5 SOLUTION RESPIRATORY (INHALATION) at 14:21

## 2019-01-04 RX ADMIN — OXYCODONE HYDROCHLORIDE AND ACETAMINOPHEN 2 TABLET: 10; 325 TABLET ORAL at 15:15

## 2019-01-04 RX ADMIN — FENTANYL CITRATE 25 MCG: 50 INJECTION, SOLUTION INTRAMUSCULAR; INTRAVENOUS at 12:40

## 2019-01-04 RX ADMIN — FENTANYL CITRATE 25 MCG: 50 INJECTION, SOLUTION INTRAMUSCULAR; INTRAVENOUS at 12:35

## 2019-01-04 RX ADMIN — MORPHINE SULFATE 2 MG: 2 INJECTION, SOLUTION INTRAMUSCULAR; INTRAVENOUS at 12:16

## 2019-01-04 RX ADMIN — OXYCODONE HYDROCHLORIDE AND ACETAMINOPHEN 2 TABLET: 10; 325 TABLET ORAL at 23:57

## 2019-01-04 RX ADMIN — OXYCODONE HYDROCHLORIDE AND ACETAMINOPHEN 1 TABLET: 10; 325 TABLET ORAL at 19:40

## 2019-01-04 RX ADMIN — LIDOCAINE HYDROCHLORIDE 0.5 ML: 10 INJECTION, SOLUTION EPIDURAL; INFILTRATION; INTRACAUDAL; PERINEURAL at 09:55

## 2019-01-04 RX ADMIN — FENTANYL CITRATE 25 MCG: 50 INJECTION, SOLUTION INTRAMUSCULAR; INTRAVENOUS at 12:20

## 2019-01-04 RX ADMIN — OXYCODONE HYDROCHLORIDE 20 MG: 20 TABLET, FILM COATED, EXTENDED RELEASE ORAL at 09:40

## 2019-01-04 RX ADMIN — PROPOFOL 100 MG: 10 INJECTION, EMULSION INTRAVENOUS at 10:41

## 2019-01-04 NOTE — PLAN OF CARE
Problem: Patient Care Overview  Goal: Plan of Care Review  Outcome: Ongoing (interventions implemented as appropriate)   01/04/19 6757   Coping/Psychosocial   Plan of Care Reviewed With patient;family   Plan of Care Review   Progress no change   OTHER   Outcome Summary SECOND PART OF SURGERY DONE TODAY, PAIN CONTROL ONLY ISSUE, REQUIRING OXYGEN TO MAINTAIN OXYGEN SATURATION, NO OTHER ISSUES.      Goal: Individualization and Mutuality  Outcome: Ongoing (interventions implemented as appropriate)    Goal: Discharge Needs Assessment  Outcome: Ongoing (interventions implemented as appropriate)    Goal: Interprofessional Rounds/Family Conf  Outcome: Ongoing (interventions implemented as appropriate)      Problem: Laminectomy/Foraminotomy/Discectomy (Adult)  Goal: Signs and Symptoms of Listed Potential Problems Will be Absent, Minimized or Managed (Laminectomy/Foraminotomy/Discectomy)  Outcome: Ongoing (interventions implemented as appropriate)      Problem: Fall Risk (Adult)  Goal: Identify Related Risk Factors and Signs and Symptoms  Outcome: Outcome(s) achieved Date Met: 01/04/19    Goal: Absence of Fall  Outcome: Ongoing (interventions implemented as appropriate)

## 2019-01-04 NOTE — PLAN OF CARE
Problem: Patient Care Overview  Goal: Plan of Care Review  Outcome: Ongoing (interventions implemented as appropriate)   01/04/19 0333   Coping/Psychosocial   Plan of Care Reviewed With patient   Plan of Care Review   Progress improving   OTHER   Outcome Summary AAOX4. SANCHEZ's. Medicated for pain as needed. Up with assist x1 with LSO brace on. VSS NPO for OR today. will cont. to monitor.       Problem: Laminectomy/Foraminotomy/Discectomy (Adult)  Goal: Signs and Symptoms of Listed Potential Problems Will be Absent, Minimized or Managed (Laminectomy/Foraminotomy/Discectomy)  Outcome: Ongoing (interventions implemented as appropriate)    Goal: Anesthesia/Sedation Recovery  Outcome: Outcome(s) achieved Date Met: 01/04/19

## 2019-01-04 NOTE — OP NOTE
LUMBAR LAMINECTOMY POSTERIOR LUMBAR INTERBODY FUSION  Procedure Note    Arminda Jiménez  1/2/2019 - 1/4/2019    Pre-op Diagnosis:     1.  Status post anterior decompression, ALIF with instrumentation L5-S1, 5/7/2018  2.  Status post PSF with instrumentation left L5-S1, 5/9/2018  3.  Recurrent back pain  4.  Bilateral buttock, thigh, and leg radiculopathy, right worse than left  5.  Recurrent neurogenic claudication  6.  Adjacent level degenerative disc disease L3 to 5  7.  Degenerative lumbar scoliosis L3 to 5  8.  Facet arthropathy L3 to 5  9.  Central and bilateral foraminal stenosis L3 to 5, worse right L3-4  10.  Osteoporosis, T score -2.5, 4/11/2018  11.  Status post right LLIF L3 to 5 with instrumentation L3-4, 1/2/2019    Post-op Diagnosis:    same     Procedure/CPT® Codes:     1.  Posterior spinal fusion L3-S1  2.  Posterior spinal instrumentation right L3-S1 (ATEC pedicle screws and sharif)  3.  Use of locally obtained autograft bone for fusion  4.  Use of fluoroscopy for confirmation of surgical level and placement of instrumentation  5.  Intraoperative neural monitoring with pedicle screw stimulation     Anesthesia: General     Surgeon: SHIV Zheng MD     Assistant:  José Luis Traore PA-C     Estimated Blood Loss: minimal     Complications: None     Condition: Stable to PACU.     Indications:     The patient is a 58-year-old who sees Dr. Bam Landis for medical issues.  She had a decompression and fusion of L5-S1 performed in a staged fashion in May 2018.  Unfortunately, she has had continued complaints of back pain as well as symptoms down both lower extremities, with the right side worse than the left.  She continued to have complaints consistent with neurogenic claudication.  Repeat imaging studies revealed adjacent level degenerative disc disease at both L3-4 and L4-5, where there was a degenerative lumbar scoliosis and facet arthropathy causing central and bilateral foraminal stenosis.  While  we were hopeful that surgery only at L5-S1 would alleviate the vast majority of her symptoms, unfortunately at this point, it appeared the degenerative changes and stenosis from L3 to 5 will also require surgery.      After failing conservative measures, it was mutually decided that surgery would be his best option. Risks, benefits, and complications of surgery were discussed with the patient. The patient appeared well informed and wished to proceed. We specifically discussed the risk of infection, blood loss, nerve root injury, CSF leak, and the possibility of incomplete resolution of symptoms. We also discussed the possible risk of a nonunion and the potential need for additional surgery in the event of a pseudoarthrosis or hardware failure.      We elected proceed with a staged operation.  Previously on 1/2/2019, the patient underwent a right lateral fusion from L3 to 5 with instrumentation at L3-4.  Today we return to surgery for the second posterior stage the procedure.     Operative Procedure:     After obtaining informed consent and verifying the correct operative site, the patient was brought to the operating room. A general anesthetic was provided by the anesthesia service with the assistance of an endotracheal tube. Once this was appropriately positioned and secured, the patient was carefully rotated prone onto a Grant frame. All bony processes were well-padded. The lumbar region was prepped and draped in usual sterile fashion. A surgical timeout was taken to confirm this was the correct patient, we were working at the correct levels, and that preoperative antibiotics were given in a timely fashion.      Using fluoroscopy for guidance, a right sided Wiltsey incision was created overlying L3 to S1 using a 10 blade scalpel.  Dissection was carried through subcutaneous tissues using Bovie cautery.  The lumbar fascia was divided in line with the incision and blunt dissection was carried between the  multifidus and the longissimus down to the facet joints of L3-4, L4-5 and L5-S1.  Dissection was carried laterally exposing the transverse processes of L3, L4, L5, and the sacral ala.  We then exposed the facet joints further.  The capsules were destroyed using Bovie cautery exposing as much bone as possible.  The high-speed bur was then used to decorticate the facet joints, destroying as much of the facet cartilage as possible.  I also decorticated the lateral pars region and the tranverse processes.  The locally obtained autograft bone was left in situ in the posterolateral gutter.  This constituted a posterior fusion of L3-4, L4-5 and L5-S1.      Next under fluoroscopic guidance, Jamshidi needles were used to cannulate the pedicles of L3, L5, and S1.  Once the needles were properly positioned in the pedicles, guidewires were placed to maintain position.  Over each of the guidewires, an [facility] pedicle screw was placed.  We used 6.5 mm x 45 mm screws into each of the pedicles of L3, L5, and S1.  No screw was placed into L4 due to the lateral instrumentation placed previously obscuring the pedicle.  I then used a neuro monitoring probe to stimulate the screws themselves confirming appropriate position ensuring no breach of the pedicles.  After confirming the screws were properly positioned, an appropriate-sized sharif was chosen to span the pedicle screws.  The sharif was tightened into position using set screws.  The setscrews were tightened using the appropriate antitorque wrench and torque limiting screwdriver provided by [facility].      The wound was then thoroughly irrigated and an inspection was then undertaken to ensure that we had adequate hemostasis.  Bleeding at this point was controlled using thrombin with Gelfoam powder and bipolar cautery.  Final fluoroscopy imaging confirmed adequate position of the newly placed posterior instrumentation spanning L3 to S1.      Wound closure was then accomplished by reapproximating  the fascia with a #1 Vicryl area immediate subcutaneous tissues were closed using a 2-0 Vicryl and skin closure was augmented using Mastisol and Steri-Strips.  The incision was then washed and sterilely dressed with Bioclusive dressings.      The patient was then carefully rotated supine onto a hospital gurney, extubated, and sent to the recovery room in good stable condition.  The patient tolerated the procedure well.  There were no complications.  We estimated blood loss to be minimal.      José Luis Traore PA-C provided critical assistance during the procedure.  His assistance was medically necessary in order to allow the procedure to occur in the most safe and efficient manner.    SHIV Zheng MD     Date: 1/4/2019  Time: 11:40 AM

## 2019-01-04 NOTE — ANESTHESIA PROCEDURE NOTES
ANESTHESIA INTUBATION  Urgency: elective    Airway not difficult    General Information and Staff    Patient location during procedure: OR  CRNA: Luis Eduardo Evans CRNA    Indications and Patient Condition  Indications for airway management: airway protection    Preoxygenated: yes  MILS maintained throughout  Mask difficulty assessment: 1 - vent by mask    Final Airway Details  Final airway type: endotracheal airway      Successful airway: ETT  Cuffed: yes   Successful intubation technique: direct laryngoscopy  Facilitating devices/methods: intubating stylet  Endotracheal tube insertion site: oral  Blade: Rama  Blade size: 3  ETT size (mm): 7.0  Cormack-Lehane Classification: grade I - full view of glottis  Placement verified by: chest auscultation and capnometry   Cuff volume (mL): 8  Measured from: lips  ETT to lips (cm): 21  Number of attempts at approach: 1

## 2019-01-04 NOTE — ANESTHESIA POSTPROCEDURE EVALUATION
"Patient: Arminda Jiménez    Procedure Summary     Date:  01/04/19 Room / Location:   [facility]  /  [facility]    Anesthesia Start:  1038 Anesthesia Stop:  1206    Procedure:  POSTERIOR SPINAL FUSION WITH INSTRUMENTATION RIGHT L3-S1 (Right Spine Lumbar) Diagnosis:  (M54.16)    Surgeon:  LUIS CARLOS Zheng MD Provider:  Luis Eduardo Evans CRNA    Anesthesia Type:  general ASA Status:  3          Anesthesia Type: general  Last vitals  BP   108/55 (01/04/19 1401)   Temp   98.4 °F (36.9 °C) (01/04/19 1401)   Pulse   87 (01/04/19 1401)   Resp   16 (01/04/19 1401)     SpO2   95 % (01/04/19 1401)     Post Anesthesia Care and Evaluation    Patient location during evaluation: PACU  Patient participation: complete - patient participated  Level of consciousness: awake and alert  Pain management: adequate  Airway patency: patent  Anesthetic complications: No anesthetic complications  PONV Status: controlled  Cardiovascular status: acceptable and hemodynamically stable  Respiratory status: acceptable  Hydration status: acceptable    Comments: Patient discharged from PACU prior to anesthesia evaluation based on Chang Score.  For details, see RN note.     /55 (BP Location: Right arm, Patient Position: Lying)   Pulse 87   Temp 98.4 °F (36.9 °C) (Oral)   Resp 16   Ht 157.5 cm (62.01\")   Wt 75 kg (165 lb 5.5 oz)   SpO2 95%   BMI 30.23 kg/m²       "

## 2019-01-04 NOTE — PROGRESS NOTES
Arminda Jiménez is a 58 y.o. female patient.   Plan on step m#2 today.      Current Facility-Administered Medications   Medication Dose Route Frequency Provider Last Rate Last Dose   • albuterol (PROVENTIL) nebulizer solution 0.083% 2.5 mg/3mL  2.5 mg Nebulization Q4H PRN José Luis Traore PA-C       • budesonide-formoterol (SYMBICORT) 80-4.5 MCG/ACT inhaler 2 puff  2 puff Inhalation BID - RT José Luis Traore PA-C   Stopped at 01/03/19 2011   • calcium carb-cholecalciferol 600-800 MG-UNIT tablet 1 tablet  1 tablet Oral BID With Meals LUIS CARLOS Zheng MD   1 tablet at 01/03/19 1714   • clindamycin (CLEOCIN) 900 mg in dextrose 5% 50 mL IVPB (premix)  900 mg Intravenous Once David Morales PA       • diphenhydrAMINE (BENADRYL) capsule 25 mg  25 mg Oral Nightly PRN LUIS CARLOS Zheng MD       • escitalopram (LEXAPRO) tablet 20 mg  20 mg Oral Daily LUIS CARLOS Zheng MD   10 mg at 01/03/19 0834   • estrogen (conjugated)-medroxyprogesterone (PREMPRO) 0.3-1.5 MG per tablet 1 tablet  1 tablet Oral Daily José Luis Traore PA-C       • famotidine (PEPCID) injection 20 mg  20 mg Intravenous Q12H LUIS CARLOS Zheng MD        Or   • famotidine (PEPCID) tablet 20 mg  20 mg Oral Q12H LUIS CARLOS Zheng MD   20 mg at 01/03/19 2211   • fluconazole (DIFLUCAN) tablet 150 mg  150 mg Oral Daily PRN Lizandro Burger MD       • gabapentin (NEURONTIN) capsule 1,600 mg  1,600 mg Oral Nightly José Luis Traore PA-C   1,600 mg at 01/03/19 2212   • gabapentin (NEURONTIN) capsule 800 mg  800 mg Oral BID LUIS CARLOS Zheng MD   800 mg at 01/03/19 1319   • Influenza Vac Subunit Quad (FLUCELVAX) injection 0.5 mL  0.5 mL Intramuscular During Hospitalization LUIS CARLOS Zheng MD       • ipratropium (ATROVENT) nebulizer solution 0.5 mg  0.5 mg Nebulization 4x Daily - RT LUIS CARLOS Zheng MD   Stopped at 01/03/19 2011   • lactated ringers infusion  30 mL/hr Intravenous Continuous Julieta Burns MD 30 mL/hr at 01/02/19  "1148 30 mL/hr at 01/02/19 1148   • ondansetron (ZOFRAN) tablet 4 mg  4 mg Oral Q6H PRN LUIS CARLOS Zheng MD        Or   • ondansetron ODT (ZOFRAN-ODT) disintegrating tablet 4 mg  4 mg Oral Q6H PRN LUIS CARLOS Zheng MD        Or   • ondansetron (ZOFRAN) injection 4 mg  4 mg Intravenous Q6H PRN LUIS CARLOS Zheng MD       • oxyCODONE-acetaminophen (PERCOCET)  MG per tablet 2 tablet  2 tablet Oral Q4H PRN LUIS CARLOS Zheng MD   2 tablet at 01/04/19 0421   • polyethylene glycol 3350 powder (packet)  17 g Oral BID Lizandro Burger MD   17 g at 01/03/19 2212   • sodium chloride 0.9 % flush 3 mL  3 mL Intravenous Q12H LUIS CARLOS Zheng MD   3 mL at 01/03/19 2212   • sodium chloride 0.9 % flush 3-10 mL  3-10 mL Intravenous PRN LUIS CARLOS Zheng MD       • Sodium chloride 0.9 % infusion  75 mL/hr Intravenous Continuous LUIS CARLOS Zheng MD       • tiZANidine (ZANAFLEX) tablet 4 mg  4 mg Oral Q8H PRN LUIS CARLOS Zheng MD         ALLERGIES:    Allergies   Allergen Reactions   • Aspirin Nausea And Vomiting     Unknown reaction   • Keflex [Cephalexin] Other (See Comments)     Blisters to nose and mouth       Lumbar stenosis    Panlobular emphysema (CMS/HCC)    Gastroesophageal reflux disease without esophagitis    Constipation due to opioid therapy    PAD (peripheral artery disease) (CMS/HCC)    Primary osteoarthritis involving multiple joints    Blood pressure 90/60, pulse 92, temperature 98.7 °F (37.1 °C), temperature source Oral, resp. rate 20, height 157.5 cm (62.01\"), weight 75 kg (165 lb 5.5 oz), SpO2 94 %.      Subjective:  Symptoms:  Stable.  She reports anxiety.  No shortness of breath, chest pain or chest pressure.    Diet:  NPO.    Activity level: Impaired due to pain.    Pain:  She complains of pain that is moderate.  She reports pain is improving.  Pain is partially controlled.      Review of Systems   Respiratory: Negative for shortness of breath.    Cardiovascular: Negative for chest " "pain.     Objective:  General Appearance:  Comfortable and well-appearing.    Vital signs: (most recent): Blood pressure 90/60, pulse 92, temperature 98.7 °F (37.1 °C), temperature source Oral, resp. rate 20, height 157.5 cm (62.01\"), weight 75 kg (165 lb 5.5 oz), SpO2 94 %.  Vital signs are normal.    Output: Producing urine and minimal stool output.    HEENT: Normal HEENT exam.    Lungs:  Normal effort and normal respiratory rate.    Heart: Normal rate.  Regular rhythm.    Abdomen: Abdomen is soft.  Hypoactive bowel sounds.   There is generalized tenderness.     Neurological: Patient is alert and oriented to person, place and time.    Skin:  Warm and dry.              Labs:  Lab Results (last 72 hours)     Procedure Component Value Units Date/Time    Iron [[id number]] Collected:  01/04/19 0645    Specimen:  Blood Updated:  01/04/19 0707    Vitamin B12 [[id number]] Collected:  01/04/19 0645    Specimen:  Blood Updated:  01/04/19 0707    Basic Metabolic Panel [[id number]]  (Abnormal) Collected:  01/03/19 0508    Specimen:  Blood Updated:  01/03/19 0540     Glucose 103 mg/dL      BUN 14 mg/dL      Creatinine 0.75 mg/dL      Sodium 135 mmol/L      Potassium 4.5 mmol/L      Chloride 99 mmol/L      CO2 28.0 mmol/L      Calcium 8.0 mg/dL      eGFR Non African Amer 79 mL/min/1.73      BUN/Creatinine Ratio 18.7     Anion Gap 8.0 mmol/L     Narrative:       GFR Normal >60  Chronic Kidney Disease <60  Kidney Failure <15    CBC & Differential [[id number]] Collected:  01/03/19 0508    Specimen:  Blood Updated:  01/03/19 0522    Narrative:       The following orders were created for panel order CBC & Differential.  Procedure                               Abnormality         Status                     ---------                               -----------         ------                     CBC Auto Differential[[id number]]        Abnormal            Final result                 Please view results for these tests on the individual " orders.    CBC Auto Differential [[id number]]  (Abnormal) Collected:  01/03/19 0508    Specimen:  Blood Updated:  01/03/19 0522     WBC 11.89 10*3/mm3      RBC 3.11 10*6/mm3      Hemoglobin 9.7 g/dL      Hematocrit 29.9 %      MCV 96.1 fL      MCH 31.2 pg      MCHC 32.4 g/dL      RDW 12.1 %      RDW-SD 42.2 fl      MPV 10.5 fL      Platelets 167 10*3/mm3      Neutrophil % 73.6 %      Lymphocyte % 17.6 %      Monocyte % 6.5 %      Eosinophil % 1.5 %      Basophil % 0.4 %      Immature Grans % 0.4 %      Neutrophils, Absolute 8.75 10*3/mm3      Lymphocytes, Absolute 2.09 10*3/mm3      Monocytes, Absolute 0.77 10*3/mm3      Eosinophils, Absolute 0.18 10*3/mm3      Basophils, Absolute 0.05 10*3/mm3      Immature Grans, Absolute 0.05 10*3/mm3      nRBC 0.0 /100 WBC           Imaging Results (last 72 hours)     Procedure Component Value Units Date/Time    XR Spine Lumbar AP & Lateral [[id number]] Collected:  01/02/19 1222     Updated:  01/02/19 1307    Narrative:       EXAMINATION:  XR SPINE LUMBAR AP AND LATERAL-  1/2/2019 9:20 AM CST     HISTORY: RLIF      COMPARISON: No comparison study.     TECHNIQUE:      Image number: [id number]     Fluoroscopy time: 44 seconds     Multi projection C-arm images were obtained in the operating room to  assist the surgeon in interbody and lateral fusion.     Postoperative devices appear well-positioned without hardware or  postprocedural complications.     The examination is available for review.  This report was finalized on 01/02/2019 12:24 by Dr. Silver Sarah MD.    FL C Arm During Surgery [[id number]] Collected:  01/02/19 1222     Updated:  01/02/19 1307    Narrative:       EXAMINATION:  XR SPINE LUMBAR AP AND LATERAL-  1/2/2019 9:20 AM CST     HISTORY: RLIF      COMPARISON: No comparison study.     TECHNIQUE:      Image number: 6     Fluoroscopy time: 44 seconds     Multi projection C-arm images were obtained in the operating room to  assist the surgeon in interbody and lateral fusion.      Postoperative devices appear well-positioned without hardware or  postprocedural complications.     The examination is available for review.  This report was finalized on 01/02/2019 12:24 by Dr. Silver Sarah MD.                Assessment:    Condition: In stable condition.  Improving.   (Plan to OR for step 2 today.  Constipation-start with Miralax 1 capful BID until BM, then decrease to 1 x a day,then can step up to Amitiza 24 mcg po BID which can be used for opioid induced constipation,and ultimately end up with Relistor 12 mcg subq every 48 hours to block the effect of narcotics on the gut.  Pain as expected  Plan discharge home over the weekend if medically stable.  Follow-up with her primary care doctor 1-2 weeks.    Explained to patient and staff that we were consulted for medical management during their acute care hospitalization. They need to f/u with their regular primary care provider concerning any further treatment and review of abnormalities found during their hospitalization at [facility] and they agree with that treatment plan. ).     Patient Active Problem List   Diagnosis   • Foraminal stenosis of lumbosacral region   • Chronic back pain   • Radiculopathy   • Lumbar stenosis   • Panlobular emphysema (CMS/HCC)   • Gastroesophageal reflux disease without esophagitis   • Constipation due to opioid therapy   • PAD (peripheral artery disease) (CMS/HCC)   • Primary osteoarthritis involving multiple joints     Lizandro Burger MD  1/4/2019

## 2019-01-05 PROBLEM — F33.9 RECURRENT MAJOR DEPRESSIVE DISORDER (HCC): Status: ACTIVE | Noted: 2019-01-05

## 2019-01-05 LAB
BASOPHILS # BLD AUTO: 0.04 10*3/MM3 (ref 0–0.2)
BASOPHILS NFR BLD AUTO: 0.4 % (ref 0–2)
DEPRECATED RDW RBC AUTO: 43.2 FL (ref 40–54)
EOSINOPHIL # BLD AUTO: 0.32 10*3/MM3 (ref 0–0.7)
EOSINOPHIL NFR BLD AUTO: 3 % (ref 0–4)
ERYTHROCYTE [DISTWIDTH] IN BLOOD BY AUTOMATED COUNT: 12.4 % (ref 12–15)
HCT VFR BLD AUTO: 26.4 % (ref 37–47)
HGB BLD-MCNC: 8.6 G/DL (ref 12–16)
IMM GRANULOCYTES # BLD AUTO: 0.06 10*3/MM3 (ref 0–0.03)
IMM GRANULOCYTES NFR BLD AUTO: 0.6 % (ref 0–5)
LYMPHOCYTES # BLD AUTO: 1.97 10*3/MM3 (ref 0.72–4.86)
LYMPHOCYTES NFR BLD AUTO: 18.4 % (ref 15–45)
MCH RBC QN AUTO: 30.7 PG (ref 28–32)
MCHC RBC AUTO-ENTMCNC: 32.6 G/DL (ref 33–36)
MCV RBC AUTO: 94.3 FL (ref 82–98)
MONOCYTES # BLD AUTO: 0.89 10*3/MM3 (ref 0.19–1.3)
MONOCYTES NFR BLD AUTO: 8.3 % (ref 4–12)
NEUTROPHILS # BLD AUTO: 7.44 10*3/MM3 (ref 1.87–8.4)
NEUTROPHILS NFR BLD AUTO: 69.3 % (ref 39–78)
NRBC BLD AUTO-RTO: 0 /100 WBC (ref 0–0)
PLATELET # BLD AUTO: 147 10*3/MM3 (ref 130–400)
PMV BLD AUTO: 10.7 FL (ref 6–12)
RBC # BLD AUTO: 2.8 10*6/MM3 (ref 4.2–5.4)
WBC NRBC COR # BLD: 10.72 10*3/MM3 (ref 4.8–10.8)

## 2019-01-05 PROCEDURE — 97164 PT RE-EVAL EST PLAN CARE: CPT

## 2019-01-05 PROCEDURE — 85025 COMPLETE CBC W/AUTO DIFF WBC: CPT | Performed by: INTERNAL MEDICINE

## 2019-01-05 PROCEDURE — 97116 GAIT TRAINING THERAPY: CPT

## 2019-01-05 PROCEDURE — 94799 UNLISTED PULMONARY SVC/PX: CPT

## 2019-01-05 PROCEDURE — 97166 OT EVAL MOD COMPLEX 45 MIN: CPT | Performed by: OCCUPATIONAL THERAPIST

## 2019-01-05 RX ORDER — ALBUTEROL SULFATE 90 UG/1
2 AEROSOL, METERED RESPIRATORY (INHALATION) EVERY 4 HOURS PRN
Status: DISCONTINUED | OUTPATIENT
Start: 2019-01-05 | End: 2019-01-08 | Stop reason: HOSPADM

## 2019-01-05 RX ORDER — BISACODYL 10 MG
10 SUPPOSITORY, RECTAL RECTAL DAILY PRN
Status: DISCONTINUED | OUTPATIENT
Start: 2019-01-05 | End: 2019-01-08 | Stop reason: HOSPADM

## 2019-01-05 RX ORDER — CLINDAMYCIN HYDROCHLORIDE 150 MG/1
300 CAPSULE ORAL ONCE
Status: COMPLETED | OUTPATIENT
Start: 2019-01-05 | End: 2019-01-05

## 2019-01-05 RX ADMIN — SODIUM CHLORIDE, PRESERVATIVE FREE 3 ML: 5 INJECTION INTRAVENOUS at 09:23

## 2019-01-05 RX ADMIN — OXYCODONE HYDROCHLORIDE AND ACETAMINOPHEN 2 TABLET: 10; 325 TABLET ORAL at 17:55

## 2019-01-05 RX ADMIN — FAMOTIDINE 20 MG: 20 TABLET, FILM COATED ORAL at 20:18

## 2019-01-05 RX ADMIN — OXYCODONE HYDROCHLORIDE AND ACETAMINOPHEN 2 TABLET: 10; 325 TABLET ORAL at 09:22

## 2019-01-05 RX ADMIN — POLYETHYLENE GLYCOL (3350) 17 G: 17 POWDER, FOR SOLUTION ORAL at 09:23

## 2019-01-05 RX ADMIN — GABAPENTIN 800 MG: 400 CAPSULE ORAL at 07:58

## 2019-01-05 RX ADMIN — POLYETHYLENE GLYCOL (3350) 17 G: 17 POWDER, FOR SOLUTION ORAL at 20:18

## 2019-01-05 RX ADMIN — GABAPENTIN 800 MG: 400 CAPSULE ORAL at 14:02

## 2019-01-05 RX ADMIN — ESCITALOPRAM 20 MG: 10 TABLET, FILM COATED ORAL at 09:23

## 2019-01-05 RX ADMIN — CLINDAMYCIN HYDROCHLORIDE 300 MG: 150 CAPSULE ORAL at 10:48

## 2019-01-05 RX ADMIN — CLINDAMYCIN IN 5 PERCENT DEXTROSE 900 MG: 18 INJECTION, SOLUTION INTRAVENOUS at 01:42

## 2019-01-05 RX ADMIN — BISACODYL 10 MG: 10 SUPPOSITORY RECTAL at 10:48

## 2019-01-05 RX ADMIN — GABAPENTIN 1600 MG: 400 CAPSULE ORAL at 20:18

## 2019-01-05 RX ADMIN — Medication 1 TABLET: at 17:03

## 2019-01-05 RX ADMIN — Medication 1 TABLET: at 07:58

## 2019-01-05 RX ADMIN — OXYCODONE HYDROCHLORIDE AND ACETAMINOPHEN 2 TABLET: 10; 325 TABLET ORAL at 14:00

## 2019-01-05 RX ADMIN — OXYCODONE HYDROCHLORIDE AND ACETAMINOPHEN 2 TABLET: 10; 325 TABLET ORAL at 04:11

## 2019-01-05 RX ADMIN — FAMOTIDINE 20 MG: 20 TABLET, FILM COATED ORAL at 09:23

## 2019-01-05 NOTE — PLAN OF CARE
Problem: Patient Care Overview  Goal: Plan of Care Review  Outcome: Ongoing (interventions implemented as appropriate)   01/05/19 0937   Coping/Psychosocial   Plan of Care Reviewed With patient   Plan of Care Review   Progress improving   OTHER   Outcome Summary OT eval completed. Pt CGA for rolling and sidelying to sit and Mod A for sit to sidelying. Pt able to sit <> stand t/f with CGA and rwx. Amb from bed to hallway and back to bed with CGA and rwx. Pt reports radiating from R knee to back with amb. Pt reports new onset tingling in R knee to foot. Max A required for donning socks. Pt would benefit from continued skilled OT services to increase adl and mobility independence. Recommend home with assist, dependent on pt progress, when d/c'd from facility.

## 2019-01-05 NOTE — THERAPY RE-EVALUATION
Acute Care - Occupational Therapy Re-Evaluation  [facility]     Patient Name: Arminda Jiménez  : 1960  MRN: [id number]  Today's Date: 2019  Onset of Illness/Injury or Date of Surgery: 19  Date of Referral to OT: 19  Referring Physician: Dr. Zheng    Admit Date: 2019       ICD-10-CM ICD-9-CM   1. Decreased activities of daily living (ADL) R68.89 780.99   2. Impaired functional mobility, balance, gait, and endurance Z74.09 V49.89     Patient Active Problem List   Diagnosis   • Foraminal stenosis of lumbosacral region   • Chronic back pain   • Radiculopathy   • Lumbar stenosis   • Panlobular emphysema (CMS/HCC)   • Gastroesophageal reflux disease without esophagitis   • Constipation due to opioid therapy   • PAD (peripheral artery disease) (CMS/HCC)   • Primary osteoarthritis involving multiple joints   • Chronic diastolic CHF (congestive heart failure) (CMS/HCC)   • Recurrent major depressive disorder (CMS/HCC)     Past Medical History:   Diagnosis Date   • Arthritis    • Asthma    • Cancer (CMS/HCC)     right lung,abd,and pelvic area   • CHF (congestive heart failure) (CMS/HCC)    • Chronic back pain    • COPD (chronic obstructive pulmonary disease) (CMS/HCC)    • Depression    • Facet arthropathy    • Foraminal stenosis of lumbosacral region    • GERD (gastroesophageal reflux disease)    • H/O degenerative disc disease    • History of blood transfusion    • PVD (peripheral vascular disease) (CMS/HCC)    • Radiculopathy      Past Surgical History:   Procedure Laterality Date   • ANTERIOR LUMBAR EXPOSURE N/A 2018    Procedure: ANTERIOR LUMBAR EXPOSURE;  Surgeon: Chris Haley DO;  Location: [facility] OR;  Service: Vascular   • APPENDECTOMY     • CARPAL TUNNEL RELEASE Bilateral    • LUMBAR FUSION N/A 2018    Procedure: ANTERIOR DECOMPRESSION, ANTERIOR LUMBAR INTERBODY FUSION WITH INSTRUMENTATION L5-S1;  Surgeon: LUIS CARLOS Zheng MD;  Location: [facility] OR;  Service: Orthopedic  Spine   • LUMBAR LAMINECTOMY WITH FUSION N/A 5/9/2018    Procedure: POSTERIOR SPINAL FUSION WITH INSTRUMENTATION L5-S1;  Surgeon: LUIS CARLOS Zheng MD;  Location: [facility] OR;  Service: Orthopedic Spine   • OTHER SURGICAL HISTORY Bilateral 2017    Stent implants BLE [place] [address] [place]          OT ASSESSMENT FLOWSHEET (last 72 hours)      Occupational Therapy Evaluation     Row Name 01/05/19 0817 01/04/19 1431 01/02/19 1354             OT Evaluation Time/Intention    Subjective Information  complains of;pain tingling in RLE from knee to foot.   -JJ  --  complains of;fatigue;pain;dizziness tingling in R foot   -J      Document Type  re-evaluation see MAR  -JJ  --  evaluation see MAR  -J      Mode of Treatment  occupational therapy;concurrent therapy  -J  --  occupational therapy  -      Evaluation Not Performed  --  patient/family decline, not feeling well  -JJ  --      Comment: Evaluation Not Performed  --  Pt declined eval d/t pain and nausea, will check back on 1/5.   -JJ  --      Patient Effort  good  -J  --  good  -         General Information    Patient Profile Reviewed?  yes  -JJ  --  yes  -J      Onset of Illness/Injury or Date of Surgery  01/04/19  -JJ  --  01/02/19  -      Referring Physician  Dr. Zheng  -  --  Dr. Zheng  -      Patient Observations  alert;cooperative;agree to therapy  -JJ  --  alert;cooperative;agree to therapy  -JJ      Patient/Family Observations  sister present in room   -J  --  sister at bedside   -J      General Observations of Patient  sitting up in bed, SCDs, IV infusing, pulse ox, no apparent distress.   -JJ  --  sitting up in bed, IV infusing, no apparent distress.   -J      Prior Level of Function  independent:;all household mobility;community mobility;transfer;bed mobility;ADL's cane for mobility   -JJ  --  independent:;all household mobility;transfer;bed mobility;ADL's;driving cane for mobility   -J      Equipment Currently Used at Home  cane, straight  -JJ   --  cane, straight  -JJ      Pertinent History of Current Functional Problem  s/p 1/4 PSF L3-S1.  s/p R LLIF at L3-5 on 1/2/19  -JJ  --  Pt s/p R LLIF at L3-5 on 1/2/19. Dx: Lumbar stenosis. Second sx scheduled for 1/4.   -JJ      Existing Precautions/Restrictions  fall;brace worn when out of bed;spinal  -JJ  --  spinal;fall;brace worn when out of bed  -JJ      Equipment Issued to Patient  gait belt;walker, front wheeled  -JJ  --  gait belt  -JJ      Risks Reviewed  patient and family:;LOB;nausea/vomiting;dizziness;increased discomfort;change in vital signs;increased drainage;lines disloged  -JJ  --  patient:;LOB;nausea/vomiting;increased discomfort;dizziness;change in vital signs;increased drainage;lines disloged  -JJ      Benefits Reviewed  patient and family:;improve function;increase independence;increase strength;increase balance;decrease pain;decrease risk of DVT;improve skin integrity;increase knowledge  -JJ  --  patient:;improve function;increase independence;increase strength;increase balance;decrease pain;decrease risk of DVT;improve skin integrity;increase knowledge  -JJ      Barriers to Rehab  none identified  -JJ  --  --         Relationship/Environment    Primary Source of Support/Comfort  child(leno);sibling(s)  -JJ  --  child(leno)  -JJ      Lives With  alone  -JJ  --  alone  -JJ      Family Caregiver if Needed  child(leno), adult  -JJ  --  child(leno), adult  -JJ      Primary Roles/Responsibilities  disabled  -JJ  --  disabled  -JJ         Resource/Environmental Concerns    Current Living Arrangements  home/apartment/condo  -JJ  --  home/apartment/condo  -JJ      Resource/Environmental Concerns  none  -JJ  --  none  -JJ      Transportation Concerns  car, none  -JJ  --  --         Home Main Entrance    Number of Stairs, Main Entrance  two  -JJ  --  two  -JJ      Stair Railings, Main Entrance  railing on right side (ascending)  -JJ  --  railing on right side (ascending)  -JJ         Cognitive  Assessment/Interventions    Additional Documentation  Cognitive Assessment/Intervention (Group)  -JJ  --  Cognitive Assessment/Intervention (Group)  -         Cognitive Assessment/Intervention- PT/OT    Affect/Mental Status (Cognitive)  WFL  -JJ  --  WFL  -JJ      Orientation Status (Cognition)  oriented x 4  -JJ  --  oriented x 4  -JJ      Follows Commands (Cognition)  WNL  -JJ  --  WFL  -JJ      Cognitive Function (Cognitive)  WNL  -JJ  --  WFL  -JJ      Personal Safety Interventions  fall prevention program maintained;gait belt;muscle strengthening facilitated;nonskid shoes/slippers when out of bed;supervised activity  -  --  fall prevention program maintained;gait belt;muscle strengthening facilitated;nonskid shoes/slippers when out of bed;supervised activity  -         Safety Issues, Functional Mobility    Impairments Affecting Function (Mobility)  --  --  balance;pain  -         Bed Mobility Assessment/Treatment    Bed Mobility Assessment/Treatment  rolling right;sidelying-sit;sit-sidelying  -  --  rolling right;sidelying-sit;sit-sidelying  -      Rolling Right [place] (Bed Mobility)  contact guard;verbal cues  -  --  independent  -      Sidelying-Sit [place] (Bed Mobility)  contact guard;verbal cues  -  --  independent  -      Sit-Sidelying [place] (Bed Mobility)  moderate assist (50% patient effort);verbal cues  -  --  independent  -      Bed Mobility, Safety Issues  decreased use of arms for pushing/pulling;decreased use of legs for bridging/pushing;impaired trunk control for bed mobility  -  --  decreased use of arms for pushing/pulling  -      Assistive Device (Bed Mobility)  bed rails  -  --  bed rails  -         Functional Mobility    Functional Mobility- Ind. Level  contact guard assist  -  --  contact guard assist  -      Functional Mobility- Device  rolling walker  -  --  straight cane  -      Functional Mobility- Comment  Amb from bed to hallway  and back to bed with CGA and rwx. Increased pain radiating from knee to back with amb.   -JJ  --  Amb from bed to bathroom and back to bed with CGA and cane.   -JJ         Transfer Assessment/Treatment    Transfer Assessment/Treatment  sit-stand transfer;stand-sit transfer  -JJ  --  sit-stand transfer;stand-sit transfer;toilet transfer  -J         Sit-Stand Transfer    Sit-Stand [place] (Transfers)  contact guard;verbal cues  -JJ  --  contact guard  -JJ      Assistive Device (Sit-Stand Transfers)  walker, front-wheeled  -JJ  --  cane, straight  -JJ         Stand-Sit Transfer    Stand-Sit [place] (Transfers)  contact guard  -JJ  --  contact guard  -JJ      Assistive Device (Stand-Sit Transfers)  walker, front-wheeled  -JJ  --  cane, straight  -JJ         Toilet Transfer    Type (Toilet Transfer)  --  --  sit-stand;stand-sit  -      [place] Level (Toilet Transfer)  --  --  contact guard  -JJ      Assistive Device (Toilet Transfer)  --  --  cane, straight  -JJ         ADL Assessment/Intervention    BADL Assessment/Intervention  lower body dressing;upper body dressing  -  --  lower body dressing;toileting  -         Upper Body Dressing Assessment/Training    Upper Body Dressing [place] Level  don;doff;pajama/robe;minimum assist (75% patient effort) LSO  -  --  --      Upper Body Dressing Position  edge of bed sitting  -  --  --         Lower Body Dressing Assessment/Training    Lower Body Dressing [place] Level  don;socks;maximum assist (25% patient effort)  -JJ  --  don;socks;maximum assist (25% patient effort)  -      Lower Body Dressing Position  supine  -  --  edge of bed sitting  -         Toileting Assessment/Training    [place] Level (Toileting)  --  --  toileting skills;adjust/manage clothing;perform perineal hygiene;contact guard assist  -      Assistive Devices (Toileting)  --  --  commode  -JJ      Toileting Position  --  --  supported sitting  -         BADL  Safety/Performance    Impairments, BADL Safety/Performance  endurance/activity tolerance;pain;range of motion;strength;sensory awareness  -  --  balance;pain  -      Skilled BADL Treatment/Intervention  adaptive equipment training;BADL process/adaptation training;environmental modifications  -J  --  BADL process/adaptation training;adaptive equipment training  -         General ROM    GENERAL ROM COMMENTS  BUE AROM WFL   -JJ  --  BUE and BLE AROM WFL   -J         MMT (Manual Muscle Testing)    General MMT Comments  BUE strength functionally 4/5  -JJ  --  BUE strength functionally 4+/5, BLE strength functionally 4/5  -J         Motor Assessment/Interventions    Additional Documentation  Balance (Group)  -J  --  Balance (Group)  -         Balance    Balance  static sitting balance;static standing balance  -  --  static sitting balance;static standing balance;dynamic sitting balance  -         Static Sitting Balance    Level of [place] (Unsupported Sitting, Static Balance)  supervision  -  --  independent  -      Sitting Position (Unsupported Sitting, Static Balance)  sitting on edge of bed  -  --  sitting on edge of bed  -         Dynamic Sitting Balance    Level of [place], Reaches Outside Midline (Sitting, Dynamic Balance)  --  --  contact guard assist  -      Sitting Position, Reaches Outside Midline (Sitting, Dynamic Balance)  --  --  sitting on edge of bed  -         Static Standing Balance    Level of [place] (Supported Standing, Static Balance)  contact guard assist  -  --  contact guard assist  -      Assistive Device Utilized (Supported Standing, Static Balance)  rolling walker  -  --  straight cane  -         Sensory Assessment/Intervention    Sensory General Assessment  light touch sensation deficits identified  -  --  other (see comments) R foot tingling   -         Light Touch Sensation Assessment    Right Lower Extremity: Light Touch Sensation  "Assessment  other (see comments) tingling reports from R knee to foot.   -JJ  --  --         Positioning and Restraints    Pre-Treatment Position  in bed  -JJ  --  in bed  -J      Post Treatment Position  bed  -JJ  --  bed  -JJ      In Bed  supine;call light within reach;encouraged to call for assist;with family/caregiver;side rails up x2;SCD pump applied  -J  --  fowlers;call light within reach;encouraged to call for assist;exit alarm on;with family/caregiver;side rails up x2;SCD pump applied  -         Pain Assessment    Additional Documentation  Pain Scale: Numbers Pre/Post-Treatment (Group)  -JJ  --  Pain Scale: Numbers Pre/Post-Treatment (Group)  -JJ         Pain Scale: Numbers Pre/Post-Treatment    Pain Scale: Numbers, Pretreatment  10/10  -JJ  --  4/10  -JJ      Pain Scale: Numbers, Post-Treatment  10/10  -JJ  --  3/10  -JJ      Pain Location - Side  Right  -J  --  --      Pain Location - Orientation  lower  -J  --  --      Pain Location  back leg  -J  --  back  -      Pre/Post Treatment Pain Comment  increased pain with amb \"25\"  -J  --  --      Pain Intervention(s)  Medication (See MAR);Repositioned;Ambulation/increased activity  -  --  Medication (See MAR);Repositioned;Ambulation/increased activity  -         Orthotics & Prosthetics Management    Orthosis Location  spinal orthosis  -J  --  spinal orthosis  -J      Additional Documentation  Orthosis Location (Row)  -  --  Orthosis Location (Row)  -         Spinal Orthosis Management    Type (Spinal Orthosis)  LSO (lumbar sacral orthosis)  -J  --  LSO (lumbar sacral orthosis)  -      Functional Design (Spinal Orthosis)  static orthosis  -  --  static orthosis  -      Therapeutic Indications (Spinal Orthosis)  pain management;post-op positioning/protection;rest/inflammation reduction;stabilization and support  -J  --  post-op positioning/protection;rest/inflammation reduction;stabilization and support  -      Wearing Schedule " (Spinal Orthosis)  wear when out of bed only  -JJ  --  wear when out of bed only  -JJ      Orthosis Training (Spinal Orthosis)  patient;all orthosis skills  -JJ  --  patient;all orthosis skills  -JJ      Compliance/Wearing Issues (Spinal Orthosis)  patient/caregiver comprehend strategies  -JJ  --  patient/caregiver comprehend strategies  -JJ         Wound 01/02/19 1013 Right flank incision    Wound - Properties Group Date first assessed: 01/02/19  -JR Time first assessed: 1013  -JR Side: Right  -JR Location: flank  -JR Type: incision  -JR       Wound 01/04/19 1143 Bilateral back incision    Wound - Properties Group Date first assessed: 01/04/19  -RS Time first assessed: 1143  -RS Side: Bilateral  -RS Location: back  -RS Type: incision  -RS       Coping    Verbalized Emotional State  --  --  acceptance  -JJ         Plan of Care Review    Plan of Care Reviewed With  patient  -JJ  --  patient;sibling  -JJ         Clinical Impression (OT)    Date of Referral to OT  01/04/19  -JJ  --  01/02/19  -JJ      OT Diagnosis  decreased adls  -JJ  --  decreased adls   -JJ      Prognosis (OT Eval)  good  -JJ  --  good  -JJ      Patient/Family Goals Statement (OT Eval)  go home  -JJ  --  go home   -JJ      Criteria for Skilled Therapeutic Interventions Met (OT Eval)  yes;treatment indicated  -JJ  --  yes;treatment indicated  -JJ      Rehab Potential (OT Eval)  good, to achieve stated therapy goals  -JJ  --  good, to achieve stated therapy goals  -JJ      Therapy Frequency (OT Eval)  5 times/wk  -JJ  --  5 times/wk  -JJ      Predicted Duration of Therapy Intervention (Therapy Eval)  until d/c from facility   -JJ  --  until d/c from facility   -JJ      Care Plan Review (OT)  evaluation/treatment results reviewed;risks/benefits reviewed;care plan/treatment goals reviewed;current/potential barriers reviewed;patient/other agree to care plan  -JJ  --  evaluation/treatment results reviewed;care plan/treatment goals reviewed;risks/benefits  reviewed;current/potential barriers reviewed;patient/other agree to care plan  -JJ      Anticipated Equipment Needs at Discharge (OT)  front wheeled walker  -JJ  --  --      Anticipated Discharge Disposition (OT)  home with assist  -JJ  --  home with assist  -JJ         Vital Signs    Pre SpO2 (%)  --  --  91  -JJ      Post SpO2 (%)  --  --  84  -JJ      Pre Patient Position  --  --  -- fowlers   -JJ      Post Patient Position  --  --  -- fowlers   -JJ         Planned OT Interventions    Planned Therapy Interventions (OT Eval)  activity tolerance training;adaptive equipment training;BADL retraining;functional balance retraining;occupation/activity based interventions;patient/caregiver education/training;orthotic fabrication/fitting/training;strengthening exercise;transfer/mobility retraining  -JJ  --  adaptive equipment training;BADL retraining;functional balance retraining;occupation/activity based interventions;patient/caregiver education/training  -JJ         OT Goals    Dressing Goal Selection (OT)  dressing, OT goal 1  -JJ  --  dressing, OT goal 1  -JJ         Dressing Goal 1 (OT)    Activity/Assistive Device (Dressing Goal 1, OT)  dressing skills, all;lower body dressing  -JJ  --  dressing skills, all;lower body dressing  -JJ      [place]/Cues Needed (Dressing Goal 1, OT)  minimum assist (75% or more patient effort)  -JJ  --  conditional independence  -JJ      Time Frame (Dressing Goal 1, OT)  long term goal (LTG);by discharge  -JJ  --  long term goal (LTG);by discharge  -JJ      Progress/Outcome (Dressing Goal 1, OT)  goal ongoing  -JJ  --  goal ongoing  -JJ         Patient Education Goal (OT)    Activity (Patient Education Goal, OT)  LSO wear schedule/fitting/mgt, spinal precuations.   -JJ  --  LSO wear schedule/fitting/mgt, spinal precuations.   -JJ      [place]/Cues/Accuracy (Memory Goal 2, OT)  verbalizes understanding  -JJ  --  verbalizes understanding  -JJ      Time Frame (Patient Education  Goal, OT)  long term goal (LTG);by discharge  -J  --  long term goal (LTG);by discharge  -J      Progress/Outcome (Patient Education Goal, OT)  goal ongoing  -J  --  goal ongoing  -J         Living Environment    Home Accessibility  stairs to enter home walk-in shower   -JJ  --  stairs to enter home walk-in shower   -JJ        User Key  (r) = Recorded By, (t) = Taken By, (c) = Cosigned By    Initials Name Effective Dates    JR Betsy Chin RN 08/02/16 -     Jose Thomas RN 07/26/16 -     Betsy Rodriguez OTR/L 10/12/18 -          Occupational Therapy Education     Title: PT OT SLP Therapies (Not Started)     Topic: Occupational Therapy (In Progress)     Point: ADL training (Done)     Description: Instruct learner(s) on proper safety adaptation and remediation techniques during self care or transfers.   Instruct in proper use of assistive devices.    Learning Progress Summary           Patient Acceptance, E, VU by JOSE at 1/5/2019  9:17 AM    Comment:  Pt educated on the benefits and roles of OT, POC, adl modfications, t/fs, spinal preucations, LSO wear schedule.    Acceptance, E, VU by JODI at 1/3/2019  1:14 PM    Comment:  transfers, back precautions, ADL modification, DME    Acceptance, E, VU by JOSE at 1/2/2019  2:43 PM    Comment:  Pt educated on the benefits and role of OT, POC, spinal precuations, LSO wear schedule/fitting/mgt, adl modifications, t/fs, bed mobility, assistive device use.                   Point: Precautions (Done)     Description: Instruct learner(s) on prescribed precautions during self-care and functional transfers.    Learning Progress Summary           Patient Acceptance, E, VU by JOSE at 1/5/2019  9:17 AM    Comment:  Pt educated on the benefits and roles of OT, POC, adl modfications, t/fs, spinal preucations, LSO wear schedule.    Acceptance, E, VU by JODI at 1/3/2019  1:14 PM    Comment:  transfers, back precautions, ADL modification, DME    Acceptance, E, VU by JOSE  at 1/2/2019  2:43 PM    Comment:  Pt educated on the benefits and role of OT, POC, spinal precuations, LSO wear schedule/fitting/mgt, adl modifications, t/fs, bed mobility, assistive device use.                   Point: Body mechanics (Done)     Description: Instruct learner(s) on proper positioning and spine alignment during self-care, functional mobility activities and/or exercises.    Learning Progress Summary           Patient Acceptance, E, VU by JOSE at 1/5/2019  9:17 AM    Comment:  Pt educated on the benefits and roles of OT, POC, adl modfications, t/fs, spinal preucations, LSO wear schedule.    Acceptance, E, VU by JOSE at 1/2/2019  2:43 PM    Comment:  Pt educated on the benefits and role of OT, POC, spinal precuations, LSO wear schedule/fitting/mgt, adl modifications, t/fs, bed mobility, assistive device use.                               User Key     Initials Effective Dates Name Provider Type Discipline     08/02/16 -  Stacy Ogden COTA/L Occupational Therapy Assistant OT    JOSE 10/12/18 -  Betsy Renee OTR/L Occupational Therapist OT                  OT Recommendation and Plan  Outcome Summary/Treatment Plan (OT)  Anticipated Equipment Needs at Discharge (OT): front wheeled walker  Anticipated Discharge Disposition (OT): home with assist  Planned Therapy Interventions (OT Eval): activity tolerance training, adaptive equipment training, BADL retraining, functional balance retraining, occupation/activity based interventions, patient/caregiver education/training, orthotic fabrication/fitting/training, strengthening exercise, transfer/mobility retraining  Therapy Frequency (OT Eval): 5 times/wk  Plan of Care Review  Plan of Care Reviewed With: patient  Plan of Care Reviewed With: patient  Outcome Summary: OT eval completed. Pt CGA for rolling and sidelying to sit and Mod A for sit to sidelying. Pt able to sit <> stand t/f with CGA and rwx. Amb from bed to hallway and back to bed with CGA and  rwx. Pt reports radiating from R knee to back with amb. Pt reports new onset tingling in R knee to foot. Max A required for donning socks. Pt would benefit from continued skilled OT services to increase adl and mobility independence. Recommend home with assist, dependent on pt progress, when d/c'd from facility.     Outcome Measures     Row Name 01/05/19 0900 01/03/19 1314 01/03/19 1300       How much help from another person do you currently need...    Turning from your back to your side while in flat bed without using bedrails?  --  --  3  -JE    Moving from lying on back to sitting on the side of a flat bed without bedrails?  --  --  3  -JE    Moving to and from a bed to a chair (including a wheelchair)?  --  --  3  -JE    Standing up from a chair using your arms (e.g., wheelchair, bedside chair)?  --  --  3  -JE    Climbing 3-5 steps with a railing?  --  --  3  -JE    To walk in hospital room?  --  --  3  -JE    AM-PAC 6 Clicks Score  --  --  18  -JE       How much help from another is currently needed...    Putting on and taking off regular lower body clothing?  2  -JJ  2  -TS  --    Bathing (including washing, rinsing, and drying)  2  -JJ  3  -TS  --    Toileting (which includes using toilet bed pan or urinal)  3  -JJ  4  -TS  --    Putting on and taking off regular upper body clothing  3  -JJ  4  -TS  --    Taking care of personal grooming (such as brushing teeth)  3  -JJ  4  -TS  --    Eating meals  4  -JJ  4  -TS  --    Score  17  -JJ  21  -TS  --       Functional Assessment    Outcome Measure Options  AM-PAC 6 Clicks Daily Activity (OT)  -JJ  AM-PAC 6 Clicks Daily Activity (OT)  -TS  AM-PAC 6 Clicks Basic Mobility (PT)  -JE    Row Name 01/02/19 1400             How much help from another is currently needed...    Putting on and taking off regular lower body clothing?  2  -JJ      Bathing (including washing, rinsing, and drying)  3  -JJ      Toileting (which includes using toilet bed pan or urinal)  4  -JJ       Putting on and taking off regular upper body clothing  4  -JJ      Taking care of personal grooming (such as brushing teeth)  4  -J      Eating meals  4  -      Score  21  -         Functional Assessment    Outcome Measure Options  AM-PAC 6 Clicks Daily Activity (OT)  -        User Key  (r) = Recorded By, (t) = Taken By, (c) = Cosigned By    Initials Name Provider Type    TS Stacy Ogden, MCELROY/L Occupational Therapy Assistant    Marimar Christensen, PT Physical Therapist    Betsy Rodriguez OTR/L Occupational Therapist          Time Calculation:   Time Calculation- OT     Row Name 01/05/19 0912             Time Calculation- OT    OT Start Time  0815 add 5 minutes for chart review; add 8 minutes for attempted eval on 1/4.   -      OT Stop Time  0851  -      OT Time Calculation (min)  36 min  -      OT Received On  01/05/19  -      OT Goal Re-Cert Due Date  01/15/19  -        User Key  (r) = Recorded By, (t) = Taken By, (c) = Cosigned By    Initials Name Provider Type    Betsy Rodriguez OTR/L Occupational Therapist        Therapy Suggested Charges     Code   Minutes Charges    49496 (CPT®) Hc Ot Neuromusc Re Education Ea 15 Min      83613 (CPT®) Hc Ot Ther Proc Ea 15 Min      30728 (CPT®) Hc Ot Therapeutic Act Ea 15 Min      65784 (CPT®) Hc Ot Manual Therapy Ea 15 Min      25364 (CPT®) Hc Ot Iontophoresis Ea 15 Min      52817 (CPT®) Hc Ot Elec Stim Ea-Per 15 Min      83831 (CPT®) Hc Ot Ultrasound Ea 15 Min      28398 (CPT®) Hc Ot Self Care/Mgmt/Train Ea 15 Min 27 2    Total  27 2        Therapy Charges for Today     Code Description Service Date Service Provider Modifiers Qty    68873197846 HC OT EVAL MOD COMPLEXITY 3 1/5/2019 Betsy Renee OTR/L GO 1               BACILIO Lam/ABDOULAYE  1/5/2019

## 2019-01-05 NOTE — PROGRESS NOTES
Orthopedic Spine Progress Note    Arminda Jiménez  58 y.o.  female  Subjective     Post-Operative Day # 3/1  Systemic or Specific Complaints:     Sore, not walked much yet, havent been up to bathroom alone yet, very sore.    Objective     Vital signs in last 24 hours:  Temp:  [97.7 °F (36.5 °C)-99.6 °F (37.6 °C)] 98.7 °F (37.1 °C)  Heart Rate:  [] 86  Resp:  [11-18] 18  BP: ()/() 92/53    Physical Exam:    Alert and oriented ×3, no acute distress, grossly neurovascularly intact, vital signs stable, dressing clean dry and intact, moving all extremities without focal deficit    Diagnostic Data:  CBC:  Results from last 7 days   Lab Units  01/05/19   0556   WBC 10*3/mm3  10.72   RBC 10*6/mm3  2.80*   HEMOGLOBIN g/dL  8.6*   HEMATOCRIT %  26.4*   PLATELETS 10*3/mm3  147          Assessment/Plan     1.  Status post anterior decompression, ALIF with instrumentation L5-S1, 5/7/2018  2.  Status post PSF with instrumentation left L5-S1, 5/9/2018  3.  Recurrent back pain  4.  Bilateral buttock, thigh, and leg radiculopathy, right worse than left  5.  Recurrent neurogenic claudication  6.  Adjacent level degenerative disc disease L3 to 5  7.  Degenerative lumbar scoliosis L3 to 5  8.  Facet arthropathy L3 to 5  9.  Central and bilateral foraminal stenosis L3 to 5, worse right L3-4  10.  Osteoporosis, T score -2.5, 4/11/2018  11.  Status post right LLIF L3 to 5 with instrumentation L3-4, 1/2/2019  12. Status post Posterior spinal fusion L3-S1 - 1/4/19      Plan:  1. PT/OT/Brace  2. Periops  3. Probably home tomorrow    MIRA Lara    Date: 1/5/2019  Time: 7:34 AM

## 2019-01-05 NOTE — PLAN OF CARE
Problem: Patient Care Overview  Goal: Plan of Care Review  Outcome: Ongoing (interventions implemented as appropriate)   01/05/19 0453   Coping/Psychosocial   Plan of Care Reviewed With patient   Plan of Care Review   Progress no change   OTHER   Outcome Summary patient with R flank dressing dry and intact and midline lumbar dressing with scant dried drainage, c/o back and R leg pain, prn pain meds given with relief, up with assist of one and back brace, O2 sat 98% on 2L, BP 92/53, will monitor       Problem: Laminectomy/Foraminotomy/Discectomy (Adult)  Goal: Signs and Symptoms of Listed Potential Problems Will be Absent, Minimized or Managed (Laminectomy/Foraminotomy/Discectomy)  Outcome: Ongoing (interventions implemented as appropriate)      Problem: Fall Risk (Adult)  Goal: Absence of Fall  Outcome: Ongoing (interventions implemented as appropriate)

## 2019-01-05 NOTE — PLAN OF CARE
Problem: Patient Care Overview  Goal: Plan of Care Review  Outcome: Ongoing (interventions implemented as appropriate)   01/05/19 1528   Coping/Psychosocial   Plan of Care Reviewed With patient   Plan of Care Review   Progress improving   OTHER   Outcome Summary A&O X4. Safety maintained. PRN pain medication given upon request with some relief of symptoms. Trying to ween O2. Right flank and lower back dressings changed to tegaderm per order, CDI. Suppository given for BM, effective. Will continue to monitor.      Goal: Individualization and Mutuality  Outcome: Ongoing (interventions implemented as appropriate)    Goal: Discharge Needs Assessment  Outcome: Ongoing (interventions implemented as appropriate)    Goal: Interprofessional Rounds/Family Conf  Outcome: Ongoing (interventions implemented as appropriate)      Problem: Laminectomy/Foraminotomy/Discectomy (Adult)  Goal: Signs and Symptoms of Listed Potential Problems Will be Absent, Minimized or Managed (Laminectomy/Foraminotomy/Discectomy)  Outcome: Ongoing (interventions implemented as appropriate)      Problem: Fall Risk (Adult)  Goal: Absence of Fall  Outcome: Ongoing (interventions implemented as appropriate)

## 2019-01-05 NOTE — PROGRESS NOTES
Medicine Progress Note 1/5/2019    Patient:  Arminda Jiménez  YOB: 1960  MRN: [id number]     Acct: [id number]   Admit date: 1/2/2019    Patient Seen, Chart, Consults notes, Labs, Radiology studies reviewed.    Subjective:   Underwent 2nd part spinal surgery yesterday without complications.  Had some hypoxia afterwards.  Has known COPD & on breo & spiriva at home.  Not on supp O2 at home.  Complains of low back & R hip pain.  Has not started work with PT.  Last BM several days ago.        Medications:   Scheduled Meds:  budesonide-formoterol 2 puff Inhalation BID - RT   calcium carb-cholecalciferol 1 tablet Oral BID With Meals   clindamycin 900 mg Intravenous Q8H   escitalopram 20 mg Oral Daily   estrogen (conjugated)-medroxyprogesterone 1 tablet Oral Daily   famotidine 20 mg Intravenous Q12H   Or      famotidine 20 mg Oral Q12H   gabapentin 1,600 mg Oral Nightly   gabapentin 800 mg Oral BID   ipratropium 0.5 mg Nebulization 4x Daily - RT   polyethylene glycol 17 g Oral BID   sodium chloride 3 mL Intravenous Q12H     Continuous Infusions:  lactated ringers 30 mL/hr Last Rate: 30 mL/hr (01/02/19 1148)   lactated ringers 100 mL/hr Last Rate: 100 mL/hr (01/04/19 1515)   Sodium chloride 75 mL/hr      PRN Meds:   albuterol 2.5 mg Q4H PRN   diphenhydrAMINE 25 mg Nightly PRN   fluconazole 150 mg Daily PRN   influenza vaccine 0.5 mL During Hospitalization   ondansetron 4 mg Q6H PRN   Or     ondansetron ODT 4 mg Q6H PRN   Or     ondansetron 4 mg Q6H PRN   oxyCODONE-acetaminophen 2 tablet Q4H PRN   sodium chloride 3-10 mL PRN   tiZANidine 4 mg Q8H PRN         Objective:   Vitals:   Patient Vitals for the past 24 hrs:   BP Temp Temp src Pulse Resp SpO2   01/05/19 0414 92/53 98.7 °F (37.1 °C) -- 86 18 98 %   01/04/19 2346 98/63 99.2 °F (37.3 °C) Oral 88 17 98 %   01/04/19 2011 -- 97.9 °F (36.6 °C) Oral -- -- --   01/04/19 1956 -- -- -- 85 -- 95 %   01/04/19 1937 97/56 -- Oral 86 18 98 %   01/04/19 1431 109/57  98.5 °F (36.9 °C) Oral 90 16 91 %   01/04/19 1427 -- -- -- 84 18 95 %   01/04/19 1421 -- -- -- 87 16 95 %   01/04/19 1401 108/55 98.4 °F (36.9 °C) Oral 87 16 95 %   01/04/19 1345 110/58 99.6 °F (37.6 °C) Oral 88 16 93 %   01/04/19 1325 (!) 112/35 -- -- 96 14 96 %   01/04/19 1310 111/57 97.7 °F (36.5 °C) Temporal 92 12 95 %   01/04/19 1255 112/60 -- -- 94 12 97 %   01/04/19 1244 -- -- -- -- -- 95 %   01/04/19 1243 109/82 -- -- 95 11 (!) 86 %   01/04/19 1240 128/61 -- -- 96 12 100 %   01/04/19 1230 132/83 -- -- 96 15 100 %   01/04/19 1225 131/79 -- -- 94 13 99 %   01/04/19 1220 137/73 -- -- 103 13 98 %   01/04/19 1215 (!) 126/111 -- -- 99 14 100 %   01/04/19 1210 144/79 98.1 °F (36.7 °C) Temporal 98 17 100 %   01/04/19 0911 -- -- -- 84 -- 94 %   01/04/19 0748 -- -- -- -- -- 93 %   01/04/19 0747 105/57 -- -- 91 -- (!) 88 %   01/04/19 0735 (!) 89/59 98.7 °F (37.1 °C) Oral 92 16 (!) 83 %   01/04/19 0630 -- -- -- 92 20 94 %   01/04/19 0530 -- 98.7 °F (37.1 °C) Oral -- -- --     GEN: Awake, alert, no acute distress.  NC in place.  RESP: Full and symmetric respirations.  Chest clear to auscultation bilaterally, no wheezes, rales, or rhonchi.  CARD: Regular rate and rhythm.  No murmurs, rubs or gallops.  ABD: Normoactive bowel sounds.  Abdomen soft, nontender, and nondistended.  EXT: No cyanosis, clubbing, or edema.  Brace in place.      24 hour intake/output:    Intake/Output Summary (Last 24 hours) at 1/5/2019 0521  Last data filed at 1/5/2019 0142  Gross per 24 hour   Intake 1920 ml   Output --   Net 1920 ml     Last 3 weights:  Wt Readings from Last 3 Encounters:   01/02/19 75 kg (165 lb 5.5 oz)   12/26/18 75 kg (165 lb 5.5 oz)   05/07/18 72.6 kg (160 lb 2 oz)       Labs:  Results from last 7 days   Lab Units  01/03/19   0508   WBC 10*3/mm3  11.89*   HEMOGLOBIN g/dL  9.7*   HEMATOCRIT %  29.9*   PLATELETS 10*3/mm3  167     Results from last 7 days   Lab Units  01/03/19   0508   SODIUM mmol/L  135   POTASSIUM mmol/L  4.5    CHLORIDE mmol/L  99   CO2 mmol/L  28.0   BUN mg/dL  14   CREATININE mg/dL  0.75   CALCIUM mg/dL  8.0*   GLUCOSE mg/dL  103*         Lab Results   Component Value Date    GLUCOSE 103 (H) 01/03/2019    GLUCOSE 89 12/26/2018    GLUCOSE 102 (H) 05/10/2018    GLUCOSE 128 (H) 05/08/2018    GLUCOSE 97 04/24/2018       Radiology:   Imaging Results (last 72 hours)     Procedure Component Value Units Date/Time    XR Spine Lumbar AP & Lateral [[id number]] Collected:  01/04/19 1138     Updated:  01/04/19 1345    Narrative:       EXAMINATION: Seminar 1/4/2019     Fluoroscopy time: 37 seconds. 2 images are submitted for interpretation     FINDINGS: Today's exam is compared to previous study of 2 days earlier.  The patient's undergone previous anterior fusion at the L5-S1 level with  pedicle screws at the L5-S1 level and a lateral fusion plate at the  L3-L4 level.     New pedicles screws have been placed at L3, L5 and S1. There is good  anatomic alignment. The films are available to the OR for review.  This report was finalized on 01/04/2019 11:41 by Dr. Bill Benitez MD.    FL C Arm During Surgery [[id number]] Collected:  01/04/19 1138     Updated:  01/04/19 1345    Narrative:       EXAMINATION: Seminar 1/4/2019     Fluoroscopy time: 37 seconds. 2 images are submitted for interpretation     FINDINGS: Today's exam is compared to previous study of 2 days earlier.  The patient's undergone previous anterior fusion at the L5-S1 level with  pedicle screws at the L5-S1 level and a lateral fusion plate at the  L3-L4 level.     New pedicles screws have been placed at L3, L5 and S1. There is good  anatomic alignment. The films are available to the OR for review.  This report was finalized on 01/04/2019 11:41 by Dr. Bill Benitez MD.    XR Spine Lumbar AP & Lateral [[id number]] Collected:  01/02/19 1222     Updated:  01/02/19 1307    Narrative:       EXAMINATION:  XR SPINE LUMBAR AP AND LATERAL-  1/2/2019 9:20 AM CST     HISTORY:  RLIF      COMPARISON: No comparison study.     TECHNIQUE:      Image number: [id number]     Fluoroscopy time: 44 seconds     Multi projection C-arm images were obtained in the operating room to  assist the surgeon in interbody and lateral fusion.     Postoperative devices appear well-positioned without hardware or  postprocedural complications.     The examination is available for review.  This report was finalized on 01/02/2019 12:24 by Dr. Silver Sarah MD.    FL C Arm During Surgery [[id number]] Collected:  01/02/19 1222     Updated:  01/02/19 1307    Narrative:       EXAMINATION:  XR SPINE LUMBAR AP AND LATERAL-  1/2/2019 9:20 AM CST     HISTORY: RLIF      COMPARISON: No comparison study.     TECHNIQUE:      Image number: [id number]     Fluoroscopy time: 44 seconds     Multi projection C-arm images were obtained in the operating room to  assist the surgeon in interbody and lateral fusion.     Postoperative devices appear well-positioned without hardware or  postprocedural complications.     The examination is available for review.  This report was finalized on 01/02/2019 12:24 by Dr. Silver Sarah MD.            Assessment/Plan:   Principal Problem: Lumbar stenosis  Active Hospital Problems    Diagnosis Date Noted   • **Lumbar stenosis [M48.061] 05/07/2018   • Recurrent major depressive disorder (CMS/Formerly [facility]) [F33.9] 01/05/2019   • Chronic diastolic CHF (congestive heart failure) (CMS/Formerly [facility]) [I50.32] 01/04/2019   • Gastroesophageal reflux disease without esophagitis [K21.9] 05/07/2018   • Constipation due to opioid therapy [K59.03, T40.2X5A] 05/07/2018   • Panlobular emphysema (CMS/Former[facility]) [J43.1] 05/07/2018   • Primary osteoarthritis involving multiple joints [M15.0] 05/07/2018   • PAD (peripheral artery disease) (CMS/Formerly [facility]) [I73.9] 05/07/2018      Resolved Hospital Problems   No resolved problems to display.       PT/OT/pain control per ortho.  Continue COPD controller med (spiriva & breo nonformulary).  Bronchodilators available prn.   Encourage IS use.  Wean O2 as able.  No signs of ongoing blood loss.  Monitor H/H.  Transfusion threshold in this patient without known Coronary Artery Disease is <7.  Continue BM regimen.  Continue gastric protection.  Chronic medical conditions otherwise stable.  Continue current medications & management.    Electronically signed by Kevin Diggs MD on 1/5/2019 at 5:21 AM

## 2019-01-05 NOTE — THERAPY TREATMENT NOTE
Acute Care - Physical Therapy Treatment Note  [facility]     Patient Name: Arminda Jiménez  : 1960  MRN: [id number]  Today's Date: 2019  Onset of Illness/Injury or Date of Surgery: 19  Date of Referral to PT: 19  Referring Physician: Dr. Zheng    Admit Date: 2019    Visit Dx:    ICD-10-CM ICD-9-CM   1. Decreased activities of daily living (ADL) R68.89 780.99   2. Impaired functional mobility, balance, gait, and endurance Z74.09 V49.89     Patient Active Problem List   Diagnosis   • Foraminal stenosis of lumbosacral region   • Chronic back pain   • Radiculopathy   • Lumbar stenosis   • Panlobular emphysema (CMS/HCC)   • Gastroesophageal reflux disease without esophagitis   • Constipation due to opioid therapy   • PAD (peripheral artery disease) (CMS/HCC)   • Primary osteoarthritis involving multiple joints   • Chronic diastolic CHF (congestive heart failure) (CMS/HCC)   • Recurrent major depressive disorder (CMS/HCC)       Therapy Treatment    Rehabilitation Treatment Summary     Row Name 19 1418             Treatment Time/Intention    Discipline  physical therapy assistant  -      Document Type  therapy note (daily note)  -MF2      Subjective Information  complains of;weakness;pain  -MF2      Mode of Treatment  physical therapy  -MF2      Existing Precautions/Restrictions  fall;brace worn when out of bed  -MF2      Recorded by [MF] Mary Jane Juares, PTA 19 1419  [MF2] Mary Jane Juares, PTA 19 1447      Row Name 19 1418             Vital Signs    Pre SpO2 (%)  95  -MF      O2 Delivery Pre Treatment  supplemental O2 1L  -MF      O2 Delivery Intra Treatment  supplemental O2 1L  -MF      Post SpO2 (%)  95  -MF      O2 Delivery Post Treatment  supplemental O2 1L  -MF      Pre Patient Position  Supine  -MF      Intra Patient Position  Standing  -MF      Post Patient Position  Supine  -MF      Recorded by [MF] Mary Jane Juares, PTA 19 1447      Row  Name 01/05/19 1418             Bed Mobility Assessment/Treatment    Rolling Left Seney (Bed Mobility)  verbal cues;contact guard  -MF      Sidelying-Sit Seney (Bed Mobility)  verbal cues;minimum assist (75% patient effort)  -      Sit-Sidelying Seney (Bed Mobility)  verbal cues;moderate assist (50% patient effort);maximum assist (25% patient effort)  -      Assistive Device (Bed Mobility)  bed rails  -MF      Recorded by [] Mary Jane Juares, [facility] 01/05/19 1447      Row Name 01/05/19 1418             Transfer Assessment/Treatment    Comment (Transfers)  very slow to rise   -MF      Recorded by [] Mary Jane Juares, PTA 01/05/19 1447      Row Name 01/05/19 1418             Sit-Stand Transfer    Sit-Stand Seney (Transfers)  verbal cues;contact guard  -MF      Recorded by [] Mary Jane Juares, PTA 01/05/19 1447      Row Name 01/05/19 1418             Stand-Sit Transfer    Stand-Sit Seney (Transfers)  verbal cues;contact guard  -MF      Recorded by [] Mary Jane Juares, [facility] 01/05/19 1447      Row Name 01/05/19 1418             Gait/Stairs Assessment/Training    Seney Level (Gait)  verbal cues;contact guard  -      Assistive Device (Gait)  walker, front-wheeled  -      Distance in Feet (Gait)  60  -MF      Deviations/Abnormal Patterns (Gait)  stride length decreased;rosita decreased  -      Bilateral Gait Deviations  forward flexed posture;heel strike decreased;hip circumduction  -      Comment (Gait/Stairs)  pt.  lacking hip flexion when taking steps. Pt. forward flexed also.   -MF      Recorded by [] Mary Jane Juares, PTA 01/05/19 1447      Row Name 01/05/19 1418             Positioning and Restraints    Pre-Treatment Position  in bed  -MF      Post Treatment Position  bed  -MF      In Bed  fowlers;call light within reach;encouraged to call for assist;side rails up x2;with family/caregiver;legs elevated;SCD pump applied  -MF      Recorded by  [MF] Mary Jane Juares, PTA 01/05/19 1447      Row Name 01/05/19 1418             Pain Scale: Numbers Pre/Post-Treatment    Pain Scale: Numbers, Pretreatment  10/10  -MF      Pain Scale: Numbers, Post-Treatment  10/10  -MF      Pain Location - Side  Right  -MF      Pain Location - Orientation  lower  -MF      Pain Location  back  -MF      Recorded by [MF] Mary Jane Juares, PTA 01/05/19 1447      Row Name                Wound 01/02/19 1013 Right flank incision    Wound - Properties Group Date first assessed: 01/02/19 [JR] Time first assessed: 1013 [JR] Side: Right [JR] Location: flank [JR] Type: incision [JR] Recorded by:  [JR] Betsy Chin RN 01/02/19 1013    Row Name                Wound 01/04/19 1143 Bilateral back incision    Wound - Properties Group Date first assessed: 01/04/19 [RS] Time first assessed: 1143 [RS] Side: Bilateral [RS] Location: back [RS] Type: incision [RS] Recorded by:  [RS] Jose Farah RN 01/04/19 1143      User Key  (r) = Recorded By, (t) = Taken By, (c) = Cosigned By    Initials Name Effective Dates Discipline     Betsy Chin RN 08/02/16 -  Nurse     Mary Jane Juares, PTA 08/02/16 -  PT    RS Jose Farah RN 07/26/16 -  Nurse          Wound 01/02/19 1013 Right flank incision (Active)   Dressing Appearance dry;intact;no drainage 1/5/2019  9:22 AM   Closure IVAN 1/4/2019  8:00 PM   Base dressing in place, unable to visualize 1/4/2019  8:00 PM   Drainage Amount none 1/5/2019  9:22 AM   Dressing Care, Wound dressing changed;transparent film 1/5/2019  9:22 AM       Wound 01/04/19 1143 Bilateral back incision (Active)   Dressing Appearance dry;intact 1/5/2019  9:22 AM   Closure IVAN 1/4/2019  8:00 PM   Base dressing in place, unable to visualize 1/4/2019  8:00 PM   Drainage Characteristics/Odor sanguineous 1/5/2019  9:22 AM   Drainage Amount scant 1/5/2019  9:22 AM   Dressing Care, Wound dressing changed;transparent film 1/5/2019  9:22 AM       Rehab  Goal Summary     Row Name 01/05/19 0817 01/05/19 0816          Bed Mobility Goal 1 (PT)    Progress/Outcomes (Bed Mobility Goal 1, PT)  --  goal not met;goal ongoing  -        Transfer Goal 1 (PT)    Progress/Outcome (Transfer Goal 1, PT)  --  goal not met;goal ongoing  -        Gait Training Goal 1 (PT)    Distance (Gait Goal 1, PT)  --  100 ft  -     Progress/Outcome (Gait Training Goal 1, PT)  --  goal revised this date;goal ongoing  -        Stairs Goal 1 (PT)    Progress/Outcome (Stairs Goal 1, PT)  --  goal not met;goal ongoing  -        Precaution Goal 1 (PT)    Progress/Outcome (Precaution Goal 1, PT)  --  goal not met;goal ongoing  -        Patient Education Goal (PT)    Progress/Outcome (Patient Education Goal, PT)  --  goal not met;goal ongoing  -        Occupational Therapy Goals    Dressing Goal Selection (OT)  dressing, OT goal 1  -JJ  --        Dressing Goal 1 (OT)    Activity/Assistive Device (Dressing Goal 1, OT)  dressing skills, all;lower body dressing  -JJ  --     [place]/Cues Needed (Dressing Goal 1, OT)  minimum assist (75% or more patient effort)  -JJ  --     Time Frame (Dressing Goal 1, OT)  long term goal (LTG);by discharge  -JJ  --     Progress/Outcome (Dressing Goal 1, OT)  goal ongoing  -JJ  --        Patient Education Goal (OT)    Activity (Patient Education Goal, OT)  LSO wear schedule/fitting/mgt, spinal precuations.   -JJ  --     [place]/Cues/Accuracy (Memory Goal 2, OT)  verbalizes understanding  -JJ  --     Time Frame (Patient Education Goal, OT)  long term goal (LTG);by discharge  -JJ  --     Progress/Outcome (Patient Education Goal, OT)  goal ongoing  -JJ  --       User Key  (r) = Recorded By, (t) = Taken By, (c) = Cosigned By    Initials Name Provider Type Discipline    Marimar Christensen, PT Physical Therapist PT    Betsy Rodriguez, OTR/L Occupational Therapist OT              PT Recommendation and Plan     Plan of Care Reviewed With:  patient  Progress: no change  Outcome Summary: Pt. continues to required a good deal of assistance with bed mobility and is limited in overall functional mobility. PTA recommended further therapy after discharge. Pt. states she would consider this if she could go to [facility], other than that she would return home. Pt. only walking about 60' with RWX and has impaired gait pattern due to weakness and pain. Will continue to work with her while she is here.   Outcome Measures     Row Name 01/05/19 0929 01/05/19 0900 01/03/19 1314       How much help from another person do you currently need...    Turning from your back to your side while in flat bed without using bedrails?  3  -JE  --  --    Moving from lying on back to sitting on the side of a flat bed without bedrails?  3  -JE  --  --    Moving to and from a bed to a chair (including a wheelchair)?  3  -JE  --  --    Standing up from a chair using your arms (e.g., wheelchair, bedside chair)?  3  -JE  --  --    Climbing 3-5 steps with a railing?  3  -JE  --  --    To walk in hospital room?  3  -JE  --  --    AM-PAC 6 Clicks Score  18  -JE  --  --       How much help from another is currently needed...    Putting on and taking off regular lower body clothing?  --  2  -JJ  2  -TS    Bathing (including washing, rinsing, and drying)  --  2  -JJ  3  -TS    Toileting (which includes using toilet bed pan or urinal)  --  3  -JJ  4  -TS    Putting on and taking off regular upper body clothing  --  3  -JJ  4  -TS    Taking care of personal grooming (such as brushing teeth)  --  3  -JJ  4  -TS    Eating meals  --  4  -JJ  4  -TS    Score  --  17  -JJ  21  -TS       Functional Assessment    Outcome Measure Options  AM-PAC 6 Clicks Basic Mobility (PT)  -JE  AM-PAC 6 Clicks Daily Activity (OT)  -JJ  AM-PAC 6 Clicks Daily Activity (OT)  -TS    Row Name 01/03/19 1300             How much help from another person do you currently need...    Turning from your back to  your side while in flat bed without using bedrails?  3  -JE      Moving from lying on back to sitting on the side of a flat bed without bedrails?  3  -JE      Moving to and from a bed to a chair (including a wheelchair)?  3  -JE      Standing up from a chair using your arms (e.g., wheelchair, bedside chair)?  3  -JE      Climbing 3-5 steps with a railing?  3  -JE      To walk in hospital room?  3  -JE      AM-PAC 6 Clicks Score  18  -JE         Functional Assessment    Outcome Measure Options  AM-PAC 6 Clicks Basic Mobility (PT)  -JE        User Key  (r) = Recorded By, (t) = Taken By, (c) = Cosigned By    Initials Name Provider Type    TS Stacy Ogden, MCELROY/L Occupational Therapy Assistant    Marimar Christensen, PT Physical Therapist    Betsy Rodriguez, OTR/L Occupational Therapist         Time Calculation:   PT Charges     Row Name 01/05/19 1449             Time Calculation    Start Time  1418  -MF      Stop Time  1444  -MF      Time Calculation (min)  26 min  -MF      PT Received On  01/05/19  -      PT Goal Re-Cert Due Date  01/13/19  -MF         Time Calculation- PT    Total Timed Code Minutes- PT  26 minute(s)  -MF         Timed Charges    62973 - Gait Training Minutes   26  -MF        User Key  (r) = Recorded By, (t) = Taken By, (c) = Cosigned By    Initials Name Provider Type    Mary Jane Dawson PTA Physical Therapy Assistant        Therapy Suggested Charges     Code   Minutes Charges    13294 (CPT®) Hc Pt Neuromusc Re Education Ea 15 Min      50602 (CPT®) Hc Pt Ther Proc Ea 15 Min      71954 (CPT®) Hc Gait Training Ea 15 Min 26 2    48325 (CPT®) Hc Pt Therapeutic Act Ea 15 Min      87766 (CPT®) Hc Pt Manual Therapy Ea 15 Min      79579 (CPT®) Hc Pt Iontophoresis Ea 15 Min      76839 (CPT®) Hc Pt Elec Stim Ea-Per 15 Min      11106 (CPT®) Hc Pt Ultrasound Ea 15 Min      68143 (CPT®) Hc Pt Self Care/Mgmt/Train Ea 15 Min      27653 (CPT®) Hc Pt Prosthetic (S) Train Initial Encounter, Each  15 Min      41903 (CPT®) Hc Pt Orthotic(S)/Prosthetic(S) Encounter, Each 15 Min      10756 (CPT®) Hc Orthotic(S) Mgmt/Train Initial Encounter, Each 15min      Total  26 2        Therapy Charges for Today     Code Description Service Date Service Provider Modifiers Qty    83013190584 HC GAIT TRAINING EA 15 MIN 1/5/2019 Mary Jane Juares, PTA GP 2          PT G-Codes  PT Professional Judgement Used?: Yes  Outcome Measure Options: AM-PAC 6 Clicks Basic Mobility (PT)  AM-PAC 6 Clicks Score: 18  Score: 17  Functional Limitation: Mobility: Walking and moving around  Mobility: Walking and Moving Around Current Status (): At least 40 percent but less than 60 percent impaired, limited or restricted  Mobility: Walking and Moving Around Goal Status (): At least 20 percent but less than 40 percent impaired, limited or restricted    Mary Jane Juares, PTA  1/5/2019

## 2019-01-06 PROCEDURE — 94799 UNLISTED PULMONARY SVC/PX: CPT

## 2019-01-06 PROCEDURE — 94760 N-INVAS EAR/PLS OXIMETRY 1: CPT

## 2019-01-06 PROCEDURE — 97116 GAIT TRAINING THERAPY: CPT

## 2019-01-06 RX ORDER — OXYCODONE HCL 10 MG/1
10 TABLET, FILM COATED, EXTENDED RELEASE ORAL EVERY 12 HOURS SCHEDULED
Status: DISCONTINUED | OUTPATIENT
Start: 2019-01-06 | End: 2019-01-08 | Stop reason: HOSPADM

## 2019-01-06 RX ORDER — PANTOPRAZOLE SODIUM 40 MG/1
40 TABLET, DELAYED RELEASE ORAL EVERY MORNING
Status: DISCONTINUED | OUTPATIENT
Start: 2019-01-06 | End: 2019-01-08 | Stop reason: HOSPADM

## 2019-01-06 RX ORDER — FUROSEMIDE 40 MG/1
40 TABLET ORAL ONCE
Status: COMPLETED | OUTPATIENT
Start: 2019-01-06 | End: 2019-01-06

## 2019-01-06 RX ORDER — PANTOPRAZOLE SODIUM 40 MG/1
40 TABLET, DELAYED RELEASE ORAL EVERY MORNING
Status: DISCONTINUED | OUTPATIENT
Start: 2019-01-07 | End: 2019-01-06 | Stop reason: SDUPTHER

## 2019-01-06 RX ADMIN — GABAPENTIN 800 MG: 400 CAPSULE ORAL at 06:01

## 2019-01-06 RX ADMIN — PANTOPRAZOLE SODIUM 40 MG: 40 TABLET, DELAYED RELEASE ORAL at 09:18

## 2019-01-06 RX ADMIN — GABAPENTIN 1600 MG: 400 CAPSULE ORAL at 21:21

## 2019-01-06 RX ADMIN — OXYCODONE HYDROCHLORIDE AND ACETAMINOPHEN 2 TABLET: 10; 325 TABLET ORAL at 18:53

## 2019-01-06 RX ADMIN — ESCITALOPRAM 20 MG: 10 TABLET, FILM COATED ORAL at 09:16

## 2019-01-06 RX ADMIN — OXYCODONE HYDROCHLORIDE AND ACETAMINOPHEN 2 TABLET: 10; 325 TABLET ORAL at 06:01

## 2019-01-06 RX ADMIN — POLYETHYLENE GLYCOL (3350) 17 G: 17 POWDER, FOR SOLUTION ORAL at 09:17

## 2019-01-06 RX ADMIN — Medication 1 TABLET: at 09:16

## 2019-01-06 RX ADMIN — ALBUTEROL SULFATE 2 PUFF: 90 AEROSOL, METERED RESPIRATORY (INHALATION) at 12:41

## 2019-01-06 RX ADMIN — GABAPENTIN 800 MG: 400 CAPSULE ORAL at 14:36

## 2019-01-06 RX ADMIN — OXYCODONE HYDROCHLORIDE AND ACETAMINOPHEN 2 TABLET: 10; 325 TABLET ORAL at 10:28

## 2019-01-06 RX ADMIN — OXYCODONE HYDROCHLORIDE AND ACETAMINOPHEN 2 TABLET: 10; 325 TABLET ORAL at 23:53

## 2019-01-06 RX ADMIN — OXYCODONE HYDROCHLORIDE AND ACETAMINOPHEN 2 TABLET: 10; 325 TABLET ORAL at 14:35

## 2019-01-06 RX ADMIN — OXYCODONE HYDROCHLORIDE 10 MG: 10 TABLET, FILM COATED, EXTENDED RELEASE ORAL at 21:21

## 2019-01-06 RX ADMIN — Medication 1 TABLET: at 17:42

## 2019-01-06 RX ADMIN — FUROSEMIDE 40 MG: 40 TABLET ORAL at 21:21

## 2019-01-06 NOTE — PROGRESS NOTES
"Orthopedic Spine Progress Note    Arminda Jiménez  58 y.o.  female  Subjective     Post-Operative Day # 4/2  Systemic or Specific Complaints:     Sore, not walked much yet, havent been up to bathroom alone yet, very sore. \"want to go to rehab at [facility]\"  Objective     Vital signs in last 24 hours:  Temp:  [97.9 °F (36.6 °C)-98.7 °F (37.1 °C)] 98.3 °F (36.8 °C)  Heart Rate:  [89-94] 91  Resp:  [16] 16  BP: (101-105)/(51-66) 102/60    Physical Exam:    Alert and oriented ×3, no acute distress, grossly neurovascularly intact, vital signs stable, dressing clean dry and intact, moving all extremities without focal deficit    Diagnostic Data:  CBC:  Results from last 7 days   Lab Units  01/05/19   0556   WBC 10*3/mm3  10.72   RBC 10*6/mm3  2.80*   HEMOGLOBIN g/dL  8.6*   HEMATOCRIT %  26.4*   PLATELETS 10*3/mm3  147          Assessment/Plan     1.  Status post anterior decompression, ALIF with instrumentation L5-S1, 5/7/2018  2.  Status post PSF with instrumentation left L5-S1, 5/9/2018  3.  Recurrent back pain  4.  Bilateral buttock, thigh, and leg radiculopathy, right worse than left  5.  Recurrent neurogenic claudication  6.  Adjacent level degenerative disc disease L3 to 5  7.  Degenerative lumbar scoliosis L3 to 5  8.  Facet arthropathy L3 to 5  9.  Central and bilateral foraminal stenosis L3 to 5, worse right L3-4  10.  Osteoporosis, T score -2.5, 4/11/2018  11.  Status post right LLIF L3 to 5 with instrumentation L3-4, 1/2/2019  12. Status post Posterior spinal fusion L3-S1 - 1/4/19      Plan:  1. PT/OT/Brace  2. Periops  3. SS/ wants rehab bed at [facility]     MIRA Lara    Date: 1/6/2019  Time: 3:01 PM  "

## 2019-01-06 NOTE — THERAPY RE-EVALUATION
Acute Care - Physical Therapy Re-Evaluation  [facility]     Patient Name: Arminda Jiménez  : 1960  MRN: [id number]  Today's Date: 2019   Onset of Illness/Injury or Date of Surgery: 19  Date of Referral to PT: 19  Referring Physician: Dr. Zheng      Admit Date: 2019    Visit Dx:     ICD-10-CM ICD-9-CM   1. Decreased activities of daily living (ADL) R68.89 780.99   2. Impaired functional mobility, balance, gait, and endurance Z74.09 V49.89     Patient Active Problem List   Diagnosis   • Foraminal stenosis of lumbosacral region   • Chronic back pain   • Radiculopathy   • Lumbar stenosis   • Panlobular emphysema (CMS/HCC)   • Gastroesophageal reflux disease without esophagitis   • Constipation due to opioid therapy   • PAD (peripheral artery disease) (CMS/HCC)   • Primary osteoarthritis involving multiple joints   • Chronic diastolic CHF (congestive heart failure) (CMS/HCC)   • Recurrent major depressive disorder (CMS/HCC)     Past Medical History:   Diagnosis Date   • Arthritis    • Asthma    • Cancer (CMS/HCC)     right lung,abd,and pelvic area   • CHF (congestive heart failure) (CMS/HCC)    • Chronic back pain    • COPD (chronic obstructive pulmonary disease) (CMS/HCC)    • Depression    • Facet arthropathy    • Foraminal stenosis of lumbosacral region    • GERD (gastroesophageal reflux disease)    • H/O degenerative disc disease    • History of blood transfusion    • PVD (peripheral vascular disease) (CMS/HCC)    • Radiculopathy      Past Surgical History:   Procedure Laterality Date   • ANTERIOR LUMBAR EXPOSURE N/A 2018    Procedure: ANTERIOR LUMBAR EXPOSURE;  Surgeon: Chris Haley DO;  Location: [facility] OR;  Service: Vascular   • APPENDECTOMY     • CARPAL TUNNEL RELEASE Bilateral    • LUMBAR FUSION N/A 2018    Procedure: ANTERIOR DECOMPRESSION, ANTERIOR LUMBAR INTERBODY FUSION WITH INSTRUMENTATION L5-S1;  Surgeon: LUIS CARLOS Zheng MD;  Location: [facility] OR;  Service:  Orthopedic Spine   • LUMBAR LAMINECTOMY WITH FUSION N/A 5/9/2018    Procedure: POSTERIOR SPINAL FUSION WITH INSTRUMENTATION L5-S1;  Surgeon: LUIS CARLOS Zheng MD;  Location: [facility] OR;  Service: Orthopedic Spine   • OTHER SURGICAL HISTORY Bilateral 2017    Stent implants BLE [address] [place]        PT ASSESSMENT (last 12 hours)      Physical Therapy Evaluation     Row Name 01/05/19 0816          PT Evaluation Time/Intention    Subjective Information  complains of tingling R LE from knee to foot new since last surgery  -JE     Document Type  re-evaluation;other (see comments) see MAR  -JE     Mode of Treatment  physical therapy  -JE     Patient Effort  good  -JE     Symptoms Noted During/After Treatment  increased pain  -JE     Row Name 01/05/19 0816          General Information    Patient Profile Reviewed?  yes  -JE     Onset of Illness/Injury or Date of Surgery  01/02/19  -JE     Referring Physician  Dr. Zheng  -JE     Patient Observations  alert;cooperative;agree to therapy  -JE     Patient/Family Observations  sister present  -JE     General Observations of Patient  fowlers in bed, SCDs, pulse ox, O2 per NC  -JE     Prior Level of Function  -- see initial eval  -JE     Equipment Currently Used at Home  cane, straight  -JE     Pertinent History of Current Functional Problem  s/p R lateral lumbar interbody fusion L3-5 on 1/2/19 and 2nd part surgery posterior spinal fusion w/ instrumentation R L3-S1 1/4/19.    -JE     Existing Precautions/Restrictions  fall;spinal;brace worn when out of bed  -JE     Limitations/Impairments  other (see comments) pain  -JE     Equipment Issued to Patient  walker, front wheeled;other (see comments) see prior visits  -JE     Equipment Ordered for Patient  -- wants rwx upon discharge  -JE     Risks Reviewed  patient and family:;LOB;dizziness;increased discomfort;change in vital signs;increased drainage;lines disloged;other (comment) fall risk  -JE     Benefits Reviewed  patient and  family:;improve function;increase independence;increase strength;increase balance;decrease pain;decrease risk of DVT;improve skin integrity;increase knowledge;other (comment) safety/falls prevention  -     Barriers to Rehab  none identified  -     Row Name 01/05/19 0816          Relationship/Environment    Primary Source of Support/Comfort  sibling(s);child(leno)  -     Lives With  alone  -     Family Caregiver if Needed  child(leno), adult;sibling(s)  -     Row Name 01/05/19 0816          Resource/Environmental Concerns    Current Living Arrangements  -- see initial eval  -     Row Name 01/05/19 0816          Home Main Entrance    Number of Stairs, Main Entrance  -- per initial eval  -     Row Name 01/05/19 0816          Cognitive Assessment/Intervention- PT/OT    Orientation Status (Cognition)  oriented x 4  -     Follows Commands (Cognition)  WNL  -     Cognitive Function (Cognitive)  WNL  -     Safety Deficit (Cognitive)  safety precautions awareness  -     Personal Safety Interventions  gait belt;fall prevention program maintained;muscle strengthening facilitated;nonskid shoes/slippers when out of bed;supervised activity  -     Row Name 01/05/19 0816          Safety Issues, Functional Mobility    Safety Issues Affecting Function (Mobility)  safety precaution awareness;friction/shear risk  -     Impairments Affecting Function (Mobility)  balance;endurance/activity tolerance;pain;strength  -     Row Name 01/05/19 0816          Bed Mobility Assessment/Treatment    Bed Mobility Assessment/Treatment  rolling left;sidelying-sit  -     Rolling Left [place] (Bed Mobility)  contact guard;verbal cues  -     Sidelying-Sit [place] (Bed Mobility)  contact guard;verbal cues  -     Sit-Sidelying [place] (Bed Mobility)  moderate assist (50% patient effort);contact guard;2 person assist;verbal cues required assist to lift legs up into bed  -     Bed Mobility, Safety Issues   decreased use of arms for pushing/pulling;decreased use of legs for bridging/pushing;impaired trunk control for bed mobility  -     Assistive Device (Bed Mobility)  bed rails  -     Row Name 01/05/19 0816          Transfer Assessment/Treatment    Transfer Assessment/Treatment  sit-stand transfer;stand-sit transfer  -     Sit-Stand [place] (Transfers)  contact guard;verbal cues  -     Stand-Sit [place] (Transfers)  contact guard;verbal cues  -     Row Name 01/05/19 0816          Sit-Stand Transfer    Assistive Device (Sit-Stand Transfers)  walker, front-wheeled  -     Row Name 01/05/19 0816          Stand-Sit Transfer    Assistive Device (Stand-Sit Transfers)  walker, front-wheeled  -     Row Name 01/05/19 0816          Gait/Stairs Assessment/Training    33939 - Gait Training Minutes   -- 10  -     Gait/Stairs Assessment/Training  gait/ambulation independence;gait/ambulation assistive device;distance ambulated;gait pattern;gait deviations  -     [place] Level (Gait)  contact guard;verbal cues  -     Assistive Device (Gait)  walker, front-wheeled  -     Distance in Feet (Gait)  60 ft  -     Pattern (Gait)  step-through  -     Deviations/Abnormal Patterns (Gait)  base of support, wide;gait speed decreased;stride length decreased  -     Bilateral Gait Deviations  forward flexed posture;heel strike decreased  -     Comment (Gait/Stairs)  decrease knee flexion on R compared to L with swing phase, decrease HS B R worse than L  -     Row Name 01/05/19 0816          General ROM    GENERAL ROM COMMENTS  B UE AROM WFLs, L LE AROM WFLs, R LE AAROM WFLs (  -     Row Name 01/05/19 0816          MMT (Manual Muscle Testing)    General MMT Comments  no formal assessment of B UEs due to recent surgeries, R LE 2/5 grossly throughout; L LE grossly 4+/5 - assessed ms strength in sitting  -     Row Name 01/05/19 0816          Motor Assessment/Intervention    Additional Documentation   Balance (Group)  -     Row Name 01/05/19 0816          Balance    Balance  static sitting balance;static standing balance  -[facility] Name 01/05/19 0816          Static Sitting Balance    Level of [place] (Unsupported Sitting, Static Balance)  supervision  -     Sitting Position (Unsupported Sitting, Static Balance)  sitting on edge of bed  -     Level of [place] (Supported Sitting, Static Balance)  supervision  -     Sitting Position (Supported Sitting, Static Balance)  sitting on edge of bed  -[facility] Name 01/05/19 0816          Static Standing Balance    Level of [place] (Supported Standing, Static Balance)  contact guard assist  -     Assistive Device Utilized (Supported Standing, Static Balance)  rolling walker  -[facility] Name 01/05/19 0816          Sensory Assessment/Intervention    Sensory General Assessment  light touch sensation deficits identified;other (see comments) reported tingling R Lower leg knee to foot  -[facility] Name 01/05/19 0816          Pain Scale: Numbers Pre/Post-Treatment    Pain Scale: Numbers, Pretreatment  10/10  -     Pain Scale: Numbers, Post-Treatment  10/10  -     Pain Location - Side  Right  -     Pain Location - Orientation  lower  -     Pain Location  back R leg  -     Pre/Post Treatment Pain Comment  increase pain R leg, progessing to the L leg and then into the back  -     Pain Intervention(s)  Medication (See MAR);Repositioned;Rest;Ambulation/increased activity  -[facility] Name 01/05/19 0816          Spinal Orthosis Management    Type (Spinal Orthosis)  LSO (lumbar sacral orthosis)  -     Functional Design (Spinal Orthosis)  static orthosis  -     Therapeutic Indications (Spinal Orthosis)  pain management;post-op positioning/protection;rest/inflammation reduction;stabilization and support  -     Wearing Schedule (Spinal Orthosis)  wear when out of bed only  -     Orthosis Training (Spinal Orthosis)  patient;caregiver;activity  limitations/precautions;donning/doffing orthosis;orthosis adjustment;purpose/goals of orthosis;wearing schedule  -     Compliance/Wearing Issues (Spinal Orthosis)  patient/caregiver comprehend strategies  -     Row Name             Wound 01/02/19 1013 Right flank incision    Wound - Properties Group Date first assessed: 01/02/19  -JR Time first assessed: 1013  -JR Side: Right  -JR Location: flank  -JR Type: incision  -JR    Row Name             Wound 01/04/19 1143 Bilateral back incision    Wound - Properties Group Date first assessed: 01/04/19  -RS Time first assessed: 1143  -RS Side: Bilateral  -RS Location: back  -RS Type: incision  -RS    Row Name 01/05/19 0816          Coping    Observed Emotional State  accepting;calm;cooperative  -     Verbalized Emotional State  acceptance  -     Row Name 01/05/19 0816          Plan of Care Review    Plan of Care Reviewed With  patient;caregiver  -     Row Name 01/05/19 0816          Physical Therapy Clinical Impression    Date of Referral to PT  01/04/19  -     Patient/Family Goals Statement (PT Clinical Impression)  improve mobility and decrease pain  -     Criteria for Skilled Interventions Met (PT Clinical Impression)  yes;treatment indicated;other (see comments) cont w/ increase pain and decrease I w/ mobility  -     Rehab Potential (PT Clinical Summary)  good, to achieve stated therapy goals  -     Predicted Duration of Therapy (PT)  until discharge or goals achieved  -     Care Plan Review (PT)  evaluation/treatment results reviewed;care plan/treatment goals reviewed;risks/benefits reviewed;patient/other agree to care plan  -     Care Plan Review, Other Participant (PT Clinical Impression)  sibling  -     Row Name 01/05/19 0816          Bed Mobility Goal 1 (PT)    Progress/Outcomes (Bed Mobility Goal 1, PT)  goal not met;goal ongoing  -     Row Name 01/05/19 0816          Transfer Goal 1 (PT)    Progress/Outcome (Transfer Goal 1, PT)  goal  not met;goal ongoing  -MARTINA     Row Name 01/05/19 0816          Gait Training Goal 1 (PT)    Distance (Gait Goal 1, PT)  100 ft  -MARTINA     Progress/Outcome (Gait Training Goal 1, PT)  goal revised this date;goal ongoing  -MARTINA     Row Name 01/05/19 0816          Stairs Goal 1 (PT)    Progress/Outcome (Stairs Goal 1, PT)  goal not met;goal ongoing  -MARTINA     Row Name 01/05/19 0816          Precaution Goal 1 (PT)    Progress/Outcome (Precaution Goal 1, PT)  goal not met;goal ongoing  -MARTINA     Row Name 01/05/19 0816          Patient Education Goal (PT)    Progress/Outcome (Patient Education Goal, PT)  goal not met;goal ongoing  -MARTINA     Row Name 01/05/19 0816          Positioning and Restraints    Post Treatment Position  bed  -MARTINA     In Bed  fowlers;call light within reach;exit alarm on;with family/caregiver;side rails up x2;SCD pump applied  -MARTINA     Row Name 01/05/19 0816          Living Environment    Home Accessibility  -- see initial eval  -       User Key  (r) = Recorded By, (t) = Taken By, (c) = Cosigned By    Initials Name Provider Type    Betsy Benavides, RN Registered Nurse    Jose Thomas RN Registered Nurse    Marimar Christensen, PT Physical Therapist          PT Recommendation and Plan  Anticipated Discharge Disposition (PT): home with assist(if pt progresses as expected)  Planned Therapy Interventions (PT Eval): patient/family education, balance training, bed mobility training, gait training, motor coordination training, neuromuscular re-education, orthotic fitting/training, ROM (range of motion), strengthening, transfer training, other (see comments)(safety/falls prevention)  Therapy Frequency (PT Clinical Impression): 2 times/day  Outcome Summary/Treatment Plan (PT)  Anticipated Equipment Needs at Discharge (PT): front wheeled walker  Anticipated Discharge Disposition (PT): home with assist(if pt progresses as expected)  Plan of Care Reviewed With: patient, sibling  Progress:  improving  Outcome Summary: PT re-eval completed s/p surgery.  Pt gaurded with increase pain, now with R LE weakness.  Pt reports tingling new in the R lower leg from the knee down.  Pt rolls with CGA, Transfers L sidelying to sit with CGA, Transfers sit to/from stand with CGA.  Pt ambulated with rwx with CGA ~ 60 ft with slowed pace with decrease step length and decrease heel strike R worse than L.  Pt with reported worsening pain with gait initiating in the R LE, moving to the L LE and then into the back.  Pt required CGA to Mod assist to return to bed requiring increase assist for the LE s back up into bed.  Pt will benefit from continued PT services to improve strength, knowledge of spinal precautions, use of LSO brace, to assist with pain management, and to improve I with mobility improving balance/safety awareness reducing fall risk.  Anticipate improved mobility in order for pt to return home with assist from family.  If pt able to return home, she may need  services to continue with skilled services.  However if progress slows and pt's unable to safely assist hers, she may need SNF for continued care.  Will follow for pt progress.  Outcome Measures     Row Name 01/05/19 0929 01/05/19 0900 01/03/19 1314       How much help from another person do you currently need...    Turning from your back to your side while in flat bed without using bedrails?  3  -JE  --  --    Moving from lying on back to sitting on the side of a flat bed without bedrails?  3  -JE  --  --    Moving to and from a bed to a chair (including a wheelchair)?  3  -JE  --  --    Standing up from a chair using your arms (e.g., wheelchair, bedside chair)?  3  -JE  --  --    Climbing 3-5 steps with a railing?  3  -JE  --  --    To walk in hospital room?  3  -JE  --  --    AM-PAC 6 Clicks Score  18  -JE  --  --       How much help from another is currently needed...    Putting on and taking off regular lower body clothing?  --  2  -JOSE  2  -TS     Bathing (including washing, rinsing, and drying)  --  2  -JJ  3  -TS    Toileting (which includes using toilet bed pan or urinal)  --  3  -JJ  4  -TS    Putting on and taking off regular upper body clothing  --  3  -JJ  4  -TS    Taking care of personal grooming (such as brushing teeth)  --  3  -JJ  4  -TS    Eating meals  --  4  -JJ  4  -TS    Score  --  17  -JJ  21  -TS       Functional Assessment    Outcome Measure Options  AM-PAC 6 Clicks Basic Mobility (PT)  -  AM-PAC 6 Clicks Daily Activity (OT)  -  AM-PAC 6 Clicks Daily Activity (OT)  -    Row Name 01/03/19 1300             How much help from another person do you currently need...    Turning from your back to your side while in flat bed without using bedrails?  3  -JE      Moving from lying on back to sitting on the side of a flat bed without bedrails?  3  -JE      Moving to and from a bed to a chair (including a wheelchair)?  3  -JE      Standing up from a chair using your arms (e.g., wheelchair, bedside chair)?  3  -JE      Climbing 3-5 steps with a railing?  3  -JE      To walk in hospital room?  3  -JE      AM-PAC 6 Clicks Score  18  -JE         Functional Assessment    Outcome Measure Options  AM-PAC 6 Clicks Basic Mobility (PT)  -        User Key  (r) = Recorded By, (t) = Taken By, (c) = Cosigned By    Initials Name Provider Type     Stacy Ogden, MCELROY/L Occupational Therapy Assistant    Marimar Christensen, PT Physical Therapist    Betsy Rodriguez, OTR/L Occupational Therapist         Time Calculation:   PT Charges     Row Name 01/05/19 1913 01/05/19 1449 01/05/19 0816       Time Calculation    Start Time  0816  -  1418  -  --    Stop Time  0856  -  1444  -  --    Time Calculation (min)  40 min  -  26 min  -  --    PT Received On  01/05/19  -  01/05/19  -  --    PT Goal Re-Cert Due Date  01/15/19  -  01/13/19  -  --       Time Calculation- PT    Total Timed Code Minutes- PT  --  26 minute(s)  -  --        Timed Charges    05253 - Gait Training Minutes   --  26  -  -- 10  -MARTINA      User Key  (r) = Recorded By, (t) = Taken By, (c) = Cosigned By    Initials Name Provider Type    Mary Jane Dawson, PTA Physical Therapy Assistant    Marimar Christensen, PT Physical Therapist        Therapy Suggested Charges     Code   Minutes Charges    62090 (CPT®) Hc Pt Neuromusc Re Education Ea 15 Min      17289 (CPT®) Hc Pt Ther Proc Ea 15 Min      77385 (CPT®) Hc Gait Training Ea 15 Min 26 2    68075 (CPT®) Hc Pt Therapeutic Act Ea 15 Min      89704 (CPT®) Hc Pt Manual Therapy Ea 15 Min      79353 (CPT®) Hc Pt Iontophoresis Ea 15 Min      50397 (CPT®) Hc Pt Elec Stim Ea-Per 15 Min      89751 (CPT®) Hc Pt Ultrasound Ea 15 Min      71655 (CPT®) Hc Pt Self Care/Mgmt/Train Ea 15 Min      61211 (CPT®) Hc Pt Prosthetic (S) Train Initial Encounter, Each 15 Min      44857 (CPT®) Hc Pt Orthotic(S)/Prosthetic(S) Encounter, Each 15 Min      82618 (CPT®) Hc Orthotic(S) Mgmt/Train Initial Encounter, Each 15min      Total  26 2        Therapy Charges for Today     Code Description Service Date Service Provider Modifiers Qty    84875518026 HC PT RE-EVAL ESTABLISHED PLAN 2 1/5/2019 Marimar Mcclure, PT GP 1    30975616619 HC GAIT TRAINING EA 15 MIN 1/5/2019 Marimar Mcclure, PT GP 1          PT G-Codes  PT Professional Judgement Used?: Yes  Outcome Measure Options: AM-PAC 6 Clicks Basic Mobility (PT)  AM-PAC 6 Clicks Score: 18  Score: 17  Functional Limitation: Mobility: Walking and moving around  Mobility: Walking and Moving Around Current Status (): At least 40 percent but less than 60 percent impaired, limited or restricted  Mobility: Walking and Moving Around Goal Status (): At least 20 percent but less than 40 percent impaired, limited or restricted      Marimar Mcclure, PT  1/5/2019

## 2019-01-06 NOTE — PROGRESS NOTES
Medicine Progress Note 1/6/2019    Patient:  Arminda Jiménez  YOB: 1960  MRN: [id number]     Acct: [id number]   Admit date: 1/2/2019    Patient Seen, Chart, Consults notes, Labs, Radiology studies reviewed.    Subjective:   Having difficulties with pain control, rani R hip & leg pain.  Reports some tingling of this area.  Has started work with PT & OT.  Did have stool output with suppository yesterday.  Reports on nexium at home & famotidine is not addressing her GERD.  Able to wean off O2.      Medications:   Scheduled Meds:    calcium carb-cholecalciferol 1 tablet Oral BID With Meals   escitalopram 20 mg Oral Daily   estrogen (conjugated)-medroxyprogesterone 1 tablet Oral Daily   famotidine 20 mg Intravenous Q12H   Or      famotidine 20 mg Oral Q12H   Fluticasone Furoate-Vilanterol 1 puff Inhalation Daily   gabapentin 1,600 mg Oral Nightly   gabapentin 800 mg Oral BID   polyethylene glycol 17 g Oral BID   sodium chloride 3 mL Intravenous Q12H   tiotropium 1 capsule Inhalation Daily - RT     Continuous Infusions:    lactated ringers 30 mL/hr Last Rate: 30 mL/hr (01/02/19 1148)   lactated ringers 100 mL/hr Last Rate: 100 mL/hr (01/04/19 1515)     PRN Meds:     albuterol sulfate HFA 2 puff Q4H PRN   bisacodyl 10 mg Daily PRN   diphenhydrAMINE 25 mg Nightly PRN   influenza vaccine 0.5 mL During Hospitalization   ondansetron 4 mg Q6H PRN   Or     ondansetron ODT 4 mg Q6H PRN   Or     ondansetron 4 mg Q6H PRN   oxyCODONE-acetaminophen 2 tablet Q4H PRN   sodium chloride 3-10 mL PRN   tiZANidine 4 mg Q8H PRN         Objective:   Vitals:   Patient Vitals for the past 24 hrs:   BP Temp Temp src Pulse Resp SpO2   01/05/19 2130 -- -- -- -- -- 90 %   01/05/19 2008 105/51 97.9 °F (36.6 °C) Oral 94 16 94 %   01/05/19 1700 -- -- -- -- -- 95 %   01/05/19 1554 101/66 98.7 °F (37.1 °C) Oral 89 16 92 %   01/05/19 1327 -- -- -- -- -- 94 %   01/05/19 1212 111/51 98.5 °F (36.9 °C) Oral 98 18 93 %     GEN: Awake,  alert, no acute distress.  NC in place.  RESP: Full and symmetric respirations.  Chest clear to auscultation bilaterally, no wheezes, rales, or rhonchi.  CARD: Regular rate and rhythm.  No murmurs, rubs or gallops.  ABD: Normoactive bowel sounds.  Abdomen soft, nontender, and nondistended.  EXT: No cyanosis, clubbing, or edema.  Brace in place.      24 hour intake/output:    Intake/Output Summary (Last 24 hours) at 1/6/2019 0840  Last data filed at 1/5/2019 1808  Gross per 24 hour   Intake 1010 ml   Output --   Net 1010 ml     Last 3 weights:  Wt Readings from Last 3 Encounters:   01/02/19 75 kg (165 lb 5.5 oz)   12/26/18 75 kg (165 lb 5.5 oz)   05/07/18 72.6 kg (160 lb 2 oz)       Labs:  Results from last 7 days   Lab Units  01/05/19   0556  01/03/19   0508   WBC 10*3/mm3  10.72  11.89*   HEMOGLOBIN g/dL  8.6*  9.7*   HEMATOCRIT %  26.4*  29.9*   PLATELETS 10*3/mm3  147  167     Results from last 7 days   Lab Units  01/03/19   0508   SODIUM mmol/L  135   POTASSIUM mmol/L  4.5   CHLORIDE mmol/L  99   CO2 mmol/L  28.0   BUN mg/dL  14   CREATININE mg/dL  0.75   CALCIUM mg/dL  8.0*   GLUCOSE mg/dL  103*         Lab Results   Component Value Date    GLUCOSE 103 (H) 01/03/2019    GLUCOSE 89 12/26/2018    GLUCOSE 102 (H) 05/10/2018    GLUCOSE 128 (H) 05/08/2018    GLUCOSE 97 04/24/2018       Radiology:   Imaging Results (last 72 hours)     Procedure Component Value Units Date/Time    XR Spine Lumbar AP & Lateral [[id number]] Collected:  01/04/19 1138     Updated:  01/04/19 1345    Narrative:       EXAMINATION: Seminar 1/4/2019     Fluoroscopy time: 37 seconds. 2 images are submitted for interpretation     FINDINGS: Today's exam is compared to previous study of 2 days earlier.  The patient's undergone previous anterior fusion at the L5-S1 level with  pedicle screws at the L5-S1 level and a lateral fusion plate at the  L3-L4 level.     New pedicles screws have been placed at L3, L5 and S1. There is good  anatomic alignment.  The films are available to the OR for review.  This report was finalized on 01/04/2019 11:41 by Dr. Bill Benitez MD.    FL C Arm During Surgery [[id number]] Collected:  01/04/19 1138     Updated:  01/04/19 1345    Narrative:       EXAMINATION: Seminar 1/4/2019     Fluoroscopy time: 37 seconds. 2 images are submitted for interpretation     FINDINGS: Today's exam is compared to previous study of 2 days earlier.  The patient's undergone previous anterior fusion at the L5-S1 level with  pedicle screws at the L5-S1 level and a lateral fusion plate at the  L3-L4 level.     New pedicles screws have been placed at L3, L5 and S1. There is good  anatomic alignment. The films are available to the OR for review.  This report was finalized on 01/04/2019 11:41 by Dr. Bill Benitez MD.    XR Spine Lumbar AP & Lateral [[id number]] Collected:  01/02/19 1222     Updated:  01/02/19 1307    Narrative:       EXAMINATION:  XR SPINE LUMBAR AP AND LATERAL-  1/2/2019 9:20 AM CST     HISTORY: RLIF      COMPARISON: No comparison study.     TECHNIQUE:      Image number: [id number]     Fluoroscopy time: 44 seconds     Multi projection C-arm images were obtained in the operating room to  assist the surgeon in interbody and lateral fusion.     Postoperative devices appear well-positioned without hardware or  postprocedural complications.     The examination is available for review.  This report was finalized on 01/02/2019 12:24 by Dr. Silver Sarah MD.    FL C Arm During Surgery [[id number]] Collected:  01/02/19 1222     Updated:  01/02/19 1307    Narrative:       EXAMINATION:  XR SPINE LUMBAR AP AND LATERAL-  1/2/2019 9:20 AM CST     HISTORY: RLIF      COMPARISON: No comparison study.     TECHNIQUE:      Image number: 6     Fluoroscopy time: 44 seconds     Multi projection C-arm images were obtained in the operating room to  assist the surgeon in interbody and lateral fusion.     Postoperative devices appear well-positioned without hardware  or  postprocedural complications.     The examination is available for review.  This report was finalized on 01/02/2019 12:24 by Dr. Silver Sarah MD.            Assessment/Plan:   Principal Problem: Lumbar stenosis  Active Hospital Problems    Diagnosis Date Noted   • **Lumbar stenosis [M48.061] 05/07/2018   • Recurrent major depressive disorder (CMS/[facility]) [F33.9] 01/05/2019   • Chronic diastolic CHF (congestive heart failure) (CMS/[facility]) [I50.32] 01/04/2019   • Gastroesophageal reflux disease without esophagitis [K21.9] 05/07/2018   • Constipation due to opioid therapy [K59.03, T40.2X5A] 05/07/2018   • Panlobular emphysema (CMS/[facility]) [J43.1] 05/07/2018   • Primary osteoarthritis involving multiple joints [M15.0] 05/07/2018   • PAD (peripheral artery disease) (CMS/[facility]) [I73.9] 05/07/2018      Resolved Hospital Problems   No resolved problems to display.       PT/OT/pain control per ortho.  Continue COPD controller med (spiriva & breo nonformulary) & bronchodilators available prn.  Encourage IS use.   No signs of ongoing blood loss.  Monitor H/H.  Transfusion threshold in this patient without known Coronary Artery Disease is <7.  Continue BM regimen.  Escalate H2 blocker to PPI.  Chronic medical conditions otherwise stable.  Continue current medications & management.  Needs ongoing PT/OT after discharge.  Poss HH but may actually need more intensive rehab.  Will ask SW to assist.    Electronically signed by Kevin Diggs MD on 1/6/2019 at 8:40 AM

## 2019-01-06 NOTE — PROGRESS NOTES
Medicine Progress Note 1/6/2019    Patient:  Arminda Jiménez  YOB: 1960  MRN: [id number]     Acct: [id number]   Admit date: 1/2/2019    Patient Seen, Chart, Consults notes, Labs, Radiology studies reviewed.    Subjective:   Pain relatively well controlled.  Has started work with PT & OT.  Did have stool output with regimen yesterday.      Medications:   Scheduled Meds:    calcium carb-cholecalciferol 1 tablet Oral BID With Meals   escitalopram 20 mg Oral Daily   estrogen (conjugated)-medroxyprogesterone 1 tablet Oral Daily   famotidine 20 mg Intravenous Q12H   Or      famotidine 20 mg Oral Q12H   Fluticasone Furoate-Vilanterol 1 puff Inhalation Daily   gabapentin 1,600 mg Oral Nightly   gabapentin 800 mg Oral BID   polyethylene glycol 17 g Oral BID   sodium chloride 3 mL Intravenous Q12H   tiotropium 1 capsule Inhalation Daily - RT     Continuous Infusions:    lactated ringers 30 mL/hr Last Rate: 30 mL/hr (01/02/19 1148)   lactated ringers 100 mL/hr Last Rate: 100 mL/hr (01/04/19 1515)     PRN Meds:     albuterol sulfate HFA 2 puff Q4H PRN   bisacodyl 10 mg Daily PRN   diphenhydrAMINE 25 mg Nightly PRN   influenza vaccine 0.5 mL During Hospitalization   ondansetron 4 mg Q6H PRN   Or     ondansetron ODT 4 mg Q6H PRN   Or     ondansetron 4 mg Q6H PRN   oxyCODONE-acetaminophen 2 tablet Q4H PRN   sodium chloride 3-10 mL PRN   tiZANidine 4 mg Q8H PRN         Objective:   Vitals:   Patient Vitals for the past 24 hrs:   BP Temp Temp src Pulse Resp SpO2   01/05/19 2130 -- -- -- -- -- 90 %   01/05/19 2008 105/51 97.9 °F (36.6 °C) Oral 94 16 94 %   01/05/19 1700 -- -- -- -- -- 95 %   01/05/19 1554 101/66 98.7 °F (37.1 °C) Oral 89 16 92 %   01/05/19 1327 -- -- -- -- -- 94 %   01/05/19 1212 111/51 98.5 °F (36.9 °C) Oral 98 18 93 %     GEN: Awake, alert, no acute distress.  NC in place.  RESP: Full and symmetric respirations.  Chest clear to auscultation bilaterally, no wheezes, rales, or rhonchi.  CARD:  Regular rate and rhythm.  No murmurs, rubs or gallops.  ABD: Normoactive bowel sounds.  Abdomen soft, nontender, and nondistended.  EXT: No cyanosis, clubbing, or edema.  Brace in place.      24 hour intake/output:    Intake/Output Summary (Last 24 hours) at 1/6/2019 0810  Last data filed at 1/5/2019 1808  Gross per 24 hour   Intake 1010 ml   Output --   Net 1010 ml     Last 3 weights:  Wt Readings from Last 3 Encounters:   01/02/19 75 kg (165 lb 5.5 oz)   12/26/18 75 kg (165 lb 5.5 oz)   05/07/18 72.6 kg (160 lb 2 oz)       Labs:  Results from last 7 days   Lab Units  01/05/19   0556  01/03/19   0508   WBC 10*3/mm3  10.72  11.89*   HEMOGLOBIN g/dL  8.6*  9.7*   HEMATOCRIT %  26.4*  29.9*   PLATELETS 10*3/mm3  147  167     Results from last 7 days   Lab Units  01/03/19   0508   SODIUM mmol/L  135   POTASSIUM mmol/L  4.5   CHLORIDE mmol/L  99   CO2 mmol/L  28.0   BUN mg/dL  14   CREATININE mg/dL  0.75   CALCIUM mg/dL  8.0*   GLUCOSE mg/dL  103*         Lab Results   Component Value Date    GLUCOSE 103 (H) 01/03/2019    GLUCOSE 89 12/26/2018    GLUCOSE 102 (H) 05/10/2018    GLUCOSE 128 (H) 05/08/2018    GLUCOSE 97 04/24/2018       Radiology:   Imaging Results (last 72 hours)     Procedure Component Value Units Date/Time    XR Spine Lumbar AP & Lateral [[id number]] Collected:  01/04/19 1138     Updated:  01/04/19 1345    Narrative:       EXAMINATION: Seminar 1/4/2019     Fluoroscopy time: 37 seconds. 2 images are submitted for interpretation     FINDINGS: Today's exam is compared to previous study of 2 days earlier.  The patient's undergone previous anterior fusion at the L5-S1 level with  pedicle screws at the L5-S1 level and a lateral fusion plate at the  L3-L4 level.     New pedicles screws have been placed at L3, L5 and S1. There is good  anatomic alignment. The films are available to the OR for review.  This report was finalized on 01/04/2019 11:41 by Dr. Bill Benitez MD.    FL C Arm During Surgery [[id number]]  Collected:  01/04/19 1138     Updated:  01/04/19 1345    Narrative:       EXAMINATION: Seminar 1/4/2019     Fluoroscopy time: 37 seconds. 2 images are submitted for interpretation     FINDINGS: Today's exam is compared to previous study of 2 days earlier.  The patient's undergone previous anterior fusion at the L5-S1 level with  pedicle screws at the L5-S1 level and a lateral fusion plate at the  L3-L4 level.     New pedicles screws have been placed at L3, L5 and S1. There is good  anatomic alignment. The films are available to the OR for review.  This report was finalized on 01/04/2019 11:41 by Dr. Bill Benitez MD.    XR Spine Lumbar AP & Lateral [[id number]] Collected:  01/02/19 1222     Updated:  01/02/19 1307    Narrative:       EXAMINATION:  XR SPINE LUMBAR AP AND LATERAL-  1/2/2019 9:20 AM CST     HISTORY: RLIF      COMPARISON: No comparison study.     TECHNIQUE:      Image number: [id number]     Fluoroscopy time: 44 seconds     Multi projection C-arm images were obtained in the operating room to  assist the surgeon in interbody and lateral fusion.     Postoperative devices appear well-positioned without hardware or  postprocedural complications.     The examination is available for review.  This report was finalized on 01/02/2019 12:24 by Dr. Silver Sarah MD.    FL C Arm During Surgery [[id number]] Collected:  01/02/19 1222     Updated:  01/02/19 1307    Narrative:       EXAMINATION:  XR SPINE LUMBAR AP AND LATERAL-  1/2/2019 9:20 AM CST     HISTORY: RLIF      COMPARISON: No comparison study.     TECHNIQUE:      Image number: [id number]     Fluoroscopy time: 44 seconds     Multi projection C-arm images were obtained in the operating room to  assist the surgeon in interbody and lateral fusion.     Postoperative devices appear well-positioned without hardware or  postprocedural complications.     The examination is available for review.  This report was finalized on 01/02/2019 12:24 by Dr. Silver Sarah MD.             Assessment/Plan:   Principal Problem: Lumbar stenosis  Active Hospital Problems    Diagnosis Date Noted   • **Lumbar stenosis [M48.061] 05/07/2018   • Recurrent major depressive disorder (CMS/[facility]) [F33.9] 01/05/2019   • Chronic diastolic CHF (congestive heart failure) (CMS/[facility]) [I50.32] 01/04/2019   • Gastroesophageal reflux disease without esophagitis [K21.9] 05/07/2018   • Constipation due to opioid therapy [K59.03, T40.2X5A] 05/07/2018   • Panlobular emphysema (CMS/[facility]) [J43.1] 05/07/2018   • Primary osteoarthritis involving multiple joints [M15.0] 05/07/2018   • PAD (peripheral artery disease) (CMS/[facility]) [I73.9] 05/07/2018      Resolved Hospital Problems   No resolved problems to display.       PT/OT/pain control per ortho.  Continue COPD controller med (spiriva & breo nonformulary).  Bronchodilators available prn.  Encourage IS use.  Wean O2 as able.  No signs of ongoing blood loss.  Monitor H/H.  Transfusion threshold in this patient without known Coronary Artery Disease is <7.  Continue BM regimen.  Continue gastric protection.  Chronic medical conditions otherwise stable.  Continue current medications & management.  Likely needs ongoing PT/OT after discharge.  Poss HH but may actually need more intensive rehab.    Electronically signed by Kevin Diggs MD on 1/6/2019 at 8:10 AM

## 2019-01-06 NOTE — THERAPY TREATMENT NOTE
Acute Care - Physical Therapy Treatment Note  [facility]     Patient Name: Arminda Jiménez  : 1960  MRN: [id number]  Today's Date: 2019  Onset of Illness/Injury or Date of Surgery: 19  Date of Referral to PT: 19  Referring Physician: Dr. Zheng    Admit Date: 2019    Visit Dx:    ICD-10-CM ICD-9-CM   1. Decreased activities of daily living (ADL) R68.89 780.99   2. Impaired functional mobility, balance, gait, and endurance Z74.09 V49.89     Patient Active Problem List   Diagnosis   • Foraminal stenosis of lumbosacral region   • Chronic back pain   • Radiculopathy   • Lumbar stenosis   • Panlobular emphysema (CMS/HCC)   • Gastroesophageal reflux disease without esophagitis   • Constipation due to opioid therapy   • PAD (peripheral artery disease) (CMS/HCC)   • Primary osteoarthritis involving multiple joints   • Chronic diastolic CHF (congestive heart failure) (CMS/HCC)   • Recurrent major depressive disorder (CMS/HCC)       Therapy Treatment    Rehabilitation Treatment Summary     Row Name 19 1028             Treatment Time/Intention    Discipline  physical therapy assistant  -      Document Type  therapy note (daily note)  -MF2      Subjective Information  complains of;pain  -MF2      Mode of Treatment  physical therapy  -MF2      Existing Precautions/Restrictions  fall;brace worn when out of bed  -MF2      Recorded by [MF] Mary Jane Juares, PTA 19 1028  [MF2] Mary Jane Juares, SUSI 19 1123      Row Name 19 1028             Vital Signs    Pre SpO2 (%)  90  -MF      O2 Delivery Pre Treatment  room air  -MF      O2 Delivery Intra Treatment  room air  -MF      Post SpO2 (%)  95  -MF      O2 Delivery Post Treatment  room air  -MF      Pre Patient Position  Supine  -MF      Intra Patient Position  Sitting  -MF      Post Patient Position  Supine  -MF      Recorded by [] Mary Jane Juares, PTA 19 1123      Row Name 19 1028             Bed  Mobility Assessment/Treatment    Sidelying-Sit [place] (Bed Mobility)  verbal cues;contact guard;minimum assist (75% patient effort)  -      Sit-Sidelying [place] (Bed Mobility)  verbal cues;minimum assist (75% patient effort)  -      Bed Mobility, Safety Issues  decreased use of legs for bridging/pushing  -      Assistive Device (Bed Mobility)  bed rails;head of bed elevated  -      Comment (Bed Mobility)  pt. refused to have the HOB let down.   -      Recorded by [MF] Mary Jane Juares, PTA 01/06/19 1123      Row Name 01/06/19 1028             Sit-Stand Transfer    Sit-Stand [place] (Transfers)  contact guard;verbal cues  -      Recorded by [MF] Mary Jane Juares, PTA 01/06/19 1123      Row Name 01/06/19 1028             Stand-Sit Transfer    Stand-Sit [place] (Transfers)  verbal cues;contact guard  -      Recorded by [] Mary Jane Juares, PTA 01/06/19 1123      Row Name 01/06/19 1028             Toilet Transfer    Type (Toilet Transfer)  sit-stand;stand-sit  -      [place] Level (Toilet Transfer)  contact guard  -      Assistive Device (Toilet Transfer)  commode;grab bars/safety frame  -      Recorded by [MF] Mary Jane Juares, PTA 01/06/19 1123      Row Name 01/06/19 1028             Gait/Stairs Assessment/Training    [place] Level (Gait)  verbal cues;contact guard  -      Assistive Device (Gait)  walker, front-wheeled  -      Distance in Feet (Gait)  50  -MF      Pattern (Gait)  step-through  -      Deviations/Abnormal Patterns (Gait)  stride length decreased;rosita decreased;antalgic  -MF      Bilateral Gait Deviations  heel strike decreased;forward flexed posture  -      Recorded by [MF] Mary Jane Juares, PTA 01/06/19 1123      Row Name 01/06/19 1028             Positioning and Restraints    Pre-Treatment Position  in bed  -MF      Post Treatment Position  bed  -MF      In Bed  fowlers;call light within reach;encouraged to call for  assist;side rails up x2;legs elevated;SCD pump applied  -MF      Recorded by [MF] Mary Jane Juares, PTA 01/06/19 1123      Row Name 01/06/19 1028             Pain Scale: Numbers Pre/Post-Treatment    Pain Scale: Numbers, Pretreatment  10/10  -MF      Pain Scale: Numbers, Post-Treatment  10/10  -MF      Pain Location - Side  Right  -MF      Pain Location - Orientation  lower  -MF      Pain Location  extremity  -MF      Pain Intervention(s)  Medication (See MAR);Repositioned  -MF      Recorded by [MF] Mary Jane Juares, PTA 01/06/19 1123      Row Name                Wound 01/02/19 1013 Right flank incision    Wound - Properties Group Date first assessed: 01/02/19 [JR] Time first assessed: 1013 [JR] Side: Right [JR] Location: flank [JR] Type: incision [JR] Recorded by:  [JR] Betsy Chin RN 01/02/19 1013    Row Name                Wound 01/04/19 1143 Bilateral back incision    Wound - Properties Group Date first assessed: 01/04/19 [RS] Time first assessed: 1143 [RS] Side: Bilateral [RS] Location: back [RS] Type: incision [RS] Recorded by:  [RS] Jose Farah RN 01/04/19 1143      User Key  (r) = Recorded By, (t) = Taken By, (c) = Cosigned By    Initials Name Effective Dates Discipline    JR Betsy Chin RN 08/02/16 -  Nurse     Mary Jane Juares, PTA 08/02/16 -  PT    RS Jose Farah RN 07/26/16 -  Nurse          Wound 01/02/19 1013 Right flank incision (Active)   Dressing Appearance dry;intact;no drainage 1/5/2019  8:00 PM   Closure IVAN 1/5/2019  8:00 PM   Base dressing in place, unable to visualize 1/5/2019  8:00 PM   Drainage Amount none 1/5/2019  8:00 PM       Wound 01/04/19 1143 Bilateral back incision (Active)   Dressing Appearance dry;intact 1/5/2019  8:00 PM   Closure IVAN 1/5/2019  8:00 PM   Base dressing in place, unable to visualize 1/5/2019  8:00 PM   Drainage Characteristics/Odor sanguineous 1/5/2019  8:00 PM   Drainage Amount scant 1/5/2019  8:00 PM                PT Recommendation and Plan     Plan of Care Reviewed With: patient  Progress: no change  Outcome Summary: Pt. agreeable to therapy, but c/o increased pain in R leg. Pt. was MIN x 1 for bed mobility. Pt. was CGA for transfers and gait. She walked 50' wiith RWX. SHe was ablee to take better steps today, but still antalgic. Will continue to work on mobility.   Outcome Measures     Row Name 01/06/19 1028 01/05/19 0929 01/05/19 0900       How much help from another person do you currently need...    Turning from your back to your side while in flat bed without using bedrails?  3  -  3  -JE  --    Moving from lying on back to sitting on the side of a flat bed without bedrails?  3  -  3  -JE  --    Moving to and from a bed to a chair (including a wheelchair)?  3  -  3  -JE  --    Standing up from a chair using your arms (e.g., wheelchair, bedside chair)?  3  -  3  -JE  --    Climbing 3-5 steps with a railing?  2  -  3  -JE  --    To walk in hospital room?  3  -  3  -JE  --    AM-PAC 6 Clicks Score  17  -MF  18  -JE  --       How much help from another is currently needed...    Putting on and taking off regular lower body clothing?  --  --  2  -JJ    Bathing (including washing, rinsing, and drying)  --  --  2  -JJ    Toileting (which includes using toilet bed pan or urinal)  --  --  3  -JJ    Putting on and taking off regular upper body clothing  --  --  3  -JJ    Taking care of personal grooming (such as brushing teeth)  --  --  3  -JJ    Eating meals  --  --  4  -JJ    Score  --  --  17  -JJ       Functional Assessment    Outcome Measure Options  AM-PAC 6 Clicks Basic Mobility (PT)  -MF  AM-PAC 6 Clicks Basic Mobility (PT)  -JE  AM-PAC 6 Clicks Daily Activity (OT)  -JJ    Row Name 01/03/19 1314 01/03/19 1300          How much help from another person do you currently need...    Turning from your back to your side while in flat bed without using bedrails?  --  3  -JE     Moving from lying on back  to sitting on the side of a flat bed without bedrails?  --  3  -JE     Moving to and from a bed to a chair (including a wheelchair)?  --  3  -JE     Standing up from a chair using your arms (e.g., wheelchair, bedside chair)?  --  3  -JE     Climbing 3-5 steps with a railing?  --  3  -JE     To walk in hospital room?  --  3  -JE     AM-PAC 6 Clicks Score  --  18  -JE        How much help from another is currently needed...    Putting on and taking off regular lower body clothing?  2  -TS  --     Bathing (including washing, rinsing, and drying)  3  -TS  --     Toileting (which includes using toilet bed pan or urinal)  4  -TS  --     Putting on and taking off regular upper body clothing  4  -TS  --     Taking care of personal grooming (such as brushing teeth)  4  -TS  --     Eating meals  4  -TS  --     Score  21  -TS  --        Functional Assessment    Outcome Measure Options  AM-PAC 6 Clicks Daily Activity (OT)  -TS  AM-[facility] 6 Clicks Basic Mobility (PT)  -       User Key  (r) = Recorded By, (t) = Taken By, (c) = Cosigned By    Initials Name Provider Type     Stacy Ogden, MCELROY/L Occupational Therapy Assistant    Mary Jane Dawson PTA Physical Therapy Assistant    Marimar Christensen, PT Physical Therapist    Betsy Rodriguez, OTR/L Occupational Therapist         Time Calculation:   PT Charges     Row Name 01/06/19 1127             Time Calculation    Start Time  1028  -      Stop Time  1053  -      Time Calculation (min)  25 min  -      PT Received On  01/06/19  -      PT Goal Re-Cert Due Date  01/15/19  -         Time Calculation- PT    Total Timed Code Minutes- PT  25 minute(s)  -         Timed Charges    78403 - Gait Training Minutes   25  -        User Key  (r) = Recorded By, (t) = Taken By, (c) = Cosigned By    Initials Name Provider Type    Mary Jane Dawson PTA Physical Therapy Assistant        Therapy Suggested Charges     Code   Minutes Charges    62723 (CPT®)  Pt  Neuromusc Re Education Ea 15 Min      77841 (CPT®) Hc Pt Ther Proc Ea 15 Min      19061 (CPT®) Hc Gait Training Ea 15 Min 25 2    66077 (CPT®) Hc Pt Therapeutic Act Ea 15 Min      90768 (CPT®) Hc Pt Manual Therapy Ea 15 Min      08716 (CPT®) Hc Pt Iontophoresis Ea 15 Min      30635 (CPT®) Hc Pt Elec Stim Ea-Per 15 Min      57915 (CPT®) Hc Pt Ultrasound Ea 15 Min      69580 (CPT®) Hc Pt Self Care/Mgmt/Train Ea 15 Min      19516 (CPT®) Hc Pt Prosthetic (S) Train Initial Encounter, Each 15 Min      71459 (CPT®) Hc Pt Orthotic(S)/Prosthetic(S) Encounter, Each 15 Min      14632 (CPT®) Hc Orthotic(S) Mgmt/Train Initial Encounter, Each 15min      Total  25 2        Therapy Charges for Today     Code Description Service Date Service Provider Modifiers Qty    02090771831 HC GAIT TRAINING EA 15 MIN 1/5/2019 Mary Jane Juares, SUSI GP 2    33969383238 HC GAIT TRAINING EA 15 MIN 1/6/2019 Mary Jane Juares PTA GP 2          PT G-Codes  PT Professional Judgement Used?: Yes  Outcome Measure Options: AM-PAC 6 Clicks Basic Mobility (PT)  AM-PAC 6 Clicks Score: 17  Score: 17  Functional Limitation: Mobility: Walking and moving around  Mobility: Walking and Moving Around Current Status (): At least 40 percent but less than 60 percent impaired, limited or restricted  Mobility: Walking and Moving Around Goal Status (): At least 20 percent but less than 40 percent impaired, limited or restricted    Mary Jane Juares PTA  1/6/2019

## 2019-01-06 NOTE — PLAN OF CARE
Problem: Patient Care Overview  Goal: Plan of Care Review  Outcome: Ongoing (interventions implemented as appropriate)      Problem: Laminectomy/Foraminotomy/Discectomy (Adult)  Goal: Signs and Symptoms of Listed Potential Problems Will be Absent, Minimized or Managed (Laminectomy/Foraminotomy/Discectomy)  Outcome: Ongoing (interventions implemented as appropriate)      Problem: Fall Risk (Adult)  Goal: Absence of Fall  Outcome: Ongoing (interventions implemented as appropriate)

## 2019-01-06 NOTE — PLAN OF CARE
Problem: Patient Care Overview  Goal: Plan of Care Review  Outcome: Ongoing (interventions implemented as appropriate)   01/06/19 1028 01/06/19 1412   Coping/Psychosocial   Plan of Care Reviewed With patient --    Plan of Care Review   Progress --  improving   OTHER   Outcome Summary --  A&O X4. Safety maintained. PRN pain medication given upon request with some relief of symptoms. Right flank tegaderm CDI. Lower back tegaderm has a scant amount of dried drainage. LSO brace worn when out of bed. Trying to ween patient off oxygen. Room air earlier in shift but patient's O2 sats kept dropping, currently 1L O2. Will continue to monitor.      Goal: Individualization and Mutuality  Outcome: Ongoing (interventions implemented as appropriate)    Goal: Discharge Needs Assessment  Outcome: Ongoing (interventions implemented as appropriate)    Goal: Interprofessional Rounds/Family Conf  Outcome: Ongoing (interventions implemented as appropriate)      Problem: Laminectomy/Foraminotomy/Discectomy (Adult)  Goal: Signs and Symptoms of Listed Potential Problems Will be Absent, Minimized or Managed (Laminectomy/Foraminotomy/Discectomy)  Outcome: Ongoing (interventions implemented as appropriate)      Problem: Fall Risk (Adult)  Goal: Absence of Fall  Outcome: Ongoing (interventions implemented as appropriate)

## 2019-01-06 NOTE — PLAN OF CARE
Problem: Patient Care Overview  Goal: Plan of Care Review  Outcome: Ongoing (interventions implemented as appropriate)   01/05/19 1669   Coping/Psychosocial   Plan of Care Reviewed With patient;sibling   Plan of Care Review   Progress improving   OTHER   Outcome Summary PT re-eval completed s/p surgery. Pt gaurded with increase pain, now with R LE weakness. Pt reports tingling new in the R lower leg from the knee down. Pt rolls with CGA, Transfers L sidelying to sit with CGA, Transfers sit to/from stand with CGA. Pt ambulated with rwx with CGA ~ 60 ft with slowed pace with decrease step length and decrease heel strike R worse than L. Pt with reported worsening pain with gait initiating in the R LE, moving to the L LE and then into the back. Pt required CGA to Mod assist to return to bed requiring increase assist for the LE s back up into bed. Pt will benefit from continued PT services to improve strength, knowledge of spinal precautions, use of LSO brace, to assist with pain management, and to improve I with mobility improving balance/safety awareness reducing fall risk. Anticipate improved mobility in order for pt to return home with assist from family. If pt able to return home, she may need  services to continue with skilled services. However if progress slows and pt's unable to safely assist hers, she may need SNF for continued care. Will follow for pt progress.

## 2019-01-06 NOTE — PLAN OF CARE
Problem: Patient Care Overview  Goal: Plan of Care Review  Outcome: Ongoing (interventions implemented as appropriate)   01/06/19 1028   Coping/Psychosocial   Plan of Care Reviewed With patient   Plan of Care Review   Progress no change   OTHER   Outcome Summary Pt. agreeable to therapy, but c/o increased pain in R leg. Pt. was MIN x 1 for bed mobility. Pt. was CGA for transfers and gait. She walked 50' wiith RWX. SHe was ablee to take better steps today, but still antalgic. Will continue to work on mobility.

## 2019-01-07 ENCOUNTER — APPOINTMENT (OUTPATIENT)
Dept: GENERAL RADIOLOGY | Facility: HOSPITAL | Age: 59
End: 2019-01-07

## 2019-01-07 LAB
ANION GAP SERPL CALCULATED.3IONS-SCNC: 11 MMOL/L (ref 4–13)
BASOPHILS # BLD AUTO: 0.04 10*3/MM3 (ref 0–0.2)
BASOPHILS NFR BLD AUTO: 0.6 % (ref 0–2)
BUN BLD-MCNC: 8 MG/DL (ref 5–21)
BUN/CREAT SERPL: 14.5 (ref 7–25)
CALCIUM SPEC-SCNC: 9.1 MG/DL (ref 8.4–10.4)
CHLORIDE SERPL-SCNC: 93 MMOL/L (ref 98–110)
CO2 SERPL-SCNC: 34 MMOL/L (ref 24–31)
CREAT BLD-MCNC: 0.55 MG/DL (ref 0.5–1.4)
DEPRECATED RDW RBC AUTO: 42.3 FL (ref 40–54)
EOSINOPHIL # BLD AUTO: 0.4 10*3/MM3 (ref 0–0.7)
EOSINOPHIL NFR BLD AUTO: 5.8 % (ref 0–4)
ERYTHROCYTE [DISTWIDTH] IN BLOOD BY AUTOMATED COUNT: 12.3 % (ref 12–15)
GFR SERPL CREATININE-BSD FRML MDRD: 114 ML/MIN/1.73
GLUCOSE BLD-MCNC: 103 MG/DL (ref 70–100)
GLUCOSE BLDC GLUCOMTR-MCNC: 120 MG/DL (ref 70–130)
HCT VFR BLD AUTO: 28.6 % (ref 37–47)
HGB BLD-MCNC: 9.4 G/DL (ref 12–16)
IMM GRANULOCYTES # BLD AUTO: 0.02 10*3/MM3 (ref 0–0.03)
IMM GRANULOCYTES NFR BLD AUTO: 0.3 % (ref 0–5)
LYMPHOCYTES # BLD AUTO: 1.75 10*3/MM3 (ref 0.72–4.86)
LYMPHOCYTES NFR BLD AUTO: 25.6 % (ref 15–45)
MCH RBC QN AUTO: 31 PG (ref 28–32)
MCHC RBC AUTO-ENTMCNC: 32.9 G/DL (ref 33–36)
MCV RBC AUTO: 94.4 FL (ref 82–98)
MONOCYTES # BLD AUTO: 0.8 10*3/MM3 (ref 0.19–1.3)
MONOCYTES NFR BLD AUTO: 11.7 % (ref 4–12)
NEUTROPHILS # BLD AUTO: 3.83 10*3/MM3 (ref 1.87–8.4)
NEUTROPHILS NFR BLD AUTO: 56 % (ref 39–78)
NRBC BLD AUTO-RTO: 0 /100 WBC (ref 0–0)
PLATELET # BLD AUTO: 158 10*3/MM3 (ref 130–400)
PMV BLD AUTO: 11.8 FL (ref 6–12)
POTASSIUM BLD-SCNC: 3.9 MMOL/L (ref 3.5–5.3)
RBC # BLD AUTO: 3.03 10*6/MM3 (ref 4.2–5.4)
SODIUM BLD-SCNC: 138 MMOL/L (ref 135–145)
WBC NRBC COR # BLD: 6.84 10*3/MM3 (ref 4.8–10.8)

## 2019-01-07 PROCEDURE — 94799 UNLISTED PULMONARY SVC/PX: CPT

## 2019-01-07 PROCEDURE — 97535 SELF CARE MNGMENT TRAINING: CPT

## 2019-01-07 PROCEDURE — 85025 COMPLETE CBC W/AUTO DIFF WBC: CPT | Performed by: INTERNAL MEDICINE

## 2019-01-07 PROCEDURE — 80048 BASIC METABOLIC PNL TOTAL CA: CPT | Performed by: INTERNAL MEDICINE

## 2019-01-07 PROCEDURE — 82962 GLUCOSE BLOOD TEST: CPT

## 2019-01-07 PROCEDURE — 97116 GAIT TRAINING THERAPY: CPT

## 2019-01-07 PROCEDURE — 71045 X-RAY EXAM CHEST 1 VIEW: CPT

## 2019-01-07 RX ADMIN — ESCITALOPRAM 20 MG: 10 TABLET, FILM COATED ORAL at 09:37

## 2019-01-07 RX ADMIN — OXYCODONE HYDROCHLORIDE AND ACETAMINOPHEN 2 TABLET: 10; 325 TABLET ORAL at 17:03

## 2019-01-07 RX ADMIN — OXYCODONE HYDROCHLORIDE AND ACETAMINOPHEN 2 TABLET: 10; 325 TABLET ORAL at 22:13

## 2019-01-07 RX ADMIN — OXYCODONE HYDROCHLORIDE 10 MG: 10 TABLET, FILM COATED, EXTENDED RELEASE ORAL at 09:37

## 2019-01-07 RX ADMIN — OXYCODONE HYDROCHLORIDE AND ACETAMINOPHEN 2 TABLET: 10; 325 TABLET ORAL at 12:29

## 2019-01-07 RX ADMIN — GABAPENTIN 800 MG: 400 CAPSULE ORAL at 12:29

## 2019-01-07 RX ADMIN — Medication 1 TABLET: at 08:34

## 2019-01-07 RX ADMIN — Medication 1 TABLET: at 17:12

## 2019-01-07 RX ADMIN — GABAPENTIN 800 MG: 400 CAPSULE ORAL at 05:58

## 2019-01-07 RX ADMIN — OXYCODONE HYDROCHLORIDE AND ACETAMINOPHEN 2 TABLET: 10; 325 TABLET ORAL at 03:57

## 2019-01-07 RX ADMIN — SODIUM CHLORIDE, PRESERVATIVE FREE 3 ML: 5 INJECTION INTRAVENOUS at 08:33

## 2019-01-07 RX ADMIN — GABAPENTIN 1600 MG: 400 CAPSULE ORAL at 21:24

## 2019-01-07 RX ADMIN — OXYCODONE HYDROCHLORIDE AND ACETAMINOPHEN 2 TABLET: 10; 325 TABLET ORAL at 08:33

## 2019-01-07 RX ADMIN — OXYCODONE HYDROCHLORIDE 10 MG: 10 TABLET, FILM COATED, EXTENDED RELEASE ORAL at 21:24

## 2019-01-07 RX ADMIN — CHOLECALCIFEROL CAP 125 MCG (5000 UNIT) 5000 UNITS: 125 CAP at 09:40

## 2019-01-07 NOTE — PLAN OF CARE
Problem: Patient Care Overview  Goal: Plan of Care Review  Outcome: Ongoing (interventions implemented as appropriate)   01/07/19 1762   Coping/Psychosocial   Plan of Care Reviewed With patient   Plan of Care Review   Progress no change   OTHER   Outcome Summary Pt A&Ox4, vs stable. pt cont c/o back pain 8/10. pt med as ordered. pt refusing muscle relaxer. incision site C/D/I. no drainage noted.        Problem: Laminectomy/Foraminotomy/Discectomy (Adult)  Goal: Signs and Symptoms of Listed Potential Problems Will be Absent, Minimized or Managed (Laminectomy/Foraminotomy/Discectomy)  Outcome: Ongoing (interventions implemented as appropriate)      Problem: Fall Risk (Adult)  Goal: Absence of Fall  Outcome: Ongoing (interventions implemented as appropriate)

## 2019-01-07 NOTE — DISCHARGE PLACEMENT REQUEST
"Call Back: Tena Panda, [phone]    Riley Coon (58 y.o. Female)     Date of Birth Social Security Number Address Home Phone MRN    1960  [address] [place] [address] [address] [place] [address] [phone] [id number]    Taoism Marital Status          Spiritism        Admission Date Admission Type Admitting Provider Attending Provider Department, Room/Bed    1/2/19 Elective LUIS CARLOS Zheng MD Strenge, K Brandon, MD [facility] 3A, 350/1    Discharge Date Discharge Disposition Discharge Destination                       Attending Provider:  LUIS CARLOS Zheng MD    Allergies:  Aspirin, Keflex [Cephalexin]    Isolation:  None   Infection:  None   Code Status:  CPR    Ht:  157.5 cm (62.01\")   Wt:  75 kg (165 lb 5.5 oz)    Admission Cmt:  None   Principal Problem:  Lumbar stenosis [M48.061]                 Active Insurance as of 1/2/2019     Primary Coverage     Payor Plan Insurance Group Employer/Plan Group    MEDICARE MEDICARE A & B      Payor Plan Address Payor Plan Phone Number Payor Plan Fax Number Effective Dates    [address] [phone]  3/1/2013 - None Entered    [place] [address]       Subscriber Name Subscriber Birth Date Member ID       RILEY COON 1960 [id number]                 Emergency Contacts      (Rel.) Home Phone Work Phone Mobile Phone    Anay Dunham (Daughter) [phone] -- --            Insurance Information                MEDICARE/MEDICARE A & B Phone: [phone]    Subscriber: Riley Coon Subscriber#: [id number]    Group#:  Precert#:              History & Physical      LUIS CARLOS Zheng MD at 1/2/2019  6:39 AM          H&P reviewed. The patient was examined and there are no changes to the H&P.          Electronically signed by LUIS CARLOS Zheng MD at 1/2/2019  6:39 AM   Source Note         Scan on 1/2/2019: SPENSER - 12/27/2018 (below)            Electronically signed by Interface, Scans Incoming at " 12/28/2018  8:15 AM             H&P filed by New Onbase, [place] at 12/28/2018  8:33 AM     Scan on 1/2/2019: HISTORY /ORTHOPAEDIC/SPENSER TAMAYO /1/4/19 (below)            Electronically signed by Interface, Scans Incoming at 12/28/2018  8:33 AM     H&P filed by New Onbase, [place] at 12/28/2018  8:15 AM     Scan on 1/2/2019: SPENSER - 12/27/2018 (below)            Electronically signed by Interface, Scans Incoming at 12/28/2018  8:15 AM       Prior to Admission Medications     Prescriptions Last Dose Informant Patient Reported? Taking?    BREO ELLIPTA 200-25 MCG/INH aerosol powder  1/2/2019 Pharmacy Yes Yes    Inhale 200 mcg Daily.    calcium citrate-vitamin d (CITRACAL) 200-250 MG-UNIT tablet tablet   Yes Yes    Take 1 tablet by mouth 2 (Two) Times a Day With Meals.    cholecalciferol (VITAMIN D3) 1000 units tablet 1/1/2019 Pharmacy Yes Yes    Take 1,000 Units by mouth 2 (Two) Times a Day.    escitalopram (LEXAPRO) 20 MG tablet 1/1/2019 Pharmacy Yes Yes    Take 20 mg by mouth Daily.    estrogen, conjugated,-medroxyprogesterone (PREMPRO) 0.3-1.5 MG per tablet 1/1/2019 Pharmacy Yes Yes    Take 1 tablet/day by mouth Daily.    gabapentin (NEURONTIN) 800 MG tablet 1/2/2019 Pharmacy Yes Yes    800 mg 3 (Three) Times a Day.    gabapentin (NEURONTIN) 800 MG tablet   Yes Yes    Take 1,600 mg by mouth Every Night.    NEXIUM 40 MG capsule 1/1/2019 Pharmacy Yes Yes    Take 40 mg by mouth 2 (Two) Times a Day As Needed.    oxyCODONE-acetaminophen (PERCOCET)  MG per tablet 1/2/2019 Pharmacy Yes Yes    Take 1 tablet by mouth Every 4 (Four) Hours As Needed for Moderate Pain .    tiotropium (SPIRIVA HANDIHALER) 18 MCG per inhalation capsule 1/1/2019 Pharmacy Yes Yes    Place 18 mcg into inhaler and inhale Daily. Takes at 5 pm    Unable to find 1/1/2019 Self Yes Yes    1 each 1 (One) Time. Med Name: Calcium Citrate 600 mg, Take 1 tablet by mouth twice daily.    Albuterol Sulfate (PROAIR HFA IN) 12/26/2018 Pharmacy Yes No     Inhale 2 puffs 4 (Four) Times a Day As Needed (Wheezing/Shortness of breath).    clopidogrel (PLAVIX) 75 MG tablet 12/21/2018 Pharmacy Yes No    Take 75 mg by mouth Daily. HOLD STARTING 12/23/18          Hospital Medications (active)       Dose Frequency Start End    albuterol sulfate HFA (PROVENTIL HFA;VENTOLIN HFA;PROAIR HFA) inhaler 2 puff 2 puff Every 4 Hours PRN 1/5/2019     Sig - Route: Inhale 2 puffs Every 4 (Four) Hours As Needed for Wheezing or Shortness of Air. - Inhalation    Non-formulary Exception Code: Patient supplied medication    bisacodyl (DULCOLAX) suppository 10 mg 10 mg Daily PRN 1/5/2019     Sig - Route: Insert 1 suppository into the rectum Daily As Needed for Constipation. - Rectal    calcium carb-cholecalciferol 600-800 MG-UNIT tablet 1 tablet 1 tablet 2 Times Daily With Meals 1/2/2019     Sig - Route: Take 1 tablet by mouth 2 (Two) Times a Day With Meals. - Oral    diphenhydrAMINE (BENADRYL) capsule 25 mg 25 mg Nightly PRN 1/2/2019     Sig - Route: Take 1 capsule by mouth At Night As Needed for Sleep. - Oral    escitalopram (LEXAPRO) tablet 20 mg 20 mg Daily 1/2/2019     Sig - Route: Take 2 tablets by mouth Daily. - Oral    estrogen (conjugated)-medroxyprogesterone (PREMPRO) 0.3-1.5 MG per tablet 1 tablet 1 tablet Daily 1/7/2019     Sig - Route: Take 1 tablet by mouth Daily. - Oral    Non-formulary Exception Code: Patient supplied medication    Fluticasone Furoate-Vilanterol (BREO ELLIPTA) 200-25 MCG/INH inhaler 1 puff 1 puff Daily 1/6/2019     Sig - Route: Inhale 1 puff Daily. - Inhalation    Non-formulary Exception Code: Patient supplied medication    furosemide (LASIX) tablet 40 mg 40 mg Once 1/6/2019 1/6/2019    Sig - Route: Take 1 tablet by mouth 1 (One) Time. - Oral    gabapentin (NEURONTIN) capsule 1,600 mg 1,600 mg Nightly 1/2/2019     Sig - Route: Take 4 capsules by mouth Every Night. - Oral    gabapentin (NEURONTIN) capsule 800 mg 800 mg 2 Times Daily 1/2/2019     Sig - Route: Take  "2 capsules by mouth 2 (Two) Times a Day. - Oral    Influenza Vac Subunit Quad (FLUCELVAX) injection 0.5 mL 0.5 mL During Hospitalization 1/3/2019     Sig - Route: Inject 0.5 mL into the appropriate muscle as directed by prescriber During Hospitalization for Immunization. - Intramuscular    lactated ringers infusion 30 mL/hr Continuous 1/2/2019     Sig - Route: Infuse 30 mL/hr into a venous catheter Continuous. - Intravenous    Notes to Pharmacy: Pt still in PACU had not been transferred to room yet    lactated ringers infusion 100 mL/hr Continuous 1/4/2019     Sig - Route: Infuse 100 mL/hr into a venous catheter Continuous. - Intravenous    ondansetron (ZOFRAN) injection 4 mg 4 mg Every 6 Hours PRN 1/2/2019     Sig - Route: Infuse 2 mL into a venous catheter Every 6 (Six) Hours As Needed for Nausea or Vomiting. - Intravenous    Linked Group 1:  \"Or\" Linked Group Details        ondansetron (ZOFRAN) tablet 4 mg 4 mg Every 6 Hours PRN 1/2/2019     Sig - Route: Take 1 tablet by mouth Every 6 (Six) Hours As Needed for Nausea or Vomiting. - Oral    Linked Group 1:  \"Or\" Linked Group Details        ondansetron ODT (ZOFRAN-ODT) disintegrating tablet 4 mg 4 mg Every 6 Hours PRN 1/2/2019     Sig - Route: Take 1 tablet by mouth Every 6 (Six) Hours As Needed for Nausea or Vomiting. - Oral    Linked Group 1:  \"Or\" Linked Group Details        oxyCODONE (oxyCONTIN) 12 hr tablet 10 mg 10 mg Every 12 Hours Scheduled 1/6/2019 1/16/2019    Sig - Route: Take 1 tablet by mouth Every 12 (Twelve) Hours. - Oral    oxyCODONE-acetaminophen (PERCOCET)  MG per tablet 2 tablet 2 tablet Every 4 Hours PRN 1/2/2019 1/12/2019    Sig - Route: Take 2 tablets by mouth Every 4 (Four) Hours As Needed for Severe Pain . - Oral    pantoprazole (PROTONIX) EC tablet 40 mg 40 mg Every Morning 1/6/2019     Sig - Route: Take 1 tablet by mouth Every Morning. - Oral    polyethylene glycol 3350 powder (packet) 17 g 2 Times Daily 1/3/2019     Sig - Route: " "Take 17 g by mouth 2 (Two) Times a Day. - Oral    sodium chloride 0.9 % flush 3 mL 3 mL Every 12 Hours Scheduled 1/2/2019     Sig - Route: Infuse 3 mL into a venous catheter Every 12 (Twelve) Hours. - Intravenous    sodium chloride 0.9 % flush 3-10 mL 3-10 mL As Needed 1/2/2019     Sig - Route: Infuse 3-10 mL into a venous catheter As Needed for Line Care. - Intravenous    tiotropium (SPIRIVA) 18 MCG per inhalation capsule 1 capsule 1 capsule Daily - RT 1/5/2019     Sig - Route: Place 1 puff into inhaler and inhale Daily. - Inhalation    Non-formulary Exception Code: Patient supplied medication    tiZANidine (ZANAFLEX) tablet 4 mg 4 mg Every 8 Hours PRN 1/2/2019     Sig - Route: Take 1 tablet by mouth Every 8 (Eight) Hours As Needed for Muscle Spasms. - Oral    vitamin D3 capsule 5,000 Units 5,000 Units Daily 1/7/2019     Sig - Route: Take 1 capsule by mouth Daily. - Oral    estrogen (conjugated)-medroxyprogesterone (PREMPRO) 0.3-1.5 MG per tablet 1 tablet (Discontinued) 1 tablet Daily 1/3/2019 1/6/2019    Sig - Route: Take 1 tablet by mouth Daily. - Oral    famotidine (PEPCID) injection 20 mg (Discontinued) 20 mg Every 12 Hours Scheduled 1/2/2019 1/6/2019    Sig - Route: Infuse 2 mL into a venous catheter Every 12 (Twelve) Hours. - Intravenous    Linked Group 2:  \"Or\" Linked Group Details        famotidine (PEPCID) tablet 20 mg (Discontinued) 20 mg Every 12 Hours Scheduled 1/2/2019 1/6/2019    Sig - Route: Take 1 tablet by mouth Every 12 (Twelve) Hours. - Oral    Linked Group 2:  \"Or\" Linked Group Details        pantoprazole (PROTONIX) EC tablet 40 mg (Discontinued) 40 mg Every Morning 1/7/2019 1/6/2019    Sig - Route: Take 1 tablet by mouth Every Morning. - Oral    Reason for Discontinue: Duplicate order             Physician Progress Notes (most recent note)      Lizandro Burger MD at 1/7/2019  7:53 AM          Arminda Jiménez is a 58 y.o. female patient.  Post operative pain as expected.  Plan on " rehabilitation in [place].  wants vitamin D restarted    Current Facility-Administered Medications   Medication Dose Route Frequency Provider Last Rate Last Dose   • albuterol sulfate HFA (PROVENTIL HFA;VENTOLIN HFA;PROAIR HFA) inhaler 2 puff  2 puff Inhalation Q4H PRN Lizandro Burger MD   2 puff at 01/06/19 1241   • bisacodyl (DULCOLAX) suppository 10 mg  10 mg Rectal Daily PRN Kevin Diggs MD   10 mg at 01/05/19 1048   • calcium carb-cholecalciferol 600-800 MG-UNIT tablet 1 tablet  1 tablet Oral BID With Meals LUIS CARLOS Zheng MD   1 tablet at 01/06/19 1742   • diphenhydrAMINE (BENADRYL) capsule 25 mg  25 mg Oral Nightly PRN LUIS CARLOS Zheng MD       • escitalopram (LEXAPRO) tablet 20 mg  20 mg Oral Daily LUIS CARLOS Zheng MD   20 mg at 01/06/19 0916   • estrogen (conjugated)-medroxyprogesterone (PREMPRO) 0.3-1.5 MG per tablet 1 tablet  1 tablet Oral Daily LUIS CARLOS Zheng MD       • Fluticasone Furoate-Vilanterol (BREO ELLIPTA) 200-25 MCG/INH inhaler 1 puff  1 puff Inhalation Daily LUIS CARLOS Zheng MD   1 puff at 01/06/19 0919   • gabapentin (NEURONTIN) capsule 1,600 mg  1,600 mg Oral Nightly José Luis Traore PA-C   1,600 mg at 01/06/19 2121   • gabapentin (NEURONTIN) capsule 800 mg  800 mg Oral BID LUIS CARLOS Zheng MD   800 mg at 01/07/19 0558   • Influenza Vac Subunit Quad (FLUCELVAX) injection 0.5 mL  0.5 mL Intramuscular During Hospitalization LUIS CARLOS Zheng MD       • lactated ringers infusion  30 mL/hr Intravenous Continuous Julieta Burns MD 30 mL/hr at 01/02/19 1148 30 mL/hr at 01/02/19 1148   • lactated ringers infusion  100 mL/hr Intravenous Continuous LUIS CARLOS Zheng  mL/hr at 01/04/19 1515 100 mL/hr at 01/04/19 1515   • ondansetron (ZOFRAN) tablet 4 mg  4 mg Oral Q6H PRN LUIS CARLOS Zheng MD        Or   • ondansetron ODT (ZOFRAN-ODT) disintegrating tablet 4 mg  4 mg Oral Q6H PRN LUIS CARLOS Zheng MD        Or   • ondansetron (ZOFRAN) injection 4  "mg  4 mg Intravenous Q6H PRN LUIS CARLOS Zheng MD       • oxyCODONE (oxyCONTIN) 12 hr tablet 10 mg  10 mg Oral Q12H LUIS CARLOS Zheng MD   10 mg at 01/06/19 2121   • oxyCODONE-acetaminophen (PERCOCET)  MG per tablet 2 tablet  2 tablet Oral Q4H PRN LUIS CARLOS Zheng MD   2 tablet at 01/07/19 0357   • pantoprazole (PROTONIX) EC tablet 40 mg  40 mg Oral Kevin Bonilla MD   40 mg at 01/06/19 0918   • polyethylene glycol 3350 powder (packet)  17 g Oral BID Lizandro Burger MD   17 g at 01/06/19 0917   • sodium chloride 0.9 % flush 3 mL  3 mL Intravenous Q12H LUIS CARLOS Zheng MD   3 mL at 01/05/19 0923   • sodium chloride 0.9 % flush 3-10 mL  3-10 mL Intravenous PRN LUIS CARLOS Zheng MD       • tiotropium (SPIRIVA) 18 MCG per inhalation capsule 1 capsule  1 capsule Inhalation Daily - RT LUIS CARLOS Zheng MD       • tiZANidine (ZANAFLEX) tablet 4 mg  4 mg Oral Q8H PRN LUIS CARLOS Zheng MD         ALLERGIES:    Allergies   Allergen Reactions   • Aspirin Nausea And Vomiting     Unknown reaction   • Keflex [Cephalexin] Other (See Comments)     Blisters to nose and mouth       Lumbar stenosis    Panlobular emphysema (CMS/HCC)    Gastroesophageal reflux disease without esophagitis    Constipation due to opioid therapy    PAD (peripheral artery disease) (CMS/[facility])    Primary osteoarthritis involving multiple joints    Chronic diastolic CHF (congestive heart failure) (CMS/HCC)    Recurrent major depressive disorder (CMS/HCC)    Blood pressure 95/53, pulse 76, temperature 97.3 °F (36.3 °C), temperature source Axillary, resp. rate 16, height 157.5 cm (62.01\"), weight 75 kg (165 lb 5.5 oz), SpO2 92 %.      Subjective:  Symptoms:  Stable.  No shortness of breath, chest pain or chest pressure.    Diet:  Adequate intake.    Activity level: Impaired due to pain.    Pain:  She complains of pain that is moderate.  She reports pain is improving.  Pain is partially controlled.      Review of Systems " "  Respiratory: Negative for shortness of breath.    Cardiovascular: Negative for chest pain.     Objective:  General Appearance:  Comfortable and well-appearing.    Vital signs: (most recent): Blood pressure 95/53, pulse 76, temperature 97.3 °F (36.3 °C), temperature source Axillary, resp. rate 16, height 157.5 cm (62.01\"), weight 75 kg (165 lb 5.5 oz), SpO2 92 %.  Vital signs are normal.    Output: Producing urine and minimal stool output.    HEENT: Normal HEENT exam.    Lungs:  Normal effort and normal respiratory rate.    Heart: Normal rate.  Regular rhythm.    Abdomen: Abdomen is soft.    Neurological: Patient is alert and oriented to person, place and time.    Skin:  Warm and dry.              Labs:  Lab Results (last 72 hours)     Procedure Component Value Units Date/Time    Basic Metabolic Panel [[id number]]  (Abnormal) Collected:  01/07/19 0444    Specimen:  Blood Updated:  01/07/19 0519     Glucose 103 mg/dL      BUN 8 mg/dL      Creatinine 0.55 mg/dL      Sodium 138 mmol/L      Potassium 3.9 mmol/L      Chloride 93 mmol/L      CO2 34.0 mmol/L      Calcium 9.1 mg/dL      eGFR Non African Amer 114 mL/min/1.73      BUN/Creatinine Ratio 14.5     Anion Gap 11.0 mmol/L     Narrative:       GFR Normal >60  Chronic Kidney Disease <60  Kidney Failure <15    CBC & Differential [[id number]] Collected:  01/07/19 0444    Specimen:  Blood Updated:  01/07/19 0506    Narrative:       The following orders were created for panel order CBC & Differential.  Procedure                               Abnormality         Status                     ---------                               -----------         ------                     CBC Auto Differential[[id number]]        Abnormal            Final result                 Please view results for these tests on the individual orders.    CBC Auto Differential [[id number]]  (Abnormal) Collected:  01/07/19 0444    Specimen:  Blood Updated:  01/07/19 0506     WBC 6.84 10*3/mm3      RBC 3.03 " 10*6/mm3      Hemoglobin 9.4 g/dL      Hematocrit 28.6 %      MCV 94.4 fL      MCH 31.0 pg      MCHC 32.9 g/dL      RDW 12.3 %      RDW-SD 42.3 fl      MPV 11.8 fL      Platelets 158 10*3/mm3      Neutrophil % 56.0 %      Lymphocyte % 25.6 %      Monocyte % 11.7 %      Eosinophil % 5.8 %      Basophil % 0.6 %      Immature Grans % 0.3 %      Neutrophils, Absolute 3.83 10*3/mm3      Lymphocytes, Absolute 1.75 10*3/mm3      Monocytes, Absolute 0.80 10*3/mm3      Eosinophils, Absolute 0.40 10*3/mm3      Basophils, Absolute 0.04 10*3/mm3      Immature Grans, Absolute 0.02 10*3/mm3      nRBC 0.0 /100 WBC     CBC & Differential [[id number]] Collected:  01/05/19 0556    Specimen:  Blood Updated:  01/05/19 0604    Narrative:       The following orders were created for panel order CBC & Differential.  Procedure                               Abnormality         Status                     ---------                               -----------         ------                     CBC Auto Differential[[id number]]        Abnormal            Final result                 Please view results for these tests on the individual orders.    CBC Auto Differential [[id number]]  (Abnormal) Collected:  01/05/19 0556    Specimen:  Blood Updated:  01/05/19 0604     WBC 10.72 10*3/mm3      RBC 2.80 10*6/mm3      Hemoglobin 8.6 g/dL      Hematocrit 26.4 %      MCV 94.3 fL      MCH 30.7 pg      MCHC 32.6 g/dL      RDW 12.4 %      RDW-SD 43.2 fl      MPV 10.7 fL      Platelets 147 10*3/mm3      Neutrophil % 69.3 %      Lymphocyte % 18.4 %      Monocyte % 8.3 %      Eosinophil % 3.0 %      Basophil % 0.4 %      Immature Grans % 0.6 %      Neutrophils, Absolute 7.44 10*3/mm3      Lymphocytes, Absolute 1.97 10*3/mm3      Monocytes, Absolute 0.89 10*3/mm3      Eosinophils, Absolute 0.32 10*3/mm3      Basophils, Absolute 0.04 10*3/mm3      Immature Grans, Absolute 0.06 10*3/mm3      nRBC 0.0 /100 WBC     Vitamin B12 [[id number]]  (Normal) Collected:  01/04/19  0645    Specimen:  Blood Updated:  01/04/19 0857     Vitamin B-12 428 pg/mL     Iron [[id number]]  (Abnormal) Collected:  01/04/19 0645    Specimen:  Blood Updated:  01/04/19 0756     Iron <10 mcg/dL           Imaging Results (last 72 hours)     Procedure Component Value Units Date/Time    XR Chest 1 View [[id number]] Collected:  01/07/19 0711     Updated:  01/07/19 0714    Narrative:       EXAMINATION:   XR CHEST 1 VW-  1/7/2019 7:11 AM CST     HISTORY: Congestion     Frontal upright radiograph of the chest 1/7/2019 4:25 AM CST     COMPARISON: December 28, 2018.     FINDINGS:   The lungs are clear. The cardiomediastinal silhouette and pulmonary  vascularity are within normal limits.      The osseous structures and surrounding soft tissues demonstrate no acute  abnormality.       Impression:       1. No radiographic evidence of acute cardiopulmonary process.        This report was finalized on 01/07/2019 07:11 by Dr. Tay Sanchez MD.    XR Spine Lumbar AP & Lateral [[id number]] Collected:  01/04/19 1138     Updated:  01/04/19 1345    Narrative:       EXAMINATION: Seminar 1/4/2019     Fluoroscopy time: 37 seconds. 2 images are submitted for interpretation     FINDINGS: Today's exam is compared to previous study of 2 days earlier.  The patient's undergone previous anterior fusion at the L5-S1 level with  pedicle screws at the L5-S1 level and a lateral fusion plate at the  L3-L4 level.     New pedicles screws have been placed at L3, L5 and S1. There is good  anatomic alignment. The films are available to the OR for review.  This report was finalized on 01/04/2019 11:41 by Dr. Bill Benitez MD.    FL C Arm During Surgery [[id number]] Collected:  01/04/19 1138     Updated:  01/04/19 1345    Narrative:       EXAMINATION: Seminar 1/4/2019     Fluoroscopy time: 37 seconds. 2 images are submitted for interpretation     FINDINGS: Today's exam is compared to previous study of 2 days earlier.  The patient's undergone previous  anterior fusion at the L5-S1 level with  pedicle screws at the L5-S1 level and a lateral fusion plate at the  L3-L4 level.     New pedicles screws have been placed at L3, L5 and S1. There is good  anatomic alignment. The films are available to the OR for review.  This report was finalized on 2019 11:41 by Dr. Bill Benitez MD.                Assessment:    Condition: In stable condition.  Improving.       Plan:   Transfer Plan: To rehabilitation in [place] when bed available.  Encourage ambulation and per physical therapy.  (Medically stable for discharge to rehabilitation in [place].  Restart vitamin D per patient request.  Keep follow-up with Dr. Landis upon discharge from rehabilitation    Explained to patient and staff that we were consulted for medical management during their acute care hospitalization. They need to f/u with their regular primary care provider concerning any further treatment and review of abnormalities found during their hospitalization at [facility] and they agree with that treatment plan. ).     Patient Active Problem List   Diagnosis   • Foraminal stenosis of lumbosacral region   • Chronic back pain   • Radiculopathy   • Lumbar stenosis   • Panlobular emphysema (CMS/HCC)   • Gastroesophageal reflux disease without esophagitis   • Constipation due to opioid therapy   • PAD (peripheral artery disease) (CMS/HCC)   • Primary osteoarthritis involving multiple joints   • Chronic diastolic CHF (congestive heart failure) (CMS/HCC)   • Recurrent major depressive disorder (CMS/HCC)     Lizandro Burger MD  2019                                                Electronically signed by Lizandro Burger MD at 2019  7:56 AM          Physical Therapy Notes (most recent note)      Mary Jane Juares PTA at 2019 11:28 AM  Version 1 of 1         Acute Care - Physical Therapy Treatment Note  [facility]     Patient Name: Arminda Jiménez  : 1960  MRN:  [id number]  Today's Date: 1/6/2019  Onset of Illness/Injury or Date of Surgery: 01/04/19  Date of Referral to PT: 01/04/19  Referring Physician: Dr. Zheng    Admit Date: 1/2/2019    Visit Dx:    ICD-10-CM ICD-9-CM   1. Decreased activities of daily living (ADL) R68.89 780.99   2. Impaired functional mobility, balance, gait, and endurance Z74.09 V49.89     Patient Active Problem List   Diagnosis   • Foraminal stenosis of lumbosacral region   • Chronic back pain   • Radiculopathy   • Lumbar stenosis   • Panlobular emphysema (CMS/HCC)   • Gastroesophageal reflux disease without esophagitis   • Constipation due to opioid therapy   • PAD (peripheral artery disease) (CMS/HCC)   • Primary osteoarthritis involving multiple joints   • Chronic diastolic CHF (congestive heart failure) (CMS/HCC)   • Recurrent major depressive disorder (CMS/HCC)       Therapy Treatment    Rehabilitation Treatment Summary     Row Name 01/06/19 1028             Treatment Time/Intention    Discipline  physical therapy assistant  -      Document Type  therapy note (daily note)  -MF2      Subjective Information  complains of;pain  -MF2      Mode of Treatment  physical therapy  -2      Existing Precautions/Restrictions  fall;brace worn when out of bed  -MF2      Recorded by [MF] Mary Jane Juares, PTA 01/06/19 1028  [MF2] Mary Jane Juares, PTA 01/06/19 1123      Row Name 01/06/19 1028             Vital Signs    Pre SpO2 (%)  90  -MF      O2 Delivery Pre Treatment  room air  -MF      O2 Delivery Intra Treatment  room air  -MF      Post SpO2 (%)  95  -MF      O2 Delivery Post Treatment  room air  -MF      Pre Patient Position  Supine  -MF      Intra Patient Position  Sitting  -MF      Post Patient Position  Supine  -MF      Recorded by [MF] Mary Jane Juares, PTA 01/06/19 1123      Row Name 01/06/19 1028             Bed Mobility Assessment/Treatment    Sidelying-Sit [place] (Bed Mobility)  verbal cues;contact guard;minimum assist (75%  patient effort)  -      Sit-Sidelying [place] (Bed Mobility)  verbal cues;minimum assist (75% patient effort)  -      Bed Mobility, Safety Issues  decreased use of legs for bridging/pushing  -      Assistive Device (Bed Mobility)  bed rails;head of bed elevated  -      Comment (Bed Mobility)  pt. refused to have the HOB let down.   -MF      Recorded by [MF] Mary Jane Juares, PTA 01/06/19 1123      Row Name 01/06/19 1028             Sit-Stand Transfer    Sit-Stand [place] (Transfers)  contact guard;verbal cues  -MF      Recorded by [MF] Mary Jane Juares, PTA 01/06/19 1123      Row Name 01/06/19 1028             Stand-Sit Transfer    Stand-Sit [place] (Transfers)  verbal cues;contact guard  -MF      Recorded by [MF] Mary Jane Juares, PTA 01/06/19 1123      Row Name 01/06/19 1028             Toilet Transfer    Type (Toilet Transfer)  sit-stand;stand-sit  -      [place] Level (Toilet Transfer)  contact guard  -      Assistive Device (Toilet Transfer)  commode;grab bars/safety frame  -MF      Recorded by [MF] Mary Jane Juares, PTA 01/06/19 1123      Row Name 01/06/19 1028             Gait/Stairs Assessment/Training    [place] Level (Gait)  verbal cues;contact guard  -      Assistive Device (Gait)  walker, front-wheeled  -      Distance in Feet (Gait)  50  -MF      Pattern (Gait)  step-through  -      Deviations/Abnormal Patterns (Gait)  stride length decreased;rosita decreased;antalgic  -MF      Bilateral Gait Deviations  heel strike decreased;forward flexed posture  -MF      Recorded by [MF] Mary Jane Juares, PTA 01/06/19 1123      Row Name 01/06/19 1028             Positioning and Restraints    Pre-Treatment Position  in bed  -MF      Post Treatment Position  bed  -MF      In Bed  fowlers;call light within reach;encouraged to call for assist;side rails up x2;legs elevated;SCD pump applied  -MF      Recorded by [MF] Mary Jane Juares, PTA 01/06/19 1123       Row Name 01/06/19 1028             Pain Scale: Numbers Pre/Post-Treatment    Pain Scale: Numbers, Pretreatment  10/10  -MF      Pain Scale: Numbers, Post-Treatment  10/10  -MF      Pain Location - Side  Right  -MF      Pain Location - Orientation  lower  -MF      Pain Location  extremity  -MF      Pain Intervention(s)  Medication (See MAR);Repositioned  -MF      Recorded by [MF] Mary Jane Juares, PTA 01/06/19 1123      Row Name                Wound 01/02/19 1013 Right flank incision    Wound - Properties Group Date first assessed: 01/02/19 [JR] Time first assessed: 1013 [JR] Side: Right [JR] Location: flank [JR] Type: incision [JR] Recorded by:  [JR] Betsy Chin RN 01/02/19 1013    Row Name                Wound 01/04/19 1143 Bilateral back incision    Wound - Properties Group Date first assessed: 01/04/19 [RS] Time first assessed: 1143 [RS] Side: Bilateral [RS] Location: back [RS] Type: incision [RS] Recorded by:  [RS] Jose Farah RN 01/04/19 1143      User Key  (r) = Recorded By, (t) = Taken By, (c) = Cosigned By    Initials Name Effective Dates Discipline    JR Betsy Chin RN 08/02/16 -  Nurse    MF Mary Jane Juares, PTA 08/02/16 -  PT    RS Jose Farah RN 07/26/16 -  Nurse          Wound 01/02/19 1013 Right flank incision (Active)   Dressing Appearance dry;intact;no drainage 1/5/2019  8:00 PM   Closure IVAN 1/5/2019  8:00 PM   Base dressing in place, unable to visualize 1/5/2019  8:00 PM   Drainage Amount none 1/5/2019  8:00 PM       Wound 01/04/19 1143 Bilateral back incision (Active)   Dressing Appearance dry;intact 1/5/2019  8:00 PM   Closure IVAN 1/5/2019  8:00 PM   Base dressing in place, unable to visualize 1/5/2019  8:00 PM   Drainage Characteristics/Odor sanguineous 1/5/2019  8:00 PM   Drainage Amount scant 1/5/2019  8:00 PM               PT Recommendation and Plan     Plan of Care Reviewed With: patient  Progress: no change  Outcome Summary: Pt. agreeable to  therapy, but c/o increased pain in R leg. Pt. was MIN x 1 for bed mobility. Pt. was CGA for transfers and gait. She walked 50' wiith RWX. SHe was ablee to take better steps today, but still antalgic. Will continue to work on mobility.   Outcome Measures     Row Name 01/06/19 1028 01/05/19 0929 01/05/19 0900       How much help from another person do you currently need...    Turning from your back to your side while in flat bed without using bedrails?  3  -  3  -JE  --    Moving from lying on back to sitting on the side of a flat bed without bedrails?  3  -  3  -JE  --    Moving to and from a bed to a chair (including a wheelchair)?  3  -  3  -JE  --    Standing up from a chair using your arms (e.g., wheelchair, bedside chair)?  3  -  3  -JE  --    Climbing 3-5 steps with a railing?  2  -  3  -JE  --    To walk in hospital room?  3  -  3  -JE  --    AM-PAC 6 Clicks Score  17  -MF  18  -JE  --       How much help from another is currently needed...    Putting on and taking off regular lower body clothing?  --  --  2  -JJ    Bathing (including washing, rinsing, and drying)  --  --  2  -JJ    Toileting (which includes using toilet bed pan or urinal)  --  --  3  -JJ    Putting on and taking off regular upper body clothing  --  --  3  -JJ    Taking care of personal grooming (such as brushing teeth)  --  --  3  -JJ    Eating meals  --  --  4  -JJ    Score  --  --  17  -JJ       Functional Assessment    Outcome Measure Options  AM-PAC 6 Clicks Basic Mobility (PT)  -MF  AM-PAC 6 Clicks Basic Mobility (PT)  -JE  AM-PAC 6 Clicks Daily Activity (OT)  -JJ    Row Name 01/03/19 1314 01/03/19 1300          How much help from another person do you currently need...    Turning from your back to your side while in flat bed without using bedrails?  --  3  -JE     Moving from lying on back to sitting on the side of a flat bed without bedrails?  --  3  -JE     Moving to and from a bed to a chair (including a wheelchair)?  --   3  -JE     Standing up from a chair using your arms (e.g., wheelchair, bedside chair)?  --  3  -JE     Climbing 3-5 steps with a railing?  --  3  -JE     To walk in hospital room?  --  3  -JE     AM-PAC 6 Clicks Score  --  18  -JE        How much help from another is currently needed...    Putting on and taking off regular lower body clothing?  2  -TS  --     Bathing (including washing, rinsing, and drying)  3  -TS  --     Toileting (which includes using toilet bed pan or urinal)  4  -TS  --     Putting on and taking off regular upper body clothing  4  -TS  --     Taking care of personal grooming (such as brushing teeth)  4  -TS  --     Eating meals  4  -TS  --     Score  21  -TS  --        Functional Assessment    Outcome Measure Options  AM-PAC 6 Clicks Daily Activity (OT)  -TS  AM-PAC 6 Clicks Basic Mobility (PT)  -       User Key  (r) = Recorded By, (t) = Taken By, (c) = Cosigned By    Initials Name Provider Type    Stacy Kimble, MCELROY/L Occupational Therapy Assistant    Mary Jane Dawson PTA Physical Therapy Assistant    Marimar Christensen, PT Physical Therapist    Betsy Rodriguez, OTR/L Occupational Therapist         Time Calculation:   PT Charges     Row Name 01/06/19 1127             Time Calculation    Start Time  1028  -      Stop Time  1053  -      Time Calculation (min)  25 min  -      PT Received On  01/06/19  -      PT Goal Re-Cert Due Date  01/15/19  -         Time Calculation- PT    Total Timed Code Minutes- PT  25 minute(s)  -MF         Timed Charges    21823 - Gait Training Minutes   25  -MF        User Key  (r) = Recorded By, (t) = Taken By, (c) = Cosigned By    Initials Name Provider Type    Mary Jane Dawson, SUSI Physical Therapy Assistant        Therapy Suggested Charges     Code   Minutes Charges    00352 (CPT®) Hc Pt Neuromusc Re Education Ea 15 Min      84325 (CPT®) Hc Pt Ther Proc Ea 15 Min      13971 (CPT®) Hc Gait Training Ea 15 Min 25 2    92423  (CPT®) Hc Pt Therapeutic Act Ea 15 Min      87516 (CPT®) Hc Pt Manual Therapy Ea 15 Min      78098 (CPT®) Hc Pt Iontophoresis Ea 15 Min      81645 (CPT®) Hc Pt Elec Stim Ea-Per 15 Min      52972 (CPT®) Hc Pt Ultrasound Ea 15 Min      30757 (CPT®) Hc Pt Self Care/Mgmt/Train Ea 15 Min      43134 (CPT®) Hc Pt Prosthetic (S) Train Initial Encounter, Each 15 Min      72768 (CPT®) Hc Pt Orthotic(S)/Prosthetic(S) Encounter, Each 15 Min      28719 (CPT®) Hc Orthotic(S) Mgmt/Train Initial Encounter, Each 15min      Total  25 2        Therapy Charges for Today     Code Description Service Date Service Provider Modifiers Qty    91381613189 HC GAIT TRAINING EA 15 MIN 2019 Mary Jane Juares PTA GP 2    44016814256 HC GAIT TRAINING EA 15 MIN 2019 Mary Jane Juares PTA GP 2          PT G-Codes  PT Professional Judgement Used?: Yes  Outcome Measure Options: AM-PAC 6 Clicks Basic Mobility (PT)  AM-PAC 6 Clicks Score: 17  Score: 17  Functional Limitation: Mobility: Walking and moving around  Mobility: Walking and Moving Around Current Status (): At least 40 percent but less than 60 percent impaired, limited or restricted  Mobility: Walking and Moving Around Goal Status (): At least 20 percent but less than 40 percent impaired, limited or restricted    Mary Jane Juares PTA  2019         Electronically signed by Mary Jane Juares PTA at 2019 11:28 AM          Occupational Therapy Notes (most recent note)      Betsy Renee OTR/L at 2019  9:25 AM          Acute Care - Occupational Therapy Re-Evaluation  [facility]     Patient Name: Arminda Jiménez  : 1960  MRN: [id number]  Today's Date: 2019  Onset of Illness/Injury or Date of Surgery: 19  Date of Referral to OT: 19  Referring Physician: Dr. Zheng    Admit Date: 2019       ICD-10-CM ICD-9-CM   1. Decreased activities of daily living (ADL) R68.89 780.99   2. Impaired functional mobility, balance, gait, and  endurance Z74.09 V49.89     Patient Active Problem List   Diagnosis   • Foraminal stenosis of lumbosacral region   • Chronic back pain   • Radiculopathy   • Lumbar stenosis   • Panlobular emphysema (CMS/HCC)   • Gastroesophageal reflux disease without esophagitis   • Constipation due to opioid therapy   • PAD (peripheral artery disease) (CMS/HCC)   • Primary osteoarthritis involving multiple joints   • Chronic diastolic CHF (congestive heart failure) (CMS/HCC)   • Recurrent major depressive disorder (CMS/HCC)     Past Medical History:   Diagnosis Date   • Arthritis    • Asthma    • Cancer (CMS/HCC)     right lung,abd,and pelvic area   • CHF (congestive heart failure) (CMS/HCC)    • Chronic back pain    • COPD (chronic obstructive pulmonary disease) (CMS/HCC)    • Depression    • Facet arthropathy    • Foraminal stenosis of lumbosacral region    • GERD (gastroesophageal reflux disease)    • H/O degenerative disc disease    • History of blood transfusion    • PVD (peripheral vascular disease) (CMS/[facility])    • Radiculopathy      Past Surgical History:   Procedure Laterality Date   • ANTERIOR LUMBAR EXPOSURE N/A 5/7/2018    Procedure: ANTERIOR LUMBAR EXPOSURE;  Surgeon: Chris Haley DO;  Location:  [facility] OR;  Service: Vascular   • APPENDECTOMY     • CARPAL TUNNEL RELEASE Bilateral    • LUMBAR FUSION N/A 5/7/2018    Procedure: ANTERIOR DECOMPRESSION, ANTERIOR LUMBAR INTERBODY FUSION WITH INSTRUMENTATION L5-S1;  Surgeon: LUIS CARLOS Zheng MD;  Location:  [facility] OR;  Service: Orthopedic Spine   • LUMBAR LAMINECTOMY WITH FUSION N/A 5/9/2018    Procedure: POSTERIOR SPINAL FUSION WITH INSTRUMENTATION L5-S1;  Surgeon: LUIS CARLOS Zheng MD;  Location:  [facility] OR;  Service: Orthopedic Spine   • OTHER SURGICAL HISTORY Bilateral 2017    Stent implants BLE [place] [facility] [place]          OT ASSESSMENT FLOWSHEET (last 72 hours)      Occupational Therapy Evaluation     Row Name 01/05/19 0817 01/04/19 1431 01/02/19 1354             OT  Evaluation Time/Intention    Subjective Information  complains of;pain tingling in RLE from knee to foot.   -JJ  --  complains of;fatigue;pain;dizziness tingling in R foot   -J      Document Type  re-evaluation see MAR  -JJ  --  evaluation see MAR  -JJ      Mode of Treatment  occupational therapy;concurrent therapy  -JJ  --  occupational therapy  -      Evaluation Not Performed  --  patient/family decline, not feeling well  -JJ  --      Comment: Evaluation Not Performed  --  Pt declined eval d/t pain and nausea, will check back on 1/5.   -JJ  --      Patient Effort  good  -JJ  --  good  -J         General Information    Patient Profile Reviewed?  yes  -JJ  --  yes  -      Onset of Illness/Injury or Date of Surgery  01/04/19  -J  --  01/02/19  -      Referring Physician  Dr. Zheng  -  --  Dr. Zheng  -      Patient Observations  alert;cooperative;agree to therapy  -JJ  --  alert;cooperative;agree to therapy  -J      Patient/Family Observations  sister present in room   -J  --  sister at bedside   -J      General Observations of Patient  sitting up in bed, SCDs, IV infusing, pulse ox, no apparent distress.   -JJ  --  sitting up in bed, IV infusing, no apparent distress.   -J      Prior Level of Function  independent:;all household mobility;community mobility;transfer;bed mobility;ADL's cane for mobility   -JJ  --  independent:;all household mobility;transfer;bed mobility;ADL's;driving cane for mobility   -J      Equipment Currently Used at Home  cane, straight  -JJ  --  cane, straight  -JJ      Pertinent History of Current Functional Problem  s/p 1/4 PSF L3-S1.  s/p R LLIF at L3-5 on 1/2/19  -JJ  --  Pt s/p R LLIF at L3-5 on 1/2/19. Dx: Lumbar stenosis. Second sx scheduled for 1/4.   -J      Existing Precautions/Restrictions  fall;brace worn when out of bed;spinal  -JJ  --  spinal;fall;brace worn when out of bed  -J      Equipment Issued to Patient  gait belt;walker, front wheeled  -JJ  --  gait  belt  -JJ      Risks Reviewed  patient and family:;LOB;nausea/vomiting;dizziness;increased discomfort;change in vital signs;increased drainage;lines disloged  -JJ  --  patient:;LOB;nausea/vomiting;increased discomfort;dizziness;change in vital signs;increased drainage;lines disloged  -JJ      Benefits Reviewed  patient and family:;improve function;increase independence;increase strength;increase balance;decrease pain;decrease risk of DVT;improve skin integrity;increase knowledge  -JJ  --  patient:;improve function;increase independence;increase strength;increase balance;decrease pain;decrease risk of DVT;improve skin integrity;increase knowledge  -JJ      Barriers to Rehab  none identified  -JJ  --  --         Relationship/Environment    Primary Source of Support/Comfort  child(leno);sibling(s)  -JJ  --  child(leno)  -JJ      Lives With  alone  -JJ  --  alone  -JJ      Family Caregiver if Needed  child(leno), adult  -JJ  --  child(leno), adult  -JJ      Primary Roles/Responsibilities  disabled  -JJ  --  disabled  -JJ         Resource/Environmental Concerns    Current Living Arrangements  home/apartment/condo  -JJ  --  home/apartment/condo  -JJ      Resource/Environmental Concerns  none  -JJ  --  none  -JJ      Transportation Concerns  car, none  -JJ  --  --         Home Main Entrance    Number of Stairs, Main Entrance  two  -JJ  --  two  -JJ      Stair Railings, Main Entrance  railing on right side (ascending)  -JJ  --  railing on right side (ascending)  -JJ         Cognitive Assessment/Interventions    Additional Documentation  Cognitive Assessment/Intervention (Group)  -JJ  --  Cognitive Assessment/Intervention (Group)  -J         Cognitive Assessment/Intervention- PT/OT    Affect/Mental Status (Cognitive)  WFL  -JJ  --  WFL  -JJ      Orientation Status (Cognition)  oriented x 4  -JJ  --  oriented x 4  -JJ      Follows Commands (Cognition)  WNL  -JJ  --  WFL  -JJ      Cognitive Function (Cognitive)  WNL  -JJ  --  WFL   -      Personal Safety Interventions  fall prevention program maintained;gait belt;muscle strengthening facilitated;nonskid shoes/slippers when out of bed;supervised activity  -J  --  fall prevention program maintained;gait belt;muscle strengthening facilitated;nonskid shoes/slippers when out of bed;supervised activity  -         Safety Issues, Functional Mobility    Impairments Affecting Function (Mobility)  --  --  balance;pain  -         Bed Mobility Assessment/Treatment    Bed Mobility Assessment/Treatment  rolling right;sidelying-sit;sit-sidelying  -J  --  rolling right;sidelying-sit;sit-sidelying  -      Rolling Right [place] (Bed Mobility)  contact guard;verbal cues  -J  --  independent  -      Sidelying-Sit [place] (Bed Mobility)  contact guard;verbal cues  -JJ  --  independent  -      Sit-Sidelying [place] (Bed Mobility)  moderate assist (50% patient effort);verbal cues  -J  --  independent  -      Bed Mobility, Safety Issues  decreased use of arms for pushing/pulling;decreased use of legs for bridging/pushing;impaired trunk control for bed mobility  -J  --  decreased use of arms for pushing/pulling  -      Assistive Device (Bed Mobility)  bed rails  -JJ  --  bed rails  -         Functional Mobility    Functional Mobility- Ind. Level  contact guard assist  -  --  contact guard assist  -      Functional Mobility- Device  rolling walker  -  --  straight cane  -      Functional Mobility- Comment  Amb from bed to hallway and back to bed with CGA and rwx. Increased pain radiating from knee to back with amb.   -JJ  --  Amb from bed to bathroom and back to bed with CGA and cane.   -J         Transfer Assessment/Treatment    Transfer Assessment/Treatment  sit-stand transfer;stand-sit transfer  -JJ  --  sit-stand transfer;stand-sit transfer;toilet transfer  -         Sit-Stand Transfer    Sit-Stand [place] (Transfers)  contact guard;verbal cues  -J  --  contact  guard  -      Assistive Device (Sit-Stand Transfers)  walker, front-wheeled  -JJ  --  cane, straight  -JJ         Stand-Sit Transfer    Stand-Sit [place] (Transfers)  contact guard  -  --  contact guard  -J      Assistive Device (Stand-Sit Transfers)  walker, front-wheeled  -JJ  --  cane, straight  -JJ         Toilet Transfer    Type (Toilet Transfer)  --  --  sit-stand;stand-sit  -      [place] Level (Toilet Transfer)  --  --  contact guard  -J      Assistive Device (Toilet Transfer)  --  --  cane, straight  -JJ         ADL Assessment/Intervention    BADL Assessment/Intervention  lower body dressing;upper body dressing  -  --  lower body dressing;toileting  -         Upper Body Dressing Assessment/Training    Upper Body Dressing [place] Level  don;doff;pajama/robe;minimum assist (75% patient effort) LSO  -  --  --      Upper Body Dressing Position  edge of bed sitting  -  --  --         Lower Body Dressing Assessment/Training    Lower Body Dressing [place] Level  don;socks;maximum assist (25% patient effort)  [facility]  --  don;socks;maximum assist (25% patient effort)  -      Lower Body Dressing Position  supine  -  --  edge of bed sitting  -         Toileting Assessment/Training    [place] Level (Toileting)  --  --  toileting skills;adjust/manage clothing;perform perineal hygiene;contact guard assist  -      Assistive Devices (Toileting)  --  --  commode  -      Toileting Position  --  --  supported sitting  -         BAD Safety/Performance    Impairments, BADL Safety/Performance  endurance/activity tolerance;pain;range of motion;strength;sensory awareness  -  --  balance;pain  -      Skilled BADL Treatment/Intervention  adaptive equipment training;BADL process/adaptation training;environmental modifications  -J  --  BADL process/adaptation training;adaptive equipment training  -         General ROM    GENERAL ROM COMMENTS  BUE AROM WFL   -  --  BUE and BLE  AROM WFL   -JJ         MMT (Manual Muscle Testing)    General MMT Comments  BUE strength functionally 4/5  -JJ  --  BUE strength functionally 4+/5, BLE strength functionally 4/5  -JJ         Motor Assessment/Interventions    Additional Documentation  Balance (Group)  -JJ  --  Balance (Group)  -JJ         Balance    Balance  static sitting balance;static standing balance  -JJ  --  static sitting balance;static standing balance;dynamic sitting balance  -         Static Sitting Balance    Level of [place] (Unsupported Sitting, Static Balance)  supervision  -J  --  independent  -      Sitting Position (Unsupported Sitting, Static Balance)  sitting on edge of bed  -  --  sitting on edge of bed  -         Dynamic Sitting Balance    Level of [place], Reaches Outside Midline (Sitting, Dynamic Balance)  --  --  contact guard assist  -      Sitting Position, Reaches Outside Midline (Sitting, Dynamic Balance)  --  --  sitting on edge of bed  -         Static Standing Balance    Level of [place] (Supported Standing, Static Balance)  contact guard assist  -  --  contact guard assist  -      Assistive Device Utilized (Supported Standing, Static Balance)  rolling walker  -  --  straight cane  -J         Sensory Assessment/Intervention    Sensory General Assessment  light touch sensation deficits identified  -  --  other (see comments) R foot tingling   -         Light Touch Sensation Assessment    Right Lower Extremity: Light Touch Sensation Assessment  other (see comments) tingling reports from R knee to foot.   -JJ  --  --         Positioning and Restraints    Pre-Treatment Position  in bed  -  --  in bed  -J      Post Treatment Position  bed  -  --  bed  -      In Bed  supine;call light within reach;encouraged to call for assist;with family/caregiver;side rails up x2;SCD pump applied  -  --  fowlers;call light within reach;encouraged to call for assist;exit alarm on;with  "family/caregiver;side rails up x2;SCD pump applied  -J         Pain Assessment    Additional Documentation  Pain Scale: Numbers Pre/Post-Treatment (Group)  -JJ  --  Pain Scale: Numbers Pre/Post-Treatment (Group)  -JJ         Pain Scale: Numbers Pre/Post-Treatment    Pain Scale: Numbers, Pretreatment  10/10  -JJ  --  4/10  -JJ      Pain Scale: Numbers, Post-Treatment  10/10  -JJ  --  3/10  -JJ      Pain Location - Side  Right  -JJ  --  --      Pain Location - Orientation  lower  -JJ  --  --      Pain Location  back leg  -JJ  --  back  -JJ      Pre/Post Treatment Pain Comment  increased pain with amb \"25\"  -JJ  --  --      Pain Intervention(s)  Medication (See MAR);Repositioned;Ambulation/increased activity  -J  --  Medication (See MAR);Repositioned;Ambulation/increased activity  -J         Orthotics & Prosthetics Management    Orthosis Location  spinal orthosis  -J  --  spinal orthosis  -J      Additional Documentation  Orthosis Location (Row)  -J  --  Orthosis Location (Row)  -         Spinal Orthosis Management    Type (Spinal Orthosis)  LSO (lumbar sacral orthosis)  -J  --  LSO (lumbar sacral orthosis)  -J      Functional Design (Spinal Orthosis)  static orthosis  -J  --  static orthosis  -J      Therapeutic Indications (Spinal Orthosis)  pain management;post-op positioning/protection;rest/inflammation reduction;stabilization and support  -JJ  --  post-op positioning/protection;rest/inflammation reduction;stabilization and support  -J      Wearing Schedule (Spinal Orthosis)  wear when out of bed only  -JJ  --  wear when out of bed only  -JJ      Orthosis Training (Spinal Orthosis)  patient;all orthosis skills  -JJ  --  patient;all orthosis skills  -J      Compliance/Wearing Issues (Spinal Orthosis)  patient/caregiver comprehend strategies  -JJ  --  patient/caregiver comprehend strategies  -JJ         Wound 01/02/19 1013 Right flank incision    Wound - Properties Group Date first assessed: 01/02/19  " -JR Time first assessed: 1013  -JR Side: Right  -JR Location: flank  -JR Type: incision  -JR       Wound 01/04/19 1143 Bilateral back incision    Wound - Properties Group Date first assessed: 01/04/19  -RS Time first assessed: 1143  -RS Side: Bilateral  -RS Location: back  -RS Type: incision  -RS       Coping    Verbalized Emotional State  --  --  acceptance  -JJ         Plan of Care Review    Plan of Care Reviewed With  patient  -JJ  --  patient;sibling  -JJ         Clinical Impression (OT)    Date of Referral to OT  01/04/19  -JJ  --  01/02/19  -JJ      OT Diagnosis  decreased adls  -JJ  --  decreased adls   -JJ      Prognosis (OT Eval)  good  -JJ  --  good  -JJ      Patient/Family Goals Statement (OT Eval)  go home  -JJ  --  go home   -JJ      Criteria for Skilled Therapeutic Interventions Met (OT Eval)  yes;treatment indicated  -JJ  --  yes;treatment indicated  -JJ      Rehab Potential (OT Eval)  good, to achieve stated therapy goals  -JJ  --  good, to achieve stated therapy goals  -JJ      Therapy Frequency (OT Eval)  5 times/wk  -JJ  --  5 times/wk  -JJ      Predicted Duration of Therapy Intervention (Therapy Eval)  until d/c from facility   -JJ  --  until d/c from facility   -JJ      Care Plan Review (OT)  evaluation/treatment results reviewed;risks/benefits reviewed;care plan/treatment goals reviewed;current/potential barriers reviewed;patient/other agree to care plan  -JJ  --  evaluation/treatment results reviewed;care plan/treatment goals reviewed;risks/benefits reviewed;current/potential barriers reviewed;patient/other agree to care plan  -JJ      Anticipated Equipment Needs at Discharge (OT)  front wheeled walker  -JJ  --  --      Anticipated Discharge Disposition (OT)  home with assist  -JJ  --  home with assist  -JJ         Vital Signs    Pre SpO2 (%)  --  --  91  -JJ      Post SpO2 (%)  --  --  84  -JJ      Pre Patient Position  --  --  -- fowlers   -JJ      Post Patient Position  --  --  -- fowlers    -JJ         Planned OT Interventions    Planned Therapy Interventions (OT Eval)  activity tolerance training;adaptive equipment training;BADL retraining;functional balance retraining;occupation/activity based interventions;patient/caregiver education/training;orthotic fabrication/fitting/training;strengthening exercise;transfer/mobility retraining  -JJ  --  adaptive equipment training;BADL retraining;functional balance retraining;occupation/activity based interventions;patient/caregiver education/training  -JJ         OT Goals    Dressing Goal Selection (OT)  dressing, OT goal 1  -JJ  --  dressing, OT goal 1  -JJ         Dressing Goal 1 (OT)    Activity/Assistive Device (Dressing Goal 1, OT)  dressing skills, all;lower body dressing  -JJ  --  dressing skills, all;lower body dressing  -JJ      Mason/Cues Needed (Dressing Goal 1, OT)  minimum assist (75% or more patient effort)  -JJ  --  conditional independence  -JJ      Time Frame (Dressing Goal 1, OT)  long term goal (LTG);by discharge  -JJ  --  long term goal (LTG);by discharge  -JJ      Progress/Outcome (Dressing Goal 1, OT)  goal ongoing  -JJ  --  goal ongoing  -JJ         Patient Education Goal (OT)    Activity (Patient Education Goal, OT)  LSO wear schedule/fitting/mgt, spinal precuations.   -JJ  --  LSO wear schedule/fitting/mgt, spinal precuations.   -JJ      Mason/Cues/Accuracy (Memory Goal 2, OT)  verbalizes understanding  -JJ  --  verbalizes understanding  -JJ      Time Frame (Patient Education Goal, OT)  long term goal (LTG);by discharge  -JJ  --  long term goal (LTG);by discharge  -JJ      Progress/Outcome (Patient Education Goal, OT)  goal ongoing  -JJ  --  goal ongoing  -JJ         Living Environment    Home Accessibility  stairs to enter home walk-in shower   -JJ  --  stairs to enter home walk-in shower   -JJ        User Key  (r) = Recorded By, (t) = Taken By, (c) = Cosigned By    Initials Name Effective Dates    Betsy Benavides  RN 08/02/16 -     Jose Thomas, RITCHIE 07/26/16 -     Betsy Rodriguez, OTR/L 10/12/18 -          Occupational Therapy Education     Title: PT OT SLP Therapies (Not Started)     Topic: Occupational Therapy (In Progress)     Point: ADL training (Done)     Description: Instruct learner(s) on proper safety adaptation and remediation techniques during self care or transfers.   Instruct in proper use of assistive devices.    Learning Progress Summary           Patient Acceptance, E, VU by JOSE at 1/5/2019  9:17 AM    Comment:  Pt educated on the benefits and roles of OT, POC, adl modfications, t/fs, spinal preucations, LSO wear schedule.    Acceptance, E, VU by JODI at 1/3/2019  1:14 PM    Comment:  transfers, back precautions, ADL modification, DME    Acceptance, E, VU by JOSE at 1/2/2019  2:43 PM    Comment:  Pt educated on the benefits and role of OT, POC, spinal precuations, LSO wear schedule/fitting/mgt, adl modifications, t/fs, bed mobility, assistive device use.                   Point: Precautions (Done)     Description: Instruct learner(s) on prescribed precautions during self-care and functional transfers.    Learning Progress Summary           Patient Acceptance, E, VU by JOSE at 1/5/2019  9:17 AM    Comment:  Pt educated on the benefits and roles of OT, POC, adl modfications, t/fs, spinal preucations, LSO wear schedule.    Acceptance, E, VU by JODI at 1/3/2019  1:14 PM    Comment:  transfers, back precautions, ADL modification, DME    Acceptance, E, VU by JOSE at 1/2/2019  2:43 PM    Comment:  Pt educated on the benefits and role of OT, POC, spinal precuations, LSO wear schedule/fitting/mgt, adl modifications, t/fs, bed mobility, assistive device use.                   Point: Body mechanics (Done)     Description: Instruct learner(s) on proper positioning and spine alignment during self-care, functional mobility activities and/or exercises.    Learning Progress Summary           Patient Acceptance, E, VU by  JOSE at 1/5/2019  9:17 AM    Comment:  Pt educated on the benefits and roles of OT, POC, adl modfications, t/fs, spinal preucations, LSO wear schedule.    Acceptance, E, VU by JOSE at 1/2/2019  2:43 PM    Comment:  Pt educated on the benefits and role of OT, POC, spinal precuations, LSO wear schedule/fitting/mgt, adl modifications, t/fs, bed mobility, assistive device use.                               User Key     Initials Effective Dates Name Provider Type Discipline    TS 08/02/16 -  Stacy Ogden COTA/L Occupational Therapy Assistant OT    JOSE 10/12/18 -  Betsy Renee OTR/L Occupational Therapist OT                  OT Recommendation and Plan  Outcome Summary/Treatment Plan (OT)  Anticipated Equipment Needs at Discharge (OT): front wheeled walker  Anticipated Discharge Disposition (OT): home with assist  Planned Therapy Interventions (OT Eval): activity tolerance training, adaptive equipment training, BADL retraining, functional balance retraining, occupation/activity based interventions, patient/caregiver education/training, orthotic fabrication/fitting/training, strengthening exercise, transfer/mobility retraining  Therapy Frequency (OT Eval): 5 times/wk  Plan of Care Review  Plan of Care Reviewed With: patient  Plan of Care Reviewed With: patient  Outcome Summary: OT eval completed. Pt CGA for rolling and sidelying to sit and Mod A for sit to sidelying. Pt able to sit <> stand t/f with CGA and rwx. Amb from bed to hallway and back to bed with CGA and rwx. Pt reports radiating from R knee to back with amb. Pt reports new onset tingling in R knee to foot. Max A required for donning socks. Pt would benefit from continued skilled OT services to increase adl and mobility independence. Recommend home with assist, dependent on pt progress, when d/c'd from facility.     Outcome Measures     Row Name 01/05/19 0900 01/03/19 1314 01/03/19 1300       How much help from another person do you currently need...     Turning from your back to your side while in flat bed without using bedrails?  --  --  3  -JE    Moving from lying on back to sitting on the side of a flat bed without bedrails?  --  --  3  -JE    Moving to and from a bed to a chair (including a wheelchair)?  --  --  3  -JE    Standing up from a chair using your arms (e.g., wheelchair, bedside chair)?  --  --  3  -JE    Climbing 3-5 steps with a railing?  --  --  3  -JE    To walk in hospital room?  --  --  3  -JE    AM-PAC 6 Clicks Score  --  --  18  -JE       How much help from another is currently needed...    Putting on and taking off regular lower body clothing?  2  -JJ  2  -TS  --    Bathing (including washing, rinsing, and drying)  2  -JJ  3  -TS  --    Toileting (which includes using toilet bed pan or urinal)  3  -JJ  4  -TS  --    Putting on and taking off regular upper body clothing  3  -JJ  4  -TS  --    Taking care of personal grooming (such as brushing teeth)  3  -JJ  4  -TS  --    Eating meals  4  -JJ  4  -TS  --    Score  17  -JJ  21  -TS  --       Functional Assessment    Outcome Measure Options  AM-PAC 6 Clicks Daily Activity (OT)  -JJ  AM-PAC 6 Clicks Daily Activity (OT)  -TS  AM-PAC 6 Clicks Basic Mobility (PT)  -    Row Name 01/02/19 1400             How much help from another is currently needed...    Putting on and taking off regular lower body clothing?  2  -JJ      Bathing (including washing, rinsing, and drying)  3  -JJ      Toileting (which includes using toilet bed pan or urinal)  4  -JJ      Putting on and taking off regular upper body clothing  4  -JJ      Taking care of personal grooming (such as brushing teeth)  4  -JJ      Eating meals  4  -JJ      Score  21  -JJ         Functional Assessment    Outcome Measure Options  AM-PAC 6 Clicks Daily Activity (OT)  -JJ        User Key  (r) = Recorded By, (t) = Taken By, (c) = Cosigned By    Initials Name Provider Type    Stacy Kimble COTA/L Occupational Therapy Assistant    MARTINA  Marimar Mcclure, PT Physical Therapist    Betsy Rodriguez OTR/L Occupational Therapist          Time Calculation:   Time Calculation- OT     Row Name 01/05/19 0912             Time Calculation- OT    OT Start Time  0815 add 5 minutes for chart review; add 8 minutes for attempted eval on 1/4.   -      OT Stop Time  0851  -      OT Time Calculation (min)  36 min  -      OT Received On  01/05/19  -      OT Goal Re-Cert Due Date  01/15/19  -        User Key  (r) = Recorded By, (t) = Taken By, (c) = Cosigned By    Initials Name Provider Type    Betsy Rodriguez OTR/L Occupational Therapist        Therapy Suggested Charges     Code   Minutes Charges    16004 (CPT®) Hc Ot Neuromusc Re Education Ea 15 Min      71768 (CPT®) Hc Ot Ther Proc Ea 15 Min      22638 (CPT®) Hc Ot Therapeutic Act Ea 15 Min      03306 (CPT®) Hc Ot Manual Therapy Ea 15 Min      00186 (CPT®) Hc Ot Iontophoresis Ea 15 Min      28494 (CPT®) Hc Ot Elec Stim Ea-Per 15 Min      50381 (CPT®) Hc Ot Ultrasound Ea 15 Min      31251 (CPT®) Hc Ot Self Care/Mgmt/Train Ea 15 Min 27 2    Total  27 2        Therapy Charges for Today     Code Description Service Date Service Provider Modifiers Qty    66297485687 HC OT EVAL MOD COMPLEXITY 3 1/5/2019 Betsy Renee OTR/L GO 1               BACILIO Lam/L  1/5/2019    Electronically signed by Betsy Renee OTR/L at 1/5/2019  9:26 AM

## 2019-01-07 NOTE — PROGRESS NOTES
Orthopedic Spine Progress Note    Arminda Jiménez  58 y.o.  female  Subjective     Post-Operative Day # 5/3    Systemic or Specific Complaints:     Pt awaiting rehab bed placement. Pain well controlled with medication. Ambulating reasonably well with PT/OT but OT suggests rehab placement. Pt concerned of having increased pain on dc and asked about increasing medication. Informed her that we would likely not be able to do that. No other complaints at this time.    Objective     Vital signs in last 24 hours:  Temp:  [97.3 °F (36.3 °C)-98.5 °F (36.9 °C)] 97.3 °F (36.3 °C)  Heart Rate:  [76-90] 76  Resp:  [16] 16  BP: ()/(53-57) 95/53    Physical Exam:    Alert and oriented ×3, no acute distress, grossly neurovascularly intact, vital signs stable, dressing clean dry and intact, moving all extremities without focal deficit    Diagnostic Data:  CBC:  Results from last 7 days   Lab Units  01/07/19   0444   WBC 10*3/mm3  6.84   RBC 10*6/mm3  3.03*   HEMOGLOBIN g/dL  9.4*   HEMATOCRIT %  28.6*   PLATELETS 10*3/mm3  158          Assessment/Plan     1.  Status post anterior decompression, ALIF with instrumentation L5-S1, 5/7/2018  2.  Status post PSF with instrumentation left L5-S1, 5/9/2018  3.  Recurrent back pain  4.  Bilateral buttock, thigh, and leg radiculopathy, right worse than left  5.  Recurrent neurogenic claudication  6.  Adjacent level degenerative disc disease L3 to 5  7.  Degenerative lumbar scoliosis L3 to 5  8.  Facet arthropathy L3 to 5  9.  Central and bilateral foraminal stenosis L3 to 5, worse right L3-4  10.  Osteoporosis, T score -2.5, 4/11/2018  11.  Status post right LLIF L3 to 5 with instrumentation L3-4, 1/2/2019  12. Status post PSF L3-S1 - 1/4/19        Plan:  1. PT/OT/Brace  2. Periops  3. [facility]waiting [facility] bed [facility]    Date: 1/7/2019  Time: 3:06 PM

## 2019-01-07 NOTE — THERAPY TREATMENT NOTE
Acute Care - Physical Therapy Treatment Note  Psychiatric     Patient Name: Arminda Jiménez  : 1960  MRN: [id number]  Today's Date: 2019  Onset of Illness/Injury or Date of Surgery: 19  Date of Referral to PT: 19  Referring Physician: Dr. Zheng    Admit Date: 2019    Visit Dx:    ICD-10-CM ICD-9-CM   1. Decreased activities of daily living (ADL) R68.89 780.99   2. Impaired functional mobility, balance, gait, and endurance Z74.09 V49.89     Patient Active Problem List   Diagnosis   • Foraminal stenosis of lumbosacral region   • Chronic back pain   • Radiculopathy   • Lumbar stenosis   • Panlobular emphysema (CMS/HCC)   • Gastroesophageal reflux disease without esophagitis   • Constipation due to opioid therapy   • PAD (peripheral artery disease) (CMS/HCC)   • Primary osteoarthritis involving multiple joints   • Chronic diastolic CHF (congestive heart failure) (CMS/HCC)   • Recurrent major depressive disorder (CMS/HCC)       Therapy Treatment    Rehabilitation Treatment Summary     Row Name 19 1200 19 1144 19 1005       Treatment Time/Intention    Discipline  physical therapy assistant  -KJ  physical therapy assistant  -KJ  occupational therapy assistant  -TS    Document Type  therapy note (daily note)  -KJ  therapy note (daily note)  -KJ2  therapy note (daily note)  -TS    Subjective Information  complains of;pain  -KJ  complains of;pain  -KJ2  --    Mode of Treatment  --  physical therapy  -KJ2  --    Patient Effort  good  -KJ  adequate  -KJ2  --    Comment  LSO  -KJ  LSO  -KJ2  --    Existing Precautions/Restrictions  spinal;fall;brace worn when out of bed  -KJ  fall;spinal;brace worn when out of bed  -KJ2  --    Recorded by [KJ] Shirley Rocha, PTA 19 1208 [KJ] Shirley Rocha, PTA 19 1144  [KJ2] Shirley Rocha, PTA 19 1208 [TS] Stacy Ogden COTA/L 19 1020    Row Name 19 1144             Bed Mobility Assessment/Treatment     Sidelying-Sit [place] (Bed Mobility)  verbal cues;minimum assist (75% patient effort)  -KJ      Bed Mobility, Safety Issues  decreased use of legs for bridging/pushing  -KJ      Recorded by [KJ] Shirley Rocha, [facility] 01/07/19 1208      Row Name 01/07/19 1200             Sit-Stand Transfer    Sit-Stand [place] (Transfers)  verbal cues;contact guard  -KJ      Recorded by [KJ] Shirley Rocha, [facility] 01/07/19 1208      Row Name 01/07/19 1200             Stand-Sit Transfer    Stand-Sit [place] (Transfers)  supervision  -KJ      Recorded by [KJ] Shirley Rocha, [facility] 01/07/19 1208      Row Name 01/07/19 1200             Toilet Transfer    Type (Toilet Transfer)  sit-stand;stand-sit  -KJ      [place] Level (Toilet Transfer)  supervision  -KJ      Recorded by [KJ] Shirley Rocha, [facility] 01/07/19 1208      Row Name 01/07/19 1200             Gait/Stairs Assessment/Training    [place] Level (Gait)  verbal cues;contact guard  -KJ      Assistive Device (Gait)  walker, front-wheeled  -KJ      Distance in Feet (Gait)  75' x 2  -KJ      Pattern (Gait)  step-through  -KJ      Deviations/Abnormal Patterns (Gait)  rosita decreased;gait speed decreased;stride length decreased  -KJ      Comment (Gait/Stairs)  right knee slightly flexed during gait, decreased swing phase  -KJ      Recorded by [KJ] Shirley Rocha, [facility] 01/07/19 1208      Row Name 01/07/19 1200             Positioning and Restraints    Pre-Treatment Position  sitting in chair/recliner  -KJ      Post Treatment Position  bed  -KJ      Recorded by [KJ] Shirley Rocha, [facility] 01/07/19 1208      Row Name 01/07/19 1200             Pain Scale: Numbers Pre/Post-Treatment    Pain Scale: Numbers, Pretreatment  10/10  -KJ      Pain Scale: Numbers, Post-Treatment  10/10  -KJ      Pain Location - Side  Right  -KJ      Pain Location - Orientation  lower  -KJ      Pain Location  extremity  -KJ      Recorded by [KJ] Shirley Rocha, [facility] 01/07/19 1208      Row Name                 Wound 01/02/19 1013 Right flank incision    Wound - Properties Group Date first assessed: 01/02/19 [JR] Time first assessed: 1013 [JR] Side: Right [JR] Location: flank [JR] Type: incision [JR] Recorded by:  [JR] Betsy Chin, RN 01/02/19 1013    Row Name                Wound 01/04/19 1143 Bilateral back incision    Wound - Properties Group Date first assessed: 01/04/19 [RS] Time first assessed: 1143 [RS] Side: Bilateral [RS] Location: back [RS] Type: incision [RS] Recorded by:  [RS] Jose Farah, RITCHIE 01/04/19 1143      User Key  (r) = Recorded By, (t) = Taken By, (c) = Cosigned By    Initials Name Effective Dates Discipline    Shirley Martínez, PTA 08/02/16 -  PT    Stacy Kimble, MCELROY/L 08/02/16 -  OT    Betsy Benavides, RN 08/02/16 -  Nurse    RS Jose Farah RN 07/26/16 -  Nurse          Wound 01/02/19 1013 Right flank incision (Active)   Dressing Appearance dry;intact 1/7/2019  8:25 AM   Base dressing in place, unable to visualize 1/7/2019  8:25 AM   Drainage Amount none 1/6/2019  8:00 PM   Dressing Care, Wound transparent film 1/6/2019  8:00 PM       Wound 01/04/19 1143 Bilateral back incision (Active)   Dressing Appearance intact;dry 1/7/2019  8:25 AM   Drainage Characteristics/Odor sanguineous 1/6/2019  8:00 PM   Drainage Amount scant 1/6/2019  8:00 PM   Dressing Care, Wound transparent film 1/6/2019  8:00 PM               PT Recommendation and Plan     Plan of Care Reviewed With: patient  Progress: improving  Outcome Summary: PT tx completed. Pt sitting in chair c/o RLE pn rates 10/10. CG sit<>stand, amb 75' x 2 with r wx.   Outcome Measures     Row Name 01/06/19 1028 01/05/19 0929 01/05/19 0900       How much help from another person do you currently need...    Turning from your back to your side while in flat bed without using bedrails?  3  -MF  3  -JE  --    Moving from lying on back to sitting on the side of a flat bed without bedrails?  3  -MF  3  -JE   --    Moving to and from a bed to a chair (including a wheelchair)?  3  -  3  -JE  --    Standing up from a chair using your arms (e.g., wheelchair, bedside chair)?  3  -  3  -JE  --    Climbing 3-5 steps with a railing?  2  -  3  -JE  --    To walk in hospital room?  3  -  3  -JE  --    AM-PAC 6 Clicks Score  17  -  18  -JE  --       How much help from another is currently needed...    Putting on and taking off regular lower body clothing?  --  --  2  -JJ    Bathing (including washing, rinsing, and drying)  --  --  2  -JJ    Toileting (which includes using toilet bed pan or urinal)  --  --  3  -JJ    Putting on and taking off regular upper body clothing  --  --  3  -JJ    Taking care of personal grooming (such as brushing teeth)  --  --  3  -JJ    Eating meals  --  --  4  -JJ    Score  --  --  17  -JJ       Functional Assessment    Outcome Measure Options  AM-PAC 6 Clicks Basic Mobility (PT)  -  AM-PAC 6 Clicks Basic Mobility (PT)  -  AM-PAC 6 Clicks Daily Activity (OT)  -JJ      User Key  (r) = Recorded By, (t) = Taken By, (c) = Cosigned By    Initials Name Provider Type    Mary Jane Dawson PTA Physical Therapy Assistant    Marimar Christensen, PT Physical Therapist    Betsy Rodriguez, OTR/L Occupational Therapist         Time Calculation:   PT Charges     Row Name 01/07/19 1209             Time Calculation    Start Time  1144  -KJ      Stop Time  1209  -KJ      Time Calculation (min)  25 min  -KJ      PT Received On  01/07/19  -KJ      PT Goal Re-Cert Due Date  01/15/19  -KJ         Time Calculation- PT    Total Timed Code Minutes- PT  25 minute(s)  -KJ        User Key  (r) = Recorded By, (t) = Taken By, (c) = Cosigned By    Initials Name Provider Type    Shirley Martínez, PTA Physical Therapy Assistant        Therapy Suggested Charges     Code   Minutes Charges    70165 (CPT®) Hc Pt Neuromusc Re Education Ea 15 Min      38212 (CPT®) Hc Pt Ther Proc Ea 15 Min      94764 (CPT®) Hc Gait  Training Ea 15 Min 29 2    06629 (CPT®) Hc Pt Therapeutic Act Ea 15 Min      62019 (CPT®) Hc Pt Manual Therapy Ea 15 Min      85036 (CPT®) Hc Pt Iontophoresis Ea 15 Min      04278 (CPT®) Hc Pt Elec Stim Ea-Per 15 Min      14800 (CPT®) Hc Pt Ultrasound Ea 15 Min      58285 (CPT®) Hc Pt Self Care/Mgmt/Train Ea 15 Min      92338 (CPT®) Hc Pt Prosthetic (S) Train Initial Encounter, Each 15 Min      81515 (CPT®) Hc Pt Orthotic(S)/Prosthetic(S) Encounter, Each 15 Min      82959 (CPT®) Hc Orthotic(S) Mgmt/Train Initial Encounter, Each 15min      Total  29 2        Therapy Charges for Today     Code Description Service Date Service Provider Modifiers Qty    99868521489 HC GAIT TRAINING EA 15 MIN 1/7/2019 Shirley Rocha, PTA GP 2          PT G-Codes  PT Professional Judgement Used?: Yes  Outcome Measure Options: AM-PAC 6 Clicks Basic Mobility (PT)  AM-PAC 6 Clicks Score: 17  Score: 17  Functional Limitation: Mobility: Walking and moving around  Mobility: Walking and Moving Around Current Status (): At least 40 percent but less than 60 percent impaired, limited or restricted  Mobility: Walking and Moving Around Goal Status (): At least 20 percent but less than 40 percent impaired, limited or restricted    Shirley Rocha, PTA  1/7/2019

## 2019-01-07 NOTE — PLAN OF CARE
Problem: Patient Care Overview  Goal: Plan of Care Review  Outcome: Ongoing (interventions implemented as appropriate)   01/07/19 1630   Coping/Psychosocial   Plan of Care Reviewed With patient;family   Plan of Care Review   Progress no change   OTHER   Outcome Summary Pt reports her appetite to be fair, 42% avg of the last 6 documented meals. Family/Friend states she has also brought pt in food. Encouraged intake. Will continue to follow.

## 2019-01-07 NOTE — PROGRESS NOTES
Continued Stay Note   [place]     Patient Name: Arminda Jiménez  MRN: [id number]  Today's Date: 1/7/2019    Admit Date: 1/2/2019    Discharge Plan     Row Name 01/07/19 0822       Plan    Plan  Referral to [facility]    Patient/Family in Agreement with Plan  yes    Plan Comments  Patient now requests referral to [facility]. SW has faxed referral to [facility] at [phone]. Will follow for bed offer.                   LILO ReyesW

## 2019-01-07 NOTE — PROGRESS NOTES
Continued Stay Note   [place]     Patient Name: Arminda Jiménez  MRN: [id number]  Today's Date: 1/7/2019    Admit Date: 1/2/2019    Discharge Plan     Row Name 01/07/19 1416       Plan    Plan  Referral to [facility]    Patient/Family in Agreement with Plan  yes    Plan Comments  Josefina in admissions at [facility] requested updates. SW faxed to her at [phone]. Will follow for bed offer.          LILO ReyesW

## 2019-01-07 NOTE — PROGRESS NOTES
Arminda Jiménez is a 58 y.o. female patient.  Post operative pain as expected.  Plan on rehabilitation in [place].  wants vitamin D restarted    Current Facility-Administered Medications   Medication Dose Route Frequency Provider Last Rate Last Dose   • albuterol sulfate HFA (PROVENTIL HFA;VENTOLIN HFA;PROAIR HFA) inhaler 2 puff  2 puff Inhalation Q4H PRN Lizandro Burger MD   2 puff at 01/06/19 1241   • bisacodyl (DULCOLAX) suppository 10 mg  10 mg Rectal Daily PRN Kevin Diggs MD   10 mg at 01/05/19 1048   • calcium carb-cholecalciferol 600-800 MG-UNIT tablet 1 tablet  1 tablet Oral BID With Meals LUIS CARLOS Zheng MD   1 tablet at 01/06/19 1742   • diphenhydrAMINE (BENADRYL) capsule 25 mg  25 mg Oral Nightly PRN LUIS CARLOS Zheng MD       • escitalopram (LEXAPRO) tablet 20 mg  20 mg Oral Daily LUIS CARLOS Zheng MD   20 mg at 01/06/19 0916   • estrogen (conjugated)-medroxyprogesterone (PREMPRO) 0.3-1.5 MG per tablet 1 tablet  1 tablet Oral Daily LUIS CARLOS Zheng MD       • Fluticasone Furoate-Vilanterol (BREO ELLIPTA) 200-25 MCG/INH inhaler 1 puff  1 puff Inhalation Daily LUIS CARLOS Zheng MD   1 puff at 01/06/19 0919   • gabapentin (NEURONTIN) capsule 1,600 mg  1,600 mg Oral Nightly José Luis Traore PA-C   1,600 mg at 01/06/19 2121   • gabapentin (NEURONTIN) capsule 800 mg  800 mg Oral BID LUIS CARLOS Zheng MD   800 mg at 01/07/19 0558   • Influenza Vac Subunit Quad (FLUCELVAX) injection 0.5 mL  0.5 mL Intramuscular During Hospitalization LUIS CARLOS Zheng MD       • lactated ringers infusion  30 mL/hr Intravenous Continuous Julieta Burns MD 30 mL/hr at 01/02/19 1148 30 mL/hr at 01/02/19 1148   • lactated ringers infusion  100 mL/hr Intravenous Continuous LUIS CARLOS Zheng  mL/hr at 01/04/19 1515 100 mL/hr at 01/04/19 1515   • ondansetron (ZOFRAN) tablet 4 mg  4 mg Oral Q6H PRN LUIS CARLOS Zheng MD        Or   • ondansetron ODT (ZOFRAN-ODT) disintegrating tablet 4  "mg  4 mg Oral Q6H PRN LUIS CARLOS Zheng MD        Or   • ondansetron (ZOFRAN) injection 4 mg  4 mg Intravenous Q6H PRN LUIS CARLOS Zheng MD       • oxyCODONE (oxyCONTIN) 12 hr tablet 10 mg  10 mg Oral Q12H LUIS CARLOS Zheng MD   10 mg at 01/06/19 2121   • oxyCODONE-acetaminophen (PERCOCET)  MG per tablet 2 tablet  2 tablet Oral Q4H PRN LUIS CARLOS Zheng MD   2 tablet at 01/07/19 0357   • pantoprazole (PROTONIX) EC tablet 40 mg  40 mg Oral Kevin Bonilla MD   40 mg at 01/06/19 0918   • polyethylene glycol 3350 powder (packet)  17 g Oral BID Lizandro Burger MD   17 g at 01/06/19 0917   • sodium chloride 0.9 % flush 3 mL  3 mL Intravenous Q12H LUIS CARLOS Zheng MD   3 mL at 01/05/19 0923   • sodium chloride 0.9 % flush 3-10 mL  3-10 mL Intravenous PRN LUIS CARLOS Zheng MD       • tiotropium (SPIRIVA) 18 MCG per inhalation capsule 1 capsule  1 capsule Inhalation Daily - RT LUIS CARLOS Zheng MD       • tiZANidine (ZANAFLEX) tablet 4 mg  4 mg Oral Q8H PRN LUIS CARLOS Zheng MD         ALLERGIES:    Allergies   Allergen Reactions   • Aspirin Nausea And Vomiting     Unknown reaction   • Keflex [Cephalexin] Other (See Comments)     Blisters to nose and mouth       Lumbar stenosis    Panlobular emphysema (CMS/HCC)    Gastroesophageal reflux disease without esophagitis    Constipation due to opioid therapy    PAD (peripheral artery disease) (CMS/HCC)    Primary osteoarthritis involving multiple joints    Chronic diastolic CHF (congestive heart failure) (CMS/HCC)    Recurrent major depressive disorder (CMS/HCC)    Blood pressure 95/53, pulse 76, temperature 97.3 °F (36.3 °C), temperature source Axillary, resp. rate 16, height 157.5 cm (62.01\"), weight 75 kg (165 lb 5.5 oz), SpO2 92 %.      Subjective:  Symptoms:  Stable.  No shortness of breath, chest pain or chest pressure.    Diet:  Adequate intake.    Activity level: Impaired due to pain.    Pain:  She complains of pain that is moderate.  She " "reports pain is improving.  Pain is partially controlled.      Review of Systems   Respiratory: Negative for shortness of breath.    Cardiovascular: Negative for chest pain.     Objective:  General Appearance:  Comfortable and well-appearing.    Vital signs: (most recent): Blood pressure 95/53, pulse 76, temperature 97.3 °F (36.3 °C), temperature source Axillary, resp. rate 16, height 157.5 cm (62.01\"), weight 75 kg (165 lb 5.5 oz), SpO2 92 %.  Vital signs are normal.    Output: Producing urine and minimal stool output.    HEENT: Normal HEENT exam.    Lungs:  Normal effort and normal respiratory rate.    Heart: Normal rate.  Regular rhythm.    Abdomen: Abdomen is soft.    Neurological: Patient is alert and oriented to person, place and time.    Skin:  Warm and dry.              Labs:  Lab Results (last 72 hours)     Procedure Component Value Units Date/Time    Basic Metabolic Panel [[id number]]  (Abnormal) Collected:  01/07/19 0444    Specimen:  Blood Updated:  01/07/19 0519     Glucose 103 mg/dL      BUN 8 mg/dL      Creatinine 0.55 mg/dL      Sodium 138 mmol/L      Potassium 3.9 mmol/L      Chloride 93 mmol/L      CO2 34.0 mmol/L      Calcium 9.1 mg/dL      eGFR Non African Amer 114 mL/min/1.73      BUN/Creatinine Ratio 14.5     Anion Gap 11.0 mmol/L     Narrative:       GFR Normal >60  Chronic Kidney Disease <60  Kidney Failure <15    CBC & Differential [[id number]] Collected:  01/07/19 0444    Specimen:  Blood Updated:  01/07/19 0506    Narrative:       The following orders were created for panel order CBC & Differential.  Procedure                               Abnormality         Status                     ---------                               -----------         ------                     CBC Auto Differential[[id number]]        Abnormal            Final result                 Please view results for these tests on the individual orders.    CBC Auto Differential [[id number]]  (Abnormal) Collected:  01/07/19 " 0444    Specimen:  Blood Updated:  01/07/19 0506     WBC 6.84 10*3/mm3      RBC 3.03 10*6/mm3      Hemoglobin 9.4 g/dL      Hematocrit 28.6 %      MCV 94.4 fL      MCH 31.0 pg      MCHC 32.9 g/dL      RDW 12.3 %      RDW-SD 42.3 fl      MPV 11.8 fL      Platelets 158 10*3/mm3      Neutrophil % 56.0 %      Lymphocyte % 25.6 %      Monocyte % 11.7 %      Eosinophil % 5.8 %      Basophil % 0.6 %      Immature Grans % 0.3 %      Neutrophils, Absolute 3.83 10*3/mm3      Lymphocytes, Absolute 1.75 10*3/mm3      Monocytes, Absolute 0.80 10*3/mm3      Eosinophils, Absolute 0.40 10*3/mm3      Basophils, Absolute 0.04 10*3/mm3      Immature Grans, Absolute 0.02 10*3/mm3      nRBC 0.0 /100 WBC     CBC & Differential [[id number]] Collected:  01/05/19 0556    Specimen:  Blood Updated:  01/05/19 0604    Narrative:       The following orders were created for panel order CBC & Differential.  Procedure                               Abnormality         Status                     ---------                               -----------         ------                     CBC Auto Differential[[id number]]        Abnormal            Final result                 Please view results for these tests on the individual orders.    CBC Auto Differential [[id number]]  (Abnormal) Collected:  01/05/19 0556    Specimen:  Blood Updated:  01/05/19 0604     WBC 10.72 10*3/mm3      RBC 2.80 10*6/mm3      Hemoglobin 8.6 g/dL      Hematocrit 26.4 %      MCV 94.3 fL      MCH 30.7 pg      MCHC 32.6 g/dL      RDW 12.4 %      RDW-SD 43.2 fl      MPV 10.7 fL      Platelets 147 10*3/mm3      Neutrophil % 69.3 %      Lymphocyte % 18.4 %      Monocyte % 8.3 %      Eosinophil % 3.0 %      Basophil % 0.4 %      Immature Grans % 0.6 %      Neutrophils, Absolute 7.44 10*3/mm3      Lymphocytes, Absolute 1.97 10*3/mm3      Monocytes, Absolute 0.89 10*3/mm3      Eosinophils, Absolute 0.32 10*3/mm3      Basophils, Absolute 0.04 10*3/mm3      Immature Grans, Absolute 0.06  10*3/mm3      nRBC 0.0 /100 WBC     Vitamin B12 [[id number]]  (Normal) Collected:  01/04/19 0645    Specimen:  Blood Updated:  01/04/19 0857     Vitamin B-12 428 pg/mL     Iron [[id number]]  (Abnormal) Collected:  01/04/19 0645    Specimen:  Blood Updated:  01/04/19 0756     Iron <10 mcg/dL           Imaging Results (last 72 hours)     Procedure Component Value Units Date/Time    XR Chest 1 View [[id number]] Collected:  01/07/19 0711     Updated:  01/07/19 0714    Narrative:       EXAMINATION:   XR CHEST 1 VW-  1/7/2019 7:11 AM CST     HISTORY: Congestion     Frontal upright radiograph of the chest 1/7/2019 4:25 AM CST     COMPARISON: December 28, 2018.     FINDINGS:   The lungs are clear. The cardiomediastinal silhouette and pulmonary  vascularity are within normal limits.      The osseous structures and surrounding soft tissues demonstrate no acute  abnormality.       Impression:       1. No radiographic evidence of acute cardiopulmonary process.        This report was finalized on 01/07/2019 07:11 by Dr. Tay Sanchez MD.    XR Spine Lumbar AP & Lateral [[id number]] Collected:  01/04/19 1138     Updated:  01/04/19 1345    Narrative:       EXAMINATION: Seminar 1/4/2019     Fluoroscopy time: 37 seconds. 2 images are submitted for interpretation     FINDINGS: Today's exam is compared to previous study of 2 days earlier.  The patient's undergone previous anterior fusion at the L5-S1 level with  pedicle screws at the L5-S1 level and a lateral fusion plate at the  L3-L4 level.     New pedicles screws have been placed at L3, L5 and S1. There is good  anatomic alignment. The films are available to the OR for review.  This report was finalized on 01/04/2019 11:41 by Dr. Bill Benitez MD.    FL C Arm During Surgery [[id number]] Collected:  01/04/19 1138     Updated:  01/04/19 1345    Narrative:       EXAMINATION: Seminar 1/4/2019     Fluoroscopy time: 37 seconds. 2 images are submitted for interpretation     FINDINGS:  Today's exam is compared to previous study of 2 days earlier.  The patient's undergone previous anterior fusion at the L5-S1 level with  pedicle screws at the L5-S1 level and a lateral fusion plate at the  L3-L4 level.     New pedicles screws have been placed at L3, L5 and S1. There is good  anatomic alignment. The films are available to the OR for review.  This report was finalized on 01/04/2019 11:41 by Dr. Bill Benitez MD.                Assessment:    Condition: In stable condition.  Improving.       Plan:   Transfer Plan: To rehabilitation in [place] when bed available.  Encourage ambulation and per physical therapy.  (Medically stable for discharge to rehabilitation in [place].  Restart vitamin D per patient request.  Keep follow-up with Dr. Landis upon discharge from rehabilitation    Explained to patient and staff that we were consulted for medical management during their acute care hospitalization. They need to f/u with their regular primary care provider concerning any further treatment and review of abnormalities found during their hospitalization at [facility] and they agree with that treatment plan. ).     Patient Active Problem List   Diagnosis   • Foraminal stenosis of lumbosacral region   • Chronic back pain   • Radiculopathy   • Lumbar stenosis   • Panlobular emphysema (CMS/HCC)   • Gastroesophageal reflux disease without esophagitis   • Constipation due to opioid therapy   • PAD (peripheral artery disease) (CMS/HCC)   • Primary osteoarthritis involving multiple joints   • Chronic diastolic CHF (congestive heart failure) (CMS/HCC)   • Recurrent major depressive disorder (CMS/HCC)     Lizandro Burger MD  1/7/2019

## 2019-01-07 NOTE — PLAN OF CARE
Problem: Patient Care Overview  Goal: Plan of Care Review  Outcome: Ongoing (interventions implemented as appropriate)   01/07/19 0611   Coping/Psychosocial   Plan of Care Reviewed With patient   Plan of Care Review   Progress no change   OTHER   Outcome Summary patient pain continues, scheduled oxycontin added, dressings dry and intact, using LSO brace when up, D/C tuesday to [facility]       Problem: Laminectomy/Foraminotomy/Discectomy (Adult)  Goal: Signs and Symptoms of Listed Potential Problems Will be Absent, Minimized or Managed (Laminectomy/Foraminotomy/Discectomy)  Outcome: Ongoing (interventions implemented as appropriate)      Problem: Fall Risk (Adult)  Goal: Absence of Fall  Outcome: Ongoing (interventions implemented as appropriate)

## 2019-01-07 NOTE — PLAN OF CARE
Problem: Patient Care Overview  Goal: Plan of Care Review  Outcome: Ongoing (interventions implemented as appropriate)   01/07/19 1322   Coping/Psychosocial   Plan of Care Reviewed With patient   Plan of Care Review   Progress improving   OTHER   Outcome Summary Pt SBA for sidelying to sit with bed rails. Pt transfers with SBA with RW and ambulates with RW with SBA. Pt max A for LB dressing and dons LSO with set up. Pt would benefit from rehab at discharge. Continue OT POC

## 2019-01-07 NOTE — PLAN OF CARE
Problem: Patient Care Overview  Goal: Plan of Care Review  Outcome: Ongoing (interventions implemented as appropriate)   01/07/19 1209   Coping/Psychosocial   Plan of Care Reviewed With patient   Plan of Care Review   Progress improving   OTHER   Outcome Summary PT tx completed. Pt sitting in chair c/o RLE pn rates 10/10. CG sit<>stand, amb 75' x 2 with r wx.

## 2019-01-08 VITALS
WEIGHT: 165.34 LBS | TEMPERATURE: 98.1 F | HEIGHT: 62 IN | BODY MASS INDEX: 30.43 KG/M2 | OXYGEN SATURATION: 93 % | HEART RATE: 77 BPM | DIASTOLIC BLOOD PRESSURE: 49 MMHG | RESPIRATION RATE: 16 BRPM | SYSTOLIC BLOOD PRESSURE: 105 MMHG

## 2019-01-08 PROCEDURE — 25010000002 INFLUENZA VAC SUBUNIT QUAD 0.5 ML SUSPENSION PREFILLED SYRINGE: Performed by: ORTHOPAEDIC SURGERY

## 2019-01-08 PROCEDURE — 97116 GAIT TRAINING THERAPY: CPT

## 2019-01-08 PROCEDURE — G0008 ADMIN INFLUENZA VIRUS VAC: HCPCS | Performed by: ORTHOPAEDIC SURGERY

## 2019-01-08 PROCEDURE — 94760 N-INVAS EAR/PLS OXIMETRY 1: CPT

## 2019-01-08 PROCEDURE — 90661 CCIIV3 VAC ABX FR 0.5 ML IM: CPT | Performed by: ORTHOPAEDIC SURGERY

## 2019-01-08 PROCEDURE — 94799 UNLISTED PULMONARY SVC/PX: CPT

## 2019-01-08 RX ORDER — OXYCODONE AND ACETAMINOPHEN 10; 325 MG/1; MG/1
1 TABLET ORAL EVERY 4 HOURS PRN
Start: 2019-01-08 | End: 2019-01-12

## 2019-01-08 RX ORDER — OXYCODONE HCL 10 MG/1
10 TABLET, FILM COATED, EXTENDED RELEASE ORAL EVERY 12 HOURS SCHEDULED
Qty: 28 TABLET | Refills: 0
Start: 2019-01-08 | End: 2019-01-22

## 2019-01-08 RX ORDER — FUROSEMIDE 20 MG/1
40 TABLET ORAL DAILY PRN
Status: DISCONTINUED | OUTPATIENT
Start: 2019-01-08 | End: 2019-01-08 | Stop reason: HOSPADM

## 2019-01-08 RX ORDER — FLUCONAZOLE 150 MG/1
150 TABLET ORAL ONCE
Status: COMPLETED | OUTPATIENT
Start: 2019-01-08 | End: 2019-01-08

## 2019-01-08 RX ADMIN — OXYCODONE HYDROCHLORIDE AND ACETAMINOPHEN 2 TABLET: 10; 325 TABLET ORAL at 10:00

## 2019-01-08 RX ADMIN — OXYCODONE HYDROCHLORIDE AND ACETAMINOPHEN 2 TABLET: 10; 325 TABLET ORAL at 05:56

## 2019-01-08 RX ADMIN — FUROSEMIDE 40 MG: 20 TABLET ORAL at 09:41

## 2019-01-08 RX ADMIN — FLUCONAZOLE 150 MG: 150 TABLET ORAL at 09:41

## 2019-01-08 RX ADMIN — ESCITALOPRAM 20 MG: 10 TABLET, FILM COATED ORAL at 09:41

## 2019-01-08 RX ADMIN — OXYCODONE HYDROCHLORIDE AND ACETAMINOPHEN 2 TABLET: 10; 325 TABLET ORAL at 14:57

## 2019-01-08 RX ADMIN — PANTOPRAZOLE SODIUM 40 MG: 40 TABLET, DELAYED RELEASE ORAL at 05:56

## 2019-01-08 RX ADMIN — OXYCODONE HYDROCHLORIDE 10 MG: 10 TABLET, FILM COATED, EXTENDED RELEASE ORAL at 09:41

## 2019-01-08 RX ADMIN — CHOLECALCIFEROL CAP 125 MCG (5000 UNIT) 5000 UNITS: 125 CAP at 09:41

## 2019-01-08 RX ADMIN — Medication 1 TABLET: at 09:41

## 2019-01-08 RX ADMIN — INFLUENZA A VIRUS A/SINGAPORE/GP1908/2015 IVR-180 (H1N1) ANTIGEN (MDCK CELL DERIVED, PROPIOLACTONE INACTIVATED), INFLUENZA A VIRUS A/NORTH CAROLINA/04/2016 (H3N2) HEMAGGLUTININ ANTIGEN (MDCK CELL DERIVED, PROPIOLACTONE INACTIVATED), INFLUENZA B VIRUS B/IOWA/06/2017 HEMAGGLUTININ ANTIGEN (MDCK CELL DERIVED, PROPIOLACTONE INACTIVATED), INFLUENZA B VIRUS B/SINGAPORE/INFTT-16-0610/2016 HEMAGGLUTININ ANTIGEN (MDCK CELL DERIVED, PROPIOLACTONE INACTIVATED) 0.5 ML: 15; 15; 15; 15 INJECTION, SUSPENSION INTRAMUSCULAR at 14:57

## 2019-01-08 RX ADMIN — GABAPENTIN 800 MG: 400 CAPSULE ORAL at 14:57

## 2019-01-08 RX ADMIN — GABAPENTIN 800 MG: 400 CAPSULE ORAL at 05:55

## 2019-01-08 NOTE — PROGRESS NOTES
Arminda Jiménez is a 58 y.o. female patient.  Post operative pain as expected.  Plan on rehabilitation in [facility].  wants vitamin D restarted    Current Facility-Administered Medications   Medication Dose Route Frequency Provider Last Rate Last Dose   • albuterol sulfate HFA (PROVENTIL HFA;VENTOLIN HFA;PROAIR HFA) inhaler 2 puff  2 puff Inhalation Q4H PRN Lizandro Burger MD   2 puff at 01/06/19 1241   • bisacodyl (DULCOLAX) suppository 10 mg  10 mg Rectal Daily PRN Kevin Diggs MD   10 mg at 01/05/19 1048   • calcium carb-cholecalciferol 600-800 MG-UNIT tablet 1 tablet  1 tablet Oral BID With Meals LUIS CARLOS Zheng MD   1 tablet at 01/07/19 1712   • diphenhydrAMINE (BENADRYL) capsule 25 mg  25 mg Oral Nightly PRN LUIS CARLOS Zheng MD       • escitalopram (LEXAPRO) tablet 20 mg  20 mg Oral Daily LUIS CARLOS Zheng MD   20 mg at 01/07/19 0937   • estrogen (conjugated)-medroxyprogesterone (PREMPRO) 0.3-1.5 MG per tablet 1 tablet  1 tablet Oral Daily LUIS CARLOS Zheng MD       • Fluticasone Furoate-Vilanterol (BREO ELLIPTA) 200-25 MCG/INH inhaler 1 puff  1 puff Inhalation Daily LUIS CARLOS Zheng MD   1 puff at 01/08/19 0601   • gabapentin (NEURONTIN) capsule 1,600 mg  1,600 mg Oral Nightly José Luis Traore PA-C   1,600 mg at 01/07/19 2124   • gabapentin (NEURONTIN) capsule 800 mg  800 mg Oral BID LUIS CARLOS Zheng MD   800 mg at 01/08/19 0555   • Influenza Vac Subunit Quad (FLUCELVAX) injection 0.5 mL  0.5 mL Intramuscular During Hospitalization LUIS CARLOS Zheng MD       • ondansetron (ZOFRAN) tablet 4 mg  4 mg Oral Q6H PRN LUIS CARLOS Zheng MD        Or   • ondansetron ODT (ZOFRAN-ODT) disintegrating tablet 4 mg  4 mg Oral Q6H PRN LUIS CARLOS Zheng MD        Or   • ondansetron (ZOFRAN) injection 4 mg  4 mg Intravenous Q6H PRN LUIS CARLOS Zheng MD       • oxyCODONE (oxyCONTIN) 12 hr tablet 10 mg  10 mg Oral Q12H LUIS CARLOS Zheng MD   10 mg at 01/07/19 2127   •  "oxyCODONE-acetaminophen (PERCOCET)  MG per tablet 2 tablet  2 tablet Oral Q4H PRN LUIS CARLOS Zheng MD   2 tablet at 01/08/19 0556   • pantoprazole (PROTONIX) EC tablet 40 mg  40 mg Oral Kevin Bonilla MD   40 mg at 01/08/19 0556   • polyethylene glycol 3350 powder (packet)  17 g Oral BID Lizandro Burger MD   17 g at 01/06/19 0917   • sodium chloride 0.9 % flush 3 mL  3 mL Intravenous Q12H LUIS CARLOS Zheng MD   3 mL at 01/07/19 0833   • sodium chloride 0.9 % flush 3-10 mL  3-10 mL Intravenous PRN LUIS CARLOS Zheng MD       • tiotropium (SPIRIVA) 18 MCG per inhalation capsule 1 capsule  1 capsule Inhalation Daily - RT LUIS CARLOS Zheng MD       • tiZANidine (ZANAFLEX) tablet 4 mg  4 mg Oral Q8H PRN LUIS CARLOS Zheng MD       • vitamin D3 capsule 5,000 Units  5,000 Units Oral Daily Lizandro Burger MD   5,000 Units at 01/07/19 0940     ALLERGIES:    Allergies   Allergen Reactions   • Aspirin Nausea And Vomiting     Unknown reaction   • Keflex [Cephalexin] Other (See Comments)     Blisters to nose and mouth       Lumbar stenosis    Panlobular emphysema (CMS/HCC)    Gastroesophageal reflux disease without esophagitis    Constipation due to opioid therapy    PAD (peripheral artery disease) (CMS/HCC)    Primary osteoarthritis involving multiple joints    Chronic diastolic CHF (congestive heart failure) (CMS/HCC)    Recurrent major depressive disorder (CMS/HCC)    Blood pressure 105/49, pulse 83, temperature 98.1 °F (36.7 °C), temperature source Oral, resp. rate 18, height 157.5 cm (62.01\"), weight 75 kg (165 lb 5.5 oz), SpO2 93 %.      Subjective:  Symptoms:  Stable.  No shortness of breath, chest pain or chest pressure.    Diet:  Adequate intake.    Activity level: Impaired due to pain.    Pain:  She complains of pain that is moderate.  She reports pain is improving.  Pain is partially controlled.      Review of Systems   Respiratory: Negative for shortness of breath.    Cardiovascular: " "Negative for chest pain.     Objective:  General Appearance:  Comfortable and well-appearing.    Vital signs: (most recent): Blood pressure 105/49, pulse 83, temperature 98.1 °F (36.7 °C), temperature source Oral, resp. rate 18, height 157.5 cm (62.01\"), weight 75 kg (165 lb 5.5 oz), SpO2 93 %.  Vital signs are normal.    Output: Producing urine and minimal stool output.    HEENT: Normal HEENT exam.    Lungs:  Normal effort and normal respiratory rate.    Heart: Normal rate.  Regular rhythm.    Abdomen: Abdomen is soft.    Neurological: Patient is alert and oriented to person, place and time.    Skin:  Warm and dry.              Labs:  Lab Results (last 72 hours)     Procedure Component Value Units Date/Time    Basic Metabolic Panel [[id number]]  (Abnormal) Collected:  01/07/19 0444    Specimen:  Blood Updated:  01/07/19 0519     Glucose 103 mg/dL      BUN 8 mg/dL      Creatinine 0.55 mg/dL      Sodium 138 mmol/L      Potassium 3.9 mmol/L      Chloride 93 mmol/L      CO2 34.0 mmol/L      Calcium 9.1 mg/dL      eGFR Non African Amer 114 mL/min/1.73      BUN/Creatinine Ratio 14.5     Anion Gap 11.0 mmol/L     Narrative:       GFR Normal >60  Chronic Kidney Disease <60  Kidney Failure <15    CBC & Differential [[id number]] Collected:  01/07/19 0444    Specimen:  Blood Updated:  01/07/19 0506    Narrative:       The following orders were created for panel order CBC & Differential.  Procedure                               Abnormality         Status                     ---------                               -----------         ------                     CBC Auto Differential[[id number]]        Abnormal            Final result                 Please view results for these tests on the individual orders.    CBC Auto Differential [[id number]]  (Abnormal) Collected:  01/07/19 0444    Specimen:  Blood Updated:  01/07/19 0506     WBC 6.84 10*3/mm3      RBC 3.03 10*6/mm3      Hemoglobin 9.4 g/dL      Hematocrit 28.6 %      MCV 94.4 " fL      MCH 31.0 pg      MCHC 32.9 g/dL      RDW 12.3 %      RDW-SD 42.3 fl      MPV 11.8 fL      Platelets 158 10*3/mm3      Neutrophil % 56.0 %      Lymphocyte % 25.6 %      Monocyte % 11.7 %      Eosinophil % 5.8 %      Basophil % 0.6 %      Immature Grans % 0.3 %      Neutrophils, Absolute 3.83 10*3/mm3      Lymphocytes, Absolute 1.75 10*3/mm3      Monocytes, Absolute 0.80 10*3/mm3      Eosinophils, Absolute 0.40 10*3/mm3      Basophils, Absolute 0.04 10*3/mm3      Immature Grans, Absolute 0.02 10*3/mm3      nRBC 0.0 /100 WBC     CBC & Differential [[id number]] Collected:  01/05/19 0556    Specimen:  Blood Updated:  01/05/19 0604    Narrative:       The following orders were created for panel order CBC & Differential.  Procedure                               Abnormality         Status                     ---------                               -----------         ------                     CBC Auto Differential[[id number]]        Abnormal            Final result                 Please view results for these tests on the individual orders.    CBC Auto Differential [[id number]]  (Abnormal) Collected:  01/05/19 0556    Specimen:  Blood Updated:  01/05/19 0604     WBC 10.72 10*3/mm3      RBC 2.80 10*6/mm3      Hemoglobin 8.6 g/dL      Hematocrit 26.4 %      MCV 94.3 fL      MCH 30.7 pg      MCHC 32.6 g/dL      RDW 12.4 %      RDW-SD 43.2 fl      MPV 10.7 fL      Platelets 147 10*3/mm3      Neutrophil % 69.3 %      Lymphocyte % 18.4 %      Monocyte % 8.3 %      Eosinophil % 3.0 %      Basophil % 0.4 %      Immature Grans % 0.6 %      Neutrophils, Absolute 7.44 10*3/mm3      Lymphocytes, Absolute 1.97 10*3/mm3      Monocytes, Absolute 0.89 10*3/mm3      Eosinophils, Absolute 0.32 10*3/mm3      Basophils, Absolute 0.04 10*3/mm3      Immature Grans, Absolute 0.06 10*3/mm3      nRBC 0.0 /100 WBC     Vitamin B12 [[id number]]  (Normal) Collected:  01/04/19 0645    Specimen:  Blood Updated:  01/04/19 0857     Vitamin B-12 428  pg/mL     Iron [[id number]]  (Abnormal) Collected:  01/04/19 0645    Specimen:  Blood Updated:  01/04/19 0756     Iron <10 mcg/dL           Imaging Results (last 72 hours)     Procedure Component Value Units Date/Time    XR Chest 1 View [[id number]] Collected:  01/07/19 0711     Updated:  01/07/19 0714    Narrative:       EXAMINATION:   XR CHEST 1 VW-  1/7/2019 7:11 AM CST     HISTORY: Congestion     Frontal upright radiograph of the chest 1/7/2019 4:25 AM CST     COMPARISON: December 28, 2018.     FINDINGS:   The lungs are clear. The cardiomediastinal silhouette and pulmonary  vascularity are within normal limits.      The osseous structures and surrounding soft tissues demonstrate no acute  abnormality.       Impression:       1. No radiographic evidence of acute cardiopulmonary process.        This report was finalized on 01/07/2019 07:11 by Dr. Tay Sanchez MD.    XR Spine Lumbar AP & Lateral [[id number]] Collected:  01/04/19 1138     Updated:  01/04/19 1345    Narrative:       EXAMINATION: Seminar 1/4/2019     Fluoroscopy time: 37 seconds. 2 images are submitted for interpretation     FINDINGS: Today's exam is compared to previous study of 2 days earlier.  The patient's undergone previous anterior fusion at the L5-S1 level with  pedicle screws at the L5-S1 level and a lateral fusion plate at the  L3-L4 level.     New pedicles screws have been placed at L3, L5 and S1. There is good  anatomic alignment. The films are available to the OR for review.  This report was finalized on 01/04/2019 11:41 by Dr. Bill Benitez MD.    FL C Arm During Surgery [[id number]] Collected:  01/04/19 1138     Updated:  01/04/19 1345    Narrative:       EXAMINATION: Seminar 1/4/2019     Fluoroscopy time: 37 seconds. 2 images are submitted for interpretation     FINDINGS: Today's exam is compared to previous study of 2 days earlier.  The patient's undergone previous anterior fusion at the L5-S1 level with  pedicle screws at the L5-S1  level and a lateral fusion plate at the  L3-L4 level.     New pedicles screws have been placed at L3, L5 and S1. There is good  anatomic alignment. The films are available to the OR for review.  This report was finalized on 01/04/2019 11:41 by Dr. Bill Benitez MD.                Assessment:    Condition: In stable condition.  Improving.       Plan:   Transfer Plan: To rehabilitation in [place] when bed available.  Encourage ambulation and per physical therapy.  (Medically stable for discharge to rehabilitation in [place].  Restart vitamin D per patient request.  Keep follow-up with Dr. Landis upon discharge from rehabilitation    Explained to patient and staff that we were consulted for medical management during their acute care hospitalization. They need to f/u with their regular primary care provider concerning any further treatment and review of abnormalities found during their hospitalization at [facility] and they agree with that treatment plan. ).   Vulvovaginitis-Diflucan orally.  Trace pedal edema-Lasix when necessary.  Medically stable for discharge to [facility].  Patient Active Problem List   Diagnosis   • Foraminal stenosis of lumbosacral region   • Chronic back pain   • Radiculopathy   • Lumbar stenosis   • Panlobular emphysema (CMS/HCC)   • Gastroesophageal reflux disease without esophagitis   • Constipation due to opioid therapy   • PAD (peripheral artery disease) (CMS/HCC)   • Primary osteoarthritis involving multiple joints   • Chronic diastolic CHF (congestive heart failure) (CMS/HCC)   • Recurrent major depressive disorder (CMS/HCC)     Lizandro Burger MD  1/8/2019

## 2019-01-08 NOTE — PLAN OF CARE
Problem: Patient Care Overview  Goal: Plan of Care Review  Outcome: Ongoing (interventions implemented as appropriate)   01/08/19 0850   Coping/Psychosocial   Plan of Care Reviewed With patient   Plan of Care Review   Progress improving   OTHER   Outcome Summary pt trans to EOB cga, pt able to rich LSO, trans sit-stand cga, pt amb 65 feet x 2 cga with rwx,very slow gait. Pt plans for swing bed

## 2019-01-08 NOTE — PLAN OF CARE
Problem: Patient Care Overview  Goal: Plan of Care Review  Outcome: Ongoing (interventions implemented as appropriate)   01/08/19 0353   Coping/Psychosocial   Plan of Care Reviewed With patient   Plan of Care Review   Progress no change   OTHER   Outcome Summary vss; alert and oriented x 4; c/o back pain; prn pain med offered relief; dressings c/d/i; up x 1 with brace to br; pt rested well this shift; safety maintained; awaiting bed at [place] [facility]     Goal: Individualization and Mutuality  Outcome: Ongoing (interventions implemented as appropriate)    Goal: Discharge Needs Assessment  Outcome: Ongoing (interventions implemented as appropriate)    Goal: Interprofessional Rounds/Family Conf  Outcome: Ongoing (interventions implemented as appropriate)      Problem: Laminectomy/Foraminotomy/Discectomy (Adult)  Goal: Signs and Symptoms of Listed Potential Problems Will be Absent, Minimized or Managed (Laminectomy/Foraminotomy/Discectomy)  Outcome: Ongoing (interventions implemented as appropriate)      Problem: Fall Risk (Adult)  Goal: Absence of Fall  Outcome: Ongoing (interventions implemented as appropriate)

## 2019-01-08 NOTE — PROGRESS NOTES
Continued Stay Note  [facility]     Patient Name: Arminda Jiménez  MRN: [id number]  Today's Date: 1/8/2019    Admit Date: 1/2/2019    Discharge Plan     Row Name 01/08/19 1031       Plan    Plan  [facility]    Patient/Family in Agreement with Plan  yes    Final Discharge Disposition Code  61 - hospital-based swing bed    Final Note  Patient has been accepted to [facility] and can admit there today. PA with Dr Zheng has been notified. Discharge summary fax to [phone]. Patient's nurse will call report to [phone]. Plan family transport.       Expected Discharge Date and Time     Expected Discharge Date Expected Discharge Time    Jan 8, 2019             MARY Reyes

## 2019-01-08 NOTE — THERAPY TREATMENT NOTE
Acute Care - Physical Therapy Treatment Note  [facility]     Patient Name: Arminda Jiménez  : 1960  MRN: [id number]  Today's Date: 2019  Onset of Illness/Injury or Date of Surgery: 19  Date of Referral to : 19  Referring Physician: Dr. Zheng    Admit Date: 2019    Visit Dx:    ICD-10-CM ICD-9-CM   1. Decreased activities of daily living (ADL) R68.89 780.99   2. Impaired functional mobility, balance, gait, and endurance Z74.09 V49.89     Patient Active Problem List   Diagnosis   • Foraminal stenosis of lumbosacral region   • Chronic back pain   • Radiculopathy   • Lumbar stenosis   • Panlobular emphysema (CMS/HCC)   • Gastroesophageal reflux disease without esophagitis   • Constipation due to opioid therapy   • PAD (peripheral artery disease) (CMS/HCC)   • Primary osteoarthritis involving multiple joints   • Chronic diastolic CHF (congestive heart failure) (CMS/HCC)   • Recurrent major depressive disorder (CMS/HCC)       Therapy Treatment    Rehabilitation Treatment Summary     Row Name 19             Treatment Time/Intention    Discipline  physical therapy assistant  -      Document Type  therapy note (daily note)  -2      Subjective Information  complains of;pain;weakness  -2      Existing Precautions/Restrictions  fall;spinal;brace worn when out of bed  -2      Recorded by [] Alexandra Odonnell, PTA 19  [2] Alexandra Odonnell, PTA 1949      Row Name 19             Bed Mobility Assessment/Treatment    Sit-Supine Hye (Bed Mobility)  conditional independence  -      Sidelying-Sit Hye (Bed Mobility)  -- chair  -      Assistive Device (Bed Mobility)  bed rails;head of bed elevated  -      Recorded by [] Alexandra Odonnell, PTA 1949      Row Name 19             Sit-Stand Transfer    Sit-Stand Hye (Transfers)  verbal cues;contact guard  -      Recorded by [] Alexandra Odonnell, PTA  01/08/19 0849      Row Name 01/08/19 0821             Stand-Sit Transfer    Stand-Sit Page (Transfers)  supervision;verbal cues  -      Recorded by [] Alexandra Odonnell, [facility] 01/08/19 0849      Row Name 01/08/19 0821             Gait/Stairs Assessment/Training    Page Level (Gait)  contact guard;stand by assist;verbal cues  -      Assistive Device (Gait)  walker, front-wheeled  -      Distance in Feet (Gait)  65 65 x 2  -      Deviations/Abnormal Patterns (Gait)  rosita decreased;gait speed decreased;stride length decreased  (Significant)   -      Recorded by [] Alexandra Odonnell, [facility] 01/08/19 0849      Row Name 01/08/19 0821             Motor Skills Assessment/Interventions    Additional Documentation  Therapeutic Exercise (Group)  -      Recorded by [] Alexandra Odonnell, [facility] 01/08/19 0849      Row Name 01/08/19 0821             Therapeutic Exercise    Therapeutic Exercise  seated, lower extremities  -      Additional Documentation  Therapeutic Exercise (Row)  -      Recorded by [] Alexandra Odonnell, [facility] 01/08/19 0849      Row Name 01/08/19 0821             Lower Extremity Seated Therapeutic Exercise    Performed, Seated Lower Extremity (Therapeutic Exercise)  LAQ (long arc quad), knee extension;ankle dorsiflexion/plantarflexion  -      Exercise Type, Seated Lower Extremity (Therapeutic Exercise)  AROM (active range of motion)  -      Sets/Reps Detail, Seated Lower Extremity (Therapeutic Exercise)  10  -      Recorded by [] Alexandra Odonnell, [facility] 01/08/19 0849      Row Name 01/08/19 0821             Positioning and Restraints    Pre-Treatment Position  in bed  -      Post Treatment Position  chair  -      In Chair  sitting;call light within reach;encouraged to call for assist;notified nsg  -      Recorded by [] Alexandra Odonnell, PTA 01/08/19 0849      Row Name 01/08/19 0821             Pain Scale: Numbers Pre/Post-Treatment    Pain Scale: Numbers, Pretreatment  3/10  -       Pain Location - Side  Right  -      Pain Location - Orientation  lower  -      Pain Location  extremity flank,right side of back  -      Pain Intervention(s)  Medication (See MAR);Repositioned;Ambulation/increased activity  -      Recorded by [] Alexandra Odonnell, PTA 01/08/19 0849      Row Name 01/08/19 0821             Orthotic/Prosthetic Management    Orthosis Location  spinal orthosis  -      Recorded by [] Alexandra Odonnell, PTA 01/08/19 0849      Row Name 01/08/19 0821             Spinal Orthosis Management    Type (Spinal Orthosis)  LSO (lumbar sacral orthosis)  -      Wearing Schedule (Spinal Orthosis)  wear when out of bed only  -      Orthosis Training (Spinal Orthosis)  patient;donning/doffing orthosis;purpose/goals of orthosis;meets goals/outcomes  -      Recorded by [] Alexandra Odonnell, PTA 01/08/19 0849      Row Name                Wound 01/02/19 1013 Right flank incision    Wound - Properties Group Date first assessed: 01/02/19 [JR] Time first assessed: 1013 [JR] Side: Right [JR] Location: flank [JR] Type: incision [JR] Recorded by:  [JR] Betsy Chin RN 01/02/19 1013    Row Name                Wound 01/04/19 1143 Bilateral back incision    Wound - Properties Group Date first assessed: 01/04/19 [RS] Time first assessed: 1143 [RS] Side: Bilateral [RS] Location: back [RS] Type: incision [RS] Recorded by:  [RS] Jose Farah RN 01/04/19 1143      User Key  (r) = Recorded By, (t) = Taken By, (c) = Cosigned By    Initials Name Effective Dates Discipline     Alexandra Odonnell, PTA 08/02/16 -  PT    JR Betsy Chin RN 08/02/16 -  Nurse    RS Jose Farah RN 07/26/16 -  Nurse          Wound 01/02/19 1013 Right flank incision (Active)   Dressing Appearance dry;intact 1/7/2019  9:00 PM   Closure IVAN 1/7/2019  9:00 PM   Base dressing in place, unable to visualize 1/7/2019  9:00 PM   Drainage Amount none 1/7/2019  9:00 PM   Dressing Care, Wound transparent  film 1/7/2019  9:00 PM       Wound 01/04/19 1143 Bilateral back incision (Active)   Dressing Appearance dry;intact 1/7/2019  9:00 PM   Closure IVAN 1/7/2019  9:00 PM   Base dressing in place, unable to visualize 1/7/2019  9:00 PM   Dressing Care, Wound transparent film 1/7/2019  9:00 PM           Physical Therapy Education     Title: PT OT SLP Therapies (Not Started)     Topic: Physical Therapy (In Progress)     Point: Precautions (Done)     Learning Progress Summary           Patient Acceptance, E,TB,D, VU,DU by  at 1/8/2019  8:49 AM    Comment:  rich/doff LSO                               User Key     Initials Effective Dates Name Provider Type Discipline     08/02/16 -  Alexandra Odonnell, PTA Physical Therapy Assistant PT                PT Recommendation and Plan     Plan of Care Reviewed With: patient  Progress: improving  Outcome Summary: pt trans to EOB cga, pt able to rich LSO, trans sit-stand cga, pt amb 65 feet x 2 cga with rwx,very slow gait. Pt plans for swing bed  Outcome Measures     Row Name 01/08/19 0800 01/07/19 1300 01/06/19 1028       How much help from another person do you currently need...    Turning from your back to your side while in flat bed without using bedrails?  3  -  --  3  -MF    Moving from lying on back to sitting on the side of a flat bed without bedrails?  3  -  --  3  -MF    Moving to and from a bed to a chair (including a wheelchair)?  3  -  --  3  -MF    Standing up from a chair using your arms (e.g., wheelchair, bedside chair)?  3  -  --  3  -MF    Climbing 3-5 steps with a railing?  3  -AH  --  2  -MF    To walk in hospital room?  3  -AH  --  3  -MF    AM-PAC 6 Clicks Score  18  -AH  --  17  -MF       How much help from another is currently needed...    Putting on and taking off regular lower body clothing?  --  2  -TS  --    Bathing (including washing, rinsing, and drying)  --  3  -TS  --    Toileting (which includes using toilet bed pan or urinal)  --  3  -TS  --     Putting on and taking off regular upper body clothing  --  3  -TS  --    Taking care of personal grooming (such as brushing teeth)  --  3  -TS  --    Eating meals  --  4  -TS  --    Score  --  18  -TS  --       Functional Assessment    Outcome Measure Options  AM-PAC 6 Clicks Basic Mobility (PT)  -  AM-PAC 6 Clicks Daily Activity (OT)  -  AM-PAC 6 Clicks Basic Mobility (PT)  -    Row Name 01/05/19 0929 01/05/19 0900          How much help from another person do you currently need...    Turning from your back to your side while in flat bed without using bedrails?  3  -JE  --     Moving from lying on back to sitting on the side of a flat bed without bedrails?  3  -JE  --     Moving to and from a bed to a chair (including a wheelchair)?  3  -JE  --     Standing up from a chair using your arms (e.g., wheelchair, bedside chair)?  3  -JE  --     Climbing 3-5 steps with a railing?  3  -JE  --     To walk in hospital room?  3  -JE  --     AM-PAC 6 Clicks Score  18  -JE  --        How much help from another is currently needed...    Putting on and taking off regular lower body clothing?  --  2  -JJ     Bathing (including washing, rinsing, and drying)  --  2  -JJ     Toileting (which includes using toilet bed pan or urinal)  --  3  -JJ     Putting on and taking off regular upper body clothing  --  3  -JJ     Taking care of personal grooming (such as brushing teeth)  --  3  -JJ     Eating meals  --  4  -JJ     Score  --  17  -JJ        Functional Assessment    Outcome Measure Options  AM-PAC 6 Clicks Basic Mobility (PT)  -[facility]-PAC 6 Clicks Daily Activity (OT)  -       User Key  (r) = Recorded By, (t) = Taken By, (c) = Cosigned By    Initials Name Provider Type     Alexandra Odonnell, PTA Physical Therapy Assistant    Stacy Kimble, MCELROY/L Occupational Therapy Assistant    Mary Jane Dawson, PTA Physical Therapy Assistant    Marimar Christensen, PT Physical Therapist    Betsy Rodriguez, OTR/L  Occupational Therapist         Time Calculation:   PT Charges     Row Name 01/08/19 0852             Time Calculation    Start Time  0821  -      Stop Time  0844  -      Time Calculation (min)  23 min  -      PT Received On  01/08/19  -      PT Goal Re-Cert Due Date  01/15/19  -         Time Calculation- PT    Total Timed Code Minutes- PT  23 minute(s)  -         Timed Charges    72705 - Gait Training Minutes   23  -AH        User Key  (r) = Recorded By, (t) = Taken By, (c) = Cosigned By    Initials Name Provider Type     Alexandra Odonnell, PTA Physical Therapy Assistant        Therapy Suggested Charges     Code   Minutes Charges    01751 (CPT®) Hc Pt Neuromusc Re Education Ea 15 Min      52028 (CPT®) Hc Pt Ther Proc Ea 15 Min      50004 (CPT®) Hc Gait Training Ea 15 Min 23 2    13297 (CPT®) Hc Pt Therapeutic Act Ea 15 Min      24578 (CPT®) Hc Pt Manual Therapy Ea 15 Min      05000 (CPT®) Hc Pt Iontophoresis Ea 15 Min      12121 (CPT®) Hc Pt Elec Stim Ea-Per 15 Min      20162 (CPT®) Hc Pt Ultrasound Ea 15 Min      61938 (CPT®) Hc Pt Self Care/Mgmt/Train Ea 15 Min      91165 (CPT®) Hc Pt Prosthetic (S) Train Initial Encounter, Each 15 Min      02325 (CPT®) Hc Pt Orthotic(S)/Prosthetic(S) Encounter, Each 15 Min      66795 (CPT®) Hc Orthotic(S) Mgmt/Train Initial Encounter, Each 15min      Total  23 2        Therapy Charges for Today     Code Description Service Date Service Provider Modifiers Qty    [id number] HC GAIT TRAINING EA 15 MIN 1/8/2019 Alexandra Odonnell PTA GP 2          PT G-Codes  PT Professional Judgement Used?: Yes  Outcome Measure Options: AM-PAC 6 Clicks Basic Mobility (PT)  AM-PAC 6 Clicks Score: 18  Score: 18  Functional Limitation: Mobility: Walking and moving around  Mobility: Walking and Moving Around Current Status (): At least 40 percent but less than 60 percent impaired, limited or restricted  Mobility: Walking and Moving Around Goal Status (): At least 20 percent but less  than 40 percent impaired, limited or restricted    Alexandra Odonnell, PTA  1/8/2019

## 2019-01-08 NOTE — PROGRESS NOTES
Orthopedic Spine Progress Note    Arminda Jiménez  58 y.o.  female  Subjective     Post-Operative Day # 6/4    Systemic or Specific Complaints:     No complaints at this time. Pain well controlled with medication. Pt states that her pain is better than before surgery. Pt has ambulated but not much with PT. Still awaiting swing bed.    Objective     Vital signs in last 24 hours:  Temp:  [98.1 °F (36.7 °C)-98.5 °F (36.9 °C)] 98.1 °F (36.7 °C)  Heart Rate:  [83-90] 83  Resp:  [16-18] 18  BP: (102-105)/(49-50) 105/49    Physical Exam:    Alert and oriented ×3, no acute distress, grossly neurovascularly intact, vital signs stable, dressing clean dry and intact, moving all extremities without focal deficit    Diagnostic Data:  CBC:  Results from last 7 days   Lab Units  01/07/19   0444   WBC 10*3/mm3  6.84   RBC 10*6/mm3  3.03*   HEMOGLOBIN g/dL  9.4*   HEMATOCRIT %  28.6*   PLATELETS 10*3/mm3  158          Assessment/Plan     1.  Status post anterior decompression, ALIF with instrumentation L5-S1, 5/7/2018  2.  Status post PSF with instrumentation left L5-S1, 5/9/2018  3.  Recurrent back pain  4.  Bilateral buttock, thigh, and leg radiculopathy, right worse than left  5.  Recurrent neurogenic claudication  6.  Adjacent level degenerative disc disease L3 to 5  7.  Degenerative lumbar scoliosis L3 to 5  8.  Facet arthropathy L3 to 5  9.  Central and bilateral foraminal stenosis L3 to 5, worse right L3-4  10.  Osteoporosis, T score -2.5, 4/11/2018  11.  Status post right LLIF L3 to 5 with instrumentation L3-4, 1/2/2019  12. Status post PSF L3-S1 - 1/4/19        Plan:  1. PT/OT/Brace  2. Periops  3. Ok to dc when swing bed at [facility]        José Luis Traore PA-C    Date: 1/8/2019  Time: 8:04 AM

## 2019-01-08 NOTE — PROGRESS NOTES
Continued Stay Note   [facility]     Patient Name: Arminda Jiménez  MRN: [id number]  Today's Date: 1/8/2019    Admit Date: 1/2/2019    Discharge Plan     Row Name 01/08/19 0947       Plan    Plan  Referral to [facility]    Patient/Family in Agreement with Plan  yes    Plan Comments  SW faxed therapy updates to Josefina in admissions at [facility]. Josefina states she will have answer shortly on potential admission to [facility] today. Will follow for return call.           Expected Discharge Date and Time     Expected Discharge Date Expected Discharge Time    Jan 8, 2019             MARY Reyes

## 2019-01-08 NOTE — DISCHARGE SUMMARY
Date of Discharge:  1/8/2019    Admission Diagnosis: M54.16    Discharge Diagnosis:   1.  Status post anterior decompression, ALIF with instrumentation L5-S1, 5/7/2018  2.  Status post PSF with instrumentation left L5-S1, 5/9/2018  3.  Recurrent back pain  4.  Bilateral buttock, thigh, and leg radiculopathy, right worse than left  5.  Recurrent neurogenic claudication  6.  Adjacent level degenerative disc disease L3 to 5  7.  Degenerative lumbar scoliosis L3 to 5  8.  Facet arthropathy L3 to 5  9.  Central and bilateral foraminal stenosis L3 to 5, worse right L3-4  10.  Osteoporosis, T score -2.5, 4/11/2018  11.  Status post right LLIF L3 to 5 with instrumentation L3-4, 1/2/2019  12. Status post PSF L3-S1 - 1/4/19    Consults During Admission: Dr. Burger    Hospital Course  Patient is a 58 y.o. female Known to our practice. Admitted for the above staged fusion.  This has been well tolerated and the patient will be discharged home today in good stable condition with instructions for brace when out of bed.  No driving until directed.  Patient will follow-up with Dr. Zheng's clinic in two weeks. They will call if problems arise.       Condition on Discharge:  STABLE    Vital Signs  Temp:  [98.1 °F (36.7 °C)-98.5 °F (36.9 °C)] 98.1 °F (36.7 °C)  Heart Rate:  [83-90] 83  Resp:  [16-18] 18  BP: (102-105)/(49-50) 105/49    Physical Exam:   Alert and oriented ×3, no acute distress, grossly neurovascularly intact, vital signs stable, dressing clean dry and intact, moving all extremities without focal deficit    Discharge Disposition  Rehab Facility or Unit (DC - External)    Discharge Medications     Discharge Medications      New Medications      Instructions Start Date   oxyCODONE 10 MG 12 hr tablet  Commonly known as:  oxyCONTIN   10 mg, Oral, Every 12 Hours Scheduled         Changes to Medications      Instructions Start Date   oxyCODONE-acetaminophen  MG per tablet  Commonly known as:  PERCOCET  What  changed:  reasons to take this   1 tablet, Oral, Every 4 Hours PRN         Continue These Medications      Instructions Start Date   BREO ELLIPTA 200-25 MCG/INH aerosol powder  inhaler  Generic drug:  Fluticasone Furoate-Vilanterol   200 mcg, Inhalation, Daily      calcium citrate-vitamin d 200-250 MG-UNIT tablet tablet  Commonly known as:  CITRACAL   1 tablet, Oral, 2 Times Daily With Meals      cholecalciferol 1000 units tablet  Commonly known as:  VITAMIN D3   1,000 Units, Oral, 2 Times Daily      clopidogrel 75 MG tablet  Commonly known as:  PLAVIX   75 mg, Oral, Daily, HOLD STARTING 12/23/18      escitalopram 20 MG tablet  Commonly known as:  LEXAPRO   20 mg, Oral, Daily      gabapentin 800 MG tablet  Commonly known as:  NEURONTIN   1,600 mg, Oral, Nightly      gabapentin 800 MG tablet  Commonly known as:  NEURONTIN   800 mg, 3 Times Daily      NEXIUM 40 MG capsule  Generic drug:  esomeprazole   40 mg, Oral, 2 Times Daily PRN      PREMPRO 0.3-1.5 MG per tablet  Generic drug:  estrogen (conjugated)-medroxyprogesterone   1 tablet/day, Oral, Daily      PROAIR HFA IN   2 puffs, Inhalation, 4 Times Daily PRN      SPIRIVA HANDIHALER 18 MCG per inhalation capsule  Generic drug:  tiotropium   18 mcg, Inhalation, Daily - RT, Takes at 5 pm      Unable to find   1 each, Once, Med Name: Calcium Citrate 600 mg, Take 1 tablet by mouth twice daily.              Discharge Diet: Resume Home diet, advance as tolerated    Activity at Discharge: Resume home activity advace as tolerated, no lifting, no twisting, no bending, brace as directed, no driving until directed.     Follow-up Appointments  Followup with PCP within one week  Followup [facility] at 2weeks post-op         José Luis Traore PA-C  01/08/19  11:02 AM

## 2019-01-09 NOTE — THERAPY DISCHARGE NOTE
Acute Care - Occupational Therapy Discharge Summary  [facility]     Patient Name: Arminda Jiménez  : 1960  MRN: [id number]    Today's Date: 2019  Onset of Illness/Injury or Date of Surgery: 19    Date of Referral to OT: 19  Referring Physician: Dr. Zheng      Admit Date: 2019        OT Recommendation and Plan    Visit Dx:    ICD-10-CM ICD-9-CM   1. Decreased activities of daily living (ADL) R68.89 780.99   2. Impaired functional mobility, balance, gait, and endurance Z74.09 V49.89               Rehab Goal Summary     Row Name 19 0700             Dressing Goal 1 (OT)    Activity/Assistive Device (Dressing Goal 1, OT)  dressing skills, all;lower body dressing  -TS      [place]/Cues Needed (Dressing Goal 1, OT)  minimum assist (75% or more patient effort)  -TS      Time Frame (Dressing Goal 1, OT)  long term goal (LTG);by discharge  -TS      Progress/Outcome (Dressing Goal 1, OT)  goal not met  -TS         Patient Education Goal (OT)    Activity (Patient Education Goal, OT)  LSO wear schedule/fitting/mgt, spinal precuations.   -TS      [place]/Cues/Accuracy (Memory Goal 2, OT)  verbalizes understanding  -TS      Time Frame (Patient Education Goal, OT)  long term goal (LTG);by discharge  -TS      Progress/Outcome (Patient Education Goal, OT)  goal not met  -TS        User Key  (r) = Recorded By, (t) = Taken By, (c) = Cosigned By    Initials Name Provider Type Discipline    TS Stacy Ogden, NAVI/L Occupational Therapy Assistant OT          Outcome Measures     Row Name 19 0800 19 1300 19 1028       How much help from another person do you currently need...    Turning from your back to your side while in flat bed without using bedrails?  3  -AH  --  3  -MF    Moving from lying on back to sitting on the side of a flat bed without bedrails?  3  -AH  --  3  -MF    Moving to and from a bed to a chair (including a wheelchair)?  3  -AH  --  3  -MF     Standing up from a chair using your arms (e.g., wheelchair, bedside chair)?  3  -AH  --  3  -MF    Climbing 3-5 steps with a railing?  3  -AH  --  2  -MF    To walk in hospital room?  3  -AH  --  3  -MF    AM-PAC 6 Clicks Score  18  -AH  --  17  -MF       How much help from another is currently needed...    Putting on and taking off regular lower body clothing?  --  2  -TS  --    Bathing (including washing, rinsing, and drying)  --  3  -TS  --    Toileting (which includes using toilet bed pan or urinal)  --  3  -TS  --    Putting on and taking off regular upper body clothing  --  3  -TS  --    Taking care of personal grooming (such as brushing teeth)  --  3  -TS  --    Eating meals  --  4  -TS  --    Score  --  18  -TS  --       Functional Assessment    Outcome Measure Options  AM-PAC 6 Clicks Basic Mobility (PT)  -  AM-PAC 6 Clicks Daily Activity (OT)  -  AM-PAC 6 Clicks Basic Mobility (PT)  -      User Key  (r) = Recorded By, (t) = Taken By, (c) = Cosigned By    Initials Name Provider Type     Alexandra Odonnell, PTA Physical Therapy Assistant    Stacy Kimble COTA/L Occupational Therapy Assistant     Mary Jane Juares PTA Physical Therapy Assistant          Therapy Suggested Charges     Code   Minutes Charges    11632 (CPT®) Hc Ot Neuromusc Re Education Ea 15 Min      90570 (CPT®) Hc Ot Ther Proc Ea 15 Min      01320 (CPT®) Hc Ot Therapeutic Act Ea 15 Min      01961 (CPT®) Hc Ot Manual Therapy Ea 15 Min      19048 (CPT®) Hc Ot Iontophoresis Ea 15 Min      12845 (CPT®) Hc Ot Elec Stim Ea-Per 15 Min      16351 (CPT®) Hc Ot Ultrasound Ea 15 Min      19660 (CPT®) Hc Ot Self Care/Mgmt/Train Ea 15 Min 25 2    Total  25 2              OT Discharge Summary  Reason for Discharge: Discharge from facility  Outcomes Achieved: Refer to plan of care for updates on goals achieved  Discharge Destination: other (comment)(swing bed)      FELIZ Murphy  1/9/2019

## 2019-01-09 NOTE — THERAPY DISCHARGE NOTE
Acute Care - Physical Therapy Discharge Summary  [facility]       Patient Name: Arminda Jiménez  : 1960  MRN: [id number]    Today's Date: 2019  Onset of Illness/Injury or Date of Surgery: 19    Date of Referral to PT: 19  Referring Physician: Dr. Zheng      Admit Date: 2019      PT Recommendation and Plan    Visit Dx:    ICD-10-CM ICD-9-CM   1. Decreased activities of daily living (ADL) R68.89 780.99   2. Impaired functional mobility, balance, gait, and endurance Z74.09 V49.89       Outcome Measures     Row Name 19 0800 19 1300          How much help from another person do you currently need...    Turning from your back to your side while in flat bed without using bedrails?  3  -AH  --     Moving from lying on back to sitting on the side of a flat bed without bedrails?  3  -AH  --     Moving to and from a bed to a chair (including a wheelchair)?  3  -AH  --     Standing up from a chair using your arms (e.g., wheelchair, bedside chair)?  3  -AH  --     Climbing 3-5 steps with a railing?  3  -AH  --     To walk in hospital room?  3  -AH  --     AM-PAC 6 Clicks Score  18  -AH  --        How much help from another is currently needed...    Putting on and taking off regular lower body clothing?  --  2  -TS     Bathing (including washing, rinsing, and drying)  --  3  -TS     Toileting (which includes using toilet bed pan or urinal)  --  3  -TS     Putting on and taking off regular upper body clothing  --  3  -TS     Taking care of personal grooming (such as brushing teeth)  --  3  -TS     Eating meals  --  4  -TS     Score  --  18  -TS        Functional Assessment    Outcome Measure Options  AM-PAC 6 Clicks Basic Mobility (PT)  -  AM-PAC 6 Clicks Daily Activity (OT)  -       User Key  (r) = Recorded By, (t) = Taken By, (c) = Cosigned By    Initials Name Provider Type     Alexandra Odonnell, PTA Physical Therapy Assistant     Stacy Ogden, NAVI/ABDOULAYE Occupational  Therapy Assistant            Therapy Suggested Charges     Code   Minutes Charges    09748 (CPT®) Hc Pt Neuromusc Re Education Ea 15 Min      33777 (CPT®) Hc Pt Ther Proc Ea 15 Min      03375 (CPT®) Hc Gait Training Ea 15 Min 23 2    85415 (CPT®) Hc Pt Therapeutic Act Ea 15 Min      71321 (CPT®) Hc Pt Manual Therapy Ea 15 Min      61438 (CPT®) Hc Pt Iontophoresis Ea 15 Min      59615 (CPT®) Hc Pt Elec Stim Ea-Per 15 Min      92712 (CPT®) Hc Pt Ultrasound Ea 15 Min      15391 (CPT®) Hc Pt Self Care/Mgmt/Train Ea 15 Min      86830 (CPT®) Hc Pt Prosthetic (S) Train Initial Encounter, Each 15 Min      92411 (CPT®) Hc Pt Orthotic(S)/Prosthetic(S) Encounter, Each 15 Min      91719 (CPT®) Hc Orthotic(S) Mgmt/Train Initial Encounter, Each 15min      Total  23 2          Rehab Goal Summary     Row Name 01/09/19 1634 01/09/19 0700          Physical Therapy Goals    Bed Mobility Goal Selection (PT)  bed mobility, PT goal 1  -LG  --     Transfer Goal Selection (PT)  transfer, PT goal 1  -LG  --     Gait Training Goal Selection (PT)  gait training, PT goal 1  -LG  --     Stairs Goal Selection (PT)  stairs, PT goal 1  -LG  --        Bed Mobility Goal 1 (PT)    Activity/Assistive Device (Bed Mobility Goal 1, PT)  rolling to left;rolling to right;scooting;sit to supine/supine to sit  -LG  --     [place] Level/Cues Needed (Bed Mobility Goal 1, PT)  standby assist  -LG  --     Time Frame (Bed Mobility Goal 1, PT)  long term goal (LTG);10 days  -LG  --     Progress/Outcomes (Bed Mobility Goal 1, PT)  goal not met  -LG  --        Transfer Goal 1 (PT)    Activity/Assistive Device (Transfer Goal 1, PT)  sit-to-stand/stand-to-sit;bed-to-chair/chair-to-bed;other (see comments) with LSO brace donned  -LG  --     [place] Level/Cues Needed (Transfer Goal 1, PT)  standby assist  -LG  --     Time Frame (Transfer Goal 1, PT)  long term goal (LTG);10 days  -LG  --     Progress/Outcome (Transfer Goal 1, PT)  goal not met  -LG  --         Gait Training Goal 1 (PT)    Activity/Assistive Device (Gait Training Goal 1, PT)  gait (walking locomotion);decrease fall risk;forward stepping;backward stepping;improve balance and speed;increase endurance/gait distance;normalize weight shifts;other (see comments) with LSO brace donned  -LG  --     [place] Level (Gait Training Goal 1, PT)  standby assist  -LG  --     Distance (Gait Goal 1, PT)  200 ft  -LG  --     Time Frame (Gait Training Goal 1, PT)  long term goal (LTG);10 days  -LG  --     Progress/Outcome (Gait Training Goal 1, PT)  goal not met  -LG  --        Stairs Goal 1 (PT)    Activity/Assistive Device (Stairs Goal 1, PT)  ascending stairs;descending stairs;using handrail, right;step-to-step;decrease fall risk;improve balance and speed  -LG  --     [place] Level/Cues Needed (Stairs Goal 1, PT)  contact guard assist  -LG  --     Number of Stairs (Stairs Goal 1, PT)  2  -LG  --     Time Frame (Stairs Goal 1, PT)  long term goal (LTG);10 days  -LG  --     Progress/Outcome (Stairs Goal 1, PT)  goal not met  -LG  --        Precaution Goal 1 (PT)    Progress/Outcome (Precaution Goal 1, PT)  goal not met  -LG  --        Patient Education Goal (PT)    Activity (Patient Education Goal, PT)  safety/falls prevention  -LG  --     [place]/Cues/Accuracy (Memory Goal 2, PT)  demonstrates adequately;verbalizes understanding  -LG  --     Time Frame (Patient Education Goal, PT)  long term goal (LTG);10 days  -LG  --     Progress/Outcome (Patient Education Goal, PT)  goal not met  -LG  --        Dressing Goal 1 (OT)    Activity/Assistive Device (Dressing Goal 1, OT)  --  dressing skills, all;lower body dressing  -TS     [place]/Cues Needed (Dressing Goal 1, OT)  --  minimum assist (75% or more patient effort)  -TS     Time Frame (Dressing Goal 1, OT)  --  long term goal (LTG);by discharge  -TS     Progress/Outcome (Dressing Goal 1, OT)  --  goal not met  -TS        Patient Education Goal (OT)     Activity (Patient Education Goal, OT)  --  LSO wear schedule/fitting/mgt, spinal precuations.   -TS     [place]/Cues/Accuracy (Memory Goal 2, OT)  --  verbalizes understanding  -TS     Time Frame (Patient Education Goal, OT)  --  long term goal (LTG);by discharge  -TS     Progress/Outcome (Patient Education Goal, OT)  --  goal not met  -TS       User Key  (r) = Recorded By, (t) = Taken By, (c) = Cosigned By    Initials Name Provider Type Discipline    LG Gadiel Almonte, PTA Physical Therapy Assistant PT    TS Stacy Ogden, MCELROY/L Occupational Therapy Assistant OT              PT Discharge Summary  Anticipated Discharge Disposition (PT): long term acute care facility  Reason for Discharge: Discharge from facility  Outcomes Achieved: Refer to plan of care for updates on goals achieved  Discharge Destination: other (comment)([facility])      Gadiel Almonte PTA   1/9/2019

## 2019-01-10 ENCOUNTER — TRANSCRIBE ORDERS (OUTPATIENT)
Dept: PHYSICAL THERAPY | Facility: HOSPITAL | Age: 59
End: 2019-01-10

## 2019-01-10 DIAGNOSIS — Z98.1 STATUS POST LUMBAR SPINAL FUSION: ICD-10-CM

## 2019-01-10 DIAGNOSIS — M54.50 LUMBAR PAIN: Primary | ICD-10-CM

## 2019-01-16 ENCOUNTER — APPOINTMENT (OUTPATIENT)
Dept: PHYSICAL THERAPY | Facility: HOSPITAL | Age: 59
End: 2019-01-16

## 2019-01-22 ENCOUNTER — APPOINTMENT (OUTPATIENT)
Dept: PHYSICAL THERAPY | Facility: HOSPITAL | Age: 59
End: 2019-01-22

## 2019-07-24 ENCOUNTER — TRANSCRIBE ORDERS (OUTPATIENT)
Dept: ADMINISTRATIVE | Facility: HOSPITAL | Age: 59
End: 2019-07-24

## 2019-07-24 DIAGNOSIS — M54.5 LOW BACK PAIN, UNSPECIFIED BACK PAIN LATERALITY, UNSPECIFIED CHRONICITY, WITH SCIATICA PRESENCE UNSPECIFIED: Primary | ICD-10-CM

## 2019-07-30 ENCOUNTER — HOSPITAL ENCOUNTER (OUTPATIENT)
Dept: MRI IMAGING | Facility: HOSPITAL | Age: 59
Discharge: HOME | End: 2019-07-30

## 2019-07-30 ENCOUNTER — HOSPITAL ENCOUNTER (OUTPATIENT)
Dept: CT IMAGING | Facility: HOSPITAL | Age: 59
Discharge: HOME | End: 2019-07-30
Admitting: PHYSICIAN ASSISTANT

## 2019-07-30 DIAGNOSIS — M54.5 LOW BACK PAIN, UNSPECIFIED BACK PAIN LATERALITY, UNSPECIFIED CHRONICITY, WITH SCIATICA PRESENCE UNSPECIFIED: ICD-10-CM

## 2019-07-30 PROCEDURE — 72148 MRI LUMBAR SPINE W/O DYE: CPT

## 2019-07-30 PROCEDURE — 72131 CT LUMBAR SPINE W/O DYE: CPT

## 2020-03-18 ENCOUNTER — TRANSCRIBE ORDERS (OUTPATIENT)
Dept: ADMINISTRATIVE | Facility: HOSPITAL | Age: 60
End: 2020-03-18

## 2020-03-18 DIAGNOSIS — M54.41 BILATERAL LOW BACK PAIN WITH BILATERAL SCIATICA, UNSPECIFIED CHRONICITY: Primary | ICD-10-CM

## 2020-03-18 DIAGNOSIS — M54.42 BILATERAL LOW BACK PAIN WITH BILATERAL SCIATICA, UNSPECIFIED CHRONICITY: Primary | ICD-10-CM

## 2020-03-18 DIAGNOSIS — Z96.9 RETAINED ORTHOPEDIC HARDWARE: ICD-10-CM

## 2020-03-24 ENCOUNTER — HOSPITAL ENCOUNTER (OUTPATIENT)
Dept: CT IMAGING | Facility: HOSPITAL | Age: 60
Discharge: HOME | End: 2020-03-24
Admitting: PHYSICIAN ASSISTANT

## 2020-03-24 ENCOUNTER — HOSPITAL ENCOUNTER (OUTPATIENT)
Dept: MRI IMAGING | Facility: HOSPITAL | Age: 60
Discharge: HOME | End: 2020-03-24

## 2020-03-24 PROCEDURE — 72131 CT LUMBAR SPINE W/O DYE: CPT

## 2020-03-24 PROCEDURE — 72148 MRI LUMBAR SPINE W/O DYE: CPT

## 2021-08-11 NOTE — THERAPY TREATMENT NOTE
Acute Care - Occupational Therapy Treatment Note  [facility]     Patient Name: Arminda Jiménez  : 1960  MRN: [id number]  Today's Date: 2019  Onset of Illness/Injury or Date of Surgery: 19  Date of Referral to OT: 19  Referring Physician: Dr. Zheng    Admit Date: 2019       ICD-10-CM ICD-9-CM   1. Decreased activities of daily living (ADL) R68.89 780.99   2. Impaired functional mobility, balance, gait, and endurance Z74.09 V49.89     Patient Active Problem List   Diagnosis   • Foraminal stenosis of lumbosacral region   • Chronic back pain   • Radiculopathy   • Lumbar stenosis   • Panlobular emphysema (CMS/HCC)   • Gastroesophageal reflux disease without esophagitis   • Constipation due to opioid therapy   • PAD (peripheral artery disease) (CMS/HCC)   • Primary osteoarthritis involving multiple joints   • Chronic diastolic CHF (congestive heart failure) (CMS/HCC)   • Recurrent major depressive disorder (CMS/HCC)     Past Medical History:   Diagnosis Date   • Arthritis    • Asthma    • Cancer (CMS/HCC)     right lung,abd,and pelvic area   • CHF (congestive heart failure) (CMS/HCC)    • Chronic back pain    • COPD (chronic obstructive pulmonary disease) (CMS/HCC)    • Depression    • Facet arthropathy    • Foraminal stenosis of lumbosacral region    • GERD (gastroesophageal reflux disease)    • H/O degenerative disc disease    • History of blood transfusion    • PVD (peripheral vascular disease) (CMS/HCC)    • Radiculopathy      Past Surgical History:   Procedure Laterality Date   • ANTERIOR LUMBAR EXPOSURE N/A 2018    Procedure: ANTERIOR LUMBAR EXPOSURE;  Surgeon: Chris Haley DO;  Location: [facility] OR;  Service: Vascular   • APPENDECTOMY     • CARPAL TUNNEL RELEASE Bilateral    • LUMBAR FUSION N/A 2018    Procedure: ANTERIOR DECOMPRESSION, ANTERIOR LUMBAR INTERBODY FUSION WITH INSTRUMENTATION L5-S1;  Surgeon: LUIS CARLOS Zheng MD;  Location: [facility] OR;  Service:  Orthopedic Spine   • LUMBAR LAMINECTOMY WITH FUSION N/A 5/9/2018    Procedure: POSTERIOR SPINAL FUSION WITH INSTRUMENTATION L5-S1;  Surgeon: LUIS CARLOS Zheng MD;  Location: [facility];  Service: Orthopedic Spine   • OTHER SURGICAL HISTORY Bilateral 2017    Stent implants BLE [place]       Therapy Treatment    Rehabilitation Treatment Summary     Row Name 01/07/19 1200 01/07/19 1144 01/07/19 1005       Treatment Time/Intention    Discipline  physical therapy assistant  -KJ  physical therapy assistant  -KJ  occupational therapy assistant  -TS    Document Type  therapy note (daily note)  -KJ  therapy note (daily note)  -KJ2  therapy note (daily note)  -TS    Subjective Information  complains of;pain  -KJ  complains of;pain  -KJ2  complains of;weakness;pain  -TS2    Mode of Treatment  --  physical therapy  -KJ2  --    Patient Effort  good  -KJ  adequate  -KJ2  good  -TS2    Comment  LSO  -KJ  LSO  -KJ2  --    Existing Precautions/Restrictions  spinal;fall;brace worn when out of bed  -KJ  fall;spinal;brace worn when out of bed  -KJ2  fall;brace worn when out of bed;spinal  -TS2    Recorded by [KJ] Shirley Rocha, PTA 01/07/19 1208 [KJ] Shirley Rocha, PTA 01/07/19 1144  [KJ2] Shirley Rocha, PTA 01/07/19 1208 [TS] Stacy Ogden COTA/L 01/07/19 1020  [TS2] Stacy Ogden COTA/L 01/07/19 1321    Row Name 01/07/19 1005             Vital Signs    Intra SpO2 (%)  88  -TS      O2 Delivery Intra Treatment  room air  -TS      Post SpO2 (%)  92  -TS      O2 Delivery Post Treatment  supplemental O2 1L  -TS      Intra Patient Position  Sitting  -TS      Post Patient Position  Sitting  -TS      Recorded by [TS] Stacy Ogden COTA/L 01/07/19 1321      Row Name 01/07/19 1005             Cognitive Assessment/Intervention- PT/OT    Personal Safety Interventions  fall prevention program maintained;gait belt;nonskid shoes/slippers when out of bed  -TS      Recorded by [TS] Stacy Ogden COTA/L  01/07/19 1321      Row Name 01/07/19 1144 01/07/19 1005          Bed Mobility Assessment/Treatment    Sidelying-Sit [place] (Bed Mobility)  verbal cues;minimum assist (75% patient effort)  -KJ  supervision  -TS     Bed Mobility, Safety Issues  decreased use of legs for bridging/pushing  -KJ  --     Assistive Device (Bed Mobility)  --  bed rails;head of bed elevated  -TS     Recorded by [KJ] Shirley Rocha, SUSI 01/07/19 1208 [TS] Stacy Ogden COTA/L 01/07/19 1321     Row Name 01/07/19 1005             Functional Mobility    Functional Mobility- Ind. Level  contact guard assist  -TS      Functional Mobility- Device  rolling walker  -TS      Functional Mobility- Comment  in room, in hallway  -TS      Recorded by [TS] Stacy Ogden COTA/L 01/07/19 1321      Row Name 01/07/19 1005             Transfer Assessment/Treatment    Transfer Assessment/Treatment  sit-stand transfer;stand-sit transfer  -TS      Recorded by [TS] Stacy Ogden COTA/L 01/07/19 1321      Row Name 01/07/19 1200 01/07/19 1005          Sit-Stand Transfer    Sit-Stand [place] (Transfers)  verbal cues;contact guard  -KJ  stand by assist  -TS     Assistive Device (Sit-Stand Transfers)  --  walker, front-wheeled  -TS     Recorded by [KJ] Shirley Rocha, SUSI 01/07/19 1208 [TS] Stacy Ogden COTA/L 01/07/19 1321     Row Name 01/07/19 1200 01/07/19 1005          Stand-Sit Transfer    Stand-Sit [place] (Transfers)  supervision  -KJ  stand by assist  -TS     Assistive Device (Stand-Sit Transfers)  --  walker, front-wheeled  -TS     Recorded by [KJ] Shirley Rocha, SUSI 01/07/19 1208 [TS] Stacy Ogden COTA/L 01/07/19 1321     Row Name 01/07/19 1200             Toilet Transfer    Type (Toilet Transfer)  sit-stand;stand-sit  -KJ      [place] Level (Toilet Transfer)  supervision  -KJ      Recorded by [KJ] Shirley Rocha, PTA 01/07/19 1208      Row Name 01/07/19 1200             Gait/Stairs  Assessment/Training    [place] Level (Gait)  verbal cues;contact guard  -KJ      Assistive Device (Gait)  walker, front-wheeled  -KJ      Distance in Feet (Gait)  75' x 2  -KJ      Pattern (Gait)  step-through  -KJ      Deviations/Abnormal Patterns (Gait)  rosita decreased;gait speed decreased;stride length decreased  -KJ      Comment (Gait/Stairs)  right knee slightly flexed during gait, decreased swing phase  -KJ      Recorded by [KJ] Shirley Rocha, PTA 01/07/19 1208      Row Name 01/07/19 1005             ADL Assessment/Intervention    BADL Assessment/Intervention  upper body dressing;lower body dressing  -TS      Recorded by [TS] Stacy Ogden COTA/L 01/07/19 1321      Row Name 01/07/19 1005             Upper Body Dressing Assessment/Training    Upper Body Dressing [place] Level  don;set up  -TS      Upper Body Dressing Position  edge of bed sitting  -TS      Comment (Upper Body Dressing)  LSO  -TS      Recorded by [TS] Stacy Ogden COTA/L 01/07/19 1321      Row Name 01/07/19 1005             Lower Body Dressing Assessment/Training    Lower Body Dressing [place] Level  don;socks;maximum assist (25% patient effort)  -TS      Lower Body Dressing Position  edge of bed sitting  -TS      Recorded by [TS] Stacy Ogden COTA/L 01/07/19 1321      Row Name 01/07/19 1200 01/07/19 1005          Positioning and Restraints    Pre-Treatment Position  sitting in chair/recliner  -KJ  in bed  -TS     Post Treatment Position  bed  -KJ  chair  -TS     In Chair  --  sitting;call light within reach;encouraged to call for assist;with family/caregiver;with brace  -TS     Recorded by [KJ] Shirley Rocha, PTA 01/07/19 1208 [TS] Stacy Ogden COTA/L 01/07/19 1321     Row Name 01/07/19 1200 01/07/19 1005          Pain Scale: Numbers Pre/Post-Treatment    Pain Scale: Numbers, Pretreatment  10/10  -KJ  4/10  -TS     Pain Scale: Numbers, Post-Treatment  10/10  -KJ  --     Pain Location -  Side  Right  -KJ  Right  -TS     Pain Location - Orientation  lower  -KJ  lower  -TS     Pain Location  extremity  -KJ  extremity  -TS     Recorded by [KJ] Shirley Rocha, PTA 01/07/19 1208 [TS] Stacy Ogden MCELROY/L 01/07/19 1321     Row Name                Wound 01/02/19 1013 Right flank incision    Wound - Properties Group Date first assessed: 01/02/19 [JR] Time first assessed: 1013 [JR] Side: Right [JR] Location: flank [JR] Type: incision [JR] Recorded by:  [JR] Betsy Chin, RN 01/02/19 1013    Row Name                Wound 01/04/19 1143 Bilateral back incision    Wound - Properties Group Date first assessed: 01/04/19 [RS] Time first assessed: 1143 [RS] Side: Bilateral [RS] Location: back [RS] Type: incision [RS] Recorded by:  [RS] Jose Farah RN 01/04/19 1143    Row Name 01/07/19 1005             Outcome Summary/Treatment Plan (OT)    Daily Summary of Progress (OT)  progress toward functional goals is good  -TS      Recorded by [TS] Stacy Ogden MCELROY/L 01/07/19 1321        User Key  (r) = Recorded By, (t) = Taken By, (c) = Cosigned By    Initials Name Effective Dates Discipline     Shirley Rocha, PTA 08/02/16 -  PT    TS Stacy Ogden MCELROY/L 08/02/16 -  OT    JR Betsy Chin RN 08/02/16 -  Nurse    RS Jose Farah RN 07/26/16 -  Nurse        Wound 01/02/19 1013 Right flank incision (Active)   Dressing Appearance dry;intact 1/7/2019  8:25 AM   Base dressing in place, unable to visualize 1/7/2019  8:25 AM   Drainage Amount none 1/6/2019  8:00 PM   Dressing Care, Wound transparent film 1/6/2019  8:00 PM       Wound 01/04/19 1143 Bilateral back incision (Active)   Dressing Appearance intact;dry 1/7/2019  8:25 AM   Drainage Characteristics/Odor sanguineous 1/6/2019  8:00 PM   Drainage Amount scant 1/6/2019  8:00 PM   Dressing Care, Wound transparent film 1/6/2019  8:00 PM       Occupational Therapy Education     Title: PT OT SLP Therapies (Not Started)      Topic: Occupational Therapy (In Progress)     Point: ADL training (Done)     Description: Instruct learner(s) on proper safety adaptation and remediation techniques during self care or transfers.   Instruct in proper use of assistive devices.    Learning Progress Summary           Patient Acceptance, E, VU by JOES at 1/5/2019  9:17 AM    Comment:  Pt educated on the benefits and roles of OT, POC, adl modfications, t/fs, spinal preucations, LSO wear schedule.    Acceptance, E, VU by JODI at 1/3/2019  1:14 PM    Comment:  transfers, back precautions, ADL modification, DME    Acceptance, E, VU by JOSE at 1/2/2019  2:43 PM    Comment:  Pt educated on the benefits and role of OT, POC, spinal precuations, LSO wear schedule/fitting/mgt, adl modifications, t/fs, bed mobility, assistive device use.                   Point: Precautions (Done)     Description: Instruct learner(s) on prescribed precautions during self-care and functional transfers.    Learning Progress Summary           Patient Acceptance, E, VU by JOSE at 1/5/2019  9:17 AM    Comment:  Pt educated on the benefits and roles of OT, POC, adl modfications, t/fs, spinal preucations, LSO wear schedule.    Acceptance, E, VU by JODI at 1/3/2019  1:14 PM    Comment:  transfers, back precautions, ADL modification, DME    Acceptance, E, VU by JOSE at 1/2/2019  2:43 PM    Comment:  Pt educated on the benefits and role of OT, POC, spinal precuations, LSO wear schedule/fitting/mgt, adl modifications, t/fs, bed mobility, assistive device use.                   Point: Body mechanics (Done)     Description: Instruct learner(s) on proper positioning and spine alignment during self-care, functional mobility activities and/or exercises.    Learning Progress Summary           Patient Acceptance, E, VU by JOSE at 1/5/2019  9:17 AM    Comment:  Pt educated on the benefits and roles of OT, POC, adl modfications, t/fs, spinal preucations, LSO wear schedule.    Acceptance, E, VU by JOSE at 1/2/2019   2:43 PM    Comment:  Pt educated on the benefits and role of OT, POC, spinal precuations, LSO wear schedule/fitting/mgt, adl modifications, t/fs, bed mobility, assistive device use.                               User Key     Initials Effective Dates Name Provider Type Discipline    TS 08/02/16 -  Stacy Ogden COTA/L Occupational Therapy Assistant OT    JOSE 10/12/18 -  Betsy Renee OTR/L Occupational Therapist OT                OT Recommendation and Plan  Outcome Summary/Treatment Plan (OT)  Daily Summary of Progress (OT): progress toward functional goals is good  Daily Summary of Progress (OT): progress toward functional goals is good  Plan of Care Review  Plan of Care Reviewed With: patient  Plan of Care Reviewed With: patient  Outcome Summary: Pt SBA for sidelying to sit with bed rails. Pt transfers with SBA with RW and ambulates with RW with SBA. Pt max A for LB dressing and dons LSO with set up. Pt would benefit from rehab at discharge. Continue OT POC   Outcome Measures     Row Name 01/07/19 1300 01/06/19 1028 01/05/19 0929       How much help from another person do you currently need...    Turning from your back to your side while in flat bed without using bedrails?  --  3  -MF  3  -JE    Moving from lying on back to sitting on the side of a flat bed without bedrails?  --  3  -MF  3  -JE    Moving to and from a bed to a chair (including a wheelchair)?  --  3  -MF  3  -JE    Standing up from a chair using your arms (e.g., wheelchair, bedside chair)?  --  3  -MF  3  -JE    Climbing 3-5 steps with a railing?  --  2  -MF  3  -JE    To walk in hospital room?  --  3  -MF  3  -JE    AM-PAC 6 Clicks Score  --  17  -MF  18  -JE       How much help from another is currently needed...    Putting on and taking off regular lower body clothing?  2  -TS  --  --    Bathing (including washing, rinsing, and drying)  3  -TS  --  --    Toileting (which includes using toilet bed pan or urinal)  3  -TS  --  --     Putting on and taking off regular upper body clothing  3  -TS  --  --    Taking care of personal grooming (such as brushing teeth)  3  -TS  --  --    Eating meals  4  -TS  --  --    Score  18  -TS  --  --       Functional Assessment    Outcome Measure Options  AM-PAC 6 Clicks Daily Activity (OT)  -TS  AM-PAC 6 Clicks Basic Mobility (PT)  -  AM-PAC 6 Clicks Basic Mobility (PT)  -    Row Name 01/05/19 0900             How much help from another is currently needed...    Putting on and taking off regular lower body clothing?  2  -JJ      Bathing (including washing, rinsing, and drying)  2  -JJ      Toileting (which includes using toilet bed pan or urinal)  3  -JJ      Putting on and taking off regular upper body clothing  3  -JJ      Taking care of personal grooming (such as brushing teeth)  3  -JJ      Eating meals  4  -JJ      Score  17  -JJ         Functional Assessment    Outcome Measure Options  AM-PAC 6 Clicks Daily Activity (OT)  -JJ        User Key  (r) = Recorded By, (t) = Taken By, (c) = Cosigned By    Initials Name Provider Type    TS Stacy Ogden, MCELROY/L Occupational Therapy Assistant    Mary Jane Dawson, PTA Physical Therapy Assistant    Marimar Christensen, PT Physical Therapist    Betsy Rodriguez, OTR/L Occupational Therapist           Time Calculation:   Time Calculation- OT     Row Name 01/07/19 1321             Time Calculation- OT    OT Start Time  1005  -TS      OT Stop Time  1030  -TS      OT Time Calculation (min)  25 min  -TS      Total Timed Code Minutes- OT  25 minute(s)  -TS      OT Received On  01/07/19  -TS         Timed Charges    44104 - OT Self Care/Mgmt Minutes  25  -TS        User Key  (r) = Recorded By, (t) = Taken By, (c) = Cosigned By    Initials Name Provider Type    Stacy Kimble MCELROY/L Occupational Therapy Assistant           Therapy Suggested Charges     Code   Minutes Charges    04241 (CPT®) Hc Ot Neuromusc Re Education Ea 15 Min      17302  (CPT®) Hc Ot Ther Proc Ea 15 Min      68837 (CPT®) Hc Ot Therapeutic Act Ea 15 Min      69238 (CPT®) Hc Ot Manual Therapy Ea 15 Min      08116 (CPT®) Hc Ot Iontophoresis Ea 15 Min      54400 (CPT®) Hc Ot Elec Stim Ea-Per 15 Min      79905 (CPT®) Hc Ot Ultrasound Ea 15 Min      79712 (CPT®) Hc Ot Self Care/Mgmt/Train Ea 15 Min 25 2    Total  25 2        Therapy Charges for Today     Code Description Service Date Service Provider Modifiers Qty    [id number] HC OT SELF CARE/MGMT/TRAIN EA 15 MIN 1/7/2019 Stacy Ogden COTA/L GO 2               NAVI Murphy/ABDOULAYE  1/7/2019   Stable

## 2022-01-25 ENCOUNTER — OFFICE VISIT (OUTPATIENT)
Dept: FAMILY MEDICINE CLINIC | Age: 62
End: 2022-01-25
Payer: COMMERCIAL

## 2022-01-25 VITALS
SYSTOLIC BLOOD PRESSURE: 134 MMHG | TEMPERATURE: 98.1 F | HEIGHT: 63 IN | BODY MASS INDEX: 27.46 KG/M2 | WEIGHT: 155 LBS | DIASTOLIC BLOOD PRESSURE: 86 MMHG | HEART RATE: 81 BPM | OXYGEN SATURATION: 98 %

## 2022-01-25 DIAGNOSIS — F41.9 ANXIETY: ICD-10-CM

## 2022-01-25 DIAGNOSIS — L29.9 ITCHY SKIN: ICD-10-CM

## 2022-01-25 DIAGNOSIS — Z11.4 ENCOUNTER FOR SCREENING FOR HIV: ICD-10-CM

## 2022-01-25 DIAGNOSIS — Z12.11 COLON CANCER SCREENING: ICD-10-CM

## 2022-01-25 DIAGNOSIS — J42 CHRONIC BRONCHITIS, UNSPECIFIED CHRONIC BRONCHITIS TYPE (HCC): ICD-10-CM

## 2022-01-25 DIAGNOSIS — Z76.89 ENCOUNTER TO ESTABLISH CARE: Primary | ICD-10-CM

## 2022-01-25 DIAGNOSIS — K21.9 GASTROESOPHAGEAL REFLUX DISEASE WITHOUT ESOPHAGITIS: ICD-10-CM

## 2022-01-25 DIAGNOSIS — E78.5 HYPERLIPIDEMIA, UNSPECIFIED HYPERLIPIDEMIA TYPE: ICD-10-CM

## 2022-01-25 DIAGNOSIS — Z12.31 SCREENING MAMMOGRAM FOR BREAST CANCER: ICD-10-CM

## 2022-01-25 DIAGNOSIS — M54.9 CHRONIC BACK PAIN, UNSPECIFIED BACK LOCATION, UNSPECIFIED BACK PAIN LATERALITY: ICD-10-CM

## 2022-01-25 DIAGNOSIS — Z85.118 HISTORY OF LUNG CANCER: ICD-10-CM

## 2022-01-25 DIAGNOSIS — Z11.59 NEED FOR HEPATITIS C SCREENING TEST: ICD-10-CM

## 2022-01-25 DIAGNOSIS — E55.9 VITAMIN D DEFICIENCY: ICD-10-CM

## 2022-01-25 DIAGNOSIS — G89.29 CHRONIC BACK PAIN, UNSPECIFIED BACK LOCATION, UNSPECIFIED BACK PAIN LATERALITY: ICD-10-CM

## 2022-01-25 PROCEDURE — 99204 OFFICE O/P NEW MOD 45 MIN: CPT | Performed by: FAMILY MEDICINE

## 2022-01-25 RX ORDER — GABAPENTIN 800 MG/1
TABLET ORAL
COMMUNITY
Start: 2021-06-21

## 2022-01-25 RX ORDER — ALBUTEROL SULFATE 2.5 MG/3ML
2.5 SOLUTION RESPIRATORY (INHALATION) EVERY 6 HOURS PRN
Qty: 120 EACH | Refills: 3 | Status: SHIPPED | OUTPATIENT
Start: 2022-01-25

## 2022-01-25 RX ORDER — SOY ISOFLAV/BCOHOSH/CISSUS QUA 198 MG
CAPSULE ORAL
COMMUNITY

## 2022-01-25 RX ORDER — ALBUTEROL SULFATE 90 UG/1
AEROSOL, METERED RESPIRATORY (INHALATION)
COMMUNITY
Start: 2021-08-09 | End: 2022-10-10

## 2022-01-25 RX ORDER — ESOMEPRAZOLE MAGNESIUM 40 MG/1
CAPSULE, DELAYED RELEASE ORAL
COMMUNITY
Start: 2022-01-13 | End: 2022-02-11 | Stop reason: SDUPTHER

## 2022-01-25 RX ORDER — OXYCODONE AND ACETAMINOPHEN 10; 325 MG/1; MG/1
TABLET ORAL
COMMUNITY
Start: 2021-07-02

## 2022-01-25 RX ORDER — ESCITALOPRAM OXALATE 20 MG/1
TABLET ORAL
COMMUNITY
Start: 2021-08-11

## 2022-01-25 RX ORDER — BUDESONIDE, GLYCOPYRROLATE, AND FORMOTEROL FUMARATE 160; 9; 4.8 UG/1; UG/1; UG/1
AEROSOL, METERED RESPIRATORY (INHALATION)
COMMUNITY
End: 2022-02-25 | Stop reason: SDUPTHER

## 2022-01-25 ASSESSMENT — PATIENT HEALTH QUESTIONNAIRE - PHQ9
SUM OF ALL RESPONSES TO PHQ QUESTIONS 1-9: 0
SUM OF ALL RESPONSES TO PHQ QUESTIONS 1-9: 0
1. LITTLE INTEREST OR PLEASURE IN DOING THINGS: 0
SUM OF ALL RESPONSES TO PHQ QUESTIONS 1-9: 0
SUM OF ALL RESPONSES TO PHQ QUESTIONS 1-9: 0
SUM OF ALL RESPONSES TO PHQ9 QUESTIONS 1 & 2: 0
2. FEELING DOWN, DEPRESSED OR HOPELESS: 0

## 2022-01-25 ASSESSMENT — ENCOUNTER SYMPTOMS
EYES NEGATIVE: 1
RESPIRATORY NEGATIVE: 1
GASTROINTESTINAL NEGATIVE: 1
ALLERGIC/IMMUNOLOGIC NEGATIVE: 1
BACK PAIN: 1

## 2022-01-25 NOTE — PROGRESS NOTES
SUBJECTIVE:    Patient ID: Francy Corrales is a 64 y. o.female. HPI:   Patient here to establish care  Patient is 57-year-old white female. She complains of itching in both axilla. She have no rash. She never have a rash. She has used multiple steroid creams and antifungal creams. Nothing seems to help. She requesting to be seen by dermatology. She also have history of COPD. She is on triple therapy. She is not on oxygen therapy. She had a refill albuterol solution. She does not use very often. She has history of lung cancer. She was seen by [facility] and she has been declared cured. She is to have follow-up with them no more. She have history of anxiety takes Lexapro. Medication is helpful. Denies medication side effect. He has acid reflux and take a PPI with good results. Denies medication side effect. She have chronic back pain with neuropathy. She sees pain management. She has an appointment near future. She will get her medication for pain control from them. She has been told that her cholesterol is slightly elevated. Health maintenance  Patient is due for colonoscopy mammography.        Past Medical History:   Diagnosis Date    Anxiety     Cancer [facility])     adenocarcirnoma     Chronic back pain     COPD (chronic obstructive pulmonary disease) (HCC)     Neuropathy     Osteoarthritis     Restless legs syndrome      Current Outpatient Medications on File Prior to Visit   Medication Sig Dispense Refill    albuterol sulfate HFA (PROAIR HFA) 108 (90 Base) MCG/ACT inhaler ProAir HFA 90 mcg/actuation aerosol inhaler      Calcium Citrate-Vitamin D 200-250 MG-UNIT TABS Take 1 tablet by mouth 2 times daily (with meals)      escitalopram (LEXAPRO) 20 MG tablet escitalopram 20 mg tablet      esomeprazole (NEXIUM) 40 MG delayed release capsule       gabapentin (NEURONTIN) 800 MG tablet gabapentin 800 mg tablet      oxyCODONE-acetaminophen (PERCOCET)  MG per tablet oxycodone-acetaminophen 10 mg-325 mg tablet      Rhubarb (ESTROVEN COMPLETE) 4 MG TABS Take by mouth      Budeson-Glycopyrrol-Formoterol (BREZTRI AEROSPHERE) 160-9-4.8 MCG/ACT AERO Inhale into the lungs       No current facility-administered medications on file prior to visit. Allergies   Allergen Reactions    Cephalexin Other (See Comments)     Other reaction(s): blisters in mouth and nose  Blisters to nose and mouth  Blisters to nose and mouth      Aspirin Other (See Comments) and Nausea And Vomiting     vomiting  Unknown reaction       Past Surgical History:   Procedure Laterality Date    BACK SURGERY      4     GALLBLADDER SURGERY      HAND SURGERY      Patient has had surgeries on both hands     Family History   Problem Relation Age of Onset    Heart Disease Mother     Cancer Mother     Heart Disease Father     Cancer Father     Heart Disease Sister     Cancer Sister     Heart Disease Brother      Social History     Socioeconomic History    Marital status:      Spouse name: Not on file    Number of children: Not on file    Years of education: Not on file    Highest education level: Not on file   Occupational History    Not on file   Tobacco Use    Smoking status: Former Smoker     Packs/day: 1.50     Years: 30.00     Pack years: 45.00     Quit date:      Years since quittin.0    Smokeless tobacco: Never Used   Substance and Sexual Activity    Alcohol use: Never    Drug use: Never    Sexual activity: Not Currently   Other Topics Concern    Not on file   Social History Narrative    Not on file     Social Determinants of Health     Financial Resource Strain:     Difficulty of Paying Living Expenses: Not on file   Food Insecurity:     Worried About [address] in the Last Year: Not on file    [address] in the Last Year: Not on file   Transportation Needs:     Lack of Transportation (Medical): Not on file    Lack of Transportation (Non-Medical):  Not on file   Physical Activity:     Days of Exercise per Week: Not on file    Minutes of Exercise per Session: Not on file   Stress:     Feeling of Stress : Not on file   Social Connections:     Frequency of Communication with Friends and Family: Not on file    Frequency of Social Gatherings with Friends and Family: Not on file    Attends Spiritism Services: Not on file    Active Member of [address] [place] [address] or Organizations: Not on file    Attends Club or Organization Meetings: Not on file    Marital Status: Not on file   Intimate Partner Violence:     Fear of Current or Ex-Partner: Not on file    Emotionally Abused: Not on file    Physically Abused: Not on file    Sexually Abused: Not on file   Housing Stability:     Unable to Pay for Housing in the Last Year: Not on file    Number of [place] in the Last Year: Not on file    Unstable Housing in the Last Year: Not on file        Review of Systems   Constitutional: Negative. HENT: Negative. Eyes: Negative. Respiratory: Negative. Cardiovascular: Negative. Gastrointestinal: Negative. Endocrine: Negative. Genitourinary: Negative. Musculoskeletal: Positive for back pain. Skin: Negative. Allergic/Immunologic: Negative. Neurological: Negative. Hematological: Negative. Psychiatric/Behavioral: Negative. OBJECTIVE:    Physical Exam  Constitutional:       Appearance: Normal appearance. She is well-developed and well-groomed. HENT:      Head: Normocephalic and atraumatic. Right Ear: Tympanic membrane, ear canal and external ear normal. There is no impacted cerumen. Left Ear: Tympanic membrane, ear canal and external ear normal. There is no impacted cerumen. Nose: Nose normal.      Mouth/Throat:      Lips: Pink. Mouth: Mucous membranes are moist.      Dentition: Normal dentition. Pharynx: Oropharynx is clear. Uvula midline. Eyes:      General: Lids are normal.         Right eye: No discharge. Left eye: No discharge. Extraocular Movements: Extraocular movements intact. Conjunctiva/sclera: Conjunctivae normal.      Right eye: Right conjunctiva is not injected. Left eye: Left conjunctiva is not injected. Pupils: Pupils are equal, round, and reactive to light. Neck:      Thyroid: No thyromegaly. Vascular: No carotid bruit or JVD. Cardiovascular:      Rate and Rhythm: Normal rate and regular rhythm. Pulses: Normal pulses. Carotid pulses are 2+ on the right side and 2+ on the left side. Radial pulses are 2+ on the right side and 2+ on the left side. Heart sounds: Normal heart sounds, S1 normal and S2 normal. No murmur heard. Pulmonary:      Effort: Pulmonary effort is normal.      Breath sounds: Normal breath sounds. Chest:   Breasts:      Right: No supraclavicular adenopathy. Left: No supraclavicular adenopathy. Abdominal:      General: Bowel sounds are normal. There is no distension or abdominal bruit. Palpations: Abdomen is soft. There is no mass. Hernia: No hernia is present. Musculoskeletal:         General: Normal range of motion. Cervical back: Full passive range of motion without pain, normal range of motion and neck supple. Right lower leg: No edema. Left lower leg: No edema. Comments: Walk with a cane   Lymphadenopathy:      Cervical: No cervical adenopathy. Right cervical: No superficial cervical adenopathy. Left cervical: No superficial cervical adenopathy. Upper Body:      Right upper body: No supraclavicular adenopathy. Left upper body: No supraclavicular adenopathy. Skin:     General: Skin is warm and dry. Coloration: Skin is not pale. Findings: No lesion or rash. Nails: There is no clubbing. Neurological:      Mental Status: She is alert and oriented to person, place, and time. Motor: No weakness or tremor.       Coordination: Coordination normal. Deep Tendon Reflexes: Reflexes are normal and symmetric. Psychiatric:         Attention and Perception: Attention normal.         Mood and Affect: Mood normal.         Speech: Speech normal.         Behavior: Behavior normal. Behavior is cooperative. Thought Content: Thought content normal.         Cognition and Memory: Cognition and memory normal.         Judgment: Judgment normal.        /86 (Site: Left Upper Arm, Position: Sitting, Cuff Size: Medium Adult)   Pulse 81   Temp 98.1 °F (36.7 °C) (Temporal)   Ht 5' 3\" (1.6 m)   Wt 155 lb (70.3 kg)   SpO2 98%   BMI 27.46 kg/m²      ASSESSMENT:     Diagnosis Orders   1. Encounter to establish care     2. Itchy skin-etiology unknown External Referral To Dermatology   3. Gastroesophageal reflux disease without esophagitis-stable-    4. Chronic back pain, unspecified back location, unspecified back pain laterality-stable Urinalysis   5. Anxiety-stable CBC    TSH without Reflex    Urinalysis   6. Chronic bronchitis, unspecified chronic bronchitis type (HCC)-stable albuterol (PROVENTIL) (2.5 MG/3ML) 0.083% nebulizer solution   7. History of lung cancer     8. Vitamin D deficiency  Vitamin D 25 Hydroxy   9. Hyperlipidemia, unspecified hyperlipidemia type-diet controlled Comprehensive Metabolic Panel    Lipid Panel    Urinalysis   10. Need for hepatitis C screening test  Hepatitis C Antibody   11. Encounter for screening for HIV  HIV Screen   12. Colon cancer screening  External Referral To Gastroenterology   13. Screening mammogram for breast cancer  KHAI DIGITAL SCREENING AUGMENTED BILATERAL        PLAN:    1. We will see him therapy obtain records from previous PCP. 2.  Refer to dermatology. 3.  Continue PPI  4. [place]. 5.  Continue medication. Blood work  6. Refill butyryl. Continue triple therapy. 7.  No further intervention. 8.  Blood work  9. Blood work  10. Blood work  11. Blood work  12. Set up for colonoscopy.   13.  Set up for

## 2022-02-11 ENCOUNTER — TELEPHONE (OUTPATIENT)
Dept: FAMILY MEDICINE CLINIC | Age: 62
End: 2022-02-11

## 2022-02-11 RX ORDER — ESOMEPRAZOLE MAGNESIUM 40 MG/1
40 CAPSULE, DELAYED RELEASE ORAL
Qty: 30 CAPSULE | Refills: 5 | Status: SHIPPED | OUTPATIENT
Start: 2022-02-11 | End: 2022-10-06 | Stop reason: SDUPTHER

## 2022-02-11 NOTE — TELEPHONE ENCOUNTER
I called and spoke with patient regarding the pain medication. I explained to her that Dr. Kuldeep Mederos does not prescribe chronic controlled substances for new patients and that was discussed at her initial appointment. She stated she was seeing pain management and they were prescribing her the pain medication. She stated she was seeing Dr. Keaton Casanova but it was decided that Dr. Samuel Gao would write all of her prescriptions including the pain medication. I explained Dr. Kuldeep Mederos would not do that and she would need to have Dr. Keaton Casanova write her pain medications. She verbalized understanding and stated she would get in touch with him.

## 2022-02-11 NOTE — TELEPHONE ENCOUNTER
I was noted impression that she will start going to pain management and she will get her medications from them

## 2022-02-11 NOTE — TELEPHONE ENCOUNTER
New established patient requesting refill on Percocet 10 mg to [facility]. She states she wants it written to take every 4 to 6 hours. Please advise.

## 2022-02-14 DIAGNOSIS — Z12.31 ENCOUNTER FOR SCREENING MAMMOGRAM FOR MALIGNANT NEOPLASM OF BREAST: Primary | ICD-10-CM

## 2022-02-16 ENCOUNTER — TELEPHONE (OUTPATIENT)
Dept: FAMILY MEDICINE CLINIC | Age: 62
End: 2022-02-16

## 2022-02-16 DIAGNOSIS — D64.9 ANEMIA, UNSPECIFIED TYPE: Primary | ICD-10-CM

## 2022-02-16 NOTE — TELEPHONE ENCOUNTER
Called patient to discuss her recent lab results. Dr. Wang Kingston is wanting additional labs for anemia. Orders will be faxed to [facility]. Pt is aware.

## 2022-02-25 RX ORDER — BUDESONIDE, GLYCOPYRROLATE, AND FORMOTEROL FUMARATE 160; 9; 4.8 UG/1; UG/1; UG/1
2 AEROSOL, METERED RESPIRATORY (INHALATION) 2 TIMES DAILY
Qty: 1 EACH | Refills: 5 | Status: SHIPPED | OUTPATIENT
Start: 2022-02-25 | End: 2022-09-12

## 2022-03-02 ENCOUNTER — NURSE TRIAGE (OUTPATIENT)
Dept: OTHER | Facility: CLINIC | Age: 62
End: 2022-03-02

## 2022-03-02 NOTE — TELEPHONE ENCOUNTER
Received call from Ana Yung at [facility] with [employer]. Subjective: Caller states \"I'm just having a hard time breathing. Paulino Tracy is not working. \"     Current Symptoms: SOB with exertion worsening since starting Breztri     Onset: Last couple months     Associated Symptoms: NA    Pain Severity: 0/10; N/A; none    Temperature: Denies fever and feeling feverish/chills     What has been tried: Breathing treatments, rescue inhaler     LMP: NA Pregnant: NA    Recommended disposition: Go to [facility]- caller states she already has an appointment with her back doctor today so she can't come in today. Reiterated recommendation and caller verbalizes understanding. Care advice provided, patient verbalizes understanding; denies any other questions or concerns; instructed to call back for any new or worsening symptoms. Spoke to Chante at [facility] who states everyone is a lunch so she is not able to assist. Transferred caller to Edmond Stopover with [facility] for scheduling. Attention Provider: Thank you for allowing me to participate in the care of your patient. The patient was connected to triage in response to information provided to the ECC/PSC. Please do not respond through this encounter as the response is not directed to a shared pool.     Reason for Disposition   MILD difficulty breathing (e.g., minimal/no SOB at rest, SOB with walking, pulse < 100) of new-onset or worse than normal    Protocols used: BREATHING DIFFICULTY-ADULT-OH

## 2022-03-04 ENCOUNTER — HOSPITAL ENCOUNTER (OUTPATIENT)
Dept: GENERAL RADIOLOGY | Age: 62
Discharge: HOME | End: 2022-03-04
Payer: MEDICARE

## 2022-03-04 ENCOUNTER — OFFICE VISIT (OUTPATIENT)
Dept: FAMILY MEDICINE CLINIC | Age: 62
End: 2022-03-04
Payer: MEDICARE

## 2022-03-04 VITALS
WEIGHT: 160 LBS | HEART RATE: 82 BPM | BODY MASS INDEX: 28.35 KG/M2 | HEIGHT: 63 IN | DIASTOLIC BLOOD PRESSURE: 76 MMHG | TEMPERATURE: 97.4 F | OXYGEN SATURATION: 98 % | SYSTOLIC BLOOD PRESSURE: 124 MMHG

## 2022-03-04 DIAGNOSIS — J44.1 COPD WITH ACUTE EXACERBATION (HCC): Primary | ICD-10-CM

## 2022-03-04 DIAGNOSIS — J44.1 COPD WITH ACUTE EXACERBATION (HCC): ICD-10-CM

## 2022-03-04 PROCEDURE — 71046 X-RAY EXAM CHEST 2 VIEWS: CPT

## 2022-03-04 PROCEDURE — 99214 OFFICE O/P EST MOD 30 MIN: CPT | Performed by: FAMILY MEDICINE

## 2022-03-04 RX ORDER — PREDNISONE 20 MG/1
20 TABLET ORAL 2 TIMES DAILY
Qty: 8 TABLET | Refills: 0 | Status: SHIPPED | OUTPATIENT
Start: 2022-03-04 | End: 2022-03-08

## 2022-03-04 RX ORDER — LEVOFLOXACIN 500 MG/1
500 TABLET, FILM COATED ORAL DAILY
Qty: 10 TABLET | Refills: 0 | Status: SHIPPED | OUTPATIENT
Start: 2022-03-04 | End: 2022-03-14

## 2022-03-04 ASSESSMENT — ENCOUNTER SYMPTOMS
EYES NEGATIVE: 1
SHORTNESS OF BREATH: 1
COUGH: 1
ALLERGIC/IMMUNOLOGIC NEGATIVE: 1
WHEEZING: 1
GASTROINTESTINAL NEGATIVE: 1

## 2022-03-04 NOTE — PROGRESS NOTES
SUBJECTIVE:    Patient ID: Ginger Donis is a 64 y. o.female. HPI:   Here for evaluation of COPD. Patient is 79-year-old white male. She have COPD and is on triple therapy. She gets short of breath with minimal exertion. She also have history of adenocarcinoma of the lung. She getting short of breath and wheezing more often than usual.  Cough. No weight loss. No hemoptysis. She does not have a pulmonary doctor. Past Medical History:   Diagnosis Date    Anxiety     Cancer [facility])     adenocarcirnoma     Chronic back pain     COPD (chronic obstructive pulmonary disease) (HCC)     Neuropathy     Osteoarthritis     Restless legs syndrome       Current Outpatient Medications   Medication Sig Dispense Refill    predniSONE (DELTASONE) 20 MG tablet Take 1 tablet by mouth 2 times daily for 4 days 8 tablet 0    levoFLOXacin (LEVAQUIN) 500 MG tablet Take 1 tablet by mouth daily for 10 days 10 tablet 0    Budeson-Glycopyrrol-Formoterol (BREZTRI AEROSPHERE) 160-9-4.8 MCG/ACT AERO Inhale 2 puffs into the lungs in the morning and at bedtime 1 each 5    esomeprazole (NEXIUM) 40 MG delayed release capsule Take 1 capsule by mouth every morning (before breakfast) 30 capsule 5    albuterol sulfate HFA (PROAIR HFA) 108 (90 Base) MCG/ACT inhaler ProAir HFA 90 mcg/actuation aerosol inhaler      Calcium Citrate-Vitamin D 200-250 MG-UNIT TABS Take 1 tablet by mouth 2 times daily (with meals)      escitalopram (LEXAPRO) 20 MG tablet escitalopram 20 mg tablet      gabapentin (NEURONTIN) 800 MG tablet gabapentin 800 mg tablet      oxyCODONE-acetaminophen (PERCOCET)  MG per tablet oxycodone-acetaminophen 10 mg-325 mg tablet      Rhubarb (ESTROVEN COMPLETE) 4 MG TABS Take by mouth      albuterol (PROVENTIL) (2.5 MG/3ML) 0.083% nebulizer solution Take 3 mLs by nebulization every 6 hours as needed for Wheezing 120 each 3     No current facility-administered medications for this visit.       Allergies   Allergen Reactions    Cephalexin Other (See Comments)     Other reaction(s): blisters in mouth and nose  Blisters to nose and mouth  Blisters to nose and mouth      Aspirin Other (See Comments) and Nausea And Vomiting     vomiting  Unknown reaction         Review of Systems   Constitutional: Negative. HENT: Negative. Eyes: Negative. Respiratory: Positive for cough, shortness of breath and wheezing. Cardiovascular: Negative. Gastrointestinal: Negative. Endocrine: Negative. Genitourinary: Negative. Musculoskeletal: Negative. Skin: Negative. Allergic/Immunologic: Negative. Neurological: Negative. Hematological: Negative. Psychiatric/Behavioral: Negative. OBJECTIVE:    Physical Exam  Constitutional:       Appearance: Normal appearance. She is well-developed and well-groomed. HENT:      Head: Normocephalic and atraumatic. Right Ear: Tympanic membrane, ear canal and external ear normal. There is no impacted cerumen. Left Ear: Tympanic membrane, ear canal and external ear normal. There is no impacted cerumen. Nose: Nose normal.      Mouth/Throat:      Lips: Pink. Mouth: Mucous membranes are moist.      Dentition: Normal dentition. Pharynx: Oropharynx is clear. Uvula midline. Eyes:      General: Lids are normal.         Right eye: No discharge. Left eye: No discharge. Extraocular Movements: Extraocular movements intact. Conjunctiva/sclera: Conjunctivae normal.      Right eye: Right conjunctiva is not injected. Left eye: Left conjunctiva is not injected. Pupils: Pupils are equal, round, and reactive to light. Neck:      Thyroid: No thyromegaly. Vascular: No carotid bruit or JVD. Cardiovascular:      Rate and Rhythm: Normal rate and regular rhythm. Pulses: Normal pulses. Carotid pulses are 2+ on the right side and 2+ on the left side. Radial pulses are 2+ on the right side and 2+ on the left side. Heart sounds: Normal heart sounds, S1 normal and S2 normal. No murmur heard. Pulmonary:      Effort: Pulmonary effort is normal.      Breath sounds: Wheezing present. Chest:   Breasts:      Right: No supraclavicular adenopathy. Left: No supraclavicular adenopathy. Abdominal:      General: Bowel sounds are normal. There is no distension or abdominal bruit. Palpations: Abdomen is soft. There is no mass. Hernia: No hernia is present. Musculoskeletal:         General: Normal range of motion. Cervical back: Full passive range of motion without pain, normal range of motion and neck supple. Right lower leg: No edema. Left lower leg: No edema. Lymphadenopathy:      Cervical: No cervical adenopathy. Right cervical: No superficial cervical adenopathy. Left cervical: No superficial cervical adenopathy. Upper Body:      Right upper body: No supraclavicular adenopathy. Left upper body: No supraclavicular adenopathy. Skin:     General: Skin is warm and dry. Coloration: Skin is not pale. Findings: No lesion or rash. Nails: There is no clubbing. Neurological:      Mental Status: She is alert and oriented to person, place, and time. Motor: No weakness or tremor. Coordination: Coordination normal.      Deep Tendon Reflexes: Reflexes are normal and symmetric. Psychiatric:         Attention and Perception: Attention normal.         Mood and Affect: Mood normal.         Speech: Speech normal.         Behavior: Behavior normal. Behavior is cooperative. Thought Content: Thought content normal.         Cognition and Memory: Cognition and memory normal.         Judgment: Judgment normal.        /76 (Site: Left Upper Arm, Position: Sitting, Cuff Size: Medium Adult)   Pulse 82   Temp 97.4 °F (36.3 °C) (Temporal)   Ht 5' 3\" (1.6 m)   Wt 160 lb (72.6 kg)   SpO2 98%   BMI 28.34 kg/m²      ASSESSMENT:     Diagnosis Orders   1.  COPD with acute exacerbation (HCC)  XR CHEST STANDARD (2 VW)    External Referral To Pulmonology    predniSONE (DELTASONE) 20 MG tablet    levoFLOXacin (LEVAQUIN) 500 MG tablet        PLAN:    1. Chest x-ray. Prednisone. Levaquin. Refer to pulmonary.   Follow-up after seen by pulmonary

## 2022-04-04 PROBLEM — Z87.891 PERSONAL HISTORY OF NICOTINE DEPENDENCE: Chronic | Status: ACTIVE | Noted: 2022-04-04

## 2022-04-04 PROBLEM — E66.3 OVERWEIGHT: Chronic | Status: ACTIVE | Noted: 2022-04-04

## 2022-04-04 PROBLEM — M48.061 LUMBAR STENOSIS: Chronic | Status: ACTIVE | Noted: 2018-05-07

## 2022-04-04 PROBLEM — E66.3 OVERWEIGHT: Status: ACTIVE | Noted: 2022-04-04

## 2022-04-04 PROBLEM — C80.1 ADENOCARCINOMA OF UNKNOWN ORIGIN: Status: ACTIVE | Noted: 2022-04-04

## 2022-04-04 PROBLEM — Z87.891 PERSONAL HISTORY OF NICOTINE DEPENDENCE: Status: ACTIVE | Noted: 2022-04-04

## 2022-04-04 NOTE — PROGRESS NOTES
"Chief Complaint  Shortness of Breath, COPD, and panlobular emphysema    Subjective    History of Present Illness     Arminda Jiménez presents to [facility] for:    Management of reported \"COPD\".  This is a new patient referral.  There are no PFTs on file.  She denies any childhood history of lung disease.  History of cancer in her brother although she is uncertain what kind of cancer.  History of cancer in herself.  She has a history of a diagnosis of adenocarcinoma of unknown origin.  Diagnosis made after emergent pericardiocentesis for pericardial effusion identifying adenocarcinoma.  She was followed by [place] [facility] for 10 years years however she was released from their services in 2020. She is a former smoker.  She quit in 2010.  She was a 1.5 pack/day smoker for 30 years.  She has not had CT imaging of her chest in the last 12 months.  She had a chest x-ray in March of this year at [facility] showing no acute pathology of the chest.  She is overweight.  She has recurrent seasonal allergy issues.  She feels her recent complaints were secondary to this.  She indicates her PCP started her on Allegra.  Her symptoms are totally resolved at this time.  She remains on routine Breztri for management of presumed COPD.  It is very helpful.  She seldom has to use her emergency inhaler although she has one available.  She also has a nebulizer.  She has reflux issues.  She remains on Nexium which is keeping her symptoms under good control.        Prior to Admission medications    Medication Sig Start Date End Date Taking? Authorizing Provider   Albuterol Sulfate (PROAIR HFA IN) Inhale 2 puffs 4 (Four) Times a Day As Needed (Wheezing/Shortness of breath). 12/15/15   ProviderMark MD BREO ELLIPTA 200-25 MCG/INH aerosol powder  Inhale 200 mcg Daily. 3/28/18   ProviderMark MD   calcium citrate-vitamin d (CITRACAL) 200-250 MG-UNIT tablet tablet Take 1 " "tablet by mouth 2 (Two) Times a Day With Meals.    Mark Lopez MD   cholecalciferol (VITAMIN D3) 1000 units tablet Take 1,000 Units by mouth 2 (Two) Times a Day.    Mark Lopez MD   clopidogrel (PLAVIX) 75 MG tablet Take 75 mg by mouth Daily. HOLD STARTING 12/23/18 3/5/18   Mark Lopez MD   escitalopram (LEXAPRO) 20 MG tablet Take 20 mg by mouth Daily.    Mark Lopez MD   estrogen, conjugated,-medroxyprogesterone (PREMPRO) 0.3-1.5 MG per tablet Take 1 tablet/day by mouth Daily. 9/13/13   Mark Lopez MD   estrogen, conjugated,-medroxyprogesterone (PREMPRO) 0.3-1.5 MG per tablet Take one tablet by mouth daily 8/20/18   Bam Landis MD   gabapentin (NEURONTIN) 800 MG tablet 800 mg 3 (Three) Times a Day. 3/29/18   Mark Lopez MD   gabapentin (NEURONTIN) 800 MG tablet Take 1,600 mg by mouth Every Night.    Mark Lopez MD   NEXIUM 40 MG capsule Take 40 mg by mouth 2 (Two) Times a Day As Needed. 1/22/18   Mark Lopez MD   tiotropium (SPIRIVA HANDIHALER) 18 MCG per inhalation capsule Place 18 mcg into inhaler and inhale Daily. Takes at 5 pm 12/15/15   Mark Lopez MD   Unable to find 1 each 1 (One) Time. Med Name: Calcium Citrate 600 mg, Take 1 tablet by mouth twice daily.    Mark Lopez MD       Social History     Socioeconomic History   • Marital status:    Tobacco Use   • Smoking status: Former Smoker     Packs/day: 1.50     Years: 30.00     Pack years: 45.00   • Smokeless tobacco: Never Used   • Tobacco comment: quit 2010   Substance and Sexual Activity   • Alcohol use: No   • Drug use: No   • Sexual activity: Defer       Objective   Vital Signs:   /80   Pulse 97   Ht 160 cm (63\")   Wt 70.9 kg (156 lb 3.2 oz)   SpO2 98% Comment: cam  BMI 27.67 kg/m²     Physical Exam  Constitutional:       Appearance: She is overweight.      Interventions: Face mask in place.   Eyes:      Comments: Glasses "   Cardiovascular:      Rate and Rhythm: Normal rate and regular rhythm.      Heart sounds: No murmur heard.  Pulmonary:      Effort: Pulmonary effort is normal.      Breath sounds: Normal breath sounds.   Musculoskeletal:      Right lower leg: No edema.      Left lower leg: No edema.   Neurological:      Mental Status: She is alert and oriented to person, place, and time.        Result Review :      My interpretation of the PFT: None for this visit    No results found for this or any previous visit.      My interpretation of imaging: X-ray imaging at [facility] in March 2022 - for any acute pathology that was    My interpretation of labs: None for this visit      Assessment and Plan     Diagnoses and all orders for this visit:    1. Shortness of breath (Primary)  Comments:  Suspect COPD.  PFTs with follow-up.  Symptoms well controlled currently on Breztri.  Has albuterol HFA/nebs to use as needed.  Call if symptoms recur    2. Personal history of nicotine dependence  Comments:  Continue to avoid smoking.  Meets criteria for low-dose lung cancer imaging.  She is agreeable.  CT ordered.  I will call her with results  Orders:  -      CT Chest Low Dose Cancer Screening WO; Future    3. Adenocarcinoma of unknown origin (HCC)  Comments:  Followed by [facility] x10 years.  Released in 2020.    4. Gastroesophageal reflux disease without esophagitis  Comments:  Well-controlled on Nexium.  Please continue.  She does not need refills    5. Environmental allergies  Comments:  Symptoms much improved since adding Allegra.  Please continue.  If allergies worsen consider nasal sprays and/or allergy work-up        Patient's Body mass index is 27.67 kg/m². indicating that she is overweight (BMI 25-29.9). Educational material provided after visit summary about elevated BMI        ELISA Stone  4/11/2022  14:19 CDT    Follow Up   Return in about 4 months (around 8/11/2022) for ct now, FVL with diffusion with f/u.    Patient was  given instructions and counseling regarding her condition or for health maintenance advice. Please see specific information pulled into the AVS if appropriate.

## 2022-04-11 ENCOUNTER — OFFICE VISIT (OUTPATIENT)
Dept: PULMONOLOGY | Facility: CLINIC | Age: 62
End: 2022-04-11

## 2022-04-11 VITALS
HEART RATE: 97 BPM | DIASTOLIC BLOOD PRESSURE: 80 MMHG | OXYGEN SATURATION: 98 % | HEIGHT: 63 IN | WEIGHT: 156.2 LBS | BODY MASS INDEX: 27.68 KG/M2 | SYSTOLIC BLOOD PRESSURE: 142 MMHG

## 2022-04-11 DIAGNOSIS — Z91.09 ENVIRONMENTAL ALLERGIES: Chronic | ICD-10-CM

## 2022-04-11 DIAGNOSIS — K21.9 GASTROESOPHAGEAL REFLUX DISEASE WITHOUT ESOPHAGITIS: Chronic | ICD-10-CM

## 2022-04-11 DIAGNOSIS — Z87.891 PERSONAL HISTORY OF NICOTINE DEPENDENCE: Chronic | ICD-10-CM

## 2022-04-11 DIAGNOSIS — C80.1 ADENOCARCINOMA OF UNKNOWN ORIGIN: Chronic | ICD-10-CM

## 2022-04-11 DIAGNOSIS — R06.02 SHORTNESS OF BREATH: Primary | Chronic | ICD-10-CM

## 2022-04-11 PROCEDURE — 99203 OFFICE O/P NEW LOW 30 MIN: CPT | Performed by: NURSE PRACTITIONER

## 2022-04-11 RX ORDER — CLOTRIMAZOLE AND BETAMETHASONE DIPROPIONATE 10; .64 MG/G; MG/G
1 CREAM TOPICAL DAILY PRN
COMMUNITY
Start: 2022-01-13 | End: 2022-12-30

## 2022-04-11 RX ORDER — BUDESONIDE, GLYCOPYRROLATE, AND FORMOTEROL FUMARATE 160; 9; 4.8 UG/1; UG/1; UG/1
2 AEROSOL, METERED RESPIRATORY (INHALATION) 2 TIMES DAILY
COMMUNITY
Start: 2022-02-25

## 2022-04-11 RX ORDER — OXYCODONE AND ACETAMINOPHEN 10; 325 MG/1; MG/1
1 TABLET ORAL EVERY 6 HOURS PRN
Status: ON HOLD | COMMUNITY
End: 2022-05-12 | Stop reason: SDUPTHER

## 2022-04-11 RX ORDER — FEXOFENADINE HCL 180 MG/1
180 TABLET ORAL DAILY
Status: ON HOLD | COMMUNITY
End: 2022-05-12

## 2022-04-12 ENCOUNTER — TELEMEDICINE (OUTPATIENT)
Dept: FAMILY MEDICINE CLINIC | Age: 62
End: 2022-04-12
Payer: MEDICARE

## 2022-04-12 DIAGNOSIS — Z12.11 SCREENING FOR COLORECTAL CANCER: ICD-10-CM

## 2022-04-12 DIAGNOSIS — Z12.12 SCREENING FOR COLORECTAL CANCER: ICD-10-CM

## 2022-04-12 DIAGNOSIS — Z00.00 INITIAL MEDICARE ANNUAL WELLNESS VISIT: Primary | ICD-10-CM

## 2022-04-12 PROBLEM — F11.20 OPIOID DEPENDENCE WITH CURRENT USE (HCC): Status: ACTIVE | Noted: 2022-04-12

## 2022-04-12 PROBLEM — R18.8 ASCITES: Status: ACTIVE | Noted: 2022-04-12

## 2022-04-12 PROBLEM — I10 PRIMARY HYPERTENSION: Status: ACTIVE | Noted: 2021-05-26

## 2022-04-12 PROBLEM — T40.2X5A CONSTIPATION DUE TO OPIOID THERAPY: Status: ACTIVE | Noted: 2018-05-07

## 2022-04-12 PROBLEM — M15.9 PRIMARY OSTEOARTHRITIS INVOLVING MULTIPLE JOINTS: Status: ACTIVE | Noted: 2018-05-07

## 2022-04-12 PROBLEM — G89.29 CHRONIC LOW BACK PAIN: Status: ACTIVE | Noted: 2022-04-12

## 2022-04-12 PROBLEM — M48.061 LUMBAR STENOSIS: Status: ACTIVE | Noted: 2018-05-07

## 2022-04-12 PROBLEM — J45.909 ASTHMA: Status: ACTIVE | Noted: 2022-04-12

## 2022-04-12 PROBLEM — M48.00 STENOSIS OF INTERVERTEBRAL FORAMINA: Status: ACTIVE | Noted: 2018-04-04

## 2022-04-12 PROBLEM — R13.19 ESOPHAGEAL DYSPHAGIA: Status: ACTIVE | Noted: 2018-02-08

## 2022-04-12 PROBLEM — I73.9 PERIPHERAL VASCULAR DISEASE (HCC): Status: ACTIVE | Noted: 2022-04-12

## 2022-04-12 PROBLEM — C80.1 ADENOCARCINOMA (HCC): Status: ACTIVE | Noted: 2022-04-12

## 2022-04-12 PROBLEM — E66.3 OVERWEIGHT: Status: ACTIVE | Noted: 2022-04-04

## 2022-04-12 PROBLEM — J43.9 PULMONARY EMPHYSEMA (HCC): Status: ACTIVE | Noted: 2019-10-31

## 2022-04-12 PROBLEM — M54.16 LUMBAR RADICULOPATHY: Status: ACTIVE | Noted: 2018-11-27

## 2022-04-12 PROBLEM — L25.9 CONTACT DERMATITIS: Status: ACTIVE | Noted: 2021-08-25

## 2022-04-12 PROBLEM — K58.9 IRRITABLE BOWEL SYNDROME: Status: ACTIVE | Noted: 2022-04-12

## 2022-04-12 PROBLEM — M51.369 DEGENERATION OF INTERVERTEBRAL DISC OF LUMBAR REGION: Status: ACTIVE | Noted: 2018-04-04

## 2022-04-12 PROBLEM — M51.36 DEGENERATION OF INTERVERTEBRAL DISC OF LUMBAR REGION: Status: ACTIVE | Noted: 2018-04-04

## 2022-04-12 PROBLEM — M54.2 NECK PAIN: Status: ACTIVE | Noted: 2022-04-12

## 2022-04-12 PROBLEM — M15.0 PRIMARY OSTEOARTHRITIS INVOLVING MULTIPLE JOINTS: Status: ACTIVE | Noted: 2018-05-07

## 2022-04-12 PROBLEM — M54.50 CHRONIC LOW BACK PAIN: Status: ACTIVE | Noted: 2022-04-12

## 2022-04-12 PROBLEM — M81.0 SENILE OSTEOPOROSIS: Status: ACTIVE | Noted: 2018-04-11

## 2022-04-12 PROBLEM — S32.000A COMPRESSION FRACTURE OF LUMBAR VERTEBRA (HCC): Status: ACTIVE | Noted: 2019-09-03

## 2022-04-12 PROBLEM — I50.9 CONGESTIVE HEART FAILURE (HCC): Status: ACTIVE | Noted: 2022-04-12

## 2022-04-12 PROBLEM — I50.32 CHRONIC DIASTOLIC CHF (CONGESTIVE HEART FAILURE) (HCC): Status: ACTIVE | Noted: 2019-01-04

## 2022-04-12 PROBLEM — K59.03 CONSTIPATION DUE TO OPIOID THERAPY: Status: ACTIVE | Noted: 2018-05-07

## 2022-04-12 PROBLEM — B37.2 CANDIDIASIS OF SKIN: Status: ACTIVE | Noted: 2021-10-04

## 2022-04-12 PROBLEM — F33.9 RECURRENT MAJOR DEPRESSIVE DISORDER (HCC): Status: ACTIVE | Noted: 2019-01-05

## 2022-04-12 PROBLEM — M25.559 HIP PAIN: Status: ACTIVE | Noted: 2019-10-15

## 2022-04-12 PROBLEM — G62.9 NEUROPATHY: Status: ACTIVE | Noted: 2021-10-04

## 2022-04-12 PROBLEM — M70.60 GREATER TROCHANTERIC BURSITIS: Status: ACTIVE | Noted: 2022-04-12

## 2022-04-12 PROBLEM — I73.9 PAD (PERIPHERAL ARTERY DISEASE) (HCC): Status: ACTIVE | Noted: 2018-05-07

## 2022-04-12 PROBLEM — M25.559 GREATER TROCHANTERIC PAIN SYNDROME: Status: ACTIVE | Noted: 2022-04-12

## 2022-04-12 PROCEDURE — G0438 PPPS, INITIAL VISIT: HCPCS | Performed by: FAMILY MEDICINE

## 2022-04-12 PROCEDURE — 3017F COLORECTAL CA SCREEN DOC REV: CPT | Performed by: FAMILY MEDICINE

## 2022-04-12 RX ORDER — ACETAMINOPHEN 160 MG
TABLET,DISINTEGRATING ORAL
COMMUNITY

## 2022-04-12 RX ORDER — NALOXONE HYDROCHLORIDE 4 MG/.1ML
SPRAY NASAL
COMMUNITY
Start: 2022-02-18

## 2022-04-12 SDOH — ECONOMIC STABILITY: FOOD INSECURITY: WITHIN THE PAST 12 MONTHS, THE FOOD YOU BOUGHT JUST DIDN'T LAST AND YOU DIDN'T HAVE MONEY TO GET MORE.: NEVER TRUE

## 2022-04-12 SDOH — ECONOMIC STABILITY: FOOD INSECURITY: WITHIN THE PAST 12 MONTHS, YOU WORRIED THAT YOUR FOOD WOULD RUN OUT BEFORE YOU GOT MONEY TO BUY MORE.: NEVER TRUE

## 2022-04-12 ASSESSMENT — PATIENT HEALTH QUESTIONNAIRE - PHQ9
SUM OF ALL RESPONSES TO PHQ QUESTIONS 1-9: 0
1. LITTLE INTEREST OR PLEASURE IN DOING THINGS: 0
SUM OF ALL RESPONSES TO PHQ9 QUESTIONS 1 & 2: 0
SUM OF ALL RESPONSES TO PHQ QUESTIONS 1-9: 0
SUM OF ALL RESPONSES TO PHQ QUESTIONS 1-9: 0
2. FEELING DOWN, DEPRESSED OR HOPELESS: 0
SUM OF ALL RESPONSES TO PHQ QUESTIONS 1-9: 0

## 2022-04-12 ASSESSMENT — SOCIAL DETERMINANTS OF HEALTH (SDOH): HOW HARD IS IT FOR YOU TO PAY FOR THE VERY BASICS LIKE FOOD, HOUSING, MEDICAL CARE, AND HEATING?: SOMEWHAT HARD

## 2022-04-12 ASSESSMENT — LIFESTYLE VARIABLES: HOW OFTEN DO YOU HAVE A DRINK CONTAINING ALCOHOL: NEVER

## 2022-04-12 NOTE — PATIENT INSTRUCTIONS
Personalized Preventive Plan for Ricardo Saini - 4/12/2022  Medicare offers a range of preventive health benefits. Some of the tests and screenings are paid in full while other may be subject to a deductible, co-insurance, and/or copay. Some of these benefits include a comprehensive review of your medical history including lifestyle, illnesses that may run in your family, and various assessments and screenings as appropriate. After reviewing your medical record and screening and assessments performed today your provider may have ordered immunizations, labs, imaging, and/or referrals for you. A list of these orders (if applicable) as well as your Preventive Care list are included within your After Visit Summary for your review. Other Preventive Recommendations:    · A preventive eye exam performed by an eye specialist is recommended every 1-2 years to screen for glaucoma; cataracts, macular degeneration, and other eye disorders. · A preventive dental visit is recommended every 6 months. · Try to get at least 150 minutes of exercise per week or 10,000 steps per day on a pedometer . · Order or download the FREE \"Exercise & Physical Activity: Your Everyday Guide\" from The "[employer]. Call 5-968.343.9594 or search [employer] online. · You need 9101-6978 mg of calcium and 3977-9147 IU of vitamin D per day. It is possible to meet your calcium requirement with diet alone, but a vitamin D supplement is usually necessary to meet this goal.  · When exposed to the sun, use a sunscreen that protects against both UVA and UVB radiation with an SPF of 30 or greater. Reapply every 2 to 3 hours or after sweating, drying off with a towel, or swimming. · Always wear a seat belt when traveling in a car. Always wear a helmet when riding a bicycle or motorcycle. Heart-Healthy Diet   Sodium, Fat, and Cholesterol Controlled Diet       What Is a Heart Healthy Diet?    A heart-healthy diet is one that limits sodium , certain types of fat , and cholesterol . This type of diet is recommended for:   People with any form of cardiovascular disease (eg, coronary heart disease , peripheral vascular disease , previous heart attack , previous stroke )   People with risk factors for cardiovascular disease, such as high blood pressure , high cholesterol , or diabetes   Anyone who wants to lower their risk of developing cardiovascular disease   Sodium    Sodium is a mineral found in many foods. In general, most people consume much more sodium than they need. Diets high in sodium can increase blood pressure and lead to edema (water retention). On a heart-healthy diet, you should consume no more than 2,300 mg (milligrams) of sodium per dayabout the amount in one teaspoon of table salt. The foods highest in sodium include table salt (about 50% sodium), processed foods, convenience foods, and preserved foods. Cholesterol    Cholesterol is a fat-like, waxy substance in your blood. Our bodies make some cholesterol. It is also found in animal products, with the highest amounts in fatty meat, egg yolks, whole milk, cheese, shellfish, and organ meats. On a heart-healthy diet, you should limit your cholesterol intake to less than 200 mg per day. It is normal and important to have some cholesterol in your bloodstream. But too much cholesterol can cause plaque to build up within your arteries, which can eventually lead to a heart attack or stroke. The two types of cholesterol that are most commonly referred to are:   Low-density lipoprotein (LDL) cholesterol  Also known as bad cholesterol, this is the cholesterol that tends to build up along your arteries. Bad cholesterol levels are increased by eating fats that are saturated or hydrogenated. Optimal level of this cholesterol is less than 100. Over 130 starts to get risky for heart disease.    High-density lipoprotein (HDL) cholesterol  Also known as good cholesterol, this type of cholesterol actually carries cholesterol away from your arteries and may, therefore, help lower your risk of having a heart attack. You want this level to be high (ideally greater than 60). It is a risk to have a level less than 40. You can raise this good cholesterol by eating olive oil, canola oil, avocados, or nuts. Exercise raises this level, too. Fat    Fat is calorie dense and packs a lot of calories into a small amount of food. Even though fats should be limited due to their high calorie content, not all fats are bad. In fact, some fats are quite healthful. Fat can be broken down into four main types. The good-for-you fats are:   Monounsaturated fat  found in oils such as olive and canola, avocados, and nuts and natural nut butters; can decrease cholesterol levels, while keeping levels of HDL cholesterol high   Polyunsaturated fat  found in oils such as safflower, sunflower, soybean, corn, and sesame; can decrease total cholesterol and LDL cholesterol   Omega-3 fatty acids  particularly those found in fatty fish (such as salmon, trout, tuna, mackerel, herring, and sardines); can decrease risk of arrhythmias, decrease triglyceride levels, and slightly lower blood pressure   The fats that you want to limit are:   Saturated fat  found in animal products, many fast foods, and a few vegetables; increases total blood cholesterol, including LDL levels   Animal fats that are saturated include: butter, lard, whole-milk dairy products, meat fat, and poultry skin   Vegetable fats that are saturated include: hydrogenated shortening, palm oil, coconut oil, cocoa butter   Hydrogenated or trans fat  found in margarine and vegetable shortening, most shelf stable snack foods, and fried foods; increases LDL and decreases HDL     It is generally recommended that you limit your total fat for the day to less than 30% of your total calories.  If you follow an 1800-calorie heart healthy diet, for example, this would mean 60 grams of fat or less per day. Saturated fat and trans fat in your diet raises your blood cholesterol the most, much more than dietary cholesterol does. For this reason, on a heart-healthy diet, less than 7% of your calories should come from saturated fat and ideally 0% from trans fat. On an 1800-calorie diet, this translates into less than 14 grams of saturated fat per day, leaving 46 grams of fat to come from mono- and polyunsaturated fats.    Food Choices on a Heart Healthy Diet   Food Category   Foods Recommended   Foods to Avoid   Grains   Breads and rolls without salted tops Most dry and cooked cereals Unsalted crackers and breadsticks Low-sodium or homemade breadcrumbs or stuffing All rice and pastas   Breads, rolls, and crackers with salted tops High-fat baked goods (eg, muffins, donuts, pastries) Quick breads, self-rising flour, and biscuit mixes Regular bread crumbs Instant hot cereals Commercially prepared rice, pasta, or stuffing mixes   Vegetables   Most fresh, frozen, and low-sodium canned vegetables Low-sodium and salt-free vegetable juices Canned vegetables if unsalted or rinsed   Regular canned vegetables and juices, including sauerkraut and pickled vegetables Frozen vegetables with sauces Commercially prepared potato and vegetable mixes   Fruits   Most fresh, frozen, and canned fruits All fruit juices   Fruits processed with salt or sodium   Milk   Nonfat or low-fat (1%) milk Nonfat or low-fat yogurt Cottage cheese, low-fat ricotta, cheeses labeled as low-fat and low-sodium   Whole milk Reduced-fat (2%) milk Malted and chocolate milk Full fat yogurt Most cheeses (unless low-fat and low salt) Buttermilk (no more than 1 cup per week)   Meats and Beans   Lean cuts of fresh or frozen beef, veal, lamb, or pork (look for the word loin) Fresh or frozen poultry without the skin Fresh or frozen fish and some shellfish Egg whites and egg substitutes (Limit whole eggs to three per week) Tofu Nuts or seeds (unsalted, dry-roasted), low-sodium peanut butter Dried peas, beans, and lentils   Any smoked, cured, salted, or canned meat, fish, or poultry (including lind, chipped beef, cold cuts, hot dogs, sausages, sardines, and anchovies) Poultry skins Breaded and/or fried fish or meats Canned peas, beans, and lentils Salted nuts   Fats and Oils   Olive oil and canola oil Low-sodium, low-fat salad dressings and mayonnaise   Butter, margarine, coconut and palm oils, lind fat   Snacks, Sweets, and Condiments   Low-sodium or unsalted versions of broths, soups, soy sauce, and condiments Pepper, herbs, and spices; vinegar, lemon, or lime juice Low-fat frozen desserts (yogurt, sherbet, fruit bars) Sugar, cocoa powder, honey, syrup, jam, and preserves Low-fat, trans-fat free cookies, cakes, and pies Eleuterio and animal crackers, fig bars, aaron snaps   High-fat desserts Broth, soups, gravies, and sauces, made from instant mixes or other high-sodium ingredients Salted snack foods Canned olives Meat tenderizers, seasoning salt, and most flavored vinegars   Beverages   Low-sodium carbonated beverages Tea and coffee in moderation Soy milk   Commercially softened water   Suggestions   Make whole grains, fruits, and vegetables the base of your diet. Choose heart-healthy fats such as canola, olive, and flaxseed oil, and foods high in heart-healthy fats, such as nuts, seeds, soybeans, tofu, and fish. Eat fish at least twice per week; the fish highest in omega-3 fatty acids and lowest in mercury include salmon, herring, mackerel, sardines, and canned chunk light tuna. If you eat fish less than twice per week or have high triglycerides, talk to your doctor about taking fish oil supplements. Read food labels.    For products low in fat and cholesterol, look for fat free, low-fat, cholesterol free, saturated fat free, and trans fat freeAlso scan the Nutrition Facts Label, which lists saturated fat, trans fat, and cholesterol amounts. For products low in sodium, look for sodium free, very low sodium, low sodium, no added salt, and unsalted   Skip the salt when cooking or at the table; if food needs more flavor, get creative and try out different herbs and spices. Garlic and onion also add substantial flavor to foods. Trim any visible fat off meat and poultry before cooking, and drain the fat off after silva. Use cooking methods that require little or no added fat, such as grilling, boiling, baking, poaching, broiling, roasting, steaming, stir-frying, and sauting. Avoid fast food and convenience food. They tend to be high in saturated and trans fat and have a lot of added salt. Talk to a registered dietitian for individualized diet advice. Last Reviewed: March 2011 Aram Posey MS, MPH, RD   Updated: 3/29/2011   ·     Heart-Healthy Diet   Sodium, Fat, and Cholesterol Controlled Diet       What Is a Heart Healthy Diet? A heart-healthy diet is one that limits sodium , certain types of fat , and cholesterol . This type of diet is recommended for:   People with any form of cardiovascular disease (eg, coronary heart disease , peripheral vascular disease , previous heart attack , previous stroke )   People with risk factors for cardiovascular disease, such as high blood pressure , high cholesterol , or diabetes   Anyone who wants to lower their risk of developing cardiovascular disease   Sodium    Sodium is a mineral found in many foods. In general, most people consume much more sodium than they need. Diets high in sodium can increase blood pressure and lead to edema (water retention). On a heart-healthy diet, you should consume no more than 2,300 mg (milligrams) of sodium per dayabout the amount in one teaspoon of table salt. The foods highest in sodium include table salt (about 50% sodium), processed foods, convenience foods, and preserved foods.    Cholesterol    Cholesterol is a fat-like, waxy substance in your blood. Our bodies make some cholesterol. It is also found in animal products, with the highest amounts in fatty meat, egg yolks, whole milk, cheese, shellfish, and organ meats. On a heart-healthy diet, you should limit your cholesterol intake to less than 200 mg per day. It is normal and important to have some cholesterol in your bloodstream. But too much cholesterol can cause plaque to build up within your arteries, which can eventually lead to a heart attack or stroke. The two types of cholesterol that are most commonly referred to are:   Low-density lipoprotein (LDL) cholesterol  Also known as bad cholesterol, this is the cholesterol that tends to build up along your arteries. Bad cholesterol levels are increased by eating fats that are saturated or hydrogenated. Optimal level of this cholesterol is less than 100. Over 130 starts to get risky for heart disease. High-density lipoprotein (HDL) cholesterol  Also known as good cholesterol, this type of cholesterol actually carries cholesterol away from your arteries and may, therefore, help lower your risk of having a heart attack. You want this level to be high (ideally greater than 60). It is a risk to have a level less than 40. You can raise this good cholesterol by eating olive oil, canola oil, avocados, or nuts. Exercise raises this level, too. Fat    Fat is calorie dense and packs a lot of calories into a small amount of food. Even though fats should be limited due to their high calorie content, not all fats are bad. In fact, some fats are quite healthful. Fat can be broken down into four main types.    The good-for-you fats are:   Monounsaturated fat  found in oils such as olive and canola, avocados, and nuts and natural nut butters; can decrease cholesterol levels, while keeping levels of HDL cholesterol high   Polyunsaturated fat  found in oils such as safflower, sunflower, soybean, corn, and sesame; can decrease total cholesterol and LDL cholesterol   Omega-3 fatty acids  particularly those found in fatty fish (such as salmon, trout, tuna, mackerel, herring, and sardines); can decrease risk of arrhythmias, decrease triglyceride levels, and slightly lower blood pressure   The fats that you want to limit are:   Saturated fat  found in animal products, many fast foods, and a few vegetables; increases total blood cholesterol, including LDL levels   Animal fats that are saturated include: butter, lard, whole-milk dairy products, meat fat, and poultry skin   Vegetable fats that are saturated include: hydrogenated shortening, palm oil, coconut oil, cocoa butter   Hydrogenated or trans fat  found in margarine and vegetable shortening, most shelf stable snack foods, and fried foods; increases LDL and decreases HDL     It is generally recommended that you limit your total fat for the day to less than 30% of your total calories. If you follow an 1800-calorie heart healthy diet, for example, this would mean 60 grams of fat or less per day. Saturated fat and trans fat in your diet raises your blood cholesterol the most, much more than dietary cholesterol does. For this reason, on a heart-healthy diet, less than 7% of your calories should come from saturated fat and ideally 0% from trans fat. On an 1800-calorie diet, this translates into less than 14 grams of saturated fat per day, leaving 46 grams of fat to come from mono- and polyunsaturated fats.    Food Choices on a Heart Healthy Diet   Food Category   Foods Recommended   Foods to Avoid   Grains   Breads and rolls without salted tops Most dry and cooked cereals Unsalted crackers and breadsticks Low-sodium or homemade breadcrumbs or stuffing All rice and pastas   Breads, rolls, and crackers with salted tops High-fat baked goods (eg, muffins, donuts, pastries) Quick breads, self-rising flour, and biscuit mixes Regular bread crumbs Instant hot cereals Commercially prepared rice, pasta, or stuffing mixes Vegetables   Most fresh, frozen, and low-sodium canned vegetables Low-sodium and salt-free vegetable juices Canned vegetables if unsalted or rinsed   Regular canned vegetables and juices, including sauerkraut and pickled vegetables Frozen vegetables with sauces Commercially prepared potato and vegetable mixes   Fruits   Most fresh, frozen, and canned fruits All fruit juices   Fruits processed with salt or sodium   Milk   Nonfat or low-fat (1%) milk Nonfat or low-fat yogurt Cottage cheese, low-fat ricotta, cheeses labeled as low-fat and low-sodium   Whole milk Reduced-fat (2%) milk Malted and chocolate milk Full fat yogurt Most cheeses (unless low-fat and low salt) Buttermilk (no more than 1 cup per week)   Meats and Beans   Lean cuts of fresh or frozen beef, veal, lamb, or pork (look for the word loin) Fresh or frozen poultry without the skin Fresh or frozen fish and some shellfish Egg whites and egg substitutes (Limit whole eggs to three per week) Tofu Nuts or seeds (unsalted, dry-roasted), low-sodium peanut butter Dried peas, beans, and lentils   Any smoked, cured, salted, or canned meat, fish, or poultry (including lind, chipped beef, cold cuts, hot dogs, sausages, sardines, and anchovies) Poultry skins Breaded and/or fried fish or meats Canned peas, beans, and lentils Salted nuts   Fats and Oils   Olive oil and canola oil Low-sodium, low-fat salad dressings and mayonnaise   Butter, margarine, coconut and palm oils, lind fat   Snacks, Sweets, and Condiments   Low-sodium or unsalted versions of broths, soups, soy sauce, and condiments Pepper, herbs, and spices; vinegar, lemon, or lime juice Low-fat frozen desserts (yogurt, sherbet, fruit bars) Sugar, cocoa powder, honey, syrup, jam, and preserves Low-fat, trans-fat free cookies, cakes, and pies Eleuterio and animal crackers, fig bars, aaron snaps   High-fat desserts Broth, soups, gravies, and sauces, made from instant mixes or other high-sodium ingredients Salted snack foods Canned olives Meat tenderizers, seasoning salt, and most flavored vinegars   Beverages   Low-sodium carbonated beverages Tea and coffee in moderation Soy milk   Commercially softened water   Suggestions   Make whole grains, fruits, and vegetables the base of your diet. Choose heart-healthy fats such as canola, olive, and flaxseed oil, and foods high in heart-healthy fats, such as nuts, seeds, soybeans, tofu, and fish. Eat fish at least twice per week; the fish highest in omega-3 fatty acids and lowest in mercury include salmon, herring, mackerel, sardines, and canned chunk light tuna. If you eat fish less than twice per week or have high triglycerides, talk to your doctor about taking fish oil supplements. Read food labels. For products low in fat and cholesterol, look for fat free, low-fat, cholesterol free, saturated fat free, and trans fat freeAlso scan the Nutrition Facts Label, which lists saturated fat, trans fat, and cholesterol amounts. For products low in sodium, look for sodium free, very low sodium, low sodium, no added salt, and unsalted   Skip the salt when cooking or at the table; if food needs more flavor, get creative and try out different herbs and spices. Garlic and onion also add substantial flavor to foods. Trim any visible fat off meat and poultry before cooking, and drain the fat off after silva. Use cooking methods that require little or no added fat, such as grilling, boiling, baking, poaching, broiling, roasting, steaming, stir-frying, and sauting. Avoid fast food and convenience food. They tend to be high in saturated and trans fat and have a lot of added salt. Talk to a registered dietitian for individualized diet advice. Last Reviewed: March 2011 Benedict Nieto MS, MPH, RD   Updated: 3/29/2011   ·     Heart-Healthy Diet   Sodium, Fat, and Cholesterol Controlled Diet       What Is a Heart Healthy Diet?    A heart-healthy diet is one that limits sodium , certain types of fat , and cholesterol . This type of diet is recommended for:   People with any form of cardiovascular disease (eg, coronary heart disease , peripheral vascular disease , previous heart attack , previous stroke )   People with risk factors for cardiovascular disease, such as high blood pressure , high cholesterol , or diabetes   Anyone who wants to lower their risk of developing cardiovascular disease   Sodium    Sodium is a mineral found in many foods. In general, most people consume much more sodium than they need. Diets high in sodium can increase blood pressure and lead to edema (water retention). On a heart-healthy diet, you should consume no more than 2,300 mg (milligrams) of sodium per dayabout the amount in one teaspoon of table salt. The foods highest in sodium include table salt (about 50% sodium), processed foods, convenience foods, and preserved foods. Cholesterol    Cholesterol is a fat-like, waxy substance in your blood. Our bodies make some cholesterol. It is also found in animal products, with the highest amounts in fatty meat, egg yolks, whole milk, cheese, shellfish, and organ meats. On a heart-healthy diet, you should limit your cholesterol intake to less than 200 mg per day. It is normal and important to have some cholesterol in your bloodstream. But too much cholesterol can cause plaque to build up within your arteries, which can eventually lead to a heart attack or stroke. The two types of cholesterol that are most commonly referred to are:   Low-density lipoprotein (LDL) cholesterol  Also known as bad cholesterol, this is the cholesterol that tends to build up along your arteries. Bad cholesterol levels are increased by eating fats that are saturated or hydrogenated. Optimal level of this cholesterol is less than 100. Over 130 starts to get risky for heart disease.    High-density lipoprotein (HDL) cholesterol  Also known as good cholesterol, this type of cholesterol actually carries cholesterol away from your arteries and may, therefore, help lower your risk of having a heart attack. You want this level to be high (ideally greater than 60). It is a risk to have a level less than 40. You can raise this good cholesterol by eating olive oil, canola oil, avocados, or nuts. Exercise raises this level, too. Fat    Fat is calorie dense and packs a lot of calories into a small amount of food. Even though fats should be limited due to their high calorie content, not all fats are bad. In fact, some fats are quite healthful. Fat can be broken down into four main types. The good-for-you fats are:   Monounsaturated fat  found in oils such as olive and canola, avocados, and nuts and natural nut butters; can decrease cholesterol levels, while keeping levels of HDL cholesterol high   Polyunsaturated fat  found in oils such as safflower, sunflower, soybean, corn, and sesame; can decrease total cholesterol and LDL cholesterol   Omega-3 fatty acids  particularly those found in fatty fish (such as salmon, trout, tuna, mackerel, herring, and sardines); can decrease risk of arrhythmias, decrease triglyceride levels, and slightly lower blood pressure   The fats that you want to limit are:   Saturated fat  found in animal products, many fast foods, and a few vegetables; increases total blood cholesterol, including LDL levels   Animal fats that are saturated include: butter, lard, whole-milk dairy products, meat fat, and poultry skin   Vegetable fats that are saturated include: hydrogenated shortening, palm oil, coconut oil, cocoa butter   Hydrogenated or trans fat  found in margarine and vegetable shortening, most shelf stable snack foods, and fried foods; increases LDL and decreases HDL     It is generally recommended that you limit your total fat for the day to less than 30% of your total calories.  If you follow an 1800-calorie heart healthy diet, for example, this would mean 60 grams of fat or less per day. Saturated fat and trans fat in your diet raises your blood cholesterol the most, much more than dietary cholesterol does. For this reason, on a heart-healthy diet, less than 7% of your calories should come from saturated fat and ideally 0% from trans fat. On an 1800-calorie diet, this translates into less than 14 grams of saturated fat per day, leaving 46 grams of fat to come from mono- and polyunsaturated fats.    Food Choices on a Heart Healthy Diet   Food Category   Foods Recommended   Foods to Avoid   Grains   Breads and rolls without salted tops Most dry and cooked cereals Unsalted crackers and breadsticks Low-sodium or homemade breadcrumbs or stuffing All rice and pastas   Breads, rolls, and crackers with salted tops High-fat baked goods (eg, muffins, donuts, pastries) Quick breads, self-rising flour, and biscuit mixes Regular bread crumbs Instant hot cereals Commercially prepared rice, pasta, or stuffing mixes   Vegetables   Most fresh, frozen, and low-sodium canned vegetables Low-sodium and salt-free vegetable juices Canned vegetables if unsalted or rinsed   Regular canned vegetables and juices, including sauerkraut and pickled vegetables Frozen vegetables with sauces Commercially prepared potato and vegetable mixes   Fruits   Most fresh, frozen, and canned fruits All fruit juices   Fruits processed with salt or sodium   Milk   Nonfat or low-fat (1%) milk Nonfat or low-fat yogurt Cottage cheese, low-fat ricotta, cheeses labeled as low-fat and low-sodium   Whole milk Reduced-fat (2%) milk Malted and chocolate milk Full fat yogurt Most cheeses (unless low-fat and low salt) Buttermilk (no more than 1 cup per week)   Meats and Beans   Lean cuts of fresh or frozen beef, veal, lamb, or pork (look for the word loin) Fresh or frozen poultry without the skin Fresh or frozen fish and some shellfish Egg whites and egg substitutes (Limit whole eggs to three per week) Tofu Nuts or seeds (unsalted, dry-roasted), low-sodium peanut butter Dried peas, beans, and lentils   Any smoked, cured, salted, or canned meat, fish, or poultry (including lind, chipped beef, cold cuts, hot dogs, sausages, sardines, and anchovies) Poultry skins Breaded and/or fried fish or meats Canned peas, beans, and lentils Salted nuts   Fats and Oils   Olive oil and canola oil Low-sodium, low-fat salad dressings and mayonnaise   Butter, margarine, coconut and palm oils, lind fat   Snacks, Sweets, and Condiments   Low-sodium or unsalted versions of broths, soups, soy sauce, and condiments Pepper, herbs, and spices; vinegar, lemon, or lime juice Low-fat frozen desserts (yogurt, sherbet, fruit bars) Sugar, cocoa powder, honey, syrup, jam, and preserves Low-fat, trans-fat free cookies, cakes, and pies Eleuterio and animal crackers, fig bars, aaron snaps   High-fat desserts Broth, soups, gravies, and sauces, made from instant mixes or other high-sodium ingredients Salted snack foods Canned olives Meat tenderizers, seasoning salt, and most flavored vinegars   Beverages   Low-sodium carbonated beverages Tea and coffee in moderation Soy milk   Commercially softened water   Suggestions   Make whole grains, fruits, and vegetables the base of your diet. Choose heart-healthy fats such as canola, olive, and flaxseed oil, and foods high in heart-healthy fats, such as nuts, seeds, soybeans, tofu, and fish. Eat fish at least twice per week; the fish highest in omega-3 fatty acids and lowest in mercury include salmon, herring, mackerel, sardines, and canned chunk light tuna. If you eat fish less than twice per week or have high triglycerides, talk to your doctor about taking fish oil supplements. Read food labels. For products low in fat and cholesterol, look for fat free, low-fat, cholesterol free, saturated fat free, and trans fat freeAlso scan the Nutrition Facts Label, which lists saturated fat, trans fat, and cholesterol amounts. For products low in sodium, look for sodium free, very low sodium, low sodium, no added salt, and unsalted   Skip the salt when cooking or at the table; if food needs more flavor, get creative and try out different herbs and spices. Garlic and onion also add substantial flavor to foods. Trim any visible fat off meat and poultry before cooking, and drain the fat off after silva. Use cooking methods that require little or no added fat, such as grilling, boiling, baking, poaching, broiling, roasting, steaming, stir-frying, and sauting. Avoid fast food and convenience food. They tend to be high in saturated and trans fat and have a lot of added salt. Talk to a registered dietitian for individualized diet advice. Last Reviewed: March 2011 Itzel Freeman MS, MPH, RD   Updated: 3/29/2011   ·     Preventing Osteoporosis: After Your Visit  Your Care Instructions  Osteoporosis means the bones are weak and thin enough that they can break easily. The older you are, the more likely you are to get osteoporosis. But with plenty of calcium, vitamin D, and exercise, you can help prevent osteoporosis. The preteen and teen years are a key time for bone building. With the help of calcium, vitamin D, and exercise in those early years and beyond, the bones reach their peak density and strength by age 27. After age 27, your bones naturally start to thin and weaken. The stronger your bones are at around age 27, the lower your risk for osteoporosis. But no matter what your age and risk are, your bones still need calcium, vitamin D, and exercise to stay strong. Also avoid smoking, and limit alcohol. Smoking and heavy alcohol use can make your bones thinner. Talk to your doctor about any special risks you might have, such as having a close relative with osteoporosis or taking a medicine that can weaken bones. Your doctor can tell you the best ways to protect your bones from thinning.   Follow-up care is a key part of your treatment and safety. Be sure to make and go to all appointments, and call your doctor if you are having problems. It's also a good idea to know your test results and keep a list of the medicines you take. How can you care for yourself at home? Get enough calcium and vitamin D. The [place] of [employer] recommends adults younger than age 46 need 1,000 mg of calcium and 600 IU of vitamin D each day. Women ages 46 to 79 need 1,200 mg of calcium and 600 IU of vitamin D each day. Men ages 46 to 79 need 1,000 mg of calcium and 600 IU of vitamin D each day. Adults 71 and older need 1,200 mg of calcium and 800 IU of vitamin D each day. Eat foods rich in calcium, like yogurt, cheese, milk, and dark green vegetables. Eat foods rich in vitamin D, like eggs, fatty fish, cereal, and fortified milk. Get some sunshine. Your body uses sunshine to make its own vitamin D. The safest time to be out in the sun is before 10 a.m. or after 3 p.m. Avoid getting sunburned. Sunburn can increase your risk of skin cancer. Talk to your doctor about taking a calcium plus vitamin D supplement. Ask about what type of calcium is right for you, and how much to take at a time. Adults ages 23 to 48 should not get more than 2,500 mg of calcium and 4,000 IU of vitamin D each day, whether it is from supplements and/or food. Adults ages 46 and older should not get more than 2,000 mg of calcium and 4,000 IU of vitamin D each day from supplements and/or food. Get regular bone-building exercise. Weight-bearing and resistance exercises keep bones healthy by working the muscles and bones against gravity. Start out at an exercise level that feels right for you. Add a little at a time until you can do the following:  Do 30 minutes of weight-bearing exercise on most days of the week. Walking, jogging, stair climbing, and dancing are good choices. Do resistance exercises with weights or elastic bands 2 to 3 days a week. Limit alcohol.  Drink no more than 1 alcohol drink a day if you are a woman. Drink no more than 2 alcohol drinks a day if you are a man. Do not smoke. Smoking can make bones thin faster. If you need help quitting, talk to your doctor about stop-smoking programs and medicines. These can increase your chances of quitting for good. When should you call for help? Watch closely for changes in your health, and be sure to contact your doctor if:  You need help with a healthy eating plan. You need help with an exercise plan    © [id number] Iris [employer]. Care instructions adapted under license by [facility]. This care instruction is for use with your licensed healthcare professional. If you have questions about a medical condition or this instruction, always ask your healthcare professional. [address] any warranty or liability for your use of this information. Content Version: 9.4.62022; Last Revised: June 20, 2011              ·     Keep Your Memory Allen Faria       Many factors can affect your ability to remembera hectic lifestyle, aging, stress, chronic disease, and certain medicines. But, there are steps you can take to sharpen your mind and help preserve your memory. Challenge Your Brain   Regularly challenging your mind may help keeps it in top shape. Good mental exercises include:   Crossword puzzlesUse a dictionary if you need it; you will learn more that way. Brainteasers Try some! Crafts, such as wood working and sewing   Hobbies, such as gardening and building model airplanes   SocializingVisit old friends or join groups to meet new ones.    Reading   Learning a new language   Taking a class, whether it be art history or viral chi   TravelingExperience the food, history, and culture of your destination   Learning to use a computer   Going to museums, the theater, or thought-provoking movies   Changing things in your daily life, such as reversing your pattern in the grocery store or brushing your teeth using your nondominant hand   Use Memory Aids   There is no need to remember every detail on your own. These memory aids can help:   Calendars and day planners   Electronic organizers to store all sorts of helpful informationThese devices can \"beep\" to remind you of appointments. A book of days to record birthdays, anniversaries, and other occasions that occur on the same date every year   Detailed \"to-do\" lists and strategically placed sticky notes   Quick \"study\" sessionsBefore a gathering, review who will be there so their names will be fresh in your mind. Establish routinesFor example, keep your keys, wallet, and umbrella in the same place all the time or take medicine with your 8:00 AM glass of juice   Live a Healthy Life   Many actions that will keep your body strong will do the same for your mind. For example:   Talk to Your Doctor About Herbs and Supplements    Malnutrition and vitamin deficiencies can impair your mental function. For example, vitamin B12 deficiency can cause a range of symptoms, including confusion. But, what if your nutritional needs are being met? Can herbs and supplements still offer a benefit? Researchers have investigated a range of natural remedies, such as ginkgo , ginseng , and the supplement phosphatidylserine (PS). So far, though, the evidence is inconsistent as to whether these products can improve memory or thinking. If you are interested in taking herbs and supplements, talk to your doctor first because they may interact with other medicines that you are taking. Exercise Regularly    Among the many benefits of regular exercise are increased blood flow to the brain and decreased risk of certain diseases that can interfere with memory function. One study found that even moderate exercise has a beneficial effect.  Examples of \"moderate\" exercise include:   Playing 18 holes of golf once a week, without a cart   Playing tennis twice a week   Walking one mile per day   Manage Stress    It can be tough to remember what is important when your mind is cluttered. Make time for relaxation. Choose activities that calm you down, and make it routine. Manage Chronic Conditions    Side effects of high blood pressure , diabetes, and heart disease can interfere with mental function. Many of the lifestyle steps discussed here can help manage these conditions. Strive to eat a healthy diet, exercise regularly, get stress under control, and follow your doctor's advice for your condition. Minimize Medications    Talk to your doctor about the medicines that you take. Some may be unnecessary. Also, healthy lifestyle habits may lower the need for certain drugs. Last Reviewed: April 2010 Maksim Huertas MD   Updated: 4/13/2010   ·     [address]       As we get older, changes in balance, gait, strength, vision, hearing, and cognition make even the most youthful senior more prone to accidents. Falls are one of the leading health risks for older people. This increased risk of falling is related to:   Aging process (eg, decreased muscle strength, slowed reflexes)   Higher incidence of chronic health problems (eg, arthritis, diabetes) that may limit mobility, agility or sensory awareness   Side effects of medicine (eg, dizziness, blurred vision)especially medicines like prescription pain medicines and drugs used to treat mental health conditions   Depending on the brittleness of your bones, the consequences of a fall can be serious and long lasting. Home Life   Research by the Association of Aging [facility]) shows that some home accidents among older adults can be prevented by making simple lifestyle changes and basic modifications and repairs to the home environment. Here are some lifestyle changes that experts recommend:   Have your hearing and vision checked regularly. Be sure to wear prescription glasses that are right for you.    Speak to your doctor or pharmacist about the possible side effects of your medicines. A number of medicines can cause dizziness. If you have problems with sleep, talk to your doctor. Limit your intake of alcohol. If necessary, use a cane or walker to help maintain your balance. Wear supportive, rubber-soled shoes, even at home. If you live in a region that gets wintry weather, you may want to put special cleats on your shoes to prevent you from slipping on the snow and ice. Exercise regularly to help maintain muscle tone, agility, and balance. Always hold the banister when going up or down stairs. Also, use  bars when getting in or out of the bath or shower, or using the toilet. To avoid dizziness, get up slowly from a lying down position. Sit up first, dangling your legs for a minute or two before rising to a standing position. Overall Home Safety Check   According to the Consumer Product Safety Commision's \"Older Consumer Home Safety Checklist,\" it is important to check for potential hazards in each room. And remember, proper lighting is an essential factor in home safety. If you cannot see clearly, you are more likely to fall. Important questions to ask yourself include:   Are lamp, electric, extension, and telephone cords placed out of the flow of traffic and maintained in good condition? Have frayed cords been replaced? Are all small rugs and runners slip resistant? If not, you can secure them to the floor with a special double-sided carpet tape. Are smoke detectors properly locatedone on every floor of your home and one outside of every sleeping area? Are they in good working order? Are batteries replaced at least once a year? Do you have a well-maintained carbon monoxide detector outside every sleeping are in your home? Does your furniture layout leave plenty of space to maneuver between and around chairs, tables, beds, and sofas? Are hallways, stairs and passages between rooms well lit?  Can you reach a lamp without getting out of bed? Are floor surfaces well maintained? Shag rugs, high-pile carpeting, tile floors, and polished wood floors can be particularly slippery. Stairs should always have handrails and be carpeted or fitted with a non-skid tread. Is your telephone easily reachable. Is the cord safely tucked away? Room by Room   According to the Association of Aging, bathrooms and analy are the two most potentially hazardous rooms in your home. In the Kitchen    Be sure your stove is in proper working order and always make sure burners and the oven are off before you go out or go to sleep. Keep pots on the back burners, turn handles away from the front of the stove, and keep stove clean and free of grease build-up. Kitchen ventilation systems and range exhausts should be working properly. Keep flammable objects such as towels and pot holders away from the cooking area except when in use. Make sure kitchen curtains are tied back. Move cords and appliances away from the sink and hot surfaces. If extension cords are needed, install wiring guides so they do not hang over the sink, range, or working areas. Look for coffee pots, kettles and toaster ovens with automatic shut-offs. Keep a mop handy in the kitchen so you can wipe up spills instantly. You should also have a small fire extinguisher. Arrange your kitchen with frequently used items on lower shelves to avoid the need to stand on a stepstool to reach them. Make sure countertops are well-lit to avoid injuries while cutting and preparing food. In the Bathroom    Use a non-slip mat or decals in the tub and shower, since wet, soapy tile or porcelain surfaces are extremely slippery. Make sure bathroom rugs are non-skid or tape them firmly to the floor. Bathtubs should have at least one, preferably two, grab bars, firmly attached to structural supports in the wall.  (Do not use built-in soap holders or glass shower doors as grab bars.) Tub seats fitted with non-slip material on the legs allow you to wash sitting down. For people with limited mobility, bathtub transfer benches allow you to slide safely into the tub. Raised toilet seats and toilet safety rails are helpful for those with knee or hip problems. In the [facility]    Make sure you use a nightlight and that the area around your bed is clear of potential obstacles. Be careful with electric blankets and never go to sleep with a heating pad, which can cause serious burns even if on a low setting. Use fire-resistant mattress covers and pillows, and NEVER smoke in bed. Keep a phone next to the bed that is programmed to dial 911 at the push of a button. If you have a chronic condition, you may want to sign on with an automatic call-in service. Typically the system includes a small pendant that connects directly to an emergency medical voice-response system. You should also make arrangements to stay in contact with someonefriend, neighbor, family memberon a regular schedule. Fire Prevention   According to the [facility] (Smoke Alarms for Every) [address] [place] [address], senior citizens are one of the two highest risk groups for death and serious injuries due to residential fires. When cooking, wear short-sleeved items, never a bulky long-sleeved robe. The [facility]'s Safety Checklist for Older Consumers emphasizes the importance of checking basements, garages, workshops and storage areas for fire hazards, such as volatile liquids, piles of old rags or clothing and overloaded circuits. Never smoke in bed or when lying down on a couch or recliner chair. Small portable electric or kerosene heaters are responsible for many home fires and should be used cautiously if at all. If you do use one, be sure to keep them away from flammable materials. In case of fire, make sure you have a pre-established emergency exit plan.     Have a professional check your fireplace and other fuel-burning appliances yearly. Helping Hands   Baby boomers entering the schmidt years will continue to see the development of new products to help older adults live safely and independently in spite of age-related changes. Making Life More Livable  , by Janel Valladares, lists over 1,000 products for \"living well in the mature years,\" such as bathing and mobility aids, household security devices, ergonomically designed knives and peelers, and faucet valves and knobs for temperature control. Medical supply stores and organizations are good sources of information about products that improve your quality of life and insure your safety. Last Reviewed: November 2009 Sher Barr MD   Updated: 3/7/2011     ·        Advance Care Planning: Care Instructions  Your Care Instructions     It can be hard to live with an illness that cannot be cured. But if your health is getting worse, you may want to make decisions about end-of-life care. Planning for the end of your life does not mean that you are giving up. It is a way to make sure that your wishes are met. Clearly stating your wishes can make it easier for your loved ones. Making plans while you are still able may alsoease your mind and make your final days less stressful and more meaningful. Follow-up care is a key part of your treatment and safety. Be sure to make and go to all appointments, and call your doctor if you are having problems. It's also a good idea to know your test results and keep alist of the medicines you take. What can you do to plan for the end of life? You can bring these issues up with your doctor. You do not need to wait until your doctor starts the conversation. You might start with, \"What makes life worth living for me is. Blanchie Bevels Blanchie Bevels \" And then follow it with, \"I would not be willing to live with . Blanchie Bevels Blanchie Bevels Blanchie Bevels \" When you complete this sentence it helps your doctor understand your wishes. Talk openly and honestly with your doctor.  This is the best way to easily see it. Consider a living will. A living will explains your wishes about life support and other treatments at the end of your life if you become unable to speak for yourself. Living arthur tell doctors to use or not use treatments that would keep you alive. You must have one or two witnesses or a notary present when you sign this form. A living will may be called something else in your state. Consider a medical power of . This form allows you to name a person to make decisions about your care if you are not able to. Most people ask a close friend or family member. Talk to this person about the kinds of treatments you want and those that you do not want. Make sure this person understands your wishes. A medical power of  may be called something else in your state. These legal papers are simple to change. Tell your doctor what you want to change, and ask him or her to make a note in your medical file. Give yourfamily updated copies of the papers. Where can you learn more? Go to https://[employer]139shop.[employer]. org and sign in to your [employer] account. Enter P184 in the [facility] box to learn more about \"Advance Care Planning: Care Instructions. \"     If you do not have an account, please click on the \"Sign Up Now\" link. Current as of: October 18, 2021               Content Version: 13.2  © 5441-6668 Healthwise, Incorporated. Care instructions adapted under license by [facility] ([facility]). If you have questions about a medical condition or this instruction, always ask your healthcare professional. [place] [address] any warranty or liability for your use of this information. ·        Learning About Living Elaubrey Driver  What is a living will? A living will, also called a declaration, is a legal form. It tells your family and your doctor your wishes when you can't speak for yourself.  It's used by the health professionals who will treat you as you near the end of your life or ifyou get seriously hurt or ill. If you put your wishes in writing, your loved ones and others will know what kind of care you want. They won't need to guess. This can ease your mind and behelpful to others. And you can change or cancel your living will at any time. A living will is not the same as an estate or property will. An estate willexplains what you want to happen with your money and property after you die. How do you use it? Keep these facts in mind about how a living will is used. Your living will is used only if you can't speak or make decisions for yourself. Most often, one or more doctors must certify that you can't speak or decide for yourself before your living will takes effect. If you get better and can speak for yourself again, you can accept or refuse any treatment. It doesn't matter what you said in your living will. Some states may limit your right to refuse treatment in certain cases. For example, you may need to clearly state in your living will that you don't want artificial hydration and nutrition, such as being fed through a tube. Is a living will a legal document? A living will is a legal document. Each state has its own laws about livingwills. And a living will may be called something else in your state. Here are some things to know about living arthur. You don't need an  to complete a living will. But legal advice can be helpful if your state's laws are unclear. It can also help if your health history is complicated or your family can't agree on what should be in your living will. You can change your living will at any time. Some people find that their wishes about end-of-life care change as their health changes. If you make big changes to your living will, complete a new form. If you move to another state, make sure that your living will is legal in the state where you now live.  In most cases, doctors will respect your wishes even if you have a form from a different state. You might use a universal form that has been approved by many states. This kind of form can sometimes be filled out and stored online. Your digital copy will then be available wherever you have a connection to the internet. The doctors and nurses who need to treat you can find it right away. Your state may offer an online registry. This is another place where you can store your living will online. It's a good idea to get your living will notarized. This means using a person called a [employer] to watch two people sign, or witness, your living will. What should you know when you create a living will? Here are some questions to ask yourself as you make your living will. Do you know enough about life support methods that might be used? If not, talk to your doctor so you know what might be done if you can't breathe on your own, your heart stops, or you can't swallow. What things would you still want to be able to do after you receive life-support methods? Would you want to be able to walk? To speak? To eat on your own? To live without the help of machines? Do you want certain Synagogue practices performed if you become very ill? If you have a choice, where do you want to be cared for? In your home? At a hospital or nursing home? If you have a choice at the end of your life, where would you prefer to die? At home? In a hospital or nursing home? Somewhere else? Would you prefer to be buried or cremated? Do you want your organs to be donated after you die? What should you do with your living will? Make sure that your family members and your health care agent have copies of your living will (also called a declaration). Give your doctor a copy of your living will. Ask to have it kept as part of your medical record. If you have more than one doctor, make sure that each one has a copy. Put a copy of your living will where it can be easily found.  For example, some people may put a copy on their refrigerator door. If you are using a digital copy, be sure your doctor, family members, and health care agent know how to find and access it. Where can you learn more? Go to [email or web address].[email or web address] org and sign in to your [employer] account. Enter W125 in the [facility] box to learn more about \"Learning About Living Linsey Balderas. \"     If you do not have an account, please click on the \"Sign Up Now\" link. Current as of: October 18, 2021               Content Version: 13.2  © 0793-8645 [employer]. Care instructions adapted under license by [facility] ([facility]). If you have questions about a medical condition or this instruction, always ask your healthcare professional. [address] any warranty or liability for your use of this information. ·        Learning About Medical Power of   What is a medical power of ? A medical power of , also called a durable power of  for health care, is one type of the legal forms called advance directives. It lets you name the person you want to make treatment decisions for you if you can't speak or decide for yourself. The person you choose is called your health care agent. This person is also called a health care proxy or health care surrogate. A medical power of  may be called something else in your state. How do you choose a health care agent? Choose your health care agent carefully. This person may or may not be a familymember. Talk to the person before you make your final decision. Make sure he or she iscomfortable with this responsibility. It's a good idea to choose someone who:  Is at least 25years old. Knows you well and understands what makes life meaningful for you. Understands your Voodoo and moral values. Will do what you want, not what he or she wants. Will be able to make difficult choices at a stressful time.   Will be able to refuse or stop treatment, if that is what you would want, even if you could die. Will be firm and confident with health professionals if needed. Will ask questions to get needed information. Lives near you or agrees to travel to you if needed. Your family may help you make medical decisions while you can still be part of that process. But it's important to choose one person to be your health careagent in case you aren't able to make decisions for yourself. If you don't fill out the legal form and name a health care agent, thedecisions your family can make may be limited. A health care agent may be called something else in your state. Who will make decisions for you if you don't have a health care agent? If you don't have a health care agent or a living will, you may not get the care you want. Decisions may be made by family members who disagree about your medical care. Or decisions may be made by a medical professional who doesn'tknow you well. In some cases, a  makes the decisions. When you name a health care agent, it is very clear who has the power to [place] decisions for you. How do you name a health care agent? You name your health care agent on a legal form. This form is usually called a medical power of . Ask your hospital, state bar association, or officeon aging where to find these forms. You must sign the form to make it legal. Some states require you to get the form notarized. This means that a person called a  watches you sign the form and then he or she signs the form. Some states also require thattwo or more witnesses sign the form. Be sure to tell your family members and doctors who your health care agent is. Where can you learn more? Go to https://chhoma.[email or web address] org and sign in to your [employer] account. Enter [id number] in the [employer] box to learn more about \"Learning About [address]. \"     If you do not have an account, please click on the \"Sign Up Now\" link. Current as of: October 18, 2021               Content Version: 13.2  © 0814-8285 Healthwise[employer]. Care instructions adapted under license by [facility] ([facility]). If you have questions about a medical condition or this instruction, always ask your healthcare professional. [address]  any warranty or liability for your use of this information.     ·

## 2022-04-12 NOTE — PROGRESS NOTES
Medicare Annual Wellness Visit    Alicia Britton is called by phone for Medicare [place]   Initial Medicare annual wellness visit  Screening for colorectal cancer  -     FIT-DNA (Cologuard); Future      Recommendations for Preventive Services Due: see orders and patient instructions/AVS.  Recommended screening schedule for the next 5-10 years is provided to the patient in written form: see Patient Instructions/AVS.     No follow-ups on file. Brii Muhammad is doing fair. She struggles with back pain and radiculopathy. She saw her pulmonary DrJarocho Henderson and is doing well. Patient's complete Health Risk Assessment and screening values have been reviewed and are found in Flowsheets. The following problems were reviewed today and where indicated follow up appointments were made and/or referrals ordered.     Positive Risk Factor Screenings with Interventions:    Fall Risk:  Do you feel unsteady or are you worried about falling? : (!) yes (uses cane as needed.)  2 or more falls in past year?: no  Fall with injury in past year?: no     Fall Risk Interventions:    · Home safety tips provided  · Home exercises provided to promote strength and balance  · Patient declines any further evaluation/treatment for this issue            General Health and ACP:  General  In general, how would you say your health is?: Good  In the past 7 days, have you experienced any of the following: New or Increased Pain, New or Increased Fatigue, Loneliness, Social Isolation, Stress or Anger?: No  Do you get the social and emotional support that you need?: Yes  Do you have a Living Will?: (!) No    Advance Directives     Power of  Living Will ACP-Advance Directive ACP-Power of     Not on File Not on File Not on File Not on File      General Health Risk Interventions:  · No Living Will: Advance Care Planning addressed with patient today    Health Habits/Nutrition:     Physical Activity: Insufficiently Active    Days of Exercise per Week: 3 days    Minutes of Exercise per Session: 10 min     Have you lost any weight without trying in the past 3 months?: No     Have you seen the dentist within the past year?: (!) No    Health Habits/Nutrition Interventions:  · Inadequate physical activity:  educational materials provided to promote increased physical activity, patient is not ready to increase his/her physical activity level at this time  · Dental exam overdue:  patient encouraged to make appointment with his/her dentist    Hearing/Vision:  Do you or your family notice any trouble with your hearing that hasn't been managed with hearing aids?: No  Do you have difficulty driving, watching TV, or doing any of your daily activities because of your eyesight?: No  Have you had an eye exam within the past year?: (!) No  No exam data present    Hearing/Vision Interventions:  · Vision concerns:  patient encouraged to make appointment with his/her eye specialist            Objective      Patient-Reported Vitals  No data recorded       Recent memory is intact. Remote memory is not tested at this time due to the VV per phone. Allergies   Allergen Reactions    Cephalexin Other (See Comments)     Other reaction(s): blisters in mouth and nose  Blisters to nose and mouth  Blisters to nose and mouth      Aspirin Other (See Comments) and Nausea And Vomiting     vomiting  Unknown reaction      Nitrofurantoin Nausea And Vomiting    Creatine Monohydrate Other (See Comments)     Prior to Visit Medications    Medication Sig Taking?  Authorizing Provider   naloxone 4 MG/0.1ML LIQD nasal spray  Yes Historical Provider, MD   Cholecalciferol (VITAMIN D3) 50 MCG (2000 UT) CAPS Take by mouth Yes Historical Provider, MD   Budeson-Glycopyrrol-Formoterol (BREZTRI AEROSPHERE) 160-9-4.8 MCG/ACT AERO Inhale 2 puffs into the lungs in the morning and at bedtime Yes Adele Albert MD   esomeprazole (NEXIUM) 40 MG delayed release capsule Take 1 capsule by mouth every morning (before breakfast) Yes Rhiannon Wolfe MD   albuterol sulfate HFA (PROAIR HFA) 108 (90 Base) MCG/ACT inhaler ProAir HFA 90 mcg/actuation aerosol inhaler Yes Historical Provider, MD   Calcium Citrate-Vitamin D 200-250 MG-UNIT TABS Take 1 tablet by mouth 2 times daily (with meals) Yes Historical Provider, MD   escitalopram (LEXAPRO) 20 MG tablet escitalopram 20 mg tablet Yes Historical Provider, MD   gabapentin (NEURONTIN) 800 MG tablet gabapentin 800 mg tablet Yes Historical Provider, MD   oxyCODONE-acetaminophen (PERCOCET)  MG per tablet oxycodone-acetaminophen 10 mg-325 mg tablet Yes Historical Provider, MD   Rhubarb (ESTROVEN COMPLETE) 4 MG TABS Take by mouth Yes Historical Provider, MD   albuterol (PROVENTIL) (2.5 MG/3ML) 0.083% nebulizer solution Take 3 mLs by nebulization every 6 hours as needed for Wheezing Yes Rhiannon Wolfe MD       [facility] (Including outside providers/suppliers regularly involved in providing care):   Patient Care Team:  Rhiannon Wolfe MD as PCP - General (Family Medicine)  Rhiannon Wolfe MD as PCP - [facility] Provider    Reviewed and updated this visit:  Allergies  Meds       Lab work was completed at [facility] and is in [address] [place] [address] in March 2022. Cologuard is ordered. Health Maintenance is reviewed and updated. Jaimee Hurst, was evaluated through a synchronous (real-time) audio-video encounter. The patient (or guardian if applicable) is aware that this is a billable service, which includes applicable co-pays. This Virtual Visit was conducted with patient's (and/or legal guardian's) consent. The visit was conducted pursuant to the emergency declaration under the [facility], [address] [place] [address] authority and the IfOnly and [employer] General Act. Patient identification was verified, and a caregiver was present when appropriate.  The patient was located at home in a state where the provider was licensed to provide care. Janna Espinoza LPN, 0/24/0564, performed the documented evaluation under the direct supervision of the attending physician.

## 2022-04-20 ENCOUNTER — PATIENT ROUNDING (BHMG ONLY) (OUTPATIENT)
Dept: PULMONOLOGY | Facility: CLINIC | Age: 62
End: 2022-04-20

## 2022-04-20 NOTE — PROGRESS NOTES
April 20, 2022    Hello, may I speak with Arminda Jiménez?    My name is Veronica Villela    I am  with [facility]  [address]  [facility] [address] [phone].    Before we get started may I verify your date of birth? 1960    I am calling to officially welcome you to our practice and ask about your recent visit. Is this a good time to talk? yes    Tell me about your visit with us. What things went well?  Visit went very well.  Staff were wonderful.  Wait time was acceptable.       We're always looking for ways to make our patients' experiences even better. Do you have recommendations on ways we may improve?  no    Overall were you satisfied with your first visit to our practice? yes       I appreciate you taking the time to speak with me today. Is there anything else I can do for you? no      Thank you, and have a great day.

## 2022-04-22 ENCOUNTER — HOSPITAL ENCOUNTER (OUTPATIENT)
Dept: CT IMAGING | Facility: HOSPITAL | Age: 62
Discharge: HOME | End: 2022-04-22
Admitting: NURSE PRACTITIONER

## 2022-04-22 ENCOUNTER — DOCUMENTATION (OUTPATIENT)
Dept: CT IMAGING | Facility: HOSPITAL | Age: 62
End: 2022-04-22

## 2022-04-22 DIAGNOSIS — Z87.891 PERSONAL HISTORY OF NICOTINE DEPENDENCE: Chronic | ICD-10-CM

## 2022-04-22 PROCEDURE — 71271 CT THORAX LUNG CANCER SCR C-: CPT

## 2022-04-22 NOTE — PROGRESS NOTES
Spoke with patient and relayed test results. Patient voiced understanding. Patient will call PCP, report faxed to Dr. Sanchez at [phone].

## 2022-05-02 ENCOUNTER — DOCUMENTATION (OUTPATIENT)
Dept: CT IMAGING | Facility: HOSPITAL | Age: 62
End: 2022-05-02

## 2022-05-07 ENCOUNTER — APPOINTMENT (OUTPATIENT)
Dept: GENERAL RADIOLOGY | Facility: HOSPITAL | Age: 62
End: 2022-05-07

## 2022-05-07 ENCOUNTER — HOSPITAL ENCOUNTER (INPATIENT)
Facility: HOSPITAL | Age: 62
LOS: 1 days | Discharge: HOME | End: 2022-05-09
Attending: STUDENT IN AN ORGANIZED HEALTH CARE EDUCATION/TRAINING PROGRAM | Admitting: ORTHOPAEDIC SURGERY

## 2022-05-07 DIAGNOSIS — M48.07 FORAMINAL STENOSIS OF LUMBOSACRAL REGION: ICD-10-CM
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
## 2022-05-07 DIAGNOSIS — R26.9 GAIT ABNORMALITY: ICD-10-CM

## 2022-05-07 LAB
APTT PPP: 26.1 SECONDS (ref 24.1–35)
BASOPHILS # BLD AUTO: 0.1 10*3/MM3 (ref 0–0.2)
BASOPHILS NFR BLD AUTO: 0.7 % (ref 0–1.5)
DEPRECATED RDW RBC AUTO: 44.2 FL (ref 37–54)
EOSINOPHIL # BLD AUTO: 0.19 10*3/MM3 (ref 0–0.4)
EOSINOPHIL NFR BLD AUTO: 1.4 % (ref 0.3–6.2)
ERYTHROCYTE [DISTWIDTH] IN BLOOD BY AUTOMATED COUNT: 12.2 % (ref 12.3–15.4)
HCT VFR BLD AUTO: 40.4 % (ref 34–46.6)
HGB BLD-MCNC: 12.9 G/DL (ref 12–15.9)
IMM GRANULOCYTES # BLD AUTO: 0.05 10*3/MM3 (ref 0–0.05)
IMM GRANULOCYTES NFR BLD AUTO: 0.4 % (ref 0–0.5)
INR PPP: 0.95 (ref 0.91–1.09)
LYMPHOCYTES # BLD AUTO: 3.81 10*3/MM3 (ref 0.7–3.1)
LYMPHOCYTES NFR BLD AUTO: 28.4 % (ref 19.6–45.3)
MCH RBC QN AUTO: 31.2 PG (ref 26.6–33)
MCHC RBC AUTO-ENTMCNC: 31.9 G/DL (ref 31.5–35.7)
MCV RBC AUTO: 97.8 FL (ref 79–97)
MONOCYTES # BLD AUTO: 1.06 10*3/MM3 (ref 0.1–0.9)
MONOCYTES NFR BLD AUTO: 7.9 % (ref 5–12)
NEUTROPHILS NFR BLD AUTO: 61.2 % (ref 42.7–76)
NEUTROPHILS NFR BLD AUTO: 8.2 10*3/MM3 (ref 1.7–7)
NRBC BLD AUTO-RTO: 0 /100 WBC (ref 0–0.2)
PLATELET # BLD AUTO: 278 10*3/MM3 (ref 140–450)
PMV BLD AUTO: 10.4 FL (ref 6–12)
PROTHROMBIN TIME: 12.3 SECONDS (ref 11.9–14.6)
RBC # BLD AUTO: 4.13 10*6/MM3 (ref 3.77–5.28)
WBC NRBC COR # BLD: 13.41 10*3/MM3 (ref 3.4–10.8)

## 2022-05-07 PROCEDURE — 99285 EMERGENCY DEPT VISIT HI MDM: CPT

## 2022-05-07 PROCEDURE — 85730 THROMBOPLASTIN TIME PARTIAL: CPT | Performed by: STUDENT IN AN ORGANIZED HEALTH CARE EDUCATION/TRAINING PROGRAM

## 2022-05-07 PROCEDURE — 85025 COMPLETE CBC W/AUTO DIFF WBC: CPT | Performed by: STUDENT IN AN ORGANIZED HEALTH CARE EDUCATION/TRAINING PROGRAM

## 2022-05-07 PROCEDURE — 85610 PROTHROMBIN TIME: CPT | Performed by: STUDENT IN AN ORGANIZED HEALTH CARE EDUCATION/TRAINING PROGRAM

## 2022-05-07 PROCEDURE — 93005 ELECTROCARDIOGRAM TRACING: CPT | Performed by: STUDENT IN AN ORGANIZED HEALTH CARE EDUCATION/TRAINING PROGRAM

## 2022-05-07 PROCEDURE — 73600 X-RAY EXAM OF ANKLE: CPT

## 2022-05-07 PROCEDURE — 90715 TDAP VACCINE 7 YRS/> IM: CPT | Performed by: STUDENT IN AN ORGANIZED HEALTH CARE EDUCATION/TRAINING PROGRAM

## 2022-05-07 PROCEDURE — 25010000002 FENTANYL CITRATE (PF) 50 MCG/ML SOLUTION: Performed by: STUDENT IN AN ORGANIZED HEALTH CARE EDUCATION/TRAINING PROGRAM

## 2022-05-07 PROCEDURE — 90471 IMMUNIZATION ADMIN: CPT | Performed by: STUDENT IN AN ORGANIZED HEALTH CARE EDUCATION/TRAINING PROGRAM

## 2022-05-07 PROCEDURE — 83036 HEMOGLOBIN GLYCOSYLATED A1C: CPT | Performed by: INTERNAL MEDICINE

## 2022-05-07 PROCEDURE — 25010000002 TETANUS-DIPHTH-ACELL PERTUSSIS 5-2.5-18.5 LF-MCG/0.5 SUSPENSION PREFILLED SYRINGE: Performed by: STUDENT IN AN ORGANIZED HEALTH CARE EDUCATION/TRAINING PROGRAM

## 2022-05-07 PROCEDURE — 71045 X-RAY EXAM CHEST 1 VIEW: CPT

## 2022-05-07 PROCEDURE — 73590 X-RAY EXAM OF LOWER LEG: CPT

## 2022-05-07 RX ORDER — CLINDAMYCIN PHOSPHATE 900 MG/50ML
900 INJECTION INTRAVENOUS ONCE
Status: COMPLETED | OUTPATIENT
Start: 2022-05-07 | End: 2022-05-08

## 2022-05-07 RX ORDER — KETAMINE HCL IN NACL, ISO-OSM 100MG/10ML
100 SYRINGE (ML) INJECTION ONCE
Status: COMPLETED | OUTPATIENT
Start: 2022-05-07 | End: 2022-05-08

## 2022-05-07 RX ORDER — FENTANYL CITRATE 50 UG/ML
50 INJECTION, SOLUTION INTRAMUSCULAR; INTRAVENOUS ONCE
Status: COMPLETED | OUTPATIENT
Start: 2022-05-07 | End: 2022-05-07

## 2022-05-07 RX ADMIN — TETANUS TOXOID, REDUCED DIPHTHERIA TOXOID AND ACELLULAR PERTUSSIS VACCINE, ADSORBED 0.5 ML: 5; 2.5; 8; 8; 2.5 SUSPENSION INTRAMUSCULAR at 23:46

## 2022-05-07 RX ADMIN — FENTANYL CITRATE 50 MCG: 0.05 INJECTION, SOLUTION INTRAMUSCULAR; INTRAVENOUS at 23:21

## 2022-05-07 RX ADMIN — CLINDAMYCIN IN 5 PERCENT DEXTROSE 900 MG: 18 INJECTION, SOLUTION INTRAVENOUS at 23:38

## 2022-05-08 ENCOUNTER — ANESTHESIA (OUTPATIENT)
Dept: PERIOP | Facility: HOSPITAL | Age: 62
End: 2022-05-08

## 2022-05-08 ENCOUNTER — ANESTHESIA EVENT (OUTPATIENT)
Dept: PERIOP | Facility: HOSPITAL | Age: 62
End: 2022-05-08

## 2022-05-08 ENCOUNTER — APPOINTMENT (OUTPATIENT)
Dept: GENERAL RADIOLOGY | Facility: HOSPITAL | Age: 62
End: 2022-05-08

## 2022-05-08 PROBLEM — M81.0 SENILE OSTEOPOROSIS: Status: ACTIVE | Noted: 2018-04-11

## 2022-05-08 PROBLEM — I10 PRIMARY HYPERTENSION: Status: ACTIVE | Noted: 2021-05-26
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
## 2022-05-08 PROBLEM — S82.892B OPEN LEFT ANKLE FRACTURE: Status: ACTIVE | Noted: 2022-05-08

## 2022-05-08 LAB
ALBUMIN SERPL-MCNC: 3.7 G/DL (ref 3.5–5.2)
ALBUMIN SERPL-MCNC: 4 G/DL (ref 3.5–5.2)
ALBUMIN/GLOB SERPL: 1.2 G/DL
ALBUMIN/GLOB SERPL: 1.5 G/DL
ALP SERPL-CCNC: 54 U/L (ref 39–117)
ALP SERPL-CCNC: 56 U/L (ref 39–117)
ALT SERPL W P-5'-P-CCNC: 16 U/L (ref 1–33)
ALT SERPL W P-5'-P-CCNC: 29 U/L (ref 1–33)
ANION GAP SERPL CALCULATED.3IONS-SCNC: 10 MMOL/L (ref 5–15)
ANION GAP SERPL CALCULATED.3IONS-SCNC: 8 MMOL/L (ref 5–15)
AST SERPL-CCNC: 20 U/L (ref 1–32)
AST SERPL-CCNC: 73 U/L (ref 1–32)
BACTERIA UR QL AUTO: ABNORMAL /HPF
BASOPHILS # BLD AUTO: 0.05 10*3/MM3 (ref 0–0.2)
BASOPHILS NFR BLD AUTO: 0.4 % (ref 0–1.5)
BILIRUB SERPL-MCNC: 0.2 MG/DL (ref 0–1.2)
BILIRUB SERPL-MCNC: <0.2 MG/DL (ref 0–1.2)
BILIRUB UR QL STRIP: NEGATIVE
BUN SERPL-MCNC: 11 MG/DL (ref 8–23)
BUN SERPL-MCNC: 13 MG/DL (ref 8–23)
BUN/CREAT SERPL: 11.5 (ref 7–25)
BUN/CREAT SERPL: 16 (ref 7–25)
CALCIUM SPEC-SCNC: 8.9 MG/DL (ref 8.6–10.5)
CALCIUM SPEC-SCNC: 9.1 MG/DL (ref 8.6–10.5)
CHLORIDE SERPL-SCNC: 102 MMOL/L (ref 98–107)
CHLORIDE SERPL-SCNC: 103 MMOL/L (ref 98–107)
CLARITY UR: CLEAR
CO2 SERPL-SCNC: 27 MMOL/L (ref 22–29)
CO2 SERPL-SCNC: 28 MMOL/L (ref 22–29)
COLOR UR: YELLOW
CREAT SERPL-MCNC: 0.81 MG/DL (ref 0.57–1)
CREAT SERPL-MCNC: 0.96 MG/DL (ref 0.57–1)
DEPRECATED RDW RBC AUTO: 43.7 FL (ref 37–54)
EGFRCR SERPLBLD CKD-EPI 2021: 67 ML/MIN/1.73
EGFRCR SERPLBLD CKD-EPI 2021: 82.2 ML/MIN/1.73
EOSINOPHIL # BLD AUTO: 0.04 10*3/MM3 (ref 0–0.4)
EOSINOPHIL NFR BLD AUTO: 0.3 % (ref 0.3–6.2)
ERYTHROCYTE [DISTWIDTH] IN BLOOD BY AUTOMATED COUNT: 12.3 % (ref 12.3–15.4)
GLOBULIN UR ELPH-MCNC: 2.6 GM/DL
GLOBULIN UR ELPH-MCNC: 3.2 GM/DL
GLUCOSE SERPL-MCNC: 128 MG/DL (ref 65–99)
GLUCOSE SERPL-MCNC: 144 MG/DL (ref 65–99)
GLUCOSE UR STRIP-MCNC: NEGATIVE MG/DL
HBA1C MFR BLD: 5 % (ref 4.8–5.6)
HCT VFR BLD AUTO: 34.4 % (ref 34–46.6)
HGB BLD-MCNC: 10.8 G/DL (ref 12–15.9)
HGB UR QL STRIP.AUTO: NEGATIVE
HYALINE CASTS UR QL AUTO: ABNORMAL /LPF
IMM GRANULOCYTES # BLD AUTO: 0.05 10*3/MM3 (ref 0–0.05)
IMM GRANULOCYTES NFR BLD AUTO: 0.4 % (ref 0–0.5)
KETONES UR QL STRIP: NEGATIVE
LEUKOCYTE ESTERASE UR QL STRIP.AUTO: ABNORMAL
LYMPHOCYTES # BLD AUTO: 2.38 10*3/MM3 (ref 0.7–3.1)
LYMPHOCYTES NFR BLD AUTO: 20.3 % (ref 19.6–45.3)
MAGNESIUM SERPL-MCNC: 1.9 MG/DL (ref 1.6–2.4)
MCH RBC QN AUTO: 30.6 PG (ref 26.6–33)
MCHC RBC AUTO-ENTMCNC: 31.4 G/DL (ref 31.5–35.7)
MCV RBC AUTO: 97.5 FL (ref 79–97)
MONOCYTES # BLD AUTO: 0.97 10*3/MM3 (ref 0.1–0.9)
MONOCYTES NFR BLD AUTO: 8.3 % (ref 5–12)
NEUTROPHILS NFR BLD AUTO: 70.3 % (ref 42.7–76)
NEUTROPHILS NFR BLD AUTO: 8.23 10*3/MM3 (ref 1.7–7)
NITRITE UR QL STRIP: NEGATIVE
NRBC BLD AUTO-RTO: 0 /100 WBC (ref 0–0.2)
PH UR STRIP.AUTO: 6.5 [PH] (ref 5–8)
PLATELET # BLD AUTO: 243 10*3/MM3 (ref 140–450)
PMV BLD AUTO: 10.7 FL (ref 6–12)
POTASSIUM SERPL-SCNC: 4.1 MMOL/L (ref 3.5–5.2)
POTASSIUM SERPL-SCNC: 4.5 MMOL/L (ref 3.5–5.2)
PROT SERPL-MCNC: 6.6 G/DL (ref 6–8.5)
PROT SERPL-MCNC: 6.9 G/DL (ref 6–8.5)
PROT UR QL STRIP: NEGATIVE
QT INTERVAL: 404 MS
QTC INTERVAL: 457 MS
RBC # BLD AUTO: 3.53 10*6/MM3 (ref 3.77–5.28)
RBC # UR STRIP: ABNORMAL /HPF
REF LAB TEST METHOD: ABNORMAL
SARS-COV-2 RNA PNL SPEC NAA+PROBE: NOT DETECTED
SODIUM SERPL-SCNC: 139 MMOL/L (ref 136–145)
SODIUM SERPL-SCNC: 139 MMOL/L (ref 136–145)
SP GR UR STRIP: 1.02 (ref 1–1.03)
SQUAMOUS #/AREA URNS HPF: ABNORMAL /HPF
UROBILINOGEN UR QL STRIP: ABNORMAL
WBC # UR STRIP: ABNORMAL /HPF
WBC NRBC COR # BLD: 11.72 10*3/MM3 (ref 3.4–10.8)

## 2022-05-08 PROCEDURE — C1713 ANCHOR/SCREW BN/BN,TIS/BN: HCPCS | Performed by: ORTHOPAEDIC SURGERY

## 2022-05-08 PROCEDURE — 0QSH04Z REPOSITION LEFT TIBIA WITH INTERNAL FIXATION DEVICE, OPEN APPROACH: ICD-10-PCS | Performed by: ORTHOPAEDIC SURGERY

## 2022-05-08 PROCEDURE — 94640 AIRWAY INHALATION TREATMENT: CPT

## 2022-05-08 PROCEDURE — 25010000002 ONDANSETRON PER 1 MG: Performed by: NURSE ANESTHETIST, CERTIFIED REGISTERED

## 2022-05-08 PROCEDURE — 25010000002 PROPOFOL 10 MG/ML EMULSION: Performed by: NURSE ANESTHETIST, CERTIFIED REGISTERED

## 2022-05-08 PROCEDURE — 76000 FLUOROSCOPY <1 HR PHYS/QHP: CPT

## 2022-05-08 PROCEDURE — C1769 GUIDE WIRE: HCPCS | Performed by: ORTHOPAEDIC SURGERY

## 2022-05-08 PROCEDURE — 85025 COMPLETE CBC W/AUTO DIFF WBC: CPT | Performed by: INTERNAL MEDICINE

## 2022-05-08 PROCEDURE — 94799 UNLISTED PULMONARY SVC/PX: CPT

## 2022-05-08 PROCEDURE — 0QSK04Z REPOSITION LEFT FIBULA WITH INTERNAL FIXATION DEVICE, OPEN APPROACH: ICD-10-PCS | Performed by: ORTHOPAEDIC SURGERY

## 2022-05-08 PROCEDURE — 25010000002 HYDROMORPHONE PER 4 MG: Performed by: NURSE ANESTHETIST, CERTIFIED REGISTERED

## 2022-05-08 PROCEDURE — 73600 X-RAY EXAM OF ANKLE: CPT

## 2022-05-08 PROCEDURE — 25010000002 FENTANYL CITRATE (PF) 50 MCG/ML SOLUTION: Performed by: NURSE ANESTHETIST, CERTIFIED REGISTERED

## 2022-05-08 PROCEDURE — 93010 ELECTROCARDIOGRAM REPORT: CPT | Performed by: EMERGENCY MEDICINE

## 2022-05-08 PROCEDURE — 25010000002 ONDANSETRON PER 1 MG: Performed by: STUDENT IN AN ORGANIZED HEALTH CARE EDUCATION/TRAINING PROGRAM

## 2022-05-08 PROCEDURE — 25010000002 CEFAZOLIN PER 500 MG: Performed by: ORTHOPAEDIC SURGERY

## 2022-05-08 PROCEDURE — 83735 ASSAY OF MAGNESIUM: CPT | Performed by: INTERNAL MEDICINE

## 2022-05-08 PROCEDURE — 80053 COMPREHEN METABOLIC PANEL: CPT | Performed by: STUDENT IN AN ORGANIZED HEALTH CARE EDUCATION/TRAINING PROGRAM

## 2022-05-08 PROCEDURE — 81001 URINALYSIS AUTO W/SCOPE: CPT | Performed by: INTERNAL MEDICINE

## 2022-05-08 PROCEDURE — 25010000002 FENTANYL CITRATE (PF) 100 MCG/2ML SOLUTION: Performed by: NURSE ANESTHETIST, CERTIFIED REGISTERED

## 2022-05-08 PROCEDURE — 87635 SARS-COV-2 COVID-19 AMP PRB: CPT | Performed by: STUDENT IN AN ORGANIZED HEALTH CARE EDUCATION/TRAINING PROGRAM

## 2022-05-08 PROCEDURE — 80053 COMPREHEN METABOLIC PANEL: CPT | Performed by: INTERNAL MEDICINE

## 2022-05-08 DEVICE — SCRW LK ST STRDRV 2.7X16MM: Type: IMPLANTABLE DEVICE | Site: ANKLE | Status: FUNCTIONAL

## 2022-05-08 DEVICE — SCRW CORT S/TAP 3.5X14MM: Type: IMPLANTABLE DEVICE | Site: ANKLE | Status: FUNCTIONAL

## 2022-05-08 DEVICE — SCRW CANN SHRT THRD 1/3 4X30MM: Type: IMPLANTABLE DEVICE | Site: ANKLE | Status: FUNCTIONAL

## 2022-05-08 DEVICE — SCRW CORT S/TAP 3.5X12MM: Type: IMPLANTABLE DEVICE | Site: ANKLE | Status: FUNCTIONAL

## 2022-05-08 DEVICE — SCRW CANN SHRT THRD 1/3 4X36MM: Type: IMPLANTABLE DEVICE | Site: ANKLE | Status: FUNCTIONAL

## 2022-05-08 DEVICE — SCRW LK ST STRDRV 2.7X12MM: Type: IMPLANTABLE DEVICE | Site: ANKLE | Status: FUNCTIONAL

## 2022-05-08 DEVICE — IMPLANTABLE DEVICE: Type: IMPLANTABLE DEVICE | Site: ANKLE | Status: FUNCTIONAL

## 2022-05-08 DEVICE — SCRW CORT S/TAP 3.5X16MM: Type: IMPLANTABLE DEVICE | Site: ANKLE | Status: FUNCTIONAL

## 2022-05-08 DEVICE — SCRW CORT S/TAP 3.5X50MM: Type: IMPLANTABLE DEVICE | Site: ANKLE | Status: FUNCTIONAL

## 2022-05-08 DEVICE — SCRW LK ST STRDRV 2.7X14MM: Type: IMPLANTABLE DEVICE | Site: ANKLE | Status: FUNCTIONAL

## 2022-05-08 RX ORDER — NALOXONE HCL 0.4 MG/ML
0.4 VIAL (ML) INJECTION
Status: DISCONTINUED | OUTPATIENT
Start: 2022-05-08 | End: 2022-05-09 | Stop reason: HOSPADM

## 2022-05-08 RX ORDER — ONDANSETRON 2 MG/ML
4 INJECTION INTRAMUSCULAR; INTRAVENOUS EVERY 6 HOURS PRN
Status: DISCONTINUED | OUTPATIENT
Start: 2022-05-08 | End: 2022-05-09 | Stop reason: HOSPADM

## 2022-05-08 RX ORDER — FENTANYL CITRATE 50 UG/ML
25 INJECTION, SOLUTION INTRAMUSCULAR; INTRAVENOUS
Status: DISCONTINUED | OUTPATIENT
Start: 2022-05-08 | End: 2022-05-08 | Stop reason: HOSPADM

## 2022-05-08 RX ORDER — ONDANSETRON 4 MG/1
4 TABLET, FILM COATED ORAL EVERY 6 HOURS PRN
Status: DISCONTINUED | OUTPATIENT
Start: 2022-05-08 | End: 2022-05-09 | Stop reason: HOSPADM

## 2022-05-08 RX ORDER — ESCITALOPRAM OXALATE 10 MG/1
20 TABLET ORAL DAILY
Status: DISCONTINUED | OUTPATIENT
Start: 2022-05-08 | End: 2022-05-09 | Stop reason: HOSPADM

## 2022-05-08 RX ORDER — OXYCODONE AND ACETAMINOPHEN 10; 325 MG/1; MG/1
1 TABLET ORAL EVERY 4 HOURS PRN
Status: DISCONTINUED | OUTPATIENT
Start: 2022-05-08 | End: 2022-05-09 | Stop reason: HOSPADM

## 2022-05-08 RX ORDER — LIDOCAINE HYDROCHLORIDE 20 MG/ML
INJECTION, SOLUTION EPIDURAL; INFILTRATION; INTRACAUDAL; PERINEURAL AS NEEDED
Status: DISCONTINUED | OUTPATIENT
Start: 2022-05-08 | End: 2022-05-08 | Stop reason: SURG

## 2022-05-08 RX ORDER — ACETAMINOPHEN 650 MG/1
650 SUPPOSITORY RECTAL EVERY 4 HOURS PRN
Status: DISCONTINUED | OUTPATIENT
Start: 2022-05-08 | End: 2022-05-09 | Stop reason: HOSPADM

## 2022-05-08 RX ORDER — LANSOPRAZOLE
30 KIT EVERY MORNING
Status: DISCONTINUED | OUTPATIENT
Start: 2022-05-08 | End: 2022-05-08 | Stop reason: CLARIF

## 2022-05-08 RX ORDER — LIDOCAINE HYDROCHLORIDE 40 MG/ML
SOLUTION TOPICAL AS NEEDED
Status: DISCONTINUED | OUTPATIENT
Start: 2022-05-08 | End: 2022-05-08 | Stop reason: SURG

## 2022-05-08 RX ORDER — LIDOCAINE HYDROCHLORIDE 10 MG/ML
10 INJECTION, SOLUTION INFILTRATION; PERINEURAL ONCE
Status: DISCONTINUED | OUTPATIENT
Start: 2022-05-08 | End: 2022-05-09 | Stop reason: HOSPADM

## 2022-05-08 RX ORDER — MAGNESIUM HYDROXIDE 1200 MG/15ML
LIQUID ORAL AS NEEDED
Status: DISCONTINUED | OUTPATIENT
Start: 2022-05-08 | End: 2022-05-08 | Stop reason: HOSPADM

## 2022-05-08 RX ORDER — IBUPROFEN 600 MG/1
600 TABLET ORAL ONCE AS NEEDED
Status: DISCONTINUED | OUTPATIENT
Start: 2022-05-08 | End: 2022-05-08 | Stop reason: HOSPADM

## 2022-05-08 RX ORDER — PHENYLEPHRINE HCL IN 0.9% NACL 1 MG/10 ML
SYRINGE (ML) INTRAVENOUS AS NEEDED
Status: DISCONTINUED | OUTPATIENT
Start: 2022-05-08 | End: 2022-05-08 | Stop reason: SURG

## 2022-05-08 RX ORDER — PROMETHAZINE HYDROCHLORIDE 25 MG/1
12.5 TABLET ORAL EVERY 4 HOURS PRN
Status: DISCONTINUED | OUTPATIENT
Start: 2022-05-08 | End: 2022-05-09 | Stop reason: HOSPADM

## 2022-05-08 RX ORDER — ONDANSETRON 2 MG/ML
4 INJECTION INTRAMUSCULAR; INTRAVENOUS EVERY 6 HOURS PRN
Status: DISCONTINUED | OUTPATIENT
Start: 2022-05-08 | End: 2022-05-08 | Stop reason: SDUPTHER

## 2022-05-08 RX ORDER — SUCCINYLCHOLINE/SOD CL,ISO/PF 200MG/10ML
SYRINGE (ML) INTRAVENOUS AS NEEDED
Status: DISCONTINUED | OUTPATIENT
Start: 2022-05-08 | End: 2022-05-08 | Stop reason: SURG

## 2022-05-08 RX ORDER — CLINDAMYCIN PHOSPHATE 900 MG/50ML
900 INJECTION INTRAVENOUS EVERY 8 HOURS SCHEDULED
Status: DISCONTINUED | OUTPATIENT
Start: 2022-05-08 | End: 2022-05-09

## 2022-05-08 RX ORDER — ACETAMINOPHEN 325 MG/1
650 TABLET ORAL EVERY 4 HOURS PRN
Status: DISCONTINUED | OUTPATIENT
Start: 2022-05-08 | End: 2022-05-09 | Stop reason: HOSPADM

## 2022-05-08 RX ORDER — LABETALOL HYDROCHLORIDE 5 MG/ML
5 INJECTION, SOLUTION INTRAVENOUS
Status: DISCONTINUED | OUTPATIENT
Start: 2022-05-08 | End: 2022-05-08 | Stop reason: HOSPADM

## 2022-05-08 RX ORDER — ONDANSETRON 2 MG/ML
INJECTION INTRAMUSCULAR; INTRAVENOUS AS NEEDED
Status: DISCONTINUED | OUTPATIENT
Start: 2022-05-08 | End: 2022-05-08 | Stop reason: SURG

## 2022-05-08 RX ORDER — IPRATROPIUM BROMIDE AND ALBUTEROL SULFATE 2.5; .5 MG/3ML; MG/3ML
3 SOLUTION RESPIRATORY (INHALATION) EVERY 6 HOURS PRN
Status: DISCONTINUED | OUTPATIENT
Start: 2022-05-08 | End: 2022-05-09 | Stop reason: HOSPADM

## 2022-05-08 RX ORDER — CETIRIZINE HYDROCHLORIDE 10 MG/1
10 TABLET ORAL DAILY
Status: DISCONTINUED | OUTPATIENT
Start: 2022-05-08 | End: 2022-05-09 | Stop reason: HOSPADM

## 2022-05-08 RX ORDER — FLUMAZENIL 0.1 MG/ML
0.2 INJECTION INTRAVENOUS AS NEEDED
Status: DISCONTINUED | OUTPATIENT
Start: 2022-05-08 | End: 2022-05-08 | Stop reason: HOSPADM

## 2022-05-08 RX ORDER — ACETAMINOPHEN 160 MG/5ML
650 SOLUTION ORAL EVERY 4 HOURS PRN
Status: DISCONTINUED | OUTPATIENT
Start: 2022-05-08 | End: 2022-05-09 | Stop reason: HOSPADM

## 2022-05-08 RX ORDER — DROPERIDOL 2.5 MG/ML
0.62 INJECTION, SOLUTION INTRAMUSCULAR; INTRAVENOUS ONCE AS NEEDED
Status: DISCONTINUED | OUTPATIENT
Start: 2022-05-08 | End: 2022-05-08 | Stop reason: HOSPADM

## 2022-05-08 RX ORDER — FENTANYL CITRATE 50 UG/ML
INJECTION, SOLUTION INTRAMUSCULAR; INTRAVENOUS AS NEEDED
Status: DISCONTINUED | OUTPATIENT
Start: 2022-05-08 | End: 2022-05-08 | Stop reason: SURG

## 2022-05-08 RX ORDER — DIAZEPAM 5 MG/1
5 TABLET ORAL EVERY 6 HOURS PRN
Status: DISCONTINUED | OUTPATIENT
Start: 2022-05-08 | End: 2022-05-09 | Stop reason: HOSPADM

## 2022-05-08 RX ORDER — EPHEDRINE SULFATE 50 MG/ML
INJECTION, SOLUTION INTRAVENOUS AS NEEDED
Status: DISCONTINUED | OUTPATIENT
Start: 2022-05-08 | End: 2022-05-08 | Stop reason: SURG

## 2022-05-08 RX ORDER — PROPOFOL 10 MG/ML
VIAL (ML) INTRAVENOUS AS NEEDED
Status: DISCONTINUED | OUTPATIENT
Start: 2022-05-08 | End: 2022-05-08 | Stop reason: SURG

## 2022-05-08 RX ORDER — MELATONIN
1000 2 TIMES DAILY
Status: DISCONTINUED | OUTPATIENT
Start: 2022-05-08 | End: 2022-05-09 | Stop reason: HOSPADM

## 2022-05-08 RX ORDER — GABAPENTIN 400 MG/1
800 CAPSULE ORAL EVERY 8 HOURS SCHEDULED
Status: DISCONTINUED | OUTPATIENT
Start: 2022-05-08 | End: 2022-05-09 | Stop reason: HOSPADM

## 2022-05-08 RX ORDER — SODIUM CHLORIDE 9 MG/ML
75 INJECTION, SOLUTION INTRAVENOUS CONTINUOUS
Status: DISCONTINUED | OUTPATIENT
Start: 2022-05-08 | End: 2022-05-09 | Stop reason: HOSPADM

## 2022-05-08 RX ORDER — ONDANSETRON 4 MG/1
4 TABLET, FILM COATED ORAL EVERY 6 HOURS PRN
Status: DISCONTINUED | OUTPATIENT
Start: 2022-05-08 | End: 2022-05-08 | Stop reason: SDUPTHER

## 2022-05-08 RX ORDER — HYDROCODONE BITARTRATE AND ACETAMINOPHEN 5; 325 MG/1; MG/1
1 TABLET ORAL EVERY 4 HOURS PRN
Status: DISCONTINUED | OUTPATIENT
Start: 2022-05-08 | End: 2022-05-09 | Stop reason: HOSPADM

## 2022-05-08 RX ORDER — PROMETHAZINE HYDROCHLORIDE 12.5 MG/1
12.5 SUPPOSITORY RECTAL EVERY 4 HOURS PRN
Status: DISCONTINUED | OUTPATIENT
Start: 2022-05-08 | End: 2022-05-09 | Stop reason: HOSPADM

## 2022-05-08 RX ORDER — GABAPENTIN 400 MG/1
800 CAPSULE ORAL 4 TIMES DAILY
Status: DISCONTINUED | OUTPATIENT
Start: 2022-05-08 | End: 2022-05-08

## 2022-05-08 RX ORDER — ROCURONIUM BROMIDE 10 MG/ML
INJECTION, SOLUTION INTRAVENOUS AS NEEDED
Status: DISCONTINUED | OUTPATIENT
Start: 2022-05-08 | End: 2022-05-08 | Stop reason: SURG

## 2022-05-08 RX ORDER — ALBUTEROL SULFATE 90 UG/1
2 AEROSOL, METERED RESPIRATORY (INHALATION) 4 TIMES DAILY PRN
Status: DISCONTINUED | OUTPATIENT
Start: 2022-05-08 | End: 2022-05-08 | Stop reason: ALTCHOICE

## 2022-05-08 RX ORDER — CLOTRIMAZOLE AND BETAMETHASONE DIPROPIONATE 10; .64 MG/G; MG/G
CREAM TOPICAL EVERY 12 HOURS SCHEDULED
Status: DISCONTINUED | OUTPATIENT
Start: 2022-05-08 | End: 2022-05-09 | Stop reason: HOSPADM

## 2022-05-08 RX ORDER — BUDESONIDE AND FORMOTEROL FUMARATE DIHYDRATE 160; 4.5 UG/1; UG/1
2 AEROSOL RESPIRATORY (INHALATION)
Status: DISCONTINUED | OUTPATIENT
Start: 2022-05-08 | End: 2022-05-09 | Stop reason: HOSPADM

## 2022-05-08 RX ORDER — OXYCODONE AND ACETAMINOPHEN 10; 325 MG/1; MG/1
1 TABLET ORAL ONCE AS NEEDED
Status: DISCONTINUED | OUTPATIENT
Start: 2022-05-08 | End: 2022-05-08 | Stop reason: HOSPADM

## 2022-05-08 RX ORDER — SODIUM CHLORIDE, SODIUM LACTATE, POTASSIUM CHLORIDE, CALCIUM CHLORIDE 600; 310; 30; 20 MG/100ML; MG/100ML; MG/100ML; MG/100ML
75 INJECTION, SOLUTION INTRAVENOUS CONTINUOUS
Status: DISCONTINUED | OUTPATIENT
Start: 2022-05-08 | End: 2022-05-08

## 2022-05-08 RX ORDER — NALOXONE HCL 0.4 MG/ML
0.04 VIAL (ML) INJECTION AS NEEDED
Status: DISCONTINUED | OUTPATIENT
Start: 2022-05-08 | End: 2022-05-08 | Stop reason: HOSPADM

## 2022-05-08 RX ORDER — DIPHENHYDRAMINE HCL 25 MG
25 CAPSULE ORAL EVERY 6 HOURS PRN
Status: DISCONTINUED | OUTPATIENT
Start: 2022-05-08 | End: 2022-05-09 | Stop reason: HOSPADM

## 2022-05-08 RX ORDER — SODIUM CHLORIDE 0.9 % (FLUSH) 0.9 %
10 SYRINGE (ML) INJECTION AS NEEDED
Status: DISCONTINUED | OUTPATIENT
Start: 2022-05-08 | End: 2022-05-09 | Stop reason: HOSPADM

## 2022-05-08 RX ORDER — ONDANSETRON 2 MG/ML
4 INJECTION INTRAMUSCULAR; INTRAVENOUS AS NEEDED
Status: DISCONTINUED | OUTPATIENT
Start: 2022-05-08 | End: 2022-05-08 | Stop reason: HOSPADM

## 2022-05-08 RX ORDER — HYDROCODONE BITARTRATE AND ACETAMINOPHEN 10; 325 MG/1; MG/1
1 TABLET ORAL EVERY 4 HOURS PRN
Status: DISCONTINUED | OUTPATIENT
Start: 2022-05-08 | End: 2022-05-09 | Stop reason: HOSPADM

## 2022-05-08 RX ORDER — SODIUM CHLORIDE 0.9 % (FLUSH) 0.9 %
10 SYRINGE (ML) INJECTION EVERY 12 HOURS SCHEDULED
Status: DISCONTINUED | OUTPATIENT
Start: 2022-05-08 | End: 2022-05-09 | Stop reason: HOSPADM

## 2022-05-08 RX ORDER — HYDROMORPHONE HYDROCHLORIDE 1 MG/ML
0.5 INJECTION, SOLUTION INTRAMUSCULAR; INTRAVENOUS; SUBCUTANEOUS
Status: DISCONTINUED | OUTPATIENT
Start: 2022-05-08 | End: 2022-05-08 | Stop reason: HOSPADM

## 2022-05-08 RX ORDER — ONDANSETRON 2 MG/ML
4 INJECTION INTRAMUSCULAR; INTRAVENOUS ONCE
Status: COMPLETED | OUTPATIENT
Start: 2022-05-08 | End: 2022-05-08

## 2022-05-08 RX ORDER — PANTOPRAZOLE SODIUM 40 MG/1
40 TABLET, DELAYED RELEASE ORAL
Status: DISCONTINUED | OUTPATIENT
Start: 2022-05-08 | End: 2022-05-09 | Stop reason: HOSPADM

## 2022-05-08 RX ADMIN — Medication 1000 UNITS: at 20:53

## 2022-05-08 RX ADMIN — GABAPENTIN 800 MG: 400 CAPSULE ORAL at 11:06

## 2022-05-08 RX ADMIN — ESCITALOPRAM 20 MG: 10 TABLET, FILM COATED ORAL at 11:06

## 2022-05-08 RX ADMIN — ROCURONIUM BROMIDE 25 MG: 10 SOLUTION INTRAVENOUS at 06:53

## 2022-05-08 RX ADMIN — CLINDAMYCIN IN 5 PERCENT DEXTROSE 900 MG: 18 INJECTION, SOLUTION INTRAVENOUS at 21:58

## 2022-05-08 RX ADMIN — CLOTRIMAZOLE AND BETAMETHASONE DIPROPIONATE: 10; .5 CREAM TOPICAL at 20:54

## 2022-05-08 RX ADMIN — GABAPENTIN 800 MG: 400 CAPSULE ORAL at 21:57

## 2022-05-08 RX ADMIN — CETIRIZINE HYDROCHLORIDE 10 MG: 10 TABLET ORAL at 11:06

## 2022-05-08 RX ADMIN — CLINDAMYCIN IN 5 PERCENT DEXTROSE 900 MG: 18 INJECTION, SOLUTION INTRAVENOUS at 14:44

## 2022-05-08 RX ADMIN — HYDROMORPHONE HYDROCHLORIDE 1 MG: 1 INJECTION, SOLUTION INTRAMUSCULAR; INTRAVENOUS; SUBCUTANEOUS at 05:33

## 2022-05-08 RX ADMIN — Medication 50 MCG: at 07:34

## 2022-05-08 RX ADMIN — PROPOFOL 140 MG: 10 INJECTION, EMULSION INTRAVENOUS at 06:35

## 2022-05-08 RX ADMIN — Medication 1 TABLET: at 18:04

## 2022-05-08 RX ADMIN — ONDANSETRON 4 MG: 2 INJECTION INTRAMUSCULAR; INTRAVENOUS at 08:14

## 2022-05-08 RX ADMIN — Medication 1000 UNITS: at 11:06

## 2022-05-08 RX ADMIN — FENTANYL CITRATE 100 MCG: 50 INJECTION, SOLUTION INTRAMUSCULAR; INTRAVENOUS at 06:35

## 2022-05-08 RX ADMIN — LIDOCAINE HYDROCHLORIDE 1 EACH: 40 SOLUTION TOPICAL at 06:35

## 2022-05-08 RX ADMIN — ROCURONIUM BROMIDE 5 MG: 10 SOLUTION INTRAVENOUS at 06:35

## 2022-05-08 RX ADMIN — HYDROCODONE BITARTRATE AND ACETAMINOPHEN 1 TABLET: 10; 325 TABLET ORAL at 12:32

## 2022-05-08 RX ADMIN — Medication 50 MG: at 00:42

## 2022-05-08 RX ADMIN — HYDROCODONE BITARTRATE AND ACETAMINOPHEN 1 TABLET: 10; 325 TABLET ORAL at 20:53

## 2022-05-08 RX ADMIN — Medication 160 MG: at 06:35

## 2022-05-08 RX ADMIN — HYDROMORPHONE HYDROCHLORIDE 1 MG: 1 INJECTION, SOLUTION INTRAMUSCULAR; INTRAVENOUS; SUBCUTANEOUS at 13:06

## 2022-05-08 RX ADMIN — CEFAZOLIN SODIUM 2 G: 10 INJECTION, POWDER, FOR SOLUTION INTRAVENOUS at 20:55

## 2022-05-08 RX ADMIN — FENTANYL CITRATE 25 MCG: 0.05 INJECTION, SOLUTION INTRAMUSCULAR; INTRAVENOUS at 10:03

## 2022-05-08 RX ADMIN — CLINDAMYCIN IN 5 PERCENT DEXTROSE 900 MG: 18 INJECTION, SOLUTION INTRAVENOUS at 06:39

## 2022-05-08 RX ADMIN — ONDANSETRON 4 MG: 2 INJECTION INTRAMUSCULAR; INTRAVENOUS at 01:05

## 2022-05-08 RX ADMIN — SODIUM CHLORIDE 100 ML/HR: 9 INJECTION, SOLUTION INTRAVENOUS at 10:42

## 2022-05-08 RX ADMIN — FENTANYL CITRATE 25 MCG: 0.05 INJECTION, SOLUTION INTRAMUSCULAR; INTRAVENOUS at 10:08

## 2022-05-08 RX ADMIN — HYDROMORPHONE HYDROCHLORIDE 1 MG: 1 INJECTION, SOLUTION INTRAMUSCULAR; INTRAVENOUS; SUBCUTANEOUS at 03:00

## 2022-05-08 RX ADMIN — HYDROMORPHONE HYDROCHLORIDE 1 MG: 1 INJECTION, SOLUTION INTRAMUSCULAR; INTRAVENOUS; SUBCUTANEOUS at 15:39

## 2022-05-08 RX ADMIN — BUDESONIDE AND FORMOTEROL FUMARATE DIHYDRATE 2 PUFF: 160; 4.5 AEROSOL RESPIRATORY (INHALATION) at 16:11

## 2022-05-08 RX ADMIN — HYDROMORPHONE HYDROCHLORIDE 0.5 MG: 1 INJECTION, SOLUTION INTRAMUSCULAR; INTRAVENOUS; SUBCUTANEOUS at 10:01

## 2022-05-08 RX ADMIN — FENTANYL CITRATE 100 MCG: 50 INJECTION, SOLUTION INTRAMUSCULAR; INTRAVENOUS at 08:10

## 2022-05-08 RX ADMIN — SODIUM CHLORIDE, POTASSIUM CHLORIDE, SODIUM LACTATE AND CALCIUM CHLORIDE 75 ML/HR: 600; 310; 30; 20 INJECTION, SOLUTION INTRAVENOUS at 02:50

## 2022-05-08 RX ADMIN — Medication 1 TABLET: at 11:06

## 2022-05-08 RX ADMIN — LIDOCAINE HYDROCHLORIDE 100 MG: 20 INJECTION, SOLUTION EPIDURAL; INFILTRATION; INTRACAUDAL; PERINEURAL at 06:35

## 2022-05-08 RX ADMIN — CEFAZOLIN SODIUM 2 G: 10 INJECTION, POWDER, FOR SOLUTION INTRAVENOUS at 13:06

## 2022-05-08 RX ADMIN — Medication 50 MCG: at 07:37

## 2022-05-08 RX ADMIN — Medication 10 ML: at 21:58

## 2022-05-08 RX ADMIN — EPHEDRINE SULFATE 10 MG: 50 INJECTION INTRAVENOUS at 06:45

## 2022-05-08 RX ADMIN — HYDROCODONE BITARTRATE AND ACETAMINOPHEN 1 TABLET: 10; 325 TABLET ORAL at 16:29

## 2022-05-08 NOTE — ANESTHESIA PROCEDURE NOTES
Airway  Urgency: elective    Date/Time: 5/8/2022 6:36 AM  Airway not difficult    General Information and Staff    Patient location during procedure: OR  CRNA/CAA: Chan Lynn CRNA    Indications and Patient Condition  Indications for airway management: airway protection    Preoxygenated: yes  Mask difficulty assessment: 1 - vent by mask    Final Airway Details  Final airway type: endotracheal airway      Successful airway: ETT  Cuffed: yes   Successful intubation technique: direct laryngoscopy  Facilitating devices/methods: intubating stylet  Endotracheal tube insertion site: oral  Blade: Rama  Blade size: 3.5  ETT size (mm): 7.5  Cormack-Lehane Classification: grade I - full view of glottis  Placement verified by: chest auscultation, capnometry and palpation of cuff   Cuff volume (mL): 6  Measured from: lips  ETT/EBT  to lips (cm): 20  Number of attempts at approach: 1  Assessment: lips, teeth, and gum same as pre-op and atraumatic intubation

## 2022-05-08 NOTE — PLAN OF CARE
Goal Outcome Evaluation:  Plan of Care Reviewed With: patient           Outcome Evaluation: Patient admitted to the floor from ER, alert and oriented times 4, LLE with splint in place elevated, complaints of pain to LLE medicated per orders, NPO except ice chips, IV fluids infusing, VSS, , safety maintained.

## 2022-05-08 NOTE — CASE MANAGEMENT/SOCIAL WORK
Continued Stay Note   [place]     Patient Name: Arminda Jiménez  MRN: [id number]  Today's Date: 5/8/2022    Admit Date: 5/7/2022     Discharge Plan     Row Name 05/08/22 0853       Plan    Patient/Family in Agreement with Plan unable to assess    Plan Comments Referral to screen.  Pt was in OR.  SW will attempt to screen later.               Discharge Codes    No documentation.                     MARY Marcum

## 2022-05-08 NOTE — CONSULTS
Orthopedic History and Physical    Pt Name: Arminda Jiménez  MRN: [id number]  YOB: 1960  Date: 5/8/2022      HPI: 62-year-old female fell getting out of her truck and complained of immediate left ankle deformity.  She presented to the emergency department with a left open ankle fracture.  X-rays demonstrated a bimalleolar ankle fracture with a lateral dislocation.  The patient denies any other injuries.  She lives at home with her daughter and is a community ambulator.   She denies use of tobacco.      Past Medical/Surgical History:   Past Medical History:   Diagnosis Date   • Arthritis    • Asthma    • Cancer ([facility])     right lung,abd,and pelvic area   • CHF (congestive heart failure) ([facility])    • Chronic back pain    • COPD (chronic obstructive pulmonary disease) ([facility])    • Depression    • Environmental allergies 4/11/2022   • Facet arthropathy    • Foraminal stenosis of lumbosacral region    • GERD (gastroesophageal reflux disease)    • H/O degenerative disc disease    • History of blood transfusion    • PVD (peripheral vascular disease) ([facility])    • Radiculopathy      Past Surgical History:   Procedure Laterality Date   • ANTERIOR LUMBAR EXPOSURE N/A 5/7/2018    Procedure: ANTERIOR LUMBAR EXPOSURE;  Surgeon: Chris Haley DO;  Location: [facility] OR;  Service: Vascular   • APPENDECTOMY     • CARPAL TUNNEL RELEASE Bilateral    • LUMBAR FUSION N/A 5/7/2018    Procedure: ANTERIOR DECOMPRESSION, ANTERIOR LUMBAR INTERBODY FUSION WITH INSTRUMENTATION L5-S1;  Surgeon: LUIS CARLOS Zheng MD;  Location:  [facility] OR;  Service: Orthopedic Spine   • LUMBAR FUSION Right 1/2/2019    Procedure: RIGHT LATERAL LUMBAR INTERBODY FUSION L3-5;  Surgeon: LUIS CARLOS Zheng MD;  Location:  [facility] OR;  Service: Orthopedic Spine   • LUMBAR LAMINECTOMY WITH FUSION N/A 5/9/2018    Procedure: POSTERIOR SPINAL FUSION WITH INSTRUMENTATION L5-S1;  Surgeon: LUIS CARLOS Zheng MD;  Location:  [facility] OR;  Service: Orthopedic Spine   •  LUMBAR LAMINECTOMY WITH FUSION Right 1/4/2019    Procedure: POSTERIOR SPINAL FUSION WITH INSTRUMENTATION RIGHT L3-S1;  Surgeon: LUIS CARLOS Zheng MD;  Location: [facility];  Service: Orthopedic Spine   • OTHER SURGICAL HISTORY Bilateral 2017    Stent implants BLE [address]         Social History:   Social History     Socioeconomic History   • Marital status:    Tobacco Use   • Smoking status: Former Smoker     Packs/day: 1.50     Years: 30.00     Pack years: 45.00   • Smokeless tobacco: Never Used   • Tobacco comment: quit 2010   Substance and Sexual Activity   • Alcohol use: No   • Drug use: No   • Sexual activity: Defer            Medications:   Current Facility-Administered Medications:   •  acetaminophen (TYLENOL) tablet 650 mg, 650 mg, Oral, Q4H PRN **OR** acetaminophen (TYLENOL) 160 MG/5ML solution 650 mg, 650 mg, Oral, Q4H PRN **OR** acetaminophen (TYLENOL) suppository 650 mg, 650 mg, Rectal, Q4H PRN, Bull Jimenez MD  •  budesonide-formoterol (SYMBICORT) 160-4.5 MCG/ACT inhaler 2 puff, 2 puff, Inhalation, BID - RT, Bull Jimenez MD  •  calcium carb-cholecalciferol 600-800 MG-UNIT tablet 1 tablet, 1 tablet, Oral, BID With Meals, Bull Jimenez MD  •  cetirizine (zyrTEC) tablet 10 mg, 10 mg, Oral, Daily, Bull Jimenez MD  •  cholecalciferol (VITAMIN D3) tablet 1,000 Units, 1,000 Units, Oral, BID, Bull Jimenez MD  •  clindamycin (CLEOCIN) 900 mg in dextrose 5% 50 mL IVPB (premix), 900 mg, Intravenous, Q8H, Bull Jimenez MD  •  clotrimazole-betamethasone (LOTRISONE) 1-0.05 % cream, , Topical, Q12H, Bull Jimenez MD  •  escitalopram (LEXAPRO) tablet 20 mg, 20 mg, Oral, Daily, Bull Jimenez MD  •  estrogen (conjugated)-medroxyprogesterone (PREMPRO) 0.3-1.5 MG per tablet 1 tablet, 1 tablet, Oral, Daily, Bull Jimneez MD  •  gabapentin (NEURONTIN) capsule 800 mg, 800 mg, Oral, 4x Daily, Bull Jimenez MD  •  HYDROmorphone (DILAUDID) injection 1 mg, 1  mg, Intravenous, Q2H PRN, 1 mg at 05/08/22 0533 **AND** naloxone (NARCAN) injection 0.4 mg, 0.4 mg, Intravenous, Q5 Min PRN, Bull Jimenez MD  •  ipratropium-albuterol (DUO-NEB) nebulizer solution 3 mL, 3 mL, Nebulization, Q6H PRN, Bull Jimenez MD  •  lactated ringers infusion, 75 mL/hr, Intravenous, Continuous, Bull Jimenez MD, Last Rate: 75 mL/hr at 05/08/22 0250, 75 mL/hr at 05/08/22 0250  •  lidocaine (XYLOCAINE) 1 % injection 10 mL, 10 mL, Injection, Once, Bull Jimenez MD  •  ondansetron (ZOFRAN) tablet 4 mg, 4 mg, Oral, Q6H PRN **OR** ondansetron (ZOFRAN) injection 4 mg, 4 mg, Intravenous, Q6H PRN, Bull Jimenez MD  •  oxyCODONE-acetaminophen (PERCOCET)  MG per tablet 1 tablet, 1 tablet, Oral, Q4H PRN, Bull Jimenez MD  •  pantoprazole (PROTONIX) EC tablet 40 mg, 40 mg, Oral, Q AM, Bull Jimenez MD  •  sodium chloride 0.9 % flush 10 mL, 10 mL, Intravenous, Q12H, Bull Jimenez MD  •  sodium chloride 0.9 % flush 10 mL, 10 mL, Intravenous, PRN, Bull Jimenez MD    Allergies:   Allergies   Allergen Reactions   • Aspirin Nausea And Vomiting     Unknown reaction   • Keflex [Cephalexin] Other (See Comments)     Blisters to nose and mouth   • Nitrofurantoin GI Intolerance and Nausea And Vomiting   • Creatine Monohydrate Other (See Comments) and Unknown - Low Severity          Review of Systems   Constitutional: Negative.    HENT: Negative.    Eyes: Negative.    Respiratory: Negative.    Cardiovascular: Negative.    Gastrointestinal: Negative.    Genitourinary: Negative.    Skin: Negative.    Allergic/Immunologic: Negative.    Neurological: Negative.    Hematological: Negative.    Psychiatric/Behavioral: Negative.           Physical Exam  Constitutional:       Appearance: She is well-developed.   HENT:      Head: Normocephalic and atraumatic.   Pulmonary:      Effort: Pulmonary effort is normal.   Abdominal:      Palpations: Abdomen is soft.   Musculoskeletal:       Cervical back: Normal range of motion and neck supple.   Skin:     General: Skin is warm and dry.   Neurological:      Mental Status: She is alert and oriented to person, place, and time.   Psychiatric:         Behavior: Behavior normal.         Thought Content: Thought content normal.         Judgment: Judgment normal.         Ortho Exam:  Left lower extremity has a splint today.  Images of the extremity prior to reduction demonstrated a open ankle fracture with a large open area medially with a lateral dislocation.  Pulses are palpable and sensations intact.      Imaging:  Imaging Results (Last 72 Hours)     Procedure Component Value Units Date/Time    XR Ankle 2 View Left [[id number]] Resulted: 05/08/22 0054     Updated: 05/08/22 0054    XR Tibia Fibula 2 View Left [[id number]] Resulted: 05/07/22 2341     Updated: 05/07/22 2343    XR Chest 1 View [[id number]] Resulted: 05/07/22 2341     Updated: 05/07/22 2343    XR Ankle 2 View Left [[id number]] Resulted: 05/07/22 2340     Updated: 05/07/22 2342          Labs:   Lab Results (last 24 hours)     Procedure Component Value Units Date/Time    Urinalysis With Microscopic If Indicated (No Culture) - Urine, Clean Catch [[id number]]  (Abnormal) Collected: 05/08/22 0320    Specimen: Urine, Clean Catch Updated: 05/08/22 0400     Color, UA Yellow     Appearance, UA Clear     pH, UA 6.5     Specific Gravity, UA 1.025     Glucose, UA Negative     Ketones, UA Negative     Bilirubin, UA Negative     Blood, UA Negative     Protein, UA Negative     Leuk Esterase, UA Small (1+)     Nitrite, UA Negative     Urobilinogen, UA 0.2 E.U./dL    Urinalysis, Microscopic Only - Urine, Clean Catch [[id number]]  (Abnormal) Collected: 05/08/22 0320    Specimen: Urine, Clean Catch Updated: 05/08/22 0400     RBC, UA None Seen /HPF      WBC, UA 13-20 /HPF      Bacteria, UA 1+ /HPF      Squamous Epithelial Cells, UA 0-2 /HPF      Hyaline Casts, UA 7-12 /LPF      Methodology Manual Light Microscopy     Hemoglobin A1c [[id number]]  (Normal) Collected: 05/07/22 2318    Specimen: Blood Updated: 05/08/22 0311     Hemoglobin A1C 5.00 %     Narrative:      Hemoglobin A1C Ranges:    Increased Risk for Diabetes  5.7% to 6.4%  Diabetes                     >= 6.5%  Diabetic Goal                < 7.0%    Magnesium [[id number]]  (Normal) Collected: 05/08/22 0005    Specimen: Blood Updated: 05/08/22 0304     Magnesium 1.9 mg/dL     Comprehensive Metabolic Panel [[id number]]  (Abnormal) Collected: 05/08/22 0005    Specimen: Blood Updated: 05/08/22 0058     Glucose 128 mg/dL      BUN 11 mg/dL      Creatinine 0.96 mg/dL      Sodium 139 mmol/L      Potassium 4.1 mmol/L      Comment: Slight hemolysis detected by analyzer. Results may be affected.        Chloride 102 mmol/L      CO2 27.0 mmol/L      Calcium 9.1 mg/dL      Total Protein 6.9 g/dL      Albumin 3.70 g/dL      ALT (SGPT) 16 U/L      AST (SGOT) 20 U/L      Alkaline Phosphatase 54 U/L      Total Bilirubin <0.2 mg/dL      Globulin 3.2 gm/dL      A/G Ratio 1.2 g/dL      BUN/Creatinine Ratio 11.5     Anion Gap 10.0 mmol/L      eGFR 67.0 mL/min/1.73      Comment: National Kidney Foundation and American Society of Nephrology (ASN) Task Force recommended calculation based on the Chronic Kidney Disease Epidemiology Collaboration (CKD-EPI) equation refit without adjustment for race.       Narrative:      GFR Normal >60  Chronic Kidney Disease <60  Kidney Failure <15      COVID PRE-OP / PRE-PROCEDURE SCREENING ORDER (NO ISOLATION) - Swab, Nasal Cavity [[id number]]  (Normal) Collected: 05/08/22 0007    Specimen: Swab from Nasal Cavity Updated: 05/08/22 0048    Narrative:      The following orders were created for panel order COVID PRE-OP / PRE-PROCEDURE SCREENING ORDER (NO ISOLATION) - Swab, Nasal Cavity.  Procedure                               Abnormality         Status                     ---------                               -----------         ------                      COVID-19,[facility]...[[id number]]  Normal              Final result                 Please view results for these tests on the individual orders.    COVID-19,[facility] IN-HOUSE,Nasal Swab No Transport Media 3-4 HR TAT - Swab, Nasal Cavity [[id number]]  (Normal) Collected: 05/08/22 0007    Specimen: Swab from Nasal Cavity Updated: 05/08/22 0048     COVID19 Not Detected    Narrative:      Fact sheet for providers: https://www.fda.gov/media/123925/download     Fact sheet for patients: https://www.fda.gov/media/362436/download    Test performed by PCR.    Consider negative results in combination with clinical observations, patient history, and epidemiological information.    Protime-INR [[id number]]  (Normal) Collected: 05/07/22 2318    Specimen: Blood Updated: 05/07/22 2339     Protime 12.3 Seconds      INR 0.95    aPTT [[id number]]  (Normal) Collected: 05/07/22 2318    Specimen: Blood Updated: 05/07/22 2339     PTT 26.1 seconds     CBC & Differential [[id number]]  (Abnormal) Collected: 05/07/22 2318    Specimen: Blood Updated: 05/07/22 2328    Narrative:      The following orders were created for panel order CBC & Differential.  Procedure                               Abnormality         Status                     ---------                               -----------         ------                     CBC Auto Differential[[id number]]        Abnormal            Final result                 Please view results for these tests on the individual orders.    CBC Auto Differential [[id number]]  (Abnormal) Collected: 05/07/22 2318    Specimen: Blood Updated: 05/07/22 2328     WBC 13.41 10*3/mm3      RBC 4.13 10*6/mm3      Hemoglobin 12.9 g/dL      Hematocrit 40.4 %      MCV 97.8 fL      MCH 31.2 pg      MCHC 31.9 g/dL      RDW 12.2 %      RDW-SD 44.2 fl      MPV 10.4 fL      Platelets 278 10*3/mm3      Neutrophil % 61.2 %      Lymphocyte % 28.4 %      Monocyte % 7.9 %      Eosinophil % 1.4 %      Basophil % 0.7 %      Immature  Grans % 0.4 %      Neutrophils, Absolute 8.20 10*3/mm3      Lymphocytes, Absolute 3.81 10*3/mm3      Monocytes, Absolute 1.06 10*3/mm3      Eosinophils, Absolute 0.19 10*3/mm3      Basophils, Absolute 0.10 10*3/mm3      Immature Grans, Absolute 0.05 10*3/mm3      nRBC 0.0 /100 WBC           Impression: Left open bimalleolar ankle fracture      Surgical Plan: I&D of left open bimalleolar ankle fracture with placement of external fixator and possible placement of wound VAC dressing.      Electronically signed by Leland Aranda MD on 5/8/2022 at 06:10 CDT

## 2022-05-08 NOTE — OP NOTE
Operative Report    Pt Name: Arminda Jiménez  MRN: [id number]  YOB: 1960  Date: 5/8/2022    PREOPERATIVE DIAGNOSIS:  1.   Left open bimalleolar ankle fracture dislocation    POSTOPERATIVE DIAGNOSIS:    1.   Left open bimalleolar ankle fracture dislocation    PROCEDURE:    1.   Open reduction internal fixation of a left open bimalleolar ankle fracture dislocation  2.    Complex wound closure, 15 cm in length 5 cm in width 2 cm in depth, stellate laceration    SURGEON:  Leland Aranda M.D.    ASSISTANT: Pardeep Buitrago PA-C    ANESTHESIA:  General    ESTIMATED BLOOD LOSS: Minimal    COMPLICATIONS:  None.    CONDITION:  Stable.    INDICATION FOR PROCEDURE: The patient is a 62-year-old female who fell getting out of her truck earlier today.  She sustained an external rotation type injury to her ankle.  This resulted in a open bimalleolar ankle fracture dislocation.  She was initially treated in the emergency department with sedation irrigation of the wound as well as a closed reduction with application of a sterile dressing and splint.  Patient was also administered antibiotics.    DESCRIPTION OF PROCEDURE:  The patient was interviewed in the preanesthesia area where the operative extremity was marked with a marking pen.  The patient was then taken to the operative suite where general endotracheal anesthesia was performed per the anesthesia team.  A timeout was called to confirm the patient, the operative site, the planned procedure, and the administration of antibiotics.  The patient was then prepped and draped in a standard sterile fashion using Betadine prep.     Attention was first turned to the open wound.  The wound edges were extended slightly proximal and distal.  I then irrigated the wound with a total of 9 L of pulsatile lavage.  Between each 3 L any devitalized tissue was excised using electrocautery and a 15 blade scalpel.  A small amount of skin fatty tissue fascial tissue muscle were removed.  No  bone was removed.  The wound was clean at the completion of the irrigation and debridement.    The patient did not demonstrate significant swelling.  Also I felt I could get the soft tissue envelope closed.  Based on this I elected to perform an open reduction internal fixation as opposed to placing an external fixator.    Attention was turned distally where an extensile approach was carried out to the lateral malleolus.  The patient had extensive comminution that involve the distal fibula.  Based on this I elected to bridge plate this fracture.  A long Synthes distal fibular locking plate was initially fixed distally.  The assistant surgeon then placed traction across the limb and I used ligamentotaxis to achieve a reduction and restore length.  The fracture was then fixated proximally with 4 bicortical screws.  I was able to restore the length of the fibula and correct rotation restoring a normal ankle mortise.  I then did place three #1 Vicryl sutures around some of the comminuted fracture fragments to hold them in position against the plate.  Due to the poor bone quality and the instability of the fracture I elected to place 1 3.5 mm syndesmotic screw engaging 4 cortices to help stabilize the construct.  The patient's bone quality was very poor.    Attention was then turned to the medial side.  Through the open area I was able to reduce the medial malleolus fracture.  This was held provisionally with K wires.  I then placed a 4.0 x 36 mm partially-threaded cancellous screw and a 4.0 x 33 mm cancellous screw which compressed the fracture site.    Intraoperative fluoroscopy including an anterior posterior lateral and mortise view confirmed restoration of the ankle mortise with appropriate length of the fibula and a reduced ankle joint.    The wounds were once again copiously irrigated with pulsatile lavage.  Attention was then turned to the wound closure medially.  This was somewhat of a stellate wound.  It  measured approximately 15 cm in length and was approximate 5 cm in width.  Using a variation a 2-0 nylon suture I used multiple simple and vertical mattress to close the complex incision.    The medial side was also closed in a layered fashion using 0 Vicryl and a 2-0 nylon suture.  The patient was then placed in a sterile dressing as well as a posterior splint with side bars.  She awoke from anesthesia without difficulty and was transferred to the PACU in stable condition.    Attention was then turned back to the wound.  I was unable to close the wound due to soft tissue tension and poor skin tissue.  Therefore a vacuum-assisted dressing was placed in a standard fashion and set to 125mmHg.  The patient awoke from anesthesia and was transferred to the PACU in stable condition.    Leland Aranda M.D.

## 2022-05-08 NOTE — ANESTHESIA POSTPROCEDURE EVALUATION
Patient: Arminda Jiménez    Procedure Summary     Date: 05/08/22 Room / Location: [facility] OR [address] [place] [address] [facility]    Anesthesia Start: 0629 Anesthesia Stop: 0941    Procedure: Incision and Drainage Left Open Ankle Fracture, Open Reduction Internal Fixation Bimalleolar Ankle Fracture (Left ) Diagnosis:     Surgeons: Leland Aranda MD Provider: Leland Franklin CRNA    Anesthesia Type: general ASA Status: 3 - Emergent          Anesthesia Type: general    Vitals  Vitals Value Taken Time   /74 05/08/22 1004   Temp 97.5 °F (36.4 °C) 05/08/22 0940   Pulse 97 05/08/22 1004   Resp 14 05/08/22 0955   SpO2 97 % 05/08/22 1004   Vitals shown include unvalidated device data.        Post Anesthesia Care and Evaluation    Patient location during evaluation: PHASE II  Patient participation: complete - patient participated  Level of consciousness: awake and alert  Pain score: 0  Pain management: adequate  Airway patency: patent  Anesthetic complications: No anesthetic complications  PONV Status: none  Cardiovascular status: acceptable and stable  Respiratory status: acceptable  Hydration status: acceptable

## 2022-05-08 NOTE — PROGRESS NOTES
[facility] Services  INPATIENT PROGRESS NOTE    Length of Stay: 0  Date of Admission: 5/7/2022  Primary Care Physician: Teddy Sanchez MD    Subjective   Chief Complaint: Follow-up fall with left ankle fracture  HPI   Lying in bed.  She returned from surgery for open reduction internal fixation left open bimalleolar ankle fracture with complex wound closure.  She denied dizziness or lightheadedness prior to fall.  She is complaining of left lower extremity pain level 10 out of 10.  She denies nausea, vomiting or abdominal pain.  Denies chest pain or palpitations.  On oxygen at 3 L after returning from surgery.  She does not wear oxygen at home.  She does not smoke.  She is asking that her left lower extremity be repositioned.  She tells me she does not take Plavix and has not for quite some time.  She denies history of heart failure to me and does not take any medications.  No echocardiogram available.    Review of Systems   Constitutional: Positive for activity change (After fall). Negative for chills, fatigue and fever.   HENT: Negative for congestion and trouble swallowing.    Eyes: Negative for photophobia and visual disturbance.   Respiratory: Negative for cough, shortness of breath and wheezing.    Cardiovascular: Negative for chest pain, palpitations and leg swelling.   Gastrointestinal: Negative for constipation, diarrhea, nausea and vomiting.   Endocrine: Negative for cold intolerance and heat intolerance.   Genitourinary: Negative for dysuria, frequency and urgency.   Musculoskeletal: Positive for gait problem (After fall).   Skin: Positive for wound (Left foot). Negative for color change, pallor and rash.   Allergic/Immunologic: Negative for immunocompromised state.   Neurological: Positive for weakness. Negative for light-headedness.   Hematological: Negative for adenopathy. Does not bruise/bleed easily.   Psychiatric/Behavioral: Negative for agitation,  behavioral problems and confusion.      All pertinent negatives and positives are as above. All other systems have been reviewed and are negative unless otherwise stated.     Objective    Temp:  [97.5 °F (36.4 °C)-98.3 °F (36.8 °C)] 98 °F (36.7 °C)  Heart Rate:  [] 93  Resp:  [10-25] 12  BP: (106-185)/(63-96) 123/73  Physical Exam  Vitals and nursing note reviewed.   Constitutional:       Comments: Lying in bed.  Oxygen at 2 L.  No family in room.   HENT:      Head: Normocephalic and atraumatic.      Nose: No congestion.      Mouth/Throat:      Pharynx: Oropharynx is clear. No oropharyngeal exudate or posterior oropharyngeal erythema.   Eyes:      Extraocular Movements: Extraocular movements intact.   Cardiovascular:      Rate and Rhythm: Normal rate and regular rhythm.      Heart sounds: No murmur heard.  Pulmonary:      Breath sounds: No wheezing, rhonchi or rales.      Comments: Oxygen at 3 L.  Abdominal:      Palpations: Abdomen is soft.      Tenderness: There is no abdominal tenderness.   Genitourinary:     Comments: Voiding.  Musculoskeletal:         General: Tenderness (Left foot.) present.      Cervical back: Normal range of motion and neck supple.      Comments: Left lower extremity with Ace cast.   Skin:     General: Skin is warm and dry.   Neurological:      General: No focal deficit present.      Mental Status: She is alert and oriented to person, place, and time.   Psychiatric:         Mood and Affect: Mood normal.         Behavior: Behavior normal.         Thought Content: Thought content normal.         Judgment: Judgment normal.       Results Review:  I have reviewed the labs, radiology results, and diagnostic studies.    Laboratory Data:    Results from last 7 days   Lab Units 05/08/22  0625 05/07/22  2318   WBC 10*3/mm3 11.72* 13.41*   HEMOGLOBIN g/dL 10.8* 12.9   HEMATOCRIT % 34.4 40.4   PLATELETS 10*3/mm3 243 278     Results from last 7 days   Lab Units 05/08/22  0559 05/08/22  0005    SODIUM mmol/L 139 139   POTASSIUM mmol/L 4.5 4.1   CHLORIDE mmol/L 103 102   CO2 mmol/L 28.0 27.0   BUN mg/dL 13 11   CREATININE mg/dL 0.81 0.96   GLUCOSE mg/dL 144* 128*   CALCIUM mg/dL 8.9 9.1   ALT (SGPT) U/L 29 16     Culture Data:    No results found for: BLOODCX, URINECX, WOUNDCX, MRSACX, RESPCX, STOOLCX    Radiology Data:   Imaging Results (Last 7 Days)     Procedure Component Value Units Date/Time    XR Ankle 2 View Left [[id number]] Collected: 05/08/22 0911     Updated: 05/08/22 0915    Narrative:      EXAMINATION:   XR ANKLE 2 VW LEFT-  5/8/2022 9:11 AM CDT     HISTORY: Fracture or dislocation left ankle     XR ANKLE 2 VW LEFT- 5/8/2022 7:00 AM CDT     HISTORY: surgery; W19.XXXA-Unspecified fall, initial encounter     COMPARISON: None     FLUOROSCOPY TIME: 1 minute 44.1 seconds     FLUOROSCOPY DOSE: 4.13 mGy     NUMBER OF IMAGES: 4       Impression:         Intraoperative fluoroscopic images during ORIF fracture dislocation of  left ankle. Surgical plate and fixation screws are present in the  fibula. Fixation screws present in the medial malleolus..     Please refer to the operative note for more details.  This report was finalized on 05/08/2022 09:12 by Dr. Tay Sanchez MD.    FL C Arm During Surgery [[id number]] Resulted: 05/08/22 0908     Updated: 05/08/22 0909    XR Tibia Fibula 2 View Left [[id number]] Collected: 05/08/22 0722     Updated: 05/08/22 0727    Narrative:      EXAMINATION:   XR TIBIA FIBULA 2 VW LEFT-  5/8/2022 7:22 AM CDT     HISTORY: Patient fell     AP and lateral views of the left leg are obtained. The proximal tibia  and fibula are intact.     In the distal leg there is a comminuted fracture of the distal fibula.  Lateral translation of dome of the talus. There is a transverse fracture  of the medial malleolus. Medial malleolus and lateral malleolus are  relatively aligned with the talus however the talus and malleolus  The dome of the talus displaced lateral to the distal fibula  and tibia.  This may be an open fracture.     IMPRESSION comminuted fracture dislocation left ankle  This report was finalized on 05/08/2022 07:24 by Dr. Tay Sanchez MD.    XR Ankle 2 View Left [[id number]] Collected: 05/08/22 0719     Updated: 05/08/22 0723    Narrative:      EXAMINATION:   XR ANKLE 2 VW LEFT-  5/8/2022 7:19 AM CDT     HISTORY: Postreduction     Comminuted fracture distal fibula and medial malleolus is again noted.  There is improved alignment on this single view. Widening of the ankle  mortise is noted. Soft tissue swelling is present     Air in the soft tissues suggests this is an open fracture.       Impression:      Single view demonstrates improved alignment of the  comminuted fracture fragments.  This report was finalized on 05/08/2022 07:20 by Dr. Tay Sanchez MD.    XR Ankle 2 View Left [[id number]] Collected: 05/08/22 0715     Updated: 05/08/22 0719    Narrative:      EXAMINATION:   XR ANKLE 2 VW LEFT-  5/8/2022 7:15 AM CDT     HISTORY: Patient fell     AP and lateral views of the left ankle is obtained. There is no fracture  or dislocation. There is complete lateral dislocation of the talus with  respect to the tibial plafond. Oblique fracture distal fibula is  present. The dome of the talus is lateral to the fibula. There also is a  fracture of the medial malleolus.       Impression:      Fracture dislocation left ankle  This report was finalized on 05/08/2022 07:16 by Dr. Tay Sanchez MD.    XR Chest 1 View [[id number]] Collected: 05/08/22 0709     Updated: 05/08/22 0712    Narrative:      EXAM: XR CHEST 1 VW- 5/7/2022 11:40 PM CDT     HISTORY: fall, ankle fx       COMPARISON: January 7, 2019.     TECHNIQUE: Frontal radiograph of the chest     FINDINGS:   The lungs are clear. The cardiomediastinal silhouette and pulmonary  vascularity are within normal limits.      The osseous structures and surrounding soft tissues demonstrate no acute  abnormality.          Impression:      1. No  radiographic evidence of acute cardiopulmonary process.        This report was finalized on 05/08/2022 07:09 by Dr. Tay Sanchez MD.          Intake/Output    Intake/Output Summary (Last 24 hours) at 5/8/2022 1341  Last data filed at 5/8/2022 0250  Gross per 24 hour   Intake 1100 ml   Output --   Net 1100 ml     Scheduled Meds  budesonide-formoterol, 2 puff, Inhalation, BID - RT  calcium carb-cholecalciferol, 1 tablet, Oral, BID With Meals  ceFAZolin, 2 g, Intravenous, Q8H  cetirizine, 10 mg, Oral, Daily  cholecalciferol, 1,000 Units, Oral, BID  clindamycin, 900 mg, Intravenous, Q8H  clotrimazole-betamethasone, , Topical, Q12H  escitalopram, 20 mg, Oral, Daily  estrogen (conjugated)-medroxyprogesterone, 1 tablet, Oral, Daily  gabapentin, 800 mg, Oral, 4x Daily  lidocaine, 10 mL, Injection, Once  pantoprazole, 40 mg, Oral, Q AM  [START ON 5/9/2022] rivaroxaban, 10 mg, Oral, Daily  sodium chloride, 10 mL, Intravenous, Q12H      I have reviewed the patient current medications.     Assessment/Plan     Active Hospital Problems    Diagnosis    • **Open left ankle fracture    • Fall    • Primary hypertension    • Panlobular emphysema (HCC)    • Senile osteoporosis      Plan:  1.  To ER 5/7/2022 after fall with left ankle fracture.  Patient was stepping out of her car, had groceries in 1 hand and drink with food and the other hand.  She lost her balance, rolled her left ankle and fell.  She denied loss of consciousness.  She did not hit her head.  She denied dizziness or lightheadedness prior to injury.  She was unable to stand and ambulate.  X-ray showed fracture dislocation left ankle.  X-ray tibia noted comminuted fracture dislocation left ankle.  Status post reduction by ER physician without complications.  Splinted with cast.    2.  Left open bimalleolar ankle fracture.  Open reduction internal fixation of left open bimalleolar ankle fracture dislocation, complex wound closure stellate laceration Dr. Aranda  5/8/2022.    3.  Physical therapy and weightbearing status per Dr. Aranda.    4.  Xarelto 10 mg orally daily beginning 5/9/2022 DVT prophylaxis.    5.  Pain control.  Patient takes Percocet and gabapentin, home medications.    6.  Prophylactic antibiotics with clindamycin started on admission due to open fracture.    7.  Primary hypertension.  Blood pressure 123/73.    8.  Osteoporosis.    9.  Reviewed home medications.  Updated.    10.  CBC and BMP in AM.    CODE STATUS/advance care planning: Full code  Patient surrogate decision-maker is her daughter.    The above documentation resulted from a face-to-face encounter by me Ora PÉREZ, [facility].    Discharge Planning: I expect patient to be discharged to home to be determined.     Electronically signed by ELISA Nathan, 5/8/2022, 13:41 CDT.

## 2022-05-08 NOTE — PLAN OF CARE
Goal Outcome Evaluation:  Plan of Care Reviewed With: patient           Outcome Evaluation: Pt a/o x4, vss, 3l nc. PRN pain meds given throughout the day, pt can feel touch to her left foot. Pt voided. Left leg is casted and aced wrapped. Pt not eating. Family at bedside. Bed locked, lowest position, side rails x3, call light in reach, bed alarm on.

## 2022-05-08 NOTE — ANESTHESIA PREPROCEDURE EVALUATION
Anesthesia Evaluation     Patient summary reviewed and Nursing notes reviewed   NPO Solid Status: > 8 hours  NPO Liquid Status: > 8 hours           Airway   Mallampati: II  TM distance: >3 FB  Neck ROM: full  No difficulty expected  Dental      Pulmonary - normal exam   (+) COPD mild, asthma,  Cardiovascular - normal exam  Exercise tolerance: poor (<4 METS)    (+) hypertension well controlled, CHF , PVD,       Neuro/Psych  (+) numbness, psychiatric history Depression,    GI/Hepatic/Renal/Endo    (+)  GERD well controlled,      Musculoskeletal     (+) back pain,   Abdominal   (+) obese,    Substance History - negative use     OB/GYN negative ob/gyn ROS         Other   arthritis,    history of cancer remission                    Anesthesia Plan    ASA 3 - emergent     general     intravenous induction     Anesthetic plan, all risks, benefits, and alternatives have been provided, discussed and informed consent has been obtained with: patient.  Use of blood products discussed with patient  Consented to blood products.       CODE STATUS:    Level Of Support Discussed With: Patient  Code Status (Patient has no pulse and is not breathing): CPR (Attempt to Resuscitate)  Medical Interventions (Patient has pulse or is breathing): Full Support

## 2022-05-08 NOTE — H&P
[facility] Medicine Services  HISTORY AND PHYSICAL    Date of Admission: 5/7/2022  Primary Care Physician: Teddy Sanchez MD    Subjective     Chief Complaint: Fall left ankle pain    History of Present Illness  Patient is a 62-year-old white female past medical history of adenocarcinoma of unknown primary in remission, arthritis, osteoporosis peripheral vascular disease CHF COPD.  She was in her usual state of health tonight she was stepping out of the car she had groceries in 1 hand and drink her food in the other.  She lost her balance and rolled her left ankle over fell.  She suffered an open ankle fracture she was unable to bear weight.  EMS brings her in no acute distress.  She did not strike her head nor lose consciousness.  She denies any other recent falls.  She denies any other recent complaints.  She is status post reduction by the ER physician without complications.  She is now currently splinted in a cast.  Orthopedics is asked us to facilitate admission for surgical repair.  Reviewing her labs they are relatively unremarkable nonfasting glucose was 128, white count is 13.4 with a normal differential.  Chest x-ray is negative EKG is unremarkable.  Urine has not been ordered yet.        Review of Systems   14 point review of systems negative except for as per HPI    Otherwise complete ROS reviewed and negative except as mentioned in the HPI.    Past Medical History:   Past Medical History:   Diagnosis Date   • Arthritis    • Asthma    • Cancer (HCC)     right lung,abd,and pelvic area   • CHF (congestive heart failure) ([facility])    • Chronic back pain    • COPD (chronic obstructive pulmonary disease) ([facility])    • Depression    • Environmental allergies 4/11/2022   • Facet arthropathy    • Foraminal stenosis of lumbosacral region    • GERD (gastroesophageal reflux disease)    • H/O degenerative disc disease    • History of blood transfusion    • PVD (peripheral vascular  disease) (HCC)    • Radiculopathy      Past Surgical History:  Past Surgical History:   Procedure Laterality Date   • ANTERIOR LUMBAR EXPOSURE N/A 5/7/2018    Procedure: ANTERIOR LUMBAR EXPOSURE;  Surgeon: Chris Haley DO;  Location:  [facility] OR;  Service: Vascular   • APPENDECTOMY     • CARPAL TUNNEL RELEASE Bilateral    • LUMBAR FUSION N/A 5/7/2018    Procedure: ANTERIOR DECOMPRESSION, ANTERIOR LUMBAR INTERBODY FUSION WITH INSTRUMENTATION L5-S1;  Surgeon: LUIS CARLOS Zheng MD;  Location:  [facility] OR;  Service: Orthopedic Spine   • LUMBAR FUSION Right 1/2/2019    Procedure: RIGHT LATERAL LUMBAR INTERBODY FUSION L3-5;  Surgeon: LUIS CARLOS Zheng MD;  Location:  [facility] OR;  Service: Orthopedic Spine   • LUMBAR LAMINECTOMY WITH FUSION N/A 5/9/2018    Procedure: POSTERIOR SPINAL FUSION WITH INSTRUMENTATION L5-S1;  Surgeon: LUIS CARLOS Zheng MD;  Location:  [facility] OR;  Service: Orthopedic Spine   • LUMBAR LAMINECTOMY WITH FUSION Right 1/4/2019    Procedure: POSTERIOR SPINAL FUSION WITH INSTRUMENTATION RIGHT L3-S1;  Surgeon: LUIS CARLOS Zheng MD;  Location:  [facility] OR;  Service: Orthopedic Spine   • OTHER SURGICAL HISTORY Bilateral 2017    Stent implants BLE [address] [place]     Social History:  reports that she has quit smoking. She has a 45.00 pack-year smoking history. She has never used smokeless tobacco. She reports that she does not drink alcohol and does not use drugs.    Family History: family history includes Alcohol abuse in an other family member; Arthritis in an other family member; Asthma in an other family member; COPD in an other family member; Cancer in her brother and another family member; Depression in an other family member; Heart disease in her father, mother, sister, sister, and another family member; Heart failure in an other family member; Kidney disease in her mother and sister; Migraines in an other family member; Stroke in her sister and another family member.       Allergies:  Allergies    Allergen Reactions   • Aspirin Nausea And Vomiting     Unknown reaction   • Keflex [Cephalexin] Other (See Comments)     Blisters to nose and mouth   • Nitrofurantoin GI Intolerance and Nausea And Vomiting   • Creatine Monohydrate Other (See Comments) and Unknown - Low Severity       Medications:  Prior to Admission medications    Medication Sig Start Date End Date Taking? Authorizing Provider   Budeson-Glycopyrrol-Formoterol (Breztri Aerosphere) 160-9-4.8 MCG/ACT aerosol inhaler Inhale 2 puffs. 2/25/22  Yes Mark Lopez MD   calcium citrate-vitamin d (CITRACAL) 200-250 MG-UNIT tablet tablet Take 1 tablet by mouth 2 (Two) Times a Day With Meals.   Yes Mark Lopez MD   cholecalciferol (VITAMIN D3) 1000 units tablet Take 1,000 Units by mouth 2 (Two) Times a Day.   Yes Mark Lopez MD   escitalopram (LEXAPRO) 20 MG tablet Take 20 mg by mouth Daily.   Yes Mark Lopez MD   estrogen, conjugated,-medroxyprogesterone (PREMPRO) 0.3-1.5 MG per tablet Take one tablet by mouth daily 8/20/18  Yes Bam Landis MD   gabapentin (NEURONTIN) 800 MG tablet Take 800 mg by mouth 4 (Four) Times a Day. 3/29/18  Yes Mark Lopez MD   oxyCODONE-acetaminophen (PERCOCET)  MG per tablet oxycodone-acetaminophen 10 mg-325 mg tablet   Yes Mark Lopez MD   Albuterol Sulfate (PROAIR HFA IN) Inhale 2 puffs 4 (Four) Times a Day As Needed (Wheezing/Shortness of breath). 12/15/15   Mark Lopez MD   clopidogrel (PLAVIX) 75 MG tablet Take 75 mg by mouth Daily. HOLD STARTING 12/23/18 3/5/18   Mark Lopez MD   clotrimazole-betamethasone (LOTRISONE) 1-0.05 % cream  1/13/22   Mark Lopez MD   estrogen, conjugated,-medroxyprogesterone (PREMPRO) 0.3-1.5 MG per tablet Take 1 tablet/day by mouth Daily. 9/13/13   Mark Lopez MD   fexofenadine (ALLEGRA) 180 MG tablet Take 180 mg by mouth Daily.    Mark Lopez MD   gabapentin (NEURONTIN) 800 MG  "tablet Take 1,600 mg by mouth Every Night.    ProviderMark MD   NEXIUM 40 MG capsule Take 40 mg by mouth Daily. 1/22/18   ProviderMark MD   Unable to find Take 1 each by mouth 3 (Three) Times a Day. Med Name: Calcium Citrate 600 mg, Take 1 tablet by mouth twice daily.    ProviderMark MD     I have utilized all available immediate resources to obtain, update, and review the patient's current medications.    Objective     Vital Signs: /70 (BP Location: Left arm, Patient Position: Lying)   Pulse 79   Temp 98.1 °F (36.7 °C) (Axillary)   Resp 18   Ht 160 cm (63\")   Wt 76.2 kg (168 lb) Comment: bed scale  SpO2 96%   BMI 29.76 kg/m²   Physical Exam   Gen.:  Well-nourished well-developed white female in no acute distress  HEENT: Atraumatic, normocephalic.  Pupils equal, round, and reactive to light. Extraocular movements intact.  Sclerae anicteric.  External ears negative.  Mucous membranes moist.  Neck is supple without lymphadenopathy.  No JVD is noted.  No carotid bruits are auscultated.  Oropharynx is without erythema or exudate.   Chest: Clear to auscultation and percussion.  CV: Regular rate and rhythm.  Normal S1-S2.  No gallops, murmurs. or rubs.  Abdomen: Soft, nontender, nondistended.  Active bowel sounds,  No hepatosplenomegaly.  No masses.  No hernias.  Extremities: No clubbing, edema, or cyanosis.  Capillary refill is normal.  Pulses are 2+ and symmetric at radial and dorsalis pedis.  Posterior tibial pulses are intact and 2+ palpable.  Left ankle casted.  Toes neurologically intact  Neuro: Patient is awake, alert, and oriented ×3.  Cranial nerves II through XII are grossly intact.  Motor is 5 out of 5 bilaterally.  DTRs are 2+ and symmetric bilaterally. Sensory exam is nonfocal  Skin: Warm, dry, and intact.  No evidence of breakdown.  Sensorium: Normal thought and affect    Nursing notes and vital signs reviewed        Results Reviewed:  Lab Results (last 24 hours)     " Procedure Component Value Units Date/Time    Comprehensive Metabolic Panel [[id number]]  (Abnormal) Collected: 05/08/22 0559    Specimen: Blood Updated: 05/08/22 0651     Glucose 144 mg/dL      BUN 13 mg/dL      Creatinine 0.81 mg/dL      Sodium 139 mmol/L      Potassium 4.5 mmol/L      Chloride 103 mmol/L      CO2 28.0 mmol/L      Calcium 8.9 mg/dL      Total Protein 6.6 g/dL      Albumin 4.00 g/dL      ALT (SGPT) 29 U/L      AST (SGOT) 73 U/L      Alkaline Phosphatase 56 U/L      Total Bilirubin 0.2 mg/dL      Globulin 2.6 gm/dL      A/G Ratio 1.5 g/dL      BUN/Creatinine Ratio 16.0     Anion Gap 8.0 mmol/L      eGFR 82.2 mL/min/1.73      Comment: National Kidney Foundation and American Society of Nephrology (ASN) Task Force recommended calculation based on the Chronic Kidney Disease Epidemiology Collaboration (CKD-EPI) equation refit without adjustment for race.       Narrative:      GFR Normal >60  Chronic Kidney Disease <60  Kidney Failure <15      CBC Auto Differential [[id number]]  (Abnormal) Collected: 05/08/22 0625    Specimen: Blood Updated: 05/08/22 0650     WBC 11.72 10*3/mm3      RBC 3.53 10*6/mm3      Hemoglobin 10.8 g/dL      Hematocrit 34.4 %      MCV 97.5 fL      MCH 30.6 pg      MCHC 31.4 g/dL      RDW 12.3 %      RDW-SD 43.7 fl      MPV 10.7 fL      Platelets 243 10*3/mm3      Neutrophil % 70.3 %      Lymphocyte % 20.3 %      Monocyte % 8.3 %      Eosinophil % 0.3 %      Basophil % 0.4 %      Immature Grans % 0.4 %      Neutrophils, Absolute 8.23 10*3/mm3      Lymphocytes, Absolute 2.38 10*3/mm3      Monocytes, Absolute 0.97 10*3/mm3      Eosinophils, Absolute 0.04 10*3/mm3      Basophils, Absolute 0.05 10*3/mm3      Immature Grans, Absolute 0.05 10*3/mm3      nRBC 0.0 /100 WBC     Urinalysis With Microscopic If Indicated (No Culture) - Urine, Clean Catch [[id number]]  (Abnormal) Collected: 05/08/22 0320    Specimen: Urine, Clean Catch Updated: 05/08/22 0400     Color, UA Yellow     Appearance, UA  Clear     pH, UA 6.5     Specific Gravity, UA 1.025     Glucose, UA Negative     Ketones, UA Negative     Bilirubin, UA Negative     Blood, UA Negative     Protein, UA Negative     Leuk Esterase, UA Small (1+)     Nitrite, UA Negative     Urobilinogen, UA 0.2 E.U./dL    Urinalysis, Microscopic Only - Urine, Clean Catch [[id number]]  (Abnormal) Collected: 05/08/22 0320    Specimen: Urine, Clean Catch Updated: 05/08/22 0400     RBC, UA None Seen /HPF      WBC, UA 13-20 /HPF      Bacteria, UA 1+ /HPF      Squamous Epithelial Cells, UA 0-2 /HPF      Hyaline Casts, UA 7-12 /LPF      Methodology Manual Light Microscopy    Hemoglobin A1c [[id number]]  (Normal) Collected: 05/07/22 2318    Specimen: Blood Updated: 05/08/22 0311     Hemoglobin A1C 5.00 %     Narrative:      Hemoglobin A1C Ranges:    Increased Risk for Diabetes  5.7% to 6.4%  Diabetes                     >= 6.5%  Diabetic Goal                < 7.0%    Magnesium [[id number]]  (Normal) Collected: 05/08/22 0005    Specimen: Blood Updated: 05/08/22 0304     Magnesium 1.9 mg/dL     Comprehensive Metabolic Panel [[id number]]  (Abnormal) Collected: 05/08/22 0005    Specimen: Blood Updated: 05/08/22 0058     Glucose 128 mg/dL      BUN 11 mg/dL      Creatinine 0.96 mg/dL      Sodium 139 mmol/L      Potassium 4.1 mmol/L      Comment: Slight hemolysis detected by analyzer. Results may be affected.        Chloride 102 mmol/L      CO2 27.0 mmol/L      Calcium 9.1 mg/dL      Total Protein 6.9 g/dL      Albumin 3.70 g/dL      ALT (SGPT) 16 U/L      AST (SGOT) 20 U/L      Alkaline Phosphatase 54 U/L      Total Bilirubin <0.2 mg/dL      Globulin 3.2 gm/dL      A/G Ratio 1.2 g/dL      BUN/Creatinine Ratio 11.5     Anion Gap 10.0 mmol/L      eGFR 67.0 mL/min/1.73      Comment: National Kidney Foundation and American Society of Nephrology (ASN) Task Force recommended calculation based on the Chronic Kidney Disease Epidemiology Collaboration (CKD-EPI) equation refit without adjustment  for race.       Narrative:      GFR Normal >60  Chronic Kidney Disease <60  Kidney Failure <15      COVID PRE-OP / PRE-PROCEDURE SCREENING ORDER (NO ISOLATION) - Swab, Nasal Cavity [[id number]]  (Normal) Collected: 05/08/22 0007    Specimen: Swab from Nasal Cavity Updated: 05/08/22 0048    Narrative:      The following orders were created for panel order COVID PRE-OP / PRE-PROCEDURE SCREENING ORDER (NO ISOLATION) - Swab, Nasal Cavity.  Procedure                               Abnormality         Status                     ---------                               -----------         ------                     COVID-19,[facility]...[[id number]]  Normal              Final result                 Please view results for these tests on the individual orders.    COVID-19,[facility] IN-HOUSE,Nasal Swab No Transport Media 3-4 HR TAT - Swab, Nasal Cavity [[id number]]  (Normal) Collected: 05/08/22 0007    Specimen: Swab from Nasal Cavity Updated: 05/08/22 0048     COVID19 Not Detected    Narrative:      Fact sheet for providers: https://www.fda.gov/media/102582/download     Fact sheet for patients: https://www.fda.gov/media/325647/download    Test performed by PCR.    Consider negative results in combination with clinical observations, patient history, and epidemiological information.    Protime-INR [[id number]]  (Normal) Collected: 05/07/22 2318    Specimen: Blood Updated: 05/07/22 2339     Protime 12.3 Seconds      INR 0.95    aPTT [[id number]]  (Normal) Collected: 05/07/22 2318    Specimen: Blood Updated: 05/07/22 2339     PTT 26.1 seconds     CBC & Differential [[id number]]  (Abnormal) Collected: 05/07/22 2318    Specimen: Blood Updated: 05/07/22 2328    Narrative:      The following orders were created for panel order CBC & Differential.  Procedure                               Abnormality         Status                     ---------                               -----------         ------                     CBC Auto  Differential[[id number]]        Abnormal            Final result                 Please view results for these tests on the individual orders.    CBC Auto Differential [[id number]]  (Abnormal) Collected: 05/07/22 2318    Specimen: Blood Updated: 05/07/22 2328     WBC 13.41 10*3/mm3      RBC 4.13 10*6/mm3      Hemoglobin 12.9 g/dL      Hematocrit 40.4 %      MCV 97.8 fL      MCH 31.2 pg      MCHC 31.9 g/dL      RDW 12.2 %      RDW-SD 44.2 fl      MPV 10.4 fL      Platelets 278 10*3/mm3      Neutrophil % 61.2 %      Lymphocyte % 28.4 %      Monocyte % 7.9 %      Eosinophil % 1.4 %      Basophil % 0.7 %      Immature Grans % 0.4 %      Neutrophils, Absolute 8.20 10*3/mm3      Lymphocytes, Absolute 3.81 10*3/mm3      Monocytes, Absolute 1.06 10*3/mm3      Eosinophils, Absolute 0.19 10*3/mm3      Basophils, Absolute 0.10 10*3/mm3      Immature Grans, Absolute 0.05 10*3/mm3      nRBC 0.0 /100 WBC         Imaging Results (Last 24 Hours)     Procedure Component Value Units Date/Time    XR Ankle 2 View Left [[id number]] Resulted: 05/08/22 0054     Updated: 05/08/22 0054    XR Tibia Fibula 2 View Left [[id number]] Resulted: 05/07/22 2341     Updated: 05/07/22 2343    XR Chest 1 View [[id number]] Resulted: 05/07/22 2341     Updated: 05/07/22 2343    XR Ankle 2 View Left [[id number]] Resulted: 05/07/22 2340     Updated: 05/07/22 2342        I have personally reviewed and interpreted the radiology studies and ECG obtained at time of admission.     Assessment / Plan     Assessment:   Active Hospital Problems    Diagnosis    • **Open left ankle fracture    • Fall    • Primary hypertension    • Panlobular emphysema (HCC)    • Senile osteoporosis    1.  Open left ankle fracture plans to admit the patient n.p.o. except for sips and chips.  Hydrate with IV fluids medications for pain control antiemetics.  Consult orthopedic surgery for definitive management.  Consult physical therapy further plans as per orthopedics.  We will also continue  prophylactic antibiotics due to being open fracture with clindamycin.  2.  Fall consult PT.  3.  Hypertension resume home medications monitor BP.  4.  Emphysema currently stable resume inhalers as needed neb treatments for pulmonary toilet incentive spirometry.  5.  Osteoporosis check vitamin D level.  6.  Medications reviewed and resumed as appropriate.    Patient seen after midnight       Code Status/Advanced Care Plan: Full    The patient's surrogate decision maker is the patient's daughter.     I discussed my findings and recommendations with the patient.    Estimated length of stay is greater than 2 nights.     The patient was seen and examined by me on May 8, 2022 at 1:07 AM.    Electronically signed by Bull Jimenez MD, 05/08/22, 06:53 CDT.

## 2022-05-09 ENCOUNTER — READMISSION MANAGEMENT (OUTPATIENT)
Dept: CALL CENTER | Facility: HOSPITAL | Age: 62
End: 2022-05-09

## 2022-05-09 VITALS
SYSTOLIC BLOOD PRESSURE: 101 MMHG | DIASTOLIC BLOOD PRESSURE: 51 MMHG | BODY MASS INDEX: 29.77 KG/M2 | HEIGHT: 63 IN | OXYGEN SATURATION: 94 % | RESPIRATION RATE: 20 BRPM | TEMPERATURE: 100.2 F | WEIGHT: 168 LBS | HEART RATE: 96 BPM

## 2022-05-09 PROBLEM — R26.89 IMPAIRED GAIT AND MOBILITY: Status: ACTIVE | Noted: 2022-05-09

## 2022-05-09 PROBLEM — M79.672 ACUTE FOOT PAIN, LEFT: Status: ACTIVE | Noted: 2022-05-09

## 2022-05-09 PROBLEM — D62 POSTOPERATIVE ANEMIA DUE TO ACUTE BLOOD LOSS: Status: ACTIVE | Noted: 2022-05-09

## 2022-05-09 LAB
ANION GAP SERPL CALCULATED.3IONS-SCNC: 8 MMOL/L (ref 5–15)
BUN SERPL-MCNC: 13 MG/DL (ref 8–23)
BUN/CREAT SERPL: 18.8 (ref 7–25)
CALCIUM SPEC-SCNC: 8.6 MG/DL (ref 8.6–10.5)
CHLORIDE SERPL-SCNC: 103 MMOL/L (ref 98–107)
CO2 SERPL-SCNC: 26 MMOL/L (ref 22–29)
CREAT SERPL-MCNC: 0.69 MG/DL (ref 0.57–1)
DEPRECATED RDW RBC AUTO: 44.9 FL (ref 37–54)
EGFRCR SERPLBLD CKD-EPI 2021: 98.3 ML/MIN/1.73
ERYTHROCYTE [DISTWIDTH] IN BLOOD BY AUTOMATED COUNT: 12.1 % (ref 12.3–15.4)
GLUCOSE SERPL-MCNC: 99 MG/DL (ref 65–99)
HCT VFR BLD AUTO: 30.1 % (ref 34–46.6)
HGB BLD-MCNC: 9.3 G/DL (ref 12–15.9)
MCH RBC QN AUTO: 30.9 PG (ref 26.6–33)
MCHC RBC AUTO-ENTMCNC: 30.9 G/DL (ref 31.5–35.7)
MCV RBC AUTO: 100 FL (ref 79–97)
PLATELET # BLD AUTO: 184 10*3/MM3 (ref 140–450)
PMV BLD AUTO: 10.8 FL (ref 6–12)
POTASSIUM SERPL-SCNC: 4.4 MMOL/L (ref 3.5–5.2)
RBC # BLD AUTO: 3.01 10*6/MM3 (ref 3.77–5.28)
SODIUM SERPL-SCNC: 137 MMOL/L (ref 136–145)
WBC NRBC COR # BLD: 13.37 10*3/MM3 (ref 3.4–10.8)

## 2022-05-09 PROCEDURE — 94799 UNLISTED PULMONARY SVC/PX: CPT

## 2022-05-09 PROCEDURE — 80048 BASIC METABOLIC PNL TOTAL CA: CPT | Performed by: NURSE PRACTITIONER

## 2022-05-09 PROCEDURE — 85027 COMPLETE CBC AUTOMATED: CPT | Performed by: NURSE PRACTITIONER

## 2022-05-09 PROCEDURE — 97165 OT EVAL LOW COMPLEX 30 MIN: CPT

## 2022-05-09 PROCEDURE — 25010000002 CEFAZOLIN PER 500 MG: Performed by: ORTHOPAEDIC SURGERY

## 2022-05-09 PROCEDURE — 97162 PT EVAL MOD COMPLEX 30 MIN: CPT

## 2022-05-09 RX ORDER — DOXYCYCLINE 100 MG/1
100 CAPSULE ORAL EVERY 12 HOURS SCHEDULED
Qty: 12 CAPSULE | Refills: 0 | Status: SHIPPED | OUTPATIENT
Start: 2022-05-09 | End: 2022-05-12 | Stop reason: HOSPADM

## 2022-05-09 RX ORDER — DOXYCYCLINE 100 MG/1
100 TABLET ORAL EVERY 12 HOURS SCHEDULED
Status: DISCONTINUED | OUTPATIENT
Start: 2022-05-09 | End: 2022-05-09 | Stop reason: HOSPADM

## 2022-05-09 RX ADMIN — Medication 1000 UNITS: at 08:38

## 2022-05-09 RX ADMIN — RIVAROXABAN 10 MG: 10 TABLET, FILM COATED ORAL at 08:38

## 2022-05-09 RX ADMIN — Medication 1 TABLET: at 08:38

## 2022-05-09 RX ADMIN — BUDESONIDE AND FORMOTEROL FUMARATE DIHYDRATE 2 PUFF: 160; 4.5 AEROSOL RESPIRATORY (INHALATION) at 07:25

## 2022-05-09 RX ADMIN — HYDROMORPHONE HYDROCHLORIDE 0.5 MG: 1 INJECTION, SOLUTION INTRAMUSCULAR; INTRAVENOUS; SUBCUTANEOUS at 01:27

## 2022-05-09 RX ADMIN — DOXYCYCLINE 100 MG: 100 TABLET, FILM COATED ORAL at 11:00

## 2022-05-09 RX ADMIN — HYDROMORPHONE HYDROCHLORIDE 1 MG: 1 INJECTION, SOLUTION INTRAMUSCULAR; INTRAVENOUS; SUBCUTANEOUS at 14:36

## 2022-05-09 RX ADMIN — HYDROMORPHONE HYDROCHLORIDE 0.5 MG: 1 INJECTION, SOLUTION INTRAMUSCULAR; INTRAVENOUS; SUBCUTANEOUS at 04:04

## 2022-05-09 RX ADMIN — HYDROCODONE BITARTRATE AND ACETAMINOPHEN 1 TABLET: 10; 325 TABLET ORAL at 11:00

## 2022-05-09 RX ADMIN — PANTOPRAZOLE SODIUM 40 MG: 40 TABLET, DELAYED RELEASE ORAL at 05:18

## 2022-05-09 RX ADMIN — CEFAZOLIN SODIUM 2 G: 10 INJECTION, POWDER, FOR SOLUTION INTRAVENOUS at 03:58

## 2022-05-09 RX ADMIN — GABAPENTIN 800 MG: 400 CAPSULE ORAL at 05:18

## 2022-05-09 RX ADMIN — HYDROCODONE BITARTRATE AND ACETAMINOPHEN 1 TABLET: 10; 325 TABLET ORAL at 07:11

## 2022-05-09 RX ADMIN — CLOTRIMAZOLE AND BETAMETHASONE DIPROPIONATE: 10; .5 CREAM TOPICAL at 08:38

## 2022-05-09 RX ADMIN — CETIRIZINE HYDROCHLORIDE 10 MG: 10 TABLET ORAL at 08:38

## 2022-05-09 RX ADMIN — Medication 10 ML: at 08:38

## 2022-05-09 RX ADMIN — GABAPENTIN 800 MG: 400 CAPSULE ORAL at 14:36

## 2022-05-09 RX ADMIN — CLINDAMYCIN IN 5 PERCENT DEXTROSE 900 MG: 18 INJECTION, SOLUTION INTRAVENOUS at 05:21

## 2022-05-09 RX ADMIN — HYDROCODONE BITARTRATE AND ACETAMINOPHEN 1 TABLET: 10; 325 TABLET ORAL at 02:07

## 2022-05-09 RX ADMIN — ESCITALOPRAM 20 MG: 10 TABLET, FILM COATED ORAL at 08:38

## 2022-05-09 NOTE — DISCHARGE PLACEMENT REQUEST
"  KRISTIE CISNEROS [phone]  Riley Coon (62 y.o. Female)             Date of Birth   1960    Social Security Number       Address   [address] [place] [address] [address] [place] [id number]    Home Phone   [phone]    MRN   [id number]       [facility]    Marital Status                               Admission Date   22    Admission Type   Emergency    Admitting Provider   Kofi Andrade MD    Attending Provider   Kofi Andrade MD    Department, Room/Bed   [address], 358/1       Discharge Date       Discharge Disposition   Home or Self Care    Discharge Destination                               Attending Provider: Kofi Andrade MD    Allergies: Aspirin, Keflex [Cephalexin], Nitrofurantoin, Creatine Monohydrate    Isolation: None   Infection: None   Code Status: CPR   Advance Care Planning Activity    Ht: 160 cm (63\")   Wt: 76.2 kg (168 lb)    Admission Cmt: None   Principal Problem: Open left ankle fracture [S82.892B]                 Active Insurance as of 2022     Primary Coverage     Payor Plan Insurance Group Employer/Plan Group    HUMANA MEDICARE REPLACEMENT HUMANA MEDICARE REPLACEMENT [id number]     Payor Plan Address Payor Plan Phone Number Payor Plan Fax Number Effective Dates    [address] [address] [phone]  2022 - None Entered    [facility] [address]       Subscriber Name Subscriber Birth Date Member ID       RILEY COON 1960 [id number]                 Emergency Contacts      (Rel.) Home Phone Work Phone Mobile Phone    Anay Dunham (Daughter) [phone] -- --        [address] [address]  Dept. Phone:  [phone]  Dept. Fax:  [phone] Date Ordered: May 9, 2022         Patient:  Riley Coon MRN:  [id number]   Critical access hospital STATE [address] [facility] [address] :  1960  SSN:    Phone: [phone] Sex:  F     Weight: 76.2 kg (168 lb)         Ht " "Readings from Last 1 Encounters:   22 160 cm (63\")         Standard Wheelchair              (Order ID: [id number])    Diagnosis:  Fall, initial encounter (W19.XXXA [ICD-10-CM] E888.9 [ICD-9-CM])  Foraminal stenosis of lumbosacral region (M48.07 [ICD-10-CM] 724.02 [ICD-9-CM])   Quantity:  1     Wheelchair accessories:  Manual W/C Seat Widths 24-27 inches  Wheelchair accessories:  Elevating Leg Rest (pair)  Wheelchair accessories:  Gen Use W/C Cushion >22\" Gel or Foam  The face to face evaluation was performed on: 2022  Length of Need (99 Months = Lifetime): 12 Months        Authorizing Provider's Phone: [phone]  Verbal Order Mode: Verbal with readback   Authorizing Provider: Leland Aranda MD  Authorizing Provider's NPI: [id number]     Order Entered By: Sara Albert RN 2022 10:03 AM     Electronically signed by:       [address] [address]  Dept. Phone:  [phone]  Dept. Fax:  [phone] Date Ordered: May 9, 2022         Patient:  Arminda Jiménez MRN:  [id number]   [address] [place] [address] [address] [facility] [address] :  1960  SSN:    Phone: [phone] Sex:  F     Weight: 76.2 kg (168 lb)         Ht Readings from Last 1 Encounters:   22 160 cm (63\")         Commode Chair          (Order ID: [id number])    Diagnosis:  Fall, initial encounter (W19.XXXA [ICD-10-CM] E888.9 [ICD-9-CM])  Foraminal stenosis of lumbosacral region (M48.07 [ICD-10-CM] 724.02 [ICD-9-CM])   Quantity:  1     Equipment:  Bedside Commode Chair w/Fixed Arms  Length of Need (99 Months = Lifetime): 99 Months = Lifetime        Authorizing Provider's Phone: [phone]  Verbal Order Mode: Verbal with readback   Authorizing Provider: Leland Aranda MD  Authorizing Provider's NPI: [id number]     Order Entered By: Sara Albert RN 2022 10:04 AM     Electronically signed by:              History & Physical      Bull Jimenez MD at " 05/08/22 0107              [facility] Medicine Services  HISTORY AND PHYSICAL    Date of Admission: 5/7/2022  Primary Care Physician: Teddy Sanchez MD    Subjective     Chief Complaint: Fall left ankle pain    History of Present Illness  Patient is a 62-year-old white female past medical history of adenocarcinoma of unknown primary in remission, arthritis, osteoporosis peripheral vascular disease CHF COPD.  She was in her usual state of health tonight she was stepping out of the car she had groceries in 1 hand and drink her food in the other.  She lost her balance and rolled her left ankle over fell.  She suffered an open ankle fracture she was unable to bear weight.  EMS brings her in no acute distress.  She did not strike her head nor lose consciousness.  She denies any other recent falls.  She denies any other recent complaints.  She is status post reduction by the ER physician without complications.  She is now currently splinted in a cast.  Orthopedics is asked us to facilitate admission for surgical repair.  Reviewing her labs they are relatively unremarkable nonfasting glucose was 128, white count is 13.4 with a normal differential.  Chest x-ray is negative EKG is unremarkable.  Urine has not been ordered yet.        Review of Systems   14 point review of systems negative except for as per HPI    Otherwise complete ROS reviewed and negative except as mentioned in the HPI.    Past Medical History:   Past Medical History:   Diagnosis Date   • Arthritis    • Asthma    • Cancer (HCC)     right lung,abd,and pelvic area   • CHF (congestive heart failure) ([facility])    • Chronic back pain    • COPD (chronic obstructive pulmonary disease) ([facility])    • Depression    • Environmental allergies 4/11/2022   • Facet arthropathy    • Foraminal stenosis of lumbosacral region    • GERD (gastroesophageal reflux disease)    • H/O degenerative disc disease    • History of blood transfusion    •  PVD (peripheral vascular disease) ([facility])    • Radiculopathy      Past Surgical History:  Past Surgical History:   Procedure Laterality Date   • ANTERIOR LUMBAR EXPOSURE N/A 5/7/2018    Procedure: ANTERIOR LUMBAR EXPOSURE;  Surgeon: Chris Haley DO;  Location:  [facility] OR;  Service: Vascular   • APPENDECTOMY     • CARPAL TUNNEL RELEASE Bilateral    • LUMBAR FUSION N/A 5/7/2018    Procedure: ANTERIOR DECOMPRESSION, ANTERIOR LUMBAR INTERBODY FUSION WITH INSTRUMENTATION L5-S1;  Surgeon: LUIS CARLOS Zheng MD;  Location:  [facility] OR;  Service: Orthopedic Spine   • LUMBAR FUSION Right 1/2/2019    Procedure: RIGHT LATERAL LUMBAR INTERBODY FUSION L3-5;  Surgeon: LUIS CARLOS Zheng MD;  Location:  [facility] OR;  Service: Orthopedic Spine   • LUMBAR LAMINECTOMY WITH FUSION N/A 5/9/2018    Procedure: POSTERIOR SPINAL FUSION WITH INSTRUMENTATION L5-S1;  Surgeon: LUIS CARLOS Zheng MD;  Location:  [facility] OR;  Service: Orthopedic Spine   • LUMBAR LAMINECTOMY WITH FUSION Right 1/4/2019    Procedure: POSTERIOR SPINAL FUSION WITH INSTRUMENTATION RIGHT L3-S1;  Surgeon: LUIS CARLOS Zheng MD;  Location:  [facility] OR;  Service: Orthopedic Spine   • OTHER SURGICAL HISTORY Bilateral 2017    Stent implants BLE [place] Alvarado     Social History:  reports that she has quit smoking. She has a 45.00 pack-year smoking history. She has never used smokeless tobacco. She reports that she does not drink alcohol and does not use drugs.    Family History: family history includes Alcohol abuse in an other family member; Arthritis in an other family member; Asthma in an other family member; COPD in an other family member; Cancer in her brother and another family member; Depression in an other family member; Heart disease in her father, mother, sister, sister, and another family member; Heart failure in an other family member; Kidney disease in her mother and sister; Migraines in an other family member; Stroke in her sister and another family member.        Allergies:  Allergies   Allergen Reactions   • Aspirin Nausea And Vomiting     Unknown reaction   • Keflex [Cephalexin] Other (See Comments)     Blisters to nose and mouth   • Nitrofurantoin GI Intolerance and Nausea And Vomiting   • Creatine Monohydrate Other (See Comments) and Unknown - Low Severity       Medications:  Prior to Admission medications    Medication Sig Start Date End Date Taking? Authorizing Provider   Budeson-Glycopyrrol-Formoterol (Breztri Aerosphere) 160-9-4.8 MCG/ACT aerosol inhaler Inhale 2 puffs. 2/25/22  Yes Mark Lopez MD   calcium citrate-vitamin d (CITRACAL) 200-250 MG-UNIT tablet tablet Take 1 tablet by mouth 2 (Two) Times a Day With Meals.   Yes Mark Lopez MD   cholecalciferol (VITAMIN D3) 1000 units tablet Take 1,000 Units by mouth 2 (Two) Times a Day.   Yes Mark Lopez MD   escitalopram (LEXAPRO) 20 MG tablet Take 20 mg by mouth Daily.   Yes Mark Lopez MD   estrogen, conjugated,-medroxyprogesterone (PREMPRO) 0.3-1.5 MG per tablet Take one tablet by mouth daily 8/20/18  Yes Bam Landis MD   gabapentin (NEURONTIN) 800 MG tablet Take 800 mg by mouth 4 (Four) Times a Day. 3/29/18  Yes Mark Lopez MD   oxyCODONE-acetaminophen (PERCOCET)  MG per tablet oxycodone-acetaminophen 10 mg-325 mg tablet   Yes Mark Lopez MD   Albuterol Sulfate (PROAIR HFA IN) Inhale 2 puffs 4 (Four) Times a Day As Needed (Wheezing/Shortness of breath). 12/15/15   Mark Lopez MD   clopidogrel (PLAVIX) 75 MG tablet Take 75 mg by mouth Daily. HOLD STARTING 12/23/18 3/5/18   Mark Lopez MD   clotrimazole-betamethasone (LOTRISONE) 1-0.05 % cream  1/13/22   Mark Lopez MD   estrogen, conjugated,-medroxyprogesterone (PREMPRO) 0.3-1.5 MG per tablet Take 1 tablet/day by mouth Daily. 9/13/13   Mark Lopez MD   fexofenadine (ALLEGRA) 180 MG tablet Take 180 mg by mouth Daily.    Mark Lopez MD  "  gabapentin (NEURONTIN) 800 MG tablet Take 1,600 mg by mouth Every Night.    ProviderMark MD   NEXIUM 40 MG capsule Take 40 mg by mouth Daily. 1/22/18   ProviderMark MD   Unable to find Take 1 each by mouth 3 (Three) Times a Day. Med Name: Calcium Citrate 600 mg, Take 1 tablet by mouth twice daily.    ProviderMark MD     I have utilized all available immediate resources to obtain, update, and review the patient's current medications.    Objective     Vital Signs: /70 (BP Location: Left arm, Patient Position: Lying)   Pulse 79   Temp 98.1 °F (36.7 °C) (Axillary)   Resp 18   Ht 160 cm (63\")   Wt 76.2 kg (168 lb) Comment: bed scale  SpO2 96%   BMI 29.76 kg/m²   Physical Exam   Gen.:  Well-nourished well-developed white female in no acute distress  HEENT: Atraumatic, normocephalic.  Pupils equal, round, and reactive to light. Extraocular movements intact.  Sclerae anicteric.  External ears negative.  Mucous membranes moist.  Neck is supple without lymphadenopathy.  No JVD is noted.  No carotid bruits are auscultated.  Oropharynx is without erythema or exudate.   Chest: Clear to auscultation and percussion.  CV: Regular rate and rhythm.  Normal S1-S2.  No gallops, murmurs. or rubs.  Abdomen: Soft, nontender, nondistended.  Active bowel sounds,  No hepatosplenomegaly.  No masses.  No hernias.  Extremities: No clubbing, edema, or cyanosis.  Capillary refill is normal.  Pulses are 2+ and symmetric at radial and dorsalis pedis.  Posterior tibial pulses are intact and 2+ palpable.  Left ankle casted.  Toes neurologically intact  Neuro: Patient is awake, alert, and oriented ×3.  Cranial nerves II through XII are grossly intact.  Motor is 5 out of 5 bilaterally.  DTRs are 2+ and symmetric bilaterally. Sensory exam is nonfocal  Skin: Warm, dry, and intact.  No evidence of breakdown.  Sensorium: Normal thought and affect    Nursing notes and vital signs reviewed        Results " Reviewed:  Lab Results (last 24 hours)     Procedure Component Value Units Date/Time    Comprehensive Metabolic Panel [[id number]]  (Abnormal) Collected: 05/08/22 0559    Specimen: Blood Updated: 05/08/22 0651     Glucose 144 mg/dL      BUN 13 mg/dL      Creatinine 0.81 mg/dL      Sodium 139 mmol/L      Potassium 4.5 mmol/L      Chloride 103 mmol/L      CO2 28.0 mmol/L      Calcium 8.9 mg/dL      Total Protein 6.6 g/dL      Albumin 4.00 g/dL      ALT (SGPT) 29 U/L      AST (SGOT) 73 U/L      Alkaline Phosphatase 56 U/L      Total Bilirubin 0.2 mg/dL      Globulin 2.6 gm/dL      A/G Ratio 1.5 g/dL      BUN/Creatinine Ratio 16.0     Anion Gap 8.0 mmol/L      eGFR 82.2 mL/min/1.73      Comment: National Kidney Foundation and American Society of Nephrology (ASN) Task Force recommended calculation based on the Chronic Kidney Disease Epidemiology Collaboration (CKD-EPI) equation refit without adjustment for race.       Narrative:      GFR Normal >60  Chronic Kidney Disease <60  Kidney Failure <15      CBC Auto Differential [[id number]]  (Abnormal) Collected: 05/08/22 0625    Specimen: Blood Updated: 05/08/22 0650     WBC 11.72 10*3/mm3      RBC 3.53 10*6/mm3      Hemoglobin 10.8 g/dL      Hematocrit 34.4 %      MCV 97.5 fL      MCH 30.6 pg      MCHC 31.4 g/dL      RDW 12.3 %      RDW-SD 43.7 fl      MPV 10.7 fL      Platelets 243 10*3/mm3      Neutrophil % 70.3 %      Lymphocyte % 20.3 %      Monocyte % 8.3 %      Eosinophil % 0.3 %      Basophil % 0.4 %      Immature Grans % 0.4 %      Neutrophils, Absolute 8.23 10*3/mm3      Lymphocytes, Absolute 2.38 10*3/mm3      Monocytes, Absolute 0.97 10*3/mm3      Eosinophils, Absolute 0.04 10*3/mm3      Basophils, Absolute 0.05 10*3/mm3      Immature Grans, Absolute 0.05 10*3/mm3      nRBC 0.0 /100 WBC     Urinalysis With Microscopic If Indicated (No Culture) - Urine, Clean Catch [[id number]]  (Abnormal) Collected: 05/08/22 0320    Specimen: Urine, Clean Catch Updated: 05/08/22 7384      Color, UA Yellow     Appearance, UA Clear     pH, UA 6.5     Specific Gravity, UA 1.025     Glucose, UA Negative     Ketones, UA Negative     Bilirubin, UA Negative     Blood, UA Negative     Protein, UA Negative     Leuk Esterase, UA Small (1+)     Nitrite, UA Negative     Urobilinogen, UA 0.2 E.U./dL    Urinalysis, Microscopic Only - Urine, Clean Catch [[id number]]  (Abnormal) Collected: 05/08/22 0320    Specimen: Urine, Clean Catch Updated: 05/08/22 0400     RBC, UA None Seen /HPF      WBC, UA 13-20 /HPF      Bacteria, UA 1+ /HPF      Squamous Epithelial Cells, UA 0-2 /HPF      Hyaline Casts, UA 7-12 /LPF      Methodology Manual Light Microscopy    Hemoglobin A1c [[id number]]  (Normal) Collected: 05/07/22 2318    Specimen: Blood Updated: 05/08/22 0311     Hemoglobin A1C 5.00 %     Narrative:      Hemoglobin A1C Ranges:    Increased Risk for Diabetes  5.7% to 6.4%  Diabetes                     >= 6.5%  Diabetic Goal                < 7.0%    Magnesium [[id number]]  (Normal) Collected: 05/08/22 0005    Specimen: Blood Updated: 05/08/22 0304     Magnesium 1.9 mg/dL     Comprehensive Metabolic Panel [[id number]]  (Abnormal) Collected: 05/08/22 0005    Specimen: Blood Updated: 05/08/22 0058     Glucose 128 mg/dL      BUN 11 mg/dL      Creatinine 0.96 mg/dL      Sodium 139 mmol/L      Potassium 4.1 mmol/L      Comment: Slight hemolysis detected by analyzer. Results may be affected.        Chloride 102 mmol/L      CO2 27.0 mmol/L      Calcium 9.1 mg/dL      Total Protein 6.9 g/dL      Albumin 3.70 g/dL      ALT (SGPT) 16 U/L      AST (SGOT) 20 U/L      Alkaline Phosphatase 54 U/L      Total Bilirubin <0.2 mg/dL      Globulin 3.2 gm/dL      A/G Ratio 1.2 g/dL      BUN/Creatinine Ratio 11.5     Anion Gap 10.0 mmol/L      eGFR 67.0 mL/min/1.73      Comment: National Kidney Foundation and American Society of Nephrology (ASN) Task Force recommended calculation based on the Chronic Kidney Disease Epidemiology Collaboration  (CKD-EPI) equation refit without adjustment for race.       Narrative:      GFR Normal >60  Chronic Kidney Disease <60  Kidney Failure <15      COVID PRE-OP / PRE-PROCEDURE SCREENING ORDER (NO ISOLATION) - Swab, Nasal Cavity [[id number]]  (Normal) Collected: 05/08/22 0007    Specimen: Swab from Nasal Cavity Updated: 05/08/22 0048    Narrative:      The following orders were created for panel order COVID PRE-OP / PRE-PROCEDURE SCREENING ORDER (NO ISOLATION) - Swab, Nasal Cavity.  Procedure                               Abnormality         Status                     ---------                               -----------         ------                     COVID-19,[facility] IN-[facility]ENRI[facility]...[[id number]]  Normal              Final result                 Please view results for these tests on the individual orders.    COVID-19,[facility] IN-HOUSE,Nasal Swab No Transport Media 3-4 HR TAT - Swab, Nasal Cavity [[id number]]  (Normal) Collected: 05/08/22 0007    Specimen: Swab from Nasal Cavity Updated: 05/08/22 0048     COVID19 Not Detected    Narrative:      Fact sheet for providers: https://www.fda.gov/media/599611/download     Fact sheet for patients: https://www.fda.gov/media/107071/download    Test performed by PCR.    Consider negative results in combination with clinical observations, patient history, and epidemiological information.    Protime-INR [[id number]]  (Normal) Collected: 05/07/22 2318    Specimen: Blood Updated: 05/07/22 2339     Protime 12.3 Seconds      INR 0.95    aPTT [[id number]]  (Normal) Collected: 05/07/22 2318    Specimen: Blood Updated: 05/07/22 2339     PTT 26.1 seconds     CBC & Differential [[id number]]  (Abnormal) Collected: 05/07/22 2318    Specimen: Blood Updated: 05/07/22 2328    Narrative:      The following orders were created for panel order CBC & Differential.  Procedure                               Abnormality         Status                     ---------                               -----------          ------                     CBC Auto Differential[[id number]]        Abnormal            Final result                 Please view results for these tests on the individual orders.    CBC Auto Differential [[id number]]  (Abnormal) Collected: 05/07/22 2318    Specimen: Blood Updated: 05/07/22 2328     WBC 13.41 10*3/mm3      RBC 4.13 10*6/mm3      Hemoglobin 12.9 g/dL      Hematocrit 40.4 %      MCV 97.8 fL      MCH 31.2 pg      MCHC 31.9 g/dL      RDW 12.2 %      RDW-SD 44.2 fl      MPV 10.4 fL      Platelets 278 10*3/mm3      Neutrophil % 61.2 %      Lymphocyte % 28.4 %      Monocyte % 7.9 %      Eosinophil % 1.4 %      Basophil % 0.7 %      Immature Grans % 0.4 %      Neutrophils, Absolute 8.20 10*3/mm3      Lymphocytes, Absolute 3.81 10*3/mm3      Monocytes, Absolute 1.06 10*3/mm3      Eosinophils, Absolute 0.19 10*3/mm3      Basophils, Absolute 0.10 10*3/mm3      Immature Grans, Absolute 0.05 10*3/mm3      nRBC 0.0 /100 WBC         Imaging Results (Last 24 Hours)     Procedure Component Value Units Date/Time    XR Ankle 2 View Left [[id number]] Resulted: 05/08/22 0054     Updated: 05/08/22 0054    XR Tibia Fibula 2 View Left [[id number]] Resulted: 05/07/22 2341     Updated: 05/07/22 2343    XR Chest 1 View [[id number]] Resulted: 05/07/22 2341     Updated: 05/07/22 2343    XR Ankle 2 View Left [[id number]] Resulted: 05/07/22 2340     Updated: 05/07/22 2342        I have personally reviewed and interpreted the radiology studies and ECG obtained at time of admission.     Assessment / Plan     Assessment:   Active Hospital Problems    Diagnosis    • **Open left ankle fracture    • Fall    • Primary hypertension    • Panlobular emphysema (HCC)    • Senile osteoporosis    1.  Open left ankle fracture plans to admit the patient n.p.o. except for sips and chips.  Hydrate with IV fluids medications for pain control antiemetics.  Consult orthopedic surgery for definitive management.  Consult physical therapy further plans as per  orthopedics.  We will also continue prophylactic antibiotics due to being open fracture with clindamycin.  2.  Fall consult PT.  3.  Hypertension resume home medications monitor BP.  4.  Emphysema currently stable resume inhalers as needed neb treatments for pulmonary toilet incentive spirometry.  5.  Osteoporosis check vitamin D level.  6.  Medications reviewed and resumed as appropriate.    Patient seen after midnight       Code Status/Advanced Care Plan: Full    The patient's surrogate decision maker is the patient's daughter.     I discussed my findings and recommendations with the patient.    Estimated length of stay is greater than 2 nights.     The patient was seen and examined by me on May 8, 2022 at 1:07 AM.    Electronically signed by Bull Jimenez MD, 05/08/22, 06:53 CDT.                Electronically signed by Bull Jimenez MD at 05/08/22 0653          Discharge Summary      Ora Hyatt APRN at 05/09/22 0850              [facility] Medicine Services  DISCHARGE SUMMARY     Date of Admission: 5/7/2022  Date of Discharge:  5/9/2022  Primary Care Physician: Teddy Sanchez MD    Presenting Problem/History of Present Illness:  Open left ankle fracture [S82.892B]     Discharge Diagnoses:  Active Hospital Problems    Diagnosis    • **Open left ankle fracture    • Postoperative anemia due to acute blood loss    • Acute foot pain, left    • Impaired gait and mobility due to left ankle fracture    • Fall    • Panlobular emphysema (HCC)    • Senile osteoporosis      Chief Complaint on Day of Discharge: Left ankle pain  History of Present Illness on Day of Discharge:   Sitting up in bed and up on bedside commode.  Denies nausea, vomiting or abdominal pain.  Denies chest pain or palpitation.  Does not wear oxygen at home.  She tells me she only has a bowel movement 2-3 times a week and she does not need a laxative at this time.    Consults: Dr. Aranda,  orthopedics    Procedures Performed: Open reduction internal fixation left open bimalleolar ankle fracture dislocation.  Complex wound closure stellate laceration 5/8/2022 Dr. Aranda    Pertinent Test Results:      Imaging Results (All)     Procedure Component Value Units Date/Time    XR Ankle 2 View Left [[id number]] Collected: 05/08/22 0911     Updated: 05/09/22 0727    Narrative:      EXAMINATION:   XR ANKLE 2 VW LEFT-  5/8/2022 9:11 AM CDT     HISTORY: Fracture or dislocation left ankle     XR ANKLE 2 VW LEFT- 5/8/2022 7:00 AM CDT     HISTORY: surgery; W19.XXXA-Unspecified fall, initial encounter     COMPARISON: None     FLUOROSCOPY TIME: 1 minute 44.1 seconds     FLUOROSCOPY DOSE: 4.13 mGy     NUMBER OF IMAGES: 4       Impression:         Intraoperative fluoroscopic images during ORIF fracture dislocation of  left ankle. Surgical plate and fixation screws are present in the  fibula. Fixation screws present in the medial malleolus..     Please refer to the operative note for more details.  This report was finalized on 05/08/2022 09:12 by Dr. Tay Sanchez MD.    FL C Arm During Surgery [[id number]] Collected: 05/08/22 0911     Updated: 05/09/22 0727    Narrative:      EXAMINATION:   XR ANKLE 2 VW LEFT-  5/8/2022 9:11 AM CDT     HISTORY: Fracture or dislocation left ankle     XR ANKLE 2 VW LEFT- 5/8/2022 7:00 AM CDT     HISTORY: surgery; W19.XXXA-Unspecified fall, initial encounter     COMPARISON: None     FLUOROSCOPY TIME: 1 minute 44.1 seconds     FLUOROSCOPY DOSE: 4.13 mGy     NUMBER OF IMAGES: 4       Impression:         Intraoperative fluoroscopic images during ORIF fracture dislocation of  left ankle. Surgical plate and fixation screws are present in the  fibula. Fixation screws present in the medial malleolus..     Please refer to the operative note for more details.  This report was finalized on 05/08/2022 09:12 by Dr. Tay Sanchez MD.    XR Tibia Fibula 2 View Left [[id number]] Collected: 05/08/22 0722      Updated: 05/08/22 0727    Narrative:      EXAMINATION:   XR TIBIA FIBULA 2 VW LEFT-  5/8/2022 7:22 AM CDT     HISTORY: Patient fell     AP and lateral views of the left leg are obtained. The proximal tibia  and fibula are intact.     In the distal leg there is a comminuted fracture of the distal fibula.  Lateral translation of dome of the talus. There is a transverse fracture  of the medial malleolus. Medial malleolus and lateral malleolus are  relatively aligned with the talus however the talus and malleolus  The dome of the talus displaced lateral to the distal fibula and tibia.  This may be an open fracture.     IMPRESSION comminuted fracture dislocation left ankle  This report was finalized on 05/08/2022 07:24 by Dr. Tay Sanchez MD.    XR Ankle 2 View Left [[id number]] Collected: 05/08/22 0719     Updated: 05/08/22 0723    Narrative:      EXAMINATION:   XR ANKLE 2 VW LEFT-  5/8/2022 7:19 AM CDT     HISTORY: Postreduction     Comminuted fracture distal fibula and medial malleolus is again noted.  There is improved alignment on this single view. Widening of the ankle  mortise is noted. Soft tissue swelling is present     Air in the soft tissues suggests this is an open fracture.       Impression:      Single view demonstrates improved alignment of the  comminuted fracture fragments.  This report was finalized on 05/08/2022 07:20 by Dr. Tay Sanchez MD.    XR Ankle 2 View Left [[id number]] Collected: 05/08/22 0715     Updated: 05/08/22 0719    Narrative:      EXAMINATION:   XR ANKLE 2 VW LEFT-  5/8/2022 7:15 AM CDT     HISTORY: Patient fell     AP and lateral views of the left ankle is obtained. There is no fracture  or dislocation. There is complete lateral dislocation of the talus with  respect to the tibial plafond. Oblique fracture distal fibula is  present. The dome of the talus is lateral to the fibula. There also is a  fracture of the medial malleolus.       Impression:      Fracture dislocation left  ankle  This report was finalized on 05/08/2022 07:16 by Dr. Tay Sanchez MD.    XR Chest 1 View [[id number]] Collected: 05/08/22 0709     Updated: 05/08/22 0712    Narrative:      EXAM: XR CHEST 1 VW- 5/7/2022 11:40 PM CDT     HISTORY: fall, ankle fx       COMPARISON: January 7, 2019.     TECHNIQUE: Frontal radiograph of the chest     FINDINGS:   The lungs are clear. The cardiomediastinal silhouette and pulmonary  vascularity are within normal limits.      The osseous structures and surrounding soft tissues demonstrate no acute  abnormality.          Impression:      1. No radiographic evidence of acute cardiopulmonary process.        This report was finalized on 05/08/2022 07:09 by Dr. Tay Sanchez MD.        Laboratory Data Last 7 Days:  Results from last 7 days   Lab Units 05/09/22  0805 05/08/22  0625 05/07/22  2318   WBC 10*3/mm3 13.37* 11.72* 13.41*   HEMOGLOBIN g/dL 9.3* 10.8* 12.9   HEMATOCRIT % 30.1* 34.4 40.4   PLATELETS 10*3/mm3 184 243 278     Results from last 7 days   Lab Units 05/09/22  0805 05/08/22  0559 05/08/22  0005   SODIUM mmol/L 137 139 139   POTASSIUM mmol/L 4.4 4.5 4.1   CHLORIDE mmol/L 103 103 102   CO2 mmol/L 26.0 28.0 27.0   BUN mg/dL 13 13 11   CREATININE mg/dL 0.69 0.81 0.96   GLUCOSE mg/dL 99 144* 128*   CALCIUM mg/dL 8.6 8.9 9.1   ALT (SGPT) U/L  --  29 16     Laboratory Data Last 7 Days:    Lab Results (last 7 days)     Procedure Component Value Units Date/Time    Basic Metabolic Panel [[id number]]  (Normal) Collected: 05/09/22 0805    Specimen: Blood Updated: 05/09/22 0850     Glucose 99 mg/dL      BUN 13 mg/dL      Creatinine 0.69 mg/dL      Sodium 137 mmol/L      Potassium 4.4 mmol/L      Chloride 103 mmol/L      CO2 26.0 mmol/L      Calcium 8.6 mg/dL      BUN/Creatinine Ratio 18.8     Anion Gap 8.0 mmol/L      eGFR 98.3 mL/min/1.73      Comment: National Kidney Foundation and American Society of Nephrology (ASN) Task Force recommended calculation based on the Chronic Kidney  Disease Epidemiology Collaboration (CKD-EPI) equation refit without adjustment for race.       Narrative:      GFR Normal >60  Chronic Kidney Disease <60  Kidney Failure <15      CBC (No Diff) [[id number]]  (Abnormal) Collected: 05/09/22 0805    Specimen: Blood Updated: 05/09/22 0829     WBC 13.37 10*3/mm3      RBC 3.01 10*6/mm3      Hemoglobin 9.3 g/dL      Hematocrit 30.1 %      .0 fL      MCH 30.9 pg      MCHC 30.9 g/dL      RDW 12.1 %      RDW-SD 44.9 fl      MPV 10.8 fL      Platelets 184 10*3/mm3     Comprehensive Metabolic Panel [[id number]]  (Abnormal) Collected: 05/08/22 0559    Specimen: Blood Updated: 05/08/22 0651     Glucose 144 mg/dL      BUN 13 mg/dL      Creatinine 0.81 mg/dL      Sodium 139 mmol/L      Potassium 4.5 mmol/L      Chloride 103 mmol/L      CO2 28.0 mmol/L      Calcium 8.9 mg/dL      Total Protein 6.6 g/dL      Albumin 4.00 g/dL      ALT (SGPT) 29 U/L      AST (SGOT) 73 U/L      Alkaline Phosphatase 56 U/L      Total Bilirubin 0.2 mg/dL      Globulin 2.6 gm/dL      A/G Ratio 1.5 g/dL      BUN/Creatinine Ratio 16.0     Anion Gap 8.0 mmol/L      eGFR 82.2 mL/min/1.73      Comment: National Kidney Foundation and American Society of Nephrology (ASN) Task Force recommended calculation based on the Chronic Kidney Disease Epidemiology Collaboration (CKD-EPI) equation refit without adjustment for race.       Narrative:      GFR Normal >60  Chronic Kidney Disease <60  Kidney Failure <15      CBC Auto Differential [[id number]]  (Abnormal) Collected: 05/08/22 0625    Specimen: Blood Updated: 05/08/22 0650     WBC 11.72 10*3/mm3      RBC 3.53 10*6/mm3      Hemoglobin 10.8 g/dL      Hematocrit 34.4 %      MCV 97.5 fL      MCH 30.6 pg      MCHC 31.4 g/dL      RDW 12.3 %      RDW-SD 43.7 fl      MPV 10.7 fL      Platelets 243 10*3/mm3      Neutrophil % 70.3 %      Lymphocyte % 20.3 %      Monocyte % 8.3 %      Eosinophil % 0.3 %      Basophil % 0.4 %      Immature Grans % 0.4 %      Neutrophils,  Absolute 8.23 10*3/mm3      Lymphocytes, Absolute 2.38 10*3/mm3      Monocytes, Absolute 0.97 10*3/mm3      Eosinophils, Absolute 0.04 10*3/mm3      Basophils, Absolute 0.05 10*3/mm3      Immature Grans, Absolute 0.05 10*3/mm3      nRBC 0.0 /100 WBC     Urinalysis With Microscopic If Indicated (No Culture) - Urine, Clean Catch [[id number]]  (Abnormal) Collected: 05/08/22 0320    Specimen: Urine, Clean Catch Updated: 05/08/22 0400     Color, UA Yellow     Appearance, UA Clear     pH, UA 6.5     Specific Gravity, UA 1.025     Glucose, UA Negative     Ketones, UA Negative     Bilirubin, UA Negative     Blood, UA Negative     Protein, UA Negative     Leuk Esterase, UA Small (1+)     Nitrite, UA Negative     Urobilinogen, UA 0.2 E.U./dL    Urinalysis, Microscopic Only - Urine, Clean Catch [[id number]]  (Abnormal) Collected: 05/08/22 0320    Specimen: Urine, Clean Catch Updated: 05/08/22 0400     RBC, UA None Seen /HPF      WBC, UA 13-20 /HPF      Bacteria, UA 1+ /HPF      Squamous Epithelial Cells, UA 0-2 /HPF      Hyaline Casts, UA 7-12 /LPF      Methodology Manual Light Microscopy    Hemoglobin A1c [[id number]]  (Normal) Collected: 05/07/22 2318    Specimen: Blood Updated: 05/08/22 0311     Hemoglobin A1C 5.00 %     Narrative:      Hemoglobin A1C Ranges:    Increased Risk for Diabetes  5.7% to 6.4%  Diabetes                     >= 6.5%  Diabetic Goal                < 7.0%    Magnesium [[id number]]  (Normal) Collected: 05/08/22 0005    Specimen: Blood Updated: 05/08/22 0304     Magnesium 1.9 mg/dL     Comprehensive Metabolic Panel [[id number]]  (Abnormal) Collected: 05/08/22 0005    Specimen: Blood Updated: 05/08/22 0058     Glucose 128 mg/dL      BUN 11 mg/dL      Creatinine 0.96 mg/dL      Sodium 139 mmol/L      Potassium 4.1 mmol/L      Comment: Slight hemolysis detected by analyzer. Results may be affected.        Chloride 102 mmol/L      CO2 27.0 mmol/L      Calcium 9.1 mg/dL      Total Protein 6.9 g/dL      Albumin  3.70 g/dL      ALT (SGPT) 16 U/L      AST (SGOT) 20 U/L      Alkaline Phosphatase 54 U/L      Total Bilirubin <0.2 mg/dL      Globulin 3.2 gm/dL      A/G Ratio 1.2 g/dL      BUN/Creatinine Ratio 11.5     Anion Gap 10.0 mmol/L      eGFR 67.0 mL/min/1.73      Comment: National Kidney Foundation and American Society of Nephrology (ASN) Task Force recommended calculation based on the Chronic Kidney Disease Epidemiology Collaboration (CKD-EPI) equation refit without adjustment for race.       Narrative:      GFR Normal >60  Chronic Kidney Disease <60  Kidney Failure <15      COVID PRE-OP / PRE-PROCEDURE SCREENING ORDER (NO ISOLATION) - Swab, Nasal Cavity [[id number]]  (Normal) Collected: 05/08/22 0007    Specimen: Swab from Nasal Cavity Updated: 05/08/22 0048    Narrative:      The following orders were created for panel order COVID PRE-OP / PRE-PROCEDURE SCREENING ORDER (NO ISOLATION) - Swab, Nasal Cavity.  Procedure                               Abnormality         Status                     ---------                               -----------         ------                     COVID-19,[facility]...[[id number]]  Normal              Final result                 Please view results for these tests on the individual orders.    COVID-19,[facility] IN-HOUSE,Nasal Swab No Transport Media 3-4 HR TAT - Swab, Nasal Cavity [[id number]]  (Normal) Collected: 05/08/22 0007    Specimen: Swab from Nasal Cavity Updated: 05/08/22 0048     COVID19 Not Detected    Narrative:      Fact sheet for providers: https://www.fda.gov/media/969369/download     Fact sheet for patients: https://www.fda.gov/media/839562/download    Test performed by PCR.    Consider negative results in combination with clinical observations, patient history, and epidemiological information.    Protime-INR [[id number]]  (Normal) Collected: 05/07/22 2318    Specimen: Blood Updated: 05/07/22 2339     Protime 12.3 Seconds      INR 0.95    aPTT [[id number]]  (Normal)  Collected: 05/07/22 2318    Specimen: Blood Updated: 05/07/22 2339     PTT 26.1 seconds     CBC & Differential [[id number]]  (Abnormal) Collected: 05/07/22 2318    Specimen: Blood Updated: 05/07/22 2328    Narrative:      The following orders were created for panel order CBC & Differential.  Procedure                               Abnormality         Status                     ---------                               -----------         ------                     CBC Auto Differential[[id number]]        Abnormal            Final result                 Please view results for these tests on the individual orders.    CBC Auto Differential [[id number]]  (Abnormal) Collected: 05/07/22 2318    Specimen: Blood Updated: 05/07/22 2328     WBC 13.41 10*3/mm3      RBC 4.13 10*6/mm3      Hemoglobin 12.9 g/dL      Hematocrit 40.4 %      MCV 97.8 fL      MCH 31.2 pg      MCHC 31.9 g/dL      RDW 12.2 %      RDW-SD 44.2 fl      MPV 10.4 fL      Platelets 278 10*3/mm3      Neutrophil % 61.2 %      Lymphocyte % 28.4 %      Monocyte % 7.9 %      Eosinophil % 1.4 %      Basophil % 0.7 %      Immature Grans % 0.4 %      Neutrophils, Absolute 8.20 10*3/mm3      Lymphocytes, Absolute 3.81 10*3/mm3      Monocytes, Absolute 1.06 10*3/mm3      Eosinophils, Absolute 0.19 10*3/mm3      Basophils, Absolute 0.10 10*3/mm3      Immature Grans, Absolute 0.05 10*3/mm3      nRBC 0.0 /100 WBC         Hospital Course  Patient is a 62 y.o. female presented to [facility] emergency room 5/7/2022 after a fall.  Patient was stepping out of her car and had groceries in 1 hand and a drink and food in the other hand.  She lost her balance, rolled her left ankle and fell.  She denied dizziness, lightheadedness prior to fall.  She did not strike her head or lose consciousness.  Patient denied any recent falls.  She denied any additional complaints.  She experienced immediate pain and was unable to stand and ambulate.  X-ray left ankle show fracture  dislocation left ankle.  X-ray left tibia fibula noted comminuted fracture dislocation left ankle.  Chest x-ray no acute cardiopulmonary process.  Left ankle reduced by emergency room physician without complaints.  Splint cast placed.  Orthopedics consulted and requested admission for surgical repair.  White cell count 13.4.  Urinalysis 13-20 WBC, 1+ bacteria, negative nitrite.  Patient denied any symptoms of dysuria, frequency.  Clindamycin given in ER.    She was admitted to the medical floor with open left ankle fracture reduced and splint cast placed in the emergency room.  She was made nothing by mouth for orthopedic consultation.  She was hydrated with IV fluids, offered pain medication and Zofran for nausea.  Prophylactic antibiotics with clindamycin started on admission due to open fracture.  Dr. Aranda consulted and recommended surgical intervention.  Patient agreeable.  On 5/8/2022 she was taken to surgery for left open bimalleolar ankle fracture.  Open reduction, internal fixation of left open bimalleolar ankle fracture dislocation with complex wound closure stellate laceration per Dr. Aranda.  Postoperatively, she was to remain nonweightbearing left lower extremity per Dr. Aranda.  Xarelto 10 mg orally daily for 6 weeks ordered.  Physical therapy and occupational therapy consulted.  Patient was able to stand and pivot to bedside commode and maintain nonweightbearing status left lower extremity.  Physical therapy recommends bedside commode and wheelchair at discharge as patient already has walker.  Patient will follow-up with Dr. Aranda 4/16/2022 for recheck.  Sutures are to remain in place for 2 to 3 weeks.    Postop blood loss anemia.  Hemoglobin 12.9 on admission 9.3 postop.  No need for transfusion.    Leukocytosis, WBC 13.41 on admission 13.37 at discharge.  Clindamycin ordered on admission due to open fracture.  Temperature max 99.7.  Patient has been encouraged to use incentive spirometry every 2-4 hours  "while awake.  She denies fever or chills.  Denies dysuria.  She received 3 doses of cefazolin.  Clindamycin transitioned to doxycycline for total of 7 days due to open ankle fracture.    She is followed by Dr. Teddy Lazo primary care.  She takes Neurontin and Percocet at home which will be continued at discharge.    On 5/9/2022 she has been evaluated by orthopedics and cleared for discharge.  Patient to remain on Xarelto 10 mg daily for 6 weeks.  She is to remain nonweightbearing left lower extremity until cleared by Dr. Aranda.  Sutures are to remain in place for 2 to 3 weeks.  She will follow-up with Dr. Aranda on 5/16/2022.  Follow-up with Dr. Teddy Lazo 5/17/2022.    Physical Exam on Discharge:  /55 (BP Location: Right arm, Patient Position: Lying)   Pulse 93   Temp 99.7 °F (37.6 °C) (Oral)   Resp 18   Ht 160 cm (63\")   Wt 76.2 kg (168 lb) Comment: bed scale  SpO2 95%   BMI 29.76 kg/m²   Physical Exam  Vitals and nursing note reviewed.   Constitutional:       Comments: Sitting up in bed.  No family in room.  Oxygen removed.   HENT:      Head: Normocephalic and atraumatic.      Nose: No congestion.      Mouth/Throat:      Pharynx: Oropharynx is clear. No oropharyngeal exudate or posterior oropharyngeal erythema.   Eyes:      Extraocular Movements: Extraocular movements intact.      Pupils: Pupils are equal, round, and reactive to light.   Cardiovascular:      Rate and Rhythm: Normal rate and regular rhythm.      Heart sounds: No murmur heard.  Pulmonary:      Breath sounds: No wheezing, rhonchi or rales.      Comments: Oxygen removed.  Saturation 95%.  Abdominal:      Palpations: Abdomen is soft.      Tenderness: There is no abdominal tenderness.   Genitourinary:     Comments: Voiding.  Musculoskeletal:         General: Tenderness (Left lower extremity) present.      Cervical back: Normal range of motion and neck supple.   Skin:     Comments: Ace cast dressing left lower extremity. "   Neurological:      General: No focal deficit present.      Mental Status: She is alert and oriented to person, place, and time.   Psychiatric:         Mood and Affect: Mood normal.         Behavior: Behavior normal.         Thought Content: Thought content normal.         Judgment: Judgment normal.       Condition on Discharge: Stable    Discharge Disposition: Home    Discharge Diet:   Diet Instructions     Advance Diet As Tolerated        As tolerated    Activity at Discharge:   Activity Instructions     Other Activity Instructions      Activity Instructions: Nonweightbearing left lower extremity per Dr. Aranda       Nonweightbearing left lower extremity per Dr. Aranda    Discharge Care Plan / Instructions:   1.  For recurrent fall seek medical attention.  2.  Nonweightbearing left lower extremity per Dr. Aranda.  3.  Follow-up with Dr. Aranda 5/16/2022 for recheck.  Sutures to remain in place 2 to 3 weeks per Dr. Aranda.  4.  Follow-up with Dr. Teddy Lazo  5/17/22  5.  Xarelto 10 mg orally daily for 6 weeks  6.  Doxycycline 100 mg twice daily for 6 days    Discharge Medications:     Discharge Medications      New Medications      Instructions Start Date   doxycycline 100 MG tablet  Commonly known as: ADOXA   100 mg, Oral, Every 12 Hours Scheduled      rivaroxaban 10 MG tablet  Commonly known as: XARELTO   10 mg, Oral, Daily   Start Date: May 10, 2022        Continue These Medications      Instructions Start Date   Agnesjaylonkathrine Aerosphere 160-9-4.8 MCG/ACT aerosol inhaler  Generic drug: Budeson-Glycopyrrol-Formoterol   2 puffs, Inhalation      cholecalciferol 25 MCG (1000 UT) tablet  Commonly known as: VITAMIN D3   1,000 Units, Oral, 2 Times Daily      clotrimazole-betamethasone 1-0.05 % cream  Commonly known as: LOTRISONE   No dose, route, or frequency recorded.      escitalopram 20 MG tablet  Commonly known as: LEXAPRO   20 mg, Oral, Daily      fexofenadine 180 MG tablet  Commonly known as: ALLEGRA   180 mg, Oral,  Daily      gabapentin 800 MG tablet  Commonly known as: NEURONTIN   1,600 mg, Oral, Nightly, Takes at 9pm      gabapentin 800 MG tablet  Commonly known as: NEURONTIN   800 mg, Oral, 2 Times Daily, Takes at 5am and 1pm      nexIUM 40 MG capsule  Generic drug: esomeprazole   40 mg, Oral, Daily      oxyCODONE-acetaminophen  MG per tablet  Commonly known as: PERCOCET   oxycodone-acetaminophen 10 mg-325 mg tablet      PROAIR HFA IN   2 puffs, Inhalation, 4 Times Daily PRN      Unable to find   1 each, Oral, 3 Times Daily, Med Name: Calcium Citrate 600 mg, Take 1 tablet by mouth twice daily.            Follow-up Appointments:    Follow-up Information     Teddy Sanchez MD Follow up on 5/17/2022.    Specialty: Family Medicine  Why: 0930  Contact information:  [address] [place] [address] [phone]             Pardeep Buitrago PA-C Follow up on 5/16/2022.    Specialty: Physician Assistant  Why: 1:10  Contact information:  [address] [facility] [id number] [phone]                       Future Appointments:  Future Appointments   Date Time Provider Department Center   8/22/2022 10:45 AM THERAPIST [facility]ORY [facility]   8/22/2022 11:00 AM Amanda Moreno APRN [facility]       Test Results Pending at Discharge: None    The above documentation resulted from a face-to-face encounter by me Ora PÉREZ, [facility].    Electronically signed by ELISA Nathan, 5/9/2022, 10:10 CDT.    Time: This discharge process required 35 minutes for completion.    Plan discussed with Dr. Andrade, Pardeep Buitrago PA-C, and patient.    Time spent in face-to-face evaluation, chart review, planning and education 35 minutes.          Electronically signed by Ora Hyatt APRN at 05/09/22 1011

## 2022-05-09 NOTE — PLAN OF CARE
Goal Outcome Evaluation:   Call light and belongings in reach, patient edu done for call light and fall risk, patient instructed to call for assistance, every 2 hour checks done

## 2022-05-09 NOTE — DISCHARGE SUMMARY
[facility]  DISCHARGE SUMMARY     Date of Admission: 5/7/2022  Date of Discharge:  5/9/2022  Primary Care Physician: Teddy Sanchez MD    Presenting Problem/History of Present Illness:  Open left ankle fracture [S82.897D]     Discharge Diagnoses:  Active Hospital Problems    Diagnosis    • **Open left ankle fracture    • Postoperative anemia due to acute blood loss    • Acute foot pain, left    • Impaired gait and mobility due to left ankle fracture    • Fall    • Panlobular emphysema (HCC)    • Senile osteoporosis      Chief Complaint on Day of Discharge: Left ankle pain  History of Present Illness on Day of Discharge:   Sitting up in bed and up on bedside commode.  Denies nausea, vomiting or abdominal pain.  Denies chest pain or palpitation.  Does not wear oxygen at home.  She tells me she only has a bowel movement 2-3 times a week and she does not need a laxative at this time.    Consults: Dr. Aranda, orthopedics    Procedures Performed: Open reduction internal fixation left open bimalleolar ankle fracture dislocation.  Complex wound closure stellate laceration 5/8/2022 Dr. Aranda    Pertinent Test Results:      Imaging Results (All)     Procedure Component Value Units Date/Time    XR Ankle 2 View Left [[id number]] Collected: 05/08/22 0911     Updated: 05/09/22 0727    Narrative:      EXAMINATION:   XR ANKLE 2 VW LEFT-  5/8/2022 9:11 AM CDT     HISTORY: Fracture or dislocation left ankle     XR ANKLE 2 VW LEFT- 5/8/2022 7:00 AM CDT     HISTORY: surgery; W19.XXXA-Unspecified fall, initial encounter     COMPARISON: None     FLUOROSCOPY TIME: 1 minute 44.1 seconds     FLUOROSCOPY DOSE: 4.13 mGy     NUMBER OF IMAGES: 4       Impression:         Intraoperative fluoroscopic images during ORIF fracture dislocation of  left ankle. Surgical plate and fixation screws are present in the  fibula. Fixation screws present in the medial malleolus..     Please refer to the  operative note for more details.  This report was finalized on 05/08/2022 09:12 by Dr. Tay Sanchez MD.    FL C Arm During Surgery [[id number]] Collected: 05/08/22 0911     Updated: 05/09/22 0727    Narrative:      EXAMINATION:   XR ANKLE 2 VW LEFT-  5/8/2022 9:11 AM CDT     HISTORY: Fracture or dislocation left ankle     XR ANKLE 2 VW LEFT- 5/8/2022 7:00 AM CDT     HISTORY: surgery; W19.XXXA-Unspecified fall, initial encounter     COMPARISON: None     FLUOROSCOPY TIME: 1 minute 44.1 seconds     FLUOROSCOPY DOSE: 4.13 mGy     NUMBER OF IMAGES: 4       Impression:         Intraoperative fluoroscopic images during ORIF fracture dislocation of  left ankle. Surgical plate and fixation screws are present in the  fibula. Fixation screws present in the medial malleolus..     Please refer to the operative note for more details.  This report was finalized on 05/08/2022 09:12 by Dr. Tay Sanchez MD.    XR Tibia Fibula 2 View Left [[id number]] Collected: 05/08/22 0722     Updated: 05/08/22 0727    Narrative:      EXAMINATION:   XR TIBIA FIBULA 2 VW LEFT-  5/8/2022 7:22 AM CDT     HISTORY: Patient fell     AP and lateral views of the left leg are obtained. The proximal tibia  and fibula are intact.     In the distal leg there is a comminuted fracture of the distal fibula.  Lateral translation of dome of the talus. There is a transverse fracture  of the medial malleolus. Medial malleolus and lateral malleolus are  relatively aligned with the talus however the talus and malleolus  The dome of the talus displaced lateral to the distal fibula and tibia.  This may be an open fracture.     IMPRESSION comminuted fracture dislocation left ankle  This report was finalized on 05/08/2022 07:24 by Dr. Tay Sanchez MD.    XR Ankle 2 View Left [[id number]] Collected: 05/08/22 0719     Updated: 05/08/22 0723    Narrative:      EXAMINATION:   XR ANKLE 2 VW LEFT-  5/8/2022 7:19 AM CDT     HISTORY: Postreduction     Comminuted fracture distal  fibula and medial malleolus is again noted.  There is improved alignment on this single view. Widening of the ankle  mortise is noted. Soft tissue swelling is present     Air in the soft tissues suggests this is an open fracture.       Impression:      Single view demonstrates improved alignment of the  comminuted fracture fragments.  This report was finalized on 05/08/2022 07:20 by Dr. Tay Sanchez MD.    XR Ankle 2 View Left [[id number]] Collected: 05/08/22 0715     Updated: 05/08/22 0719    Narrative:      EXAMINATION:   XR ANKLE 2 VW LEFT-  5/8/2022 7:15 AM CDT     HISTORY: Patient fell     AP and lateral views of the left ankle is obtained. There is no fracture  or dislocation. There is complete lateral dislocation of the talus with  respect to the tibial plafond. Oblique fracture distal fibula is  present. The dome of the talus is lateral to the fibula. There also is a  fracture of the medial malleolus.       Impression:      Fracture dislocation left ankle  This report was finalized on 05/08/2022 07:16 by Dr. Tay Sanchez MD.    XR Chest 1 View [[id number]] Collected: 05/08/22 0709     Updated: 05/08/22 0712    Narrative:      EXAM: XR CHEST 1 VW- 5/7/2022 11:40 PM CDT     HISTORY: fall, ankle fx       COMPARISON: January 7, 2019.     TECHNIQUE: Frontal radiograph of the chest     FINDINGS:   The lungs are clear. The cardiomediastinal silhouette and pulmonary  vascularity are within normal limits.      The osseous structures and surrounding soft tissues demonstrate no acute  abnormality.          Impression:      1. No radiographic evidence of acute cardiopulmonary process.        This report was finalized on 05/08/2022 07:09 by Dr. Tay Sanchez MD.        Laboratory Data Last 7 Days:  Results from last 7 days   Lab Units 05/09/22  0805 05/08/22  0625 05/07/22  2318   WBC 10*3/mm3 13.37* 11.72* 13.41*   HEMOGLOBIN g/dL 9.3* 10.8* 12.9   HEMATOCRIT % 30.1* 34.4 40.4   PLATELETS 10*3/mm3 184 243 278      Results from last 7 days   Lab Units 05/09/22  0805 05/08/22 0559 05/08/22  0005   SODIUM mmol/L 137 139 139   POTASSIUM mmol/L 4.4 4.5 4.1   CHLORIDE mmol/L 103 103 102   CO2 mmol/L 26.0 28.0 27.0   BUN mg/dL 13 13 11   CREATININE mg/dL 0.69 0.81 0.96   GLUCOSE mg/dL 99 144* 128*   CALCIUM mg/dL 8.6 8.9 9.1   ALT (SGPT) U/L  --  29 16     Laboratory Data Last 7 Days:    Lab Results (last 7 days)     Procedure Component Value Units Date/Time    Basic Metabolic Panel [[id number]]  (Normal) Collected: 05/09/22 0805    Specimen: Blood Updated: 05/09/22 0850     Glucose 99 mg/dL      BUN 13 mg/dL      Creatinine 0.69 mg/dL      Sodium 137 mmol/L      Potassium 4.4 mmol/L      Chloride 103 mmol/L      CO2 26.0 mmol/L      Calcium 8.6 mg/dL      BUN/Creatinine Ratio 18.8     Anion Gap 8.0 mmol/L      eGFR 98.3 mL/min/1.73      Comment: National Kidney Foundation and American Society of Nephrology (ASN) Task Force recommended calculation based on the Chronic Kidney Disease Epidemiology Collaboration (CKD-EPI) equation refit without adjustment for race.       Narrative:      GFR Normal >60  Chronic Kidney Disease <60  Kidney Failure <15      CBC (No Diff) [[id number]]  (Abnormal) Collected: 05/09/22 0805    Specimen: Blood Updated: 05/09/22 0829     WBC 13.37 10*3/mm3      RBC 3.01 10*6/mm3      Hemoglobin 9.3 g/dL      Hematocrit 30.1 %      .0 fL      MCH 30.9 pg      MCHC 30.9 g/dL      RDW 12.1 %      RDW-SD 44.9 fl      MPV 10.8 fL      Platelets 184 10*3/mm3     Comprehensive Metabolic Panel [[id number]]  (Abnormal) Collected: 05/08/22 0559    Specimen: Blood Updated: 05/08/22 0651     Glucose 144 mg/dL      BUN 13 mg/dL      Creatinine 0.81 mg/dL      Sodium 139 mmol/L      Potassium 4.5 mmol/L      Chloride 103 mmol/L      CO2 28.0 mmol/L      Calcium 8.9 mg/dL      Total Protein 6.6 g/dL      Albumin 4.00 g/dL      ALT (SGPT) 29 U/L      AST (SGOT) 73 U/L      Alkaline Phosphatase 56 U/L      Total  Bilirubin 0.2 mg/dL      Globulin 2.6 gm/dL      A/G Ratio 1.5 g/dL      BUN/Creatinine Ratio 16.0     Anion Gap 8.0 mmol/L      eGFR 82.2 mL/min/1.73      Comment: National Kidney Foundation and American Society of Nephrology (ASN) Task Force recommended calculation based on the Chronic Kidney Disease Epidemiology Collaboration (CKD-EPI) equation refit without adjustment for race.       Narrative:      GFR Normal >60  Chronic Kidney Disease <60  Kidney Failure <15      CBC Auto Differential [[id number]]  (Abnormal) Collected: 05/08/22 0625    Specimen: Blood Updated: 05/08/22 0650     WBC 11.72 10*3/mm3      RBC 3.53 10*6/mm3      Hemoglobin 10.8 g/dL      Hematocrit 34.4 %      MCV 97.5 fL      MCH 30.6 pg      MCHC 31.4 g/dL      RDW 12.3 %      RDW-SD 43.7 fl      MPV 10.7 fL      Platelets 243 10*3/mm3      Neutrophil % 70.3 %      Lymphocyte % 20.3 %      Monocyte % 8.3 %      Eosinophil % 0.3 %      Basophil % 0.4 %      Immature Grans % 0.4 %      Neutrophils, Absolute 8.23 10*3/mm3      Lymphocytes, Absolute 2.38 10*3/mm3      Monocytes, Absolute 0.97 10*3/mm3      Eosinophils, Absolute 0.04 10*3/mm3      Basophils, Absolute 0.05 10*3/mm3      Immature Grans, Absolute 0.05 10*3/mm3      nRBC 0.0 /100 WBC     Urinalysis With Microscopic If Indicated (No Culture) - Urine, Clean Catch [[id number]]  (Abnormal) Collected: 05/08/22 0320    Specimen: Urine, Clean Catch Updated: 05/08/22 0400     Color, UA Yellow     Appearance, UA Clear     pH, UA 6.5     Specific Gravity, UA 1.025     Glucose, UA Negative     Ketones, UA Negative     Bilirubin, UA Negative     Blood, UA Negative     Protein, UA Negative     Leuk Esterase, UA Small (1+)     Nitrite, UA Negative     Urobilinogen, UA 0.2 E.U./dL    Urinalysis, Microscopic Only - Urine, Clean Catch [[id number]]  (Abnormal) Collected: 05/08/22 0320    Specimen: Urine, Clean Catch Updated: 05/08/22 0400     RBC, UA None Seen /HPF      WBC, UA 13-20 /HPF      Bacteria, UA  1+ /HPF      Squamous Epithelial Cells, UA 0-2 /HPF      Hyaline Casts, UA 7-12 /LPF      Methodology Manual Light Microscopy    Hemoglobin A1c [[id number]]  (Normal) Collected: 05/07/22 2318    Specimen: Blood Updated: 05/08/22 0311     Hemoglobin A1C 5.00 %     Narrative:      Hemoglobin A1C Ranges:    Increased Risk for Diabetes  5.7% to 6.4%  Diabetes                     >= 6.5%  Diabetic Goal                < 7.0%    Magnesium [[id number]]  (Normal) Collected: 05/08/22 0005    Specimen: Blood Updated: 05/08/22 0304     Magnesium 1.9 mg/dL     Comprehensive Metabolic Panel [[id number]]  (Abnormal) Collected: 05/08/22 0005    Specimen: Blood Updated: 05/08/22 0058     Glucose 128 mg/dL      BUN 11 mg/dL      Creatinine 0.96 mg/dL      Sodium 139 mmol/L      Potassium 4.1 mmol/L      Comment: Slight hemolysis detected by analyzer. Results may be affected.        Chloride 102 mmol/L      CO2 27.0 mmol/L      Calcium 9.1 mg/dL      Total Protein 6.9 g/dL      Albumin 3.70 g/dL      ALT (SGPT) 16 U/L      AST (SGOT) 20 U/L      Alkaline Phosphatase 54 U/L      Total Bilirubin <0.2 mg/dL      Globulin 3.2 gm/dL      A/G Ratio 1.2 g/dL      BUN/Creatinine Ratio 11.5     Anion Gap 10.0 mmol/L      eGFR 67.0 mL/min/1.73      Comment: National Kidney Foundation and American Society of Nephrology (ASN) Task Force recommended calculation based on the Chronic Kidney Disease Epidemiology Collaboration (CKD-EPI) equation refit without adjustment for race.       Narrative:      GFR Normal >60  Chronic Kidney Disease <60  Kidney Failure <15      COVID PRE-OP / PRE-PROCEDURE SCREENING ORDER (NO ISOLATION) - Swab, Nasal Cavity [[id number]]  (Normal) Collected: 05/08/22 0007    Specimen: Swab from Nasal Cavity Updated: 05/08/22 0048    Narrative:      The following orders were created for panel order COVID PRE-OP / PRE-PROCEDURE SCREENING ORDER (NO ISOLATION) - Swab, Nasal Cavity.  Procedure                               Abnormality          Status                     ---------                               -----------         ------                     COVID-19,[facility]...[[id number]]  Normal              Final result                 Please view results for these tests on the individual orders.    COVID-19,[facility] IN-HOUSE,Nasal Swab No Transport Media 3-4 HR TAT - Swab, Nasal Cavity [[id number]]  (Normal) Collected: 05/08/22 0007    Specimen: Swab from Nasal Cavity Updated: 05/08/22 0048     COVID19 Not Detected    Narrative:      Fact sheet for providers: https://www.fda.gov/media/562064/download     Fact sheet for patients: https://www.fda.gov/media/530008/download    Test performed by PCR.    Consider negative results in combination with clinical observations, patient history, and epidemiological information.    Protime-INR [[id number]]  (Normal) Collected: 05/07/22 2318    Specimen: Blood Updated: 05/07/22 2339     Protime 12.3 Seconds      INR 0.95    aPTT [[id number]]  (Normal) Collected: 05/07/22 2318    Specimen: Blood Updated: 05/07/22 2339     PTT 26.1 seconds     CBC & Differential [[id number]]  (Abnormal) Collected: 05/07/22 2318    Specimen: Blood Updated: 05/07/22 2328    Narrative:      The following orders were created for panel order CBC & Differential.  Procedure                               Abnormality         Status                     ---------                               -----------         ------                     CBC Auto Differential[[id number]]        Abnormal            Final result                 Please view results for these tests on the individual orders.    CBC Auto Differential [[id number]]  (Abnormal) Collected: 05/07/22 2318    Specimen: Blood Updated: 05/07/22 2328     WBC 13.41 10*3/mm3      RBC 4.13 10*6/mm3      Hemoglobin 12.9 g/dL      Hematocrit 40.4 %      MCV 97.8 fL      MCH 31.2 pg      MCHC 31.9 g/dL      RDW 12.2 %      RDW-SD 44.2 fl      MPV 10.4 fL      Platelets 278 10*3/mm3       Neutrophil % 61.2 %      Lymphocyte % 28.4 %      Monocyte % 7.9 %      Eosinophil % 1.4 %      Basophil % 0.7 %      Immature Grans % 0.4 %      Neutrophils, Absolute 8.20 10*3/mm3      Lymphocytes, Absolute 3.81 10*3/mm3      Monocytes, Absolute 1.06 10*3/mm3      Eosinophils, Absolute 0.19 10*3/mm3      Basophils, Absolute 0.10 10*3/mm3      Immature Grans, Absolute 0.05 10*3/mm3      nRBC 0.0 /100 WBC         Hospital Course  Patient is a 62 y.o. female presented to [facility] emergency room 5/7/2022 after a fall.  Patient was stepping out of her car and had groceries in 1 hand and a drink and food in the other hand.  She lost her balance, rolled her left ankle and fell.  She denied dizziness, lightheadedness prior to fall.  She did not strike her head or lose consciousness.  Patient denied any recent falls.  She denied any additional complaints.  She experienced immediate pain and was unable to stand and ambulate.  X-ray left ankle show fracture dislocation left ankle.  X-ray left tibia fibula noted comminuted fracture dislocation left ankle.  Chest x-ray no acute cardiopulmonary process.  Left ankle reduced by emergency room physician without complaints.  Splint cast placed.  Orthopedics consulted and requested admission for surgical repair.  White cell count 13.4.  Urinalysis 13-20 WBC, 1+ bacteria, negative nitrite.  Patient denied any symptoms of dysuria, frequency.  Clindamycin given in ER.    She was admitted to the medical floor with open left ankle fracture reduced and splint cast placed in the emergency room.  She was made nothing by mouth for orthopedic consultation.  She was hydrated with IV fluids, offered pain medication and Zofran for nausea.  Prophylactic antibiotics with clindamycin started on admission due to open fracture.  Dr. Aranda consulted and recommended surgical intervention.  Patient agreeable.  On 5/8/2022 she was taken to surgery for left open bimalleolar ankle fracture.   "Open reduction, internal fixation of left open bimalleolar ankle fracture dislocation with complex wound closure stellate laceration per Dr. Aranda.  Postoperatively, she was to remain nonweightbearing left lower extremity per Dr. Aranda.  Xarelto 10 mg orally daily for 6 weeks ordered.  Physical therapy and occupational therapy consulted.  Patient was able to stand and pivot to bedside commode and maintain nonweightbearing status left lower extremity.  Physical therapy recommends bedside commode and wheelchair at discharge as patient already has walker.  Patient will follow-up with Dr. Aranda 4/16/2022 for recheck.  Sutures are to remain in place for 2 to 3 weeks.    Postop blood loss anemia.  Hemoglobin 12.9 on admission 9.3 postop.  No need for transfusion.    Leukocytosis, WBC 13.41 on admission 13.37 at discharge.  Clindamycin ordered on admission due to open fracture.  Temperature max 99.7.  Patient has been encouraged to use incentive spirometry every 2-4 hours while awake.  She denies fever or chills.  Denies dysuria.  She received 3 doses of cefazolin.  Clindamycin transitioned to doxycycline for total of 7 days due to open ankle fracture.    She is followed by Dr. Teddy Lazo primary care.  She takes Neurontin and Percocet at home which will be continued at discharge.    On 5/9/2022 she has been evaluated by orthopedics and cleared for discharge.  Patient to remain on Xarelto 10 mg daily for 6 weeks.  She is to remain nonweightbearing left lower extremity until cleared by Dr. Aranda.  Sutures are to remain in place for 2 to 3 weeks.  She will follow-up with Dr. Aranda on 5/16/2022.  Follow-up with Dr. Teddy Lazo 5/17/2022.    Physical Exam on Discharge:  /55 (BP Location: Right arm, Patient Position: Lying)   Pulse 93   Temp 99.7 °F (37.6 °C) (Oral)   Resp 18   Ht 160 cm (63\")   Wt 76.2 kg (168 lb) Comment: bed scale  SpO2 95%   BMI 29.76 kg/m²   Physical Exam  Vitals and nursing note " reviewed.   Constitutional:       Comments: Sitting up in bed.  No family in room.  Oxygen removed.   HENT:      Head: Normocephalic and atraumatic.      Nose: No congestion.      Mouth/Throat:      Pharynx: Oropharynx is clear. No oropharyngeal exudate or posterior oropharyngeal erythema.   Eyes:      Extraocular Movements: Extraocular movements intact.      Pupils: Pupils are equal, round, and reactive to light.   Cardiovascular:      Rate and Rhythm: Normal rate and regular rhythm.      Heart sounds: No murmur heard.  Pulmonary:      Breath sounds: No wheezing, rhonchi or rales.      Comments: Oxygen removed.  Saturation 95%.  Abdominal:      Palpations: Abdomen is soft.      Tenderness: There is no abdominal tenderness.   Genitourinary:     Comments: Voiding.  Musculoskeletal:         General: Tenderness (Left lower extremity) present.      Cervical back: Normal range of motion and neck supple.   Skin:     Comments: Ace cast dressing left lower extremity.   Neurological:      General: No focal deficit present.      Mental Status: She is alert and oriented to person, place, and time.   Psychiatric:         Mood and Affect: Mood normal.         Behavior: Behavior normal.         Thought Content: Thought content normal.         Judgment: Judgment normal.       Condition on Discharge: Stable    Discharge Disposition: Home    Discharge Diet:   Diet Instructions     Advance Diet As Tolerated        As tolerated    Activity at Discharge:   Activity Instructions     Other Activity Instructions      Activity Instructions: Nonweightbearing left lower extremity per Dr. Aranda       Nonweightbearing left lower extremity per Dr. Aranda    Discharge Care Plan / Instructions:   1.  For recurrent fall seek medical attention.  2.  Nonweightbearing left lower extremity per Dr. Aranda.  3.  Follow-up with Dr. Aranda 5/16/2022 for recheck.  Sutures to remain in place 2 to 3 weeks per Dr. Aranda.  4.  Follow-up with Dr. Teddy Lazo   5/17/22  5.  Xarelto 10 mg orally daily for 6 weeks  6.  Doxycycline 100 mg twice daily for 6 days    Discharge Medications:     Discharge Medications      New Medications      Instructions Start Date   doxycycline 100 MG tablet  Commonly known as: ADOXA   100 mg, Oral, Every 12 Hours Scheduled      rivaroxaban 10 MG tablet  Commonly known as: XARELTO   10 mg, Oral, Daily   Start Date: May 10, 2022        Continue These Medications      Instructions Start Date   Brejaylontri Aerosphere 160-9-4.8 MCG/ACT aerosol inhaler  Generic drug: Budeson-Glycopyrrol-Formoterol   2 puffs, Inhalation      cholecalciferol 25 MCG (1000 UT) tablet  Commonly known as: VITAMIN D3   1,000 Units, Oral, 2 Times Daily      clotrimazole-betamethasone 1-0.05 % cream  Commonly known as: LOTRISONE   No dose, route, or frequency recorded.      escitalopram 20 MG tablet  Commonly known as: LEXAPRO   20 mg, Oral, Daily      fexofenadine 180 MG tablet  Commonly known as: ALLEGRA   180 mg, Oral, Daily      gabapentin 800 MG tablet  Commonly known as: NEURONTIN   1,600 mg, Oral, Nightly, Takes at 9pm      gabapentin 800 MG tablet  Commonly known as: NEURONTIN   800 mg, Oral, 2 Times Daily, Takes at 5am and 1pm      nexIUM 40 MG capsule  Generic drug: esomeprazole   40 mg, Oral, Daily      oxyCODONE-acetaminophen  MG per tablet  Commonly known as: PERCOCET   oxycodone-acetaminophen 10 mg-325 mg tablet      PROAIR HFA IN   2 puffs, Inhalation, 4 Times Daily PRN      Unable to find   1 each, Oral, 3 Times Daily, Med Name: Calcium Citrate 600 mg, Take 1 tablet by mouth twice daily.            Follow-up Appointments:    Follow-up Information     Teddy Sanchez MD Follow up on 5/17/2022.    Specialty: Family Medicine  Why: 0930  Contact information:  [address] [facility] [place] [address] [phone]             Pardeep Buitrago PA-C Follow up on 5/16/2022.    Specialty: Physician Assistant  Why: 1:10  Contact information:  [address] [place] [address]  [phone]                       Future Appointments:  Future Appointments   Date Time Provider Department Center   8/22/2022 10:45 AM THERAPIST RESPIRATORY [facility]   8/22/2022 11:00 AM Amanda Moreno APRN [facility]       Test Results Pending at Discharge: None    The above documentation resulted from a face-to-face encounter by me Ora PÉREZ, [facility].    Electronically signed by ELISA Nathan, 5/9/2022, 10:10 CDT.    Time: This discharge process required 35 minutes for completion.    Plan discussed with Dr. Andrade, Pardeep Buitrago PA-C, and patient.    Time spent in face-to-face evaluation, chart review, planning and education 35 minutes.

## 2022-05-09 NOTE — THERAPY DISCHARGE NOTE
Acute Care - Occupational Therapy Initial Evaluation/Discharge  [facility]     Patient Name: Arminda Jiménez  : 1960  MRN: [id number]  Today's Date: 2022  Onset of Illness/Injury or Date of Surgery: 22  Date of Referral to OT: 22  Referring Physician: Dr. Aranda      Admit Date: 2022       ICD-10-CM ICD-9-CM   1. Fall, initial encounter  W19.XXXA E888.9   2. Foraminal stenosis of lumbosacral region  M48.07 724.02   3. Gait abnormality  R26.9 781.2     Patient Active Problem List   Diagnosis   • Foraminal stenosis of lumbosacral region   • Chronic back pain   • Radiculopathy   • Lumbar stenosis   • Panlobular emphysema (HCC)   • Gastroesophageal reflux disease without esophagitis   • Constipation due to opioid therapy   • PAD (peripheral artery disease) ([facility])   • Primary osteoarthritis involving multiple joints   • Recurrent major depressive disorder ([facility])   • Overweight   • Personal history of nicotine dependence   • Adenocarcinoma of unknown origin ([facility])   • Environmental allergies   • Primary hypertension   • Senile osteoporosis   • Open left ankle fracture   • Fall   • Postoperative anemia due to acute blood loss   • Acute foot pain, left   • Impaired gait and mobility due to left ankle fracture     Past Medical History:   Diagnosis Date   • Arthritis    • Asthma    • Cancer ([facility])     right lung,abd,and pelvic area   • Chronic back pain    • COPD (chronic obstructive pulmonary disease) ([facility])    • Depression    • Environmental allergies 2022   • Facet arthropathy    • Foraminal stenosis of lumbosacral region    • GERD (gastroesophageal reflux disease)    • H/O degenerative disc disease    • History of blood transfusion    • PVD (peripheral vascular disease) ([facility])    • Radiculopathy      Past Surgical History:   Procedure Laterality Date   • ANTERIOR LUMBAR EXPOSURE N/A 2018    Procedure: ANTERIOR LUMBAR EXPOSURE;  Surgeon: Chris Haley DO;  Location: [facility];  Service:  Vascular   • APPENDECTOMY     • CARPAL TUNNEL RELEASE Bilateral    • INCISION AND DRAINAGE OF WOUND Left 5/8/2022    Procedure: Incision and Drainage Left Open Ankle Fracture, Open Reduction Internal Fixation Bimalleolar Ankle Fracture;  Surgeon: Leland Aranda MD;  Location:  [facility];  Service: Orthopedics;  Laterality: Left;   • LUMBAR FUSION N/A 5/7/2018    Procedure: ANTERIOR DECOMPRESSION, ANTERIOR LUMBAR INTERBODY FUSION WITH INSTRUMENTATION L5-S1;  Surgeon: LUIS CARLOS Zheng MD;  Location:  [facility];  Service: Orthopedic Spine   • LUMBAR FUSION Right 1/2/2019    Procedure: RIGHT LATERAL LUMBAR INTERBODY FUSION L3-5;  Surgeon: LUIS CARLOS Zheng MD;  Location:  [facility];  Service: Orthopedic Spine   • LUMBAR LAMINECTOMY WITH FUSION N/A 5/9/2018    Procedure: POSTERIOR SPINAL FUSION WITH INSTRUMENTATION L5-S1;  Surgeon: LUIS CARLOS Zheng MD;  Location:  [facility];  Service: Orthopedic Spine   • LUMBAR LAMINECTOMY WITH FUSION Right 1/4/2019    Procedure: POSTERIOR SPINAL FUSION WITH INSTRUMENTATION RIGHT L3-S1;  Surgeon: LUIS CARLOS Zheng MD;  Location:  [facility];  Service: Orthopedic Spine   • OTHER SURGICAL HISTORY Bilateral 2017    Stent implants BLE [address] [place]       OT ASSESSMENT FLOWSHEET (last 12 hours)     OT Evaluation and Treatment     Row Name 05/09/22 0904                   OT Time and Intention    Subjective Information complains of;pain  -AC        Document Type evaluation  -AC        Mode of Treatment occupational therapy  -AC                  General Information    Patient Profile Reviewed yes  -AC        Onset of Illness/Injury or Date of Surgery 05/07/22  -AC        Referring Physician Dr. Aranda  -AC        General Observations of Patient groggy but awake and alerts  -AC        Prior Level of Function independent:;all household mobility;community mobility;gait;transfer;bed mobility;ADL's;home management;cooking;cleaning;driving;shopping  -AC        Equipment Currently Used at Home  "cane, straight;walker, rolling  -AC        Pertinent History of Current Functional Problem fall from standing height, L ankle pain; Dx: L bimalleolar ankle fx dislocation s/p ORIF on 5/8/22; PMH: adenocarcinoma w/unknown primary  -AC        Existing Precautions/Restrictions fall;non-weight bearing   NWB L ankle  -AC        Barriers to Rehab none identified  -AC                  Living Environment    Current Living Arrangements home  walk in shower  -AC        Home Accessibility stairs to enter home  -AC        People in Home child(leno), adult  -AC        Name(s) of People in Home Regina Dunham  daughter able to assist pt 24/7  -                  Home Main Entrance    Number of Stairs, Main Entrance two  -AC        Stair Railings, Main Entrance railing on right side (ascending)  -AC                  Pain Assessment    Pretreatment Pain Rating 4/10  -AC        Posttreatment Pain Rating 4/10  -AC        Pain Location - Side/Orientation Right  -AC        Pain Location lower  -AC        Pain Location - extremity  -AC        Pre/Posttreatment Pain Comment \"ankle and back of knee\"  -AC                  Cognition    Orientation Status (Cognition) oriented x 4  -AC        Follows Commands (Cognition) WNL  -AC        Personal Safety Interventions fall prevention program maintained;gait belt;muscle strengthening facilitated;nonskid shoes/slippers when out of bed;supervised activity  -AC                  Range of Motion Comprehensive    Comment, General Range of Motion WFL AROM BUE except R wrist no AROM d/t prior fusion  -AC                  Strength Comprehensive (MMT)    Comment, General Manual Muscle Testing (MMT) Assessment 4+/5 B shoulders and triceps/biceps  -AC                  Activities of Daily Living    BADL Assessment/Intervention lower body dressing  -AC                  Lower Body Dressing Assessment/Training    Sa[place] Level (Lower Body Dressing) don;socks;standby assist  -AC        Position (Lower Body " Dressing) edge of bed sitting  -AC                  BADL Safety/Performance    Impairments, BADL Safety/Performance balance;endurance/activity tolerance;pain;range of motion  -                  Bed Mobility    Bed Mobility scooting/bridging;supine-sit  -AC        Scooting/Bridging [place] (Bed Mobility) standby assist  -AC        Supine-Sit [place] (Bed Mobility) standby assist  -AC        Assistive Device (Bed Mobility) head of bed elevated  -                  Transfer Assessment/Treatment    Transfers bed-chair transfer;chair-bed transfer  -AC        Maintains Weight-bearing Status (Transfers) able to maintain  -AC                  Transfers    Bed-Chair [place] (Transfers) standby assist  -AC        Chair-Bed [place] (Transfers) standby assist  -AC        Assistive Device (Bed-Chair Transfers) walker, front-wheeled  -                  Chair-Bed Transfer    Assistive Device (Chair-Bed Transfers) walker, front-wheeled  -AC                  Safety Issues, Functional Mobility    Impairments Affecting Function (Mobility) balance;endurance/activity tolerance;pain;range of motion (ROM)  -                  Balance    Balance Assessment sitting static balance;sitting dynamic balance;standing static balance;standing dynamic balance  -AC        Static Sitting Balance independent  -AC        Dynamic Sitting Balance standby assist  -AC        Position, Sitting Balance sitting edge of bed  -AC        Static Standing Balance standby assist  -AC        Dynamic Standing Balance standby assist  -AC        Position/Device Used, Standing Balance walker, front-wheeled  -AC                  Wound 05/08/22 0655 Left lateral ankle Incision    Wound - Properties Group Placement Date: 05/08/22  -JR Placement Time: 0655  -JR Side: Left  -JR Orientation: lateral  -JR Location: ankle  -JR Primary Wound Type: Incision  -JR        Retired Wound - Properties Group Placement Date: 05/08/22  -JR Placement Time: 0655   -JR Side: Left  -JR Orientation: lateral  -JR Location: ankle  -JR Primary Wound Type: Incision  -JR        Retired Wound - Properties Group Date first assessed: 05/08/22  -JR Time first assessed: 0655 -JR Side: Left  -JR Location: ankle  -JR Primary Wound Type: Incision  -JR                  Plan of Care Review    Plan of Care Reviewed With patient  -AC        Progress no change  -AC        Outcome Evaluation OT eval completed.  Pt drowsy but alerts and is oriented x4.  Pt has prior history of R wrist fusion with no active flexion or extension at wrist.  Pt came to EOB with SBA.  SBA for sitting balance.  Pt dons sock on R foot with SBA.  Transfers sit>stand initially with Santosh.  Maintains NWB status well. On subsequent transfers, pt able to transfer with SBA-CGA.  Pt voids and completes hygiene on BSC with set up.  Educated pt at length on transfer safety, DME recs, DVT prevention, breathing exercises.  O2 sat initially 89% on room air, increased to 94% post activity but trends down when pt is seated and not focusing on breathing.  Recommend BSC and w/c, notified .  Pt plans to discharge home with brother.  OT agrees with this plan and pt is safe for discharge.  Pt does not wish to receive HH at discharge.  -AC                  Vital Signs    Pre SpO2 (%) 87  -AC        O2 Delivery Pre Treatment room air  -AC        Intra SpO2 (%) 91  -AC        O2 Delivery Intra Treatment room air  -AC        Post SpO2 (%) 94  -AC        O2 Delivery Post Treatment room air  -AC        Pre Patient Position Supine  -AC        Intra Patient Position Sitting  -AC        Post Patient Position Sitting  -AC                  Positioning and Restraints    Pre-Treatment Position in bed  -AC        Post Treatment Position chair  -AC        In Chair reclined;call light within reach;encouraged to call for assist;legs elevated  -AC                  Therapy Assessment/Plan (OT)    Date of Referral to OT 05/08/22  -        OT  Diagnosis decreased adl  -        Criteria for Skilled Therapeutic Interventions Met (OT) no problems identified which require skilled intervention  -        Therapy Frequency (OT) evaluation only  -              User Key  (r) = Recorded By, (t) = Taken By, (c) = Cosigned By    Initials Name Effective Dates     Slade Green, OTR/LBETZAIDA 04/09/19 -     Haleigh Granados RN 02/17/22 -                 Occupational Therapy Education                 Title: PT OT SLP Therapies (In Progress)     Topic: Occupational Therapy (In Progress)     Point: ADL training (Done)     Description:   Instruct learner(s) on proper safety adaptation and remediation techniques during self care or transfers.   Instruct in proper use of assistive devices.              Learning Progress Summary           Patient Acceptance, E,TB,D, VU,DU by  at 5/9/2022 1019                   Point: Home exercise program (Not Started)     Description:   Instruct learner(s) on appropriate technique for monitoring, assisting and/or progressing therapeutic exercises/activities.              Learner Progress:  Not documented in this visit.          Point: Precautions (Done)     Description:   Instruct learner(s) on prescribed precautions during self-care and functional transfers.              Learning Progress Summary           Patient Acceptance, E,TB,D, VU,DU by  at 5/9/2022 1019                   Point: Body mechanics (Done)     Description:   Instruct learner(s) on proper positioning and spine alignment during self-care, functional mobility activities and/or exercises.              Learning Progress Summary           Patient Acceptance, E,TB,D, VU,DU by  at 5/9/2022 1019                               User Key     Initials Effective Dates Name Provider Type Discipline     04/09/19 -  Slade Green OTR/L, BETZAIDA Occupational Therapist OT                OT Recommendation and Plan  Therapy Frequency (OT): evaluation only  Plan of Care  Review  Plan of Care Reviewed With: patient  Progress: no change  Outcome Evaluation: OT eval completed.  Pt drowsy but alerts and is oriented x4.  Pt has prior history of R wrist fusion with no active flexion or extension at wrist.  Pt came to EOB with SBA.  SBA for sitting balance.  Pt dons sock on R foot with SBA.  Transfers sit>stand initially with Santosh.  Maintains NWB status well. On subsequent transfers, pt able to transfer with SBA-CGA.  Pt voids and completes hygiene on BSC with set up.  Educated pt at length on transfer safety, DME recs, DVT prevention, breathing exercises.  O2 sat initially 89% on room air, increased to 94% post activity but trends down when pt is seated and not focusing on breathing.  Recommend BSC and w/c, notified .  Pt plans to discharge home with brother.  OT agrees with this plan and pt is safe for discharge.  Pt does not wish to receive HH at discharge.  Plan of Care Reviewed With: patient  Outcome Evaluation: OT akosua completed.  Pt drowsy but alerts and is oriented x4.  Pt has prior history of R wrist fusion with no active flexion or extension at wrist.  Pt came to EOB with SBA.  SBA for sitting balance.  Pt dons sock on R foot with SBA.  Transfers sit>stand initially with Santosh.  Maintains NWB status well. On subsequent transfers, pt able to transfer with SBA-CGA.  Pt voids and completes hygiene on BSC with set up.  Educated pt at length on transfer safety, DME recs, DVT prevention, breathing exercises.  O2 sat initially 89% on room air, increased to 94% post activity but trends down when pt is seated and not focusing on breathing.  Recommend BSC and w/c, notified .  Pt plans to discharge home with brother.  OT agrees with this plan and pt is safe for discharge.  Pt does not wish to receive HH at discharge.          Outcome Measures     Row Name 05/09/22 0904             How much help from another is currently needed...    Putting on and taking off regular  lower body clothing? 3  -AC      Bathing (including washing, rinsing, and drying) 3  -AC      Toileting (which includes using toilet bed pan or urinal) 3  -AC      Putting on and taking off regular upper body clothing 4  -AC      Taking care of personal grooming (such as brushing teeth) 4  -AC      Eating meals 4  -AC      AM-PAC 6 Clicks Score (OT) 21  -AC              Functional Assessment    Outcome Measure Options AM-PAC 6 Clicks Daily Activity (OT)  -            User Key  (r) = Recorded By, (t) = Taken By, (c) = Cosigned By    Initials Name Provider Type    Slade Marrufo OTR/L, CNT Occupational Therapist                Time Calculation:    Time Calculation- OT     Row Name 05/09/22 1019             Time Calculation- OT    OT Start Time 0904  -      OT Stop Time 1000  -      OT Time Calculation (min) 56 min  -      OT Received On 05/09/22  -            User Key  (r) = Recorded By, (t) = Taken By, (c) = Cosigned By    Initials Name Provider Type    Slade Marrufo OTR/L, CNT Occupational Therapist                Therapy Charges for Today     Code Description Service Date Service Provider Modifiers Qty    [id number]  OT EVAL LOW COMPLEXITY 4 5/9/2022 Slade Green OTR/L, CNT GO 1               OT Discharge Summary  Anticipated Discharge Disposition (OT): home with assist  Reason for Discharge: Discharge from facility  Outcomes Achieved: Other  Discharge Destination: Home with assist    BACILIO Simeon/L, CNT  5/9/2022

## 2022-05-09 NOTE — THERAPY EVALUATION
Patient Name: Arminda Jiménez  : 1960    MRN: [id number]                              Today's Date: 2022       Admit Date: 2022    Visit Dx:     ICD-10-CM ICD-9-CM   1. Fall, initial encounter  W19.XXXA E888.9   2. Foraminal stenosis of lumbosacral region  M48.07 724.02   3. Gait abnormality  R26.9 781.2     Patient Active Problem List   Diagnosis   • Foraminal stenosis of lumbosacral region   • Chronic back pain   • Radiculopathy   • Lumbar stenosis   • Panlobular emphysema ([facility])   • Gastroesophageal reflux disease without esophagitis   • Constipation due to opioid therapy   • PAD (peripheral artery disease) ([facility])   • Primary osteoarthritis involving multiple joints   • Recurrent major depressive disorder ([facility])   • Overweight   • Personal history of nicotine dependence   • Adenocarcinoma of unknown origin ([facility])   • Environmental allergies   • Primary hypertension   • Senile osteoporosis   • Open left ankle fracture   • Fall   • Postoperative anemia due to acute blood loss   • Acute foot pain, left   • Impaired gait and mobility due to left ankle fracture     Past Medical History:   Diagnosis Date   • Arthritis    • Asthma    • Cancer ([facility])     right lung,abd,and pelvic area   • Chronic back pain    • COPD (chronic obstructive pulmonary disease) ([facility])    • Depression    • Environmental allergies 2022   • Facet arthropathy    • Foraminal stenosis of lumbosacral region    • GERD (gastroesophageal reflux disease)    • H/O degenerative disc disease    • History of blood transfusion    • PVD (peripheral vascular disease) ([facility])    • Radiculopathy      Past Surgical History:   Procedure Laterality Date   • ANTERIOR LUMBAR EXPOSURE N/A 2018    Procedure: ANTERIOR LUMBAR EXPOSURE;  Surgeon: Chris Haley DO;  Location: [facility] OR;  Service: Vascular   • APPENDECTOMY     • CARPAL TUNNEL RELEASE Bilateral    • INCISION AND DRAINAGE OF WOUND Left 2022    Procedure: Incision and Drainage Left  Open Ankle Fracture, Open Reduction Internal Fixation Bimalleolar Ankle Fracture;  Surgeon: Leland Aranda MD;  Location:  [facility] OR;  Service: Orthopedics;  Laterality: Left;   • LUMBAR FUSION N/A 5/7/2018    Procedure: ANTERIOR DECOMPRESSION, ANTERIOR LUMBAR INTERBODY FUSION WITH INSTRUMENTATION L5-S1;  Surgeon: LUIS CARLOS Zheng MD;  Location:  [facility] OR;  Service: Orthopedic Spine   • LUMBAR FUSION Right 1/2/2019    Procedure: RIGHT LATERAL LUMBAR INTERBODY FUSION L3-5;  Surgeon: LUIS CARLOS Zheng MD;  Location:  [facility] OR;  Service: Orthopedic Spine   • LUMBAR LAMINECTOMY WITH FUSION N/A 5/9/2018    Procedure: POSTERIOR SPINAL FUSION WITH INSTRUMENTATION L5-S1;  Surgeon: LUIS CARLOS Zheng MD;  Location:  [facility] OR;  Service: Orthopedic Spine   • LUMBAR LAMINECTOMY WITH FUSION Right 1/4/2019    Procedure: POSTERIOR SPINAL FUSION WITH INSTRUMENTATION RIGHT L3-S1;  Surgeon: LUIS CARLOS Zheng MD;  Location:  [facility] OR;  Service: Orthopedic Spine   • OTHER SURGICAL HISTORY Bilateral 2017    Stent implants [facility] [place]      General Information     Row Name 05/09/22 0916          Physical Therapy Time and Intention    Document Type evaluation;other (see comments)  see MAR  -JE     Mode of Treatment physical therapy  -JE     Row Name 05/09/22 0916          General Information    Patient Profile Reviewed yes  -JE     Prior Level of Function independent:;all household mobility;community mobility;ADL's;home management;driving;shopping;using stairs  -JE     Existing Precautions/Restrictions fall;non-weight bearing;left;other (see comments)   LE  -JE     Barriers to Rehab physical barrier  -JE     Row Name 05/09/22 0916          Living Environment    People in Home child(leno), adult;other (see comments)  plans to go to stay w/ her brother who has a w/c access to enter home  -JE     Name(s) of People in Home Regina Dunham - dtr (per pt dtr able to assist 24/7)  -     Row Name 05/09/22 0916          Home Main  Entrance    Number of Stairs, Main Entrance two  -     Stair Railings, Main Entrance railing on right side (ascending)  -     Row Name 05/09/22 0916          Stairs Within Home, Primary    Number of Stairs, Within Home, Primary none  -     Row Name 05/09/22 0916          Cognition    Orientation Status (Cognition) oriented x 4  -     Row Name 05/09/22 0916          Safety Issues, Functional Mobility    Safety Issues Affecting Function (Mobility) friction/shear risk;safety precaution awareness  -     Impairments Affecting Function (Mobility) balance;endurance/activity tolerance;pain;range of motion (ROM);strength  -           User Key  (r) = Recorded By, (t) = Taken By, (c) = Cosigned By    Initials Name Provider Type    Marimar Christensen, PT Physical Therapist               Mobility     Row Name 05/09/22 0916          Bed Mobility    Bed Mobility scooting/bridging;supine-sit  -     Scooting/Bridging [place] (Bed Mobility) standby assist;verbal cues  -     Supine-Sit [place] (Bed Mobility) standby assist;verbal cues  -     Assistive Device (Bed Mobility) head of bed elevated  -     Row Name 05/09/22 0916          Bed-Chair Transfer    Bed-Chair [place] (Transfers) contact guard;standby assist;verbal cues  CGA for safety only  -     Assistive Device (Bed-Chair Transfers) walker, front-wheeled  -     Row Name 05/09/22 0916          Sit-Stand Transfer    Sit-Stand [place] (Transfers) contact guard;standby assist;verbal cues  CGA for safety only  -     Assistive Device (Sit-Stand Transfers) walker, front-wheeled  -     Row Name 05/09/22 0916          Gait/Stairs (Locomotion)    [place] Level (Gait) contact guard;standby assist;verbal cues  -     Assistive Device (Gait) walker, front-wheeled  -     Distance in Feet (Gait) 3-4 steps bed to chair, chair to BSC and BSC to chair  -     Deviations/Abnormal Patterns (Gait) gait speed decreased  -     Bilateral Gait  Deviations forward flexed posture;heel strike decreased  -     Comment, (Gait/Stairs) NWB L LE  -     Row Name 05/09/22 0916          Mobility    Extremity Weight-bearing Status left lower extremity  -     Left Lower Extremity (Weight-bearing Status) non weight-bearing (NWB)  -           User Key  (r) = Recorded By, (t) = Taken By, (c) = Cosigned By    Initials Name Provider Type     Marimar Mcclure, PT Physical Therapist               Obj/Interventions     Row Name 05/09/22 0916          Range of Motion Comprehensive    Comment, General Range of Motion AROM all 4 extremities WFLS except R wrist w/ hx of fusion and L ankle splinted s/p repair  -     Row Name 05/09/22 0916          Strength Comprehensive (MMT)    Comment, General Manual Muscle Testing (MMT) Assessment defer to OT for formal UE strength assessment, however pt able to move B UEs against gravity w/out difficulty; R LE 4+ to 5/5, L hip flexor 4+/5, able to extend L knee I, guarded w/ mvmt L LE, able to wiggle toes on L  -     Row Name 05/09/22 0916          Balance    Balance Assessment sitting static balance;sitting dynamic balance;sit to stand dynamic balance;standing static balance;standing dynamic balance  -     Static Sitting Balance standby assist;independent  -     Dynamic Sitting Balance standby assist;independent  -     Position, Sitting Balance supported;unsupported;sitting edge of bed;sitting in chair  -     Sit to Stand Dynamic Balance contact guard;standby assist;verbal cues  -     Static Standing Balance contact guard;standby assist;verbal cues  -     Dynamic Standing Balance contact guard;standby assist;verbal cues  -     Position/Device Used, Standing Balance supported;walker, rolling  -     Row Name 05/09/22 0916          Sensory Assessment (Somatosensory)    Sensory Assessment (Somatosensory) sensation intact  -           User Key  (r) = Recorded By, (t) = Taken By, (c) = Cosigned By    Initials Name  Provider Type    Marimar Christensen, PT Physical Therapist               Goals/Plan     Row Name 05/09/22 0916          Bed Mobility Goal 1 (PT)    Activity/Assistive Device (Bed Mobility Goal 1, PT) scooting;sit to supine/supine to sit  -JE     Heard Level/Cues Needed (Bed Mobility Goal 1, PT) independent  -JE     Time Frame (Bed Mobility Goal 1, PT) long term goal (LTG);10 days  -JE     Progress/Outcomes (Bed Mobility Goal 1, PT) goal ongoing  -     Row Name 05/09/22 0916          Transfer Goal 1 (PT)    Activity/Assistive Device (Transfer Goal 1, PT) sit-to-stand/stand-to-sit;bed-to-chair/chair-to-bed;walker, rolling  -JE     Heard Level/Cues Needed (Transfer Goal 1, PT) modified independence  -JE     Time Frame (Transfer Goal 1, PT) long term goal (LTG);10 days  -JE     Progress/Outcome (Transfer Goal 1, PT) goal ongoing  -     Row Name 05/09/22 0916          Gait Training Goal 1 (PT)    Activity/Assistive Device (Gait Training Goal 1, PT) gait (walking locomotion);assistive device use;decrease fall risk;improve balance and speed;increase endurance/gait distance;increase energy conservation;maintain weight-bearing status;walker, rolling  -JE     [place]ard Level (Gait Training Goal 1, PT) standby assist;modified independence  -JE     Distance (Gait Training Goal 1, PT) 12-15 ft  -     Time Frame (Gait Training Goal 1, PT) long term goal (LTG);10 days  -JE     Progress/Outcome (Gait Training Goal 1, PT) goal ongoing  -     Row Name 05/09/22 0916          Problem Specific Goal 1 (PT)    Problem Specific Goal 1 (PT) I w/ wheelchair mgmt and propulsion on level surface ~ 50 ft in/around obstacles  -JE     Time Frame (Problem Specific Goal 1, PT) long-term goal (LTG);other (see comments)  10 days  -JE     Progress/Outcome (Problem Specific Goal 1, PT) goal ongoing  -     Row Name 05/09/22 0916          Patient Education Goal (PT)    Activity (Patient Education Goal, PT) I w/ activities to  "assist w/ edema mgmt, use of IS  -     [place]/Cues/Accuracy (Memory Goal 2, PT) demonstrates adequately;independent;verbalizes understanding  -     Time Frame (Patient Education Goal, PT) long term goal (LTG);10 days  -     Progress/Outcome (Patient Education Goal, PT) goal ongoing  -     Row Name 05/09/22 0916          Therapy Assessment/Plan (PT)    Planned Therapy Interventions (PT) balance training;bed mobility training;gait training;postural re-education;patient/family education;transfer training;wheelchair management/propulsion training;other (see comments)  safety/falls prevention, edema/pain mgmt, NWB L LE  -           User Key  (r) = Recorded By, (t) = Taken By, (c) = Cosigned By    Initials Name Provider Type    Marimar Christensen, PT Physical Therapist               Clinical Impression     Row Name 05/09/22 0916          Pain    Pretreatment Pain Rating 4/10  -     Posttreatment Pain Rating 4/10  -     Pain Location - Side/Orientation Left  -     Pain Location lower  -     Pain Location - extremity  -     Pre/Posttreatment Pain Comment reports pain in \"ankle and back of knee\"  -     Pain Intervention(s) Ambulation/increased activity;Repositioned;Rest  -     Row Name 05/09/22 0916          Plan of Care Review    Plan of Care Reviewed With patient  -     Outcome Evaluation PT eval completed.  Pt pleasant and agreeable to therapy.  Pt education for NWB L LE and use of rwx for out of bed mobility.  Pt performs bed mobility w/ SBA, tfers w/ CGA to SBA and VCs w/ increase time and effort.  Pt took 3-4 steps bed to chair, then chair to BSC, then back to chair w/ CGA to SBA.  CGA for safety only.  Pt demonstrated good control and was able to maintain NWB status L LE.  Discussed needs for home.  Pt reports access to a wx and cane.  Currently recommend BSC and w/c as well as tub tfer bench in addition to current equipment..  Notified SW.  Education for R ankle pumps and use of IS " every hr awake.  Pt returned to bed w/ CGA to SBA.  Pt plans to return home w/ her brother who has a ramp entrance to his home.  Reports dtr is also available to assist her. Discussed HH.  Pt not interested at this [facility].   Will follow for progress and any additional needs.  -     Row Name 05/09/22 0916          Therapy Assessment/Plan (PT)    Patient/Family Therapy Goals Statement (PT) improve mobility, return home  -     Rehab Potential (PT) good, to achieve stated therapy goals  -     Criteria for Skilled Interventions Met (PT) yes;meets criteria;skilled treatment is necessary  -     Therapy Frequency (PT) 2 times/day  -     Predicted Duration of Therapy Intervention (PT) until discharge or goals achieved  -     Row Name 05/09/22 0916          Vital Signs    Posttreatment Heart Rate (beats/min) 90  -JE     Pre SpO2 (%) 87  -JE     O2 Delivery Pre Treatment room air  -JE     Intra SpO2 (%) 91  -JE     O2 Delivery Intra Treatment room air  -JE     Post SpO2 (%) 94  -JE     O2 Delivery Post Treatment room air  -JE     Pre Patient Position Supine  -     Intra Patient Position Standing  -JE     Post Patient Position Sitting  -JE     Row Name 05/09/22 0916          Positioning and Restraints    Pre-Treatment Position in bed  -JE     Post Treatment Position chair  -JE     In Chair notified nsg;reclined;call light within reach;encouraged to call for assist;legs elevated;LLE elevated  -           User Key  (r) = Recorded By, (t) = Taken By, (c) = Cosigned By    Initials Name Provider Type    Marimar Christensen, PT Physical Therapist               Outcome Measures     Row Name 05/09/22 0916          How much help from another person do you currently need...    Turning from your back to your side while in flat bed without using bedrails? 3  -JE     Moving from lying on back to sitting on the side of a flat bed without bedrails? 3  -JE     Moving to and from a bed to a chair (including a wheelchair)? 3  -JE      Standing up from a chair using your arms (e.g., wheelchair, bedside chair)? 3  -JE     Climbing 3-5 steps with a railing? 3  -JE     To walk in hospital room? 3  -JE     AM-PAC 6 Clicks Score (PT) 18  -     Highest level of mobility 6 --> Walked 10 steps or more  -     Row Name 05/09/22 0916 05/09/22 0904       Functional Assessment    Outcome Measure Options AM-PAC 6 Clicks Basic Mobility (PT)  - AM-PAC 6 Clicks Daily Activity (OT)  -AC          User Key  (r) = Recorded By, (t) = Taken By, (c) = Cosigned By    Initials Name Provider Type    AC Slade Green, OTR/L, CNT Occupational Therapist    Marimar Christensen, PT Physical Therapist                             Physical Therapy Education                 Title: PT OT SLP Therapies (In Progress)     Topic: Physical Therapy (In Progress)     Point: Mobility training (Done)     Learning Progress Summary           Patient Acceptance, E,TB,D, VU,DU,NR by  at 5/9/2022 1003    Comment: Education re: purpose of PT/importance of activity, NWB L LE, improved tech w/ bed mob, tfers & gt activity w/ rwx w/ emphasis on safety/falls prevention, frequent aps w/ R ankle and use of IS both every hr awake, L LE elevation to assist w/ edema mgmt                   Point: Home exercise program (Done)     Learning Progress Summary           Patient Acceptance, E,TB,D, VU,DU,NR by  at 5/9/2022 1003    Comment: Education re: purpose of PT/importance of activity, NWB L LE, improved tech w/ bed mob, tfers & gt activity w/ rwx w/ emphasis on safety/falls prevention, frequent aps w/ R ankle and use of IS both every hr awake, L LE elevation to assist w/ edema mgmt                   Point: Body mechanics (Not Started)     Learner Progress:  Not documented in this visit.          Point: Precautions (Done)     Learning Progress Summary           Patient Acceptance, E,TB,D, VU,DU,NR by  at 5/9/2022 1003    Comment: Education re: purpose of PT/importance of activity, NWB L LE,  improved tech w/ bed mob, tfers & gt activity w/ rwx w/ emphasis on safety/falls prevention, frequent aps w/ R ankle and use of IS both every hr awake, L LE elevation to assist w/ edema mgmt                               User Key     Initials Effective Dates Name Provider Type Discipline     08/02/18 -  Marimar Mcclure, PT Physical Therapist PT              PT Recommendation and Plan  Planned Therapy Interventions (PT): balance training, bed mobility training, gait training, postural re-education, patient/family education, transfer training, wheelchair management/propulsion training, other (see comments) (safety/falls prevention, edema/pain mgmt, NWB L LE)  Plan of Care Reviewed With: patient  Outcome Evaluation: PT eval completed.  Pt pleasant and agreeable to therapy.  Pt education for NWB L LE and use of rwx for out of bed mobility.  Pt performs bed mobility w/ SBA, tfers w/ CGA to SBA and VCs w/ increase time and effort.  Pt took 3-4 steps bed to chair, then chair to BSC, then back to chair w/ CGA to SBA.  CGA for safety only.  Pt demonstrated good control and was able to maintain NWB status L LE.  Discussed needs for home.  Pt reports access to a wx and cane.  Currently recommend BSC and w/c as well as tub tfer bench in addition to current equipment..  Notified SW.  Education for R ankle pumps and use of IS every hr awake.  Pt returned to bed w/ CGA to SBA.  Pt plans to return home w/ her brother who has a ramp entrance to his home.  Reports dtr is also available to assist her. Discussed HH.  Pt not interested at this [facility].   Will follow for progress and any additional needs.     Time Calculation:    PT Charges     Row Name 05/09/22 1005             Time Calculation    Start Time 0916  -JE      Stop Time 0955 -JE      Time Calculation (min) 39 min  -JE      PT Received On 05/09/22  -JE      PT Goal Re-Cert Due Date 05/19/22  -JE            User Key  (r) = Recorded By, (t) = Taken By, (c) = Cosigned By     Initials Name Provider Type    JE Marimar Mcclure, PT Physical Therapist              Therapy Charges for Today     Code Description Service Date Service Provider Modifiers Qty    [id number] HC PT EVAL MOD COMPLEXITY 3 5/9/2022 Marimar Mcclure, PT GP 1          PT G-Codes  Outcome Measure Options: AM-PAC 6 Clicks Basic Mobility (PT)  AM-PAC 6 Clicks Score (PT): 18  AM-PAC 6 Clicks Score (OT): 21    Marimar Mcclure, PT  5/9/2022

## 2022-05-09 NOTE — PLAN OF CARE
Goal Outcome Evaluation:  Plan of Care Reviewed With: patient        Progress: no change  Outcome Evaluation: OT eval completed.  Pt drowsy but alerts and is oriented x4.  Pt has prior history of R wrist fusion with no active flexion or extension at wrist.  Pt came to EOB with SBA.  SBA for sitting balance.  Pt dons sock on R foot with SBA.  Transfers sit>stand initially with Santosh.  Maintains NWB status well. On subsequent transfers, pt able to transfer with SBA-CGA.  Pt voids and completes hygiene on BSC with set up.  Educated pt at length on transfer safety, DME recs, DVT prevention, breathing exercises.  O2 sat initially 89% on room air, increased to 94% post activity but trends down when pt is seated and not focusing on breathing.  Recommend BSC and w/c, notified .  Pt plans to discharge home with brother.  OT agrees with this plan and pt is safe for discharge.  Pt does not wish to receive HH at discharge.

## 2022-05-09 NOTE — CASE MANAGEMENT/SOCIAL WORK
Continued Stay Note  [facility]     Patient Name: Arminda Jiménez  MRN: [id number]  Today's Date: 5/9/2022    Admit Date: 5/7/2022     Discharge Plan     Row Name 05/09/22 1059       Plan    Plan Home    Provided Post Acute Provider List? Yes    Post Acute Provider List DME Supplier    Provided Post Acute Provider Quality & Resource List? Yes    Patient/Family in Agreement with Plan yes    Final Discharge Disposition Code 01 - home or self-care    Final Note Pt is being discharged home today.  Pt has order for W/C with elevated legs and BSC. Pt prefers using [facility] for DME, spoke with Lindsay from there [phone] and provided referral. Pt saying she will go by on way home to pick items up.               Discharge Codes    No documentation.               Expected Discharge Date and Time     Expected Discharge Date Expected Discharge Time    May 9, 2022             FLORESITA Carrasquillo

## 2022-05-09 NOTE — PROGRESS NOTES
Orthopaedic Surgery Progress Note      5/9/2022   08:26 CDT    Name:  Arminda Jiménez  MRN:    [id number]     Acct:     [id number]   Room:  [facility] Day: 1     Admit Date: 5/7/2022  PCP: Teddy Sanchez MD        Subjective:     C/C: POD 1  Open reduction internal fixation of a left open bimalleolar ankle fracture dislocation  Complex wound closure, 15 cm in length 5 cm in width 2 cm in depth, stellate laceration    Interval History: Status: improved and remained stable.  Complaining of pain today.  Denies any loss of sensation.    Medications:     Allergies:   Allergies   Allergen Reactions   • Aspirin Nausea And Vomiting     Unknown reaction   • Keflex [Cephalexin] Other (See Comments)     Blisters to nose and mouth   • Nitrofurantoin GI Intolerance and Nausea And Vomiting   • Creatine Monohydrate Other (See Comments) and Unknown - Low Severity       Current Meds:   Current Facility-Administered Medications   Medication Dose Route Frequency Provider Last Rate Last Admin   • acetaminophen (TYLENOL) tablet 650 mg  650 mg Oral Q4H PRN Leland Aranda MD        Or   • acetaminophen (TYLENOL) 160 MG/5ML solution 650 mg  650 mg Oral Q4H PRN Leland Aranda MD        Or   • acetaminophen (TYLENOL) suppository 650 mg  650 mg Rectal Q4H PRN Leland Aranda MD       • budesonide-formoterol (SYMBICORT) 160-4.5 MCG/ACT inhaler 2 puff  2 puff Inhalation BID - RT Leland Aranda MD   2 puff at 05/09/22 0725   • calcium carb-cholecalciferol 600-800 MG-UNIT tablet 1 tablet  1 tablet Oral BID With Meals Leland Aranda MD   1 tablet at 05/08/22 1804   • cetirizine (zyrTEC) tablet 10 mg  10 mg Oral Daily Leland Aranda MD   10 mg at 05/08/22 1106   • cholecalciferol (VITAMIN D3) tablet 1,000 Units  1,000 Units Oral BID Leland Aranda MD   1,000 Units at 05/08/22 2053   • clindamycin (CLEOCIN) 900 mg in dextrose 5% 50 mL IVPB (premix)  900 mg Intravenous Q8H Leland Aranda MD 50 mL/hr at 05/09/22 0521 900 mg at 05/09/22 0521   •  clotrimazole-betamethasone (LOTRISONE) 1-0.05 % cream   Topical Q12H Leland Aranda MD   Given at 05/08/22 2054   • diazePAM (VALIUM) tablet 5 mg  5 mg Oral Q6H PRN Leland Aranda MD       • diphenhydrAMINE (BENADRYL) capsule 25 mg  25 mg Oral Q6H PRN Leland Aranda MD       • escitalopram (LEXAPRO) tablet 20 mg  20 mg Oral Daily Leland Aranda MD   20 mg at 05/08/22 1106   • gabapentin (NEURONTIN) capsule 800 mg  800 mg Oral Q8H Ora Hyatt, APRN   800 mg at 05/09/22 0518   • HYDROcodone-acetaminophen (NORCO)  MG per tablet 1 tablet  1 tablet Oral Q4H PRN Leland Aranda MD   1 tablet at 05/09/22 0711   • HYDROcodone-acetaminophen (NORCO) 5-325 MG per tablet 1 tablet  1 tablet Oral Q4H PRN Leland Aranda MD       • HYDROmorphone (DILAUDID) injection 0.5 mg  0.5 mg Intravenous Q2H PRN Leland Aranda MD   0.5 mg at 05/09/22 0404    And   • naloxone (NARCAN) injection 0.4 mg  0.4 mg Intravenous Q5 Min PRN Leland Aranda MD       • HYDROmorphone (DILAUDID) injection 1 mg  1 mg Intravenous Q2H PRN Leland Aranda MD   1 mg at 05/08/22 1539    And   • naloxone (NARCAN) injection 0.4 mg  0.4 mg Intravenous Q5 Min PRN Leland Aranda MD       • ipratropium-albuterol (DUO-NEB) nebulizer solution 3 mL  3 mL Nebulization Q6H PRN Leland Aranda MD       • lidocaine (XYLOCAINE) 1 % injection 10 mL  10 mL Injection Once Leland Aranda MD       • ondansetron (ZOFRAN) injection 4 mg  4 mg Intravenous Q6H PRN Leland Aranda MD       • ondansetron (ZOFRAN) tablet 4 mg  4 mg Oral Q6H PRN Leland Aranda MD       • oxyCODONE-acetaminophen (PERCOCET)  MG per tablet 1 tablet  1 tablet Oral Q4H PRN Leland Aranda MD       • pantoprazole (PROTONIX) EC tablet 40 mg  40 mg Oral Q AM Leland Aranda MD   40 mg at 05/09/22 0518   • promethazine (PHENERGAN) tablet 12.5 mg  12.5 mg Oral Q4H PRN Leland Aranda MD        Or   • promethazine (PHENERGAN) suppository 12.5 mg  12.5 mg Rectal Q4H PRN Leland Aranda MD       • promethazine (PHENERGAN) tablet 12.5 mg  12.5 mg  "Oral Q4H PRN Leland Aranda MD       • rivaroxaban (XARELTO) tablet 10 mg  10 mg Oral Daily Leland Aranda MD       • sodium chloride 0.9 % flush 10 mL  10 mL Intravenous Q12H Leland Aranda MD   10 mL at 22   • sodium chloride 0.9 % flush 10 mL  10 mL Intravenous PRN Leland Aranda MD       • sodium chloride 0.9 % infusion  75 mL/hr Intravenous Continuous Ora Hyatt APRN 75 mL/hr at 22 1446 75 mL/hr at 22 1446           Data:     Code Status:    Code Status and Medical Interventions:   Ordered at: 22 0201     Level Of Support Discussed With:    Patient     Code Status (Patient has no pulse and is not breathing):    CPR (Attempt to Resuscitate)     Medical Interventions (Patient has pulse or is breathing):    Full Support       Family History   Problem Relation Age of Onset   • Heart disease Other    • Arthritis Other    • Cancer Other    • Alcohol abuse Other    • Stroke Other    • Migraines Other    • COPD Other    • Heart failure Other    • Depression Other    • Asthma Other    • Heart disease Mother    • Kidney disease Mother    • Heart disease Father    • Heart disease Sister    • Kidney disease Sister    • Stroke Sister    • Heart disease Sister    • Cancer Brother        Social History     Socioeconomic History   • Marital status:    Tobacco Use   • Smoking status: Former Smoker     Packs/day: 1.50     Years: 30.00     Pack years: 45.00   • Smokeless tobacco: Never Used   • Tobacco comment: quit    Substance and Sexual Activity   • Alcohol use: No   • Drug use: No   • Sexual activity: Defer       Vitals:  /55 (BP Location: Right arm, Patient Position: Lying)   Pulse 93   Temp 99.7 °F (37.6 °C) (Oral)   Resp 18   Ht 160 cm (63\")   Wt 76.2 kg (168 lb) Comment: bed scale  SpO2 95%   BMI 29.76 kg/m²   Temp (24hrs), Av.4 °F (36.9 °C), Min:97.5 °F (36.4 °C), Max:99.7 °F (37.6 °C)      I/O (24Hr):    Intake/Output Summary (Last 24 hours) at 2022 " 0826  Last data filed at 5/8/2022 1314  Gross per 24 hour   Intake 240 ml   Output --   Net 240 ml       Labs:  Lab Results (last 72 hours)     Procedure Component Value Units Date/Time    Basic Metabolic Panel [[id number]] Collected: 05/09/22 0805    Specimen: Blood Updated: 05/09/22 0825    CBC (No Diff) [[id number]] Collected: 05/09/22 0805    Specimen: Blood Updated: 05/09/22 0825    Comprehensive Metabolic Panel [[id number]]  (Abnormal) Collected: 05/08/22 0559    Specimen: Blood Updated: 05/08/22 0651     Glucose 144 mg/dL      BUN 13 mg/dL      Creatinine 0.81 mg/dL      Sodium 139 mmol/L      Potassium 4.5 mmol/L      Chloride 103 mmol/L      CO2 28.0 mmol/L      Calcium 8.9 mg/dL      Total Protein 6.6 g/dL      Albumin 4.00 g/dL      ALT (SGPT) 29 U/L      AST (SGOT) 73 U/L      Alkaline Phosphatase 56 U/L      Total Bilirubin 0.2 mg/dL      Globulin 2.6 gm/dL      A/G Ratio 1.5 g/dL      BUN/Creatinine Ratio 16.0     Anion Gap 8.0 mmol/L      eGFR 82.2 mL/min/1.73      Comment: National Kidney Foundation and American Society of Nephrology (ASN) Task Force recommended calculation based on the Chronic Kidney Disease Epidemiology Collaboration (CKD-EPI) equation refit without adjustment for race.       Narrative:      GFR Normal >60  Chronic Kidney Disease <60  Kidney Failure <15      CBC Auto Differential [[id number]]  (Abnormal) Collected: 05/08/22 0625    Specimen: Blood Updated: 05/08/22 0650     WBC 11.72 10*3/mm3      RBC 3.53 10*6/mm3      Hemoglobin 10.8 g/dL      Hematocrit 34.4 %      MCV 97.5 fL      MCH 30.6 pg      MCHC 31.4 g/dL      RDW 12.3 %      RDW-SD 43.7 fl      MPV 10.7 fL      Platelets 243 10*3/mm3      Neutrophil % 70.3 %      Lymphocyte % 20.3 %      Monocyte % 8.3 %      Eosinophil % 0.3 %      Basophil % 0.4 %      Immature Grans % 0.4 %      Neutrophils, Absolute 8.23 10*3/mm3      Lymphocytes, Absolute 2.38 10*3/mm3      Monocytes, Absolute 0.97 10*3/mm3      Eosinophils, Absolute  0.04 10*3/mm3      Basophils, Absolute 0.05 10*3/mm3      Immature Grans, Absolute 0.05 10*3/mm3      nRBC 0.0 /100 WBC     Urinalysis With Microscopic If Indicated (No Culture) - Urine, Clean Catch [[id number]]  (Abnormal) Collected: 05/08/22 0320    Specimen: Urine, Clean Catch Updated: 05/08/22 0400     Color, UA Yellow     Appearance, UA Clear     pH, UA 6.5     Specific Gravity, UA 1.025     Glucose, UA Negative     Ketones, UA Negative     Bilirubin, UA Negative     Blood, UA Negative     Protein, UA Negative     Leuk Esterase, UA Small (1+)     Nitrite, UA Negative     Urobilinogen, UA 0.2 E.U./dL    Urinalysis, Microscopic Only - Urine, Clean Catch [[id number]]  (Abnormal) Collected: 05/08/22 0320    Specimen: Urine, Clean Catch Updated: 05/08/22 0400     RBC, UA None Seen /HPF      WBC, UA 13-20 /HPF      Bacteria, UA 1+ /HPF      Squamous Epithelial Cells, UA 0-2 /HPF      Hyaline Casts, UA 7-12 /LPF      Methodology Manual Light Microscopy    Hemoglobin A1c [[id number]]  (Normal) Collected: 05/07/22 2318    Specimen: Blood Updated: 05/08/22 0311     Hemoglobin A1C 5.00 %     Narrative:      Hemoglobin A1C Ranges:    Increased Risk for Diabetes  5.7% to 6.4%  Diabetes                     >= 6.5%  Diabetic Goal                < 7.0%    Magnesium [[id number]]  (Normal) Collected: 05/08/22 0005    Specimen: Blood Updated: 05/08/22 0304     Magnesium 1.9 mg/dL     Comprehensive Metabolic Panel [[id number]]  (Abnormal) Collected: 05/08/22 0005    Specimen: Blood Updated: 05/08/22 0058     Glucose 128 mg/dL      BUN 11 mg/dL      Creatinine 0.96 mg/dL      Sodium 139 mmol/L      Potassium 4.1 mmol/L      Comment: Slight hemolysis detected by analyzer. Results may be affected.        Chloride 102 mmol/L      CO2 27.0 mmol/L      Calcium 9.1 mg/dL      Total Protein 6.9 g/dL      Albumin 3.70 g/dL      ALT (SGPT) 16 U/L      AST (SGOT) 20 U/L      Alkaline Phosphatase 54 U/L      Total Bilirubin <0.2 mg/dL       Globulin 3.2 gm/dL      A/G Ratio 1.2 g/dL      BUN/Creatinine Ratio 11.5     Anion Gap 10.0 mmol/L      eGFR 67.0 mL/min/1.73      Comment: National Kidney Foundation and American Society of Nephrology (ASN) Task Force recommended calculation based on the Chronic Kidney Disease Epidemiology Collaboration (CKD-EPI) equation refit without adjustment for race.       Narrative:      GFR Normal >60  Chronic Kidney Disease <60  Kidney Failure <15      COVID PRE-OP / PRE-PROCEDURE SCREENING ORDER (NO ISOLATION) - Swab, Nasal Cavity [[id number]]  (Normal) Collected: 05/08/22 0007    Specimen: Swab from Nasal Cavity Updated: 05/08/22 0048    Narrative:      The following orders were created for panel order COVID PRE-OP / PRE-PROCEDURE SCREENING ORDER (NO ISOLATION) - Swab, Nasal Cavity.  Procedure                               Abnormality         Status                     ---------                               -----------         ------                     COVID-19,[facility] IN-[facility]...[[id number]]  Normal              Final result                 Please view results for these tests on the individual orders.    COVID-19,[facility] IN-HOUSE,Nasal Swab No Transport Media 3-4 HR TAT - Swab, Nasal Cavity [[id number]]  (Normal) Collected: 05/08/22 0007    Specimen: Swab from Nasal Cavity Updated: 05/08/22 0048     COVID19 Not Detected    Narrative:      Fact sheet for providers: https://www.fda.gov/media/082337/download     Fact sheet for patients: https://www.fda.gov/media/347389/download    Test performed by PCR.    Consider negative results in combination with clinical observations, patient history, and epidemiological information.    Protime-INR [[id number]]  (Normal) Collected: 05/07/22 2318    Specimen: Blood Updated: 05/07/22 2339     Protime 12.3 Seconds      INR 0.95    aPTT [[id number]]  (Normal) Collected: 05/07/22 2318    Specimen: Blood Updated: 05/07/22 2339     PTT 26.1 seconds     CBC & Differential [[id number]]   (Abnormal) Collected: 05/07/22 2318    Specimen: Blood Updated: 05/07/22 2328    Narrative:      The following orders were created for panel order CBC & Differential.  Procedure                               Abnormality         Status                     ---------                               -----------         ------                     CBC Auto Differential[[id number]]        Abnormal            Final result                 Please view results for these tests on the individual orders.    CBC Auto Differential [[id number]]  (Abnormal) Collected: 05/07/22 2318    Specimen: Blood Updated: 05/07/22 2328     WBC 13.41 10*3/mm3      RBC 4.13 10*6/mm3      Hemoglobin 12.9 g/dL      Hematocrit 40.4 %      MCV 97.8 fL      MCH 31.2 pg      MCHC 31.9 g/dL      RDW 12.2 %      RDW-SD 44.2 fl      MPV 10.4 fL      Platelets 278 10*3/mm3      Neutrophil % 61.2 %      Lymphocyte % 28.4 %      Monocyte % 7.9 %      Eosinophil % 1.4 %      Basophil % 0.7 %      Immature Grans % 0.4 %      Neutrophils, Absolute 8.20 10*3/mm3      Lymphocytes, Absolute 3.81 10*3/mm3      Monocytes, Absolute 1.06 10*3/mm3      Eosinophils, Absolute 0.19 10*3/mm3      Basophils, Absolute 0.10 10*3/mm3      Immature Grans, Absolute 0.05 10*3/mm3      nRBC 0.0 /100 WBC               Physical Exam:     Left Ankle: Incision is clean, dry, intact. Dressing is Clean, Dry, Intact. Neurovascular intact distally. Moderate tenderness to palpation, soft throughout. No signs or symptoms of DVT or PE. Splint in place. Cap refill <2.      Assessment:     Primary Problem  Open left ankle fracture  POD 1 Day Post-Op Incision and Drainage Left Open Ankle Fracture, Open Reduction Internal Fixation Bimalleolar Ankle Fracture (Left)       Plan:     1. Sutures to remain in place x 2-3 weeks, will plan for outpatient f/u in 1 week for recheck  2. DVT ppx - xarelto 10mg   3. Discharge plans pending - ok for discharge from our standpoint when medically appropriate  4. NWB  LLE, elevate, ice, continue neurovascular checks and monitoring for increasing pain/swelling      Electronically signed by Pardeep Buitrago PA-C on 5/9/2022 at 08:26 CDT

## 2022-05-09 NOTE — PLAN OF CARE
Goal Outcome Evaluation:  Plan of Care Reviewed With: patient           Outcome Evaluation: PT eval completed.  Pt pleasant and agreeable to therapy.  Pt education for NWB L LE and use of rwx for out of bed mobility.  Pt performs bed mobility w/ SBA, tfers w/ CGA to SBA and VCs w/ increase time and effort.  Pt took 3-4 steps bed to chair, then chair to BSC, then back to chair w/ CGA to SBA.  CGA for safety only.  Pt demonstrated good control and was able to maintain NWB status L LE.  Discussed needs for home.  Pt reports access to a wx and cane.  Currently recommend BSC and w/c as well as tub tfer bench in addition to current equipment..  Notified SW.  Education for R ankle pumps and use of IS every hr awake.  Pt returned to bed w/ CGA to SBA.  Pt plans to return home w/ her brother who has a ramp entrance to his home.  Reports dtr is also available to assist her. Discussed HH.  Pt not interested at this [facility].   Will follow for progress and any additional needs.

## 2022-05-10 ENCOUNTER — TELEPHONE (OUTPATIENT)
Dept: FAMILY MEDICINE CLINIC | Age: 62
End: 2022-05-10

## 2022-05-10 NOTE — OUTREACH NOTE
Prep Survey    Flowsheet Row Responses   [facility] facility patient discharged from? [facility]   Is LACE score < 7 ? No   Emergency Room discharge w/ pulse ox? No   Eligibility Readm Mgmt   Discharge diagnosis **Open left ankle fracture   Does the patient have one of the following disease processes/diagnoses(primary or secondary)? General Surgery   Does the patient have Home health ordered? No   Is there a DME ordered? Yes   What DME was ordered? W/c and BSC per [facility]    Medication alerts for this patient [facility]    Prep survey completed? Yes          ARTUR LAUGHLIN - Registered Nurse

## 2022-05-10 NOTE — PAYOR COMM NOTE
"REF: [id number]    [facility]  [place],  [phone]  OR  FAX  [phone]      Riley Coon (62 y.o. Female)             Date of Birth   1960    Social Security Number       Address   [address] [id number]    Home Phone   [phone]    MRN   [id number]       Episcopal   Jainism    Marital Status                               Admission Date   5/7/22    Admission Type   Emergency    Admitting Provider   Kofi Andrade MD    Attending Provider       Department, Room/Bed   [facility] 3A, 358/1       Discharge Date   5/9/2022    Discharge Disposition   Home or Self Care    Discharge Destination                               Attending Provider: (none)   Allergies: Aspirin, Keflex [Cephalexin], Nitrofurantoin, Creatine Monohydrate    Isolation: None   Infection: None   Code Status: Prior   Advance Care Planning Activity    Ht: 160 cm (63\")   Wt: 76.2 kg (168 lb)    Admission Cmt: None   Principal Problem: Open left ankle fracture [S82.892B]                 Active Insurance as of 5/7/2022     Primary Coverage     Payor Plan Insurance Group Employer/Plan Group    HUMANA MEDICARE REPLACEMENT HUMANA MEDICARE REPLACEMENT [id number]     Payor Plan Address Payor Plan Phone Number Payor Plan Fax Number Effective Dates    [address] [address] [phone]  1/1/2022 - None Entered    [facility] [address]       Subscriber Name Subscriber Birth Date Member ID       RILEY COON 1960 [id number]                 Emergency Contacts      (Rel.) Home Phone Work Phone Mobile Phone    Anay Dunham (Daughter) [phone] -- --               Discharge Summary      Ora Hyatt, ELISA at 05/09/22 0850     Attestation signed by Kofi Andrade MD at 05/09/22 1710    I have reviewed this documentation and agree.    .This visit was performed by both a physician and an APC. I personally evaluated and examined the patient. I performed all aspects " of the MDM as documented.  I have reviewed and agree with the plans.  KT .      Electronically signed by Kofi Andrade MD, 5/9/2022, 17:15 CDT.                       [facility] Medicine Services  DISCHARGE SUMMARY     Date of Admission: 5/7/2022  Date of Discharge:  5/9/2022  Primary Care Physician: Teddy Sanchez MD    Presenting Problem/History of Present Illness:  Open left ankle fracture [S82.894K]     Discharge Diagnoses:  Active Hospital Problems    Diagnosis    • **Open left ankle fracture    • Postoperative anemia due to acute blood loss    • Acute foot pain, left    • Impaired gait and mobility due to left ankle fracture    • Fall    • Panlobular emphysema (HCC)    • Senile osteoporosis      Chief Complaint on Day of Discharge: Left ankle pain  History of Present Illness on Day of Discharge:   Sitting up in bed and up on bedside commode.  Denies nausea, vomiting or abdominal pain.  Denies chest pain or palpitation.  Does not wear oxygen at home.  She tells me she only has a bowel movement 2-3 times a week and she does not need a laxative at this time.    Consults: Dr. Aranda, orthopedics    Procedures Performed: Open reduction internal fixation left open bimalleolar ankle fracture dislocation.  Complex wound closure stellate laceration 5/8/2022 Dr. Aranda    Pertinent Test Results:      Imaging Results (All)     Procedure Component Value Units Date/Time    XR Ankle 2 View Left [[id number]] Collected: 05/08/22 0911     Updated: 05/09/22 0727    Narrative:      EXAMINATION:   XR ANKLE 2 VW LEFT-  5/8/2022 9:11 AM CDT     HISTORY: Fracture or dislocation left ankle     XR ANKLE 2 VW LEFT- 5/8/2022 7:00 AM CDT     HISTORY: surgery; W19.XXXA-Unspecified fall, initial encounter     COMPARISON: None     FLUOROSCOPY TIME: 1 minute 44.1 seconds     FLUOROSCOPY DOSE: 4.13 mGy     NUMBER OF IMAGES: 4       Impression:         Intraoperative fluoroscopic images during ORIF fracture  dislocation of  left ankle. Surgical plate and fixation screws are present in the  fibula. Fixation screws present in the medial malleolus..     Please refer to the operative note for more details.  This report was finalized on 05/08/2022 09:12 by Dr. Tay Sanchez MD.    FL C Arm During Surgery [[id number]] Collected: 05/08/22 0911     Updated: 05/09/22 0727    Narrative:      EXAMINATION:   XR ANKLE 2 VW LEFT-  5/8/2022 9:11 AM CDT     HISTORY: Fracture or dislocation left ankle     XR ANKLE 2 VW LEFT- 5/8/2022 7:00 AM CDT     HISTORY: surgery; W19.XXXA-Unspecified fall, initial encounter     COMPARISON: None     FLUOROSCOPY TIME: 1 minute 44.1 seconds     FLUOROSCOPY DOSE: 4.13 mGy     NUMBER OF IMAGES: 4       Impression:         Intraoperative fluoroscopic images during ORIF fracture dislocation of  left ankle. Surgical plate and fixation screws are present in the  fibula. Fixation screws present in the medial malleolus..     Please refer to the operative note for more details.  This report was finalized on 05/08/2022 09:12 by Dr. Tay Sanchez MD.    XR Tibia Fibula 2 View Left [[id number]] Collected: 05/08/22 0722     Updated: 05/08/22 0727    Narrative:      EXAMINATION:   XR TIBIA FIBULA 2 VW LEFT-  5/8/2022 7:22 AM CDT     HISTORY: Patient fell     AP and lateral views of the left leg are obtained. The proximal tibia  and fibula are intact.     In the distal leg there is a comminuted fracture of the distal fibula.  Lateral translation of dome of the talus. There is a transverse fracture  of the medial malleolus. Medial malleolus and lateral malleolus are  relatively aligned with the talus however the talus and malleolus  The dome of the talus displaced lateral to the distal fibula and tibia.  This may be an open fracture.     IMPRESSION comminuted fracture dislocation left ankle  This report was finalized on 05/08/2022 07:24 by Dr. Tay Sanchez MD.    XR Ankle 2 View Left [[id number]] Collected: 05/08/22  0719     Updated: 05/08/22 0723    Narrative:      EXAMINATION:   XR ANKLE 2 VW LEFT-  5/8/2022 7:19 AM CDT     HISTORY: Postreduction     Comminuted fracture distal fibula and medial malleolus is again noted.  There is improved alignment on this single view. Widening of the ankle  mortise is noted. Soft tissue swelling is present     Air in the soft tissues suggests this is an open fracture.       Impression:      Single view demonstrates improved alignment of the  comminuted fracture fragments.  This report was finalized on 05/08/2022 07:20 by Dr. Tay Sanchez MD.    XR Ankle 2 View Left [[id number]] Collected: 05/08/22 0715     Updated: 05/08/22 0719    Narrative:      EXAMINATION:   XR ANKLE 2 VW LEFT-  5/8/2022 7:15 AM CDT     HISTORY: Patient fell     AP and lateral views of the left ankle is obtained. There is no fracture  or dislocation. There is complete lateral dislocation of the talus with  respect to the tibial plafond. Oblique fracture distal fibula is  present. The dome of the talus is lateral to the fibula. There also is a  fracture of the medial malleolus.       Impression:      Fracture dislocation left ankle  This report was finalized on 05/08/2022 07:16 by Dr. Tay Sanchez MD.    XR Chest 1 View [[id number]] Collected: 05/08/22 0709     Updated: 05/08/22 0712    Narrative:      EXAM: XR CHEST 1 VW- 5/7/2022 11:40 PM CDT     HISTORY: fall, ankle fx       COMPARISON: January 7, 2019.     TECHNIQUE: Frontal radiograph of the chest     FINDINGS:   The lungs are clear. The cardiomediastinal silhouette and pulmonary  vascularity are within normal limits.      The osseous structures and surrounding soft tissues demonstrate no acute  abnormality.          Impression:      1. No radiographic evidence of acute cardiopulmonary process.        This report was finalized on 05/08/2022 07:09 by Dr. Tay Sanchez MD.        Laboratory Data Last 7 Days:  Results from last 7 days   Lab Units 05/09/22  0805  05/08/22  0625 05/07/22  2318   WBC 10*3/mm3 13.37* 11.72* 13.41*   HEMOGLOBIN g/dL 9.3* 10.8* 12.9   HEMATOCRIT % 30.1* 34.4 40.4   PLATELETS 10*3/mm3 184 243 278     Results from last 7 days   Lab Units 05/09/22  0805 05/08/22  0559 05/08/22  0005   SODIUM mmol/L 137 139 139   POTASSIUM mmol/L 4.4 4.5 4.1   CHLORIDE mmol/L 103 103 102   CO2 mmol/L 26.0 28.0 27.0   BUN mg/dL 13 13 11   CREATININE mg/dL 0.69 0.81 0.96   GLUCOSE mg/dL 99 144* 128*   CALCIUM mg/dL 8.6 8.9 9.1   ALT (SGPT) U/L  --  29 16     Laboratory Data Last 7 Days:    Lab Results (last 7 days)     Procedure Component Value Units Date/Time    Basic Metabolic Panel [[id number]]  (Normal) Collected: 05/09/22 0805    Specimen: Blood Updated: 05/09/22 0850     Glucose 99 mg/dL      BUN 13 mg/dL      Creatinine 0.69 mg/dL      Sodium 137 mmol/L      Potassium 4.4 mmol/L      Chloride 103 mmol/L      CO2 26.0 mmol/L      Calcium 8.6 mg/dL      BUN/Creatinine Ratio 18.8     Anion Gap 8.0 mmol/L      eGFR 98.3 mL/min/1.73      Comment: National Kidney Foundation and American Society of Nephrology (ASN) Task Force recommended calculation based on the Chronic Kidney Disease Epidemiology Collaboration (CKD-EPI) equation refit without adjustment for race.       Narrative:      GFR Normal >60  Chronic Kidney Disease <60  Kidney Failure <15      CBC (No Diff) [[id number]]  (Abnormal) Collected: 05/09/22 0805    Specimen: Blood Updated: 05/09/22 0829     WBC 13.37 10*3/mm3      RBC 3.01 10*6/mm3      Hemoglobin 9.3 g/dL      Hematocrit 30.1 %      .0 fL      MCH 30.9 pg      MCHC 30.9 g/dL      RDW 12.1 %      RDW-SD 44.9 fl      MPV 10.8 fL      Platelets 184 10*3/mm3     Comprehensive Metabolic Panel [[id number]]  (Abnormal) Collected: 05/08/22 0559    Specimen: Blood Updated: 05/08/22 0651     Glucose 144 mg/dL      BUN 13 mg/dL      Creatinine 0.81 mg/dL      Sodium 139 mmol/L      Potassium 4.5 mmol/L      Chloride 103 mmol/L      CO2 28.0 mmol/L       Calcium 8.9 mg/dL      Total Protein 6.6 g/dL      Albumin 4.00 g/dL      ALT (SGPT) 29 U/L      AST (SGOT) 73 U/L      Alkaline Phosphatase 56 U/L      Total Bilirubin 0.2 mg/dL      Globulin 2.6 gm/dL      A/G Ratio 1.5 g/dL      BUN/Creatinine Ratio 16.0     Anion Gap 8.0 mmol/L      eGFR 82.2 mL/min/1.73      Comment: National Kidney Foundation and American Society of Nephrology (ASN) Task Force recommended calculation based on the Chronic Kidney Disease Epidemiology Collaboration (CKD-EPI) equation refit without adjustment for race.       Narrative:      GFR Normal >60  Chronic Kidney Disease <60  Kidney Failure <15      CBC Auto Differential [[id number]]  (Abnormal) Collected: 05/08/22 0625    Specimen: Blood Updated: 05/08/22 0650     WBC 11.72 10*3/mm3      RBC 3.53 10*6/mm3      Hemoglobin 10.8 g/dL      Hematocrit 34.4 %      MCV 97.5 fL      MCH 30.6 pg      MCHC 31.4 g/dL      RDW 12.3 %      RDW-SD 43.7 fl      MPV 10.7 fL      Platelets 243 10*3/mm3      Neutrophil % 70.3 %      Lymphocyte % 20.3 %      Monocyte % 8.3 %      Eosinophil % 0.3 %      Basophil % 0.4 %      Immature Grans % 0.4 %      Neutrophils, Absolute 8.23 10*3/mm3      Lymphocytes, Absolute 2.38 10*3/mm3      Monocytes, Absolute 0.97 10*3/mm3      Eosinophils, Absolute 0.04 10*3/mm3      Basophils, Absolute 0.05 10*3/mm3      Immature Grans, Absolute 0.05 10*3/mm3      nRBC 0.0 /100 WBC     Urinalysis With Microscopic If Indicated (No Culture) - Urine, Clean Catch [[id number]]  (Abnormal) Collected: 05/08/22 0320    Specimen: Urine, Clean Catch Updated: 05/08/22 0400     Color, UA Yellow     Appearance, UA Clear     pH, UA 6.5     Specific Gravity, UA 1.025     Glucose, UA Negative     Ketones, UA Negative     Bilirubin, UA Negative     Blood, UA Negative     Protein, UA Negative     Leuk Esterase, UA Small (1+)     Nitrite, UA Negative     Urobilinogen, UA 0.2 E.U./dL    Urinalysis, Microscopic Only - Urine, Clean Catch [[id number]]   (Abnormal) Collected: 05/08/22 0320    Specimen: Urine, Clean Catch Updated: 05/08/22 0400     RBC, UA None Seen /HPF      WBC, UA 13-20 /HPF      Bacteria, UA 1+ /HPF      Squamous Epithelial Cells, UA 0-2 /HPF      Hyaline Casts, UA 7-12 /LPF      Methodology Manual Light Microscopy    Hemoglobin A1c [[id number]]  (Normal) Collected: 05/07/22 2318    Specimen: Blood Updated: 05/08/22 0311     Hemoglobin A1C 5.00 %     Narrative:      Hemoglobin A1C Ranges:    Increased Risk for Diabetes  5.7% to 6.4%  Diabetes                     >= 6.5%  Diabetic Goal                < 7.0%    Magnesium [[id number]]  (Normal) Collected: 05/08/22 0005    Specimen: Blood Updated: 05/08/22 0304     Magnesium 1.9 mg/dL     Comprehensive Metabolic Panel [[id number]]  (Abnormal) Collected: 05/08/22 0005    Specimen: Blood Updated: 05/08/22 0058     Glucose 128 mg/dL      BUN 11 mg/dL      Creatinine 0.96 mg/dL      Sodium 139 mmol/L      Potassium 4.1 mmol/L      Comment: Slight hemolysis detected by analyzer. Results may be affected.        Chloride 102 mmol/L      CO2 27.0 mmol/L      Calcium 9.1 mg/dL      Total Protein 6.9 g/dL      Albumin 3.70 g/dL      ALT (SGPT) 16 U/L      AST (SGOT) 20 U/L      Alkaline Phosphatase 54 U/L      Total Bilirubin <0.2 mg/dL      Globulin 3.2 gm/dL      A/G Ratio 1.2 g/dL      BUN/Creatinine Ratio 11.5     Anion Gap 10.0 mmol/L      eGFR 67.0 mL/min/1.73      Comment: National Kidney Foundation and American Society of Nephrology (ASN) Task Force recommended calculation based on the Chronic Kidney Disease Epidemiology Collaboration (CKD-EPI) equation refit without adjustment for race.       Narrative:      GFR Normal >60  Chronic Kidney Disease <60  Kidney Failure <15      COVID PRE-OP / PRE-PROCEDURE SCREENING ORDER (NO ISOLATION) - Swab, Nasal Cavity [[id number]]  (Normal) Collected: 05/08/22 0007    Specimen: Swab from Nasal Cavity Updated: 05/08/22 0048    Narrative:      The following orders were  created for panel order COVID PRE-OP / PRE-PROCEDURE SCREENING ORDER (NO ISOLATION) - Swab, Nasal Cavity.  Procedure                               Abnormality         Status                     ---------                               -----------         ------                     COVID-19,[facility] IN-[facility]...[[id number]]  Normal              Final result                 Please view results for these tests on the individual orders.    COVID-19,[facility] IN-HOUSE,Nasal Swab No Transport Media 3-4 HR TAT - Swab, Nasal Cavity [[id number]]  (Normal) Collected: 05/08/22 0007    Specimen: Swab from Nasal Cavity Updated: 05/08/22 0048     COVID19 Not Detected    Narrative:      Fact sheet for providers: https://www.fda.gov/media/567411/download     Fact sheet for patients: https://www.fda.gov/media/347470/download    Test performed by PCR.    Consider negative results in combination with clinical observations, patient history, and epidemiological information.    Protime-INR [[id number]]  (Normal) Collected: 05/07/22 2318    Specimen: Blood Updated: 05/07/22 2339     Protime 12.3 Seconds      INR 0.95    aPTT [[id number]]  (Normal) Collected: 05/07/22 2318    Specimen: Blood Updated: 05/07/22 2339     PTT 26.1 seconds     CBC & Differential [[id number]]  (Abnormal) Collected: 05/07/22 2318    Specimen: Blood Updated: 05/07/22 2328    Narrative:      The following orders were created for panel order CBC & Differential.  Procedure                               Abnormality         Status                     ---------                               -----------         ------                     CBC Auto Differential[[id number]]        Abnormal            Final result                 Please view results for these tests on the individual orders.    CBC Auto Differential [[id number]]  (Abnormal) Collected: 05/07/22 2318    Specimen: Blood Updated: 05/07/22 2328     WBC 13.41 10*3/mm3      RBC 4.13 10*6/mm3      Hemoglobin 12.9 g/dL       Hematocrit 40.4 %      MCV 97.8 fL      MCH 31.2 pg      MCHC 31.9 g/dL      RDW 12.2 %      RDW-SD 44.2 fl      MPV 10.4 fL      Platelets 278 10*3/mm3      Neutrophil % 61.2 %      Lymphocyte % 28.4 %      Monocyte % 7.9 %      Eosinophil % 1.4 %      Basophil % 0.7 %      Immature Grans % 0.4 %      Neutrophils, Absolute 8.20 10*3/mm3      Lymphocytes, Absolute 3.81 10*3/mm3      Monocytes, Absolute 1.06 10*3/mm3      Eosinophils, Absolute 0.19 10*3/mm3      Basophils, Absolute 0.10 10*3/mm3      Immature Grans, Absolute 0.05 10*3/mm3      nRBC 0.0 /100 WBC         Hospital Course  Patient is a 62 y.o. female presented to [facility] emergency room 5/7/2022 after a fall.  Patient was stepping out of her car and had groceries in 1 hand and a drink and food in the other hand.  She lost her balance, rolled her left ankle and fell.  She denied dizziness, lightheadedness prior to fall.  She did not strike her head or lose consciousness.  Patient denied any recent falls.  She denied any additional complaints.  She experienced immediate pain and was unable to stand and ambulate.  X-ray left ankle show fracture dislocation left ankle.  X-ray left tibia fibula noted comminuted fracture dislocation left ankle.  Chest x-ray no acute cardiopulmonary process.  Left ankle reduced by emergency room physician without complaints.  Splint cast placed.  Orthopedics consulted and requested admission for surgical repair.  White cell count 13.4.  Urinalysis 13-20 WBC, 1+ bacteria, negative nitrite.  Patient denied any symptoms of dysuria, frequency.  Clindamycin given in ER.    She was admitted to the medical floor with open left ankle fracture reduced and splint cast placed in the emergency room.  She was made nothing by mouth for orthopedic consultation.  She was hydrated with IV fluids, offered pain medication and Zofran for nausea.  Prophylactic antibiotics with clindamycin started on admission due to open fracture.   Dr. Aranda consulted and recommended surgical intervention.  Patient agreeable.  On 5/8/2022 she was taken to surgery for left open bimalleolar ankle fracture.  Open reduction, internal fixation of left open bimalleolar ankle fracture dislocation with complex wound closure stellate laceration per Dr. Aranda.  Postoperatively, she was to remain nonweightbearing left lower extremity per Dr. Aranda.  Xarelto 10 mg orally daily for 6 weeks ordered.  Physical therapy and occupational therapy consulted.  Patient was able to stand and pivot to bedside commode and maintain nonweightbearing status left lower extremity.  Physical therapy recommends bedside commode and wheelchair at discharge as patient already has walker.  Patient will follow-up with Dr. Aranda 4/16/2022 for recheck.  Sutures are to remain in place for 2 to 3 weeks.    Postop blood loss anemia.  Hemoglobin 12.9 on admission 9.3 postop.  No need for transfusion.    Leukocytosis, WBC 13.41 on admission 13.37 at discharge.  Clindamycin ordered on admission due to open fracture.  Temperature max 99.7.  Patient has been encouraged to use incentive spirometry every 2-4 hours while awake.  She denies fever or chills.  Denies dysuria.  She received 3 doses of cefazolin.  Clindamycin transitioned to doxycycline for total of 7 days due to open ankle fracture.    She is followed by Dr. Teddy Lazo primary care.  She takes Neurontin and Percocet at home which will be continued at discharge.    On 5/9/2022 she has been evaluated by orthopedics and cleared for discharge.  Patient to remain on Xarelto 10 mg daily for 6 weeks.  She is to remain nonweightbearing left lower extremity until cleared by Dr. Aranda.  Sutures are to remain in place for 2 to 3 weeks.  She will follow-up with Dr. Aranda on 5/16/2022.  Follow-up with Dr. Teddy Lazo 5/17/2022.    Physical Exam on Discharge:  /55 (BP Location: Right arm, Patient Position: Lying)   Pulse 93   Temp 99.7 °F (37.6  "°C) (Oral)   Resp 18   Ht 160 cm (63\")   Wt 76.2 kg (168 lb) Comment: bed scale  SpO2 95%   BMI 29.76 kg/m²   Physical Exam  Vitals and nursing note reviewed.   Constitutional:       Comments: Sitting up in bed.  No family in room.  Oxygen removed.   HENT:      Head: Normocephalic and atraumatic.      Nose: No congestion.      Mouth/Throat:      Pharynx: Oropharynx is clear. No oropharyngeal exudate or posterior oropharyngeal erythema.   Eyes:      Extraocular Movements: Extraocular movements intact.      Pupils: Pupils are equal, round, and reactive to light.   Cardiovascular:      Rate and Rhythm: Normal rate and regular rhythm.      Heart sounds: No murmur heard.  Pulmonary:      Breath sounds: No wheezing, rhonchi or rales.      Comments: Oxygen removed.  Saturation 95%.  Abdominal:      Palpations: Abdomen is soft.      Tenderness: There is no abdominal tenderness.   Genitourinary:     Comments: Voiding.  Musculoskeletal:         General: Tenderness (Left lower extremity) present.      Cervical back: Normal range of motion and neck supple.   Skin:     Comments: Ace cast dressing left lower extremity.   Neurological:      General: No focal deficit present.      Mental Status: She is alert and oriented to person, place, and time.   Psychiatric:         Mood and Affect: Mood normal.         Behavior: Behavior normal.         Thought Content: Thought content normal.         Judgment: Judgment normal.       Condition on Discharge: Stable    Discharge Disposition: Home    Discharge Diet:   Diet Instructions     Advance Diet As Tolerated        As tolerated    Activity at Discharge:   Activity Instructions     Other Activity Instructions      Activity Instructions: Nonweightbearing left lower extremity per Dr. Aranda       Nonweightbearing left lower extremity per Dr. Aranda    Discharge Care Plan / Instructions:   1.  For recurrent fall seek medical attention.  2.  Nonweightbearing left lower extremity per  " Manoj.  3.  Follow-up with Dr. Aranda 5/16/2022 for recheck.  Sutures to remain in place 2 to 3 weeks per Dr. Aranda.  4.  Follow-up with Dr. Teddy Lazo  5/17/22  5.  Xarelto 10 mg orally daily for 6 weeks  6.  Doxycycline 100 mg twice daily for 6 days    Discharge Medications:     Discharge Medications      New Medications      Instructions Start Date   doxycycline 100 MG tablet  Commonly known as: ADOXA   100 mg, Oral, Every 12 Hours Scheduled      rivaroxaban 10 MG tablet  Commonly known as: XARELTO   10 mg, Oral, Daily   Start Date: May 10, 2022        Continue These Medications      Instructions Start Date   Breztri Aerosphere 160-9-4.8 MCG/ACT aerosol inhaler  Generic drug: Budeson-Glycopyrrol-Formoterol   2 puffs, Inhalation      cholecalciferol 25 MCG (1000 UT) tablet  Commonly known as: VITAMIN D3   1,000 Units, Oral, 2 Times Daily      clotrimazole-betamethasone 1-0.05 % cream  Commonly known as: LOTRISONE   No dose, route, or frequency recorded.      escitalopram 20 MG tablet  Commonly known as: LEXAPRO   20 mg, Oral, Daily      fexofenadine 180 MG tablet  Commonly known as: ALLEGRA   180 mg, Oral, Daily      gabapentin 800 MG tablet  Commonly known as: NEURONTIN   1,600 mg, Oral, Nightly, Takes at 9pm      gabapentin 800 MG tablet  Commonly known as: NEURONTIN   800 mg, Oral, 2 Times Daily, Takes at 5am and 1pm      nexIUM 40 MG capsule  Generic drug: esomeprazole   40 mg, Oral, Daily      oxyCODONE-acetaminophen  MG per tablet  Commonly known as: PERCOCET   oxycodone-acetaminophen 10 mg-325 mg tablet      PROAIR HFA IN   2 puffs, Inhalation, 4 Times Daily PRN      Unable to find   1 each, Oral, 3 Times Daily, Med Name: Calcium Citrate 600 mg, Take 1 tablet by mouth twice daily.            Follow-up Appointments:    Follow-up Information     Teddy Sanchez MD Follow up on 5/17/2022.    Specialty: Family Medicine  Why: [id number]  Contact information:  [address] [facility] [place]  [address]  [phone]             Pardeep Buitrago PA-C Follow up on 5/16/2022.    Specialty: Physician Assistant  Why: 1:10  Contact information:  [address] [place] [address]  [phone]                       Future Appointments:  Future Appointments   Date Time Provider Department Center   8/22/2022 10:45 AM THERAPIST RESPIRATORY DI [facility]   8/22/2022 11:00 AM Amanda Moreno APRN [facility]       Test Results Pending at Discharge: None    The above documentation resulted from a face-to-face encounter by me Ora PÉREZ, [facility].    Electronically signed by ELISA Nathan, 5/9/2022, 10:10 CDT.    Time: This discharge process required 35 minutes for completion.    Plan discussed with Dr. Andrade, Pardeep Buitrago PA-C, and patient.    Time spent in face-to-face evaluation, chart review, planning and education 35 minutes.          Electronically signed by Kofi Andrade MD at 05/09/22 1715       Discharge Order (From admission, onward)     Start     Ordered    05/09/22 0853  Discharge patient  Once        Expected Discharge Date: 05/09/22    Discharge Disposition: Home or Self Care    Physician of Record for Attribution - Please select from Treatment Team: ALONA FELIX [[id number]]    Review needed by CMO to determine Physician of Record: No       Question Answer Comment   Physician of Record for Attribution - Please select from Treatment Team ALONA FELIX    Review needed by CMO to determine Physician of Record No        05/09/22 0852

## 2022-05-10 NOTE — THERAPY DISCHARGE NOTE
Acute Care - Physical Therapy Discharge Summary  Psychiatric       Patient Name: Arminda Jiménez  : 1960  MRN: [id number]    Today's Date: 5/10/2022  Onset of Illness/Injury or Date of Surgery: 22       Referring Physician: Dr. Aranda      Admit Date: 2022      PT Recommendation and Plan    Visit Dx:    ICD-10-CM ICD-9-CM   1. Fall, initial encounter  W19.XXXA E888.9   2. Foraminal stenosis of lumbosacral region  M48.07 724.02   3. Gait abnormality  R26.9 781.2        Outcome Measures     Row Name 22 0904             How much help from another is currently needed...    Putting on and taking off regular lower body clothing? 3  -AC      Bathing (including washing, rinsing, and drying) 3  -AC      Toileting (which includes using toilet bed pan or urinal) 3  -AC      Putting on and taking off regular upper body clothing 4  -AC      Taking care of personal grooming (such as brushing teeth) 4  -AC      Eating meals 4  -AC      AM-PAC 6 Clicks Score (OT) 21  -AC              Functional Assessment    Outcome Measure Options AM-PAC 6 Clicks Daily Activity (OT)  -AC            User Key  (r) = Recorded By, (t) = Taken By, (c) = Cosigned By    Initials Name Provider Type    AC Slade Green, OTR/L, CNT Occupational Therapist                     PT Rehab Goals     Row Name 05/10/22 1028             Bed Mobility Goal 1 (PT)    Activity/Assistive Device (Bed Mobility Goal 1, PT) scooting;sit to supine/supine to sit  -AB      [place] Level/Cues Needed (Bed Mobility Goal 1, PT) independent  -AB      Time Frame (Bed Mobility Goal 1, PT) long term goal (LTG);10 days  -AB      Progress/Outcomes (Bed Mobility Goal 1, PT) goal not met  -AB              Transfer Goal 1 (PT)    Activity/Assistive Device (Transfer Goal 1, PT) sit-to-stand/stand-to-sit;bed-to-chair/chair-to-bed;walker, rolling  -AB      [place] Level/Cues Needed (Transfer Goal 1, PT) modified independence  -AB      Time Frame (Transfer  Goal 1, PT) long term goal (LTG);10 days  -AB      Progress/Outcome (Transfer Goal 1, PT) goal not met  -AB              Gait Training Goal 1 (PT)    Activity/Assistive Device (Gait Training Goal 1, PT) gait (walking locomotion);assistive device use;decrease fall risk;improve balance and speed;increase endurance/gait distance;increase energy conservation;maintain weight-bearing status;walker, rolling  -AB      [place] Level (Gait Training Goal 1, PT) standby assist;modified independence  -AB      Distance (Gait Training Goal 1, PT) 12-15 ft  -AB      Time Frame (Gait Training Goal 1, PT) long term goal (LTG);10 days  -AB      Progress/Outcome (Gait Training Goal 1, PT) goal not met  -AB              Problem Specific Goal 1 (PT)    Problem Specific Goal 1 (PT) I w/ wheelchair mgmt and propulsion on level surface ~ 50 ft in/around obstacles  -AB      Time Frame (Problem Specific Goal 1, PT) long-term goal (LTG);other (see comments)  10 days  -AB      Progress/Outcome (Problem Specific Goal 1, PT) goal not met  -AB              Patient Education Goal (PT)    Activity (Patient Education Goal, PT) I w/ activities to assist w/ edema mgmt, use of IS  -AB      [place]/Cues/Accuracy (Memory Goal 2, PT) demonstrates adequately;independent;verbalizes understanding  -AB      Time Frame (Patient Education Goal, PT) long term goal (LTG);10 days  -AB      Progress/Outcome (Patient Education Goal, PT) goal partially met  -AB            User Key  (r) = Recorded By, (t) = Taken By, (c) = Cosigned By    Initials Name Provider Type Discipline    Viktoriya Royal PTA Physical Therapist Assistant PT                    PT Discharge Summary  Anticipated Discharge Disposition (PT): home with 24/7 care, other (see comments)  Reason for Discharge: Discharge from facility  Outcomes Achieved: Refer to plan of care for updates on goals achieved  Discharge Destination: Home with assist      Viktoriya Martinez PTA   5/10/2022

## 2022-05-10 NOTE — TELEPHONE ENCOUNTER
[facility] Trans[facility] Initial Follow Up Call    Outreach made within 2 business days of discharge: Yes    Patient: Joanne Fischer Patient : 1960   MRN: [id number]  Reason for Admission: No discharge information exists for this patient. Discharge Date:         Spoke with: Joanne Fischer    Discharge department/facility: [facility]    TCM Interactive Patient Contact:  Was patient able to fill all prescriptions: Yes  Was patient instructed to bring all medications to the follow-up visit: Yes  Is patient taking all medications as directed in the discharge summary?  Yes  Does patient understand their discharge instructions: Yes  Does patient have questions or concerns that need addressed prior to 7-14 day follow up office visit: No    Scheduled appointment with PCP within 7-14 days    Follow Up  Future Appointments   Date Time Provider Manuel Hidalgo   2022  9:30 AM Delbra Galeazzi, MD [place], [place]

## 2022-05-11 ENCOUNTER — HOSPITAL ENCOUNTER (OUTPATIENT)
Facility: HOSPITAL | Age: 62
Setting detail: OBSERVATION
LOS: 1 days | Discharge: HOME | End: 2022-05-12
Attending: FAMILY MEDICINE | Admitting: FAMILY MEDICINE

## 2022-05-11 ENCOUNTER — APPOINTMENT (OUTPATIENT)
Dept: CARDIOLOGY | Facility: HOSPITAL | Age: 62
End: 2022-05-11

## 2022-05-11 ENCOUNTER — APPOINTMENT (OUTPATIENT)
Dept: CT IMAGING | Facility: HOSPITAL | Age: 62
End: 2022-05-11

## 2022-05-11 ENCOUNTER — READMISSION MANAGEMENT (OUTPATIENT)
Dept: CALL CENTER | Facility: HOSPITAL | Age: 62
End: 2022-05-11

## 2022-05-11 DIAGNOSIS — E66.3 OVERWEIGHT: Chronic | ICD-10-CM

## 2022-05-11 DIAGNOSIS — I73.9 PAD (PERIPHERAL ARTERY DISEASE): Chronic | ICD-10-CM

## 2022-05-11 DIAGNOSIS — K59.03 CONSTIPATION DUE TO OPIOID THERAPY: Chronic | ICD-10-CM

## 2022-05-11 DIAGNOSIS — G89.29 CHRONIC LOW BACK PAIN, UNSPECIFIED BACK PAIN LATERALITY, UNSPECIFIED WHETHER SCIATICA PRESENT: Chronic | ICD-10-CM

## 2022-05-11 DIAGNOSIS — M15.9 PRIMARY OSTEOARTHRITIS INVOLVING MULTIPLE JOINTS: Chronic | ICD-10-CM

## 2022-05-11 DIAGNOSIS — E04.1 THYROID CYST: ICD-10-CM

## 2022-05-11 DIAGNOSIS — M48.07 FORAMINAL STENOSIS OF LUMBOSACRAL REGION: ICD-10-CM

## 2022-05-11 DIAGNOSIS — M81.0 SENILE OSTEOPOROSIS: ICD-10-CM
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
## 2022-05-11 DIAGNOSIS — J44.1 COPD WITH ACUTE EXACERBATION: ICD-10-CM

## 2022-05-11 DIAGNOSIS — E66.3 OVERWEIGHT (BMI 25.0-29.9): ICD-10-CM

## 2022-05-11 DIAGNOSIS — F33.9 RECURRENT MAJOR DEPRESSIVE DISORDER, REMISSION STATUS UNSPECIFIED: ICD-10-CM

## 2022-05-11 DIAGNOSIS — I10 PRIMARY HYPERTENSION: ICD-10-CM

## 2022-05-11 DIAGNOSIS — R07.9 CHEST PAIN, UNSPECIFIED TYPE: Primary | ICD-10-CM

## 2022-05-11 DIAGNOSIS — Z74.09 IMPAIRED MOBILITY: ICD-10-CM

## 2022-05-11 DIAGNOSIS — C80.1 ADENOCARCINOMA OF UNKNOWN ORIGIN: ICD-10-CM

## 2022-05-11 DIAGNOSIS — M54.50 CHRONIC LOW BACK PAIN, UNSPECIFIED BACK PAIN LATERALITY, UNSPECIFIED WHETHER SCIATICA PRESENT: Chronic | ICD-10-CM

## 2022-05-11 DIAGNOSIS — J43.1 PANLOBULAR EMPHYSEMA: Chronic | ICD-10-CM

## 2022-05-11 DIAGNOSIS — S82.892E TYPE I OR II OPEN FRACTURE OF LEFT ANKLE WITH ROUTINE HEALING, SUBSEQUENT ENCOUNTER: ICD-10-CM

## 2022-05-11 DIAGNOSIS — R09.02 HYPOXIA: ICD-10-CM

## 2022-05-11 DIAGNOSIS — Z87.891 PERSONAL HISTORY OF NICOTINE DEPENDENCE: Chronic | ICD-10-CM

## 2022-05-11 DIAGNOSIS — J44.1 COPD EXACERBATION: ICD-10-CM

## 2022-05-11 DIAGNOSIS — D62 POSTOPERATIVE ANEMIA DUE TO ACUTE BLOOD LOSS: ICD-10-CM

## 2022-05-11 DIAGNOSIS — Z91.09 ENVIRONMENTAL ALLERGIES: Chronic | ICD-10-CM

## 2022-05-11 DIAGNOSIS — T40.2X5A CONSTIPATION DUE TO OPIOID THERAPY: Chronic | ICD-10-CM

## 2022-05-11 DIAGNOSIS — K21.9 GASTROESOPHAGEAL REFLUX DISEASE WITHOUT ESOPHAGITIS: Chronic | ICD-10-CM

## 2022-05-11 DIAGNOSIS — M79.672 ACUTE FOOT PAIN, LEFT: ICD-10-CM

## 2022-05-11 DIAGNOSIS — D64.9 ANEMIA, UNSPECIFIED TYPE: ICD-10-CM

## 2022-05-11 DIAGNOSIS — M54.16 LUMBAR RADICULOPATHY: ICD-10-CM

## 2022-05-11 DIAGNOSIS — M48.061 SPINAL STENOSIS OF LUMBAR REGION, UNSPECIFIED WHETHER NEUROGENIC CLAUDICATION PRESENT: Chronic | ICD-10-CM

## 2022-05-11 DIAGNOSIS — R26.89 IMPAIRED GAIT AND MOBILITY: ICD-10-CM

## 2022-05-11 LAB
ALBUMIN SERPL-MCNC: 3.1 G/DL (ref 3.5–5.2)
ALBUMIN/GLOB SERPL: 0.9 G/DL
ALP SERPL-CCNC: 68 U/L (ref 39–117)
ALT SERPL W P-5'-P-CCNC: 31 U/L (ref 1–33)
ANION GAP SERPL CALCULATED.3IONS-SCNC: 8 MMOL/L (ref 5–15)
APTT PPP: 51.2 SECONDS (ref 24.1–35)
AST SERPL-CCNC: 35 U/L (ref 1–32)
BASOPHILS # BLD AUTO: 0.05 10*3/MM3 (ref 0–0.2)
BASOPHILS NFR BLD AUTO: 0.3 % (ref 0–1.5)
BH CV STRESS BP STAGE 1: NORMAL
BH CV STRESS BP STAGE 2: NORMAL
BH CV STRESS BP STAGE 3: NORMAL
BH CV STRESS DOSE DOBUTAMINE STAGE 1: 10
BH CV STRESS DOSE DOBUTAMINE STAGE 2: 20
BH CV STRESS DOSE DOBUTAMINE STAGE 3: 30
BH CV STRESS DURATION MIN STAGE 1: 3
BH CV STRESS DURATION MIN STAGE 2: 3
BH CV STRESS DURATION MIN STAGE 3: 2
BH CV STRESS DURATION SEC STAGE 1: 0
BH CV STRESS DURATION SEC STAGE 2: 0
BH CV STRESS DURATION SEC STAGE 3: 53
BH CV STRESS HR STAGE 1: 88
BH CV STRESS HR STAGE 2: 122
BH CV STRESS HR STAGE 3: 136
BH CV STRESS PROTOCOL 1: NORMAL
BH CV STRESS RECOVERY BP: NORMAL MMHG
BH CV STRESS RECOVERY HR: 93 BPM
BH CV STRESS STAGE 1: 1
BH CV STRESS STAGE 2: 2
BH CV STRESS STAGE 3: 3
BILIRUB SERPL-MCNC: 0.3 MG/DL (ref 0–1.2)
BUN SERPL-MCNC: 7 MG/DL (ref 8–23)
BUN/CREAT SERPL: 13.2 (ref 7–25)
CALCIUM SPEC-SCNC: 8.5 MG/DL (ref 8.6–10.5)
CHLORIDE SERPL-SCNC: 101 MMOL/L (ref 98–107)
CO2 SERPL-SCNC: 28 MMOL/L (ref 22–29)
CREAT SERPL-MCNC: 0.53 MG/DL (ref 0.57–1)
DEPRECATED RDW RBC AUTO: 43.8 FL (ref 37–54)
EGFRCR SERPLBLD CKD-EPI 2021: 104.7 ML/MIN/1.73
EOSINOPHIL # BLD AUTO: 0.02 10*3/MM3 (ref 0–0.4)
EOSINOPHIL NFR BLD AUTO: 0.1 % (ref 0.3–6.2)
ERYTHROCYTE [DISTWIDTH] IN BLOOD BY AUTOMATED COUNT: 12 % (ref 12.3–15.4)
FERRITIN SERPL-MCNC: 130.4 NG/ML (ref 13–150)
FOLATE SERPL-MCNC: 16.5 NG/ML (ref 4.78–24.2)
GLOBULIN UR ELPH-MCNC: 3.3 GM/DL
GLUCOSE SERPL-MCNC: 109 MG/DL (ref 65–99)
HCT VFR BLD AUTO: 27.6 % (ref 34–46.6)
HGB BLD-MCNC: 8.4 G/DL (ref 12–15.9)
HOLD SPECIMEN: NORMAL
HOLD SPECIMEN: NORMAL
IMM GRANULOCYTES # BLD AUTO: 0.12 10*3/MM3 (ref 0–0.05)
IMM GRANULOCYTES NFR BLD AUTO: 0.7 % (ref 0–0.5)
INR PPP: 1.39 (ref 0.91–1.09)
IRON 24H UR-MRATE: 11 MCG/DL (ref 37–145)
IRON SATN MFR SERPL: 5 % (ref 20–50)
LIPASE SERPL-CCNC: 16 U/L (ref 13–60)
LYMPHOCYTES # BLD AUTO: 1.69 10*3/MM3 (ref 0.7–3.1)
LYMPHOCYTES NFR BLD AUTO: 10 % (ref 19.6–45.3)
MAXIMAL PREDICTED HEART RATE: 158 BPM
MCH RBC QN AUTO: 30.4 PG (ref 26.6–33)
MCHC RBC AUTO-ENTMCNC: 30.4 G/DL (ref 31.5–35.7)
MCV RBC AUTO: 100 FL (ref 79–97)
MONOCYTES # BLD AUTO: 1.34 10*3/MM3 (ref 0.1–0.9)
MONOCYTES NFR BLD AUTO: 7.9 % (ref 5–12)
NEUTROPHILS NFR BLD AUTO: 13.76 10*3/MM3 (ref 1.7–7)
NEUTROPHILS NFR BLD AUTO: 81 % (ref 42.7–76)
NRBC BLD AUTO-RTO: 0 /100 WBC (ref 0–0.2)
NT-PROBNP SERPL-MCNC: 1246 PG/ML (ref 0–900)
PERCENT MAX PREDICTED HR: 86.08 %
PLATELET # BLD AUTO: 190 10*3/MM3 (ref 140–450)
PMV BLD AUTO: 11.3 FL (ref 6–12)
POTASSIUM SERPL-SCNC: 3.9 MMOL/L (ref 3.5–5.2)
PROT SERPL-MCNC: 6.4 G/DL (ref 6–8.5)
PROTHROMBIN TIME: 16.5 SECONDS (ref 11.9–14.6)
QT INTERVAL: 318 MS
QT INTERVAL: 330 MS
QTC INTERVAL: 426 MS
QTC INTERVAL: 440 MS
RBC # BLD AUTO: 2.76 10*6/MM3 (ref 3.77–5.28)
SARS-COV-2 RNA PNL SPEC NAA+PROBE: NOT DETECTED
SODIUM SERPL-SCNC: 137 MMOL/L (ref 136–145)
STRESS BASELINE BP: NORMAL MMHG
STRESS BASELINE HR: 91 BPM
STRESS PERCENT HR: 101 %
STRESS POST EXERCISE DUR MIN: 8 MIN
STRESS POST EXERCISE DUR SEC: 53 SEC
STRESS POST PEAK BP: NORMAL MMHG
STRESS POST PEAK HR: 136 BPM
STRESS TARGET HR: 134 BPM
TIBC SERPL-MCNC: 229 MCG/DL (ref 298–536)
TRANSFERRIN SERPL-MCNC: 154 MG/DL (ref 200–360)
TROPONIN T SERPL-MCNC: 0.01 NG/ML (ref 0–0.03)
TROPONIN T SERPL-MCNC: <0.01 NG/ML (ref 0–0.03)
TROPONIN T SERPL-MCNC: <0.01 NG/ML (ref 0–0.03)
VIT B12 BLD-MCNC: 659 PG/ML (ref 211–946)
WBC NRBC COR # BLD: 16.98 10*3/MM3 (ref 3.4–10.8)
WHOLE BLOOD HOLD COAG: NORMAL
WHOLE BLOOD HOLD SPECIMEN: NORMAL

## 2022-05-11 PROCEDURE — 25010000002 NA FERRIC GLUC CPLX PER 12.5 MG: Performed by: FAMILY MEDICINE

## 2022-05-11 PROCEDURE — 82746 ASSAY OF FOLIC ACID SERUM: CPT | Performed by: FAMILY MEDICINE

## 2022-05-11 PROCEDURE — 25010000002 METHYLPREDNISOLONE PER 125 MG: Performed by: EMERGENCY MEDICINE

## 2022-05-11 PROCEDURE — 94799 UNLISTED PULMONARY SVC/PX: CPT

## 2022-05-11 PROCEDURE — C9803 HOPD COVID-19 SPEC COLLECT: HCPCS

## 2022-05-11 PROCEDURE — 93005 ELECTROCARDIOGRAM TRACING: CPT | Performed by: FAMILY MEDICINE

## 2022-05-11 PROCEDURE — 0 IOPAMIDOL PER 1 ML: Performed by: EMERGENCY MEDICINE

## 2022-05-11 PROCEDURE — 0 DOBUTAMINE PER 250 MG: Performed by: INTERNAL MEDICINE

## 2022-05-11 PROCEDURE — 84484 ASSAY OF TROPONIN QUANT: CPT | Performed by: EMERGENCY MEDICINE

## 2022-05-11 PROCEDURE — 36415 COLL VENOUS BLD VENIPUNCTURE: CPT

## 2022-05-11 PROCEDURE — 94640 AIRWAY INHALATION TREATMENT: CPT

## 2022-05-11 PROCEDURE — 84466 ASSAY OF TRANSFERRIN: CPT | Performed by: FAMILY MEDICINE

## 2022-05-11 PROCEDURE — 93350 STRESS TTE ONLY: CPT | Performed by: INTERNAL MEDICINE

## 2022-05-11 PROCEDURE — 93352 ADMIN ECG CONTRAST AGENT: CPT | Performed by: INTERNAL MEDICINE

## 2022-05-11 PROCEDURE — 93018 CV STRESS TEST I&R ONLY: CPT | Performed by: INTERNAL MEDICINE

## 2022-05-11 PROCEDURE — 99284 EMERGENCY DEPT VISIT MOD MDM: CPT

## 2022-05-11 PROCEDURE — 93350 STRESS TTE ONLY: CPT

## 2022-05-11 PROCEDURE — 84484 ASSAY OF TROPONIN QUANT: CPT | Performed by: FAMILY MEDICINE

## 2022-05-11 PROCEDURE — 87635 SARS-COV-2 COVID-19 AMP PRB: CPT | Performed by: EMERGENCY MEDICINE

## 2022-05-11 PROCEDURE — 93010 ELECTROCARDIOGRAM REPORT: CPT | Performed by: INTERNAL MEDICINE

## 2022-05-11 PROCEDURE — 93005 ELECTROCARDIOGRAM TRACING: CPT | Performed by: EMERGENCY MEDICINE

## 2022-05-11 PROCEDURE — 85025 COMPLETE CBC W/AUTO DIFF WBC: CPT | Performed by: FAMILY MEDICINE

## 2022-05-11 PROCEDURE — 96375 TX/PRO/DX INJ NEW DRUG ADDON: CPT

## 2022-05-11 PROCEDURE — 83880 ASSAY OF NATRIURETIC PEPTIDE: CPT | Performed by: FAMILY MEDICINE

## 2022-05-11 PROCEDURE — 80053 COMPREHEN METABOLIC PANEL: CPT | Performed by: FAMILY MEDICINE

## 2022-05-11 PROCEDURE — 85610 PROTHROMBIN TIME: CPT | Performed by: FAMILY MEDICINE

## 2022-05-11 PROCEDURE — 93017 CV STRESS TEST TRACING ONLY: CPT

## 2022-05-11 PROCEDURE — 82607 VITAMIN B-12: CPT | Performed by: FAMILY MEDICINE

## 2022-05-11 PROCEDURE — 96376 TX/PRO/DX INJ SAME DRUG ADON: CPT

## 2022-05-11 PROCEDURE — 71275 CT ANGIOGRAPHY CHEST: CPT

## 2022-05-11 PROCEDURE — 94664 DEMO&/EVAL PT USE INHALER: CPT

## 2022-05-11 PROCEDURE — 96374 THER/PROPH/DIAG INJ IV PUSH: CPT

## 2022-05-11 PROCEDURE — 25010000002 PERFLUTREN 6.52 MG/ML SUSPENSION: Performed by: INTERNAL MEDICINE

## 2022-05-11 PROCEDURE — 83690 ASSAY OF LIPASE: CPT | Performed by: FAMILY MEDICINE

## 2022-05-11 PROCEDURE — 85730 THROMBOPLASTIN TIME PARTIAL: CPT | Performed by: FAMILY MEDICINE

## 2022-05-11 PROCEDURE — 82728 ASSAY OF FERRITIN: CPT | Performed by: FAMILY MEDICINE

## 2022-05-11 PROCEDURE — 83540 ASSAY OF IRON: CPT | Performed by: FAMILY MEDICINE

## 2022-05-11 RX ORDER — SODIUM CHLORIDE 0.9 % (FLUSH) 0.9 %
10 SYRINGE (ML) INJECTION AS NEEDED
Status: DISCONTINUED | OUTPATIENT
Start: 2022-05-11 | End: 2022-05-11

## 2022-05-11 RX ORDER — BUDESONIDE AND FORMOTEROL FUMARATE DIHYDRATE 160; 4.5 UG/1; UG/1
2 AEROSOL RESPIRATORY (INHALATION)
Status: DISCONTINUED | OUTPATIENT
Start: 2022-05-11 | End: 2022-05-12 | Stop reason: HOSPADM

## 2022-05-11 RX ORDER — DOBUTAMINE HYDROCHLORIDE 100 MG/100ML
10-50 INJECTION INTRAVENOUS CONTINUOUS
Status: DISCONTINUED | OUTPATIENT
Start: 2022-05-11 | End: 2022-05-11

## 2022-05-11 RX ORDER — ASPIRIN 81 MG/1
81 TABLET ORAL DAILY
Status: DISCONTINUED | OUTPATIENT
Start: 2022-05-11 | End: 2022-05-11

## 2022-05-11 RX ORDER — MELATONIN
1000 DAILY
Status: DISCONTINUED | OUTPATIENT
Start: 2022-05-11 | End: 2022-05-12 | Stop reason: HOSPADM

## 2022-05-11 RX ORDER — FAMOTIDINE 10 MG/ML
20 INJECTION, SOLUTION INTRAVENOUS 2 TIMES DAILY
Status: DISCONTINUED | OUTPATIENT
Start: 2022-05-11 | End: 2022-05-12 | Stop reason: HOSPADM

## 2022-05-11 RX ORDER — ESCITALOPRAM OXALATE 10 MG/1
10 TABLET ORAL DAILY
Status: DISCONTINUED | OUTPATIENT
Start: 2022-05-11 | End: 2022-05-12 | Stop reason: HOSPADM

## 2022-05-11 RX ORDER — OXYCODONE HYDROCHLORIDE AND ACETAMINOPHEN 5; 325 MG/1; MG/1
1 TABLET ORAL EVERY 4 HOURS PRN
Status: DISCONTINUED | OUTPATIENT
Start: 2022-05-11 | End: 2022-05-12 | Stop reason: HOSPADM

## 2022-05-11 RX ORDER — METHYLPREDNISOLONE SODIUM SUCCINATE 125 MG/2ML
125 INJECTION, POWDER, LYOPHILIZED, FOR SOLUTION INTRAMUSCULAR; INTRAVENOUS ONCE
Status: COMPLETED | OUTPATIENT
Start: 2022-05-11 | End: 2022-05-11

## 2022-05-11 RX ORDER — ONDANSETRON 2 MG/ML
4 INJECTION INTRAMUSCULAR; INTRAVENOUS EVERY 6 HOURS PRN
Status: DISCONTINUED | OUTPATIENT
Start: 2022-05-11 | End: 2022-05-12 | Stop reason: HOSPADM

## 2022-05-11 RX ORDER — SODIUM CHLORIDE 0.9 % (FLUSH) 0.9 %
10 SYRINGE (ML) INJECTION AS NEEDED
Status: DISCONTINUED | OUTPATIENT
Start: 2022-05-11 | End: 2022-05-12 | Stop reason: HOSPADM

## 2022-05-11 RX ORDER — GABAPENTIN 400 MG/1
800 CAPSULE ORAL
Status: DISCONTINUED | OUTPATIENT
Start: 2022-05-11 | End: 2022-05-12 | Stop reason: HOSPADM

## 2022-05-11 RX ORDER — GABAPENTIN 400 MG/1
1600 CAPSULE ORAL NIGHTLY
Status: DISCONTINUED | OUTPATIENT
Start: 2022-05-11 | End: 2022-05-12 | Stop reason: HOSPADM

## 2022-05-11 RX ORDER — IPRATROPIUM BROMIDE AND ALBUTEROL SULFATE 2.5; .5 MG/3ML; MG/3ML
3 SOLUTION RESPIRATORY (INHALATION)
Status: DISCONTINUED | OUTPATIENT
Start: 2022-05-11 | End: 2022-05-12 | Stop reason: HOSPADM

## 2022-05-11 RX ORDER — ALBUTEROL SULFATE 2.5 MG/3ML
10 SOLUTION RESPIRATORY (INHALATION)
Status: COMPLETED | OUTPATIENT
Start: 2022-05-11 | End: 2022-05-11

## 2022-05-11 RX ORDER — SODIUM CHLORIDE 0.9 % (FLUSH) 0.9 %
10 SYRINGE (ML) INJECTION EVERY 12 HOURS SCHEDULED
Status: DISCONTINUED | OUTPATIENT
Start: 2022-05-11 | End: 2022-05-12 | Stop reason: HOSPADM

## 2022-05-11 RX ADMIN — GABAPENTIN 800 MG: 400 CAPSULE ORAL at 15:18

## 2022-05-11 RX ADMIN — PERFLUTREN 8.48 MG: 6.52 INJECTION, SUSPENSION INTRAVENOUS at 13:09

## 2022-05-11 RX ADMIN — METHYLPREDNISOLONE SODIUM SUCCINATE 125 MG: 125 INJECTION, POWDER, FOR SOLUTION INTRAMUSCULAR; INTRAVENOUS at 10:21

## 2022-05-11 RX ADMIN — IPRATROPIUM BROMIDE AND ALBUTEROL SULFATE 3 ML: .5; 3 SOLUTION RESPIRATORY (INHALATION) at 15:25

## 2022-05-11 RX ADMIN — FAMOTIDINE 20 MG: 10 INJECTION INTRAVENOUS at 15:18

## 2022-05-11 RX ADMIN — ALBUTEROL SULFATE 10 MG: 2.5 SOLUTION RESPIRATORY (INHALATION) at 08:28

## 2022-05-11 RX ADMIN — FAMOTIDINE 20 MG: 10 INJECTION INTRAVENOUS at 20:57

## 2022-05-11 RX ADMIN — Medication 10 ML: at 15:18

## 2022-05-11 RX ADMIN — IPRATROPIUM BROMIDE AND ALBUTEROL SULFATE 3 ML: .5; 3 SOLUTION RESPIRATORY (INHALATION) at 19:46

## 2022-05-11 RX ADMIN — Medication 1000 UNITS: at 15:18

## 2022-05-11 RX ADMIN — Medication 10 ML: at 20:57

## 2022-05-11 RX ADMIN — Medication 10 MCG/KG/MIN: at 13:09

## 2022-05-11 RX ADMIN — GABAPENTIN 1600 MG: 400 CAPSULE ORAL at 20:56

## 2022-05-11 RX ADMIN — OXYCODONE HYDROCHLORIDE AND ACETAMINOPHEN 1 TABLET: 5; 325 TABLET ORAL at 20:57

## 2022-05-11 RX ADMIN — SODIUM CHLORIDE 125 MG: 9 INJECTION, SOLUTION INTRAVENOUS at 17:02

## 2022-05-11 RX ADMIN — BUDESONIDE AND FORMOTEROL FUMARATE DIHYDRATE 2 PUFF: 160; 4.5 AEROSOL RESPIRATORY (INHALATION) at 19:46

## 2022-05-11 RX ADMIN — OXYCODONE HYDROCHLORIDE AND ACETAMINOPHEN 1 TABLET: 5; 325 TABLET ORAL at 15:44

## 2022-05-11 RX ADMIN — IOPAMIDOL 100 ML: 755 INJECTION, SOLUTION INTRAVENOUS at 07:02

## 2022-05-11 RX ADMIN — ESCITALOPRAM 10 MG: 10 TABLET, FILM COATED ORAL at 15:18

## 2022-05-11 NOTE — OUTREACH NOTE
General Surgery Week 1 Survey    Flowsheet Row Responses   [facility] facility patient discharged from? [place]   Does the patient have one of the following disease processes/diagnoses(primary or secondary)? General Surgery   Week 1 attempt successful? No   Revoke Readmitted          SIMON LAUGHLIN - Registered Nurse

## 2022-05-11 NOTE — PLAN OF CARE
Problem: Adult Inpatient Plan of Care  Goal: Plan of Care Review  Outcome: Ongoing, Progressing  Flowsheets (Taken 5/11/2022 1729)  Progress: no change  Plan of Care Reviewed With: patient  Outcome Evaluation: Pt admitted this shift after having hypoxia in ER. Stress test negative. Currently tolerating 1 L NC at rest. S/ST . Cast to LLE nonweightbearing. Pt does well using RW with no weight put on affected leg. Possible discharge tomorrow if tolerating room air per Dr. Andrade.

## 2022-05-11 NOTE — H&P
[facility] Medicine Services  HISTORY AND PHYSICAL    Date of Admission: 5/11/2022  Primary Care Physician: Teddy Sanchez MD    Subjective     Chief Complaint: Severe exacerbation/chest pain.    History of Present Illness  Patient is a 60-year-old  female history of recent ankle surgery.  Patient presented ER with complaint of substernal chest pain radiating towards in all direction.  Patient states chest pain started yesterday at 230.  Chest pain was ranked 8 out of 10 then . chest pain increased with exertion and at night.  Patient does take Percocet for her recent ankle surgery patient stated his Percocet helped with the chest pain.  Patient currently ranks her chest pain is 2 out of 10 at this time.  Patient stated she had a stress test done in the past about 8 years ago.  Patient also have episode shortness of breath with deep breathing.  Patient just recently had left ankle surgery on 5/8/2022 by Dr. Aranda.  Patient denies any fever night sweats or chills.  Patient also has symptoms of shortness of breath.    Patient history of adenocarcinoma, arthritis, asthma, right lung cancer, COPD, chronic pain, depression, foramen stenosis of the lumbar sacral region, reflux, degenerative disc disease, peripheral vascular disease, chronic idiopathy.    Review of Systems   Constitutional: Positive for activity change and appetite change. Negative for chills and fever.   HENT: Negative for hearing loss, nosebleeds, tinnitus and trouble swallowing.    Eyes: Negative for visual disturbance.   Respiratory: Positive for chest tightness and shortness of breath. Negative for cough and wheezing.    Cardiovascular: Negative for chest pain, palpitations and leg swelling.   Gastrointestinal: Positive for nausea. Negative for abdominal distention, abdominal pain, blood in stool, constipation, diarrhea and vomiting.   Endocrine: Negative for cold intolerance, heat intolerance,  polydipsia, polyphagia and polyuria.   Genitourinary: Negative for decreased urine volume, difficulty urinating, dysuria, flank pain, frequency and hematuria.   Musculoskeletal: Positive for arthralgias, gait problem and myalgias. Negative for joint swelling.   Skin: Negative for rash.   Allergic/Immunologic: Negative for immunocompromised state.   Neurological: Negative for dizziness, syncope, weakness, light-headedness and headaches.   Hematological: Negative for adenopathy. Does not bruise/bleed easily.   Psychiatric/Behavioral: Negative for confusion and sleep disturbance. The patient is not nervous/anxious.         Otherwise complete ROS reviewed and negative except as mentioned in the HPI.      Past Medical History:   Past Medical History:   Diagnosis Date   • Adenocarcinoma of unknown origin (HCC) 4/4/2022   • Arthritis    • Asthma    • Cancer (HCC)     right lung,abd,and pelvic area   • Chronic back pain    • COPD (chronic obstructive pulmonary disease) ([facility])    • Depression    • Environmental allergies 4/11/2022   • Facet arthropathy    • Foraminal stenosis of lumbosacral region    • GERD (gastroesophageal reflux disease)    • H/O degenerative disc disease    • History of blood transfusion    • PVD (peripheral vascular disease) ([facility])    • Radiculopathy        Past Surgical History:  Past Surgical History:   Procedure Laterality Date   • ANKLE SURGERY Left    • ANTERIOR LUMBAR EXPOSURE N/A 05/07/2018    Procedure: ANTERIOR LUMBAR EXPOSURE;  Surgeon: Chris Haley DO;  Location: [facility] OR;  Service: Vascular   • APPENDECTOMY     • CARPAL TUNNEL RELEASE Bilateral    • INCISION AND DRAINAGE OF WOUND Left 05/08/2022    Procedure: Incision and Drainage Left Open Ankle Fracture, Open Reduction Internal Fixation Bimalleolar Ankle Fracture;  Surgeon: Leland Aranda MD;  Location: [facility] OR;  Service: Orthopedics;  Laterality: Left;   • LUMBAR FUSION N/A 05/07/2018    Procedure: ANTERIOR DECOMPRESSION, ANTERIOR  LUMBAR INTERBODY FUSION WITH INSTRUMENTATION L5-S1;  Surgeon: LUIS CARLOS Zheng MD;  Location:  [facility] OR;  Service: Orthopedic Spine   • LUMBAR FUSION Right 01/02/2019    Procedure: RIGHT LATERAL LUMBAR INTERBODY FUSION L3-5;  Surgeon: LUIS CARLOS Zheng MD;  Location:  [facility] OR;  Service: Orthopedic Spine   • LUMBAR LAMINECTOMY WITH FUSION N/A 05/09/2018    Procedure: POSTERIOR SPINAL FUSION WITH INSTRUMENTATION L5-S1;  Surgeon: LUIS CARLOS Zheng MD;  Location:  [facility] OR;  Service: Orthopedic Spine   • LUMBAR LAMINECTOMY WITH FUSION Right 01/04/2019    Procedure: POSTERIOR SPINAL FUSION WITH INSTRUMENTATION RIGHT L3-S1;  Surgeon: LUIS CARLOS Zheng MD;  Location:  [facility] OR;  Service: Orthopedic Spine   • OTHER SURGICAL HISTORY Bilateral 2017    Stent implants [facility] [address] [place]       Family History: family history includes Alcohol abuse in an other family member; Arthritis in an other family member; Asthma in an other family member; COPD in an other family member; Cancer in her brother and another family member; Depression in an other family member; Heart disease in her father, mother, sister, sister, and another family member; Heart failure in an other family member; Kidney disease in her mother and sister; Migraines in an other family member; Stroke in her sister and another family member.    Social History:  reports that she has quit smoking. She has a 45.00 pack-year smoking history. She has never used smokeless tobacco. She reports that she does not drink alcohol and does not use drugs.    Allergies:  Allergies   Allergen Reactions   • Aspirin Nausea And Vomiting     Unknown reaction   • Keflex [Cephalexin] Other (See Comments)     Blisters to nose and mouth   • Nitrofurantoin GI Intolerance and Nausea And Vomiting   • Creatine Monohydrate Other (See Comments) and Unknown - Low Severity     Medications:  Prior to Admission medications    Medication Sig Start Date End Date Taking? Authorizing Provider    Albuterol Sulfate (PROAIR HFA IN) Inhale 2 puffs 4 (Four) Times a Day As Needed (Wheezing/Shortness of breath). 12/15/15  Yes Mark Lopez MD   Budeson-Glycopyrrol-Formoterol (Breztri Aerosphere) 160-9-4.8 MCG/ACT aerosol inhaler Inhale 2 puffs. 2/25/22  Yes Mark Lopez MD   cholecalciferol (VITAMIN D3) 1000 units tablet Take 1,000 Units by mouth 2 (Two) Times a Day.   Yes Mark Loepz MD   doxycycline (MONODOX) 100 MG capsule Take 1 capsule by mouth Every 12 (Twelve) Hours for 12 doses. Indications: Infection of the Skin and/or Soft Tissue 5/9/22 5/15/22 Yes Ora Hyatt APRN   escitalopram (LEXAPRO) 20 MG tablet Take 20 mg by mouth Daily.   Yes Mark Lopez MD   fexofenadine (ALLEGRA) 180 MG tablet Take 180 mg by mouth Daily.   Yes Mark Lopez MD   gabapentin (NEURONTIN) 800 MG tablet Take 800 mg by mouth 2 (Two) Times a Day. Takes at 5am and 1pm 3/29/18  Yes Mark Lopez MD   gabapentin (NEURONTIN) 800 MG tablet Take 1,600 mg by mouth Every Night. Takes at 9pm   Yes Mark Lopez MD   NEXIUM 40 MG capsule Take 40 mg by mouth Daily. 1/22/18  Yes Mark Lopez MD   rivaroxaban (XARELTO) 10 MG tablet Take 1 tablet by mouth Daily. Indications: Post Surgical - Other, Prophylaxis of Venous Thromboembolism 5/10/22  Yes Ora Hyatt APRN   Unable to find Take 1 each by mouth 3 (Three) Times a Day. Med Name: Calcium Citrate 600 mg, Take 1 tablet by mouth twice daily.   Yes Mark Lopez MD   clotrimazole-betamethasone (LOTRISONE) 1-0.05 % cream  1/13/22   Mark Lopez MD   oxyCODONE-acetaminophen (PERCOCET)  MG per tablet oxycodone-acetaminophen 10 mg-325 mg tablet    Mark Lopez MD       I have utilized all available immediate resources to obtain, update, and review the patient's current medications.    Objective     Vital Signs: /71 (BP Location: Right arm, Patient Position: Lying)   Pulse 99    "Temp 98.4 °F (36.9 °C) (Oral)   Resp 18   Ht 160 cm (63\")   Wt 69.9 kg (154 lb)   SpO2 97%   BMI 27.28 kg/m²   Physical Exam  Vitals and nursing note reviewed.   Constitutional:       Appearance: She is well-developed.   HENT:      Head: Normocephalic.   Eyes:      Conjunctiva/sclera: Conjunctivae normal.      Pupils: Pupils are equal, round, and reactive to light.   Neck:      Vascular: No JVD.   Cardiovascular:      Rate and Rhythm: Normal rate and regular rhythm.      Heart sounds: Normal heart sounds. No murmur heard.    No friction rub. No gallop.   Pulmonary:      Effort: No respiratory distress.      Breath sounds: No wheezing or rales.      Comments: Diminished breath sound bilateral, clear.  On 2 L of oxygen.  Chest:      Chest wall: No tenderness.   Abdominal:      General: Bowel sounds are normal. There is no distension.      Palpations: Abdomen is soft.      Tenderness: There is no abdominal tenderness. There is no guarding or rebound.   Musculoskeletal:         General: No tenderness or deformity.      Cervical back: Neck supple.      Comments: Left leg sling.   Skin:     General: Skin is warm and dry.      Capillary Refill: Capillary refill takes 2 to 3 seconds.      Findings: No rash.   Neurological:      General: No focal deficit present.      Mental Status: She is alert and oriented to person, place, and time.      Cranial Nerves: No cranial nerve deficit.      Motor: Weakness present. No abnormal muscle tone.      Coordination: Coordination abnormal.      Gait: Gait abnormal.      Deep Tendon Reflexes: Reflexes normal.   Psychiatric:         Behavior: Behavior normal.         Thought Content: Thought content normal.         Judgment: Judgment normal.             Results Reviewed:    Lab Results (last 24 hours)     Procedure Component Value Units Date/Time    COVID PRE-OP / PRE-PROCEDURE SCREENING ORDER (NO ISOLATION) - Swab, Nasal Cavity [[id number]]  (Normal) Collected: 05/11/22 1052    " Specimen: Swab from Nasal Cavity Updated: 05/11/22 1148    Narrative:      The following orders were created for panel order COVID PRE-OP / PRE-PROCEDURE SCREENING ORDER (NO ISOLATION) - Swab, Nasal Cavity.  Procedure                               Abnormality         Status                     ---------                               -----------         ------                     COVID-19,[facility] IN-[facility]...[[id number]]  Normal              Final result                 Please view results for these tests on the individual orders.    COVID-19,[facility] IN-HOUSE,Nasal Swab No Transport Media 3-4 HR TAT - Swab, Nasal Cavity [[id number]]  (Normal) Collected: 05/11/22 1052    Specimen: Swab from Nasal Cavity Updated: 05/11/22 1148     COVID19 Not Detected    Narrative:      Fact sheet for providers: https://www.fda.gov/media/719731/download     Fact sheet for patients: https://www.fda.gov/media/173248/download    Test performed by PCR.    Consider negative results in combination with clinical observations, patient history, and epidemiological information.  Fact sheet for providers: https://www.fda.gov/media/168015/download     Fact sheet for patients: https://www.fda.gov/media/966356/download    Test performed by PCR.    Consider negative results in combination with clinical observations, patient history, and epidemiological information.    Troponin [[id number]]  (Normal) Collected: 05/11/22 1020    Specimen: Blood Updated: 05/11/22 1111     Troponin T <0.010 ng/mL     Narrative:      Troponin T Reference Range:  <= 0.03 ng/mL-   Negative for AMI  >0.03 ng/mL-     Abnormal for myocardial necrosis.  Clinicians would have to utilize clinical acumen, EKG, Troponin and serial changes to determine if it is an Acute Myocardial Infarction or myocardial injury due to an underlying chronic condition.       Results may be falsely decreased if patient taking Biotin.      Troponin [[id number]]  (Normal) Collected: 05/11/22 0832     Specimen: Blood Updated: 05/11/22 0948     Troponin T 0.014 ng/mL     Narrative:      Troponin T Reference Range:  <= 0.03 ng/mL-   Negative for AMI  >0.03 ng/mL-     Abnormal for myocardial necrosis.  Clinicians would have to utilize clinical acumen, EKG, Troponin and serial changes to determine if it is an Acute Myocardial Infarction or myocardial injury due to an underlying chronic condition.       Results may be falsely decreased if patient taking Biotin.      [place] Draw [[id number]] Collected: 05/11/22 0602    Specimen: Blood Updated: 05/11/22 0718    Narrative:      The following orders were created for panel order [place] Draw.  Procedure                               Abnormality         Status                     ---------                               -----------         ------                     Green Top (Gel)[[id number]]                                  Final result               Lavender Top[[id number]]                                     Final result               Red Top[[id number]]                                          Final result               Light Blue Top[[id number]]                                   Final result                 Please view results for these tests on the individual orders.    Light Blue Top [[id number]] Collected: 05/11/22 0602    Specimen: Blood Updated: 05/11/22 0718     Extra Tube Hold for add-ons.     Comment: Auto resulted       Green Top (Gel) [[id number]] Collected: 05/11/22 0602    Specimen: Blood Updated: 05/11/22 0718     Extra Tube Hold for add-ons.     Comment: Auto resulted.       Lavender Top [[id number]] Collected: 05/11/22 0602    Specimen: Blood Updated: 05/11/22 0718     Extra Tube hold for add-on     Comment: Auto resulted       Red Top [[id number]] Collected: 05/11/22 0602    Specimen: Blood Updated: 05/11/22 0718     Extra Tube Hold for add-ons.     Comment: Auto resulted.       Comprehensive Metabolic Panel [[id number]]  (Abnormal) Collected: 05/11/22 0602     Specimen: Blood Updated: 05/11/22 0631     Glucose 109 mg/dL      BUN 7 mg/dL      Creatinine 0.53 mg/dL      Sodium 137 mmol/L      Potassium 3.9 mmol/L      Chloride 101 mmol/L      CO2 28.0 mmol/L      Calcium 8.5 mg/dL      Total Protein 6.4 g/dL      Albumin 3.10 g/dL      ALT (SGPT) 31 U/L      AST (SGOT) 35 U/L      Alkaline Phosphatase 68 U/L      Total Bilirubin 0.3 mg/dL      Globulin 3.3 gm/dL      A/G Ratio 0.9 g/dL      BUN/Creatinine Ratio 13.2     Anion Gap 8.0 mmol/L      eGFR 104.7 mL/min/1.73      Comment: National Kidney Foundation and American Society of Nephrology (ASN) Task Force recommended calculation based on the Chronic Kidney Disease Epidemiology Collaboration (CKD-EPI) equation refit without adjustment for race.       Narrative:      GFR Normal >60  Chronic Kidney Disease <60  Kidney Failure <15      aPTT [[id number]]  (Abnormal) Collected: 05/11/22 0602    Specimen: Blood Updated: 05/11/22 0630     PTT 51.2 seconds     Protime-INR [[id number]]  (Abnormal) Collected: 05/11/22 0602    Specimen: Blood Updated: 05/11/22 0630     Protime 16.5 Seconds      INR 1.39    Troponin [[id number]]  (Normal) Collected: 05/11/22 0602    Specimen: Blood Updated: 05/11/22 0630     Troponin T <0.010 ng/mL     Narrative:      Troponin T Reference Range:  <= 0.03 ng/mL-   Negative for AMI  >0.03 ng/mL-     Abnormal for myocardial necrosis.  Clinicians would have to utilize clinical acumen, EKG, Troponin and serial changes to determine if it is an Acute Myocardial Infarction or myocardial injury due to an underlying chronic condition.       Results may be falsely decreased if patient taking Biotin.      BNP [[id number]]  (Abnormal) Collected: 05/11/22 0602    Specimen: Blood Updated: 05/11/22 0629     proBNP 1,246.0 pg/mL     Narrative:      Among patients with dyspnea, NT-proBNP is highly sensitive for the detection of acute congestive heart failure. In addition NT-proBNP of <300 pg/ml effectively rules out  acute congestive heart failure with 99% negative predictive value.    Results may be falsely decreased if patient taking Biotin.      Lipase [[id number]]  (Normal) Collected: 05/11/22 0602    Specimen: Blood Updated: 05/11/22 0626     Lipase 16 U/L     CBC & Differential [[id number]]  (Abnormal) Collected: 05/11/22 0602    Specimen: Blood Updated: 05/11/22 0612    Narrative:      The following orders were created for panel order CBC & Differential.  Procedure                               Abnormality         Status                     ---------                               -----------         ------                     CBC Auto Differential[[id number]]        Abnormal            Final result                 Please view results for these tests on the individual orders.    CBC Auto Differential [[id number]]  (Abnormal) Collected: 05/11/22 0602    Specimen: Blood Updated: 05/11/22 0612     WBC 16.98 10*3/mm3      RBC 2.76 10*6/mm3      Hemoglobin 8.4 g/dL      Hematocrit 27.6 %      .0 fL      MCH 30.4 pg      MCHC 30.4 g/dL      RDW 12.0 %      RDW-SD 43.8 fl      MPV 11.3 fL      Platelets 190 10*3/mm3      Neutrophil % 81.0 %      Lymphocyte % 10.0 %      Monocyte % 7.9 %      Eosinophil % 0.1 %      Basophil % 0.3 %      Immature Grans % 0.7 %      Neutrophils, Absolute 13.76 10*3/mm3      Lymphocytes, Absolute 1.69 10*3/mm3      Monocytes, Absolute 1.34 10*3/mm3      Eosinophils, Absolute 0.02 10*3/mm3      Basophils, Absolute 0.05 10*3/mm3      Immature Grans, Absolute 0.12 10*3/mm3      nRBC 0.0 /100 WBC            Radiology Data:    Imaging Results (Last 24 Hours)     Procedure Component Value Units Date/Time    CT Angiogram Chest [[id number]] Collected: 05/11/22 0712     Updated: 05/11/22 0719    Narrative:      EXAMINATION: CT ANGIOGRAM CHEST-      5/11/2022 6:46 AM CDT     HISTORY: Recent ankle surgery, new onset substernal chest pain with  dyspnea and tachycardia since yesterday afternoon.; R07.9-Chest  pain,  unspecified     In order to have a CT radiation dose as low as reasonably achievable  Automated Exposure Control was utilized for adjustment of the mA and/or  KV according to patient size.     DLP in mGycm= 328.     CT angiogram chest.  CT angiography protocol.   CT imaging with bolus IV contrast injection.   Under concurrent supervision axial, sagittal, coronal, and  three-dimensional data sets were constructed.     Left thyroid lobe cyst or nodule measures greater than 10 mm.  Follow-up thyroid ultrasound recommended.     Aortic calcification with no aneurysm.  No aortic dissection.     Heart size is at the upper limits of normal.  No mediastinal mass.     Symmetric and normally opacified pulmonary arteries.  No pulmonary embolism.     Mild bibasilar atelectasis.  Chronic lung changes with upper lobe emphysema.  No bullous disease.     No pneumothorax or pleural effusion.     Mild disc space narrowing and endplate spurring throughout the thoracic  spine.     Summary:  1. No pulmonary embolism.  2. Chronic lung changes with mild basilar atelectasis.                                         This report was finalized on 05/11/2022 07:16 by Dr. Venkat Cohen MD.          I have personally reviewed and interpreted the radiology studies and ECG obtained at time of admission.     Assessment / Plan      Assessment & Plan  Active Hospital Problems    Diagnosis    • Chest pain, unspecified type    • COPD with acute exacerbation (HCC)    • Lumbar stenosis    • Panlobular emphysema (HCC)    • PAD (peripheral artery disease) (HCC)    • Gastroesophageal reflux disease without esophagitis    • Chronic back pain      Plan .    COPD exacerbation.  DuoNeb . Symbicort.  Incentive spirometer.  CTA of the chest-No pulmonary embolism, Chronic lung changes with mild basilar atelectasis.  Patient is currently on 2 L of oxygen.  Patient does not usually take oxygen at home.    Chest pain.  Troponin x3 is negative.  BNP 1200.     Stress echo.    Recent left ankle surgery by Dr. Aranda 5/8/2022.  Percocet as needed.  Neurontin.  Patient scheduled follow-up with Dr. Aranda this coming Monday outpatient.    Leukocytosis.  We will follow.    Anemia.  Anemia panel.  Elevated CMV.     Depression/anxiety.  Lexapro    Reflux.  Pepcid.  Zofran as needed.    History of cellulitis.  DC doxycycline.  Due to GI side effects.    Prophylaxis.  Continue Xarelto.    Nutrition.  Vitamin D .    COVID-19-negative.    Code Status/Advanced Care Plan: Full code.    The patient's surrogate decision maker is daughter, Anay Dunham.      I discussed the patient's findings and my recommendations with: Patient    Estimated length of stay: 1 to 3 days.    Electronically signed by Kofi Andrade MD, 05/11/22, 11:46 AM CDT.

## 2022-05-11 NOTE — ED PROVIDER NOTES
Subjective   This patient is a 62-year-old female with a history of recent ankle surgery.  She is on Xarelto because of that.  Yesterday afternoon around 2 or 3:00 she developed substernal chest pain with some radiation outwards in all directions.  With this pain she is finding it difficult to take a deep breath.  She does not think she has had any fever.  She has had no shaking chills.  She has had no hemoptysis.          Review of Systems   Respiratory: Positive for shortness of breath.    Cardiovascular: Positive for chest pain.   All other systems reviewed and are negative.      Past Medical History:   Diagnosis Date   • Arthritis    • Asthma    • Cancer ([facility])     right lung,abd,and pelvic area   • Chronic back pain    • COPD (chronic obstructive pulmonary disease) ([facility])    • Depression    • Environmental allergies 4/11/2022   • Facet arthropathy    • Foraminal stenosis of lumbosacral region    • GERD (gastroesophageal reflux disease)    • H/O degenerative disc disease    • History of blood transfusion    • PVD (peripheral vascular disease) ([facility])    • Radiculopathy        Allergies   Allergen Reactions   • Aspirin Nausea And Vomiting     Unknown reaction   • Keflex [Cephalexin] Other (See Comments)     Blisters to nose and mouth   • Nitrofurantoin GI Intolerance and Nausea And Vomiting   • Creatine Monohydrate Other (See Comments) and Unknown - Low Severity       Past Surgical History:   Procedure Laterality Date   • ANKLE SURGERY Left    • ANTERIOR LUMBAR EXPOSURE N/A 05/07/2018    Procedure: ANTERIOR LUMBAR EXPOSURE;  Surgeon: Chris Haley DO;  Location:  [facility];  Service: Vascular   • APPENDECTOMY     • CARPAL TUNNEL RELEASE Bilateral    • INCISION AND DRAINAGE OF WOUND Left 05/08/2022    Procedure: Incision and Drainage Left Open Ankle Fracture, Open Reduction Internal Fixation Bimalleolar Ankle Fracture;  Surgeon: Leland Aranda MD;  Location:  [facility];  Service: Orthopedics;  Laterality: Left;   •  LUMBAR FUSION N/A 05/07/2018    Procedure: ANTERIOR DECOMPRESSION, ANTERIOR LUMBAR INTERBODY FUSION WITH INSTRUMENTATION L5-S1;  Surgeon: LUIS CARLOS Zheng MD;  Location:  [facility];  Service: Orthopedic Spine   • LUMBAR FUSION Right 01/02/2019    Procedure: RIGHT LATERAL LUMBAR INTERBODY FUSION L3-5;  Surgeon: LUIS CARLOS hZeng MD;  Location:  [facility];  Service: Orthopedic Spine   • LUMBAR LAMINECTOMY WITH FUSION N/A 05/09/2018    Procedure: POSTERIOR SPINAL FUSION WITH INSTRUMENTATION L5-S1;  Surgeon: LUIS CARLOS Zheng MD;  Location:  [facility];  Service: Orthopedic Spine   • LUMBAR LAMINECTOMY WITH FUSION Right 01/04/2019    Procedure: POSTERIOR SPINAL FUSION WITH INSTRUMENTATION RIGHT L3-S1;  Surgeon: LUIS CARLOS Zhneg MD;  Location:  [facility];  Service: Orthopedic Spine   • OTHER SURGICAL HISTORY Bilateral 2017    Stent implants [facility] Dr [facility]ado       Family History   Problem Relation Age of Onset   • Heart disease Other    • Arthritis Other    • Cancer Other    • Alcohol abuse Other    • Stroke Other    • Migraines Other    • COPD Other    • Heart failure Other    • Depression Other    • Asthma Other    • Heart disease Mother    • Kidney disease Mother    • Heart disease Father    • Heart disease Sister    • Kidney disease Sister    • Stroke Sister    • Heart disease Sister    • Cancer Brother        Social History     Socioeconomic History   • Marital status:    Tobacco Use   • Smoking status: Former Smoker     Packs/day: 1.50     Years: 30.00     Pack years: 45.00   • Smokeless tobacco: Never Used   • Tobacco comment: quit 2010   Substance and Sexual Activity   • Alcohol use: No   • Drug use: No   • Sexual activity: Defer           Objective   Physical Exam  Vitals and nursing note reviewed.   Constitutional:       Appearance: She is well-developed.   HENT:      Head: Normocephalic and atraumatic.      Right Ear: External ear normal.      Left Ear: External ear normal.      Nose: Nose  normal.      Mouth/Throat:      Mouth: Mucous membranes are moist.      Pharynx: Oropharynx is clear.   Eyes:      Extraocular Movements: Extraocular movements intact.      Conjunctiva/sclera: Conjunctivae normal.   Cardiovascular:      Rate and Rhythm: Regular rhythm. Tachycardia present.      Pulses: Normal pulses.      Heart sounds: Normal heart sounds.   Pulmonary:      Effort: Pulmonary effort is normal.      Breath sounds: Normal breath sounds.   Abdominal:      General: Bowel sounds are normal.      Palpations: Abdomen is soft.   Musculoskeletal:         General: Normal range of motion.      Cervical back: Normal range of motion and neck supple.   Skin:     General: Skin is warm and dry.      Capillary Refill: Capillary refill takes less than 2 seconds.   Neurological:      General: No focal deficit present.      Mental Status: She is alert and oriented to person, place, and time.   Psychiatric:         Behavior: Behavior normal.         Thought Content: Thought content normal.         Judgment: Judgment normal.         Procedures           ED Course      Lab Results (last 24 hours)     Procedure Component Value Units Date/Time    CBC & Differential [[id number]]  (Abnormal) Collected: 05/11/22 0602    Specimen: Blood Updated: 05/11/22 0612    Narrative:      The following orders were created for panel order CBC & Differential.  Procedure                               Abnormality         Status                     ---------                               -----------         ------                     CBC Auto Differential[[id number]]        Abnormal            Final result                 Please view results for these tests on the individual orders.    Comprehensive Metabolic Panel [[id number]]  (Abnormal) Collected: 05/11/22 0602    Specimen: Blood Updated: 05/11/22 0631     Glucose 109 mg/dL      BUN 7 mg/dL      Creatinine 0.53 mg/dL      Sodium 137 mmol/L      Potassium 3.9 mmol/L      Chloride 101 mmol/L       CO2 28.0 mmol/L      Calcium 8.5 mg/dL      Total Protein 6.4 g/dL      Albumin 3.10 g/dL      ALT (SGPT) 31 U/L      AST (SGOT) 35 U/L      Alkaline Phosphatase 68 U/L      Total Bilirubin 0.3 mg/dL      Globulin 3.3 gm/dL      A/G Ratio 0.9 g/dL      BUN/Creatinine Ratio 13.2     Anion Gap 8.0 mmol/L      eGFR 104.7 mL/min/1.73      Comment: National Kidney Foundation and American Society of Nephrology (ASN) Task Force recommended calculation based on the Chronic Kidney Disease Epidemiology Collaboration (CKD-EPI) equation refit without adjustment for race.       Narrative:      GFR Normal >60  Chronic Kidney Disease <60  Kidney Failure <15      aPTT [[id number]]  (Abnormal) Collected: 05/11/22 0602    Specimen: Blood Updated: 05/11/22 0630     PTT 51.2 seconds     Protime-INR [[id number]]  (Abnormal) Collected: 05/11/22 0602    Specimen: Blood Updated: 05/11/22 0630     Protime 16.5 Seconds      INR 1.39    Troponin [[id number]]  (Normal) Collected: 05/11/22 0602    Specimen: Blood Updated: 05/11/22 0630     Troponin T <0.010 ng/mL     Narrative:      Troponin T Reference Range:  <= 0.03 ng/mL-   Negative for AMI  >0.03 ng/mL-     Abnormal for myocardial necrosis.  Clinicians would have to utilize clinical acumen, EKG, Troponin and serial changes to determine if it is an Acute Myocardial Infarction or myocardial injury due to an underlying chronic condition.       Results may be falsely decreased if patient taking Biotin.      CBC Auto Differential [[id number]]  (Abnormal) Collected: 05/11/22 0602    Specimen: Blood Updated: 05/11/22 0612     WBC 16.98 10*3/mm3      RBC 2.76 10*6/mm3      Hemoglobin 8.4 g/dL      Hematocrit 27.6 %      .0 fL      MCH 30.4 pg      MCHC 30.4 g/dL      RDW 12.0 %      RDW-SD 43.8 fl      MPV 11.3 fL      Platelets 190 10*3/mm3      Neutrophil % 81.0 %      Lymphocyte % 10.0 %      Monocyte % 7.9 %      Eosinophil % 0.1 %      Basophil % 0.3 %      Immature Grans % 0.7 %       Neutrophils, Absolute 13.76 10*3/mm3      Lymphocytes, Absolute 1.69 10*3/mm3      Monocytes, Absolute 1.34 10*3/mm3      Eosinophils, Absolute 0.02 10*3/mm3      Basophils, Absolute 0.05 10*3/mm3      Immature Grans, Absolute 0.12 10*3/mm3      nRBC 0.0 /100 WBC     Lipase [[id number]]  (Normal) Collected: 05/11/22 0602    Specimen: Blood Updated: 05/11/22 0626     Lipase 16 U/L     BNP [[id number]]  (Abnormal) Collected: 05/11/22 0602    Specimen: Blood Updated: 05/11/22 0629     proBNP 1,246.0 pg/mL     Narrative:      Among patients with dyspnea, NT-proBNP is highly sensitive for the detection of acute congestive heart failure. In addition NT-proBNP of <300 pg/ml effectively rules out acute congestive heart failure with 99% negative predictive value.    Results may be falsely decreased if patient taking Biotin.      Troponin [[id number]]  (Normal) Collected: 05/11/22 0832    Specimen: Blood Updated: 05/11/22 0948     Troponin T 0.014 ng/mL     Narrative:      Troponin T Reference Range:  <= 0.03 ng/mL-   Negative for AMI  >0.03 ng/mL-     Abnormal for myocardial necrosis.  Clinicians would have to utilize clinical acumen, EKG, Troponin and serial changes to determine if it is an Acute Myocardial Infarction or myocardial injury due to an underlying chronic condition.       Results may be falsely decreased if patient taking Biotin.      Troponin [[id number]] Collected: 05/11/22 1020    Specimen: Blood Updated: 05/11/22 1024        CT Angiogram Chest   Final Result      Summary:  1. No pulmonary embolism.  2. Chronic lung changes with mild basilar atelectasis.     Labs showed leukocytosis, anemia and an elevated BNP.  First troponin was completely negative.  Second troponin was slightly more elevated but still within normal values.  A third troponin has been ordered.  CTA of the chest showed no evidence of pulmonary embolus.  Did show chronic lung changes with mild basilar atelectasis and a left thyroid cyst.  Patient  was advised to have an outpatient thyroid ultrasound for further evaluation.  Patient was given a nebulizer treatment and Solu-Medrol.  Patient remained hypoxic when off oxygen in the mid 80s.  This could be a COPD exacerbation.  Patient will be admitted to the hospitalist service for further work-up and treatment.                                         HEART Score (for prediction of 6-week risk of major adverse cardiac event) reviewed and/or performed as part of the patient evaluation and treatment planning process.  The result associated with this review/performance is: 3       MDM  Number of Diagnoses or Management Options     Amount and/or Complexity of Data Reviewed  Clinical lab tests: ordered  Tests in the radiology section of CPT®: ordered    Patient Progress  Patient progress: stable      Final diagnoses:   Chest pain, unspecified type   COPD exacerbation (HCC)   Hypoxia   Anemia, unspecified type   Thyroid cyst       ED Disposition  ED Disposition     ED Disposition   Decision to Admit    Condition   --    Comment   Level of Care: Telemetry [5]   Diagnosis: Chest pain, unspecified type [4857341]   Admitting Physician: ELVIRA AARON [[id number]]   Attending Physician: ELVIRA AARON [[id number]]   Certification: I Certify That Inpatient Hospital Services Are Medically Necessary For Greater Than 2 Midnights               No follow-up provider specified.       Medication List      No changes were made to your prescriptions during this visit.       The patient's initial EKG just showed sinus tachycardia with no obvious ischemic changes otherwise.  Despite her being on Xarelto I think PE is something we must consider so CT angiogram was ordered.  The care of the patient was handed over to Dr. Sagastume at shift change.     Oumou Juarez MD  05/11/22 3764

## 2022-05-11 NOTE — CONSULTS
"Adult Nutrition  Assessment    Patient Name:  Arminda Jiménez  YOB: 1960  MRN: [id number]  Admit Date:  5/11/2022    Assessment Date:  5/11/2022    Comments: Consulted for edu due to BMI.  NTN following per protocol.      Reason for Assessment     Row Name 05/11/22 1438          Reason for Assessment    Reason For Assessment physician consult     Diagnosis cardiac disease     Identified At Risk by Screening Criteria BMI;need for education  Inpatient Nutrition Consult - BMI 25-29.9                Nutrition/Diet History     Row Name 05/11/22 1439          Nutrition/Diet History    Typical Intake (Food/Fluid/EN/PN) Pt NPO and not in room. Will follow per protocol.                Anthropometrics     Row Name 05/11/22 1256          Anthropometrics    Height 160 cm (62.99\")     Weight 70 kg (154 lb 5.2 oz)                      Electronically signed by:  June Smith RD  05/11/22 14:41 CDT  "

## 2022-05-11 NOTE — ED NOTES
The following fall interventions were initiated:    [x] Patient and/or family given education   [x] Call light within reach and educated on how to use   [x] Bed rails up per protocol    [x] Bed locked and in the lowest position   [x] Bed alarm set and on loudest setting   [x] Fall wrist band applied   [x] Non skid footwear applied   [x] Room free of clutter   [] Patient items within reach   [x] Adequate lighting provided  [x] Falls sign present    [] Patient moved closer to nursing station   [] Restraints applied

## 2022-05-12 ENCOUNTER — READMISSION MANAGEMENT (OUTPATIENT)
Dept: CALL CENTER | Facility: HOSPITAL | Age: 62
End: 2022-05-12

## 2022-05-12 VITALS
WEIGHT: 161.4 LBS | RESPIRATION RATE: 18 BRPM | OXYGEN SATURATION: 91 % | HEIGHT: 63 IN | SYSTOLIC BLOOD PRESSURE: 125 MMHG | HEART RATE: 95 BPM | TEMPERATURE: 98.1 F | DIASTOLIC BLOOD PRESSURE: 65 MMHG | BODY MASS INDEX: 28.6 KG/M2

## 2022-05-12 LAB
ALBUMIN SERPL-MCNC: 3.3 G/DL (ref 3.5–5.2)
ALBUMIN/GLOB SERPL: 0.8 G/DL
ALP SERPL-CCNC: 71 U/L (ref 39–117)
ALT SERPL W P-5'-P-CCNC: 29 U/L (ref 1–33)
ANION GAP SERPL CALCULATED.3IONS-SCNC: 16 MMOL/L (ref 5–15)
ANION GAP SERPL CALCULATED.3IONS-SCNC: 16 MMOL/L (ref 5–15)
AST SERPL-CCNC: 26 U/L (ref 1–32)
BASOPHILS # BLD AUTO: 0.02 10*3/MM3 (ref 0–0.2)
BASOPHILS NFR BLD AUTO: 0.1 % (ref 0–1.5)
BILIRUB SERPL-MCNC: 0.2 MG/DL (ref 0–1.2)
BUN SERPL-MCNC: 12 MG/DL (ref 8–23)
BUN SERPL-MCNC: 12 MG/DL (ref 8–23)
BUN/CREAT SERPL: 16.7 (ref 7–25)
BUN/CREAT SERPL: 18.8 (ref 7–25)
CALCIUM SPEC-SCNC: 8.9 MG/DL (ref 8.6–10.5)
CALCIUM SPEC-SCNC: 9.1 MG/DL (ref 8.6–10.5)
CHLORIDE SERPL-SCNC: 100 MMOL/L (ref 98–107)
CHLORIDE SERPL-SCNC: 97 MMOL/L (ref 98–107)
CHOLEST SERPL-MCNC: 148 MG/DL (ref 0–200)
CO2 SERPL-SCNC: 22 MMOL/L (ref 22–29)
CO2 SERPL-SCNC: 23 MMOL/L (ref 22–29)
CREAT SERPL-MCNC: 0.64 MG/DL (ref 0.57–1)
CREAT SERPL-MCNC: 0.72 MG/DL (ref 0.57–1)
DEPRECATED RDW RBC AUTO: 46.6 FL (ref 37–54)
EGFRCR SERPLBLD CKD-EPI 2021: 100.1 ML/MIN/1.73
EGFRCR SERPLBLD CKD-EPI 2021: 94.7 ML/MIN/1.73
EOSINOPHIL # BLD AUTO: 0 10*3/MM3 (ref 0–0.4)
EOSINOPHIL NFR BLD AUTO: 0 % (ref 0.3–6.2)
ERYTHROCYTE [DISTWIDTH] IN BLOOD BY AUTOMATED COUNT: 12.5 % (ref 12.3–15.4)
GLOBULIN UR ELPH-MCNC: 3.9 GM/DL
GLUCOSE SERPL-MCNC: 141 MG/DL (ref 65–99)
GLUCOSE SERPL-MCNC: 91 MG/DL (ref 65–99)
HCT VFR BLD AUTO: 31.8 % (ref 34–46.6)
HDLC SERPL-MCNC: 54 MG/DL (ref 40–60)
HGB BLD-MCNC: 9.5 G/DL (ref 12–15.9)
IMM GRANULOCYTES # BLD AUTO: 0.11 10*3/MM3 (ref 0–0.05)
IMM GRANULOCYTES NFR BLD AUTO: 0.7 % (ref 0–0.5)
LDLC SERPL CALC-MCNC: 81 MG/DL (ref 0–100)
LDLC/HDLC SERPL: 1.51 {RATIO}
LYMPHOCYTES # BLD AUTO: 0.64 10*3/MM3 (ref 0.7–3.1)
LYMPHOCYTES NFR BLD AUTO: 4 % (ref 19.6–45.3)
MCH RBC QN AUTO: 30.9 PG (ref 26.6–33)
MCHC RBC AUTO-ENTMCNC: 29.9 G/DL (ref 31.5–35.7)
MCV RBC AUTO: 103.6 FL (ref 79–97)
MONOCYTES # BLD AUTO: 0.68 10*3/MM3 (ref 0.1–0.9)
MONOCYTES NFR BLD AUTO: 4.2 % (ref 5–12)
NEUTROPHILS NFR BLD AUTO: 14.61 10*3/MM3 (ref 1.7–7)
NEUTROPHILS NFR BLD AUTO: 91 % (ref 42.7–76)
NRBC BLD AUTO-RTO: 0 /100 WBC (ref 0–0.2)
PLATELET # BLD AUTO: 205 10*3/MM3 (ref 140–450)
PMV BLD AUTO: 12 FL (ref 6–12)
POTASSIUM SERPL-SCNC: 3.6 MMOL/L (ref 3.5–5.2)
POTASSIUM SERPL-SCNC: 3.7 MMOL/L (ref 3.5–5.2)
PROT SERPL-MCNC: 7.2 G/DL (ref 6–8.5)
RBC # BLD AUTO: 3.07 10*6/MM3 (ref 3.77–5.28)
SODIUM SERPL-SCNC: 136 MMOL/L (ref 136–145)
SODIUM SERPL-SCNC: 138 MMOL/L (ref 136–145)
TRIGL SERPL-MCNC: 63 MG/DL (ref 0–150)
TSH SERPL DL<=0.05 MIU/L-ACNC: 0.31 UIU/ML (ref 0.27–4.2)
VLDLC SERPL-MCNC: 13 MG/DL (ref 5–40)
WBC NRBC COR # BLD: 16.06 10*3/MM3 (ref 3.4–10.8)

## 2022-05-12 PROCEDURE — 97165 OT EVAL LOW COMPLEX 30 MIN: CPT

## 2022-05-12 PROCEDURE — 94799 UNLISTED PULMONARY SVC/PX: CPT

## 2022-05-12 PROCEDURE — 97162 PT EVAL MOD COMPLEX 30 MIN: CPT

## 2022-05-12 PROCEDURE — 97110 THERAPEUTIC EXERCISES: CPT

## 2022-05-12 PROCEDURE — G0378 HOSPITAL OBSERVATION PER HR: HCPCS

## 2022-05-12 PROCEDURE — 94618 PULMONARY STRESS TESTING: CPT

## 2022-05-12 PROCEDURE — 94761 N-INVAS EAR/PLS OXIMETRY MLT: CPT

## 2022-05-12 PROCEDURE — 96376 TX/PRO/DX INJ SAME DRUG ADON: CPT

## 2022-05-12 PROCEDURE — 80061 LIPID PANEL: CPT | Performed by: FAMILY MEDICINE

## 2022-05-12 PROCEDURE — 84443 ASSAY THYROID STIM HORMONE: CPT | Performed by: FAMILY MEDICINE

## 2022-05-12 PROCEDURE — 94664 DEMO&/EVAL PT USE INHALER: CPT

## 2022-05-12 PROCEDURE — 85025 COMPLETE CBC W/AUTO DIFF WBC: CPT | Performed by: FAMILY MEDICINE

## 2022-05-12 PROCEDURE — 25010000002 NA FERRIC GLUC CPLX PER 12.5 MG: Performed by: FAMILY MEDICINE

## 2022-05-12 PROCEDURE — 97116 GAIT TRAINING THERAPY: CPT

## 2022-05-12 PROCEDURE — 80053 COMPREHEN METABOLIC PANEL: CPT | Performed by: FAMILY MEDICINE

## 2022-05-12 RX ORDER — OXYCODONE AND ACETAMINOPHEN 10; 325 MG/1; MG/1
0.5 TABLET ORAL EVERY 4 HOURS PRN
Refills: 0
Start: 2022-05-12 | End: 2023-01-05 | Stop reason: HOSPADM

## 2022-05-12 RX ORDER — FEXOFENADINE HCL AND PSEUDOEPHEDRINE HCI 180; 240 MG/1; MG/1
1 TABLET, EXTENDED RELEASE ORAL DAILY PRN
COMMUNITY

## 2022-05-12 RX ORDER — FAMOTIDINE 20 MG/1
20 TABLET, FILM COATED ORAL 2 TIMES DAILY PRN
Qty: 180 TABLET | Refills: 0 | Status: SHIPPED | OUTPATIENT
Start: 2022-05-12 | End: 2022-12-30

## 2022-05-12 RX ADMIN — SODIUM CHLORIDE 125 MG: 9 INJECTION, SOLUTION INTRAVENOUS at 08:32

## 2022-05-12 RX ADMIN — OXYCODONE HYDROCHLORIDE AND ACETAMINOPHEN 1 TABLET: 5; 325 TABLET ORAL at 10:37

## 2022-05-12 RX ADMIN — Medication 1000 UNITS: at 08:32

## 2022-05-12 RX ADMIN — FAMOTIDINE 20 MG: 10 INJECTION INTRAVENOUS at 08:32

## 2022-05-12 RX ADMIN — IPRATROPIUM BROMIDE AND ALBUTEROL SULFATE 3 ML: .5; 3 SOLUTION RESPIRATORY (INHALATION) at 10:36

## 2022-05-12 RX ADMIN — ESCITALOPRAM 10 MG: 10 TABLET, FILM COATED ORAL at 08:32

## 2022-05-12 RX ADMIN — RIVAROXABAN 10 MG: 10 TABLET, FILM COATED ORAL at 08:32

## 2022-05-12 RX ADMIN — Medication 10 ML: at 08:33

## 2022-05-12 RX ADMIN — BUDESONIDE AND FORMOTEROL FUMARATE DIHYDRATE 2 PUFF: 160; 4.5 AEROSOL RESPIRATORY (INHALATION) at 06:28

## 2022-05-12 RX ADMIN — IPRATROPIUM BROMIDE AND ALBUTEROL SULFATE 3 ML: .5; 3 SOLUTION RESPIRATORY (INHALATION) at 06:28

## 2022-05-12 RX ADMIN — OXYCODONE HYDROCHLORIDE AND ACETAMINOPHEN 1 TABLET: 5; 325 TABLET ORAL at 04:06

## 2022-05-12 RX ADMIN — GABAPENTIN 800 MG: 400 CAPSULE ORAL at 04:18

## 2022-05-12 NOTE — THERAPY TREATMENT NOTE
Acute Care - Physical Therapy Treatment Note  [facility]     Patient Name: Arminda Jiménez  : 1960  MRN: [id number]  Today's Date: 2022   Onset of Illness/Injury or Date of Surgery: 22  Visit Dx:     ICD-10-CM ICD-9-CM   1. Chest pain, unspecified type  R07.9 786.50   2. COPD exacerbation ([facility])  J44.1 491.21   3. Hypoxia  R09.02 799.02   4. Anemia, unspecified type  D64.9 285.9   5. Thyroid cyst  E04.1 246.2   6. Impaired mobility  Z74.09 799.89     Patient Active Problem List   Diagnosis   • Foraminal stenosis of lumbosacral region   • Chronic back pain   • Radiculopathy   • Lumbar stenosis   • Panlobular emphysema (HCC)   • Gastroesophageal reflux disease without esophagitis   • Constipation due to opioid therapy   • PAD (peripheral artery disease) ([facility])   • Primary osteoarthritis involving multiple joints   • Recurrent major depressive disorder ([facility])   • Overweight   • Personal history of nicotine dependence   • Adenocarcinoma of unknown origin ([facility])   • Environmental allergies   • Primary hypertension   • Senile osteoporosis   • Open left ankle fracture   • Fall   • Postoperative anemia due to acute blood loss   • Acute foot pain, left   • Impaired gait and mobility due to left ankle fracture   • Chest pain, unspecified type   • COPD with acute exacerbation ([facility])   • COPD exacerbation ([facility])   • Overweight (BMI 25.0-29.9)     Past Medical History:   Diagnosis Date   • Adenocarcinoma of unknown origin ([facility]) 2022   • Arthritis    • Asthma    • Cancer ([facility])     right lung,abd,and pelvic area   • Chronic back pain    • COPD (chronic obstructive pulmonary disease) ([facility])    • Depression    • Environmental allergies 2022   • Facet arthropathy    • Foraminal stenosis of lumbosacral region    • GERD (gastroesophageal reflux disease)    • H/O degenerative disc disease    • History of blood transfusion    • PVD (peripheral vascular disease) ([facility])    • Radiculopathy      Past Surgical History:    Procedure Laterality Date   • ANKLE SURGERY Left    • ANTERIOR LUMBAR EXPOSURE N/A 05/07/2018    Procedure: ANTERIOR LUMBAR EXPOSURE;  Surgeon: Chris Haley DO;  Location:  [facility];  Service: Vascular   • APPENDECTOMY     • CARPAL TUNNEL RELEASE Bilateral    • INCISION AND DRAINAGE OF WOUND Left 05/08/2022    Procedure: Incision and Drainage Left Open Ankle Fracture, Open Reduction Internal Fixation Bimalleolar Ankle Fracture;  Surgeon: Leland Aranda MD;  Location:  [facility];  Service: Orthopedics;  Laterality: Left;   • LUMBAR FUSION N/A 05/07/2018    Procedure: ANTERIOR DECOMPRESSION, ANTERIOR LUMBAR INTERBODY FUSION WITH INSTRUMENTATION L5-S1;  Surgeon: LUIS CARLOS Zheng MD;  Location:  [facility];  Service: Orthopedic Spine   • LUMBAR FUSION Right 01/02/2019    Procedure: RIGHT LATERAL LUMBAR INTERBODY FUSION L3-5;  Surgeon: LUIS CARLOS Zheng MD;  Location:  [facility];  Service: Orthopedic Spine   • LUMBAR LAMINECTOMY WITH FUSION N/A 05/09/2018    Procedure: POSTERIOR SPINAL FUSION WITH INSTRUMENTATION L5-S1;  Surgeon: LUIS CARLOS Zheng MD;  Location:  [facility];  Service: Orthopedic Spine   • LUMBAR LAMINECTOMY WITH FUSION Right 01/04/2019    Procedure: POSTERIOR SPINAL FUSION WITH INSTRUMENTATION RIGHT L3-S1;  Surgeon: LUIS CARLOS Zheng MD;  Location:  [facility];  Service: Orthopedic Spine   • OTHER SURGICAL HISTORY Bilateral 2017    Stent implants [facility] [address] [facility]     PT Assessment (last 12 hours)     PT Evaluation and Treatment     Row Name 05/12/22 0925          Physical Therapy Time and Intention    Subjective Information complains of;pain;dyspnea  -MF     Document Type therapy note (daily note)  -     Mode of Treatment physical therapy  -     Row Name 05/12/22 0925          General Information    Existing Precautions/Restrictions fall;non-weight bearing  n  -     Row Name 05/12/22 0925          Pain    Pretreatment Pain Rating 0/10 - no pain  -MF     Posttreatment Pain Rating 6/10   -     Pain Location - Side/Orientation Bilateral  -     Pain Location upper  -     Pain Location - back  -     Pain Intervention(s) Repositioned;Medication (See MAR);Ambulation/increased activity  -     Row Name 05/12/22 0925          Bed Mobility    Supine-Sit [place] (Bed Mobility) modified independence  -     Sit-Supine [place] (Bed Mobility) modified independence  -     Assistive Device (Bed Mobility) head of bed elevated;bed rails  -     Row Name 05/12/22 0925          Transfers    Sit-Stand [place] (Transfers) standby assist  -     Stand-Sit [place] (Transfers) contact guard  -     Row Name 05/12/22 0925          Gait/Stairs (Locomotion)    [place] Level (Gait) contact guard;minimum assist (75% patient effort)  -     Assistive Device (Gait) walker, front-wheeled  -     Distance in Feet (Gait) 40  -     Pattern (Gait) 2-point  -     Comment, (Gait/Stairs) pt began to get weak during the last 10'. Pt. leaning heavy on RWX. MIN assist to correct balance. Pt. rested and then was able to make it to bed to sit. Pt's HR in the 120-130's.   -     Row Name 05/12/22 0925          Hip (Therapeutic Exercise)    Hip (Therapeutic Exercise) AROM (active range of motion)  -     Hip AROM (Therapeutic Exercise) bilateral;flexion;aBduction;aDduction;sitting;20 repititions  -     Row Name 05/12/22 0925          Knee (Therapeutic Exercise)    Knee (Therapeutic Exercise) AROM (active range of motion)  -     Knee AROM (Therapeutic Exercise) bilateral;LAQ (long arc quad);sitting;20 repititions  -     Row Name             Wound 05/08/22 0655 Left lateral ankle Incision    Wound - Properties Group Placement Date: 05/08/22 -JR Placement Time: 0655 -JR Side: Left  -JR Orientation: lateral  -JR Location: ankle  -JR Primary Wound Type: Incision  -JR     Retired Wound - Properties Group Placement Date: 05/08/22  -JR Placement Time: 0655 -JR Side: Left  -JR Orientation:  lateral  -JR Location: ankle  -JR Primary Wound Type: Incision  -JR     Retired Wound - Properties Group Date first assessed: 05/08/22  -JR Time first assessed: 0655 -JR Side: Left  -JR Location: ankle  -JR Primary Wound Type: Incision  -JR     Row Name 05/12/22 0925          Vital Signs    Intratreatment Heart Rate (beats/min) 135  -MF     Posttreatment Heart Rate (beats/min) 98  -MF     Pre SpO2 (%) 97  -MF     O2 Delivery Pre Treatment room air  -MF     Intra SpO2 (%) 92  -MF     O2 Delivery Intra Treatment room air  -MF     Post SpO2 (%) 97  -MF     O2 Delivery Post Treatment room air  -MF     Pre Patient Position Sitting  -MF     Intra Patient Position Standing  -MF     Post Patient Position Sitting  -MF     Row Name 05/12/22 0925          Positioning and Restraints    Pre-Treatment Position in bed  -MF     Post Treatment Position bed  -MF     In Bed notified nsg;fowlers;call light within reach;encouraged to call for assist;side rails up x2;legs elevated  -MF           User Key  (r) = Recorded By, (t) = Taken By, (c) = Cosigned By    Initials Name Provider Type    Mary Jane Dawson PTA Physical Therapist Assistant    Haleigh Granados, RN Registered Nurse                Physical Therapy Education                 Title: PT OT SLP Therapies (In Progress)     Topic: Physical Therapy (In Progress)     Point: Mobility training (Done)     Learning Progress Summary           Patient Acceptance, E, VU,DU,NR by ALLISON at 5/12/2022 0924    Comment: Benefits of actvity, progression of PT POC                   Point: Home exercise program (Not Started)     Learner Progress:  Not documented in this visit.          Point: Body mechanics (Not Started)     Learner Progress:  Not documented in this visit.          Point: Precautions (Not Started)     Learner Progress:  Not documented in this visit.                      User Key     Initials Effective Dates Name Provider Type Discipline    ALLISON 08/02/16 -  Ricardo Borges, PT  DPT Physical Therapist PT              PT Recommendation and Plan             Time Calculation:    PT Charges     Row Name 05/12/22 1021 05/12/22 1005          Time Calculation    Start Time 0925  -MF 0924  -ALLISON     Stop Time 1010  -MF 0950  -ALLISON     Time Calculation (min) 45 min  -MF 26 min  -ALLISON     PT Received On -- 05/12/22  -ALLISON     PT Goal Re-Cert Due Date -- 05/22/22  -ALLISON            Time Calculation- PT    Total Timed Code Minutes- PT 45 minute(s)  -MF --            Timed Charges    82886 - PT Therapeutic Exercise Minutes 15  -MF --     59824 - Gait Training Minutes  30  -MF --            Total Minutes    Timed Charges Total Minutes 45  -MF --      Total Minutes 45  -MF --           User Key  (r) = Recorded By, (t) = Taken By, (c) = Cosigned By    Initials Name Provider Type    Ricardo Pool, PT DPT Physical Therapist    Mary Jane Dawson PTA Physical Therapist Assistant              Therapy Charges for Today     Code Description Service Date Service Provider Modifiers Qty    09450461468 HC PT THER PROC EA 15 MIN 5/12/2022 Mary Jane Juares PTA GP 1    71781642113 HC GAIT TRAINING EA 15 MIN 5/12/2022 Mary Jane Juares PTA GP 2          PT G-Codes  Outcome Measure Options: AM-PAC 6 Clicks Basic Mobility (PT)  AM-PAC 6 Clicks Score (PT): 19    Mary Jane Juares PTA  5/12/2022

## 2022-05-12 NOTE — THERAPY EVALUATION
Patient Name: Arminda Jiménez  : 1960    MRN: [id number]                              Today's Date: 2022       Admit Date: 2022    Visit Dx:     ICD-10-CM ICD-9-CM   1. Chest pain, unspecified type  R07.9 786.50   2. COPD exacerbation ([facility])  J44.1 491.21   3. Hypoxia  R09.02 799.02   4. Anemia, unspecified type  D64.9 285.9   5. Thyroid cyst  E04.1 246.2   6. Impaired mobility  Z74.09 799.89     Patient Active Problem List   Diagnosis   • Foraminal stenosis of lumbosacral region   • Chronic back pain   • Radiculopathy   • Lumbar stenosis   • Panlobular emphysema ([facility])   • Gastroesophageal reflux disease without esophagitis   • Constipation due to opioid therapy   • PAD (peripheral artery disease) ([facility])   • Primary osteoarthritis involving multiple joints   • Recurrent major depressive disorder ([facility])   • Overweight   • Personal history of nicotine dependence   • Adenocarcinoma of unknown origin ([facility])   • Environmental allergies   • Primary hypertension   • Senile osteoporosis   • Open left ankle fracture   • Fall   • Postoperative anemia due to acute blood loss   • Acute foot pain, left   • Impaired gait and mobility due to left ankle fracture   • Chest pain, unspecified type   • COPD with acute exacerbation ([facility])   • COPD exacerbation ([facility])   • Overweight (BMI 25.0-29.9)     Past Medical History:   Diagnosis Date   • Adenocarcinoma of unknown origin ([facility]) 2022   • Arthritis    • Asthma    • Cancer ([facility])     right lung,abd,and pelvic area   • Chronic back pain    • COPD (chronic obstructive pulmonary disease) ([facility])    • Depression    • Environmental allergies 2022   • Facet arthropathy    • Foraminal stenosis of lumbosacral region    • GERD (gastroesophageal reflux disease)    • H/O degenerative disc disease    • History of blood transfusion    • PVD (peripheral vascular disease) ([facility])    • Radiculopathy      Past Surgical History:   Procedure Laterality Date   • ANKLE SURGERY Left    •  ANTERIOR LUMBAR EXPOSURE N/A 05/07/2018    Procedure: ANTERIOR LUMBAR EXPOSURE;  Surgeon: Chris Haley DO;  Location:  [facility] OR;  Service: Vascular   • APPENDECTOMY     • CARPAL TUNNEL RELEASE Bilateral    • INCISION AND DRAINAGE OF WOUND Left 05/08/2022    Procedure: Incision and Drainage Left Open Ankle Fracture, Open Reduction Internal Fixation Bimalleolar Ankle Fracture;  Surgeon: Leland Aranda MD;  Location:  [facility] OR;  Service: Orthopedics;  Laterality: Left;   • LUMBAR FUSION N/A 05/07/2018    Procedure: ANTERIOR DECOMPRESSION, ANTERIOR LUMBAR INTERBODY FUSION WITH INSTRUMENTATION L5-S1;  Surgeon: LUIS CARLOS Zheng MD;  Location:  [facility];  Service: Orthopedic Spine   • LUMBAR FUSION Right 01/02/2019    Procedure: RIGHT LATERAL LUMBAR INTERBODY FUSION L3-5;  Surgeon: LUIS CARLOS Zheng MD;  Location:  [facility];  Service: Orthopedic Spine   • LUMBAR LAMINECTOMY WITH FUSION N/A 05/09/2018    Procedure: POSTERIOR SPINAL FUSION WITH INSTRUMENTATION L5-S1;  Surgeon: LUIS CARLOS Zheng MD;  Location:  [facility];  Service: Orthopedic Spine   • LUMBAR LAMINECTOMY WITH FUSION Right 01/04/2019    Procedure: POSTERIOR SPINAL FUSION WITH INSTRUMENTATION RIGHT L3-S1;  Surgeon: LUIS CARLOS Zheng MD;  Location:  [facility];  Service: Orthopedic Spine   • OTHER SURGICAL HISTORY Bilateral 2017    Stent implants BLE [address] [place]      General Information     Row Name 05/12/22 0924          Physical Therapy Time and Intention    Document Type evaluation  recent admit with L ankle fx on 5/8/22, now readmitted for chest pain and SOA  -ALLISON     Mode of Treatment physical therapy  -ALLISON     Row Name 05/12/22 0924          General Information    Patient Profile Reviewed yes  -ALLISON     Prior Level of Function independent:;all household mobility  -ALLISON     Existing Precautions/Restrictions fall;non-weight bearing  NWB L LE  -ALLISON     Barriers to Rehab physical barrier  -ALLISON     Row Name 05/12/22 0924          Living Environment     People in Home child(leno), adult  daughter available 24/7  -ALLISON     Row Name 05/12/22 0924          Home Main Entrance    Number of Stairs, Main Entrance two  -ALLISON     Row Name 05/12/22 0924          Cognition    Orientation Status (Cognition) oriented x 4  -ALLISON     Row Name 05/12/22 0924          Safety Issues, Functional Mobility    Impairments Affecting Function (Mobility) endurance/activity tolerance;shortness of breath;balance  -ALLISON           User Key  (r) = Recorded By, (t) = Taken By, (c) = Cosigned By    Initials Name Provider Type    Ricardo Pool, PT DPT Physical Therapist               Mobility     Row Name 05/12/22 0924          Bed Mobility    Bed Mobility supine-sit  -ALLISON     Scooting/Bridging Rodney (Bed Mobility) supervision  -ALLISON     Supine-Sit Rodney (Bed Mobility) other (see comments)  with PTA  -ALLISON     Assistive Device (Bed Mobility) head of bed elevated  -ALLISON     Comment, (Bed Mobility) PTA comlpeted mobliity portion of eval  -ALLISON     Row Name 05/12/22 0924          Mobility    Extremity Weight-bearing Status left lower extremity  -ALLISON     Left Lower Extremity (Weight-bearing Status) non weight-bearing (NWB)  -ALLISON           User Key  (r) = Recorded By, (t) = Taken By, (c) = Cosigned By    Initials Name Provider Type    Ricardo Pool, PT DPT Physical Therapist               Obj/Interventions     Row Name 05/12/22 0924          Range of Motion Comprehensive    Comment, General Range of Motion R LE AROM WFL, L LE hip/knee AROM WFL, L ankle casted  -ALLISON     Row Name 05/12/22 0924          Strength Comprehensive (MMT)    Comment, General Manual Muscle Testing (MMT) Assessment R LE grossly 4/5, deferred MMT to L LE  -ALLISON     Row Name 05/12/22 0924          Balance    Balance Assessment sitting dynamic balance;standing dynamic balance  -ALLISON     Dynamic Sitting Balance supervision  -ALLISON     Position, Sitting Balance unsupported;sitting edge of bed  -ALLISON     Dynamic Standing Balance contact  guard;minimal assist   balance progressed to needing min assist due to fatigue and SOB with gait.  -ALLISON     Position/Device Used, Standing Balance walker, front-wheeled  -ALLISON     Row Name 05/12/22 0924          Sensory Assessment (Somatosensory)    Sensory Assessment (Somatosensory) LE sensation intact  -ALLISON           User Key  (r) = Recorded By, (t) = Taken By, (c) = Cosigned By    Initials Name Provider Type    Ricardo Pool PT DPT Physical Therapist               Goals/Plan     Row Name 05/12/22 1002          Gait Training Goal 1 (PT)    Activity/Assistive Device (Gait Training Goal 1, PT) gait (walking locomotion);decrease fall risk;improve balance and speed;increase endurance/gait distance;maintain weight-bearing status;walker, rolling  -ALLISON     Mobile Level (Gait Training Goal 1, PT) supervision required  -ALLISON     Distance (Gait Training Goal 1, PT) 50 ft  -ALLISON     Time Frame (Gait Training Goal 1, PT) long term goal (LTG);10 days  -ALLISON     Progress/Outcome (Gait Training Goal 1, PT) goal ongoing  -ALLISON     Row Name 05/12/22 1002          Stairs Goal 1 (PT)    Activity/Assistive Device (Stairs Goal 1, PT) ascending stairs;descending stairs  -ALLISON     Mobile Level/Cues Needed (Stairs Goal 1, PT) contact guard required  -ALLISON     Number of Stairs (Stairs Goal 1, PT) 2 steps  -ALLISON     Time Frame (Stairs Goal 1, PT) long term goal (LTG);10 days  -ALLISON     Progress/Outcome (Stairs Goal 1, PT) goal ongoing  -ALLISON     Row Name 05/12/22 1002          Therapy Assessment/Plan (PT)    Planned Therapy Interventions (PT) gait training;balance training;home exercise program;patient/family education;postural re-education;stair training;strengthening  -ALLISON           User Key  (r) = Recorded By, (t) = Taken By, (c) = Cosigned By    Initials Name Provider Type    Ricardo Pool PT DPT Physical Therapist               Clinical Impression     Row Name 05/12/22 0924          Pain    Pretreatment Pain Rating 0/10 - no pain   -ALLISON     Row Name 05/12/22 0924          Plan of Care Review    Plan of Care Reviewed With patient  -ALLISON     Outcome Evaluation PT akosua complete. She is alert and oriented x 4. She is aware and maintains NWB L LE with all activity using RW. O2 sat was monitored with all activity. Pre gait in bed she was 99% on room air. With all gait she ambulated 40 ft and O2 sat stayed 92% on room air. She became very SOB and fatigued once back in room and required min assist to maintain balance. O2 sat still 92% on room air. She was then safely assisted back into sitting EOB where O2 sat was 97% and HR had increased to 130. PT will cont with gait, endurance/activity tolerance and stair training in preparation for d.c home with daughter 24/7 care.  -ALLISON     Row Name 05/12/22 0924          Therapy Assessment/Plan (PT)    Patient/Family Therapy Goals Statement (PT) go home  -ALLISON     Rehab Potential (PT) good, to achieve stated therapy goals  -ALLISON     Criteria for Skilled Interventions Met (PT) yes;meets criteria;skilled treatment is necessary  -ALLISON     Therapy Frequency (PT) 2 times/day  -ALLISON     Predicted Duration of Therapy Intervention (PT) until d.c  -ALLISON     Row Name 05/12/22 0924          Vital Signs    Pre SpO2 (%) 99  -ALLISON     O2 Delivery Pre Treatment room air  -ALLISON     Intra SpO2 (%) 92  -ALLISON     O2 Delivery Intra Treatment room air  -ALLISON     Post SpO2 (%) 97  -ALLISON     O2 Delivery Post Treatment room air  -ALLISON     Pre Patient Position Supine  -ALLISON     Intra Patient Position Standing  -ALLISON     Post Patient Position Sitting  -ALLISON     Row Name 05/12/22 0924          Positioning and Restraints    Pre-Treatment Position in bed  -ALLISON     Post Treatment Position bed  -ALLISON     In Bed sitting EOB;with PT  w PTA  -ALLISON           User Key  (r) = Recorded By, (t) = Taken By, (c) = Cosigned By    Initials Name Provider Type    Ricardo Pool, PT DPT Physical Therapist               Outcome Measures     Row Name 05/12/22 0924          How much help  from another person do you currently need...    Turning from your back to your side while in flat bed without using bedrails? 4  -ALLISON     Moving from lying on back to sitting on the side of a flat bed without bedrails? 4  -ALLISON     Moving to and from a bed to a chair (including a wheelchair)? 3  -ALLISON     Standing up from a chair using your arms (e.g., wheelchair, bedside chair)? 3  -ALLISON     Climbing 3-5 steps with a railing? 2  -ALLISON     To walk in hospital room? 3  -ALLISON     AM-PAC 6 Clicks Score (PT) 19  -ALLISON     Highest level of mobility 6 --> Walked 10 steps or more  -ALLISON     Row Name 05/12/22 0924          Functional Assessment    Outcome Measure Options AM-PAC 6 Clicks Basic Mobility (PT)  -ALLISON           User Key  (r) = Recorded By, (t) = Taken By, (c) = Cosigned By    Initials Name Provider Type    Ricardo Pool, PT DPT Physical Therapist                             Physical Therapy Education                 Title: PT OT SLP Therapies (In Progress)     Topic: Physical Therapy (In Progress)     Point: Mobility training (Done)     Learning Progress Summary           Patient Acceptance, E, JOSUE,ROSALINA,NR by ALLISON at 5/12/2022 0924    Comment: Benefits of actvity, progression of PT POC                   Point: Home exercise program (Not Started)     Learner Progress:  Not documented in this visit.          Point: Body mechanics (Not Started)     Learner Progress:  Not documented in this visit.          Point: Precautions (Not Started)     Learner Progress:  Not documented in this visit.                      User Key     Initials Effective Dates Name Provider Type Discipline    ALLISON 08/02/16 -  Ricardo Borges PT DPT Physical Therapist PT              PT Recommendation and Plan  Planned Therapy Interventions (PT): gait training, balance training, home exercise program, patient/family education, postural re-education, stair training, strengthening  Plan of Care Reviewed With: patient  Outcome Evaluation: PT eval complete. She  is alert and oriented x 4. She is aware and maintains NWB L LE with all activity using RW. O2 sat was monitored with all activity. Pre gait in bed she was 99% on room air. With all gait she ambulated 40 ft and O2 sat stayed 92% on room air. She became very SOB and fatigued once back in room and required min assist to maintain balance. O2 sat still 92% on room air. She was then safely assisted back into sitting EOB where O2 sat was 97% and HR had increased to 130. PT will cont with gait, endurance/activity tolerance and stair training in preparation for d.c home with daughter 24/7 care.     Time Calculation:    PT Charges     Row Name 05/12/22 1005             Time Calculation    Start Time 0924  -ALLISON      Stop Time 0950  -ALLISON      Time Calculation (min) 26 min  -ALLISON      PT Received On 05/12/22  -ALLISON      PT Goal Re-Cert Due Date 05/22/22  -ALLISON            User Key  (r) = Recorded By, (t) = Taken By, (c) = Cosigned By    Initials Name Provider Type    Ricardo Pool, PT DPT Physical Therapist              Therapy Charges for Today     Code Description Service Date Service Provider Modifiers Qty    [id number] HC PT EVAL MOD COMPLEXITY 2 5/12/2022 Ricardo Borges, PT DPT GP 1          PT G-Codes  Outcome Measure Options: AM-PAC 6 Clicks Basic Mobility (PT)  AM-PAC 6 Clicks Score (PT): 19    Ricardo Borges, OLI DPT  5/12/2022

## 2022-05-12 NOTE — THERAPY DISCHARGE NOTE
Acute Care - Occupational Therapy Initial Evaluation/Discharge  [facility]     Patient Name: Arminda Jiménez  : 1960  MRN: [id number]  Today's Date: 2022  Onset of Illness/Injury or Date of Surgery: 22     Referring Physician: Dr. Andrade      Admit Date: 2022       ICD-10-CM ICD-9-CM   1. Chest pain, unspecified type  R07.9 786.50   2. COPD exacerbation (HCC)  J44.1 491.21   3. Hypoxia  R09.02 799.02   4. Anemia, unspecified type  D64.9 285.9   5. Thyroid cyst  E04.1 246.2     Patient Active Problem List   Diagnosis   • Foraminal stenosis of lumbosacral region   • Chronic back pain   • Radiculopathy   • Lumbar stenosis   • Panlobular emphysema ([facility])   • Gastroesophageal reflux disease without esophagitis   • Constipation due to opioid therapy   • PAD (peripheral artery disease) ([facility])   • Primary osteoarthritis involving multiple joints   • Recurrent major depressive disorder ([facility])   • Overweight   • Personal history of nicotine dependence   • Adenocarcinoma of unknown origin ([facility])   • Environmental allergies   • Primary hypertension   • Senile osteoporosis   • Open left ankle fracture   • Fall   • Postoperative anemia due to acute blood loss   • Acute foot pain, left   • Impaired gait and mobility due to left ankle fracture   • Chest pain, unspecified type   • COPD with acute exacerbation ([facility])   • COPD exacerbation ([facility])   • Overweight (BMI 25.0-29.9)     Past Medical History:   Diagnosis Date   • Adenocarcinoma of unknown origin ([facility]) 2022   • Arthritis    • Asthma    • Cancer ([facility])     right lung,abd,and pelvic area   • Chronic back pain    • COPD (chronic obstructive pulmonary disease) ([facility])    • Depression    • Environmental allergies 2022   • Facet arthropathy    • Foraminal stenosis of lumbosacral region    • GERD (gastroesophageal reflux disease)    • H/O degenerative disc disease    • History of blood transfusion    • PVD (peripheral vascular disease) ([facility])    •  Radiculopathy      Past Surgical History:   Procedure Laterality Date   • ANKLE SURGERY Left    • ANTERIOR LUMBAR EXPOSURE N/A 05/07/2018    Procedure: ANTERIOR LUMBAR EXPOSURE;  Surgeon: Chris Haley DO;  Location:  [facility] OR;  Service: Vascular   • APPENDECTOMY     • CARPAL TUNNEL RELEASE Bilateral    • INCISION AND DRAINAGE OF WOUND Left 05/08/2022    Procedure: Incision and Drainage Left Open Ankle Fracture, Open Reduction Internal Fixation Bimalleolar Ankle Fracture;  Surgeon: Leland Aranda MD;  Location:  [facility] OR;  Service: Orthopedics;  Laterality: Left;   • LUMBAR FUSION N/A 05/07/2018    Procedure: ANTERIOR DECOMPRESSION, ANTERIOR LUMBAR INTERBODY FUSION WITH INSTRUMENTATION L5-S1;  Surgeon: LUIS CARLOS Zheng MD;  Location:  [facility] OR;  Service: Orthopedic Spine   • LUMBAR FUSION Right 01/02/2019    Procedure: RIGHT LATERAL LUMBAR INTERBODY FUSION L3-5;  Surgeon: LUIS CARLOS Zheng MD;  Location:  [facility] OR;  Service: Orthopedic Spine   • LUMBAR LAMINECTOMY WITH FUSION N/A 05/09/2018    Procedure: POSTERIOR SPINAL FUSION WITH INSTRUMENTATION L5-S1;  Surgeon: LUIS CARLOS Zheng MD;  Location:  [facility] OR;  Service: Orthopedic Spine   • LUMBAR LAMINECTOMY WITH FUSION Right 01/04/2019    Procedure: POSTERIOR SPINAL FUSION WITH INSTRUMENTATION RIGHT L3-S1;  Surgeon: LUIS CARLOS Zheng MD;  Location:  [facility] OR;  Service: Orthopedic Spine   • OTHER SURGICAL HISTORY Bilateral 2017    Stent implants [facility] [place]       OT ASSESSMENT FLOWSHEET (last 12 hours)     OT Evaluation and Treatment     Row Name 05/12/22 0700                   OT Time and Intention    Subjective Information no complaints  -AC        Document Type evaluation  -AC        Mode of Treatment occupational therapy  -AC                  General Information    Patient Profile Reviewed yes  -AC        Onset of Illness/Injury or Date of Surgery 05/11/22  -AC        Referring Physician Dr. Andrade  -AC        Prior Level of Function  independent:;all household mobility;community mobility;gait;transfer;bed mobility;ADL's;home management;cooking;cleaning;driving;shopping  -AC        Equipment Currently Used at Home cane, straight;walker, rolling;wheelchair;commode, bedside  -AC        Pertinent History of Current Functional Problem recent admit with L ankle fx on 5/8/22, now readmitted for chest pain and SOA  -AC        Existing Precautions/Restrictions fall;non-weight bearing   NWB L ankle  -AC        Barriers to Rehab none identified  -AC                  Living Environment    Current Living Arrangements home  -AC        Home Accessibility wheelchair accessible  -AC        People in Home sibling(s)  -AC                  Pain Assessment    Pretreatment Pain Rating 0/10 - no pain  -AC        Posttreatment Pain Rating 0/10 - no pain  -AC                  Cognition    Orientation Status (Cognition) oriented x 4  -AC        Follows Commands (Cognition) WNL  -AC        Personal Safety Interventions elopement precautions initiated;fall prevention program maintained;gait belt;muscle strengthening facilitated;nonskid shoes/slippers when out of bed;supervised activity  -AC                  Range of Motion Comprehensive    Comment, General Range of Motion WFL AROM  -AC                  Strength Comprehensive (MMT)    Comment, General Manual Muscle Testing (MMT) Assessment 5/5 BUE functionally  -AC                  Activities of Daily Living    BADL Assessment/Intervention lower body dressing  -AC                  Lower Body Dressing Assessment/Training    [place] Level (Lower Body Dressing) don;doff;socks;independent  -AC        Position (Lower Body Dressing) edge of bed sitting  -AC        Comment, (Lower Body Dressing) R foot  -AC                  BADL Safety/Performance    Impairments, BADL Safety/Performance endurance/activity tolerance  -AC                  Mobility    Extremity Weight-bearing Status left lower extremity  -AC        Left Lower  Extremity (Weight-bearing Status) non weight-bearing (NWB)  -                  Bed Mobility    Bed Mobility scooting/bridging;supine-sit  -        Scooting/Bridging [place] (Bed Mobility) modified independence  -        Supine-Sit [place] (Bed Mobility) modified independence  -        Assistive Device (Bed Mobility) head of bed elevated  -                  Functional Mobility    Functional Mobility- Ind. Level standby assist  -        Functional Mobility- Device walker, front-wheeled  -        Functional Mobility- Comment to BR, back to bed  -                  Transfer Assessment/Treatment    Transfers sit-stand transfer;stand-sit transfer  -                  Transfers    Sit-Stand [place] (Transfers) modified independence  -        Stand-Sit [place] (Transfers) modified independence  -                  Sit-Stand Transfer    Assistive Device (Sit-Stand Transfers) walker, front-wheeled  -                  Stand-Sit Transfer    Assistive Device (Stand-Sit Transfers) walker, front-wheeled  -AC                  Safety Issues, Functional Mobility    Impairments Affecting Function (Mobility) endurance/activity tolerance  -                  Balance    Balance Assessment sitting static balance;sitting dynamic balance;standing static balance;standing dynamic balance  -        Static Sitting Balance independent  -        Dynamic Sitting Balance independent  -        Position, Sitting Balance sitting edge of bed  -        Static Standing Balance modified independence  -        Dynamic Standing Balance modified [place]  -        Position/Device Used, Standing Balance walker, front-wheeled  -AC                  Wound 05/08/22 0655 Left lateral ankle Incision    Wound - Properties Group Placement Date: 05/08/22  -JR Placement Time: 0655 -JR Side: Left  -JR Orientation: lateral  -JR Location: ankle  -JR Primary Wound Type: Incision  -JR        Retired Wound -  Properties Group Placement Date: 05/08/22  -JR Placement Time: 0655 -JR Side: Left  -JR Orientation: lateral  -JR Location: ankle  -JR Primary Wound Type: Incision  -JR        Retired Wound - Properties Group Date first assessed: 05/08/22  -JR Time first assessed: 0655 -JR Side: Left  -JR Location: ankle  -JR Primary Wound Type: Incision  -JR                  Plan of Care Review    Plan of Care Reviewed With patient  -AC        Progress no change  -AC        Outcome Evaluation OT eval completed.  Pt was evaluated by this OT on 5/9/22 after L ankle fx and sx.  At the time of the 5/9 evaulation she needed verbal cues at times for deep breathing and her O2 sat fluctuated between 89% to 94% on room air.  Today pt presents on 1L nc with O2 sat 94%.  Removed O2 and evaluation was completed with pt on room air.  She is Ge[place] for bed mobility, transfers and ambulation.  She walked to BR and back with rw, maintaining NWB LLE well.  She likely needs Santosh for LB dressing although she could reach both feet well from seated position.  Educated pt on benefits of being OOB and up in chair, pursed lip breathing, and using incentive spirometer.  Post O2 sat today was 97% on room air.  Pt demonstrated understanding and no current needs for skilled OT were identified.  Pt is safe for discharge home with daughter when medically ready.  -AC                  Vital Signs    Pre SpO2 (%) 94  -AC        O2 Delivery Pre Treatment supplemental O2  1L  -AC        Post SpO2 (%) 97  -AC        O2 Delivery Post Treatment room air  -AC        Pre Patient Position Supine  -AC        Post Patient Position Sitting  -AC                  Positioning and Restraints    Pre-Treatment Position in bed  -AC        Post Treatment Position bed  -AC        In Bed sitting EOB;call light within reach;encouraged to call for assist;side rails up x2  -AC                  Therapy Assessment/Plan (OT)    OT Diagnosis decreased adl  -AC        Criteria for Skilled  Therapeutic Interventions Met (OT) no problems identified which require skilled intervention  -        Therapy Frequency (OT) evaluation only  -              User Key  (r) = Recorded By, (t) = Taken By, (c) = Cosigned By    Initials Name Effective Dates     Slade Green, OTR/L, BETZAIDA 04/09/19 -     Haleigh Granados RN 02/17/22 -                 Occupational Therapy Education                 Title: PT OT SLP Therapies (In Progress)     Topic: Occupational Therapy (In Progress)     Point: ADL training (Done)     Description:   Instruct learner(s) on proper safety adaptation and remediation techniques during self care or transfers.   Instruct in proper use of assistive devices.              Learning Progress Summary           Patient Acceptance, E,TB,D, VU by  at 5/12/2022 0743    Comment: deep breathing exercises, benefits of activity, safe transfers                   Point: Home exercise program (Not Started)     Description:   Instruct learner(s) on appropriate technique for monitoring, assisting and/or progressing therapeutic exercises/activities.              Learner Progress:  Not documented in this visit.          Point: Precautions (Not Started)     Description:   Instruct learner(s) on prescribed precautions during self-care and functional transfers.              Learner Progress:  Not documented in this visit.          Point: Body mechanics (Not Started)     Description:   Instruct learner(s) on proper positioning and spine alignment during self-care, functional mobility activities and/or exercises.              Learner Progress:  Not documented in this visit.                      User Key     Initials Effective Dates Name Provider Type Discipline     04/09/19 -  Slade Green, OTR/L, BETZAIDA Occupational Therapist OT                OT Recommendation and Plan  Therapy Frequency (OT): evaluation only  Plan of Care Review  Plan of Care Reviewed With: patient  Progress: no change  Outcome Evaluation:  OT eval completed.  Pt was evaluated by this OT on 5/9/22 after L ankle fx and sx.  At the time of the 5/9 evaulation she needed verbal cues at times for deep breathing and her O2 sat fluctuated between 89% to 94% on room air.  Today pt presents on 1L nc with O2 sat 94%.  Removed O2 and evaluation was completed with pt on room air.  She is [place] for bed mobility, transfers and ambulation.  She walked to BR and back with rw, maintaining NWB LLE well.  She likely needs Santosh for LB dressing although she could reach both feet well from seated position.  Educated pt on benefits of being OOB and up in chair, pursed lip breathing, and using incentive spirometer.  Post O2 sat today was 97% on room air.  Pt demonstrated understanding and no current needs for skilled OT were identified.  Pt is safe for discharge home with daughter when medically ready.  Plan of Care Reviewed With: patient  Outcome Evaluation: OT eval completed.  Pt was evaluated by this OT on 5/9/22 after L ankle fx and sx.  At the time of the 5/9 evaulation she needed verbal cues at times for deep breathing and her O2 sat fluctuated between 89% to 94% on room air.  Today pt presents on 1L nc with O2 sat 94%.  Removed O2 and evaluation was completed with pt on room air.  She is [place] for bed mobility, transfers and ambulation.  She walked to BR and back with rw, maintaining NWB LLE well.  She likely needs Santosh for LB dressing although she could reach both feet well from seated position.  Educated pt on benefits of being OOB and up in chair, pursed lip breathing, and using incentive spirometer.  Post O2 sat today was 97% on room air.  Pt demonstrated understanding and no current needs for skilled OT were identified.  Pt is safe for discharge home with daughter when medically ready.             Time Calculation:    Time Calculation- OT     Row Name 05/12/22 0744             Time Calculation- OT    OT Start Time 0700  -AC      OT Stop Time 0744  -AC      OT Time  Calculation (min) 44 min  -      OT Received On 05/12/22  -            User Key  (r) = Recorded By, (t) = Taken By, (c) = Cosigned By    Initials Name Provider Type    Slade Marrufo OTR/L, BETZAIDA Occupational Therapist                Therapy Charges for Today     Code Description Service Date Service Provider Modifiers Qty    [id number]  OT EVAL LOW COMPLEXITY 3 5/12/2022 Slade Green OTR/L, BETZAIDA GO 1               OT Discharge Summary  Anticipated Discharge Disposition (OT): home with assist  Reason for Discharge: At baseline function  Outcomes Achieved: Other  Discharge Destination: Home with assist    BACILIO Simeon/L, CNT  5/12/2022

## 2022-05-12 NOTE — PLAN OF CARE
Goal Outcome Evaluation:  Plan of Care Reviewed With: patient           Outcome Evaluation: PT eval complete. She is alert and oriented x 4. She is aware and maintains NWB L LE with all activity using RW. O2 sat was monitored with all activity. Pre gait in bed she was 99% on room air. With all gait she ambulated 40 ft and O2 sat stayed 92% on room air. She became very SOB and fatigued once back in room and required min assist to maintain balance. O2 sat still 92% on room air. She was then safely assisted back into sitting EOB where O2 sat was 97% and HR had increased to 130. PT will cont with gait, endurance/activity tolerance and stair training in preparation for d.c home with daughter 24/7 care.

## 2022-05-12 NOTE — DISCHARGE SUMMARY
[facility] Medicine Services  DISCHARGE SUMMARY       Date of Admission: 5/11/2022  Date of Discharge:  5/12/2022  Primary Care Physician: Teddy Sanchez MD    Presenting Problem/History of Present Illness:  Chest pain, unspecified type [R07.9]     Final Discharge Diagnoses:  Active Hospital Problems    Diagnosis    • Chest pain, unspecified type    • COPD with acute exacerbation (HCC)    • COPD exacerbation (HCC)    • Overweight (BMI 25.0-29.9)    • Lumbar stenosis    • Panlobular emphysema (HCC)    • PAD (peripheral artery disease) (HCC)    • Gastroesophageal reflux disease without esophagitis    • Chronic back pain        Consults: Nutrition .    Pertinent Test Results:   Results for orders placed during the hospital encounter of 05/11/22    Adult Stress Echo W/ Cont or Stress Agent if Necessary Per Protocol    Interpretation Summary  · Baseline ECG of normal sinus rhythm noted at rest. Non-specific ST-T wave changes noted.  · There was no clinically significant ST segment deviation noted during stress.  · Post stress wall motion appears to be normal.  · Low risk dobutamine stress echocardiogram with no significant ECG or echocardiographic evidence for ischemia.    .  Imaging Results (All)     Procedure Component Value Units Date/Time    CT Angiogram Chest [[id number]] Collected: 05/11/22 0712     Updated: 05/11/22 0719    Narrative:      EXAMINATION: CT ANGIOGRAM CHEST-      5/11/2022 6:46 AM CDT     HISTORY: Recent ankle surgery, new onset substernal chest pain with  dyspnea and tachycardia since yesterday afternoon.; R07.9-Chest pain,  unspecified     In order to have a CT radiation dose as low as reasonably achievable  Automated Exposure Control was utilized for adjustment of the mA and/or  KV according to patient size.     DLP in mGycm= 328.     CT angiogram chest.  CT angiography protocol.   CT imaging with bolus IV contrast injection.   Under concurrent supervision  axial, sagittal, coronal, and  three-dimensional data sets were constructed.     Left thyroid lobe cyst or nodule measures greater than 10 mm.  Follow-up thyroid ultrasound recommended.     Aortic calcification with no aneurysm.  No aortic dissection.     Heart size is at the upper limits of normal.  No mediastinal mass.     Symmetric and normally opacified pulmonary arteries.  No pulmonary embolism.     Mild bibasilar atelectasis.  Chronic lung changes with upper lobe emphysema.  No bullous disease.     No pneumothorax or pleural effusion.     Mild disc space narrowing and endplate spurring throughout the thoracic  spine.     Summary:  1. No pulmonary embolism.  2. Chronic lung changes with mild basilar atelectasis.                                         This report was finalized on 05/11/2022 07:16 by Dr. Venkat Cohen MD.        Chief Complaint on Day of Discharge: none    History of Present Illness on Day of Discharge:   Patient is a 60-year-old  female history of recent ankle surgery.  Patient presented ER with complaint of substernal chest pain radiating towards in all direction.  Patient states chest pain started yesterday at 230.  Chest pain was ranked 8 out of 10 then . chest pain increased with exertion and at night.  Patient does take Percocet for her recent ankle surgery patient stated his Percocet helped with the chest pain.  Patient currently ranks her chest pain is 2 out of 10 at this time.  Patient stated she had a stress test done in the past about 8 years ago.  Patient also have episode shortness of breath with deep breathing.  Patient just recently had left ankle surgery on 5/8/2022 by Dr. Aranda.  Patient denies any fever night sweats or chills.  Patient also has symptoms of shortness of breath.     Patient history of adenocarcinoma, arthritis, asthma, right lung cancer, COPD, chronic pain, depression, foramen stenosis of the lumbar sacral region, reflux, degenerative disc disease,  "peripheral vascular disease, chronic idiopathy.       Hospital Course:  The patient is a 62 y.o. female who presented to [facility] with COPD/chest pain/chronic pain/ankle fracture.      COPD exacerbation.  DuoNeb . Symbicort.  Incentive spirometer.  CTA of the chest-No pulmonary embolism, Chronic lung changes with mild basilar atelectasis.  Patient is currently on room air.  Patient does not usually take oxygen at home.  Patient does not qualify for home oxygen.     Chest pain.  Troponin x3 is negative.  BNP 1200.  Patient currently denies any chest pain.  Stress echo-  · Baseline ECG of normal sinus rhythm noted at rest. Non-specific ST-T wave changes noted.  · There was no clinically significant ST segment deviation noted during stress.  · Post stress wall motion appears to be normal.  · Low risk dobutamine stress echocardiogram with no significant ECG or echocardiographic evidence for ischemia.     Recent left ankle surgery by Dr. Aranda 5/8/2022.  Percocet as needed.  Neurontin.  Patient scheduled follow-up with Dr. Aranda this coming Monday outpatient.     Leukocytosis. Stable.  We will follow.     Anemia.  Anemia panel.  Elevated CMV.      Depression/anxiety.  Lexapro     Reflux.  Pepcid.  Zofran as needed.     History of cellulitis.  DC doxycycline due to GI side effects.     Prophylaxis.  Continue Xarelto.     Nutrition.  Vitamin D .     COVID-19-negative.     Vital signs stable, afebrile.  Plan to discharge patient home today.  Follow-up with orthopedics this coming Monday.  Follow-up with PCP within a week.    Condition on Discharge: stable    Physical Exam on Discharge:  /71 (BP Location: Right arm, Patient Position: Lying)   Pulse 89   Temp 98.3 °F (36.8 °C) (Oral)   Resp 18   Ht 160 cm (62.99\")   Wt 73.2 kg (161 lb 6.4 oz)   SpO2 95%   BMI 28.60 kg/m²   Physical Exam  Vitals and nursing note reviewed.   Constitutional:       Appearance: She is well-developed.   HENT:      Head: " Normocephalic.   Eyes:      Conjunctiva/sclera: Conjunctivae normal.      Pupils: Pupils are equal, round, and reactive to light.   Neck:      Vascular: No JVD.   Cardiovascular:      Rate and Rhythm: Normal rate and regular rhythm.      Heart sounds: Normal heart sounds. No murmur heard.    No friction rub. No gallop.   Pulmonary:      Effort: No respiratory distress.      Breath sounds: No wheezing or rales.      Comments: Diminished breath sound bilateral, clear.    Patient is currently on room air..  Chest:      Chest wall: No tenderness.   Abdominal:      General: Bowel sounds are normal. There is no distension.      Palpations: Abdomen is soft.      Tenderness: There is no abdominal tenderness. There is no guarding or rebound.   Musculoskeletal:         General: No tenderness or deformity.      Cervical back: Neck supple.      Comments: Left leg splint.  Skin:     General: Skin is warm and dry.      Capillary Refill: Capillary refill takes 2 to 3 seconds.      Findings: No rash.   Neurological:      General: No focal deficit present.      Mental Status: She is alert and oriented to person, place, and time.      Cranial Nerves: No cranial nerve deficit.      Motor: Weakness present. No abnormal muscle tone.      Coordination: Coordination abnormal.      Gait: Gait abnormal.      Deep Tendon Reflexes: Reflexes normal.   Psychiatric:         Behavior: Behavior normal.         Thought Content: Thought content normal.         Judgment: Judgment normal.     Discharge Disposition: Discharge home with family.  Home or Self Care    Discharge Medications:     Discharge Medications      New Medications      Instructions Start Date   famotidine 20 MG tablet  Commonly known as: Pepcid   20 mg, Oral, 2 Times Daily PRN         Changes to Medications      Instructions Start Date   oxyCODONE-acetaminophen  MG per tablet  Commonly known as: PERCOCET  What changed:   · how much to take  · when to take this   0.5 tablets,  Oral, Every 4 Hours PRN         Continue These Medications      Instructions Start Date   Breztri Aerosphere 160-9-4.8 MCG/ACT aerosol inhaler  Generic drug: Budeson-Glycopyrrol-Formoterol   2 puffs, Inhalation, 2 Times Daily      CALCIUM CITRATE PO   600 mg, Oral, 3 Times Daily      cholecalciferol 25 MCG (1000 UT) tablet  Commonly known as: VITAMIN D3   1,000 Units, Oral, Daily, 2,000 units at night      clotrimazole-betamethasone 1-0.05 % cream  Commonly known as: LOTRISONE   1 application, Topical, Daily PRN      escitalopram 20 MG tablet  Commonly known as: LEXAPRO   20 mg, Oral, Daily      fexofenadine-pseudoephedrine 180-240 MG per 24 hr tablet  Commonly known as: ALLEGRA-D 24   1 tablet, Oral, Daily      gabapentin 800 MG tablet  Commonly known as: NEURONTIN   800 mg, Oral, 4 Times Daily, Takes 800mg at 5am and 1pm and 1600mg at 9pm      PROAIR HFA IN   2 puffs, Inhalation, 4 Times Daily PRN      Xarelto 10 MG tablet  Generic drug: rivaroxaban   10 mg, Oral, Daily         Stop These Medications    doxycycline 100 MG capsule  Commonly known as: MONODOX     nexIUM 40 MG capsule  Generic drug: esomeprazole            Discharge Diet:   Diet Instructions     Advance Diet As Tolerated            Activity at Discharge:   Activity Instructions     Activity as Tolerated            Discharge Care Plan/Instructions: Discharged home family .    Follow-up Appointments:   Future Appointments   Date Time Provider Department Center   8/22/2022 10:45 AM THERAPIST [facility]   8/22/2022 11:00 AM Amanda Moreno APRN [facility]   Follow with PCP 1 week.  Follow-up with orthopedic surgeon this coming Monday.    Electronically signed by Kofi Andrade MD, 05/12/22, 10:25 CDT.    Time: Greater than 30 minutes.

## 2022-05-12 NOTE — PROGRESS NOTES
Assessment tool to be used for patients with existing breathing treatments ordered by hospitalist                               Respiratory Therapist Driven Protocol - RT to Assess and Treat Algorithm    Item 0 Points 1 Point 2 Points 3 Points 4 Points Subtotal   Mental Status Alert, orientated, cooperative Lethargic, follows commands Confused, not following commands Obtunded or Somnolent Comatose 0   Respiratory Pattern Regular RR  8-16 breaths/minute Increased RR  18-25 breaths/minute Dyspnea on exertion, irregular RR  26-30/minute Shortness of breath,  RR 31-35/minute Accessory muscle use, severe SOB  RR > 35/minute 1   Breath Sounds Clear Decreased unilaterally Decreased bilaterally Basilar crackles Wheezing and/or rhonchi 0   Cough Strong, spontaneous non-productive Strong productive Weak, non-productive Weak productive or weak with rhonchi Absent or may require suctioning 0   Pulmonary Status Nonsmoker or no previous history > 1 year quit < 1 PPD  < 1 year quit >  or = 1 PPD Diagnosed pulmonary disease (severe or chronic) Severe or chronic pulmonary disease with exacerbation 4   Surgical Status None General surgery (non-abdominal or non-thoracic) Lower abdominal Thoracic or upper abdominal Thoracic with pulmonary disease 1   Chest X-ray Clear Chronic changes Infiltrates, atelectasis or pleural effusion Infiltrates > 1 lobe Diffuse infiltrates and atelectasis and/or effusions    Activity level Ambulatory Ambulatory with assistance Non-ambulatory Paraplegic Quadriplegic             1         Total Score 7   Score    Drug Therapy Frequency  20 or >    Q4 Duoneb & Q3 Albuterol PRN 15 - 19     Q6 Duoneb & Q4 Albuterol PRN 10 - 14    QID Duoneb & Q4 Albuterol PRN 5 - 9    TID Duoneb & Q6 Albuterol PRN 0 - 4    Q4 PRN Duoneb or Q4 PRN Albuterol    Incentive Spirometry - Initial RT instruct    Lung Expansion Therapy (PEP) Bronchopulmonary Hygiene (CPT)   Q4 & PRN - Severe atelectasis, poor  oxygenation Q4 - copious secretions, dyspnea, unable to sleep, mucus plugging   QID - High risk for persistent atelectasis, existence of atelectasis QID & Q4 PRN - Moderate secretion production   TID - At risk for developing atelectasis TID - small amounts of secretions with poor cough   BID - prevention of atelectasis BID - unable to breathe deeply and cough spontaneously   *RT Protocol patients will be re-assessed/re-evaluated every 48 hours.    *Patients who are home nebulizer treatments will be protocoled to no less than their home regimen and will remain     on their home regimen with re-evaluations as needed with changes in patient condition.    RT Comments/Recommendations:   Will continue with current treatment regiment of Duoneb 4 time a day ane reevaluate pt in 48 hrs per RT protocol.

## 2022-05-12 NOTE — PLAN OF CARE
Goal Outcome Evaluation:  Plan of Care Reviewed With: patient        Progress: declining  Outcome Evaluation: BP stable, O2 sats dropped to 84% with sleep, increased 02 sto 2.5 L to get to 90%, prn pain medication given x2 for leg and generalized pain, S-ST  on tele.

## 2022-05-12 NOTE — PLAN OF CARE
Goal Outcome Evaluation:  Plan of Care Reviewed With: patient        Progress: no change  Outcome Evaluation: OT eval completed.  Pt was evaluated by this OT on 5/9/22 after L ankle fx and sx.  At the time of the 5/9 evaulation she needed verbal cues at times for deep breathing and her O2 sat fluctuated between 89% to 94% on room air.  Today pt presents on 1L nc with O2 sat 94%.  Removed O2 and evaluation was completed with pt on room air.  She is Geovanna for bed mobility, transfers and ambulation.  She walked to BR and back with rw, maintaining NWB LLE well.  She likely needs Santosh for LB dressing although she could reach both feet well from seated position.  Educated pt on benefits of being OOB and up in chair, pursed lip breathing, and using incentive spirometer.  Post O2 sat today was 97% on room air.  Pt demonstrated understanding and no current needs for skilled OT were identified.  Pt is safe for discharge home with daughter when medically ready.

## 2022-05-13 NOTE — OUTREACH NOTE
Prep Survey    Flowsheet Row Responses   [facility] facility patient discharged from? [place]   Is LACE score < 7 ? No   Emergency Room discharge w/ pulse ox? No   Eligibility Readm Mgmt   Discharge diagnosis Chest pain, COPD with acute exacerbation    Does the patient have one of the following disease processes/diagnoses(primary or secondary)? COPD/Pneumonia   Does the patient have Home health ordered? No   Is there a DME ordered? No   Prep survey completed? Yes          CRISTO CHANEL - Registered Nurse

## 2022-05-18 ENCOUNTER — READMISSION MANAGEMENT (OUTPATIENT)
Dept: CALL CENTER | Facility: HOSPITAL | Age: 62
End: 2022-05-18

## 2022-05-18 ENCOUNTER — OFFICE VISIT (OUTPATIENT)
Dept: FAMILY MEDICINE CLINIC | Age: 62
End: 2022-05-18
Payer: MEDICARE

## 2022-05-18 VITALS
DIASTOLIC BLOOD PRESSURE: 84 MMHG | SYSTOLIC BLOOD PRESSURE: 136 MMHG | HEIGHT: 63 IN | BODY MASS INDEX: 27.29 KG/M2 | WEIGHT: 154 LBS | HEART RATE: 92 BPM | OXYGEN SATURATION: 96 % | TEMPERATURE: 97.4 F

## 2022-05-18 DIAGNOSIS — R07.9 CHEST PAIN, UNSPECIFIED TYPE: Primary | ICD-10-CM

## 2022-05-18 DIAGNOSIS — S82.892D CLOSED FRACTURE OF LEFT ANKLE WITH ROUTINE HEALING, SUBSEQUENT ENCOUNTER: ICD-10-CM

## 2022-05-18 DIAGNOSIS — E04.1 THYROID NODULE: ICD-10-CM

## 2022-05-18 PROCEDURE — 3017F COLORECTAL CA SCREEN DOC REV: CPT | Performed by: FAMILY MEDICINE

## 2022-05-18 PROCEDURE — G8419 CALC BMI OUT NRM PARAM NOF/U: HCPCS | Performed by: FAMILY MEDICINE

## 2022-05-18 PROCEDURE — 99214 OFFICE O/P EST MOD 30 MIN: CPT | Performed by: FAMILY MEDICINE

## 2022-05-18 PROCEDURE — G8427 DOCREV CUR MEDS BY ELIG CLIN: HCPCS | Performed by: FAMILY MEDICINE

## 2022-05-18 PROCEDURE — 1036F TOBACCO NON-USER: CPT | Performed by: FAMILY MEDICINE

## 2022-05-18 RX ORDER — RIVAROXABAN 10 MG/1
TABLET, FILM COATED ORAL
COMMUNITY
Start: 2022-05-09

## 2022-05-18 ASSESSMENT — ENCOUNTER SYMPTOMS
ALLERGIC/IMMUNOLOGIC NEGATIVE: 1
RESPIRATORY NEGATIVE: 1
GASTROINTESTINAL NEGATIVE: 1
EYES NEGATIVE: 1

## 2022-05-18 NOTE — PROGRESS NOTES
facility-administered medications for this visit. Allergies   Allergen Reactions    Cephalexin Other (See Comments)     Other reaction(s): blisters in mouth and nose  Blisters to nose and mouth  Blisters to nose and mouth      Aspirin Other (See Comments) and Nausea And Vomiting     vomiting  Unknown reaction      Nitrofurantoin Nausea And Vomiting    Creatine Monohydrate Other (See Comments)       Review of Systems   Constitutional: Negative. HENT: Negative. Eyes: Negative. Respiratory: Negative. Cardiovascular: Negative. Gastrointestinal: Negative. Endocrine: Negative. Genitourinary: Negative. Musculoskeletal: Positive for arthralgias. Skin: Negative. Allergic/Immunologic: Negative. Neurological: Negative. Hematological: Negative. Psychiatric/Behavioral: Negative. OBJECTIVE:    Physical Exam  Constitutional:       Appearance: Normal appearance. She is well-developed and well-groomed. HENT:      Head: Normocephalic and atraumatic. Right Ear: Tympanic membrane, ear canal and external ear normal. There is no impacted cerumen. Left Ear: Tympanic membrane, ear canal and external ear normal. There is no impacted cerumen. Nose: Nose normal.      Mouth/Throat:      Lips: Pink. Mouth: Mucous membranes are moist.      Dentition: Normal dentition. Pharynx: Oropharynx is clear. Uvula midline. Eyes:      General: Lids are normal.         Right eye: No discharge. Left eye: No discharge. Extraocular Movements: Extraocular movements intact. Conjunctiva/sclera: Conjunctivae normal.      Right eye: Right conjunctiva is not injected. Left eye: Left conjunctiva is not injected. Pupils: Pupils are equal, round, and reactive to light. Neck:      Thyroid: No thyromegaly. Vascular: No carotid bruit or JVD. Cardiovascular:      Rate and Rhythm: Normal rate and regular rhythm. Pulses: Normal pulses.            Carotid pulses are 2+ on the right side and 2+ on the left side. Radial pulses are 2+ on the right side and 2+ on the left side. Heart sounds: Normal heart sounds, S1 normal and S2 normal. No murmur heard. Pulmonary:      Effort: Pulmonary effort is normal.      Breath sounds: Normal breath sounds. Chest:   Breasts:      Right: No supraclavicular adenopathy. Left: No supraclavicular adenopathy. Abdominal:      General: Bowel sounds are normal. There is no distension or abdominal bruit. Palpations: Abdomen is soft. There is no mass. Hernia: No hernia is present. Musculoskeletal:         General: Normal range of motion. Cervical back: Full passive range of motion without pain, normal range of motion and neck supple. Right lower leg: No edema. Left lower leg: No edema. Comments: Left ankle boot  Would remove and she have X looking sutures with erythema around the margins without evidence of active infection she is does have some swelling   Lymphadenopathy:      Cervical: No cervical adenopathy. Right cervical: No superficial cervical adenopathy. Left cervical: No superficial cervical adenopathy. Upper Body:      Right upper body: No supraclavicular adenopathy. Left upper body: No supraclavicular adenopathy. Skin:     General: Skin is warm and dry. Coloration: Skin is not pale. Findings: No lesion or rash. Nails: There is no clubbing. Neurological:      Mental Status: She is alert and oriented to person, place, and time. Motor: No weakness or tremor. Coordination: Coordination normal.      Deep Tendon Reflexes: Reflexes are normal and symmetric. Psychiatric:         Attention and Perception: Attention normal.         Mood and Affect: Mood normal.         Speech: Speech normal.         Behavior: Behavior normal. Behavior is cooperative. Thought Content:  Thought content normal.         Cognition and Memory: Cognition and memory normal.         Judgment: Judgment normal.        /84 (Site: Right Upper Arm, Position: Sitting, Cuff Size: Medium Adult)   Pulse 92   Temp 97.4 °F (36.3 °C) (Temporal)   Ht 5' 3\" (1.6 m)   Wt 154 lb (69.9 kg)   SpO2 96%   BMI 27.28 kg/m²      ASSESSMENT:     Diagnosis Orders   1. Chest pain, unspecified typeresolved    2. Thyroid nodule does need work-up US THYROID   3. Closed fracture of left ankle with routine healing, subsequent encounter follow with Ortho         PLAN:    1. Record from [facility] reviewed today. No further interventions. 2.  Ultrasound of thyroid. ENT consult as needed  3. Leg elevation. Follow-up with Ortho.   Follow-up after test

## 2022-05-18 NOTE — OUTREACH NOTE
COPD/PN Week 1 Survey    Flowsheet Row Responses   [facility] patient discharged from? [place]   Does the patient have one of the following disease processes/diagnoses(primary or secondary)? COPD/Pneumonia   Was the primary reason for admission: COPD exacerbation   Week 1 attempt successful? Yes   Call start time 1041   Call end time 1043   Discharge diagnosis Chest pain, COPD with acute exacerbation    Is patient permission given to speak with other caregiver? Yes   List who call center can speak with dtr   Meds reviewed with patient/caregiver? Yes   Is the patient having any side effects they believe may be caused by any medication additions or changes? No   Does the patient have all medications ordered at discharge? Yes   Is the patient taking all medications as directed (includes completed medication regime)? Yes   Does the patient have a primary care provider?  Yes   Does the patient have an appointment with their PCP or specialist within 7 days of discharge? Yes   Comments regarding PCP f/u with PCP 5-18   Has the patient kept scheduled appointments due by today? N/A   What DME was ordered? W/c, knee and BSC per [facility]    Has all DME been delivered? Yes   DME comments Using nebs at home and inhalers.   Pulse Ox monitoring None   Psychosocial issues? No   Comments Denies issues, breathing is back to baseline, she reports.    Did the patient receive a copy of their discharge instructions? Yes   Nursing interventions Reviewed instructions with patient   What is the patient's perception of their health status since discharge? Returned to baseline/stable   Nursing Interventions Nurse provided patient education   Is the patient/caregiver able to teach back the hierarchy of who to call/visit for symptoms/problems? PCP, Specialist, Home health nurse, Urgent Care, ED, 911 Yes   Patient reports what zone on this call? Green Zone   Green Zone Reports doing well, Breathing without shortness of breath, Usual  activity and exercise level   Green Zone interventions: Continue regular exercise/diet plan   Week 1 call completed? Yes   Revoked No further contact(revokes)-requires comment   Is the patient interested in additional calls from an ambulatory ?  NOTE:  applies to high risk patients requiring additional follow-up. No   Graduated/Revoked comments belle NAIR - Registered Nurse

## 2022-05-27 ENCOUNTER — HOSPITAL ENCOUNTER (OUTPATIENT)
Dept: GENERAL RADIOLOGY | Age: 62
Discharge: HOME | End: 2022-05-27
Payer: MEDICARE

## 2022-05-27 DIAGNOSIS — E04.1 THYROID NODULE: ICD-10-CM

## 2022-05-27 PROCEDURE — 76536 US EXAM OF HEAD AND NECK: CPT

## 2022-06-28 ENCOUNTER — OFFICE VISIT (OUTPATIENT)
Dept: FAMILY MEDICINE CLINIC | Age: 62
End: 2022-06-28
Payer: MEDICARE

## 2022-06-28 VITALS
HEART RATE: 94 BPM | DIASTOLIC BLOOD PRESSURE: 78 MMHG | BODY MASS INDEX: 26.58 KG/M2 | OXYGEN SATURATION: 98 % | SYSTOLIC BLOOD PRESSURE: 144 MMHG | HEIGHT: 63 IN | TEMPERATURE: 97.2 F | WEIGHT: 150 LBS

## 2022-06-28 DIAGNOSIS — M54.50 CHRONIC MIDLINE LOW BACK PAIN, UNSPECIFIED WHETHER SCIATICA PRESENT: Primary | ICD-10-CM

## 2022-06-28 DIAGNOSIS — G89.29 CHRONIC MIDLINE LOW BACK PAIN, UNSPECIFIED WHETHER SCIATICA PRESENT: Primary | ICD-10-CM

## 2022-06-28 PROCEDURE — G8419 CALC BMI OUT NRM PARAM NOF/U: HCPCS | Performed by: FAMILY MEDICINE

## 2022-06-28 PROCEDURE — 99213 OFFICE O/P EST LOW 20 MIN: CPT | Performed by: FAMILY MEDICINE

## 2022-06-28 PROCEDURE — 3017F COLORECTAL CA SCREEN DOC REV: CPT | Performed by: FAMILY MEDICINE

## 2022-06-28 PROCEDURE — G8427 DOCREV CUR MEDS BY ELIG CLIN: HCPCS | Performed by: FAMILY MEDICINE

## 2022-06-28 PROCEDURE — 1036F TOBACCO NON-USER: CPT | Performed by: FAMILY MEDICINE

## 2022-06-28 ASSESSMENT — ENCOUNTER SYMPTOMS
EYES NEGATIVE: 1
ALLERGIC/IMMUNOLOGIC NEGATIVE: 1
RESPIRATORY NEGATIVE: 1
BACK PAIN: 1
GASTROINTESTINAL NEGATIVE: 1

## 2022-06-28 NOTE — PROGRESS NOTES
SUBJECTIVE:    Patient ID: Aide Klein is a 58 y. o.female. HPI:   Patient here for follow-up of chronic back pain  Patient is 78-year-old white female she have chronic back pain and neuropathy. She takes Percocet. She was taking her medications according to her somehow she got discharged from Dr. David Yuong office. She is in the process of going to [place] [facility] anyway because she wants to get a pain pump. Request referral to a different pain management group. Past Medical History:   Diagnosis Date    Anxiety     Cancer [facility])     adenocarcirnoma right lung, abdomen, pelvis.      Chronic back pain     COPD [facility]ch[facility] obstructive [facility]) ([facility])     Neuropathy     Osteoarthritis     Restless legs syndrome       Current Outpatient Medications   Medication Sig Dispense Refill    XARELTO 10 MG TABS tablet       naloxone 4 MG/0.1ML LIQD nasal spray       Cholecalciferol (VITAMIN D3) 50 MCG (2000 UT) CAPS Take by mouth      Budeson-Glycopyrrol-Formoterol (BREZTRI AEROSPHERE) 160-9-4.8 MCG/ACT AERO Inhale 2 puffs into the lungs in the morning and at bedtime 1 each 5    esomeprazole (NEXIUM) 40 MG delayed release capsule Take 1 capsule by mouth every morning (before breakfast) 30 capsule 5    albuterol sulfate HFA (PROAIR HFA) 108 (90 Base) MCG/ACT inhaler ProAir HFA 90 mcg/actuation aerosol inhaler      Calcium Citrate-Vitamin D 200-250 MG-UNIT TABS Take 1 tablet by mouth 2 times daily (with meals)      escitalopram (LEXAPRO) 20 MG tablet escitalopram 20 mg tablet      gabapentin (NEURONTIN) 800 MG tablet gabapentin 800 mg tablet      oxyCODONE-acetaminophen (PERCOCET)  MG per tablet oxycodone-acetaminophen 10 mg-325 mg tablet      Rhubarb (ESTROVEN COMPLETE) 4 MG TABS Take by mouth      albuterol (PROVENTIL) (2.5 MG/3ML) 0.083% nebulizer solution Take 3 mLs by nebulization every 6 hours as needed for Wheezing 120 each 3     No current facility-administered medications for this visit. Allergies   Allergen Reactions    Cephalexin Other (See Comments)     Other reaction(s): blisters in mouth and nose  Blisters to nose and mouth  Blisters to nose and mouth      Aspirin Other (See Comments) and Nausea And Vomiting     vomiting  Unknown reaction      Nitrofurantoin Nausea And Vomiting    Creatine Monohydrate Other (See Comments)       Review of Systems   Constitutional: Negative. HENT: Negative. Eyes: Negative. Respiratory: Negative. Cardiovascular: Negative. Gastrointestinal: Negative. Endocrine: Negative. Genitourinary: Negative. Musculoskeletal: Positive for arthralgias and back pain. Skin: Negative. Allergic/Immunologic: Negative. Neurological: Negative. Hematological: Negative. Psychiatric/Behavioral: Negative. OBJECTIVE:    Physical Exam  Constitutional:       Appearance: Normal appearance. She is well-developed and well-groomed. HENT:      Head: Normocephalic and atraumatic. Right Ear: Tympanic membrane, ear canal and external ear normal. There is no impacted cerumen. Left Ear: Tympanic membrane, ear canal and external ear normal. There is no impacted cerumen. Nose: Nose normal.      Mouth/Throat:      Lips: Pink. Mouth: Mucous membranes are moist.      Dentition: Normal dentition. Pharynx: Oropharynx is clear. Uvula midline. Eyes:      General: Lids are normal.         Right eye: No discharge. Left eye: No discharge. Extraocular Movements: Extraocular movements intact. Conjunctiva/sclera: Conjunctivae normal.      Right eye: Right conjunctiva is not injected. Left eye: Left conjunctiva is not injected. Pupils: Pupils are equal, round, and reactive to light. Neck:      Thyroid: No thyromegaly. Vascular: No carotid bruit or JVD. Cardiovascular:      Rate and Rhythm: Normal rate and regular rhythm. Pulses: Normal pulses.            Carotid pulses are 2+ on the right side and 2+ on the left side. Radial pulses are 2+ on the right side and 2+ on the left side. Heart sounds: Normal heart sounds, S1 normal and S2 normal. No murmur heard. Pulmonary:      Effort: Pulmonary effort is normal.      Breath sounds: Normal breath sounds. Chest:   Breasts:      Right: No supraclavicular adenopathy. Left: No supraclavicular adenopathy. Abdominal:      General: Bowel sounds are normal. There is no distension or abdominal bruit. Palpations: Abdomen is soft. There is no mass. Hernia: No hernia is present. Musculoskeletal:         General: Normal range of motion. Cervical back: Full passive range of motion without pain, normal range of motion and neck supple. Right lower leg: No edema. Left lower leg: No edema. Comments: Left ankle boot  Walk with a walker   Lymphadenopathy:      Cervical: No cervical adenopathy. Right cervical: No superficial cervical adenopathy. Left cervical: No superficial cervical adenopathy. Upper Body:      Right upper body: No supraclavicular adenopathy. Left upper body: No supraclavicular adenopathy. Skin:     General: Skin is warm and dry. Coloration: Skin is not pale. Findings: No lesion or rash. Nails: There is no clubbing. Neurological:      Mental Status: She is alert and oriented to person, place, and time. Motor: No weakness or tremor. Coordination: Coordination normal.      Deep Tendon Reflexes: Reflexes are normal and symmetric. Psychiatric:         Attention and Perception: Attention normal.         Mood and Affect: Mood normal.         Speech: Speech normal.         Behavior: Behavior normal. Behavior is cooperative. Thought Content:  Thought content normal.         Cognition and Memory: Cognition and memory normal.         Judgment: Judgment normal.        BP (!) 144/78 (Site: Left Upper Arm, Position: Sitting, Cuff Size: Medium Adult)   Pulse 94   Temp 97.2 °F (36.2 °C) (Temporal)   Ht 5' 3\" (1.6 m)   Wt 150 lb (68 kg)   SpO2 98%   BMI 26.57 kg/m²      ASSESSMENT:     Diagnosis Orders   1. Chronic midline low back pain, unspecified whether sciatica present  External Referral To Pain Clinic        PLAN:    1.   Refer to to pain management

## 2022-08-01 PROBLEM — J44.1 COPD WITH ACUTE EXACERBATION: Status: RESOLVED | Noted: 2022-05-11 | Resolved: 2022-08-01

## 2022-08-01 PROBLEM — J44.1 COPD EXACERBATION (HCC): Status: RESOLVED | Noted: 2022-05-11 | Resolved: 2022-08-01

## 2022-08-10 ENCOUNTER — TRANSCRIBE ORDERS (OUTPATIENT)
Dept: ADMINISTRATIVE | Facility: HOSPITAL | Age: 62
End: 2022-08-10

## 2022-08-10 DIAGNOSIS — S82.62XG CLOSED DISPLACED FRACTURE OF LATERAL MALLEOLUS OF LEFT FIBULA WITH DELAYED HEALING: Primary | ICD-10-CM

## 2022-08-19 ENCOUNTER — HOSPITAL ENCOUNTER (OUTPATIENT)
Dept: CT IMAGING | Facility: HOSPITAL | Age: 62
Discharge: HOME | End: 2022-08-19
Admitting: PHYSICIAN ASSISTANT

## 2022-08-19 DIAGNOSIS — S82.62XG CLOSED DISPLACED FRACTURE OF LATERAL MALLEOLUS OF LEFT FIBULA WITH DELAYED HEALING: ICD-10-CM

## 2022-08-19 PROCEDURE — 73700 CT LOWER EXTREMITY W/O DYE: CPT

## 2022-09-12 RX ORDER — BUDESONIDE, GLYCOPYRROLATE, AND FORMOTEROL FUMARATE 160; 9; 4.8 UG/1; UG/1; UG/1
AEROSOL, METERED RESPIRATORY (INHALATION)
Qty: 10.7 G | Refills: 5 | Status: SHIPPED | OUTPATIENT
Start: 2022-09-12

## 2022-10-06 RX ORDER — ESOMEPRAZOLE MAGNESIUM 40 MG/1
40 CAPSULE, DELAYED RELEASE ORAL
Qty: 30 CAPSULE | Refills: 5 | Status: SHIPPED | OUTPATIENT
Start: 2022-10-06

## 2022-10-07 RX ORDER — ESOMEPRAZOLE MAGNESIUM 40 MG/1
CAPSULE, DELAYED RELEASE ORAL
Qty: 30 CAPSULE | Refills: 0 | Status: SHIPPED | OUTPATIENT
Start: 2022-10-07

## 2022-10-10 RX ORDER — ALBUTEROL SULFATE 90 UG/1
AEROSOL, METERED RESPIRATORY (INHALATION)
Qty: 8.5 G | Refills: 5 | Status: SHIPPED | OUTPATIENT
Start: 2022-10-10

## 2022-10-11 ENCOUNTER — TELEPHONE (OUTPATIENT)
Dept: FAMILY MEDICINE CLINIC | Age: 62
End: 2022-10-11

## 2022-10-11 DIAGNOSIS — E55.9 VITAMIN D DEFICIENCY: ICD-10-CM

## 2022-10-11 DIAGNOSIS — I10 PRIMARY HYPERTENSION: ICD-10-CM

## 2022-10-11 DIAGNOSIS — E78.5 HYPERLIPIDEMIA, UNSPECIFIED HYPERLIPIDEMIA TYPE: Primary | ICD-10-CM

## 2022-10-11 DIAGNOSIS — F41.9 ANXIETY: ICD-10-CM

## 2022-10-11 NOTE — TELEPHONE ENCOUNTER
----- Message from [facility] sent at 10/4/2022 10:18 AM CDT -----  Subject: Referral Request    Reason for referral request? Will is wanting orders for bloodwork. She   said she has lost a lot of weight and she used to have cancer so she wants   to check her blood. Call pt when bloodwork orders are ready. She would   like the orders sent to [facility] in [facility]. Provider patient wants to be referred to(if known): Juan Miguel Drake    Provider Phone Number(if known):     Additional Information for Provider?   ---------------------------------------------------------------------------  --------------  [id number] [facility]    [phone]; OK to leave message on voicemail  ---------------------------------------------------------------------------  --------------

## 2022-11-02 ENCOUNTER — OFFICE VISIT (OUTPATIENT)
Dept: FAMILY MEDICINE CLINIC | Age: 62
End: 2022-11-02
Payer: MEDICARE

## 2022-11-02 VITALS
WEIGHT: 141 LBS | DIASTOLIC BLOOD PRESSURE: 76 MMHG | TEMPERATURE: 97 F | BODY MASS INDEX: 24.98 KG/M2 | OXYGEN SATURATION: 98 % | SYSTOLIC BLOOD PRESSURE: 126 MMHG | HEART RATE: 86 BPM | HEIGHT: 63 IN

## 2022-11-02 DIAGNOSIS — Z23 NEED FOR INFLUENZA VACCINATION: ICD-10-CM

## 2022-11-02 DIAGNOSIS — Z12.11 COLON CANCER SCREENING: ICD-10-CM

## 2022-11-02 DIAGNOSIS — D64.9 ANEMIA, UNSPECIFIED TYPE: ICD-10-CM

## 2022-11-02 DIAGNOSIS — J44.9 CHRONIC OBSTRUCTIVE PULMONARY DISEASE, UNSPECIFIED COPD TYPE (HCC): ICD-10-CM

## 2022-11-02 DIAGNOSIS — R63.4 WEIGHT LOSS: Primary | ICD-10-CM

## 2022-11-02 LAB
FOLATE: >20 NG/ML (ref 4.8–37.3)
IRON SATURATION: 30 % (ref 14–50)
IRON: 73 UG/DL (ref 37–145)
TOTAL IRON BINDING CAPACITY: 247 UG/DL (ref 250–400)
VITAMIN B-12: 499 PG/ML (ref 211–946)

## 2022-11-02 PROCEDURE — 3078F DIAST BP <80 MM HG: CPT | Performed by: FAMILY MEDICINE

## 2022-11-02 PROCEDURE — 99214 OFFICE O/P EST MOD 30 MIN: CPT | Performed by: FAMILY MEDICINE

## 2022-11-02 PROCEDURE — 90674 CCIIV4 VAC NO PRSV 0.5 ML IM: CPT | Performed by: FAMILY MEDICINE

## 2022-11-02 PROCEDURE — G8420 CALC BMI NORM PARAMETERS: HCPCS | Performed by: FAMILY MEDICINE

## 2022-11-02 PROCEDURE — G8427 DOCREV CUR MEDS BY ELIG CLIN: HCPCS | Performed by: FAMILY MEDICINE

## 2022-11-02 PROCEDURE — 3017F COLORECTAL CA SCREEN DOC REV: CPT | Performed by: FAMILY MEDICINE

## 2022-11-02 PROCEDURE — 1036F TOBACCO NON-USER: CPT | Performed by: FAMILY MEDICINE

## 2022-11-02 PROCEDURE — 3074F SYST BP LT 130 MM HG: CPT | Performed by: FAMILY MEDICINE

## 2022-11-02 PROCEDURE — G8482 FLU IMMUNIZE ORDER/ADMIN: HCPCS | Performed by: FAMILY MEDICINE

## 2022-11-02 PROCEDURE — G0008 ADMIN INFLUENZA VIRUS VAC: HCPCS | Performed by: FAMILY MEDICINE

## 2022-11-02 PROCEDURE — 3023F SPIROM DOC REV: CPT | Performed by: FAMILY MEDICINE

## 2022-11-02 ASSESSMENT — ENCOUNTER SYMPTOMS
ALLERGIC/IMMUNOLOGIC NEGATIVE: 1
RESPIRATORY NEGATIVE: 1
EYES NEGATIVE: 1
GASTROINTESTINAL NEGATIVE: 1

## 2022-11-02 NOTE — PROGRESS NOTES
SUBJECTIVE:    Patient ID: Ousmane Bonilla is a 58 y. o.female. HPI:   Patient here for evaluation of weight loss. Patient is a 60-year-old female she complains of weight loss. Looks like she have lost close to 10 pounds in the last 3 months. She says she has been eating. She have history of adenocarcinoma. Denies any pains. She feels fine. She had blood work that was essentially unremarkable except for mild anemia. According to her she feels fine. She have history of COPD. She no longer smokes. She have a strong family history of cancer. Past Medical History:   Diagnosis Date    Anxiety     Cancer ([facility] [address]     adenocarcirnoma right lung, abdomen, pelvis.      Chronic back pain     COPD (chronic obstructive pulmonary disease) (HCC)     Neuropathy     Osteoarthritis     Restless legs syndrome       Current Outpatient Medications   Medication Sig Dispense Refill    albuterol sulfate HFA (PROVENTIL;VENTOLIN;PROAIR) 108 (90 Base) MCG/ACT inhaler INHALE 2 PUFFS BY MOUTH EVERY 4 TO 6 HOURS AS NEEDED, RESCUE INHALER 8.5 g 5    esomeprazole (NEXIUM) 40 MG delayed release capsule TAKE ONE CAPSULE BY MOUTH EVERY DAY IN THE MORNING BEFORE BREAKFAST 30 capsule 0    esomeprazole (NEXIUM) 40 MG delayed release capsule Take 1 capsule by mouth every morning (before breakfast) 30 capsule 5    BREZTRI AEROSPHERE 160-9-4.8 MCG/ACT AERO INHALE TWO PUFFS BY MOUTH TWICE A DAY (MORNING AND BEDTIME) 10.7 g 5    XARELTO 10 MG TABS tablet       naloxone 4 MG/0.1ML LIQD nasal spray       Cholecalciferol (VITAMIN D3) 50 MCG (2000 UT) CAPS Take by mouth      Calcium Citrate-Vitamin D 200-250 MG-UNIT TABS Take 1 tablet by mouth 2 times daily (with meals)      escitalopram (LEXAPRO) 20 MG tablet escitalopram 20 mg tablet      gabapentin (NEURONTIN) 800 MG tablet gabapentin 800 mg tablet      oxyCODONE-acetaminophen (PERCOCET)  MG per tablet oxycodone-acetaminophen 10 mg-325 mg tablet      Rhubarb (ESTROVEN COMPLETE) 4 MG TABS Thyroid: No thyromegaly. Vascular: No carotid bruit or JVD. Cardiovascular:      Rate and Rhythm: Normal rate and regular rhythm. Pulses: Normal pulses. Carotid pulses are 2+ on the right side and 2+ on the left side. Radial pulses are 2+ on the right side and 2+ on the left side. Heart sounds: Normal heart sounds, S1 normal and S2 normal. No murmur heard. Pulmonary:      Effort: Pulmonary effort is normal.      Breath sounds: Normal breath sounds. Abdominal:      General: Bowel sounds are normal. There is no distension or abdominal bruit. Palpations: Abdomen is soft. There is no mass. Hernia: No hernia is present. Musculoskeletal:         General: Normal range of motion. Cervical back: Full passive range of motion without pain, normal range of motion and neck supple. Right lower leg: No edema. Left lower leg: No edema. Comments: Left ankle brace  Walk with a cane   Lymphadenopathy:      Cervical: No cervical adenopathy. Right cervical: No superficial cervical adenopathy. Left cervical: No superficial cervical adenopathy. Upper Body:      Right upper body: No supraclavicular adenopathy. Left upper body: No supraclavicular adenopathy. Skin:     General: Skin is warm and dry. Coloration: Skin is not pale. Findings: No lesion or rash. Nails: There is no clubbing. Neurological:      Mental Status: She is alert and oriented to person, place, and time. Motor: No weakness or tremor. Coordination: Coordination normal.      Deep Tendon Reflexes: Reflexes are normal and symmetric. Psychiatric:         Attention and Perception: Attention normal.         Mood and Affect: Mood normal.         Speech: Speech normal.         Behavior: Behavior normal. Behavior is cooperative. Thought Content:  Thought content normal.         Cognition and Memory: Cognition and memory normal.         Judgment: Judgment normal.      /76 (Site: Left Upper Arm, Position: Sitting, Cuff Size: Medium Adult)   Pulse 86   Temp 97 °F (36.1 °C) (Temporal)   Ht 5' 3\" (1.6 m)   Wt 141 lb (64 kg)   SpO2 98%   BMI 24.98 kg/m²      ASSESSMENT:     Diagnosis Orders   1. Weight loss-worrisome for malignancy versus cachexia from COPD CT CHEST WO CONTRAST    CT ABDOMEN PELVIS WO CONTRAST Additional Contrast? None      2. Chronic obstructive pulmonary disease, unspecified COPD type (HCC)-ongoing       3. Anemia, unspecified type-need work-up Iron and TIBC    Vitamin B12 & Folate      4. Colon cancer screening  Fecal DNA Colorectal cancer screening (Cologuard)      5. Need for influenza vaccination  Influenza, FLUCELVAX, (age 10 mo+), IM, Preservative Free, 0.5 mL           PLAN:    1. Imaging studies. Monitor weight. 2.  Continue treatment plan. 3.  Blood work  4. Cologuard  5.   Influenza vaccine  Follow-up after test

## 2022-11-10 ENCOUNTER — HOSPITAL ENCOUNTER (OUTPATIENT)
Dept: GENERAL RADIOLOGY | Age: 62
Discharge: HOME | End: 2022-11-10
Payer: MEDICARE

## 2022-11-10 DIAGNOSIS — J44.9 CHRONIC OBSTRUCTIVE PULMONARY DISEASE, UNSPECIFIED COPD TYPE (HCC): ICD-10-CM

## 2022-11-10 DIAGNOSIS — R63.4 WEIGHT LOSS: ICD-10-CM

## 2022-11-10 DIAGNOSIS — Z85.9 PERSONAL HISTORY OF MALIGNANT NEOPLASM: ICD-10-CM

## 2022-11-10 PROCEDURE — 74176 CT ABD & PELVIS W/O CONTRAST: CPT

## 2022-11-10 PROCEDURE — 71250 CT THORAX DX C-: CPT

## 2022-11-10 PROCEDURE — 76536 US EXAM OF HEAD AND NECK: CPT

## 2022-11-10 PROCEDURE — 76536 US EXAM OF HEAD AND NECK: CPT | Performed by: RADIOLOGY

## 2022-11-17 DIAGNOSIS — E04.2 MULTINODULAR GOITER: Primary | ICD-10-CM

## 2022-11-28 RX ORDER — GABAPENTIN 800 MG/1
TABLET ORAL
Qty: 120 TABLET | Refills: 1 | OUTPATIENT
Start: 2022-11-28

## 2022-11-30 DIAGNOSIS — G89.29 CHRONIC MIDLINE LOW BACK PAIN, UNSPECIFIED WHETHER SCIATICA PRESENT: Primary | ICD-10-CM

## 2022-11-30 DIAGNOSIS — M54.50 CHRONIC MIDLINE LOW BACK PAIN, UNSPECIFIED WHETHER SCIATICA PRESENT: Primary | ICD-10-CM

## 2022-11-30 RX ORDER — GABAPENTIN 800 MG/1
TABLET ORAL
Qty: 90 TABLET | Refills: 0 | Status: SHIPPED | OUTPATIENT
Start: 2022-11-30 | End: 2022-12-02 | Stop reason: DRUGHIGH

## 2022-12-02 ENCOUNTER — TELEPHONE (OUTPATIENT)
Dept: FAMILY MEDICINE CLINIC | Age: 62
End: 2022-12-02

## 2022-12-02 DIAGNOSIS — G89.29 CHRONIC MIDLINE LOW BACK PAIN, UNSPECIFIED WHETHER SCIATICA PRESENT: ICD-10-CM

## 2022-12-02 DIAGNOSIS — M54.50 CHRONIC MIDLINE LOW BACK PAIN, UNSPECIFIED WHETHER SCIATICA PRESENT: ICD-10-CM

## 2022-12-02 RX ORDER — GABAPENTIN 800 MG/1
TABLET ORAL
Qty: 90 TABLET | Refills: 0 | Status: SHIPPED | OUTPATIENT
Start: 2022-12-02 | End: 2023-01-01

## 2022-12-20 ENCOUNTER — OFFICE VISIT (OUTPATIENT)
Dept: ENT CLINIC | Age: 62
End: 2022-12-20
Payer: MEDICARE

## 2022-12-20 VITALS
WEIGHT: 141 LBS | HEIGHT: 63 IN | BODY MASS INDEX: 24.98 KG/M2 | DIASTOLIC BLOOD PRESSURE: 78 MMHG | SYSTOLIC BLOOD PRESSURE: 124 MMHG

## 2022-12-20 DIAGNOSIS — E04.2 MULTINODULAR THYROID: Primary | ICD-10-CM

## 2022-12-20 PROCEDURE — G8482 FLU IMMUNIZE ORDER/ADMIN: HCPCS | Performed by: PHYSICIAN ASSISTANT

## 2022-12-20 PROCEDURE — 99203 OFFICE O/P NEW LOW 30 MIN: CPT | Performed by: PHYSICIAN ASSISTANT

## 2022-12-20 PROCEDURE — 1036F TOBACCO NON-USER: CPT | Performed by: PHYSICIAN ASSISTANT

## 2022-12-20 PROCEDURE — 3017F COLORECTAL CA SCREEN DOC REV: CPT | Performed by: PHYSICIAN ASSISTANT

## 2022-12-20 PROCEDURE — G8427 DOCREV CUR MEDS BY ELIG CLIN: HCPCS | Performed by: PHYSICIAN ASSISTANT

## 2022-12-20 PROCEDURE — 3078F DIAST BP <80 MM HG: CPT | Performed by: PHYSICIAN ASSISTANT

## 2022-12-20 PROCEDURE — 3074F SYST BP LT 130 MM HG: CPT | Performed by: PHYSICIAN ASSISTANT

## 2022-12-20 PROCEDURE — G8420 CALC BMI NORM PARAMETERS: HCPCS | Performed by: PHYSICIAN ASSISTANT

## 2022-12-20 ASSESSMENT — ENCOUNTER SYMPTOMS
RHINORRHEA: 0
EYE PAIN: 0
VOICE CHANGE: 0
TROUBLE SWALLOWING: 0
EYE DISCHARGE: 0
SINUS PAIN: 0
FACIAL SWELLING: 0
SORE THROAT: 0
SINUS PRESSURE: 0

## 2022-12-20 NOTE — ASSESSMENT & PLAN NOTE
Multinodular thyroid with dominant nodule of the left lobe  Plan: Options were discussed with the patient. Patient prefers close observation. I will repeat the ultrasound of the thyroid in 6 months. She is to see me back in clinic after the ultrasound for repeat physical exam and to discuss the findings of the ultrasound. She was reminded to call if she develops any dysphagia or tenderness from the thyroid region.

## 2022-12-20 NOTE — PROGRESS NOTES
[facility] OTOLARYNGOLOGY/ENT  Mrs. Chito Bliss is a pleasant 60-year-old  female that was referred by Dr. Crystal Mauricio due to a multinodular thyroid. Patient reports that she had a recent ultrasound that demonstrated a multinodular thyroid. Patient was noted with a complex nodule on the left lobe of the thyroid measuring 1.8 x 1.8 x 2.6. The rest of her nodules were noted to be subcentimeter. Patient denies any family history of thyroid cancer. She also denies a personal history of radiation exposure. Patient reports that she has a history of having cancer of unknown primary about 12 years ago that was treated with chemotherapy in [place]. She reports that she has had no recurring disease and still does not know the primary of the cancer. She denies any issues with dysphagia or any tenderness from the thyroid region.       Allergies: Cephalexin, Aspirin, Nitrofurantoin, and Creatine monohydrate      Current Outpatient Medications   Medication Sig Dispense Refill    gabapentin (NEURONTIN) 800 MG tablet gabapentin 800 mg tablet tid 90 tablet 0    albuterol sulfate HFA (PROVENTIL;VENTOLIN;PROAIR) 108 (90 Base) MCG/ACT inhaler INHALE 2 PUFFS BY MOUTH EVERY 4 TO 6 HOURS AS NEEDED, RESCUE INHALER 8.5 g 5    esomeprazole (NEXIUM) 40 MG delayed release capsule TAKE ONE CAPSULE BY MOUTH EVERY DAY IN THE MORNING BEFORE BREAKFAST 30 capsule 0    esomeprazole (NEXIUM) 40 MG delayed release capsule Take 1 capsule by mouth every morning (before breakfast) 30 capsule 5    BREZTRI AEROSPHERE 160-9-4.8 MCG/ACT AERO INHALE TWO PUFFS BY MOUTH TWICE A DAY (MORNING AND BEDTIME) 10.7 g 5    XARELTO 10 MG TABS tablet       naloxone 4 MG/0.1ML LIQD nasal spray       Cholecalciferol (VITAMIN D3) 50 MCG (2000 UT) CAPS Take by mouth      Calcium Citrate-Vitamin D 200-250 MG-UNIT TABS Take 1 tablet by mouth 2 times daily (with meals)      escitalopram (LEXAPRO) 20 MG tablet escitalopram 20 mg tablet      oxyCODONE-acetaminophen (PERCOCET)  MG per tablet oxycodone-acetaminophen 10 mg-325 mg tablet      Rhubarb (ESTROVEN COMPLETE) 4 MG TABS Take by mouth      albuterol (PROVENTIL) (2.5 MG/3ML) 0.083% nebulizer solution Take 3 mLs by nebulization every 6 hours as needed for Wheezing 120 each 3     No current facility-administered medications for this visit. Past Surgical History:   Procedure Laterality Date    BACK SURGERY      4     BREAST SURGERY      breast lift. CHOLECYSTECTOMY      GALLBLADDER SURGERY      HAND SURGERY      Patient has had surgeries on both hands       Past Medical History:   Diagnosis Date    Anxiety     Cancer ([facility] [address]     adenocarcirnoma right lung, abdomen, pelvis. Chronic back pain     COPD (chronic obstructive pulmonary disease) (HCC)     Neuropathy     Osteoarthritis     Restless legs syndrome        Family History   Problem Relation Age of Onset    Heart Disease Mother     Cancer Mother     Heart Disease Father     Cancer Father     Heart Disease Sister     Cancer Sister     Heart Disease Brother        Social History     Tobacco Use    Smoking status: Former     Packs/day: 1.50     Years: 30.00     Pack years: 45.00     Types: Cigarettes     Quit date:      Years since quittin.9    Smokeless tobacco: Never   Substance Use Topics    Alcohol use: Never           REVIEW OF SYSTEMS:  all other systems reviewed and are negative  Review of Systems   Constitutional:  Negative for chills and fever. HENT:  Negative for congestion, dental problem, ear discharge, ear pain, facial swelling, hearing loss, nosebleeds, postnasal drip, rhinorrhea, sinus pressure, sinus pain, sneezing, sore throat, tinnitus, trouble swallowing and voice change. Eyes:  Negative for pain and discharge. Neurological:  Negative for dizziness and headaches. Comments:     PHYSICAL EXAM:    /78   Ht 5' 3\" (1.6 m)   Wt 141 lb (64 kg)   BMI 24.98 kg/m²   Body mass index is 24.98 kg/m².     General Appearance: well some errors in transcription may have occurred.

## 2022-12-30 ENCOUNTER — PRE-ADMISSION TESTING (OUTPATIENT)
Dept: PREADMISSION TESTING | Facility: HOSPITAL | Age: 62
End: 2022-12-30
Payer: MEDICARE

## 2022-12-30 VITALS
BODY MASS INDEX: 27.14 KG/M2 | WEIGHT: 143.74 LBS | DIASTOLIC BLOOD PRESSURE: 75 MMHG | SYSTOLIC BLOOD PRESSURE: 121 MMHG | HEIGHT: 61 IN | OXYGEN SATURATION: 97 % | HEART RATE: 72 BPM | RESPIRATION RATE: 18 BRPM

## 2022-12-30 LAB
ANION GAP SERPL CALCULATED.3IONS-SCNC: 9 MMOL/L (ref 5–15)
BUN SERPL-MCNC: 11 MG/DL (ref 8–23)
BUN/CREAT SERPL: 18.6 (ref 7–25)
CALCIUM SPEC-SCNC: 9.3 MG/DL (ref 8.6–10.5)
CHLORIDE SERPL-SCNC: 103 MMOL/L (ref 98–107)
CO2 SERPL-SCNC: 27 MMOL/L (ref 22–29)
CREAT SERPL-MCNC: 0.59 MG/DL (ref 0.57–1)
DEPRECATED RDW RBC AUTO: 45 FL (ref 37–54)
EGFRCR SERPLBLD CKD-EPI 2021: 102 ML/MIN/1.73
ERYTHROCYTE [DISTWIDTH] IN BLOOD BY AUTOMATED COUNT: 12.4 % (ref 12.3–15.4)
GLUCOSE SERPL-MCNC: 89 MG/DL (ref 65–99)
HCT VFR BLD AUTO: 36.5 % (ref 34–46.6)
HGB BLD-MCNC: 11.4 G/DL (ref 12–15.9)
MCH RBC QN AUTO: 30.6 PG (ref 26.6–33)
MCHC RBC AUTO-ENTMCNC: 31.2 G/DL (ref 31.5–35.7)
MCV RBC AUTO: 98.1 FL (ref 79–97)
PLATELET # BLD AUTO: 216 10*3/MM3 (ref 140–450)
PMV BLD AUTO: 10.7 FL (ref 6–12)
POTASSIUM SERPL-SCNC: 4.1 MMOL/L (ref 3.5–5.2)
RBC # BLD AUTO: 3.72 10*6/MM3 (ref 3.77–5.28)
SODIUM SERPL-SCNC: 139 MMOL/L (ref 136–145)
WBC NRBC COR # BLD: 5.99 10*3/MM3 (ref 3.4–10.8)

## 2022-12-30 PROCEDURE — 85027 COMPLETE CBC AUTOMATED: CPT

## 2022-12-30 PROCEDURE — 80048 BASIC METABOLIC PNL TOTAL CA: CPT

## 2022-12-30 PROCEDURE — 93005 ELECTROCARDIOGRAM TRACING: CPT

## 2022-12-30 PROCEDURE — 93010 ELECTROCARDIOGRAM REPORT: CPT | Performed by: INTERNAL MEDICINE

## 2022-12-30 PROCEDURE — 36415 COLL VENOUS BLD VENIPUNCTURE: CPT

## 2022-12-30 RX ORDER — ESOMEPRAZOLE MAGNESIUM 40 MG/1
40 CAPSULE, DELAYED RELEASE ORAL
COMMUNITY

## 2022-12-30 NOTE — DISCHARGE INSTRUCTIONS
Before you come to the hospital        Arrival time: AS DIRECTED BY OFFICE     YOU MAY TAKE THE FOLLOWING MEDICATION(S) THE MORNING OF SURGERY WITH A SIP OF WATER: ***  OXYCODONE, GABAPENTIN           ALL OTHER HOME MEDICATION CHECK WITH YOUR PHYSICIAN (especially if   you are taking diabetes medicines or blood thinners)      If you were given and instructed to use a germ- killing soap, use as directed the night before surgery and again the morning of surgery or as directed by your surgeon. (Use one-half of the bottle with each shower.)   See attached information for How to Use Chlorhexidine for Bathing if applicable.            Eating and drinking restrictions prior to scheduled arrival time    2 Hours before arrival time STOP   Drinking Clear liquids (water, apple juice-no pulp)     6 Hours before arrival time STOP   Milk or drinks that contain milk, full liquids    6 Hours before arrival time STOP   Light meals or foods, such as toast or cereal    8 Hours before arrival time STOP   Heavy foods, such as meat, fried foods, or fatty foods    (It is extremely important that you follow these guidelines to prevent delay or cancelation of your procedure)     Clear Liquids  Water and flavored water                                                                      Clear Fruit juices, such as cranberry juice and apple juice.  Black coffee (NO cream of any kind, including powdered).  Plain tea  Clear bouillon or broth.  Flavored gelatin.  Soda.  Gatorade or Powerade.  Full liquid examples  Juices that have pulp.  Frozen ice pops that contain fruit pieces.  Coffee with creamer  Milk.  Yogurt.                MANAGING PAIN AFTER SURGERY    We know you are probably wondering what your pain will be like after surgery.  Following surgery it is unrealistic to expect you will not have pain.   Pain is how our bodies let us know that something is wrong or cautions us to be careful.  That said, our goal is to make your pain  tolerable.    Methods we may use to treat your pain include (oral or IV medications, PCAs, epidurals, nerve blocks, etc.)   While some procedures require IV pain medications for a short time after surgery, transitioning to pain medications by mouth allows for better management of pain.   Your nurse will encourage you to take oral pain medications whenever possible.  IV medications work almost immediately, but only last a short while.  Taking medications by mouth allows for a more constant level of medication in your blood stream for a longer period of time.      Once your pain is out of control it is harder to get back under control.  It is important you are aware when your next dose of pain medication is due.  If you are admitted, your nurse may write the time of your next dose on the white board in your room to help you remember.      We are interested in your pain and encourage you to inform us about aggravating factors during your visit.   Many times a simple repositioning every few hours can make a big difference.    If your physician says it is okay, do not let your pain prevent you from getting out of bed. Be sure to call your nurse for assistance prior to getting up so you do not fall.      Before surgery, please decide your tolerable pain goal.  These faces help describe the pain ratings we use on a 0-10 scale.   Be prepared to tell us your goal and whether or not you take pain or anxiety medications at home.          Preparing for Surgery  Preparing for surgery is an important part of your care. It can make things go more smoothly and help you avoid complications. The steps leading up to surgery may vary among hospitals. Follow all instructions given to you by your health care providers. Ask questions if you do not understand something. Talk about any concerns that you have.  Here are some questions to consider asking before your surgery:  If my surgery is not an emergency (is elective), when would be the  best time to have the surgery?  What arrangements do I need to make for work, home, or school?  What will my recovery be like? How long will it be before I can return to normal activities?  Will I need to prepare my home? Will I need to arrange care for me or my children?  Should I expect to have pain after surgery? What are my pain management options? Are there nonmedical options that I can try for pain?  Tell a health care provider about:  Any allergies you have.  All medicines you are taking, including vitamins, herbs, eye drops, creams, and over-the-counter medicines.  Any problems you or family members have had with anesthetic medicines.  Any blood disorders you have.  Any surgeries you have had.  Any medical conditions you have.  Whether you are pregnant or may be pregnant.  What are the risks?  The risks and complications of surgery depend on the specific procedure that you have. Discuss all the risks with your health care providers before your surgery. Ask about common surgical complications, which may include:  Infection.  Bleeding or a need for blood replacement (transfusion).  Allergic reactions to medicines.  Damage to surrounding nerves, tissues, or structures.  A blood clot.  Scarring.  Failure of the surgery to correct the problem.  Follow these instructions before the procedure:  Several days or weeks before your procedure  You may have a physical exam by your primary health care provider to make sure it is safe for you to have surgery.  You may have testing. This may include a chest X-ray, blood and urine tests, electrocardiogram (ECG), or other testing.  Ask your health care provider about:  Changing or stopping your regular medicines. This is especially important if you are taking diabetes medicines or blood thinners.  Taking medicines such as aspirin and ibuprofen. These medicines can thin your blood. Do not take these medicines unless your health care provider tells you to take them.  Taking  over-the-counter medicines, vitamins, herbs, and supplements.  Do not use any products that contain nicotine or tobacco, such as cigarettes and e-cigarettes. If you need help quitting, ask your health care provider.  Avoid alcohol.  Ask your health care provider if there are exercises you can do to prepare for surgery.  Eat a healthy diet.   Plan to have someone take you home from the hospital or clinic.  Plan to have a responsible adult care for you for at least 24 hours after you leave the hospital or clinic. This is important.  The day before your procedure  You may be given antibiotic medicine to take by mouth to help prevent infection. Take it as told by your health care provider.  You may be asked to shower with a germ-killing soap.  Follow instructions from your health care provider about eating and drinking restrictions. This includes gum, mints and hard candy.  Pack comfortable clothes according to your procedure.   The day of your procedure  You may need to take another shower with a germ-killing soap before you leave home in the morning.  With a small sip of water, take only the medicines that you are told to take.  Remove all jewelry including rings.   Leave anything you consider valuable at home except hearing aids if needed.  You do not need to bring your home medications into the hospital.   Do not wear any makeup, nail polish, powder, deodorant, lotion, hair accessories, or anything on your skin or body except your clothes.  If you will be staying in the hospital, bring a case to hold your glasses, contacts, or dentures. You may also want to bring your robe and non-skid footwear.       (Do not use denture adhesives since you will be asked to remove them during  surgery).   If you wear oxygen at home, bring it with you the day of surgery.  If instructed by your health care provider, bring your sleep apnea device with you on the day of your surgery (if this applies to you).  You may want to leave your  suitcase and sleep apnea device in the car until after surgery.   Arrive at the hospital as scheduled.  Bring a friend or family member with you who can help to answer questions and be present while you meet with your health care provider.  At the hospital  When you arrive at the hospital:  Go to registration located at the main entrance of the hospital. You will be registered and given a beeper and a sticker sheet. Take the stickers to the Outpatient nurses desk and place in the black tray. This is to notify staff that you have arrived. Then return to the lobby to wait.   When your beeper lights up and vibrates proceed through the double doors, under the stairs, and a member of the Outpatient Surgery staff will escort you to your preoperative room.  You may have to wear compression sleeves. These help to prevent blood clots and reduce swelling in your legs.  An IV may be inserted into one of your veins.              In the operating room, you may be given one or more of the following:        A medicine to help you relax (sedative).        A medicine to numb the area (local anesthetic).        A medicine to make you fall asleep (general anesthetic).        A medicine that is injected into an area of your body to numb everything below the                      injection site (regional anesthetic).  You may be given an antibiotic through your IV to help prevent infection.  Your surgical site will be marked or identified.    Contact a health care provider if you:  Develop a fever of more than 100.4°F (38°C) or other feelings of illness during the 48 hours before your surgery.  Have symptoms that get worse.  Have questions or concerns about your surgery.  Summary  Preparing for surgery can make the procedure go more smoothly and lower your risk of complications.  Before surgery, make a list of questions and concerns to discuss with your surgeon. Ask about the risks and possible complications.  In the days or weeks before  your surgery, follow all instructions from your health care provider. You may need to stop smoking, avoid alcohol, follow eating restrictions, and change or stop your regular medicines.  Contact your surgeon if you develop a fever or other signs of illness during the few days before your surgery.  This information is not intended to replace advice given to you by your health care provider. Make sure you discuss any questions you have with your health care provider.  Document Revised: 12/21/2018 Document Reviewed: 10/23/2018  Elsevier Patient Education © 2021 [employer]

## 2023-01-04 LAB
QT INTERVAL: 420 MS
QTC INTERVAL: 426 MS

## 2023-01-05 ENCOUNTER — APPOINTMENT (OUTPATIENT)
Dept: GENERAL RADIOLOGY | Facility: HOSPITAL | Age: 63
DRG: 494 | End: 2023-01-05
Payer: MEDICARE

## 2023-01-05 ENCOUNTER — ANESTHESIA (OUTPATIENT)
Dept: PERIOP | Facility: HOSPITAL | Age: 63
DRG: 494 | End: 2023-01-05
Payer: MEDICARE

## 2023-01-05 ENCOUNTER — ANESTHESIA EVENT (OUTPATIENT)
Dept: PERIOP | Facility: HOSPITAL | Age: 63
DRG: 494 | End: 2023-01-05
Payer: MEDICARE

## 2023-01-05 ENCOUNTER — HOSPITAL ENCOUNTER (INPATIENT)
Facility: HOSPITAL | Age: 63
LOS: 1 days | Discharge: HOME | DRG: 494 | End: 2023-01-05
Attending: PODIATRIST | Admitting: PODIATRIST
Payer: MEDICARE

## 2023-01-05 VITALS
SYSTOLIC BLOOD PRESSURE: 92 MMHG | OXYGEN SATURATION: 97 % | DIASTOLIC BLOOD PRESSURE: 61 MMHG | HEART RATE: 77 BPM | RESPIRATION RATE: 16 BRPM | TEMPERATURE: 96.8 F

## 2023-01-05 DIAGNOSIS — S82.55XK CLOSED NONDISPLACED FRACTURE OF MEDIAL MALLEOLUS OF LEFT TIBIA WITH NONUNION, SUBSEQUENT ENCOUNTER: Primary | ICD-10-CM

## 2023-01-05 DIAGNOSIS — S82.52XK DISPLACED FRACTURE OF MEDIAL MALLEOLUS OF LEFT TIBIA, SUBSEQUENT ENCOUNTER FOR CLOSED FRACTURE WITH NONUNION: ICD-10-CM

## 2023-01-05 PROCEDURE — 25010000002 MIDAZOLAM PER 1 MG: Performed by: ANESTHESIOLOGY

## 2023-01-05 PROCEDURE — 73600 X-RAY EXAM OF ANKLE: CPT

## 2023-01-05 PROCEDURE — C1713 ANCHOR/SCREW BN/BN,TIS/BN: HCPCS | Performed by: PODIATRIST

## 2023-01-05 PROCEDURE — 25010000002 DEXAMETHASONE PER 1 MG

## 2023-01-05 PROCEDURE — 76000 FLUOROSCOPY <1 HR PHYS/QHP: CPT

## 2023-01-05 PROCEDURE — 25010000002 FENTANYL CITRATE (PF) 50 MCG/ML SOLUTION: Performed by: ANESTHESIOLOGY

## 2023-01-05 PROCEDURE — 0QUH07Z SUPPLEMENT LEFT TIBIA WITH AUTOLOGOUS TISSUE SUBSTITUTE, OPEN APPROACH: ICD-10-PCS | Performed by: PODIATRIST

## 2023-01-05 PROCEDURE — 0QBM0ZZ EXCISION OF LEFT TARSAL, OPEN APPROACH: ICD-10-PCS | Performed by: PODIATRIST

## 2023-01-05 PROCEDURE — 25010000002 DEXAMETHASONE PER 1 MG: Performed by: ANESTHESIOLOGY

## 2023-01-05 PROCEDURE — 0QUK07Z SUPPLEMENT LEFT FIBULA WITH AUTOLOGOUS TISSUE SUBSTITUTE, OPEN APPROACH: ICD-10-PCS | Performed by: PODIATRIST

## 2023-01-05 PROCEDURE — 25010000002 PROPOFOL 10 MG/ML EMULSION

## 2023-01-05 PROCEDURE — 25010000002 ROPIVACAINE PER 1 MG: Performed by: ANESTHESIOLOGY

## 2023-01-05 DEVICE — OLIVE WIRE, SMOOTH, 1.4MM
Type: IMPLANTABLE DEVICE | Site: ANKLE | Status: FUNCTIONAL
Brand: BABY GORILLA/GORILLA PLATING SYSTEM

## 2023-01-05 DEVICE — GRFT TISS CELLECT2 ECM: Type: IMPLANTABLE DEVICE | Site: ANKLE | Status: FUNCTIONAL

## 2023-01-05 DEVICE — 4.2 X 14 MM R3CON LOCKING PLATE SCREW
Type: IMPLANTABLE DEVICE | Site: ANKLE | Status: FUNCTIONAL
Brand: GORILLA PLATING SYSTEM

## 2023-01-05 DEVICE — FIBR APEX DBM 5CC: Type: IMPLANTABLE DEVICE | Site: ANKLE | Status: FUNCTIONAL

## 2023-01-05 DEVICE — MINI MONSTER 4.0 X 30MM HEADED SCREW, FULL THREAD
Type: IMPLANTABLE DEVICE | Site: ANKLE | Status: FUNCTIONAL
Brand: MONSTER SCREW SYSTEM

## 2023-01-05 DEVICE — 4.2 X 30 MM R3CON LOCKING PLATE SCREW
Type: IMPLANTABLE DEVICE | Site: ANKLE | Status: FUNCTIONAL
Brand: GORILLA PLATING SYSTEM

## 2023-01-05 DEVICE — FIBR APEX DBM 2.5CC: Type: IMPLANTABLE DEVICE | Site: ANKLE | Status: FUNCTIONAL

## 2023-01-05 DEVICE — K-WIRE, SINGLE ENDED TROCAR TIP, SMOOTH, 2.3 X 150MM
Type: IMPLANTABLE DEVICE | Site: ANKLE | Status: FUNCTIONAL
Brand: MULTI SYSTEM

## 2023-01-05 DEVICE — K-WIRE, SINGLE ENDED TROCAR TIP, SMOOTH, 1.2 X 150MM
Type: IMPLANTABLE DEVICE | Site: ANKLE | Status: FUNCTIONAL
Brand: MONSTER SCREW SYSTEM

## 2023-01-05 DEVICE — 4.2 X 30 MM R3CON NON-LOCKING PLATE SCREW
Type: IMPLANTABLE DEVICE | Site: ANKLE | Status: FUNCTIONAL
Brand: GORILLA PLATING SYSTEM

## 2023-01-05 DEVICE — PUTTY BONE W/CORT FIBR 2.5CC: Type: IMPLANTABLE DEVICE | Site: ANKLE | Status: FUNCTIONAL

## 2023-01-05 RX ORDER — LIDOCAINE HYDROCHLORIDE 10 MG/ML
0.5 INJECTION, SOLUTION EPIDURAL; INFILTRATION; INTRACAUDAL; PERINEURAL ONCE AS NEEDED
Status: DISCONTINUED | OUTPATIENT
Start: 2023-01-05 | End: 2023-01-05 | Stop reason: HOSPADM

## 2023-01-05 RX ORDER — IBUPROFEN 600 MG/1
600 TABLET ORAL ONCE AS NEEDED
Status: DISCONTINUED | OUTPATIENT
Start: 2023-01-05 | End: 2023-01-05 | Stop reason: HOSPADM

## 2023-01-05 RX ORDER — SODIUM CHLORIDE 0.9 % (FLUSH) 0.9 %
3 SYRINGE (ML) INJECTION EVERY 12 HOURS SCHEDULED
Status: DISCONTINUED | OUTPATIENT
Start: 2023-01-05 | End: 2023-01-05 | Stop reason: HOSPADM

## 2023-01-05 RX ORDER — FLUMAZENIL 0.1 MG/ML
0.2 INJECTION INTRAVENOUS AS NEEDED
Status: DISCONTINUED | OUTPATIENT
Start: 2023-01-05 | End: 2023-01-05 | Stop reason: HOSPADM

## 2023-01-05 RX ORDER — FENTANYL CITRATE 50 UG/ML
25 INJECTION, SOLUTION INTRAMUSCULAR; INTRAVENOUS
Status: DISCONTINUED | OUTPATIENT
Start: 2023-01-05 | End: 2023-01-05 | Stop reason: HOSPADM

## 2023-01-05 RX ORDER — SODIUM CHLORIDE, SODIUM LACTATE, POTASSIUM CHLORIDE, CALCIUM CHLORIDE 600; 310; 30; 20 MG/100ML; MG/100ML; MG/100ML; MG/100ML
100 INJECTION, SOLUTION INTRAVENOUS CONTINUOUS PRN
Status: DISCONTINUED | OUTPATIENT
Start: 2023-01-05 | End: 2023-01-05 | Stop reason: HOSPADM

## 2023-01-05 RX ORDER — MIDAZOLAM HYDROCHLORIDE 1 MG/ML
1 INJECTION INTRAMUSCULAR; INTRAVENOUS
Status: DISCONTINUED | OUTPATIENT
Start: 2023-01-05 | End: 2023-01-05 | Stop reason: HOSPADM

## 2023-01-05 RX ORDER — SODIUM CHLORIDE 0.9 % (FLUSH) 0.9 %
10 SYRINGE (ML) INJECTION EVERY 12 HOURS SCHEDULED
Status: DISCONTINUED | OUTPATIENT
Start: 2023-01-05 | End: 2023-01-05 | Stop reason: HOSPADM

## 2023-01-05 RX ORDER — EPHEDRINE SULFATE 50 MG/ML
INJECTION, SOLUTION INTRAVENOUS AS NEEDED
Status: DISCONTINUED | OUTPATIENT
Start: 2023-01-05 | End: 2023-01-05 | Stop reason: SURG

## 2023-01-05 RX ORDER — SODIUM CHLORIDE, SODIUM LACTATE, POTASSIUM CHLORIDE, CALCIUM CHLORIDE 600; 310; 30; 20 MG/100ML; MG/100ML; MG/100ML; MG/100ML
100 INJECTION, SOLUTION INTRAVENOUS CONTINUOUS
Status: DISCONTINUED | OUTPATIENT
Start: 2023-01-05 | End: 2023-01-05 | Stop reason: HOSPADM

## 2023-01-05 RX ORDER — NALOXONE HCL 0.4 MG/ML
0.4 VIAL (ML) INJECTION AS NEEDED
Status: DISCONTINUED | OUTPATIENT
Start: 2023-01-05 | End: 2023-01-05 | Stop reason: HOSPADM

## 2023-01-05 RX ORDER — ACETAMINOPHEN 500 MG
1000 TABLET ORAL ONCE
Status: COMPLETED | OUTPATIENT
Start: 2023-01-05 | End: 2023-01-05

## 2023-01-05 RX ORDER — ROPIVACAINE HYDROCHLORIDE 5 MG/ML
INJECTION, SOLUTION EPIDURAL; INFILTRATION; PERINEURAL
Status: COMPLETED | OUTPATIENT
Start: 2023-01-05 | End: 2023-01-05

## 2023-01-05 RX ORDER — SODIUM CHLORIDE 9 MG/ML
40 INJECTION, SOLUTION INTRAVENOUS AS NEEDED
Status: DISCONTINUED | OUTPATIENT
Start: 2023-01-05 | End: 2023-01-05 | Stop reason: HOSPADM

## 2023-01-05 RX ORDER — LIDOCAINE HYDROCHLORIDE 20 MG/ML
INJECTION, SOLUTION EPIDURAL; INFILTRATION; INTRACAUDAL; PERINEURAL AS NEEDED
Status: DISCONTINUED | OUTPATIENT
Start: 2023-01-05 | End: 2023-01-05 | Stop reason: SURG

## 2023-01-05 RX ORDER — DEXAMETHASONE SODIUM PHOSPHATE 4 MG/ML
INJECTION, SOLUTION INTRA-ARTICULAR; INTRALESIONAL; INTRAMUSCULAR; INTRAVENOUS; SOFT TISSUE AS NEEDED
Status: DISCONTINUED | OUTPATIENT
Start: 2023-01-05 | End: 2023-01-05 | Stop reason: SURG

## 2023-01-05 RX ORDER — ONDANSETRON 2 MG/ML
4 INJECTION INTRAMUSCULAR; INTRAVENOUS ONCE AS NEEDED
Status: DISCONTINUED | OUTPATIENT
Start: 2023-01-05 | End: 2023-01-05 | Stop reason: HOSPADM

## 2023-01-05 RX ORDER — CLINDAMYCIN PHOSPHATE 900 MG/50ML
900 INJECTION INTRAVENOUS ONCE
Status: COMPLETED | OUTPATIENT
Start: 2023-01-05 | End: 2023-01-05

## 2023-01-05 RX ORDER — MAGNESIUM HYDROXIDE 1200 MG/15ML
LIQUID ORAL AS NEEDED
Status: DISCONTINUED | OUTPATIENT
Start: 2023-01-05 | End: 2023-01-05 | Stop reason: HOSPADM

## 2023-01-05 RX ORDER — OXYCODONE AND ACETAMINOPHEN 10; 325 MG/1; MG/1
1 TABLET ORAL EVERY 4 HOURS PRN
Qty: 30 TABLET | Refills: 0 | Status: SHIPPED | OUTPATIENT
Start: 2023-01-05

## 2023-01-05 RX ORDER — BUPIVACAINE HCL/0.9 % NACL/PF 0.125 %
PLASTIC BAG, INJECTION (ML) EPIDURAL AS NEEDED
Status: DISCONTINUED | OUTPATIENT
Start: 2023-01-05 | End: 2023-01-05 | Stop reason: SURG

## 2023-01-05 RX ORDER — HYDROMORPHONE HYDROCHLORIDE 2 MG/1
2 TABLET ORAL EVERY 4 HOURS PRN
Qty: 20 TABLET | Refills: 0 | Status: SHIPPED | OUTPATIENT
Start: 2023-01-05

## 2023-01-05 RX ORDER — OXYCODONE AND ACETAMINOPHEN 10; 325 MG/1; MG/1
1 TABLET ORAL ONCE AS NEEDED
Status: DISCONTINUED | OUTPATIENT
Start: 2023-01-05 | End: 2023-01-05 | Stop reason: HOSPADM

## 2023-01-05 RX ORDER — PROPOFOL 10 MG/ML
VIAL (ML) INTRAVENOUS AS NEEDED
Status: DISCONTINUED | OUTPATIENT
Start: 2023-01-05 | End: 2023-01-05 | Stop reason: SURG

## 2023-01-05 RX ORDER — SODIUM CHLORIDE 0.9 % (FLUSH) 0.9 %
10 SYRINGE (ML) INJECTION AS NEEDED
Status: DISCONTINUED | OUTPATIENT
Start: 2023-01-05 | End: 2023-01-05 | Stop reason: HOSPADM

## 2023-01-05 RX ORDER — FENTANYL CITRATE 50 UG/ML
50 INJECTION, SOLUTION INTRAMUSCULAR; INTRAVENOUS ONCE
Status: COMPLETED | OUTPATIENT
Start: 2023-01-05 | End: 2023-01-05

## 2023-01-05 RX ORDER — DROPERIDOL 2.5 MG/ML
0.62 INJECTION, SOLUTION INTRAMUSCULAR; INTRAVENOUS ONCE AS NEEDED
Status: DISCONTINUED | OUTPATIENT
Start: 2023-01-05 | End: 2023-01-05 | Stop reason: HOSPADM

## 2023-01-05 RX ORDER — MIDAZOLAM HYDROCHLORIDE 1 MG/ML
2 INJECTION INTRAMUSCULAR; INTRAVENOUS ONCE
Status: COMPLETED | OUTPATIENT
Start: 2023-01-05 | End: 2023-01-05

## 2023-01-05 RX ORDER — DEXAMETHASONE SODIUM PHOSPHATE 4 MG/ML
4 INJECTION, SOLUTION INTRA-ARTICULAR; INTRALESIONAL; INTRAMUSCULAR; INTRAVENOUS; SOFT TISSUE ONCE AS NEEDED
Status: COMPLETED | OUTPATIENT
Start: 2023-01-05 | End: 2023-01-05

## 2023-01-05 RX ORDER — OXYCODONE AND ACETAMINOPHEN 10; 325 MG/1; MG/1
1 TABLET ORAL EVERY 4 HOURS PRN
Qty: 60 TABLET | Refills: 0 | Status: SHIPPED | OUTPATIENT
Start: 2023-01-05

## 2023-01-05 RX ORDER — LABETALOL HYDROCHLORIDE 5 MG/ML
5 INJECTION, SOLUTION INTRAVENOUS
Status: DISCONTINUED | OUTPATIENT
Start: 2023-01-05 | End: 2023-01-05 | Stop reason: HOSPADM

## 2023-01-05 RX ORDER — SODIUM CHLORIDE 0.9 % (FLUSH) 0.9 %
3-10 SYRINGE (ML) INJECTION AS NEEDED
Status: DISCONTINUED | OUTPATIENT
Start: 2023-01-05 | End: 2023-01-05 | Stop reason: HOSPADM

## 2023-01-05 RX ORDER — OXYCODONE AND ACETAMINOPHEN 7.5; 325 MG/1; MG/1
2 TABLET ORAL EVERY 4 HOURS PRN
Status: DISCONTINUED | OUTPATIENT
Start: 2023-01-05 | End: 2023-01-05 | Stop reason: HOSPADM

## 2023-01-05 RX ADMIN — SODIUM CHLORIDE, POTASSIUM CHLORIDE, SODIUM LACTATE AND CALCIUM CHLORIDE 100 ML/HR: 600; 310; 30; 20 INJECTION, SOLUTION INTRAVENOUS at 06:05

## 2023-01-05 RX ADMIN — PROPOFOL INJECTABLE EMULSION 120 MG: 10 INJECTION, EMULSION INTRAVENOUS at 07:22

## 2023-01-05 RX ADMIN — DEXAMETHASONE SODIUM PHOSPHATE 4 MG: 4 INJECTION INTRA-ARTICULAR; INTRALESIONAL; INTRAMUSCULAR; INTRAVENOUS; SOFT TISSUE at 06:40

## 2023-01-05 RX ADMIN — LIDOCAINE HYDROCHLORIDE 60 MG: 20 INJECTION, SOLUTION EPIDURAL; INFILTRATION; INTRACAUDAL; PERINEURAL at 07:22

## 2023-01-05 RX ADMIN — Medication 100 MCG: at 08:10

## 2023-01-05 RX ADMIN — FENTANYL CITRATE 50 MCG: 50 INJECTION INTRAMUSCULAR; INTRAVENOUS at 06:40

## 2023-01-05 RX ADMIN — Medication 100 MCG: at 07:36

## 2023-01-05 RX ADMIN — ROPIVACAINE HYDROCHLORIDE 20 ML: 5 INJECTION, SOLUTION EPIDURAL; INFILTRATION; PERINEURAL at 06:45

## 2023-01-05 RX ADMIN — DEXAMETHASONE SODIUM PHOSPHATE 4 MG: 4 INJECTION, SOLUTION INTRA-ARTICULAR; INTRALESIONAL; INTRAMUSCULAR; INTRAVENOUS; SOFT TISSUE at 07:41

## 2023-01-05 RX ADMIN — ROPIVACAINE HYDROCHLORIDE 20 ML: 5 INJECTION, SOLUTION EPIDURAL; INFILTRATION; PERINEURAL at 06:43

## 2023-01-05 RX ADMIN — EPHEDRINE SULFATE 15 MG: 50 INJECTION INTRAVENOUS at 07:34

## 2023-01-05 RX ADMIN — CLINDAMYCIN IN 5 PERCENT DEXTROSE 900 MG: 18 INJECTION, SOLUTION INTRAVENOUS at 07:09

## 2023-01-05 RX ADMIN — EPHEDRINE SULFATE 10 MG: 50 INJECTION INTRAVENOUS at 09:24

## 2023-01-05 RX ADMIN — MIDAZOLAM HYDROCHLORIDE 2 MG: 2 INJECTION, SOLUTION INTRAMUSCULAR; INTRAVENOUS at 06:40

## 2023-01-05 RX ADMIN — EPHEDRINE SULFATE 10 MG: 50 INJECTION INTRAVENOUS at 07:50

## 2023-01-05 RX ADMIN — ACETAMINOPHEN 1000 MG: 500 TABLET ORAL at 06:40

## 2023-01-05 RX ADMIN — Medication 50 MCG: at 08:42

## 2023-01-05 NOTE — ANESTHESIA PROCEDURE NOTES
Peripheral Block    Pre-sedation assessment completed: 1/5/2023 6:41 AM    Patient reassessed immediately prior to procedure    Patient location during procedure: holding area  Start time: 1/5/2023 6:43 AM  Stop time: 1/5/2023 6:44 AM  Reason for block: procedure for pain, at surgeon's request, post-op pain management and Requested by Dr. Olguin  Performed by  Anesthesiologist: Shira Renee MD  Preanesthetic Checklist  Completed: patient identified, IV checked, site marked, risks and benefits discussed, surgical consent, monitors and equipment checked, pre-op evaluation and timeout performed  Prep:  Pt Position: supine  Sterile barriers:gloves, mask and washed/disinfected hands  Prep: ChloraPrep  Patient monitoring: blood pressure monitoring, continuous pulse oximetry and EKG  Procedure    Sedation: yes    Guidance:ultrasound guided and femoral artery identified in adductor canal and local anesthetic seen surrounding artery    ULTRASOUND INTERPRETATION.  Using ultrasound guidance a 20 G gauge needle was placed in close proximity to the nerve, at which point, under ultrasound guidance anesthetic was injected in the area of the nerve and spread of the anesthesia was seen on ultrasound in close proximity thereto.  There were no abnormalities seen on ultrasound; a digital image was taken; and the patient tolerated the procedure with no complications. Images:still images obtained (picture printed and placed in patients chart)    Laterality:left  Block Type:adductor canal block  Injection Technique:single-shot  Needle Type:echogenic  Resistance on Injection: none    Medications Used: ropivacaine (NAROPIN) injection 0.5 % - Injection   20 mL - 1/5/2023 6:43:00 AM      Post Assessment  Injection Assessment: negative aspiration for heme, no paresthesia on injection and incremental injection  Patient Tolerance:comfortable throughout block  Complications:no

## 2023-01-05 NOTE — H&P
[facility]     Admission Diagnosis: S82.65XK, S82.62XE, M25.572    Admission Date: 1/5/2023    Patient Care Team:  Teddy Sanchez MD as PCP - General (Family Medicine)  Amanda Moreno APRN as Nurse Practitioner (Pulmonary Disease)      Subjective .     Chief complaint/History of present illness: This a 60-year-old female presents today with chronic left ankle pain.  Stated ORIF trimalleolar ankle fracture.  That was done in May 2022.  Has been using a bone stimulator.  Has had vitamin D supplementation.  Continues to have significant pain.  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    Review of Systems  Review of Systems   Constitutional: Negative.    HENT: Negative.    Eyes: Negative.    Respiratory: Negative.    Cardiovascular: Negative.    Gastrointestinal: Negative.    Endocrine: Negative.    Genitourinary: Negative.    Musculoskeletal: Negative.    Skin: Negative.    Allergic/Immunologic: Negative.    Neurological: Negative.    Hematological: Negative.    Psychiatric/Behavioral: Negative.        History  Past Medical History:   Diagnosis Date   • Adenocarcinoma of unknown origin (HCC) 04/04/2022   • Anxiety    • Arthritis    • Asthma    • Cancer (Formerly [facility])     right lung,abd,and pelvic area   • Chronic back pain    • COPD (chronic obstructive pulmonary disease) (Formerly [facility])    • Depression    • Environmental allergies 04/11/2022   • Facet arthropathy    • Foraminal stenosis of lumbosacral region    • GERD (gastroesophageal reflux disease)    • H/O degenerative disc disease    • History of blood transfusion    • PVD (peripheral vascular disease) (Formerly [facility])    • Radiculopathy    ,   Past Surgical History:   Procedure Laterality Date   • ANKLE SURGERY Left    • ANTERIOR LUMBAR EXPOSURE N/A 05/07/2018    Procedure: ANTERIOR LUMBAR EXPOSURE;  Surgeon: Chris Haley DO;  Location: [facility] OR;  Service: Vascular   • APPENDECTOMY     • CARPAL TUNNEL RELEASE  Bilateral    • INCISION AND DRAINAGE OF WOUND Left 2022    Procedure: Incision and Drainage Left Open Ankle Fracture, Open Reduction Internal Fixation Bimalleolar Ankle Fracture;  Surgeon: Leland Aranda MD;  Location:  [facility] OR;  Service: Orthopedics;  Laterality: Left;   • LUMBAR FUSION N/A 2018    Procedure: ANTERIOR DECOMPRESSION, ANTERIOR LUMBAR INTERBODY FUSION WITH INSTRUMENTATION L5-S1;  Surgeon: LUIS CARLOS Zheng MD;  Location:  [facility] OR;  Service: Orthopedic Spine   • LUMBAR FUSION Right 2019    Procedure: RIGHT LATERAL LUMBAR INTERBODY FUSION L3-5;  Surgeon: LUIS CARLOS Zheng MD;  Location:  [facility] OR;  Service: Orthopedic Spine   • LUMBAR LAMINECTOMY WITH FUSION N/A 2018    Procedure: POSTERIOR SPINAL FUSION WITH INSTRUMENTATION L5-S1;  Surgeon: LUIS CARLOS Zheng MD;  Location:  [facility] OR;  Service: Orthopedic Spine   • LUMBAR LAMINECTOMY WITH FUSION Right 2019    Procedure: POSTERIOR SPINAL FUSION WITH INSTRUMENTATION RIGHT L3-S1;  Surgeon: LUIS CARLOS Zheng MD;  Location:  [facility] OR;  Service: Orthopedic Spine   • OTHER SURGICAL HISTORY Bilateral 2017    Stent implants [facility]   • WRIST FUSION Right    ,   Family History   Problem Relation Age of Onset   • Heart disease Other    • Arthritis Other    • Cancer Other    • Alcohol abuse Other    • Stroke Other    • Migraines Other    • COPD Other    • Heart failure Other    • Depression Other    • Asthma Other    • Heart disease Mother    • Kidney disease Mother    • Heart disease Father    • Heart disease Sister    • Kidney disease Sister    • Stroke Sister    • Heart disease Sister    • Cancer Brother    ,   Social History     Tobacco Use   • Smoking status: Former     Packs/day: 1.50     Years: 30.00     Pack years: 45.00     Types: Cigarettes     Quit date: 2010     Years since quittin.0   • Smokeless tobacco: Never   • Tobacco comments:     quit    Vaping Use   • Vaping Use: Never used   Substance  Use Topics   • Alcohol use: No   • Drug use: No   ,   Medications Prior to Admission   Medication Sig Dispense Refill Last Dose   • Albuterol Sulfate (PROAIR HFA IN) Inhale 2 puffs 4 (Four) Times a Day As Needed (Wheezing/Shortness of breath).   1/4/2023 at 0300   • Budeson-Glycopyrrol-Formoterol (Breztri Aerosphere) 160-9-4.8 MCG/ACT aerosol inhaler Inhale 2 puffs 2 (Two) Times a Day.   1/5/2023 at 0400   • CALCIUM CITRATE PO Take 600 mg by mouth 3 (Three) Times a Day.   1/4/2023 at 1900   • cholecalciferol (VITAMIN D3) 1000 units tablet Take 1,000 Units by mouth Daily. 2,000 units at night   1/4/2023 at 1900   • escitalopram (LEXAPRO) 20 MG tablet Take 20 mg by mouth Daily.   1/4/2023 at 0900   • esomeprazole (nexIUM) 40 MG capsule Take 40 mg by mouth Every Morning Before Breakfast.   1/4/2023 at 0900   • fexofenadine-pseudoephedrine (ALLEGRA-D 24) 180-240 MG per 24 hr tablet Take 1 tablet by mouth Daily As Needed for Allergies.   Past Month   • gabapentin (NEURONTIN) 800 MG tablet Take 800 mg by mouth 4 (Four) Times a Day. Takes 800mg at 5am and 1pm and 1600mg at 9pm   1/5/2023 at 0400   • oxyCODONE-acetaminophen (PERCOCET)  MG per tablet Take 0.5 tablets by mouth Every 4 (Four) Hours As Needed for Moderate Pain . (Patient taking differently: Take 0.5 tablets by mouth Every 6 (Six) Hours As Needed for Moderate Pain.)  0 1/5/2023 at 0400    and Allergies:  Aspirin, Keflex [cephalexin], Nitrofurantoin, and Creatine monohydrate    Objective     Vital Signs   Temp:  [97.5 °F (36.4 °C)] 97.5 °F (36.4 °C)  Heart Rate:  [62-72] 62  Resp:  [16] 16  BP: (106-128)/(70-72) 128/72    Physical Exam:  Physical Exam  Vitals and nursing note reviewed.   Constitutional:       Appearance: Normal appearance.   HENT:      Head: Normocephalic and atraumatic.      Right Ear: Tympanic membrane normal.      Left Ear: Tympanic membrane normal.      Nose: Nose normal.      Mouth/Throat:      Mouth: Mucous membranes are dry.   Eyes:       Extraocular Movements: Extraocular movements intact.      Pupils: Pupils are equal, round, and reactive to light.   Cardiovascular:      Rate and Rhythm: Normal rate.      Pulses: Normal pulses.   Pulmonary:      Effort: Pulmonary effort is normal.   Abdominal:      General: Abdomen is flat. There is no distension.      Tenderness: There is no abdominal tenderness.   Musculoskeletal:         General: Swelling and tenderness present.      Cervical back: Normal range of motion.   Skin:     General: Skin is warm and dry.      Capillary Refill: Capillary refill takes 2 to 3 seconds.   Neurological:      General: No focal deficit present.      Mental Status: She is alert.   Psychiatric:         Mood and Affect: Mood normal.         Behavior: Behavior normal.         Thought Content: Thought content normal.         Judgment: Judgment normal.         Results Review:  Lab Results (last 24 hours)     ** No results found for the last 24 hours. **            Assessment & Plan     Nonunion medial malleolus left  Nonunion distal fibula left  Left ankle pain    Plan    I discussed the patient's findings and my recommendations with the patient today.  We did discuss repair of nonunion with bone graft harvest of her medial malleolus as well as her distal fibula.  Risk and benefits of this procedure were discussed questions were answered.  Postop course complications reviewed.  We will continue bone stimulation postoperatively.  I would consider being more aggressive with calcium and vitamin D supplementation.  Consent signed for the procedure today.    Bartolome Olguin DPM  01/05/23  06:36 CST

## 2023-01-05 NOTE — ANESTHESIA PROCEDURE NOTES
Peripheral Block    Pre-sedation assessment completed: 1/5/2023 6:41 AM    Patient reassessed immediately prior to procedure    Patient location during procedure: holding area  Start time: 1/5/2023 6:45 AM  Stop time: 1/5/2023 6:46 AM  Reason for block: procedure for pain, at surgeon's request, post-op pain management and Requested by Dr. Olguin  Performed by  Anesthesiologist: Shira Renee MD  Preanesthetic Checklist  Completed: patient identified, IV checked, site marked, risks and benefits discussed, surgical consent, monitors and equipment checked, pre-op evaluation and timeout performed  Prep:  Sterile barriers:gloves, mask and washed/disinfected hands  Prep: ChloraPrep  Patient monitoring: blood pressure monitoring, continuous pulse oximetry and EKG  Procedure    Sedation: yes    Guidance:ultrasound guided and Sciatic nerve identified and local anesthetic seen surrounding nerve  Images:still images obtained (picture printed and placed in patients chart)    Laterality:left  Block Type:sciatic and popliteal  Injection Technique:single-shot  Needle Type:echogenic  Resistance on Injection: none    Medications Used: ropivacaine (NAROPIN) injection 0.5 % - Injection   20 mL - 1/5/2023 6:45:00 AM      Post Assessment  Injection Assessment: negative aspiration for heme, no paresthesia on injection and incremental injection  Patient Tolerance:comfortable throughout block  Complications:no

## 2023-01-05 NOTE — ANESTHESIA PROCEDURE NOTES
Airway  Urgency: elective    Date/Time: 1/5/2023 7:23 AM  Airway not difficult    General Information and Staff    Patient location during procedure: OR    Indications and Patient Condition  Indications for airway management: airway protection    Preoxygenated: yes  MILS maintained throughout  Mask difficulty assessment: 1 - vent by mask    Final Airway Details  Final airway type: supraglottic airway      Successful airway: I-gel  Size 4     Number of attempts at approach: 1  Assessment: lips, teeth, and gum same as pre-op and atraumatic intubation

## 2023-01-05 NOTE — ANESTHESIA PREPROCEDURE EVALUATION
Anesthesia Evaluation     Patient summary reviewed and Nursing notes reviewed   no history of anesthetic complications:  NPO Solid Status: > 8 hours  NPO Liquid Status: > 8 hours           Airway   Mallampati: II  TM distance: >3 FB  Neck ROM: full  No difficulty expected  Dental    (+) upper dentures    Pulmonary    (+) a smoker (quit 2010) Former, COPD, asthma,  Cardiovascular   Exercise tolerance: good (4-7 METS)    ECG reviewed    (+) CHF (No recent issues) , PVD,   (-) hypertension    ROS comment: Echo:  ? Baseline ECG of normal sinus rhythm noted at rest. Non-specific ST-T wave changes noted.  ? There was no clinically significant ST segment deviation noted during stress.  ? Post stress wall motion appears to be normal.  ? Low risk dobutamine stress echocardiogram with no significant ECG or echocardiographic evidence for ischemia.         Neuro/Psych  (+) TIA (during CHF exacerbation during cancer treatment), psychiatric history Depression,    (-) seizures, CVA  GI/Hepatic/Renal/Endo    (+) obesity,  GERD,    (-) liver disease, no renal disease, diabetes    Musculoskeletal     (+) back pain (s/p fusion),   Abdominal    Substance History      OB/GYN          Other   arthritis,    history of cancer (cancer of unknown primary lung, abdomen, pelvis, in remisison with chemo) remission                      Anesthesia Plan    ASA 3     general with block     intravenous induction     Anesthetic plan, risks, benefits, and alternatives have been provided, discussed and informed consent has been obtained with: patient.

## 2023-01-05 NOTE — DISCHARGE SUMMARY
[facility] Discharge Summary     Date of Discharge:  1/5/2023    Discharge Diagnosis: Status postrepair of nonunion medial malleolus distal fibula and harvested bone graft calcaneus.    Presenting Problem/History of Present Illness  Displaced fracture of medial malleolus of left tibia, subsequent encounter for closed fracture with nonunion [S82.52XK]     Hospital Course  Patient is a 62 y.o. 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  Had been treated with immobilization as well as bone stimulation.    Procedures Performed  Procedure(s):  REPAIR OF NONUNION MEDIAL MALLEOLUS, REPAIR NONUNION FIBULA, BONE GRAFT HARVEST, LEFT ANKLE       Consults:   Consults     No orders found from 12/7/2022 to 1/6/2023.              Condition on Discharge: Stable    Vital Signs  Temp:  [96.7 °F (35.9 °C)-97.5 °F (36.4 °C)] 96.7 °F (35.9 °C)  Heart Rate:  [59-74] 74  Resp:  [12-16] 14  BP: ()/(64-72) 102/67    Physical Exam:   Physical Exam  Vitals reviewed.   Constitutional:       Appearance: Normal appearance.   Cardiovascular:      Rate and Rhythm: Normal rate.   Pulmonary:      Effort: Pulmonary effort is normal.   Abdominal:      General: Abdomen is flat. There is no distension.      Tenderness: There is no abdominal tenderness.   Musculoskeletal:      Cervical back: Normal range of motion.   Skin:     Capillary Refill: Capillary refill takes 2 to 3 seconds.   Neurological:      Mental Status: She is alert.   Psychiatric:         Mood and Affect: Mood normal.         Behavior: Behavior normal.         Thought Content: Thought content normal.         Judgment: Judgment normal.         Discharge Disposition  Home or Self Care    Discharge Medications     Discharge Medications      New Medications      Instructions Start Date   HYDROmorphone 2 MG tablet  Commonly known as: Dilaudid   2 mg, Oral, Every 4 Hours PRN      HYDROmorphone 2 MG tablet  Commonly known  as: Dilaudid   2 mg, Oral, Every 4 Hours PRN      rivaroxaban 10 MG tablet  Commonly known as: Xarelto   10 mg, Oral, Daily   Start Date: January 6, 2023        Changes to Medications      Instructions Start Date   oxyCODONE-acetaminophen  MG per tablet  Commonly known as: PERCOCET  What changed:   · how much to take  · additional instructions   1 tablet, Oral, Every 4 Hours PRN, Take one tablet every four hours first 24 hours      oxyCODONE-acetaminophen  MG per tablet  Commonly known as: PERCOCET  What changed: You were already taking a medication with the same name, and this prescription was added. Make sure you understand how and when to take each.   1 tablet, Oral, Every 4 Hours PRN, Take one tablet every four hours first 24 hours         Continue These Medications      Instructions Start Date   Breztri Aerosphere 160-9-4.8 MCG/ACT aerosol inhaler  Generic drug: Budeson-Glycopyrrol-Formoterol   2 puffs, Inhalation, 2 Times Daily      CALCIUM CITRATE PO   600 mg, Oral, 3 Times Daily      cholecalciferol 25 MCG (1000 UT) tablet  Commonly known as: VITAMIN D3   1,000 Units, Oral, Daily, 2,000 units at night      escitalopram 20 MG tablet  Commonly known as: LEXAPRO   20 mg, Oral, Daily      esomeprazole 40 MG capsule  Commonly known as: nexIUM   40 mg, Oral, Every Morning Before Breakfast      fexofenadine-pseudoephedrine 180-240 MG per 24 hr tablet  Commonly known as: ALLEGRA-D 24   1 tablet, Oral, Daily PRN      gabapentin 800 MG tablet  Commonly known as: NEURONTIN   800 mg, Oral, 4 Times Daily, Takes 800mg at 5am and 1pm and 1600mg at 9pm      PROAIR HFA IN   2 puffs, Inhalation, 4 Times Daily PRN             Discharge Diet:   Diet Instructions     Advance Diet as Tolerated            Activity at Discharge:   Activity Instructions     Discharge Activity      1) No Driving For until reevaluated & No Longer Taking Narcotics  2) no Return to School / Work in until reevaluated      Non-Weight Bearing -  Specify Restrictions      Please fit and dispense crutches prior to discharge if needed.          Follow-up Appointments  No future appointments.  Additional Instructions for the Follow-ups that You Need to Schedule     Apply Ice to Affected Ankle / Foot Every 2 Hours   As directed      Discharge Follow-up with Specified Provider: bela; 1 Week   As directed      To: bela    Follow Up: 1 Week               Test Results Pending at Discharge  None       Bartolome Olguin DPM  01/05/23  09:48 CST

## 2023-01-05 NOTE — ANESTHESIA POSTPROCEDURE EVALUATION
Patient: Arminda Jiménez    Procedure Summary     Date: 01/05/23 Room / Location:  [facility] OR [address] [place] [address] [facility] OR    Anesthesia Start: 0659 Anesthesia Stop: 0932    Procedure: REPAIR OF NONUNION MEDIAL MALLEOLUS, REPAIR NONUNION FIBULA, BONE GRAFT HARVEST, LEFT ANKLE (Left: Ankle) Diagnosis:       Nonunion of fracture      Closed fracture of distal end of fibula with nonunion, unspecified fracture morphology, unspecified laterality, subsequent encounter      Closed displaced fracture of medial malleolus of tibia with nonunion      (Closed distal fibula fracture with nonunion, closed distal tibia fracture at the medial malleolus with nonunion)    Surgeons: Bartolome Olguin DPM Provider: Kris De La Cruz CRNA    Anesthesia Type: general with block ASA Status: 3          Anesthesia Type: general with block    Vitals  Vitals Value Taken Time   /68 01/05/23 1005   Temp 96.8 °F (36 °C) 01/05/23 1005   Pulse 83 01/05/23 1009   Resp 14 01/05/23 1005   SpO2 94 % 01/05/23 1009   Vitals shown include unvalidated device data.        Post Anesthesia Care and Evaluation    Patient location during evaluation: PACU  Patient participation: complete - patient participated  Level of consciousness: awake and alert  Pain management: adequate    Airway patency: patent  Anesthetic complications: No anesthetic complications    Cardiovascular status: acceptable  Respiratory status: acceptable  Hydration status: acceptable    Comments: Blood pressure 92/61, pulse 77, temperature 96.8 °F (36 °C), temperature source Temporal, resp. rate 16, SpO2 97 %.    Pt discharged from PACU based on nick score >8

## 2023-01-05 NOTE — OP NOTE
ANKLE OPEN REDUCTION INTERNAL FIXATION  Procedure Note    Arminda Jiménez  1/5/2023    Pre-op Diagnosis:   Closed distal fibula fracture with nonunion, closed distal tibia fracture at the medial malleolus with nonunion    Post-op Diagnosis:     Post-Op Diagnosis Codes:     * Nonunion of fracture [[id number]]     * Closed fracture of distal end of fibula with nonunion, unspecified fracture morphology, unspecified laterality, subsequent encounter [S82.839K]     * Closed displaced fracture of medial malleolus of tibia with nonunion [S82.53XK]     Procedure/CPT Codes:       Procedure(s):  1)  REPAIR OF NONUNION MEDIAL MALLEOLUS  2)  REPAIR NONUNION FIBULA  3)  BONE GRAFT HARVEST, LEFT ANKLE    Surgeon(s):  Bartolome Olguin DPM    Anesthesia: General with Block    Staff:   Circulator: Andrew Mcgarry RN  Scrub Person: Henna Valero; Andrew Ramos     was responsible for performing the following activities: Retraction and their skilled assistance was necessary for the success of this case.    Indications for procedure:  Nonunited fractures    Procedure details:  Patient brought the operating room placed under general anesthesia.  The patient did have a preoperative regional block per anesthesia preoperatively area.  Left leg prepped and draped in usual sterile fashion.  Following procedures were performed.    Procedure #1 repair of nonunion distal fibular fracture left    Attention was then directed to the lateral ankle where approximate 10 cm linear incision was made.  Was deep with sharp and blunt dissection technique.  The lateral plate was identified.  The syndesmotic screw was freed of soft tissue and bone removed the operative site in toto.  On the deep side of the plate there is nonunion in this area.  This was debrided with a rongeur and curette.  Bone surfaces were fenestrated.    Procedure #2 [place] bone graft calcaneus left    Attention was then directed to the lateral heel bone where a linear incision  was created obliquely.  Was deepened with sharp and blunt dissection technique.  The lateral cortex was pierced with a large Steinmann pin.  A Tyn[place]all 28 bone graft harvester was then used to harvest cancellous bone from the calcaneus.  Approximately 4 cc of bone graft was harvested.  This was then backfilled with cortical fiber bone graft and fiber putty to fill the dorsal aspect of the calcaneus.  Closure performed in layers.    Attention was then directed back to the fibula.  The previously harvested bone graft was combined with 0.5 cc cellect.  Was then packed into the nonunion site.  Closure performed in layers.    Procedure #3 repair of nonunion medial malleolus left    Attention was then directed to the medial ankle where a linear incision was created over previous cicatrix.  Was deepened with sharp and blunt dissection technique.  A linear periosteal incision was made.  The screws distally were freed of soft tissue and bone removed the operative site in toto.  The nonunion site was then identified and opened with an osteotome.  Nonviable bone was resected.  The fracture site was fenestrated.  Autogenous bone graft in the calcaneus was then packed into the site.  It was temporally fixated.  A medial hook plate from [place]yn[place] 28 was then placed medially.  Was tamped into place.  A plate tack was placed proximally.  A 4 oh cannulated screw was used through the tines distally.  A nonlocking screw was placed proximally.  Initially a 38 mm screw was placed and exchanged for a 30 mm screw.  2 additional locking screws were placed.  X-ray exam was performed throughout the procedure.  Closure performed in layers.  Well-padded compression bandage with posterior splint was applied.    Estimated Blood Loss: none    Specimens:                None      Drains: * No LDAs found *    Implants:   Implant Name Type Inv. Item Serial No.  Lot No. LRB No. Used Action   TIFFANIE APEX DB 2.5CC - [id number] Implant FIBR APEX  DBM 2.5CC  GENSANO  Left 1 Implanted   GRFT TISS CELLECT2 ECM - [id number] Implant GRFT TISS CELLECT2 ECM  GENSANO  Left 1 Implanted   FIBR APEX DBM 5CC - [id number] Implant FIBR APEX DBM 5CC  GENSANO  Left 1 Implanted   PUTTY BONE W/RADHA FIBR 2.5CC - [id number] Implant PUTTY BONE W/RADHA FIBR 2.5CC  GENSANO  Left 1 Implanted   WR OLIVE SMOTH 1.4MM - [id number] Implant WR OLIVE SMOTH 1.4MM  PARAGON 28  Left 2 Implanted   KWIRE SMOTH SGL/TROC 1.7K192GL NS - [id number] Implant KWIRE SMOTH SGL/TROC [id number] NS  PARAGON 28  Left 4 Implanted   KWIRE SMOTH SGL/TROC 2.3R152LD NS - [id number] Implant KWIRE SMOTH SGL/TROC [id number] NS  PARAGON 28  Left 2 Implanted   SCRQIAN MORENO MINI MONSTER FUL/THRD 4X30MM - [id number] Implant SCRQIAN MORENO MINI MONSTER FUL/THRD 4X30MM  PARAGON 28  Left 1 Implanted        Complications: none    Follow up:   Nonweightbearing.  3 to 5 days to follow-up.  Patient did tolerate this procedure well and will be able to be discharged today.  Prescriptions for Percocet 10/325 #42 1 tab p.o. every 4 hours as needed pain Dilaudid 2 mg #15 1 tab p.o. every 6 hours paren breakthrough pain.    Bartolome Olguin DPM     Date: 1/5/2023  Time: 09:38 CST

## 2023-02-21 DIAGNOSIS — M54.50 CHRONIC MIDLINE LOW BACK PAIN, UNSPECIFIED WHETHER SCIATICA PRESENT: ICD-10-CM

## 2023-02-21 DIAGNOSIS — G89.29 CHRONIC MIDLINE LOW BACK PAIN, UNSPECIFIED WHETHER SCIATICA PRESENT: ICD-10-CM

## 2023-02-21 RX ORDER — GABAPENTIN 800 MG/1
TABLET ORAL
Qty: 90 TABLET | Refills: 0 | Status: SHIPPED | OUTPATIENT
Start: 2023-02-21 | End: 2023-03-21

## 2023-03-21 ENCOUNTER — OFFICE VISIT (OUTPATIENT)
Dept: FAMILY MEDICINE CLINIC | Age: 63
End: 2023-03-21
Payer: MEDICARE

## 2023-03-21 VITALS
OXYGEN SATURATION: 99 % | BODY MASS INDEX: 24.63 KG/M2 | SYSTOLIC BLOOD PRESSURE: 122 MMHG | WEIGHT: 139 LBS | TEMPERATURE: 96.8 F | DIASTOLIC BLOOD PRESSURE: 76 MMHG | HEIGHT: 63 IN | HEART RATE: 75 BPM

## 2023-03-21 DIAGNOSIS — G62.9 NEUROPATHY: ICD-10-CM

## 2023-03-21 DIAGNOSIS — K21.9 GASTROESOPHAGEAL REFLUX DISEASE WITHOUT ESOPHAGITIS: ICD-10-CM

## 2023-03-21 DIAGNOSIS — J44.9 CHRONIC OBSTRUCTIVE PULMONARY DISEASE, UNSPECIFIED COPD TYPE (HCC): Primary | ICD-10-CM

## 2023-03-21 PROCEDURE — G8427 DOCREV CUR MEDS BY ELIG CLIN: HCPCS | Performed by: FAMILY MEDICINE

## 2023-03-21 PROCEDURE — 3023F SPIROM DOC REV: CPT | Performed by: FAMILY MEDICINE

## 2023-03-21 PROCEDURE — 3017F COLORECTAL CA SCREEN DOC REV: CPT | Performed by: FAMILY MEDICINE

## 2023-03-21 PROCEDURE — 3074F SYST BP LT 130 MM HG: CPT | Performed by: FAMILY MEDICINE

## 2023-03-21 PROCEDURE — G8420 CALC BMI NORM PARAMETERS: HCPCS | Performed by: FAMILY MEDICINE

## 2023-03-21 PROCEDURE — 3078F DIAST BP <80 MM HG: CPT | Performed by: FAMILY MEDICINE

## 2023-03-21 PROCEDURE — G8482 FLU IMMUNIZE ORDER/ADMIN: HCPCS | Performed by: FAMILY MEDICINE

## 2023-03-21 PROCEDURE — 1036F TOBACCO NON-USER: CPT | Performed by: FAMILY MEDICINE

## 2023-03-21 PROCEDURE — 99214 OFFICE O/P EST MOD 30 MIN: CPT | Performed by: FAMILY MEDICINE

## 2023-03-21 RX ORDER — BUDESONIDE, GLYCOPYRROLATE, AND FORMOTEROL FUMARATE 160; 9; 4.8 UG/1; UG/1; UG/1
AEROSOL, METERED RESPIRATORY (INHALATION)
Qty: 10.7 G | Refills: 5 | Status: SHIPPED | OUTPATIENT
Start: 2023-03-21

## 2023-03-21 RX ORDER — AZITHROMYCIN 250 MG/1
TABLET, FILM COATED ORAL
Qty: 6 TABLET | Refills: 0 | Status: SHIPPED | OUTPATIENT
Start: 2023-03-21

## 2023-03-21 RX ORDER — PREDNISONE 20 MG/1
20 TABLET ORAL 2 TIMES DAILY
Qty: 8 TABLET | Refills: 0 | Status: SHIPPED | OUTPATIENT
Start: 2023-03-21 | End: 2023-03-25

## 2023-03-21 RX ORDER — GABAPENTIN 800 MG/1
TABLET ORAL
Qty: 90 TABLET | Refills: 2 | Status: SHIPPED | OUTPATIENT
Start: 2023-03-21 | End: 2023-04-18

## 2023-03-21 RX ORDER — ESOMEPRAZOLE MAGNESIUM 40 MG/1
CAPSULE, DELAYED RELEASE ORAL
Qty: 90 CAPSULE | Refills: 3 | Status: SHIPPED | OUTPATIENT
Start: 2023-03-21

## 2023-03-21 ASSESSMENT — PATIENT HEALTH QUESTIONNAIRE - PHQ9
SUM OF ALL RESPONSES TO PHQ QUESTIONS 1-9: 0
6. FEELING BAD ABOUT YOURSELF - OR THAT YOU ARE A FAILURE OR HAVE LET YOURSELF OR YOUR FAMILY DOWN: 0
4. FEELING TIRED OR HAVING LITTLE ENERGY: 0
5. POOR APPETITE OR OVEREATING: 0
1. LITTLE INTEREST OR PLEASURE IN DOING THINGS: 0
8. MOVING OR SPEAKING SO SLOWLY THAT OTHER PEOPLE COULD HAVE NOTICED. OR THE OPPOSITE, BEING SO FIGETY OR RESTLESS THAT YOU HAVE BEEN MOVING AROUND A LOT MORE THAN USUAL: 0
2. FEELING DOWN, DEPRESSED OR HOPELESS: 0
3. TROUBLE FALLING OR STAYING ASLEEP: 0
7. TROUBLE CONCENTRATING ON THINGS, SUCH AS READING THE NEWSPAPER OR WATCHING TELEVISION: 0
SUM OF ALL RESPONSES TO PHQ QUESTIONS 1-9: 0
9. THOUGHTS THAT YOU WOULD BE BETTER OFF DEAD, OR OF HURTING YOURSELF: 0
SUM OF ALL RESPONSES TO PHQ QUESTIONS 1-9: 0
10. IF YOU CHECKED OFF ANY PROBLEMS, HOW DIFFICULT HAVE THESE PROBLEMS MADE IT FOR YOU TO DO YOUR WORK, TAKE CARE OF THINGS AT HOME, OR GET ALONG WITH OTHER PEOPLE: 0
SUM OF ALL RESPONSES TO PHQ9 QUESTIONS 1 & 2: 0
SUM OF ALL RESPONSES TO PHQ QUESTIONS 1-9: 0

## 2023-03-21 ASSESSMENT — ENCOUNTER SYMPTOMS
COUGH: 1
GASTROINTESTINAL NEGATIVE: 1
ALLERGIC/IMMUNOLOGIC NEGATIVE: 1
EYES NEGATIVE: 1
WHEEZING: 1

## 2023-03-21 NOTE — PROGRESS NOTES
Calcium Citrate-Vitamin D 200-250 MG-UNIT TABS Take 1 tablet by mouth 2 times daily (with meals)      escitalopram (LEXAPRO) 20 MG tablet escitalopram 20 mg tablet      oxyCODONE-acetaminophen (PERCOCET)  MG per tablet oxycodone-acetaminophen 10 mg-325 mg tablet      Rhubarb (ESTROVEN COMPLETE) 4 MG TABS Take by mouth      albuterol (PROVENTIL) (2.5 MG/3ML) 0.083% nebulizer solution Take 3 mLs by nebulization every 6 hours as needed for Wheezing 120 each 3     No current facility-administered medications for this visit. Allergies   Allergen Reactions    Cephalexin Other (See Comments)     Other reaction(s): blisters in mouth and nose  Blisters to nose and mouth  Blisters to nose and mouth      Aspirin Other (See Comments) and Nausea And Vomiting     vomiting  Unknown reaction      Nitrofurantoin Nausea And Vomiting    Creatine Monohydrate Other (See Comments)       Review of Systems   Constitutional: Negative. HENT: Negative. Eyes: Negative. Respiratory:  Positive for cough and wheezing. Cardiovascular: Negative. Gastrointestinal: Negative. Endocrine: Negative. Genitourinary: Negative. Musculoskeletal: Negative. Skin: Negative. Allergic/Immunologic: Negative. Neurological: Negative. Hematological: Negative. Psychiatric/Behavioral: Negative. OBJECTIVE:    Physical Exam  Constitutional:       Appearance: Normal appearance. She is well-developed and well-groomed. HENT:      Head: Normocephalic and atraumatic. Right Ear: Tympanic membrane, ear canal and external ear normal. There is no impacted cerumen. Left Ear: Tympanic membrane, ear canal and external ear normal. There is no impacted cerumen. Nose: Nose normal.      Mouth/Throat:      Lips: Pink. Mouth: Mucous membranes are moist.      Dentition: Normal dentition. Pharynx: Oropharynx is clear. Uvula midline. Eyes:      General: Lids are normal.         Right eye: No discharge.

## 2023-05-02 ENCOUNTER — TELEMEDICINE (OUTPATIENT)
Dept: FAMILY MEDICINE CLINIC | Age: 63
End: 2023-05-02
Payer: MEDICARE

## 2023-05-02 DIAGNOSIS — Z00.00 MEDICARE ANNUAL WELLNESS VISIT, SUBSEQUENT: Primary | ICD-10-CM

## 2023-05-02 DIAGNOSIS — G62.9 NEUROPATHY: ICD-10-CM

## 2023-05-02 PROBLEM — D62 POSTOPERATIVE ANEMIA DUE TO ACUTE BLOOD LOSS: Status: ACTIVE | Noted: 2022-05-09

## 2023-05-02 PROBLEM — S82.52XK: Status: ACTIVE | Noted: 2023-01-05

## 2023-05-02 PROBLEM — S82.892B OPEN LEFT ANKLE FRACTURE: Status: ACTIVE | Noted: 2022-05-08

## 2023-05-02 PROBLEM — R26.89 IMPAIRED GAIT AND MOBILITY: Status: ACTIVE | Noted: 2022-05-09

## 2023-05-02 PROCEDURE — G0439 PPPS, SUBSEQ VISIT: HCPCS | Performed by: FAMILY MEDICINE

## 2023-05-02 PROCEDURE — 3017F COLORECTAL CA SCREEN DOC REV: CPT | Performed by: FAMILY MEDICINE

## 2023-05-02 SDOH — ECONOMIC STABILITY: INCOME INSECURITY: HOW HARD IS IT FOR YOU TO PAY FOR THE VERY BASICS LIKE FOOD, HOUSING, MEDICAL CARE, AND HEATING?: SOMEWHAT HARD

## 2023-05-02 SDOH — ECONOMIC STABILITY: FOOD INSECURITY: WITHIN THE PAST 12 MONTHS, YOU WORRIED THAT YOUR FOOD WOULD RUN OUT BEFORE YOU GOT MONEY TO BUY MORE.: NEVER TRUE

## 2023-05-02 SDOH — ECONOMIC STABILITY: FOOD INSECURITY: WITHIN THE PAST 12 MONTHS, THE FOOD YOU BOUGHT JUST DIDN'T LAST AND YOU DIDN'T HAVE MONEY TO GET MORE.: NEVER TRUE

## 2023-05-02 SDOH — ECONOMIC STABILITY: HOUSING INSECURITY
IN THE LAST 12 MONTHS, WAS THERE A TIME WHEN YOU DID NOT HAVE A STEADY PLACE TO SLEEP OR SLEPT IN A SHELTER (INCLUDING NOW)?: NO

## 2023-05-02 ASSESSMENT — PATIENT HEALTH QUESTIONNAIRE - PHQ9
8. MOVING OR SPEAKING SO SLOWLY THAT OTHER PEOPLE COULD HAVE NOTICED. OR THE OPPOSITE, BEING SO FIGETY OR RESTLESS THAT YOU HAVE BEEN MOVING AROUND A LOT MORE THAN USUAL: 0
10. IF YOU CHECKED OFF ANY PROBLEMS, HOW DIFFICULT HAVE THESE PROBLEMS MADE IT FOR YOU TO DO YOUR WORK, TAKE CARE OF THINGS AT HOME, OR GET ALONG WITH OTHER PEOPLE: 0
4. FEELING TIRED OR HAVING LITTLE ENERGY: 1
2. FEELING DOWN, DEPRESSED OR HOPELESS: 0
SUM OF ALL RESPONSES TO PHQ QUESTIONS 1-9: 2
SUM OF ALL RESPONSES TO PHQ QUESTIONS 1-9: 2
5. POOR APPETITE OR OVEREATING: 0
SUM OF ALL RESPONSES TO PHQ QUESTIONS 1-9: 2
SUM OF ALL RESPONSES TO PHQ9 QUESTIONS 1 & 2: 1
9. THOUGHTS THAT YOU WOULD BE BETTER OFF DEAD, OR OF HURTING YOURSELF: 0
3. TROUBLE FALLING OR STAYING ASLEEP: 0
SUM OF ALL RESPONSES TO PHQ QUESTIONS 1-9: 2
1. LITTLE INTEREST OR PLEASURE IN DOING THINGS: 1
7. TROUBLE CONCENTRATING ON THINGS, SUCH AS READING THE NEWSPAPER OR WATCHING TELEVISION: 0
6. FEELING BAD ABOUT YOURSELF - OR THAT YOU ARE A FAILURE OR HAVE LET YOURSELF OR YOUR FAMILY DOWN: 0

## 2023-05-02 ASSESSMENT — LIFESTYLE VARIABLES
HOW OFTEN DO YOU HAVE A DRINK CONTAINING ALCOHOL: NEVER
HOW MANY STANDARD DRINKS CONTAINING ALCOHOL DO YOU HAVE ON A TYPICAL DAY: PATIENT DOES NOT DRINK

## 2023-05-02 NOTE — PATIENT INSTRUCTIONS
cholesterol . This type of diet is recommended for:   People with any form of cardiovascular disease (eg, coronary heart disease , peripheral vascular disease , previous heart attack , previous stroke )   People with risk factors for cardiovascular disease, such as high blood pressure , high cholesterol , or diabetes   Anyone who wants to lower their risk of developing cardiovascular disease   Sodium    Sodium is a mineral found in many foods. In general, most people consume much more sodium than they need. Diets high in sodium can increase blood pressure and lead to edema (water retention). On a heart-healthy diet, you should consume no more than 2,300 mg (milligrams) of sodium per dayabout the amount in one teaspoon of table salt. The foods highest in sodium include table salt (about 50% sodium), processed foods, convenience foods, and preserved foods. Cholesterol    Cholesterol is a fat-like, waxy substance in your blood. Our bodies make some cholesterol. It is also found in animal products, with the highest amounts in fatty meat, egg yolks, whole milk, cheese, shellfish, and organ meats. On a heart-healthy diet, you should limit your cholesterol intake to less than 200 mg per day. It is normal and important to have some cholesterol in your bloodstream. But too much cholesterol can cause plaque to build up within your arteries, which can eventually lead to a heart attack or stroke. The two types of cholesterol that are most commonly referred to are:   Low-density lipoprotein (LDL) cholesterol  Also known as bad cholesterol, this is the cholesterol that tends to build up along your arteries. Bad cholesterol levels are increased by eating fats that are saturated or hydrogenated. Optimal level of this cholesterol is less than 100. Over 130 starts to get risky for heart disease.    High-density lipoprotein (HDL) cholesterol  Also known as good cholesterol, this type of cholesterol actually carries cholesterol

## 2023-05-02 NOTE — TELEPHONE ENCOUNTER
I do not see me changing her medication frequency. If she was taking 4 times a day I am okay with that as long as insurance will pay for it.

## 2023-05-02 NOTE — PROGRESS NOTES
screening for lung cancer reviewed. Patient declined screening. Objective      Patient-Reported Vitals  No data recorded       Recent memory is intact with all 3 words recalled without difficulty. Remote memory is not tested at this time due to the VV per phone only. Allergies   Allergen Reactions    Cephalexin Other (See Comments)     Other reaction(s): blisters in mouth and nose  Blisters to nose and mouth  Blisters to nose and mouth      Aspirin Other (See Comments) and Nausea And Vomiting     vomiting  Unknown reaction      Nitrofurantoin Nausea And Vomiting    Creatine Monohydrate Other (See Comments)     Prior to Visit Medications    Medication Sig Taking? Authorizing Provider   gabapentin (NEURONTIN) 800 MG tablet TAKE ONE TABLET BY MOUTH THREE TIMES A DAY  Patient taking differently: 1 tablet.  TAKE ONE TABLET BY MOUTH THREE TIMES A DAY Yes Denise Mark MD   Budeson-Glycopyrrol-Formoterol (BREZTRI AEROSPHERE) 160-9-4.8 MCG/ACT AERO INHALE TWO PUFFS BY MOUTH TWICE A DAY (MORNING AND BEDTIME) Yes Denise Mark MD   albuterol sulfate HFA (PROVENTIL;VENTOLIN;PROAIR) 108 (90 Base) MCG/ACT inhaler INHALE 2 PUFFS BY MOUTH EVERY 4 TO 6 HOURS AS NEEDED, RESCUE INHALER Yes Denise Mark MD   esomeprazole (NEXIUM) 40 MG delayed release capsule Take 1 capsule by mouth every morning (before breakfast) Yes Denise Mark MD   Cholecalciferol (VITAMIN D3) 50 MCG (2000 UT) CAPS Take by mouth Yes Historical Provider, MD   Calcium Citrate-Vitamin D 200-250 MG-UNIT TABS Take 1 tablet by mouth 2 times daily (with meals) Yes Historical Provider, MD   escitalopram (LEXAPRO) 20 MG tablet escitalopram 20 mg tablet Yes Historical Provider, MD   oxyCODONE-acetaminophen (PERCOCET)  MG per tablet oxycodone-acetaminophen 10 mg-325 mg tablet Yes Historical Provider, MD   Rhubarb (ESTROVEN COMPLETE) 4 MG TABS Take by mouth Yes Historical Provider, MD   albuterol (PROVENTIL) (2.5 MG/3ML) 0.083% nebulizer solution Take 3

## 2023-05-02 NOTE — TELEPHONE ENCOUNTER
Patient was called for the AWV. She is upset her Gabapentin has been changed from four times per day to three times per day. She states this was just a recent change and due to her ankle pain, she cannot have it just three times per day. She requests it be increased back to four times per day.

## 2023-05-05 RX ORDER — GABAPENTIN 800 MG/1
800 TABLET ORAL 4 TIMES DAILY
Qty: 120 TABLET | Refills: 0 | Status: SHIPPED | OUTPATIENT
Start: 2023-05-05 | End: 2023-06-04

## 2023-06-21 ENCOUNTER — HOSPITAL ENCOUNTER (OUTPATIENT)
Dept: GENERAL RADIOLOGY | Age: 63
Discharge: HOME | End: 2023-06-21
Payer: MEDICARE

## 2023-06-21 DIAGNOSIS — E04.2 MULTINODULAR THYROID: ICD-10-CM

## 2023-06-21 PROCEDURE — 76536 US EXAM OF HEAD AND NECK: CPT

## 2023-07-10 DIAGNOSIS — G62.9 NEUROPATHY: ICD-10-CM

## 2023-07-10 RX ORDER — GABAPENTIN 800 MG/1
TABLET ORAL
Qty: 90 TABLET | Refills: 2 | Status: SHIPPED | OUTPATIENT
Start: 2023-07-10 | End: 2023-08-09

## 2023-07-21 ENCOUNTER — OFFICE VISIT (OUTPATIENT)
Dept: ENT CLINIC | Age: 63
End: 2023-07-21
Payer: MEDICARE

## 2023-07-21 VITALS
SYSTOLIC BLOOD PRESSURE: 130 MMHG | WEIGHT: 137 LBS | BODY MASS INDEX: 24.27 KG/M2 | DIASTOLIC BLOOD PRESSURE: 78 MMHG | HEIGHT: 63 IN

## 2023-07-21 DIAGNOSIS — E04.2 MULTINODULAR THYROID: Primary | ICD-10-CM

## 2023-07-21 PROCEDURE — 3017F COLORECTAL CA SCREEN DOC REV: CPT | Performed by: PHYSICIAN ASSISTANT

## 2023-07-21 PROCEDURE — G8420 CALC BMI NORM PARAMETERS: HCPCS | Performed by: PHYSICIAN ASSISTANT

## 2023-07-21 PROCEDURE — 99213 OFFICE O/P EST LOW 20 MIN: CPT | Performed by: PHYSICIAN ASSISTANT

## 2023-07-21 PROCEDURE — 3075F SYST BP GE 130 - 139MM HG: CPT | Performed by: PHYSICIAN ASSISTANT

## 2023-07-21 PROCEDURE — 1036F TOBACCO NON-USER: CPT | Performed by: PHYSICIAN ASSISTANT

## 2023-07-21 PROCEDURE — 3078F DIAST BP <80 MM HG: CPT | Performed by: PHYSICIAN ASSISTANT

## 2023-07-21 PROCEDURE — G8427 DOCREV CUR MEDS BY ELIG CLIN: HCPCS | Performed by: PHYSICIAN ASSISTANT

## 2023-07-21 NOTE — PROGRESS NOTES
Mrs. Jabari Calderon is a pleasant 70-year-old  female that presents for a 6-month thyroid exam after completing a repeat ultrasound the thyroid. Comparison study of the ultrasound was performed and demonstrated the right and left thyroid nodules to be stable with no major changes. Patient denies any issues with dysphagia or tenderness from the thyroid region. She has not had her thyroid lab drawn for this year yet. Physical examination revealed the patient to have normal TMs canals bilaterally. Oral exam demonstrated the tongue to be midline with no abnormality to the posterior pharynx. Neck exam demonstrated no lymphadenopathy. Patient was noted to have no obvious palpable nodules to palpation with no tenderness. Impression: Stable bilateral thyroid nodules confirmed by physical exam and ultrasound    Plan: I recommend repeating the ultrasound in 1 year for reevaluation. She is to see me in the clinic for repeat physical exam after the ultrasound has been completed. She was reminded to call if she notices any changes or develops any dysphagia.       Electronically signed by Maryam Rincon PA-C on 7/21/23 at 10:51 AM [email or web address]

## 2023-08-11 ENCOUNTER — OFFICE VISIT (OUTPATIENT)
Dept: FAMILY MEDICINE CLINIC | Age: 63
End: 2023-08-11
Payer: MEDICARE

## 2023-08-11 VITALS
HEIGHT: 63 IN | WEIGHT: 136 LBS | OXYGEN SATURATION: 94 % | TEMPERATURE: 96.9 F | SYSTOLIC BLOOD PRESSURE: 130 MMHG | HEART RATE: 86 BPM | DIASTOLIC BLOOD PRESSURE: 66 MMHG | BODY MASS INDEX: 24.1 KG/M2

## 2023-08-11 DIAGNOSIS — F41.9 ANXIETY: ICD-10-CM

## 2023-08-11 DIAGNOSIS — L85.3 DRY SKIN: Primary | ICD-10-CM

## 2023-08-11 DIAGNOSIS — G62.9 NEUROPATHY: ICD-10-CM

## 2023-08-11 DIAGNOSIS — L85.3 DRY SKIN: ICD-10-CM

## 2023-08-11 DIAGNOSIS — L74.510 AXILLARY HYPERHIDROSIS: ICD-10-CM

## 2023-08-11 LAB
ALBUMIN SERPL-MCNC: 3.9 G/DL (ref 3.5–5.2)
ALP SERPL-CCNC: 77 U/L (ref 35–104)
ALT SERPL-CCNC: 12 U/L (ref 5–33)
ANION GAP SERPL CALCULATED.3IONS-SCNC: 12 MMOL/L (ref 7–19)
AST SERPL-CCNC: 17 U/L (ref 5–32)
BILIRUB SERPL-MCNC: <0.2 MG/DL (ref 0.2–1.2)
BUN SERPL-MCNC: 6 MG/DL (ref 8–23)
CALCIUM SERPL-MCNC: 8.8 MG/DL (ref 8.8–10.2)
CHLORIDE SERPL-SCNC: 100 MMOL/L (ref 98–111)
CO2 SERPL-SCNC: 25 MMOL/L (ref 22–29)
CREAT SERPL-MCNC: 0.7 MG/DL (ref 0.5–0.9)
ERYTHROCYTE [DISTWIDTH] IN BLOOD BY AUTOMATED COUNT: 12.3 % (ref 11.5–14.5)
GLUCOSE SERPL-MCNC: 77 MG/DL (ref 74–109)
HCT VFR BLD AUTO: 34.5 % (ref 37–47)
HGB BLD-MCNC: 10.9 G/DL (ref 12–16)
MCH RBC QN AUTO: 31.6 PG (ref 27–31)
MCHC RBC AUTO-ENTMCNC: 31.6 G/DL (ref 33–37)
MCV RBC AUTO: 100 FL (ref 81–99)
PLATELET # BLD AUTO: 236 K/UL (ref 130–400)
PMV BLD AUTO: 11.2 FL (ref 9.4–12.3)
POTASSIUM SERPL-SCNC: 4.3 MMOL/L (ref 3.5–5)
PROT SERPL-MCNC: 6.8 G/DL (ref 6.6–8.7)
RBC # BLD AUTO: 3.45 M/UL (ref 4.2–5.4)
SODIUM SERPL-SCNC: 137 MMOL/L (ref 136–145)
TSH SERPL DL<=0.005 MIU/L-ACNC: 2.27 UIU/ML (ref 0.27–4.2)
WBC # BLD AUTO: 8.3 K/UL (ref 4.8–10.8)

## 2023-08-11 PROCEDURE — 3017F COLORECTAL CA SCREEN DOC REV: CPT | Performed by: FAMILY MEDICINE

## 2023-08-11 PROCEDURE — 3078F DIAST BP <80 MM HG: CPT | Performed by: FAMILY MEDICINE

## 2023-08-11 PROCEDURE — G8427 DOCREV CUR MEDS BY ELIG CLIN: HCPCS | Performed by: FAMILY MEDICINE

## 2023-08-11 PROCEDURE — 99214 OFFICE O/P EST MOD 30 MIN: CPT | Performed by: FAMILY MEDICINE

## 2023-08-11 PROCEDURE — 1036F TOBACCO NON-USER: CPT | Performed by: FAMILY MEDICINE

## 2023-08-11 PROCEDURE — 3075F SYST BP GE 130 - 139MM HG: CPT | Performed by: FAMILY MEDICINE

## 2023-08-11 PROCEDURE — G8420 CALC BMI NORM PARAMETERS: HCPCS | Performed by: FAMILY MEDICINE

## 2023-08-11 RX ORDER — AMMONIUM LACTATE 12 G/100G
CREAM TOPICAL
Qty: 385 G | Refills: 5 | Status: SHIPPED | OUTPATIENT
Start: 2023-08-11 | End: 2023-09-10

## 2023-08-11 RX ORDER — GABAPENTIN 800 MG/1
800 TABLET ORAL 4 TIMES DAILY
Qty: 120 TABLET | Refills: 2 | Status: SHIPPED | OUTPATIENT
Start: 2023-08-11 | End: 2023-11-09

## 2023-08-11 ASSESSMENT — ENCOUNTER SYMPTOMS
RESPIRATORY NEGATIVE: 1
EYES NEGATIVE: 1
ALLERGIC/IMMUNOLOGIC NEGATIVE: 1
GASTROINTESTINAL NEGATIVE: 1

## 2023-08-11 NOTE — PROGRESS NOTES
Right eye: No discharge. Left eye: No discharge. Extraocular Movements: Extraocular movements intact. Conjunctiva/sclera: Conjunctivae normal.      Right eye: Right conjunctiva is not injected. Left eye: Left conjunctiva is not injected. Pupils: Pupils are equal, round, and reactive to light. Neck:      Thyroid: No thyromegaly. Vascular: No carotid bruit or JVD. Cardiovascular:      Rate and Rhythm: Normal rate and regular rhythm. Pulses: Normal pulses. Carotid pulses are 2+ on the right side and 2+ on the left side. Radial pulses are 2+ on the right side and 2+ on the left side. Heart sounds: Normal heart sounds, S1 normal and S2 normal. No murmur heard. Pulmonary:      Effort: Pulmonary effort is normal.      Breath sounds: Normal breath sounds. Abdominal:      General: Bowel sounds are normal. There is no distension or abdominal bruit. Palpations: Abdomen is soft. There is no mass. Hernia: No hernia is present. Musculoskeletal:         General: Normal range of motion. Cervical back: Full passive range of motion without pain, normal range of motion and neck supple. Right lower leg: No edema. Left lower leg: No edema. Comments: Walk with a cane   Lymphadenopathy:      Cervical: No cervical adenopathy. Right cervical: No superficial cervical adenopathy. Left cervical: No superficial cervical adenopathy. Upper Body:      Right upper body: No supraclavicular adenopathy. Left upper body: No supraclavicular adenopathy. Skin:     General: Skin is warm and dry. Coloration: Skin is not pale. Findings: No lesion or rash. Nails: There is no clubbing. Comments: Multiple hyperpigmented lesions on her arms and dry skin. Neurological:      Mental Status: She is alert and oriented to person, place, and time. Motor: No weakness or tremor.       Coordination: Coordination normal.

## 2023-08-15 ENCOUNTER — PRE-ADMISSION TESTING (OUTPATIENT)
Dept: PREADMISSION TESTING | Facility: HOSPITAL | Age: 63
End: 2023-08-15
Payer: MEDICARE

## 2023-08-15 VITALS
DIASTOLIC BLOOD PRESSURE: 79 MMHG | WEIGHT: 136.91 LBS | SYSTOLIC BLOOD PRESSURE: 135 MMHG | BODY MASS INDEX: 25.85 KG/M2 | HEART RATE: 79 BPM | OXYGEN SATURATION: 95 % | HEIGHT: 61 IN | RESPIRATION RATE: 18 BRPM

## 2023-08-15 PROCEDURE — 93005 ELECTROCARDIOGRAM TRACING: CPT

## 2023-08-15 NOTE — DISCHARGE INSTRUCTIONS
Before you come to the hospital        Arrival time: AS DIRECTED BY OFFICE     YOU MAY TAKE THE FOLLOWING MEDICATION(S) THE MORNING OF SURGERY WITH A SIP OF WATER: GABAPENTIN, PERCOCET           ALL OTHER HOME MEDICATION CHECK WITH YOUR PHYSICIAN (especially if   you are taking diabetes medicines or blood thinners)    Do not take any Erectile Dysfunction medications (EX: CIALIS, VIAGRA) 24 hours prior to surgery.      If you were given and instructed to use a germ- killing soap, use as directed the night before surgery and again the morning of surgery or as directed by your surgeon. (Use one-half of the bottle with each shower.)   See attached information for How to Use Chlorhexidine for Bathing if applicable.            Eating and drinking restrictions prior to scheduled arrival time    2 Hours before arrival time STOP   Drinking Clear liquids (water, black coffee-NO CREAM,  apple juice-no pulp)      8 Hours before arrival time STOP   All food, full liquids, and dairy products    (It is extremely important that you follow these guidelines to prevent delay or cancelation of your procedure)     Clear Liquids  Water and flavored water                                                                      Clear Fruit juices, such as cranberry juice and apple juice.  Black coffee (NO cream of any kind, including powdered).  Plain tea  Clear bouillon or broth.  Flavored gelatin.  Soda.  Gatorade or Powerade.  Full liquid examples  Juices that have pulp.  Frozen ice pops that contain fruit pieces.  Coffee with creamer  Milk.  Yogurt.                MANAGING PAIN AFTER SURGERY    We know you are probably wondering what your pain will be like after surgery.  Following surgery it is unrealistic to expect you will not have pain.   Pain is how our bodies let us know that something is wrong or cautions us to be careful.  That said, our goal is to make your pain tolerable.    Methods we may use to treat your pain include (oral or  IV medications, PCAs, epidurals, nerve blocks, etc.)   While some procedures require IV pain medications for a short time after surgery, transitioning to pain medications by mouth allows for better management of pain.   Your nurse will encourage you to take oral pain medications whenever possible.  IV medications work almost immediately, but only last a short while.  Taking medications by mouth allows for a more constant level of medication in your blood stream for a longer period of time.      Once your pain is out of control it is harder to get back under control.  It is important you are aware when your next dose of pain medication is due.  If you are admitted, your nurse may write the time of your next dose on the white board in your room to help you remember.      We are interested in your pain and encourage you to inform us about aggravating factors during your visit.   Many times a simple repositioning every few hours can make a big difference.    If your physician says it is okay, do not let your pain prevent you from getting out of bed. Be sure to call your nurse for assistance prior to getting up so you do not fall.      Before surgery, please decide your tolerable pain goal.  These faces help describe the pain ratings we use on a 0-10 scale.   Be prepared to tell us your goal and whether or not you take pain or anxiety medications at home.          Preparing for Surgery  Preparing for surgery is an important part of your care. It can make things go more smoothly and help you avoid complications. The steps leading up to surgery may vary among hospitals. Follow all instructions given to you by your health care providers. Ask questions if you do not understand something. Talk about any concerns that you have.  Here are some questions to consider asking before your surgery:  If my surgery is not an emergency (is elective), when would be the best time to have the surgery?  What arrangements do I need to make  for work, home, or school?  What will my recovery be like? How long will it be before I can return to normal activities?  Will I need to prepare my home? Will I need to arrange care for me or my children?  Should I expect to have pain after surgery? What are my pain management options? Are there nonmedical options that I can try for pain?  Tell a health care provider about:  Any allergies you have.  All medicines you are taking, including vitamins, herbs, eye drops, creams, and over-the-counter medicines.  Any problems you or family members have had with anesthetic medicines.  Any blood disorders you have.  Any surgeries you have had.  Any medical conditions you have.  Whether you are pregnant or may be pregnant.  What are the risks?  The risks and complications of surgery depend on the specific procedure that you have. Discuss all the risks with your health care providers before your surgery. Ask about common surgical complications, which may include:  Infection.  Bleeding or a need for blood replacement (transfusion).  Allergic reactions to medicines.  Damage to surrounding nerves, tissues, or structures.  A blood clot.  Scarring.  Failure of the surgery to correct the problem.  Follow these instructions before the procedure:  Several days or weeks before your procedure  You may have a physical exam by your primary health care provider to make sure it is safe for you to have surgery.  You may have testing. This may include a chest X-ray, blood and urine tests, electrocardiogram (ECG), or other testing.  Ask your health care provider about:  Changing or stopping your regular medicines. This is especially important if you are taking diabetes medicines or blood thinners.  Taking medicines such as aspirin and ibuprofen. These medicines can thin your blood. Do not take these medicines unless your health care provider tells you to take them.  Taking over-the-counter medicines, vitamins, herbs, and supplements.  Do not  use any products that contain nicotine or tobacco, such as cigarettes and e-cigarettes. If you need help quitting, ask your health care provider.  Avoid alcohol.  Ask your health care provider if there are exercises you can do to prepare for surgery.  Eat a healthy diet.   Plan to have someone 18 years of age or older to take you home from the hospital. We will need to verify your ride on the morning of surgery if you are being discharged home on the same day. Tell your ride to be expecting a call from the hospital prior to your procedure.   Plan to have a responsible adult care for you for at least 24 hours after you leave the hospital or clinic. This is important.  The day before your procedure  You may be given antibiotic medicine to take by mouth to help prevent infection. Take it as told by your health care provider.  You may be asked to shower with a germ-killing soap.  Follow instructions from your health care provider about eating and drinking restrictions. This includes gum, mints and hard candy.  Pack comfortable clothes according to your procedure.   The day of your procedure  You may need to take another shower with a germ-killing soap before you leave home in the morning.  With a small sip of water, take only the medicines that you are told to take.  Remove all jewelry including rings.   Leave anything you consider valuable at home except hearing aids if needed.  You do not need to bring your home medications into the hospital.   Do not wear any makeup, nail polish, powder, deodorant, lotion, hair accessories, or anything on your skin or body except your clothes.  If you will be staying in the hospital, bring a case to hold your glasses, contacts, or dentures. You may also want to bring your robe and non-skid footwear.       (Do not use denture adhesives since you will be asked to remove them during  surgery).   If you wear oxygen at home, bring it with you the day of surgery.  If instructed by your  health care provider, bring your sleep apnea device with you on the day of your surgery (if this applies to you).  You may want to leave your suitcase and sleep apnea device in the car until after surgery.   Arrive at the hospital as scheduled.  Bring a friend or family member with you who can help to answer questions and be present while you meet with your health care provider.  At the hospital  When you arrive at the hospital:  Go to registration located at the main entrance of the hospital. You will be registered and given a beeper and a sticker sheet. Take the stickers to the Outpatient nurses desk and place in the black tray. This is to notify staff that you have arrived. Then return to the lobby to wait.   When your beeper lights up and vibrates proceed through the double doors, under the stairs, and a member of the Outpatient Surgery staff will escort you to your preoperative room.  You may have to wear compression sleeves. These help to prevent blood clots and reduce swelling in your legs.  An IV may be inserted into one of your veins.              In the operating room, you may be given one or more of the following:        A medicine to help you relax (sedative).        A medicine to numb the area (local anesthetic).        A medicine to make you fall asleep (general anesthetic).        A medicine that is injected into an area of your body to numb everything below the                      injection site (regional anesthetic).  You may be given an antibiotic through your IV to help prevent infection.  Your surgical site will be marked or identified.    Contact a health care provider if you:  Develop a fever of more than 100.4øF (38øC) or other feelings of illness during the 48 hours before your surgery.  Have symptoms that get worse.  Have questions or concerns about your surgery.  Summary  Preparing for surgery can make the procedure go more smoothly and lower your risk of complications.  Before surgery,  make a list of questions and concerns to discuss with your surgeon. Ask about the risks and possible complications.  In the days or weeks before your surgery, follow all instructions from your health care provider. You may need to stop smoking, avoid alcohol, follow eating restrictions, and change or stop your regular medicines.  Contact your surgeon if you develop a fever or other signs of illness during the few days before your surgery.  This information is not intended to replace advice given to you by your health care provider. Make sure you discuss any questions you have with your health care provider.  Document Revised: 12/21/2018 Document Reviewed: 10/23/2018  [employer] Patient Education c 2021 [employer]

## 2023-08-17 LAB
QT INTERVAL: 404 MS
QTC INTERVAL: 439 MS

## 2023-08-18 DIAGNOSIS — D64.9 ANEMIA, UNSPECIFIED TYPE: Primary | ICD-10-CM

## 2023-08-22 DIAGNOSIS — D64.9 ANEMIA, UNSPECIFIED TYPE: ICD-10-CM

## 2023-08-22 LAB
ERYTHROCYTE [DISTWIDTH] IN BLOOD BY AUTOMATED COUNT: 12.5 % (ref 11.5–14.5)
FERRITIN SERPL-MCNC: 24.8 NG/ML (ref 13–150)
FOLATE SERPL-MCNC: >20 NG/ML (ref 4.8–37.3)
HCT VFR BLD AUTO: 35.4 % (ref 37–47)
HGB BLD-MCNC: 10.9 G/DL (ref 12–16)
IRON SATN MFR SERPL: 35 % (ref 14–50)
IRON SERPL-MCNC: 94 UG/DL (ref 37–145)
MCH RBC QN AUTO: 31.1 PG (ref 27–31)
MCHC RBC AUTO-ENTMCNC: 30.8 G/DL (ref 33–37)
MCV RBC AUTO: 101.1 FL (ref 81–99)
PLATELET # BLD AUTO: 224 K/UL (ref 130–400)
PMV BLD AUTO: 11.3 FL (ref 9.4–12.3)
RBC # BLD AUTO: 3.5 M/UL (ref 4.2–5.4)
TIBC SERPL-MCNC: 272 UG/DL (ref 250–400)
VIT B12 SERPL-MCNC: 505 PG/ML (ref 211–946)
WBC # BLD AUTO: 6.2 K/UL (ref 4.8–10.8)

## 2023-08-22 RX ORDER — ESCITALOPRAM OXALATE 20 MG/1
20 TABLET ORAL DAILY
Qty: 30 TABLET | Refills: 5 | Status: SHIPPED | OUTPATIENT
Start: 2023-08-22

## 2023-08-29 ENCOUNTER — APPOINTMENT (OUTPATIENT)
Dept: GENERAL RADIOLOGY | Facility: HOSPITAL | Age: 63
End: 2023-08-29
Payer: MEDICARE

## 2023-08-29 ENCOUNTER — ANESTHESIA (OUTPATIENT)
Dept: PERIOP | Facility: HOSPITAL | Age: 63
End: 2023-08-29
Payer: MEDICARE

## 2023-08-29 ENCOUNTER — HOSPITAL ENCOUNTER (OUTPATIENT)
Facility: HOSPITAL | Age: 63
Setting detail: HOSPITAL OUTPATIENT SURGERY
Discharge: HOME | End: 2023-08-29
Attending: PODIATRIST | Admitting: PODIATRIST
Payer: MEDICARE

## 2023-08-29 ENCOUNTER — ANESTHESIA EVENT (OUTPATIENT)
Dept: PERIOP | Facility: HOSPITAL | Age: 63
End: 2023-08-29
Payer: MEDICARE

## 2023-08-29 VITALS
RESPIRATION RATE: 18 BRPM | HEART RATE: 77 BPM | SYSTOLIC BLOOD PRESSURE: 123 MMHG | TEMPERATURE: 98.8 F | DIASTOLIC BLOOD PRESSURE: 62 MMHG | OXYGEN SATURATION: 95 %

## 2023-08-29 DIAGNOSIS — M19.172 POST-TRAUMATIC ARTHRITIS OF LEFT ANKLE: Primary | ICD-10-CM

## 2023-08-29 PROCEDURE — 25010000002 PROPOFOL 10 MG/ML EMULSION: Performed by: NURSE ANESTHETIST, CERTIFIED REGISTERED

## 2023-08-29 PROCEDURE — C1776 JOINT DEVICE (IMPLANTABLE): HCPCS | Performed by: PODIATRIST

## 2023-08-29 PROCEDURE — 25010000002 ONDANSETRON PER 1 MG: Performed by: NURSE ANESTHETIST, CERTIFIED REGISTERED

## 2023-08-29 PROCEDURE — 25010000002 MIDAZOLAM PER 1 MG: Performed by: ANESTHESIOLOGY

## 2023-08-29 PROCEDURE — C1713 ANCHOR/SCREW BN/BN,TIS/BN: HCPCS | Performed by: PODIATRIST

## 2023-08-29 PROCEDURE — 76000 FLUOROSCOPY <1 HR PHYS/QHP: CPT

## 2023-08-29 PROCEDURE — C9290 INJ, BUPIVACAINE LIPOSOME: HCPCS | Performed by: ANESTHESIOLOGY

## 2023-08-29 PROCEDURE — 25010000002 FENTANYL CITRATE (PF) 50 MCG/ML SOLUTION: Performed by: ANESTHESIOLOGY

## 2023-08-29 PROCEDURE — 73600 X-RAY EXAM OF ANKLE: CPT

## 2023-08-29 PROCEDURE — 25010000002 FENTANYL CITRATE (PF) 100 MCG/2ML SOLUTION: Performed by: NURSE ANESTHETIST, CERTIFIED REGISTERED

## 2023-08-29 PROCEDURE — 25010000002 DEXAMETHASONE PER 1 MG: Performed by: NURSE ANESTHETIST, CERTIFIED REGISTERED

## 2023-08-29 PROCEDURE — 0 BUPIVACAINE LIPOSOME 1.3 % SUSPENSION: Performed by: ANESTHESIOLOGY

## 2023-08-29 PROCEDURE — 25010000002 DEXAMETHASONE PER 1 MG: Performed by: ANESTHESIOLOGY

## 2023-08-29 PROCEDURE — 25010000002 VANCOMYCIN 1 G RECONSTITUTED SOLUTION 1 EACH VIAL: Performed by: PODIATRIST

## 2023-08-29 PROCEDURE — 25010000002 BUPIVACAINE (PF) 0.5 % SOLUTION: Performed by: ANESTHESIOLOGY

## 2023-08-29 DEVICE — IMPLANTABLE DEVICE
Type: IMPLANTABLE DEVICE | Site: ANKLE | Status: FUNCTIONAL
Brand: INBONE

## 2023-08-29 DEVICE — IMPLANTABLE DEVICE
Type: IMPLANTABLE DEVICE | Site: ANKLE | Status: FUNCTIONAL
Brand: INBONE™ EVERLAST™

## 2023-08-29 RX ORDER — SUCCINYLCHOLINE/SOD CL,ISO/PF 200MG/10ML
SYRINGE (ML) INTRAVENOUS AS NEEDED
Status: DISCONTINUED | OUTPATIENT
Start: 2023-08-29 | End: 2023-08-29 | Stop reason: SURG

## 2023-08-29 RX ORDER — FENTANYL CITRATE 50 UG/ML
25 INJECTION, SOLUTION INTRAMUSCULAR; INTRAVENOUS
Status: DISCONTINUED | OUTPATIENT
Start: 2023-08-29 | End: 2023-08-29 | Stop reason: HOSPADM

## 2023-08-29 RX ORDER — MAGNESIUM HYDROXIDE 1200 MG/15ML
LIQUID ORAL AS NEEDED
Status: DISCONTINUED | OUTPATIENT
Start: 2023-08-29 | End: 2023-08-29 | Stop reason: HOSPADM

## 2023-08-29 RX ORDER — ENOXAPARIN SODIUM 100 MG/ML
40 INJECTION SUBCUTANEOUS DAILY
Qty: 12.4 ML | Refills: 0 | Status: SHIPPED | OUTPATIENT
Start: 2023-08-29 | End: 2023-09-28

## 2023-08-29 RX ORDER — HYDROMORPHONE HYDROCHLORIDE 2 MG/1
2 TABLET ORAL EVERY 4 HOURS PRN
Qty: 20 TABLET | Refills: 0 | Status: SHIPPED | OUTPATIENT
Start: 2023-08-29

## 2023-08-29 RX ORDER — FENTANYL CITRATE 50 UG/ML
INJECTION, SOLUTION INTRAMUSCULAR; INTRAVENOUS AS NEEDED
Status: DISCONTINUED | OUTPATIENT
Start: 2023-08-29 | End: 2023-08-29 | Stop reason: SURG

## 2023-08-29 RX ORDER — DEXAMETHASONE SODIUM PHOSPHATE 4 MG/ML
4 INJECTION, SOLUTION INTRA-ARTICULAR; INTRALESIONAL; INTRAMUSCULAR; INTRAVENOUS; SOFT TISSUE ONCE AS NEEDED
Status: COMPLETED | OUTPATIENT
Start: 2023-08-29 | End: 2023-08-29

## 2023-08-29 RX ORDER — FLUMAZENIL 0.1 MG/ML
0.2 INJECTION INTRAVENOUS AS NEEDED
Status: DISCONTINUED | OUTPATIENT
Start: 2023-08-29 | End: 2023-08-29 | Stop reason: HOSPADM

## 2023-08-29 RX ORDER — OXYCODONE AND ACETAMINOPHEN 10; 325 MG/1; MG/1
1 TABLET ORAL ONCE AS NEEDED
Status: COMPLETED | OUTPATIENT
Start: 2023-08-29 | End: 2023-08-29

## 2023-08-29 RX ORDER — PROPOFOL 10 MG/ML
VIAL (ML) INTRAVENOUS AS NEEDED
Status: DISCONTINUED | OUTPATIENT
Start: 2023-08-29 | End: 2023-08-29 | Stop reason: SURG

## 2023-08-29 RX ORDER — DROPERIDOL 2.5 MG/ML
0.62 INJECTION, SOLUTION INTRAMUSCULAR; INTRAVENOUS ONCE AS NEEDED
Status: DISCONTINUED | OUTPATIENT
Start: 2023-08-29 | End: 2023-08-29 | Stop reason: HOSPADM

## 2023-08-29 RX ORDER — DEXTROSE MONOHYDRATE 25 G/50ML
12.5 INJECTION, SOLUTION INTRAVENOUS AS NEEDED
Status: DISCONTINUED | OUTPATIENT
Start: 2023-08-29 | End: 2023-08-29 | Stop reason: HOSPADM

## 2023-08-29 RX ORDER — FENTANYL CITRATE 50 UG/ML
50 INJECTION, SOLUTION INTRAMUSCULAR; INTRAVENOUS ONCE
Status: COMPLETED | OUTPATIENT
Start: 2023-08-29 | End: 2023-08-29

## 2023-08-29 RX ORDER — HYDROMORPHONE HYDROCHLORIDE 1 MG/ML
0.5 INJECTION, SOLUTION INTRAMUSCULAR; INTRAVENOUS; SUBCUTANEOUS
Status: DISCONTINUED | OUTPATIENT
Start: 2023-08-29 | End: 2023-08-29 | Stop reason: HOSPADM

## 2023-08-29 RX ORDER — DEXAMETHASONE SODIUM PHOSPHATE 4 MG/ML
INJECTION, SOLUTION INTRA-ARTICULAR; INTRALESIONAL; INTRAMUSCULAR; INTRAVENOUS; SOFT TISSUE AS NEEDED
Status: DISCONTINUED | OUTPATIENT
Start: 2023-08-29 | End: 2023-08-29 | Stop reason: SURG

## 2023-08-29 RX ORDER — SODIUM CHLORIDE 0.9 % (FLUSH) 0.9 %
10 SYRINGE (ML) INJECTION AS NEEDED
Status: DISCONTINUED | OUTPATIENT
Start: 2023-08-29 | End: 2023-08-29 | Stop reason: HOSPADM

## 2023-08-29 RX ORDER — MIDAZOLAM HYDROCHLORIDE 1 MG/ML
1 INJECTION INTRAMUSCULAR; INTRAVENOUS
Status: DISCONTINUED | OUTPATIENT
Start: 2023-08-29 | End: 2023-08-29 | Stop reason: HOSPADM

## 2023-08-29 RX ORDER — IBUPROFEN 600 MG/1
600 TABLET ORAL ONCE AS NEEDED
Status: DISCONTINUED | OUTPATIENT
Start: 2023-08-29 | End: 2023-08-29 | Stop reason: HOSPADM

## 2023-08-29 RX ORDER — SODIUM CHLORIDE, SODIUM LACTATE, POTASSIUM CHLORIDE, CALCIUM CHLORIDE 600; 310; 30; 20 MG/100ML; MG/100ML; MG/100ML; MG/100ML
100 INJECTION, SOLUTION INTRAVENOUS CONTINUOUS PRN
Status: DISCONTINUED | OUTPATIENT
Start: 2023-08-29 | End: 2023-08-29 | Stop reason: HOSPADM

## 2023-08-29 RX ORDER — LABETALOL HYDROCHLORIDE 5 MG/ML
5 INJECTION, SOLUTION INTRAVENOUS
Status: DISCONTINUED | OUTPATIENT
Start: 2023-08-29 | End: 2023-08-29 | Stop reason: HOSPADM

## 2023-08-29 RX ORDER — ONDANSETRON 2 MG/ML
4 INJECTION INTRAMUSCULAR; INTRAVENOUS
Status: DISCONTINUED | OUTPATIENT
Start: 2023-08-29 | End: 2023-08-29 | Stop reason: HOSPADM

## 2023-08-29 RX ORDER — SODIUM CHLORIDE 9 MG/ML
40 INJECTION, SOLUTION INTRAVENOUS AS NEEDED
Status: DISCONTINUED | OUTPATIENT
Start: 2023-08-29 | End: 2023-08-29 | Stop reason: HOSPADM

## 2023-08-29 RX ORDER — SODIUM CHLORIDE, SODIUM LACTATE, POTASSIUM CHLORIDE, CALCIUM CHLORIDE 600; 310; 30; 20 MG/100ML; MG/100ML; MG/100ML; MG/100ML
9 INJECTION, SOLUTION INTRAVENOUS CONTINUOUS
Status: DISCONTINUED | OUTPATIENT
Start: 2023-08-29 | End: 2023-08-29 | Stop reason: HOSPADM

## 2023-08-29 RX ORDER — OXYCODONE AND ACETAMINOPHEN 10; 325 MG/1; MG/1
1 TABLET ORAL EVERY 4 HOURS PRN
Qty: 28 TABLET | Refills: 0 | Status: SHIPPED | OUTPATIENT
Start: 2023-08-29

## 2023-08-29 RX ORDER — ROCURONIUM BROMIDE 10 MG/ML
INJECTION, SOLUTION INTRAVENOUS AS NEEDED
Status: DISCONTINUED | OUTPATIENT
Start: 2023-08-29 | End: 2023-08-29 | Stop reason: SURG

## 2023-08-29 RX ORDER — SODIUM CHLORIDE 0.9 % (FLUSH) 0.9 %
10 SYRINGE (ML) INJECTION EVERY 12 HOURS SCHEDULED
Status: DISCONTINUED | OUTPATIENT
Start: 2023-08-29 | End: 2023-08-29 | Stop reason: HOSPADM

## 2023-08-29 RX ORDER — BUPIVACAINE HYDROCHLORIDE 5 MG/ML
INJECTION, SOLUTION EPIDURAL; INTRACAUDAL
Status: COMPLETED | OUTPATIENT
Start: 2023-08-29 | End: 2023-08-29

## 2023-08-29 RX ORDER — LIDOCAINE HYDROCHLORIDE 10 MG/ML
0.5 INJECTION, SOLUTION EPIDURAL; INFILTRATION; INTRACAUDAL; PERINEURAL ONCE AS NEEDED
Status: DISCONTINUED | OUTPATIENT
Start: 2023-08-29 | End: 2023-08-29 | Stop reason: HOSPADM

## 2023-08-29 RX ORDER — EPHEDRINE SULFATE 50 MG/ML
INJECTION, SOLUTION INTRAVENOUS AS NEEDED
Status: DISCONTINUED | OUTPATIENT
Start: 2023-08-29 | End: 2023-08-29 | Stop reason: SURG

## 2023-08-29 RX ORDER — LIDOCAINE HYDROCHLORIDE 20 MG/ML
INJECTION, SOLUTION EPIDURAL; INFILTRATION; INTRACAUDAL; PERINEURAL AS NEEDED
Status: DISCONTINUED | OUTPATIENT
Start: 2023-08-29 | End: 2023-08-29 | Stop reason: SURG

## 2023-08-29 RX ORDER — NALOXONE HCL 0.4 MG/ML
0.04 VIAL (ML) INJECTION AS NEEDED
Status: DISCONTINUED | OUTPATIENT
Start: 2023-08-29 | End: 2023-08-29 | Stop reason: HOSPADM

## 2023-08-29 RX ORDER — ONDANSETRON 2 MG/ML
INJECTION INTRAMUSCULAR; INTRAVENOUS AS NEEDED
Status: DISCONTINUED | OUTPATIENT
Start: 2023-08-29 | End: 2023-08-29 | Stop reason: SURG

## 2023-08-29 RX ORDER — NALOXONE HYDROCHLORIDE 4 MG/.1ML
SPRAY NASAL
Qty: 2 EACH | Refills: 0 | Status: SHIPPED | OUTPATIENT
Start: 2023-08-29

## 2023-08-29 RX ADMIN — FENTANYL CITRATE 50 MCG: 50 INJECTION, SOLUTION INTRAMUSCULAR; INTRAVENOUS at 09:10

## 2023-08-29 RX ADMIN — OXYCODONE AND ACETAMINOPHEN 1 TABLET: 10; 325 TABLET ORAL at 12:22

## 2023-08-29 RX ADMIN — SODIUM CHLORIDE, POTASSIUM CHLORIDE, SODIUM LACTATE AND CALCIUM CHLORIDE 100 ML/HR: 600; 310; 30; 20 INJECTION, SOLUTION INTRAVENOUS at 06:41

## 2023-08-29 RX ADMIN — LIDOCAINE HYDROCHLORIDE 100 MG: 20 INJECTION, SOLUTION EPIDURAL; INFILTRATION; INTRACAUDAL; PERINEURAL at 09:30

## 2023-08-29 RX ADMIN — BUPIVACAINE HYDROCHLORIDE 10 ML: 5 INJECTION, SOLUTION EPIDURAL; INTRACAUDAL; PERINEURAL at 09:11

## 2023-08-29 RX ADMIN — VANCOMYCIN HYDROCHLORIDE 1000 MG: 1 INJECTION, POWDER, LYOPHILIZED, FOR SOLUTION INTRAVENOUS at 09:20

## 2023-08-29 RX ADMIN — EPHEDRINE SULFATE 10 MG: 50 INJECTION INTRAVENOUS at 09:45

## 2023-08-29 RX ADMIN — MIDAZOLAM HYDROCHLORIDE 1 MG: 1 INJECTION, SOLUTION INTRAMUSCULAR; INTRAVENOUS at 09:10

## 2023-08-29 RX ADMIN — PROPOFOL 150 MG: 10 INJECTION, EMULSION INTRAVENOUS at 09:30

## 2023-08-29 RX ADMIN — Medication 120 MG: at 09:30

## 2023-08-29 RX ADMIN — BUPIVACAINE 10 ML: 13.3 INJECTION, SUSPENSION, LIPOSOMAL INFILTRATION at 09:11

## 2023-08-29 RX ADMIN — ONDANSETRON 4 MG: 2 INJECTION INTRAMUSCULAR; INTRAVENOUS at 11:45

## 2023-08-29 RX ADMIN — FENTANYL CITRATE 100 MCG: 50 INJECTION, SOLUTION INTRAMUSCULAR; INTRAVENOUS at 09:30

## 2023-08-29 RX ADMIN — BUPIVACAINE HYDROCHLORIDE 10 ML: 5 INJECTION, SOLUTION EPIDURAL; INTRACAUDAL at 09:14

## 2023-08-29 RX ADMIN — DEXAMETHASONE SODIUM PHOSPHATE 4 MG: 4 INJECTION INTRA-ARTICULAR; INTRALESIONAL; INTRAMUSCULAR; INTRAVENOUS; SOFT TISSUE at 09:13

## 2023-08-29 RX ADMIN — ROCURONIUM BROMIDE 10 MG: 10 INJECTION INTRAVENOUS at 09:30

## 2023-08-29 RX ADMIN — DEXAMETHASONE SODIUM PHOSPHATE 4 MG: 4 INJECTION, SOLUTION INTRA-ARTICULAR; INTRALESIONAL; INTRAMUSCULAR; INTRAVENOUS; SOFT TISSUE at 09:45

## 2023-08-29 NOTE — ANESTHESIA PROCEDURE NOTES
Peripheral Block      Patient reassessed immediately prior to procedure    Patient location during procedure: holding area  Start time: 8/29/2023 9:14 AM  Stop time: 8/29/2023 9:16 AM  Reason for block: procedure for pain, at surgeon's request, post-op pain management and Requested by   Performed by  Anesthesiologist: Shaan Landers MD  Preanesthetic Checklist  Completed: patient identified, IV checked, site marked, risks and benefits discussed, surgical consent, monitors and equipment checked, pre-op evaluation and timeout performed  Prep:  Prep: ChloraPrep  Patient monitoring: blood pressure monitoring, continuous pulse oximetry and EKG  Procedure    Sedation: yes    Guidance:ultrasound guided and Sciatic nerve identified and local anesthetic seen surrounding nerve    ULTRASOUND INTERPRETATION.  Using ultrasound guidance a 20 G gauge needle was placed in close proximity to the nerve, at which point, under ultrasound guidance anesthetic was injected in the area of the nerve and spread of the anesthesia was seen on ultrasound in close proximity thereto.  There were no abnormalities seen on ultrasound; a digital image was taken; and the patient tolerated the procedure with no complications. Images:still images obtained, printed/placed on chart (picture printed and placed in patients chart)  Loss of twitch: 0.5 mA  Block Type:sciatic and popliteal  Injection Technique:single-shot  Needle Type:echogenic      Medications Used: bupivacaine liposome (EXPAREL) 1.3 % injection - Peripheral Nerve   10 mL - 8/29/2023 9:14:00 AM  bupivacaine PF (MARCAINE) injection 0.5% - Injection   10 mL - 8/29/2023 9:14:00 AM      Post Assessment  Injection Assessment: negative aspiration for heme, no paresthesia on injection and incremental injection  Patient Tolerance:comfortable throughout block  Complications:no

## 2023-08-29 NOTE — ANESTHESIA POSTPROCEDURE EVALUATION
Patient: Arminda Jiménez    Procedure Summary       Date: 08/29/23 Room / Location:  [facility] OR [address] [place] [address] [facility]    Anesthesia Start: 0924 Anesthesia Stop: 1155    Procedure: TOTAL ANKLE ARTHROPLASTY WITH INBONE IMPLANT, GASTROCNEMIUS RECESSION, REMOVAL OF HARDWARE DEEP. LEFT ANKLE (Left: Ankle) Diagnosis:       Post-traumatic arthritis of ankle, left      Left ankle pain      Heel cord contracture      Retained orthopedic hardware      (Posttraumatic arthritis left ankle, left ankle pain, heel cord contracture, retained orthopedic hardware)    Surgeons: Bartolome Olguin DPM Provider: Reymundo Olivares CRNA    Anesthesia Type: general with block ASA Status: 3            Anesthesia Type: general with block    Vitals  Vitals Value Taken Time   /73 08/29/23 1225   Temp 98.8 øF (37.1 øC) 08/29/23 1229   Pulse 77 08/29/23 1229   Resp 15 08/29/23 1229   SpO2 92 % 08/29/23 1229           Post Anesthesia Care and Evaluation      Comments: Discharged per PACU Criteria

## 2023-08-29 NOTE — OP NOTE
ANKLE ARTHROPLASTY  Procedure Note    Arminda Jiménez  8/29/2023    Pre-op Diagnosis:   Posttraumatic arthritis left ankle, left ankle pain, heel cord contracture, retained orthopedic hardware    Post-op Diagnosis:     Post-Op Diagnosis Codes:     * Post-traumatic arthritis of ankle, left [M19.172]     * Left ankle pain [M25.572]     * Heel cord contracture [M67.00]     * Retained orthopedic hardware [Z96.9]     Procedure/CPT Codes:       Procedure(s):  1) TOTAL ANKLE ARTHROPLASTY WITH INBONE IMPLANT  2)  GASTROCNEMIUS RECESSION  3)  REMOVAL OF HARDWARE DEEP. LEFT ANKLE    Surgeon(s):  Bartolome Olguin DPM    Anesthesia: General with Block    Staff:   Circulator: Julieta Jack RN; Pardeep Brock RN  Scrub Person: Luis Eduardo Ramos; Lakeshia Guerra  Vendor Representative: Seymour Montano  Assistant: Iona Cervantes CST  Assistant: Iona Cervantes CST  was responsible for performing the following activities: Retraction and their skilled assistance was necessary for the success of this case.    Indications for procedure:  Pain.    Procedure details:  The patient was brought the operating room placed under general anesthesia.  She did have a preoperative regional block per anesthesia preop holding area.  Left leg prepped and draped in usual sterile fashion.    Procedure #1 removal of hardware deep    She was then directed medial ankle where a linear incision was made.  Deepened with sharp and blunt dissection technique.  Linear periosteal incision was made.  The hardware was freed of soft tissue and bone removed the operative site in toto.  X-ray exam was performed.  Wound was irrigated closed in layers.    Procedure #2 total ankle arthroplasty with implant left and bone type    Attention was then direct the anterior ankle where an approximate 13 cm linear incision was made.  Deepened with sharp and blunt dissection technique.  A linear incision was created the extensor retinaculum.  The tibialis anterior tendon was retracted  medially.  The extensor extensor tendons and neurovascular bundle were retracted laterally.  A linear capsular periosteal incision was made.  The periosteum and capsular tissues were elevated.  Loose bodies were removed.  The alignment guide was placed on the tibia.  1 pin was placed.  X-ray exam was performed.  The second pin was placed after proper alignment was appreciated.  The guide was then removed and the cut guide was placed.  And oblique wires placed in the cut guide.  Again x-ray exam was performed.  The tibial cuts were then made.  The cut guide was then removed.  The tibial bone was then removed.  The talar alignment guide was then placed and temporally fixated.  X-ray exam was performed.  The talar alignment guide was then removed and the cut guide was placed.  The talar cut was then made.  The cut portion of the talus was removed.  The gutters were cleared.  The wound was irrigated and a rasp was used to ensure level borders.  The C bracket was then introduced and fixated.  Again x-ray exam was performed.  A linear incision was created plantarly.  The trocar cannula was then introduced plantarly.  Entry reaming was performed under fluoroscopic guidance.  Reaming was performed by hand under AP and lateral fluoroscopic guidance.  The heel portion of the C bracket was pinned and the remainder the C bracket was removed.  Wound was irrigated.  A tibial trial was introduced and sizing was appropriate.  The top and one of the midportion stems of the inbound stem were then introduced.  The second midportion stem was then screwed into place.  The the base of the stem was then screwed into place.  The tibial tray was then tamped into place and the Abebe taper was set.  The tibia was then tamped into the appropriate portion of the tibia.  A talar sizing guide was then introduced with a poly.  Was temporally fixated.  X-ray exam was performed.  The anterior peg holes were drilled.  Small wires were were removed  after a large wire was placed centrally.  The poly in the talar trial were removed.  The central talus was reamed over the wire.  Wounds and irrigated.  The talus was then tamped into place.  8 mm poly was then chosen.  Did fit well placed without difficulty.  Gastrocnemius recession was deemed necessary.  Wound was irrigated.  Capsular and periosteal tissues were repaired with 0 Vicryl.  The extensor retinaculum was repaired with 0 Vicryl.  Subcutaneous tissues were repaired with 2-0 Vicryl and skin was repaired with 3-0 nylon.    Procedure #3 gastrocnemius recession left.    Attention was then directed posterior aspect patient's left leg where a linear incision was made.  Deep with sharp and blunt dissection technique.  A linear incision was created the peritenon.  The foot was Dorsiflexed and the fascia overlying the gastrocnemius muscle belly was lengthened in Carey fashion.  Wound was irrigated.  Peritenon was repaired with 2-0 Vicryl.  Subcutaneous tissues were repaired with 2-0 Vicryl and skin is repaired with 3-0 nylon.    A well-padded compression bandage with posterior splint was applied.        Estimated Blood Loss: none    Specimens:                None      Drains:   Urethral Catheter Non-latex;Straight-tip 16 Fr. (Active)       Implants:   Implant Name Type Inv. Item Serial No.  Lot No. LRB No. Used Action   STEM TIB INBONE MID 14MM RT/LT - [id number] Implant STEM TIB INBONE MID 14MM RT/LT  [employer] [id number] Left 1 Implanted   STEM TIB INBONE MID 14MM RT/LT - [id number] Implant STEM TIB INBONE MID 14MM RT/LT  [employer] [id number] Left 1 Implanted   STEM TIB INBONE TOP 14MM RT/LT - [id number] Implant STEM TIB INBONE TOP 14MM RT/LT  [employer] [id number] Left 1 Implanted   TRY TIB ANKL INBONE LNG SZ3 LT - [id number] Implant TRY TIB ANKL INBONE LNG SZ3 LT  [employer] [id number] Left 1 Implanted   STEM TIB INBONE BASE 16MM RT/LT - [id number] Implant STEM TIB  INBONE BASE 16MM RT/LT  [employer] TECH [id number] Left 1 Implanted   DOME INBONE TALAR SULCUS SZ2 - [id number] Implant DOME INBONE TALAR SULCUS SZ2  [employer] TECH [id number] Left 1 Implanted   STEM INBONE TALAR 10MM - [id number] Implant STEM INBONE TALAR 10MM  [employer] TECH [id number] Left 1 Implanted   INSRT TIB/ANKL INBONE2 EVERLAST POLY SZ2PLS 8MM RT/LT - [id number] Implant INSRT TIB/ANKL INBONE2 EVERLAST POLY SZ2PLS 8MM RT/LT  [employer] TECH [id number] Left 1 Implanted        Complications: none           Follow up:   Nonweightbearing.  3 to 5 days for follow-up.  Prescriptions for Percocet 10, Dilaudid, Lovenox were given    Bartolome Olguin DPM     Date: 8/29/2023  Time: 12:05 CDT

## 2023-08-29 NOTE — DISCHARGE INSTRUCTIONS
What to expect after a Nerve Block  Nerve blocks administered to block pain affect many types of nerves, including those nerves that control movement, pain, and normal sensation.  Following a nerve block, you may notice some bruising at the site where the block was given.  You may experience sensations such as:  numbness of the affected area or limb, tingling, heaviness (that is the limb feels heavy to you), weakness or inability to move the affected arm or leg, or a feeling as if your arm or leg has “fallen asleep”.    A nerve block can last from 9-18 hours depending on the medications used.  Usually the weakness wears off first followed by the tingling and heaviness.  As the block wears off, you may begin to notice pain; however, this sequence of events may occur in any order.  Typically, you will be able to move your limb before you will feel it.  Once a nerve block begins to wear off, the effects are usually completely gone within 60 minutes.    If you experience continued side effects that you believe are block related for longer than 48 hours, please call your healthcare provider.  Please see block-specific instructions below.    Instructions for any Block involving the leg/foot:  If you have had a leg/foot block, you should not bear weight on the affected leg until the block has worn off.  After the block has worn off, weight bearing should be as directed by your surgeon.  You may be sent home with crutches.  You are at high risk for falling because of the anesthetic effects on your leg.  Please use caution when standing or trying to move or walk.  Have someone assist you until your leg and foot function have returned to normal.    Protection of a “blocked” limb  After a nerve block, you cannot feel pain, pressure, or extremes of temperature in the affected limb.  And because of this, your blocked limb is at more risk for injury.  For example, it is possible to burn your limb on an extremely hot surface  without feeling it.  When resting, it is important to reposition your limb periodically to avoid prolonged pressure on it.  This may require the use of pillows and padding.  While sleeping, you should avoid rolling onto the affected limb or putting too much pressure on it.  If you have a cast or tight dressing, check the color of your toes of the affected limb.  Call your surgeon if they look discolored (that is, dusky, dark colored)  Use caution in cold weather.  Cover your limb appropriately to protect it from the cold.  Pain Management  Your surgeon will give you a prescription for pain medication.  Begin taking this before the nerve block wears off.  Bear in mind that sometimes the block can wear off in the middle of the night.

## 2023-08-29 NOTE — ANESTHESIA PROCEDURE NOTES
Airway  Urgency: elective    Date/Time: 8/29/2023 9:31 AM  Airway not difficult    General Information and Staff    Patient location during procedure: OR  CRNA/CAA: Reymundo Olivares CRNA    Indications and Patient Condition  Indications for airway management: airway protection    Preoxygenated: yes  Mask difficulty assessment: 1 - vent by mask    Final Airway Details  Final airway type: endotracheal airway      Successful airway: ETT  Cuffed: yes   Successful intubation technique: direct laryngoscopy  Endotracheal tube insertion site: oral  Blade: [place]  Blade size: 2  ETT size (mm): 7.0  Cormack-Lehane Classification: grade IIa - partial view of glottis  Placement verified by: capnometry   Cuff volume (mL): 7  Measured from: lips  ETT/EBT  to lips (cm): 21  Number of attempts at approach: 1  Assessment: lips, teeth, and gum same as pre-op and atraumatic intubation

## 2023-08-29 NOTE — ANESTHESIA PROCEDURE NOTES
Peripheral Block      Patient reassessed immediately prior to procedure    Patient location during procedure: holding area  Start time: 8/29/2023 9:11 AM  Stop time: 8/29/2023 9:12 AM  Reason for block: procedure for pain, at surgeon's request, post-op pain management and Requested by   Performed by  Anesthesiologist: Shaan Landers MD  Preanesthetic Checklist  Completed: patient identified, IV checked, site marked, risks and benefits discussed, surgical consent, monitors and equipment checked, pre-op evaluation and timeout performed  Prep:  Pt Position: supine  Prep: ChloraPrep  Patient monitoring: blood pressure monitoring, continuous pulse oximetry and EKG  Procedure    Sedation: yes    Guidance:ultrasound guided and femoral artery identified in adductor canal and local anesthetic seen surrounding artery    ULTRASOUND INTERPRETATION.  Using ultrasound guidance a 20 G gauge needle was placed in close proximity to the nerve, at which point, under ultrasound guidance anesthetic was injected in the area of the nerve and spread of the anesthesia was seen on ultrasound in close proximity thereto.  There were no abnormalities seen on ultrasound; a digital image was taken; and the patient tolerated the procedure with no complications. Images:still images obtained, printed/placed on chart (picture printed and placed in patients chart)    Laterality:left  Block Type:adductor canal block  Injection Technique:single-shot  Needle Type:echogenic      Medications Used: bupivacaine liposome (EXPAREL) 1.3 % injection - Peripheral Nerve   10 mL - 8/29/2023 9:11:00 AM  bupivacaine PF (MARCAINE) injection 0.5% - Injection   10 mL - 8/29/2023 9:11:00 AM      Post Assessment  Injection Assessment: negative aspiration for heme, no paresthesia on injection and incremental injection  Patient Tolerance:comfortable throughout block  Complications:no

## 2023-08-29 NOTE — ANESTHESIA PREPROCEDURE EVALUATION
Anesthesia Evaluation     Patient summary reviewed   NPO Solid Status: > 8 hours             Airway   Mallampati: II  Dental    (+) upper dentures    Pulmonary    (+) a smoker Former, COPD,  (-) asthma, sleep apnea  Cardiovascular     (+) PVD  (-) pacemaker, past MI, angina, cardiac stents      Neuro/Psych  (-) seizures, TIA, CVA  GI/Hepatic/Renal/Endo    (+) GERD  (-) liver disease, no renal disease, diabetes    Musculoskeletal     (+) chronic pain  Abdominal    Substance History      OB/GYN          Other                      Anesthesia Plan    ASA 3     general with block     intravenous induction     Anesthetic plan, risks, benefits, and alternatives have been provided, discussed and informed consent has been obtained with: patient.    CODE STATUS:

## 2023-09-20 ENCOUNTER — TRANSCRIBE ORDERS (OUTPATIENT)
Dept: ADMINISTRATIVE | Facility: HOSPITAL | Age: 63
End: 2023-09-20
Payer: MEDICARE

## 2023-09-20 DIAGNOSIS — M54.6 PAIN IN THORACIC SPINE: Primary | ICD-10-CM

## 2023-10-11 ENCOUNTER — HOSPITAL ENCOUNTER (OUTPATIENT)
Dept: MRI IMAGING | Facility: HOSPITAL | Age: 63
Discharge: HOME | End: 2023-10-11
Admitting: STUDENT IN AN ORGANIZED HEALTH CARE EDUCATION/TRAINING PROGRAM
Payer: MEDICARE

## 2023-10-11 DIAGNOSIS — M54.6 PAIN IN THORACIC SPINE: ICD-10-CM

## 2023-10-11 PROCEDURE — 72146 MRI CHEST SPINE W/O DYE: CPT

## 2023-10-26 ENCOUNTER — OFFICE VISIT (OUTPATIENT)
Dept: FAMILY MEDICINE CLINIC | Age: 63
End: 2023-10-26
Payer: MEDICARE

## 2023-10-26 VITALS
TEMPERATURE: 96.7 F | OXYGEN SATURATION: 98 % | SYSTOLIC BLOOD PRESSURE: 116 MMHG | DIASTOLIC BLOOD PRESSURE: 70 MMHG | HEIGHT: 63 IN | WEIGHT: 137 LBS | HEART RATE: 90 BPM | BODY MASS INDEX: 24.27 KG/M2

## 2023-10-26 DIAGNOSIS — Z12.31 BREAST CANCER SCREENING BY MAMMOGRAM: ICD-10-CM

## 2023-10-26 DIAGNOSIS — Z23 NEED FOR INFLUENZA VACCINATION: ICD-10-CM

## 2023-10-26 DIAGNOSIS — J42 CHRONIC BRONCHITIS, UNSPECIFIED CHRONIC BRONCHITIS TYPE (HCC): Primary | ICD-10-CM

## 2023-10-26 PROCEDURE — 3074F SYST BP LT 130 MM HG: CPT | Performed by: FAMILY MEDICINE

## 2023-10-26 PROCEDURE — G8427 DOCREV CUR MEDS BY ELIG CLIN: HCPCS | Performed by: FAMILY MEDICINE

## 2023-10-26 PROCEDURE — 3078F DIAST BP <80 MM HG: CPT | Performed by: FAMILY MEDICINE

## 2023-10-26 PROCEDURE — 99213 OFFICE O/P EST LOW 20 MIN: CPT | Performed by: FAMILY MEDICINE

## 2023-10-26 PROCEDURE — G8420 CALC BMI NORM PARAMETERS: HCPCS | Performed by: FAMILY MEDICINE

## 2023-10-26 PROCEDURE — 3023F SPIROM DOC REV: CPT | Performed by: FAMILY MEDICINE

## 2023-10-26 PROCEDURE — G8484 FLU IMMUNIZE NO ADMIN: HCPCS | Performed by: FAMILY MEDICINE

## 2023-10-26 PROCEDURE — 3017F COLORECTAL CA SCREEN DOC REV: CPT | Performed by: FAMILY MEDICINE

## 2023-10-26 PROCEDURE — 1036F TOBACCO NON-USER: CPT | Performed by: FAMILY MEDICINE

## 2023-10-26 RX ORDER — ALBUTEROL SULFATE 2.5 MG/3ML
2.5 SOLUTION RESPIRATORY (INHALATION) EVERY 6 HOURS PRN
Qty: 120 EACH | Refills: 3 | Status: SHIPPED | OUTPATIENT
Start: 2023-10-26

## 2023-10-26 RX ORDER — PREDNISONE 20 MG/1
20 TABLET ORAL 2 TIMES DAILY
Qty: 8 TABLET | Refills: 0 | Status: SHIPPED | OUTPATIENT
Start: 2023-10-26 | End: 2023-10-30

## 2023-10-26 RX ORDER — DOXYCYCLINE HYCLATE 100 MG/1
100 CAPSULE ORAL 2 TIMES DAILY
Qty: 20 CAPSULE | Refills: 0 | Status: SHIPPED | OUTPATIENT
Start: 2023-10-26 | End: 2023-11-05

## 2023-10-26 ASSESSMENT — ENCOUNTER SYMPTOMS
GASTROINTESTINAL NEGATIVE: 1
WHEEZING: 1
COUGH: 1
EYES NEGATIVE: 1
ALLERGIC/IMMUNOLOGIC NEGATIVE: 1

## 2023-10-27 PROCEDURE — G0008 ADMIN INFLUENZA VIRUS VAC: HCPCS | Performed by: FAMILY MEDICINE

## 2023-10-27 PROCEDURE — 90694 VACC AIIV4 NO PRSRV 0.5ML IM: CPT | Performed by: FAMILY MEDICINE

## 2023-11-17 NOTE — PROGRESS NOTES
Exercise Oximetry    Patient Name:Arminda Jiménez   MRN: [id number]   Date: 05/12/22             ROOM AIR BASELINE   SpO2% 95   Heart Rate 102   Blood Pressure      EXERCISE ON ROOM AIR SpO2% EXERCISE ON O2 @  LPM SpO2%   1 MINUTE  1 MINUTE    2 MINUTES  2 MINUTES    3 MINUTES  3 MINUTES    4 MINUTES 92 4 MINUTES    5 MINUTES  5 MINUTES    6 MINUTES  6 MINUTES               Distance Walked  40 Distance Walked   Dyspnea (Anish Scale)  5 Dyspnea (Anish Scale)   Fatigue (Anish Scale)  5 Fatigue (Anish Scale)   SpO2% Post Exercise  96 SpO2% Post Exercise   HR Post Exercise  92 HR Post Exercise   Time to Recovery  5 minutes Time to Recovery     Comments:   Patient walked with PT sat on room air 92% per PT note  Recommendation: No oxygen needed at this time   101

## 2023-12-01 DIAGNOSIS — J44.9 CHRONIC OBSTRUCTIVE PULMONARY DISEASE, UNSPECIFIED COPD TYPE (HCC): ICD-10-CM

## 2023-12-01 RX ORDER — BUDESONIDE, GLYCOPYRROLATE, AND FORMOTEROL FUMARATE 160; 9; 4.8 UG/1; UG/1; UG/1
AEROSOL, METERED RESPIRATORY (INHALATION)
Qty: 10.7 G | Refills: 1 | Status: SHIPPED | OUTPATIENT
Start: 2023-12-01

## 2023-12-06 ENCOUNTER — TELEPHONE (OUTPATIENT)
Dept: MAMMOGRAPHY | Facility: HOSPITAL | Age: 63
End: 2023-12-06
Payer: MEDICARE

## 2023-12-06 NOTE — TELEPHONE ENCOUNTER
Called Ms. Jiménez to remind her that she was overdue for scan. She said she wanted to reach out to Dr. Gonzalez and see if he thought she should have scan. She will call back after she has spoke to him.

## 2024-01-01 ENCOUNTER — HOSPITAL ENCOUNTER (OUTPATIENT)
Dept: INFUSION THERAPY | Age: 64
Discharge: HOME | End: 2024-09-30

## 2024-01-01 DIAGNOSIS — G62.9 NEUROPATHY: ICD-10-CM

## 2024-01-02 RX ORDER — GABAPENTIN 800 MG/1
TABLET ORAL
Qty: 120 TABLET | Refills: 2 | Status: SHIPPED | OUTPATIENT
Start: 2024-01-02 | End: 2024-02-01

## 2024-01-08 ENCOUNTER — OFFICE VISIT (OUTPATIENT)
Dept: FAMILY MEDICINE CLINIC | Age: 64
End: 2024-01-08
Payer: MEDICARE

## 2024-01-08 ENCOUNTER — HOSPITAL ENCOUNTER (OUTPATIENT)
Dept: GENERAL RADIOLOGY | Age: 64
Discharge: HOME | End: 2024-01-08
Payer: MEDICARE

## 2024-01-08 VITALS
TEMPERATURE: 97.6 F | HEIGHT: 63 IN | OXYGEN SATURATION: 95 % | SYSTOLIC BLOOD PRESSURE: 126 MMHG | HEART RATE: 98 BPM | DIASTOLIC BLOOD PRESSURE: 62 MMHG | BODY MASS INDEX: 23.57 KG/M2 | WEIGHT: 133 LBS

## 2024-01-08 DIAGNOSIS — J44.9 CHRONIC OBSTRUCTIVE PULMONARY DISEASE, UNSPECIFIED COPD TYPE (HCC): ICD-10-CM

## 2024-01-08 DIAGNOSIS — J44.9 CHRONIC OBSTRUCTIVE PULMONARY DISEASE, UNSPECIFIED COPD TYPE (HCC): Primary | ICD-10-CM

## 2024-01-08 LAB
INFLUENZA A ANTIBODY: NORMAL
INFLUENZA B ANTIBODY: NORMAL
RSV ANTIGEN: ABNORMAL

## 2024-01-08 PROCEDURE — 3017F COLORECTAL CA SCREEN DOC REV: CPT | Performed by: FAMILY MEDICINE

## 2024-01-08 PROCEDURE — G8420 CALC BMI NORM PARAMETERS: HCPCS | Performed by: FAMILY MEDICINE

## 2024-01-08 PROCEDURE — 99213 OFFICE O/P EST LOW 20 MIN: CPT | Performed by: FAMILY MEDICINE

## 2024-01-08 PROCEDURE — G8427 DOCREV CUR MEDS BY ELIG CLIN: HCPCS | Performed by: FAMILY MEDICINE

## 2024-01-08 PROCEDURE — 3074F SYST BP LT 130 MM HG: CPT | Performed by: FAMILY MEDICINE

## 2024-01-08 PROCEDURE — 71046 X-RAY EXAM CHEST 2 VIEWS: CPT

## 2024-01-08 PROCEDURE — 1036F TOBACCO NON-USER: CPT | Performed by: FAMILY MEDICINE

## 2024-01-08 PROCEDURE — 3023F SPIROM DOC REV: CPT | Performed by: FAMILY MEDICINE

## 2024-01-08 PROCEDURE — 3078F DIAST BP <80 MM HG: CPT | Performed by: FAMILY MEDICINE

## 2024-01-08 PROCEDURE — G8484 FLU IMMUNIZE NO ADMIN: HCPCS | Performed by: FAMILY MEDICINE

## 2024-01-08 RX ORDER — LEVOFLOXACIN 500 MG/1
500 TABLET, FILM COATED ORAL DAILY
Qty: 7 TABLET | Refills: 0 | Status: SHIPPED | OUTPATIENT
Start: 2024-01-08 | End: 2024-01-15

## 2024-01-08 RX ORDER — PREDNISONE 20 MG/1
20 TABLET ORAL 2 TIMES DAILY
Qty: 8 TABLET | Refills: 0 | Status: SHIPPED | OUTPATIENT
Start: 2024-01-08 | End: 2024-01-12

## 2024-01-08 ASSESSMENT — ENCOUNTER SYMPTOMS
WHEEZING: 1
SHORTNESS OF BREATH: 1
ALLERGIC/IMMUNOLOGIC NEGATIVE: 1
COUGH: 1
EYES NEGATIVE: 1
GASTROINTESTINAL NEGATIVE: 1

## 2024-01-08 NOTE — PROGRESS NOTES
SUBJECTIVE:    Patient ID: Niecy Iniguez is a 63 y.o.female.    HPI:   Patient here for an upper respiratory infection  Patient is a 63-year-old female with COPD.  She complains of 3-day history of cough congestion wheezes increased sputum production and general malaise.         Past Medical History:   Diagnosis Date    Anxiety     Cancer (HCC)     adenocarcirnoma right lung, abdomen, pelvis.     Chronic back pain     COPD (chronic obstructive pulmonary disease) ([facility])     Neuropathy     Osteoarthritis     Restless legs syndrome       Current Outpatient Medications   Medication Sig Dispense Refill    predniSONE (DELTASONE) 20 MG tablet Take 1 tablet by mouth 2 times daily for 4 days 8 tablet 0    levoFLOXacin (LEVAQUIN) 500 MG tablet Take 1 tablet by mouth daily for 7 days 7 tablet 0    gabapentin (NEURONTIN) 800 MG tablet TAKE ONE TABLET BY MOUTH EVERY MORNING, AT NOON, IN THE EVENING, AND AT BEDTIME. 120 tablet 2    Budeson-Glycopyrrol-Formoterol (BREZTRI AEROSPHERE) 160-9-4.8 MCG/ACT AERO INHALE TWO PUFFS TWO TIMES A DAY ( IN THE MORNING AND AT BEDTIME ) 10.7 g 1    albuterol (PROVENTIL) (2.5 MG/3ML) 0.083% nebulizer solution Take 3 mLs by nebulization every 6 hours as needed for Wheezing 120 each 3    Nebulizer MISC One neb qid 1 each 0    escitalopram (LEXAPRO) 20 MG tablet Take 1 tablet by mouth daily 30 tablet 5    aluminum chloride (DRYSOL) 20 % external solution Apply topically nightly. 1 each 5    albuterol sulfate HFA (PROVENTIL;VENTOLIN;PROAIR) 108 (90 Base) MCG/ACT inhaler INHALE 2 PUFFS BY MOUTH EVERY 4 TO 6 HOURS AS NEEDED, RESCUE INHALER 8.5 g 5    esomeprazole (NEXIUM) 40 MG delayed release capsule Take 1 capsule by mouth every morning (before breakfast) 30 capsule 5    naloxone 4 MG/0.1ML LIQD nasal spray       Cholecalciferol (VITAMIN D3) 50 MCG (2000 UT) CAPS Take by mouth      Calcium Citrate-Vitamin D 200-250 MG-UNIT TABS Take 1 tablet by mouth 2 times daily (with meals)

## 2024-01-22 RX ORDER — ALBUTEROL SULFATE 90 UG/1
AEROSOL, METERED RESPIRATORY (INHALATION)
Qty: 36 G | Refills: 5 | Status: SHIPPED | OUTPATIENT
Start: 2024-01-22

## 2024-01-30 ENCOUNTER — TELEPHONE (OUTPATIENT)
Dept: FAMILY MEDICINE CLINIC | Age: 64
End: 2024-01-30

## 2024-01-30 DIAGNOSIS — J42 CHRONIC BRONCHITIS, UNSPECIFIED CHRONIC BRONCHITIS TYPE (HCC): Primary | ICD-10-CM

## 2024-01-30 RX ORDER — PREDNISONE 20 MG/1
20 TABLET ORAL 2 TIMES DAILY
Qty: 8 TABLET | Refills: 0 | Status: SHIPPED | OUTPATIENT
Start: 2024-01-30 | End: 2024-02-03

## 2024-01-30 RX ORDER — AZITHROMYCIN 250 MG/1
250 TABLET, FILM COATED ORAL SEE ADMIN INSTRUCTIONS
Qty: 6 TABLET | Refills: 0 | Status: SHIPPED | OUTPATIENT
Start: 2024-01-30 | End: 2024-02-04

## 2024-01-30 NOTE — TELEPHONE ENCOUNTER
Patient called stating she still has a bad cough and is congested from when she RSV. She is wanting to know if she can get another Z-pack.  Please advise.

## 2024-02-02 NOTE — TELEPHONE ENCOUNTER
Left detailed VM for patient that Rx's were sent in and if she does not get any better she needs to be seen.

## 2024-02-05 ENCOUNTER — TELEPHONE (OUTPATIENT)
Dept: FAMILY MEDICINE CLINIC | Age: 64
End: 2024-02-05

## 2024-02-05 DIAGNOSIS — Z12.31 BREAST CANCER SCREENING BY MAMMOGRAM: Primary | ICD-10-CM

## 2024-02-05 DIAGNOSIS — J44.9 CHRONIC OBSTRUCTIVE PULMONARY DISEASE, UNSPECIFIED COPD TYPE (HCC): ICD-10-CM

## 2024-02-05 RX ORDER — BUDESONIDE, GLYCOPYRROLATE, AND FORMOTEROL FUMARATE 160; 9; 4.8 UG/1; UG/1; UG/1
AEROSOL, METERED RESPIRATORY (INHALATION)
Qty: 10.7 G | Refills: 5 | Status: SHIPPED | OUTPATIENT
Start: 2024-02-05

## 2024-02-05 NOTE — TELEPHONE ENCOUNTER
Patient is scheduled for a screening mammogram tomorrow at [facility]. They are requesting an order to be faxed.    Faxed as requested.

## 2024-02-19 RX ORDER — ESCITALOPRAM OXALATE 20 MG/1
20 TABLET ORAL DAILY
Qty: 30 TABLET | Refills: 5 | Status: SHIPPED | OUTPATIENT
Start: 2024-02-19

## 2024-03-01 ENCOUNTER — TELEPHONE (OUTPATIENT)
Dept: FAMILY MEDICINE CLINIC | Age: 64
End: 2024-03-01

## 2024-03-01 DIAGNOSIS — J30.9 ALLERGIC RHINITIS, UNSPECIFIED SEASONALITY, UNSPECIFIED TRIGGER: Primary | ICD-10-CM

## 2024-03-01 NOTE — TELEPHONE ENCOUNTER
----- Message from Joe Wilder sent at 3/1/2024  9:54 AM CST -----  Subject: Message to Provider    QUESTIONS  Information for Provider? Patient would like to have an prescription for   Allegra D. Patient would like this called into [facility].  ---------------------------------------------------------------------------  --------------  CALL BACK INFO  [phone]; OK to leave message on voicemail  ---------------------------------------------------------------------------  --------------  SCRIPT ANSWERS  Relationship to Patient? Self

## 2024-03-04 RX ORDER — FEXOFENADINE HCL AND PSEUDOEPHEDRINE HCI 180; 240 MG/1; MG/1
1 TABLET, EXTENDED RELEASE ORAL DAILY
Qty: 30 TABLET | Refills: 5 | Status: SHIPPED | OUTPATIENT
Start: 2024-03-04

## 2024-03-27 ENCOUNTER — HOSPITAL ENCOUNTER (OUTPATIENT)
Dept: GENERAL RADIOLOGY | Age: 64
Discharge: HOME | End: 2024-03-27
Payer: MEDICARE

## 2024-03-27 ENCOUNTER — OFFICE VISIT (OUTPATIENT)
Dept: FAMILY MEDICINE CLINIC | Age: 64
End: 2024-03-27
Payer: MEDICARE

## 2024-03-27 VITALS
DIASTOLIC BLOOD PRESSURE: 82 MMHG | TEMPERATURE: 97.8 F | HEIGHT: 63 IN | RESPIRATION RATE: 20 BRPM | BODY MASS INDEX: 23.04 KG/M2 | HEART RATE: 85 BPM | WEIGHT: 130 LBS | SYSTOLIC BLOOD PRESSURE: 148 MMHG | OXYGEN SATURATION: 97 %

## 2024-03-27 DIAGNOSIS — M25.541 ARTHRALGIA OF HANDS, BILATERAL: Primary | ICD-10-CM

## 2024-03-27 DIAGNOSIS — H69.93 EUSTACHIAN TUBE DYSFUNCTION, BILATERAL: ICD-10-CM

## 2024-03-27 DIAGNOSIS — M25.541 ARTHRALGIA OF HANDS, BILATERAL: ICD-10-CM

## 2024-03-27 DIAGNOSIS — M25.542 ARTHRALGIA OF HANDS, BILATERAL: Primary | ICD-10-CM

## 2024-03-27 DIAGNOSIS — M25.542 ARTHRALGIA OF HANDS, BILATERAL: ICD-10-CM

## 2024-03-27 DIAGNOSIS — M25.532 LEFT WRIST PAIN: ICD-10-CM

## 2024-03-27 PROCEDURE — 3017F COLORECTAL CA SCREEN DOC REV: CPT | Performed by: NURSE PRACTITIONER

## 2024-03-27 PROCEDURE — G8484 FLU IMMUNIZE NO ADMIN: HCPCS | Performed by: NURSE PRACTITIONER

## 2024-03-27 PROCEDURE — 1036F TOBACCO NON-USER: CPT | Performed by: NURSE PRACTITIONER

## 2024-03-27 PROCEDURE — 3077F SYST BP >= 140 MM HG: CPT | Performed by: NURSE PRACTITIONER

## 2024-03-27 PROCEDURE — 3079F DIAST BP 80-89 MM HG: CPT | Performed by: NURSE PRACTITIONER

## 2024-03-27 PROCEDURE — G8420 CALC BMI NORM PARAMETERS: HCPCS | Performed by: NURSE PRACTITIONER

## 2024-03-27 PROCEDURE — 73110 X-RAY EXAM OF WRIST: CPT

## 2024-03-27 PROCEDURE — 99214 OFFICE O/P EST MOD 30 MIN: CPT | Performed by: NURSE PRACTITIONER

## 2024-03-27 PROCEDURE — G8427 DOCREV CUR MEDS BY ELIG CLIN: HCPCS | Performed by: NURSE PRACTITIONER

## 2024-03-27 PROCEDURE — 73130 X-RAY EXAM OF HAND: CPT

## 2024-03-27 ASSESSMENT — ENCOUNTER SYMPTOMS
BACK PAIN: 1
RESPIRATORY NEGATIVE: 1

## 2024-03-27 ASSESSMENT — PATIENT HEALTH QUESTIONNAIRE - PHQ9
6. FEELING BAD ABOUT YOURSELF - OR THAT YOU ARE A FAILURE OR HAVE LET YOURSELF OR YOUR FAMILY DOWN: NOT AT ALL
SUM OF ALL RESPONSES TO PHQ QUESTIONS 1-9: 0
1. LITTLE INTEREST OR PLEASURE IN DOING THINGS: NOT AT ALL
3. TROUBLE FALLING OR STAYING ASLEEP: NOT AT ALL
7. TROUBLE CONCENTRATING ON THINGS, SUCH AS READING THE NEWSPAPER OR WATCHING TELEVISION: NOT AT ALL
SUM OF ALL RESPONSES TO PHQ QUESTIONS 1-9: 0
SUM OF ALL RESPONSES TO PHQ QUESTIONS 1-9: 0
SUM OF ALL RESPONSES TO PHQ9 QUESTIONS 1 & 2: 0
5. POOR APPETITE OR OVEREATING: NOT AT ALL
2. FEELING DOWN, DEPRESSED OR HOPELESS: NOT AT ALL
10. IF YOU CHECKED OFF ANY PROBLEMS, HOW DIFFICULT HAVE THESE PROBLEMS MADE IT FOR YOU TO DO YOUR WORK, TAKE CARE OF THINGS AT HOME, OR GET ALONG WITH OTHER PEOPLE: NOT DIFFICULT AT ALL
4. FEELING TIRED OR HAVING LITTLE ENERGY: NOT AT ALL
8. MOVING OR SPEAKING SO SLOWLY THAT OTHER PEOPLE COULD HAVE NOTICED. OR THE OPPOSITE, BEING SO FIGETY OR RESTLESS THAT YOU HAVE BEEN MOVING AROUND A LOT MORE THAN USUAL: NOT AT ALL
9. THOUGHTS THAT YOU WOULD BE BETTER OFF DEAD, OR OF HURTING YOURSELF: NOT AT ALL
SUM OF ALL RESPONSES TO PHQ QUESTIONS 1-9: 0

## 2024-03-27 NOTE — PROGRESS NOTES
SUBJECTIVE:    Patient ID: Niecy Iniguez is a63 y.o. female.  Niecy Iniguez is here today for Hand Pain (Patient presents for RAISSA hand pain, h/o carpal tunnel surgery in both wrists around 2004. Patient states that surgery was done by Dr. Mio Brooks. )  .    HPI:   HPI       Does have history of CTS and surgery bilat \"several times\"---2004 was the first  Left wrist pain and \"shooting up to fingers\"; right hand is also affected.    No numbness or tingling like felt before.   Right hand dominant.   Not interrupting sleep  Tx-left wrist/hand brace didn't help and tylenol.  Also has oxycodone related to back.  Symptoms began 2mo ago.      Left ear pain  Onset was 3-4mo and sx have wax/waned  No fevers.  Does report \"fluid history\"        Past Medical History:   Diagnosis Date    Anxiety     Cancer (HCC)     adenocarcirnoma right lung, abdomen, pelvis.     Chronic back pain     COPD (chronic obstructive pulmonary disease) (HCC)     Neuropathy     Osteoarthritis     Restless legs syndrome      Prior to Visit Medications    Medication Sig Taking? Authorizing Provider   diclofenac sodium (VOLTAREN) 1 % GEL Apply 2 g topically 4 times daily as needed for Pain Yes Susanne Arriola APRN   fexofenadine-pseudoephedrine (ALLEGRA-D 24HR) 180-240 MG per extended release tablet Take 1 tablet by mouth daily Yes Waqas Jones MD   escitalopram (LEXAPRO) 20 MG tablet TAKE ONE TABLET BY MOUTH EVERY DAY Yes Waqas Jones MD   Budeson-Glycopyrrol-Formoterol (BREZTRI AEROSPHERE) 160-9-4.8 MCG/ACT AERO INHALE 2 PUFFS TWO TIMES A DAY ( IN THE MORNING AND AT BEDTIME) Yes Waqas Jones MD   albuterol sulfate HFA (PROVENTIL;VENTOLIN;PROAIR) 108 (90 Base) MCG/ACT inhaler INHALE TWO PUFFS BY MOUTH EVERY 4 TO 6 HOURS AS NEEDED Yes Waqas Jones MD   gabapentin (NEURONTIN) 800 MG tablet TAKE ONE TABLET BY MOUTH EVERY MORNING, AT NOON, IN THE EVENING, AND AT BEDTIME. Yes Waqas Jones MD   albuterol (PROVENTIL) (2.5 MG/3ML) 0.083%

## 2024-03-29 DIAGNOSIS — G62.9 NEUROPATHY: ICD-10-CM

## 2024-04-01 ENCOUNTER — TELEPHONE (OUTPATIENT)
Dept: FAMILY MEDICINE CLINIC | Age: 64
End: 2024-04-01

## 2024-04-01 RX ORDER — GABAPENTIN 800 MG/1
TABLET ORAL
Qty: 120 TABLET | Refills: 2 | Status: SHIPPED | OUTPATIENT
Start: 2024-04-01 | End: 2024-04-03 | Stop reason: SDUPTHER

## 2024-04-01 NOTE — TELEPHONE ENCOUNTER
----- Message from Jody Miller sent at 4/1/2024  2:52 PM CDT -----  Subject: Refill Request    QUESTIONS  Name of Medication? gabapentin (NEURONTIN) 800 MG tablet  Patient-reported dosage and instructions? 800 MG 4 x a day  How many days do you have left? 1  Preferred Pharmacy? [facility]  Pharmacy phone number (if available)? [phone]  ---------------------------------------------------------------------------  --------------  CALL BACK INFO  What is the best way for the office to contact you? OK to leave message on   voicemail  Preferred Call Back Phone Number? [phone]  ---------------------------------------------------------------------------  --------------  SCRIPT ANSWERS  Relationship to Patient? Self

## 2024-04-03 DIAGNOSIS — G62.9 NEUROPATHY: ICD-10-CM

## 2024-04-03 RX ORDER — GABAPENTIN 800 MG/1
800 TABLET ORAL 4 TIMES DAILY
Qty: 120 TABLET | Refills: 2 | Status: SHIPPED | OUTPATIENT
Start: 2024-04-03 | End: 2024-07-02

## 2024-04-03 NOTE — TELEPHONE ENCOUNTER
Rx for gabapentin 800 mg was eRx'd to [facility] on 4/1/24.  Pharmacy is saying they do not have it. Need to resend Rx

## 2024-04-05 RX ORDER — ESOMEPRAZOLE MAGNESIUM 40 MG/1
40 CAPSULE, DELAYED RELEASE ORAL
Qty: 180 CAPSULE | Refills: 0 | Status: SHIPPED | OUTPATIENT
Start: 2024-04-05

## 2024-04-12 DIAGNOSIS — Z12.31 BREAST CANCER SCREENING BY MAMMOGRAM: Primary | ICD-10-CM

## 2024-04-23 ENCOUNTER — TELEPHONE (OUTPATIENT)
Dept: FAMILY MEDICINE CLINIC | Age: 64
End: 2024-04-23

## 2024-04-23 DIAGNOSIS — M25.541 ARTHRALGIA OF HANDS, BILATERAL: Primary | ICD-10-CM

## 2024-04-23 DIAGNOSIS — M25.542 ARTHRALGIA OF HANDS, BILATERAL: Primary | ICD-10-CM

## 2024-04-23 NOTE — TELEPHONE ENCOUNTER
----- Message from Fili Evans sent at 4/23/2024 12:41 PM CDT -----  Regarding: ECC Referral Request  ECC Referral Request    Reason for referral request: Specialty Provider    Specialist/Lab/Test patient is requesting (if known): Orthopedic. Dr. Heriberto Dixon MD-Heriberto Dixon MD    Specialist Phone Number (if applicable): [phone]    Additional Information : Patient needs a referral from PCP for Orthopedic.    --------------------------------------------------------------------------------------------------------------------------    Relationship to Patient: Self     Call Back Information: OK to leave message on voicemail  Preferred Call Back Number: Phone [phone] (home)

## 2024-04-26 ENCOUNTER — TELEPHONE (OUTPATIENT)
Dept: FAMILY MEDICINE CLINIC | Age: 64
End: 2024-04-26

## 2024-04-26 DIAGNOSIS — M25.541 ARTHRALGIA OF HANDS, BILATERAL: Primary | ICD-10-CM

## 2024-04-26 DIAGNOSIS — M25.542 ARTHRALGIA OF HANDS, BILATERAL: Primary | ICD-10-CM

## 2024-04-26 NOTE — TELEPHONE ENCOUNTER
Dr. Seng Shelton please advise. Patient called stating she does not want to go see Dr. Dixon. She wants a referral to someone else for her hands.

## 2024-06-03 DIAGNOSIS — Z12.31 BREAST CANCER SCREENING BY MAMMOGRAM: ICD-10-CM

## 2024-07-02 ENCOUNTER — OFFICE VISIT (OUTPATIENT)
Dept: FAMILY MEDICINE CLINIC | Age: 64
End: 2024-07-02
Payer: MEDICARE

## 2024-07-02 VITALS
OXYGEN SATURATION: 94 % | SYSTOLIC BLOOD PRESSURE: 116 MMHG | TEMPERATURE: 96.9 F | HEART RATE: 96 BPM | HEIGHT: 63 IN | WEIGHT: 121 LBS | BODY MASS INDEX: 21.44 KG/M2 | DIASTOLIC BLOOD PRESSURE: 70 MMHG

## 2024-07-02 DIAGNOSIS — E78.5 HYPERLIPIDEMIA, UNSPECIFIED HYPERLIPIDEMIA TYPE: ICD-10-CM

## 2024-07-02 DIAGNOSIS — F41.9 ANXIETY: ICD-10-CM

## 2024-07-02 DIAGNOSIS — Z00.00 MEDICARE ANNUAL WELLNESS VISIT, SUBSEQUENT: Primary | ICD-10-CM

## 2024-07-02 PROCEDURE — 3017F COLORECTAL CA SCREEN DOC REV: CPT | Performed by: FAMILY MEDICINE

## 2024-07-02 PROCEDURE — G0439 PPPS, SUBSEQ VISIT: HCPCS | Performed by: FAMILY MEDICINE

## 2024-07-02 PROCEDURE — 3074F SYST BP LT 130 MM HG: CPT | Performed by: FAMILY MEDICINE

## 2024-07-02 PROCEDURE — 3078F DIAST BP <80 MM HG: CPT | Performed by: FAMILY MEDICINE

## 2024-07-02 SDOH — ECONOMIC STABILITY: FOOD INSECURITY: WITHIN THE PAST 12 MONTHS, THE FOOD YOU BOUGHT JUST DIDN'T LAST AND YOU DIDN'T HAVE MONEY TO GET MORE.: NEVER TRUE

## 2024-07-02 SDOH — ECONOMIC STABILITY: FOOD INSECURITY: WITHIN THE PAST 12 MONTHS, YOU WORRIED THAT YOUR FOOD WOULD RUN OUT BEFORE YOU GOT MONEY TO BUY MORE.: NEVER TRUE

## 2024-07-02 SDOH — ECONOMIC STABILITY: INCOME INSECURITY: HOW HARD IS IT FOR YOU TO PAY FOR THE VERY BASICS LIKE FOOD, HOUSING, MEDICAL CARE, AND HEATING?: NOT HARD AT ALL

## 2024-07-02 ASSESSMENT — PATIENT HEALTH QUESTIONNAIRE - PHQ9
SUM OF ALL RESPONSES TO PHQ QUESTIONS 1-9: 0
SUM OF ALL RESPONSES TO PHQ9 QUESTIONS 1 & 2: 0
4. FEELING TIRED OR HAVING LITTLE ENERGY: NOT AT ALL
SUM OF ALL RESPONSES TO PHQ QUESTIONS 1-9: 0
3. TROUBLE FALLING OR STAYING ASLEEP: NOT AT ALL
SUM OF ALL RESPONSES TO PHQ QUESTIONS 1-9: 0
7. TROUBLE CONCENTRATING ON THINGS, SUCH AS READING THE NEWSPAPER OR WATCHING TELEVISION: NOT AT ALL
1. LITTLE INTEREST OR PLEASURE IN DOING THINGS: NOT AT ALL
10. IF YOU CHECKED OFF ANY PROBLEMS, HOW DIFFICULT HAVE THESE PROBLEMS MADE IT FOR YOU TO DO YOUR WORK, TAKE CARE OF THINGS AT HOME, OR GET ALONG WITH OTHER PEOPLE: NOT DIFFICULT AT ALL
5. POOR APPETITE OR OVEREATING: NOT AT ALL
9. THOUGHTS THAT YOU WOULD BE BETTER OFF DEAD, OR OF HURTING YOURSELF: NOT AT ALL
6. FEELING BAD ABOUT YOURSELF - OR THAT YOU ARE A FAILURE OR HAVE LET YOURSELF OR YOUR FAMILY DOWN: NOT AT ALL
8. MOVING OR SPEAKING SO SLOWLY THAT OTHER PEOPLE COULD HAVE NOTICED. OR THE OPPOSITE, BEING SO FIGETY OR RESTLESS THAT YOU HAVE BEEN MOVING AROUND A LOT MORE THAN USUAL: NOT AT ALL
2. FEELING DOWN, DEPRESSED OR HOPELESS: NOT AT ALL
SUM OF ALL RESPONSES TO PHQ QUESTIONS 1-9: 0

## 2024-07-02 NOTE — PROGRESS NOTES
Medicare Annual Wellness Visit    Niecy Iniguez is here for Medicare AWV    Assessment & Plan   Medicare annual wellness visit, subsequent  Hyperlipidemia, unspecified hyperlipidemia type  -     CBC; Future  -     Comprehensive Metabolic Panel; Future  -     Lipid Panel; Future  Anxiety  -     CBC; Future  -     Comprehensive Metabolic Panel; Future  -     TSH; Future    Recommendations for Preventive Services Due: see orders and patient instructions/AVS.  Recommended screening schedule for the next 5-10 years is provided to the patient in written form: see Patient Instructions/AVS.     Return in 6 months (on 1/2/2025).     Subjective   The following acute and/or chronic problems were also addressed today:  Patient history of hyperlipidemia.  She takes no medications.  She is overdue for blood work.  She also have history of anxiety.  She takes Lexapro.  Medication is helpful.    Patient's complete Health Risk Assessment and screening values have been reviewed and are found in Flowsheets. The following problems were reviewed today and where indicated follow up appointments were made and/or referrals ordered.    Positive Risk Factor Screenings with Interventions:            Controlled Medication Review:      Today's Pain Level: No data recorded   Opioid Risk: (Low risk score <55) Opioid risk score: 41    Patient is low risk for opioid use disorder or overdose.    Last PDMP Master as Reviewed:  Review User Review Instant Review Result                General HRA Questions:  Select all that apply: (!) Stress    Stress Interventions:  Patient comments: I always feel stressed all my life.  This is not new.        Activity, Diet, and Weight:  On average, how many days per week do you engage in moderate to strenuous exercise (like a brisk walk)?: 6 days  On average, how many minutes do you engage in exercise at this level?: 30 min    Do you eat balanced/healthy meals regularly?: (!) No    Body mass index is 21.43 kg/m².    Do

## 2024-07-02 NOTE — PATIENT INSTRUCTIONS

## 2024-07-05 DIAGNOSIS — G62.9 NEUROPATHY: ICD-10-CM

## 2024-07-05 RX ORDER — GABAPENTIN 800 MG/1
800 TABLET ORAL 4 TIMES DAILY
Qty: 120 TABLET | Refills: 2 | Status: SHIPPED | OUTPATIENT
Start: 2024-07-05 | End: 2024-10-03

## 2024-07-08 ENCOUNTER — TELEPHONE (OUTPATIENT)
Dept: FAMILY MEDICINE CLINIC | Age: 64
End: 2024-07-08

## 2024-07-08 NOTE — TELEPHONE ENCOUNTER
----- Message from Jeremiah Gallegos sent at 7/8/2024 12:54 PM CDT -----  Regarding: ECC Escalation To Practice  ECC Escalation To Practice      Type of Escalation: Red Flag Symptom  --------------------------------------------------------------------------------------------------------------------------    Information for Provider:Waqas Jones MD  Patient is looking for appointment for: Symptom has swelling on her both face    Reasons for Message: Unable to reach practice     Additional Information pt decline urgent   --------------------------------------------------------------------------------------------------------------------------    Relationship to Patient: Self     Call Back Info: OK to leave message on voicemail  Preferred Call Back Number: Phone:[phone]

## 2024-07-09 ENCOUNTER — OFFICE VISIT (OUTPATIENT)
Dept: FAMILY MEDICINE CLINIC | Age: 64
End: 2024-07-09
Payer: MEDICARE

## 2024-07-09 VITALS
SYSTOLIC BLOOD PRESSURE: 128 MMHG | RESPIRATION RATE: 17 BRPM | DIASTOLIC BLOOD PRESSURE: 80 MMHG | BODY MASS INDEX: 21.43 KG/M2 | HEART RATE: 88 BPM | TEMPERATURE: 97.5 F | WEIGHT: 121 LBS | OXYGEN SATURATION: 99 %

## 2024-07-09 DIAGNOSIS — J01.00 ACUTE NON-RECURRENT MAXILLARY SINUSITIS: Primary | ICD-10-CM

## 2024-07-09 PROCEDURE — 1036F TOBACCO NON-USER: CPT | Performed by: CLINICAL NURSE SPECIALIST

## 2024-07-09 PROCEDURE — 3074F SYST BP LT 130 MM HG: CPT | Performed by: CLINICAL NURSE SPECIALIST

## 2024-07-09 PROCEDURE — G8427 DOCREV CUR MEDS BY ELIG CLIN: HCPCS | Performed by: CLINICAL NURSE SPECIALIST

## 2024-07-09 PROCEDURE — 3079F DIAST BP 80-89 MM HG: CPT | Performed by: CLINICAL NURSE SPECIALIST

## 2024-07-09 PROCEDURE — 99213 OFFICE O/P EST LOW 20 MIN: CPT | Performed by: CLINICAL NURSE SPECIALIST

## 2024-07-09 PROCEDURE — G8420 CALC BMI NORM PARAMETERS: HCPCS | Performed by: CLINICAL NURSE SPECIALIST

## 2024-07-09 PROCEDURE — 3017F COLORECTAL CA SCREEN DOC REV: CPT | Performed by: CLINICAL NURSE SPECIALIST

## 2024-07-09 RX ORDER — PREDNISONE 20 MG/1
20 TABLET ORAL 2 TIMES DAILY
Qty: 10 TABLET | Refills: 0 | Status: SHIPPED | OUTPATIENT
Start: 2024-07-09 | End: 2024-07-14

## 2024-07-09 RX ORDER — AZITHROMYCIN 250 MG/1
TABLET, FILM COATED ORAL
Qty: 1 PACKET | Refills: 0 | Status: SHIPPED | OUTPATIENT
Start: 2024-07-09

## 2024-07-09 SDOH — ECONOMIC STABILITY: FOOD INSECURITY: WITHIN THE PAST 12 MONTHS, THE FOOD YOU BOUGHT JUST DIDN'T LAST AND YOU DIDN'T HAVE MONEY TO GET MORE.: NEVER TRUE

## 2024-07-09 SDOH — ECONOMIC STABILITY: FOOD INSECURITY: WITHIN THE PAST 12 MONTHS, YOU WORRIED THAT YOUR FOOD WOULD RUN OUT BEFORE YOU GOT MONEY TO BUY MORE.: NEVER TRUE

## 2024-07-09 SDOH — ECONOMIC STABILITY: INCOME INSECURITY: HOW HARD IS IT FOR YOU TO PAY FOR THE VERY BASICS LIKE FOOD, HOUSING, MEDICAL CARE, AND HEATING?: NOT HARD AT ALL

## 2024-07-09 ASSESSMENT — ENCOUNTER SYMPTOMS
SORE THROAT: 1
EYE DISCHARGE: 0
SINUS PAIN: 1
FACIAL SWELLING: 0
SINUS PRESSURE: 1
VOMITING: 0
COUGH: 0
WHEEZING: 0
EYE PAIN: 0
RHINORRHEA: 0
CHEST TIGHTNESS: 0
NAUSEA: 0
SHORTNESS OF BREATH: 0

## 2024-07-09 ASSESSMENT — PATIENT HEALTH QUESTIONNAIRE - PHQ9
SUM OF ALL RESPONSES TO PHQ QUESTIONS 1-9: 0
1. LITTLE INTEREST OR PLEASURE IN DOING THINGS: NOT AT ALL
2. FEELING DOWN, DEPRESSED OR HOPELESS: NOT AT ALL
SUM OF ALL RESPONSES TO PHQ QUESTIONS 1-9: 0
SUM OF ALL RESPONSES TO PHQ9 QUESTIONS 1 & 2: 0

## 2024-07-09 NOTE — PROGRESS NOTES
SUBJECTIVE:  Niecy Iniguez is a 64 y.o. who presents today for Sinusitis      HPI    Niecy presents today for an acute visit.   She has had current symptoms for 2 weeks. Her symptoms are worsening.  She notes maxillary sinus pressure and pain, greater on left.   She has nasal congestion with PND and nasal discharge.  Mild sore throat and ear fullness.  No fever or chills.  No dizziness.  Is taking allegra D, nasal saline and tylenol without relief.     Past Medical History:   Diagnosis Date    Anxiety     Cancer (HCC)     adenocarcirnoma right lung, abdomen, pelvis.     Chronic back pain     COPD (chronic obstructive pulmonary disease) ([facility])     Neuropathy     Osteoarthritis     Restless legs syndrome      Past Surgical History:   Procedure Laterality Date    BACK SURGERY      4     BREAST SURGERY      breast lift.     CHOLECYSTECTOMY      GALLBLADDER SURGERY      HAND SURGERY      Patient has had surgeries on both hands     Family History   Problem Relation Age of Onset    Heart Disease Mother     Cancer Mother     Heart Disease Father     Cancer Father     Heart Disease Sister     Cancer Sister     Heart Disease Brother      Social History     Tobacco Use    Smoking status: Former     Current packs/day: 0.00     Average packs/day: 1.5 packs/day for 30.0 years (45.0 ttl pk-yrs)     Types: Cigarettes     Start date:      Quit date:      Years since quittin.5    Smokeless tobacco: Never   Substance Use Topics    Alcohol use: Never     Current Outpatient Medications   Medication Sig Dispense Refill    azithromycin (ZITHROMAX) 250 MG tablet Take 2 tabs (500 mg) on Day 1, and take 1 tab (250 mg) on days 2 through 5. 1 packet 0    predniSONE (DELTASONE) 20 MG tablet Take 1 tablet by mouth 2 times daily for 5 days 10 tablet 0    gabapentin (NEURONTIN) 800 MG tablet Take 1 tablet by mouth in the morning, at noon, in the evening, and at bedtime for 90 days. Max Daily Amount: 3,200 mg 120 tablet 2

## 2024-07-22 ENCOUNTER — OFFICE VISIT (OUTPATIENT)
Dept: FAMILY MEDICINE CLINIC | Age: 64
End: 2024-07-22
Payer: MEDICARE

## 2024-07-22 VITALS
HEIGHT: 63 IN | DIASTOLIC BLOOD PRESSURE: 80 MMHG | TEMPERATURE: 97.5 F | BODY MASS INDEX: 20.91 KG/M2 | WEIGHT: 118 LBS | HEART RATE: 102 BPM | SYSTOLIC BLOOD PRESSURE: 122 MMHG | OXYGEN SATURATION: 98 %

## 2024-07-22 DIAGNOSIS — R11.2 NAUSEA AND VOMITING, UNSPECIFIED VOMITING TYPE: ICD-10-CM

## 2024-07-22 DIAGNOSIS — R52 BODY ACHES: Primary | ICD-10-CM

## 2024-07-22 DIAGNOSIS — R51.9 ACUTE NONINTRACTABLE HEADACHE, UNSPECIFIED HEADACHE TYPE: ICD-10-CM

## 2024-07-22 LAB — SARS-COV-2 N GENE RESP QL NAA+PROBE: NOT DETECTED

## 2024-07-22 PROCEDURE — 3017F COLORECTAL CA SCREEN DOC REV: CPT | Performed by: NURSE PRACTITIONER

## 2024-07-22 PROCEDURE — 99213 OFFICE O/P EST LOW 20 MIN: CPT | Performed by: NURSE PRACTITIONER

## 2024-07-22 PROCEDURE — G8420 CALC BMI NORM PARAMETERS: HCPCS | Performed by: NURSE PRACTITIONER

## 2024-07-22 PROCEDURE — 96372 THER/PROPH/DIAG INJ SC/IM: CPT | Performed by: NURSE PRACTITIONER

## 2024-07-22 PROCEDURE — 1036F TOBACCO NON-USER: CPT | Performed by: NURSE PRACTITIONER

## 2024-07-22 PROCEDURE — G8427 DOCREV CUR MEDS BY ELIG CLIN: HCPCS | Performed by: NURSE PRACTITIONER

## 2024-07-22 PROCEDURE — 3074F SYST BP LT 130 MM HG: CPT | Performed by: NURSE PRACTITIONER

## 2024-07-22 PROCEDURE — 87804 INFLUENZA ASSAY W/OPTIC: CPT | Performed by: NURSE PRACTITIONER

## 2024-07-22 PROCEDURE — 3079F DIAST BP 80-89 MM HG: CPT | Performed by: NURSE PRACTITIONER

## 2024-07-22 RX ORDER — DEXAMETHASONE SODIUM PHOSPHATE 10 MG/ML
4 INJECTION INTRAMUSCULAR; INTRAVENOUS ONCE
Status: COMPLETED | OUTPATIENT
Start: 2024-07-22 | End: 2024-07-22

## 2024-07-22 RX ORDER — ONDANSETRON 4 MG/1
4 TABLET, ORALLY DISINTEGRATING ORAL 3 TIMES DAILY PRN
Qty: 21 TABLET | Refills: 0 | Status: SHIPPED | OUTPATIENT
Start: 2024-07-22

## 2024-07-22 RX ORDER — TRIAMCINOLONE ACETONIDE 40 MG/ML
40 INJECTION, SUSPENSION INTRA-ARTICULAR; INTRAMUSCULAR ONCE
Status: COMPLETED | OUTPATIENT
Start: 2024-07-22 | End: 2024-07-22

## 2024-07-22 RX ADMIN — DEXAMETHASONE SODIUM PHOSPHATE 4 MG: 10 INJECTION INTRAMUSCULAR; INTRAVENOUS at 11:26

## 2024-07-22 RX ADMIN — TRIAMCINOLONE ACETONIDE 40 MG: 40 INJECTION, SUSPENSION INTRA-ARTICULAR; INTRAMUSCULAR at 11:26

## 2024-07-22 ASSESSMENT — ENCOUNTER SYMPTOMS
NAUSEA: 1
COUGH: 0
SORE THROAT: 0
VOMITING: 1

## 2024-07-22 NOTE — PROGRESS NOTES
After obtaining consent, and per orders of Dr. Mago mcnair, injection of kenalog and decardon was given in the Left upper quad. gluteus . Patient tolerated it well. Patient instructed to report any adverse reaction to me immediately.

## 2024-07-22 NOTE — PROGRESS NOTES
SUBJECTIVE:  Sick today   Patient ID: Niecy Iniguez is a 64 y.o. female.    HPI:   HPI   Started 3-4 days ago worse yesterday   Symptoms bad headache vomiting nausea body aches   Loss of voice.   No fever, or chills  Will have sweating.   NO cough or sore throat.   Taking Allegra D and Tylenol     Past Medical History:   Diagnosis Date    Anxiety     Cancer (HCC)     adenocarcirnoma right lung, abdomen, pelvis.     Chronic back pain     COPD (chronic obstructive pulmonary disease) ([facility])     Neuropathy     Osteoarthritis     Restless legs syndrome       Prior to Visit Medications    Medication Sig Taking? Authorizing Provider   ondansetron (ZOFRAN-ODT) 4 MG disintegrating tablet Take 1 tablet by mouth 3 times daily as needed for Nausea or Vomiting Yes Mago Serrano APRN   azithromycin (ZITHROMAX) 250 MG tablet Take 2 tabs (500 mg) on Day 1, and take 1 tab (250 mg) on days 2 through 5. Yes Monica Scott APRN   gabapentin (NEURONTIN) 800 MG tablet Take 1 tablet by mouth in the morning, at noon, in the evening, and at bedtime for 90 days. Max Daily Amount: 3,200 mg Yes Waqsa Jones MD   esomeprazole (NEXIUM) 40 MG delayed release capsule TAKE ONE CAPSULE BY MOUTH EVERY MORNING BEFORE BREAKFAST Yes Waqas Jones MD   diclofenac sodium (VOLTAREN) 1 % GEL Apply 2 g topically 4 times daily as needed for Pain Yes Susanne Arriola APRN   fexofenadine-pseudoephedrine (ALLEGRA-D 24HR) 180-240 MG per extended release tablet Take 1 tablet by mouth daily Yes Waqas Jones MD   escitalopram (LEXAPRO) 20 MG tablet TAKE ONE TABLET BY MOUTH EVERY DAY Yes Waqas Jones MD   Budeson-Glycopyrrol-Formoterol (BREZTRI AEROSPHERE) 160-9-4.8 MCG/ACT AERO INHALE 2 PUFFS TWO TIMES A DAY ( IN THE MORNING AND AT BEDTIME) Yes Waqas Jones MD   albuterol sulfate HFA (PROVENTIL;VENTOLIN;PROAIR) 108 (90 Base) MCG/ACT inhaler INHALE TWO PUFFS BY MOUTH EVERY 4 TO 6 HOURS AS NEEDED Yes Waqas Jones MD   albuterol (PROVENTIL) (2.5

## 2024-07-23 NOTE — RESULT ENCOUNTER NOTE
Patient is a little better. She states she is restless now. Her daughter states she sounds better. Patient notified of the neg COVID results. Will call is worsens.

## 2024-07-30 ENCOUNTER — OFFICE VISIT (OUTPATIENT)
Dept: FAMILY MEDICINE CLINIC | Age: 64
End: 2024-07-30
Payer: MEDICARE

## 2024-07-30 ENCOUNTER — HOSPITAL ENCOUNTER (OUTPATIENT)
Dept: GENERAL RADIOLOGY | Age: 64
Discharge: HOME | End: 2024-07-30
Payer: MEDICARE

## 2024-07-30 VITALS
HEART RATE: 94 BPM | BODY MASS INDEX: 20.73 KG/M2 | DIASTOLIC BLOOD PRESSURE: 80 MMHG | OXYGEN SATURATION: 97 % | SYSTOLIC BLOOD PRESSURE: 124 MMHG | WEIGHT: 117 LBS | TEMPERATURE: 97 F

## 2024-07-30 DIAGNOSIS — R63.4 WEIGHT LOSS: ICD-10-CM

## 2024-07-30 DIAGNOSIS — E78.5 HYPERLIPIDEMIA, UNSPECIFIED HYPERLIPIDEMIA TYPE: ICD-10-CM

## 2024-07-30 DIAGNOSIS — E04.2 MULTINODULAR THYROID: ICD-10-CM

## 2024-07-30 DIAGNOSIS — Z85.118 HISTORY OF ADENOCARCINOMA OF LUNG: ICD-10-CM

## 2024-07-30 DIAGNOSIS — R49.9 CHANGE IN VOICE: Primary | ICD-10-CM

## 2024-07-30 DIAGNOSIS — F41.9 ANXIETY: ICD-10-CM

## 2024-07-30 LAB
ALBUMIN SERPL-MCNC: 3.4 G/DL (ref 3.5–5.2)
ALP SERPL-CCNC: 111 U/L (ref 35–104)
ALT SERPL-CCNC: 17 U/L (ref 5–33)
ANION GAP SERPL CALCULATED.3IONS-SCNC: 14 MMOL/L (ref 7–19)
AST SERPL-CCNC: 23 U/L (ref 5–32)
BILIRUB SERPL-MCNC: 0.2 MG/DL (ref 0.2–1.2)
BUN SERPL-MCNC: 16 MG/DL (ref 8–23)
CALCIUM SERPL-MCNC: 9 MG/DL (ref 8.8–10.2)
CHLORIDE SERPL-SCNC: 99 MMOL/L (ref 98–111)
CHOLEST SERPL-MCNC: 152 MG/DL (ref 160–199)
CO2 SERPL-SCNC: 23 MMOL/L (ref 22–29)
CREAT SERPL-MCNC: 0.6 MG/DL (ref 0.5–0.9)
ERYTHROCYTE [DISTWIDTH] IN BLOOD BY AUTOMATED COUNT: 13.2 % (ref 11.5–14.5)
GLUCOSE SERPL-MCNC: 93 MG/DL (ref 74–109)
HCT VFR BLD AUTO: 36.6 % (ref 37–47)
HDLC SERPL-MCNC: 61 MG/DL (ref 65–121)
HGB BLD-MCNC: 11 G/DL (ref 12–16)
LDLC SERPL CALC-MCNC: 77 MG/DL
MCH RBC QN AUTO: 31.3 PG (ref 27–31)
MCHC RBC AUTO-ENTMCNC: 30.1 G/DL (ref 33–37)
MCV RBC AUTO: 104 FL (ref 81–99)
PLATELET # BLD AUTO: 278 K/UL (ref 130–400)
PMV BLD AUTO: 12.1 FL (ref 9.4–12.3)
POTASSIUM SERPL-SCNC: 4.8 MMOL/L (ref 3.5–5)
PROT SERPL-MCNC: 7.2 G/DL (ref 6.6–8.7)
RBC # BLD AUTO: 3.52 M/UL (ref 4.2–5.4)
SODIUM SERPL-SCNC: 136 MMOL/L (ref 136–145)
TRIGL SERPL-MCNC: 69 MG/DL (ref 0–149)
TSH SERPL DL<=0.005 MIU/L-ACNC: 1.72 UIU/ML (ref 0.27–4.2)
WBC # BLD AUTO: 10.8 K/UL (ref 4.8–10.8)

## 2024-07-30 PROCEDURE — 71260 CT THORAX DX C+: CPT

## 2024-07-30 PROCEDURE — 1036F TOBACCO NON-USER: CPT | Performed by: FAMILY MEDICINE

## 2024-07-30 PROCEDURE — 3079F DIAST BP 80-89 MM HG: CPT | Performed by: FAMILY MEDICINE

## 2024-07-30 PROCEDURE — G8427 DOCREV CUR MEDS BY ELIG CLIN: HCPCS | Performed by: FAMILY MEDICINE

## 2024-07-30 PROCEDURE — 99214 OFFICE O/P EST MOD 30 MIN: CPT | Performed by: FAMILY MEDICINE

## 2024-07-30 PROCEDURE — 3074F SYST BP LT 130 MM HG: CPT | Performed by: FAMILY MEDICINE

## 2024-07-30 PROCEDURE — G8420 CALC BMI NORM PARAMETERS: HCPCS | Performed by: FAMILY MEDICINE

## 2024-07-30 PROCEDURE — 3017F COLORECTAL CA SCREEN DOC REV: CPT | Performed by: FAMILY MEDICINE

## 2024-07-30 PROCEDURE — 74177 CT ABD & PELVIS W/CONTRAST: CPT

## 2024-07-30 PROCEDURE — 6360000004 HC RX CONTRAST MEDICATION: Performed by: FAMILY MEDICINE

## 2024-07-30 PROCEDURE — 76536 US EXAM OF HEAD AND NECK: CPT

## 2024-07-30 RX ADMIN — IOPAMIDOL 75 ML: 755 INJECTION, SOLUTION INTRAVENOUS at 11:44

## 2024-07-30 SDOH — ECONOMIC STABILITY: FOOD INSECURITY: WITHIN THE PAST 12 MONTHS, YOU WORRIED THAT YOUR FOOD WOULD RUN OUT BEFORE YOU GOT MONEY TO BUY MORE.: NEVER TRUE

## 2024-07-30 SDOH — ECONOMIC STABILITY: FOOD INSECURITY: WITHIN THE PAST 12 MONTHS, THE FOOD YOU BOUGHT JUST DIDN'T LAST AND YOU DIDN'T HAVE MONEY TO GET MORE.: NEVER TRUE

## 2024-07-30 SDOH — ECONOMIC STABILITY: INCOME INSECURITY: HOW HARD IS IT FOR YOU TO PAY FOR THE VERY BASICS LIKE FOOD, HOUSING, MEDICAL CARE, AND HEATING?: NOT HARD AT ALL

## 2024-07-30 ASSESSMENT — ENCOUNTER SYMPTOMS
EYES NEGATIVE: 1
VOICE CHANGE: 1
ALLERGIC/IMMUNOLOGIC NEGATIVE: 1
GASTROINTESTINAL NEGATIVE: 1
RESPIRATORY NEGATIVE: 1

## 2024-07-30 ASSESSMENT — PATIENT HEALTH QUESTIONNAIRE - PHQ9
6. FEELING BAD ABOUT YOURSELF - OR THAT YOU ARE A FAILURE OR HAVE LET YOURSELF OR YOUR FAMILY DOWN: NOT AT ALL
2. FEELING DOWN, DEPRESSED OR HOPELESS: NOT AT ALL
10. IF YOU CHECKED OFF ANY PROBLEMS, HOW DIFFICULT HAVE THESE PROBLEMS MADE IT FOR YOU TO DO YOUR WORK, TAKE CARE OF THINGS AT HOME, OR GET ALONG WITH OTHER PEOPLE: NOT DIFFICULT AT ALL
SUM OF ALL RESPONSES TO PHQ9 QUESTIONS 1 & 2: 0
7. TROUBLE CONCENTRATING ON THINGS, SUCH AS READING THE NEWSPAPER OR WATCHING TELEVISION: NOT AT ALL
SUM OF ALL RESPONSES TO PHQ QUESTIONS 1-9: 0
9. THOUGHTS THAT YOU WOULD BE BETTER OFF DEAD, OR OF HURTING YOURSELF: NOT AT ALL
SUM OF ALL RESPONSES TO PHQ QUESTIONS 1-9: 0
1. LITTLE INTEREST OR PLEASURE IN DOING THINGS: NOT AT ALL
SUM OF ALL RESPONSES TO PHQ QUESTIONS 1-9: 0
5. POOR APPETITE OR OVEREATING: NOT AT ALL
3. TROUBLE FALLING OR STAYING ASLEEP: NOT AT ALL
SUM OF ALL RESPONSES TO PHQ QUESTIONS 1-9: 0
8. MOVING OR SPEAKING SO SLOWLY THAT OTHER PEOPLE COULD HAVE NOTICED. OR THE OPPOSITE, BEING SO FIGETY OR RESTLESS THAT YOU HAVE BEEN MOVING AROUND A LOT MORE THAN USUAL: NOT AT ALL

## 2024-07-30 NOTE — PROGRESS NOTES
is not pale.      Findings: No lesion or rash.      Nails: There is no clubbing.   Neurological:      Mental Status: She is alert and oriented to person, place, and time.      Motor: No weakness or tremor.      Coordination: Coordination normal.      Deep Tendon Reflexes: Reflexes are normal and symmetric.   Psychiatric:         Attention and Perception: Attention normal.         Mood and Affect: Mood normal.         Speech: Speech normal.         Behavior: Behavior normal. Behavior is cooperative.         Thought Content: Thought content normal.         Cognition and Memory: Cognition and memory normal.         Judgment: Judgment normal.        /80 (Site: Left Upper Arm, Position: Sitting, Cuff Size: Large Adult)   Pulse 94   Temp 97 °F (36.1 °C) (Temporal)   Wt 53.1 kg (117 lb)   SpO2 97%   BMI 20.73 kg/m²      ASSESSMENT:     Diagnosis Orders   1. Change in voice-need to investigate Chava Enrique MD, Otolaryngology, [place]      2. History of adenocarcinoma of lung-supposed to be on remission CT CHEST W CONTRAST    CT ABDOMEN PELVIS WO CONTRAST Additional Contrast? None      3. Weight loss-worrisome for malignancy versus respiratory cachexia versus thyroid disease CBC    Comprehensive Metabolic Panel    Sedimentation Rate    TSH with Reflex to FT4    CT CHEST W CONTRAST    CT ABDOMEN PELVIS WO CONTRAST Additional Contrast? None           PLAN:    1.  Refer to ENT for direct laryngoscopy  2/3.  Given her previous history of malignancy we will do early imaging with CTs of the chest and abdomen and pelvis.  Will obtain blood work today.  If workup negative will refer to GI for colonoscopies.  Follow-up 1 month

## 2024-08-01 DIAGNOSIS — R93.89 ABNORMAL CT OF THE CHEST: ICD-10-CM

## 2024-08-01 DIAGNOSIS — R63.4 WEIGHT LOSS, ABNORMAL: ICD-10-CM

## 2024-08-01 DIAGNOSIS — R93.5 ABNORMAL CT OF THE ABDOMEN: ICD-10-CM

## 2024-08-01 DIAGNOSIS — K31.89 GASTRIC MASS: Primary | ICD-10-CM

## 2024-08-01 LAB — ERYTHROCYTE [SEDIMENTATION RATE] IN BLOOD BY WESTERGREN METHOD: 12 MM/HR (ref 0–25)

## 2024-08-02 ENCOUNTER — OFFICE VISIT (OUTPATIENT)
Dept: ENT CLINIC | Age: 64
End: 2024-08-02

## 2024-08-02 VITALS — SYSTOLIC BLOOD PRESSURE: 124 MMHG | DIASTOLIC BLOOD PRESSURE: 76 MMHG

## 2024-08-02 DIAGNOSIS — E04.2 MULTINODULAR THYROID: ICD-10-CM

## 2024-08-02 DIAGNOSIS — J01.90 ACUTE SINUSITIS WITH SYMPTOMS > 10 DAYS: ICD-10-CM

## 2024-08-02 DIAGNOSIS — J38.01 PARALYSIS OF LEFT VOCAL CORD: Primary | ICD-10-CM

## 2024-08-02 DIAGNOSIS — H92.02 OTALGIA, LEFT: ICD-10-CM

## 2024-08-02 RX ORDER — PREDNISONE 20 MG/1
TABLET ORAL
Qty: 21 TABLET | Refills: 0 | Status: SHIPPED | OUTPATIENT
Start: 2024-08-02

## 2024-08-02 RX ORDER — CLINDAMYCIN HYDROCHLORIDE 300 MG/1
300 CAPSULE ORAL 3 TIMES DAILY
Qty: 30 CAPSULE | Refills: 0 | Status: SHIPPED | OUTPATIENT
Start: 2024-08-02 | End: 2024-08-12

## 2024-08-02 ASSESSMENT — ENCOUNTER SYMPTOMS
SINUS PRESSURE: 1
SINUS PAIN: 1
ALLERGIC/IMMUNOLOGIC NEGATIVE: 1
VOICE CHANGE: 1
GASTROINTESTINAL NEGATIVE: 1
EYES NEGATIVE: 1
RESPIRATORY NEGATIVE: 1

## 2024-08-02 NOTE — PROGRESS NOTES
2024    Niecy Iniguez (:  1960) is a 64 y.o. female, Established patient, here for evaluation of the following chief complaint(s):  New Patient (Voice changes)      Vitals:    24 0909   BP: 124/76       Wt Readings from Last 3 Encounters:   24 53.1 kg (117 lb)   24 53.5 kg (118 lb)   24 54.9 kg (121 lb)       BP Readings from Last 3 Encounters:   24 124/76   24 124/80   24 122/80         SUBJECTIVE/OBJECTIVE:    Patient seen today for her voice. She states that she lost her voice about 2 weeks ago after what she thought was a sinus infection. She states that she was given azithromycin and a steroid and did not see improvement in her voice. She complains of sinus headaches, sinus pain, and sinus pressure. She denies drainage. She states that she takes Allegra-D and this keeps her from having nasal congestion. She also complains of left sided ear pain. She states that it feels like pressure. She denies fullness or muffled hearing. She also reports a history of reflux. She denies difficulty swallowing or sore throat. She reports that she has a history of cancer and she had a CT scan done this week and they found a spot on her stomach. She states she is going back to [place] for this. She states her appointment with them is next Wednesday. She also has a history of thyroid nodules. She had her annual thyroid ultrasound done 24 but this has not been ready by radiology. She denies any pain in the area.        Review of Systems   Constitutional: Negative.    HENT:  Positive for ear pain, sinus pressure, sinus pain and voice change.    Eyes: Negative.    Respiratory: Negative.     Cardiovascular: Negative.    Gastrointestinal: Negative.    Endocrine: Negative.    Musculoskeletal: Negative.    Skin: Negative.    Allergic/Immunologic: Negative.    Neurological:  Positive for headaches.   Hematological: Negative.    Psychiatric/Behavioral: Negative.

## 2024-08-06 ENCOUNTER — HOSPITAL ENCOUNTER (OUTPATIENT)
Dept: GENERAL RADIOLOGY | Age: 64
Discharge: HOME | End: 2024-08-06
Payer: MEDICARE

## 2024-08-06 ENCOUNTER — TELEPHONE (OUTPATIENT)
Dept: FAMILY MEDICINE CLINIC | Age: 64
End: 2024-08-06

## 2024-08-06 DIAGNOSIS — J38.01 PARALYSIS OF LEFT VOCAL CORD: ICD-10-CM

## 2024-08-06 DIAGNOSIS — J01.90 ACUTE SINUSITIS WITH SYMPTOMS > 10 DAYS: ICD-10-CM

## 2024-08-06 PROCEDURE — 6360000004 HC RX CONTRAST MEDICATION: Performed by: NURSE PRACTITIONER

## 2024-08-06 PROCEDURE — 70486 CT MAXILLOFACIAL W/O DYE: CPT

## 2024-08-06 PROCEDURE — 70491 CT SOFT TISSUE NECK W/DYE: CPT

## 2024-08-06 RX ADMIN — IOPAMIDOL 75 ML: 755 INJECTION, SOLUTION INTRAVENOUS at 10:12

## 2024-08-06 NOTE — TELEPHONE ENCOUNTER
She is scheduled to see [facility] later this month. She does not want to get the Pet Scan or see GI here for the Endo before she sees [facility]. She is also adamant that she see Dr. Howell who is now in [place] (according to her).  I explained that he is a surgical oncologist and she needs to see a regular oncologist first to see what the plan should be and to get additional testing. Pt VU.    Checked Care Everywhere:    Plan of Treatment - Upcoming Encounters  Upcoming Encounters  Date Type Department Care Team (Latest Contact Info) Description   08/23/2024 3:00 PM CDT Office Visit [facility] [facility]   [address]   [address]   [place] [address]   [phone]  Benita Glass MD   [facility] [address] [phone] (Work)   [phone] (Fax)

## 2024-08-07 ENCOUNTER — TELEPHONE (OUTPATIENT)
Dept: ENT CLINIC | Age: 64
End: 2024-08-07

## 2024-08-07 DIAGNOSIS — E04.2 MULTINODULAR THYROID: Primary | ICD-10-CM

## 2024-08-07 NOTE — TELEPHONE ENCOUNTER
Called patient and reviewed thyroid US. 2.4 cm TR-5 nodule of the left lobe meets requirement for FNA biopsy. She denies ever having a previous biopsy done. We will get this ordered. She voiced understanding and is agreeable to this.

## 2024-08-13 ENCOUNTER — TELEPHONE (OUTPATIENT)
Dept: ENT CLINIC | Age: 64
End: 2024-08-13

## 2024-08-13 NOTE — TELEPHONE ENCOUNTER
Called patient and reviewed CT results of her neck and sinuses. CT of neck showed suspicious necrotic adenopathy of the right neck and mild asymmetry of vocal cords. She has an appointment with [facility] on the 28th of this month with Dr. Barbara Stock. CT of sinuses was negative for paranasal sinus disease.

## 2024-08-26 RX ORDER — ESCITALOPRAM OXALATE 20 MG/1
20 TABLET ORAL DAILY
Qty: 30 TABLET | Refills: 5 | Status: SHIPPED | OUTPATIENT
Start: 2024-08-26

## 2024-08-29 ENCOUNTER — HOSPITAL ENCOUNTER (OUTPATIENT)
Dept: ULTRASOUND IMAGING | Age: 64
Discharge: HOME | End: 2024-08-29
Payer: MEDICARE

## 2024-08-29 DIAGNOSIS — E04.2 MULTINODULAR THYROID: ICD-10-CM

## 2024-08-29 PROCEDURE — 88173 CYTOPATH EVAL FNA REPORT: CPT

## 2024-08-29 PROCEDURE — 60100 BIOPSY OF THYROID: CPT

## 2024-08-29 PROCEDURE — C1729 CATH, DRAINAGE: HCPCS

## 2024-08-30 ENCOUNTER — OFFICE VISIT (OUTPATIENT)
Dept: ENT CLINIC | Age: 64
End: 2024-08-30
Payer: MEDICARE

## 2024-08-30 VITALS
BODY MASS INDEX: 19.14 KG/M2 | DIASTOLIC BLOOD PRESSURE: 80 MMHG | SYSTOLIC BLOOD PRESSURE: 118 MMHG | WEIGHT: 108 LBS | HEIGHT: 63 IN

## 2024-08-30 DIAGNOSIS — E04.2 MULTINODULAR THYROID: Primary | ICD-10-CM

## 2024-08-30 DIAGNOSIS — R13.10 DYSPHAGIA, UNSPECIFIED TYPE: ICD-10-CM

## 2024-08-30 DIAGNOSIS — J38.01 PARALYSIS OF LEFT VOCAL CORD: ICD-10-CM

## 2024-08-30 PROCEDURE — 3074F SYST BP LT 130 MM HG: CPT | Performed by: NURSE PRACTITIONER

## 2024-08-30 PROCEDURE — 3079F DIAST BP 80-89 MM HG: CPT | Performed by: NURSE PRACTITIONER

## 2024-08-30 PROCEDURE — 99213 OFFICE O/P EST LOW 20 MIN: CPT | Performed by: NURSE PRACTITIONER

## 2024-08-30 ASSESSMENT — ENCOUNTER SYMPTOMS
RESPIRATORY NEGATIVE: 1
VOICE CHANGE: 1
ALLERGIC/IMMUNOLOGIC NEGATIVE: 1
TROUBLE SWALLOWING: 1
EYES NEGATIVE: 1
GASTROINTESTINAL NEGATIVE: 1

## 2024-08-30 NOTE — PROGRESS NOTES
2024    Niecy Iniguez (:  1960) is a 64 y.o. female, Established patient, here for evaluation of the following chief complaint(s):  Follow-up (Paralysis of left vocal cord/)      Vitals:    24 1010   BP: 118/80   Weight: 49 kg (108 lb)   Height: 1.6 m (5' 3\")       Wt Readings from Last 3 Encounters:   24 49 kg (108 lb)   24 53.1 kg (117 lb)   24 53.5 kg (118 lb)       BP Readings from Last 3 Encounters:   24 118/80   24 124/76   24 124/80         SUBJECTIVE/OBJECTIVE:    Patient seen today for follow up of her voice. She states that her sinus issues have resolved with the antibiotic and steroids. Her voice has still not improved. She states that she started to have trouble swallowing liquids and pills about a week ago. She denies issues with swallowing foods. She has not seen her oncologist yet at [place] due to a communication error with her appointment. She states they are supposed to call her today with an appointment time. She states that they need the imaging she has had done sent to them prior to her appointment. She states that she is getting weak and has been losing weight over the last 2 years. She did have her thyroid biopsy yesterday and it came back as [place] category 2.         Review of Systems   Constitutional:  Positive for activity change (weakness).   HENT:  Positive for trouble swallowing (liquids and pills) and voice change.    Eyes: Negative.    Respiratory: Negative.     Cardiovascular: Negative.    Gastrointestinal: Negative.    Endocrine: Negative.    Musculoskeletal: Negative.    Skin: Negative.    Allergic/Immunologic: Negative.    Neurological: Negative.    Hematological: Negative.    Psychiatric/Behavioral: Negative.          Physical Exam  Vitals reviewed.   Constitutional:       Appearance: Normal appearance. She is normal weight.   HENT:      Head: Normocephalic and atraumatic.      Right Ear: Tympanic membrane, ear canal

## 2024-09-04 ENCOUNTER — OFFICE VISIT (OUTPATIENT)
Dept: FAMILY MEDICINE CLINIC | Age: 64
End: 2024-09-04
Payer: MEDICARE

## 2024-09-04 ENCOUNTER — HOSPITAL ENCOUNTER (INPATIENT)
Age: 64
LOS: 7 days | Discharge: HOME | DRG: 393 | End: 2024-09-11
Attending: STUDENT IN AN ORGANIZED HEALTH CARE EDUCATION/TRAINING PROGRAM | Admitting: STUDENT IN AN ORGANIZED HEALTH CARE EDUCATION/TRAINING PROGRAM
Payer: MEDICARE

## 2024-09-04 VITALS
TEMPERATURE: 97.4 F | SYSTOLIC BLOOD PRESSURE: 116 MMHG | DIASTOLIC BLOOD PRESSURE: 70 MMHG | OXYGEN SATURATION: 90 % | BODY MASS INDEX: 18.42 KG/M2 | HEART RATE: 108 BPM | WEIGHT: 104 LBS

## 2024-09-04 DIAGNOSIS — K31.89 GASTRIC MASS: ICD-10-CM

## 2024-09-04 DIAGNOSIS — R13.10 DYSPHAGIA, UNSPECIFIED TYPE: ICD-10-CM

## 2024-09-04 DIAGNOSIS — R59.1 HEAD AND NECK LYMPHADENOPATHY: ICD-10-CM

## 2024-09-04 DIAGNOSIS — R59.0 MEDIASTINAL ADENOPATHY: Primary | ICD-10-CM

## 2024-09-04 DIAGNOSIS — R63.4 WEIGHT LOSS: Primary | ICD-10-CM

## 2024-09-04 LAB
ALBUMIN SERPL-MCNC: 3.3 G/DL (ref 3.5–5.2)
ALP SERPL-CCNC: 166 U/L (ref 35–104)
ALT SERPL-CCNC: 16 U/L (ref 5–33)
ANION GAP SERPL CALCULATED.3IONS-SCNC: 15 MMOL/L (ref 7–19)
APTT PPP: 41.6 SEC (ref 26–36.2)
AST SERPL-CCNC: 20 U/L (ref 5–32)
BASOPHILS # BLD: 0.1 K/UL (ref 0–0.2)
BASOPHILS NFR BLD: 0.6 % (ref 0–1)
BILIRUB SERPL-MCNC: 0.2 MG/DL (ref 0.2–1.2)
BUN SERPL-MCNC: 17 MG/DL (ref 8–23)
CALCIUM SERPL-MCNC: 9.1 MG/DL (ref 8.8–10.2)
CHLORIDE SERPL-SCNC: 94 MMOL/L (ref 98–111)
CO2 SERPL-SCNC: 24 MMOL/L (ref 22–29)
CREAT SERPL-MCNC: 0.3 MG/DL (ref 0.5–0.9)
EOSINOPHIL # BLD: 0.2 K/UL (ref 0–0.6)
EOSINOPHIL NFR BLD: 0.9 % (ref 0–5)
ERYTHROCYTE [DISTWIDTH] IN BLOOD BY AUTOMATED COUNT: 12.5 % (ref 11.5–14.5)
GLUCOSE SERPL-MCNC: 78 MG/DL (ref 70–99)
HCT VFR BLD AUTO: 36.6 % (ref 37–47)
HGB BLD-MCNC: 11.7 G/DL (ref 12–16)
IMM GRANULOCYTES # BLD: 0.2 K/UL
INR PPP: 1.23 (ref 0.88–1.18)
LYMPHOCYTES # BLD: 1.8 K/UL (ref 1.1–4.5)
LYMPHOCYTES NFR BLD: 11.1 % (ref 20–40)
MCH RBC QN AUTO: 30.2 PG (ref 27–31)
MCHC RBC AUTO-ENTMCNC: 32 G/DL (ref 33–37)
MCV RBC AUTO: 94.3 FL (ref 81–99)
MONOCYTES # BLD: 1.4 K/UL (ref 0–0.9)
MONOCYTES NFR BLD: 8.6 % (ref 0–10)
NEUTROPHILS # BLD: 12.8 K/UL (ref 1.5–7.5)
NEUTS SEG NFR BLD: 77.8 % (ref 50–65)
PLATELET # BLD AUTO: 469 K/UL (ref 130–400)
PMV BLD AUTO: 10.2 FL (ref 9.4–12.3)
POTASSIUM SERPL-SCNC: 3.9 MMOL/L (ref 3.5–5)
PROT SERPL-MCNC: 7.2 G/DL (ref 6.4–8.3)
PROTHROMBIN TIME: 15.2 SEC (ref 12–14.6)
RBC # BLD AUTO: 3.88 M/UL (ref 4.2–5.4)
SODIUM SERPL-SCNC: 133 MMOL/L (ref 136–145)
WBC # BLD AUTO: 16.4 K/UL (ref 4.8–10.8)

## 2024-09-04 PROCEDURE — 80053 COMPREHEN METABOLIC PANEL: CPT

## 2024-09-04 PROCEDURE — 36415 COLL VENOUS BLD VENIPUNCTURE: CPT

## 2024-09-04 PROCEDURE — 3078F DIAST BP <80 MM HG: CPT | Performed by: FAMILY MEDICINE

## 2024-09-04 PROCEDURE — 99214 OFFICE O/P EST MOD 30 MIN: CPT | Performed by: FAMILY MEDICINE

## 2024-09-04 PROCEDURE — G8427 DOCREV CUR MEDS BY ELIG CLIN: HCPCS | Performed by: FAMILY MEDICINE

## 2024-09-04 PROCEDURE — 1200000000 HC SEMI PRIVATE

## 2024-09-04 PROCEDURE — 3074F SYST BP LT 130 MM HG: CPT | Performed by: FAMILY MEDICINE

## 2024-09-04 PROCEDURE — G8419 CALC BMI OUT NRM PARAM NOF/U: HCPCS | Performed by: FAMILY MEDICINE

## 2024-09-04 PROCEDURE — 6370000000 HC RX 637 (ALT 250 FOR IP)

## 2024-09-04 PROCEDURE — 1036F TOBACCO NON-USER: CPT | Performed by: FAMILY MEDICINE

## 2024-09-04 PROCEDURE — 85025 COMPLETE CBC W/AUTO DIFF WBC: CPT

## 2024-09-04 PROCEDURE — 85730 THROMBOPLASTIN TIME PARTIAL: CPT

## 2024-09-04 PROCEDURE — 3017F COLORECTAL CA SCREEN DOC REV: CPT | Performed by: FAMILY MEDICINE

## 2024-09-04 PROCEDURE — 99222 1ST HOSP IP/OBS MODERATE 55: CPT | Performed by: INTERNAL MEDICINE

## 2024-09-04 PROCEDURE — 85610 PROTHROMBIN TIME: CPT

## 2024-09-04 PROCEDURE — 94640 AIRWAY INHALATION TREATMENT: CPT

## 2024-09-04 RX ORDER — POLYETHYLENE GLYCOL 3350 17 G/17G
17 POWDER, FOR SOLUTION ORAL DAILY PRN
Status: DISCONTINUED | OUTPATIENT
Start: 2024-09-04 | End: 2024-09-11 | Stop reason: HOSPADM

## 2024-09-04 RX ORDER — SODIUM CHLORIDE 0.9 % (FLUSH) 0.9 %
5-40 SYRINGE (ML) INJECTION EVERY 12 HOURS SCHEDULED
Status: DISCONTINUED | OUTPATIENT
Start: 2024-09-04 | End: 2024-09-11 | Stop reason: HOSPADM

## 2024-09-04 RX ORDER — POTASSIUM CHLORIDE 1500 MG/1
40 TABLET, EXTENDED RELEASE ORAL PRN
Status: DISCONTINUED | OUTPATIENT
Start: 2024-09-04 | End: 2024-09-11 | Stop reason: HOSPADM

## 2024-09-04 RX ORDER — OXYCODONE AND ACETAMINOPHEN 10; 325 MG/1; MG/1
1 TABLET ORAL EVERY 6 HOURS PRN
Status: DISCONTINUED | OUTPATIENT
Start: 2024-09-04 | End: 2024-09-05

## 2024-09-04 RX ORDER — ENOXAPARIN SODIUM 100 MG/ML
30 INJECTION SUBCUTANEOUS DAILY
Status: DISCONTINUED | OUTPATIENT
Start: 2024-09-04 | End: 2024-09-07

## 2024-09-04 RX ORDER — ONDANSETRON 2 MG/ML
4 INJECTION INTRAMUSCULAR; INTRAVENOUS EVERY 6 HOURS PRN
Status: DISCONTINUED | OUTPATIENT
Start: 2024-09-04 | End: 2024-09-11 | Stop reason: HOSPADM

## 2024-09-04 RX ORDER — ACETAMINOPHEN 325 MG/1
650 TABLET ORAL EVERY 6 HOURS PRN
Status: DISCONTINUED | OUTPATIENT
Start: 2024-09-04 | End: 2024-09-11 | Stop reason: HOSPADM

## 2024-09-04 RX ORDER — PANTOPRAZOLE SODIUM 40 MG/1
40 TABLET, DELAYED RELEASE ORAL
Status: DISCONTINUED | OUTPATIENT
Start: 2024-09-05 | End: 2024-09-05

## 2024-09-04 RX ORDER — ONDANSETRON 4 MG/1
4 TABLET, ORALLY DISINTEGRATING ORAL EVERY 8 HOURS PRN
Status: DISCONTINUED | OUTPATIENT
Start: 2024-09-04 | End: 2024-09-11 | Stop reason: HOSPADM

## 2024-09-04 RX ORDER — MAGNESIUM SULFATE IN WATER 40 MG/ML
2000 INJECTION, SOLUTION INTRAVENOUS PRN
Status: DISCONTINUED | OUTPATIENT
Start: 2024-09-04 | End: 2024-09-11 | Stop reason: HOSPADM

## 2024-09-04 RX ORDER — BUDESONIDE AND FORMOTEROL FUMARATE DIHYDRATE 160; 4.5 UG/1; UG/1
2 AEROSOL RESPIRATORY (INHALATION)
Status: DISCONTINUED | OUTPATIENT
Start: 2024-09-04 | End: 2024-09-11 | Stop reason: HOSPADM

## 2024-09-04 RX ORDER — GUAIFENESIN 600 MG/1
600 TABLET, EXTENDED RELEASE ORAL 2 TIMES DAILY
Status: DISCONTINUED | OUTPATIENT
Start: 2024-09-04 | End: 2024-09-11 | Stop reason: HOSPADM

## 2024-09-04 RX ORDER — ESCITALOPRAM OXALATE 10 MG/1
20 TABLET ORAL DAILY
Status: DISCONTINUED | OUTPATIENT
Start: 2024-09-04 | End: 2024-09-11 | Stop reason: HOSPADM

## 2024-09-04 RX ORDER — ACETAMINOPHEN 500 MG
1000 TABLET ORAL EVERY 6 HOURS PRN
COMMUNITY

## 2024-09-04 RX ORDER — ALBUTEROL SULFATE 0.83 MG/ML
2.5 SOLUTION RESPIRATORY (INHALATION) EVERY 6 HOURS PRN
Status: DISCONTINUED | OUTPATIENT
Start: 2024-09-04 | End: 2024-09-11 | Stop reason: HOSPADM

## 2024-09-04 RX ORDER — ACETAMINOPHEN 650 MG/1
650 SUPPOSITORY RECTAL EVERY 6 HOURS PRN
Status: DISCONTINUED | OUTPATIENT
Start: 2024-09-04 | End: 2024-09-11 | Stop reason: HOSPADM

## 2024-09-04 RX ORDER — POTASSIUM CHLORIDE 7.45 MG/ML
10 INJECTION INTRAVENOUS PRN
Status: DISCONTINUED | OUTPATIENT
Start: 2024-09-04 | End: 2024-09-11 | Stop reason: HOSPADM

## 2024-09-04 RX ORDER — GABAPENTIN 400 MG/1
800 CAPSULE ORAL 4 TIMES DAILY
Status: DISCONTINUED | OUTPATIENT
Start: 2024-09-04 | End: 2024-09-11 | Stop reason: HOSPADM

## 2024-09-04 RX ORDER — SODIUM CHLORIDE 9 MG/ML
INJECTION, SOLUTION INTRAVENOUS PRN
Status: DISCONTINUED | OUTPATIENT
Start: 2024-09-04 | End: 2024-09-11 | Stop reason: HOSPADM

## 2024-09-04 RX ORDER — SODIUM CHLORIDE 0.9 % (FLUSH) 0.9 %
5-40 SYRINGE (ML) INJECTION PRN
Status: DISCONTINUED | OUTPATIENT
Start: 2024-09-04 | End: 2024-09-11 | Stop reason: HOSPADM

## 2024-09-04 RX ORDER — GUAIFENESIN 600 MG/1
600 TABLET, EXTENDED RELEASE ORAL 2 TIMES DAILY
COMMUNITY

## 2024-09-04 RX ADMIN — GABAPENTIN 800 MG: 400 CAPSULE ORAL at 21:22

## 2024-09-04 RX ADMIN — GUAIFENESIN 600 MG: 600 TABLET ORAL at 21:22

## 2024-09-04 RX ADMIN — OXYCODONE HYDROCHLORIDE AND ACETAMINOPHEN 1 TABLET: 10; 325 TABLET ORAL at 23:29

## 2024-09-04 RX ADMIN — Medication 2 PUFF: at 14:45

## 2024-09-04 RX ADMIN — GUAIFENESIN 600 MG: 600 TABLET ORAL at 15:11

## 2024-09-04 RX ADMIN — GABAPENTIN 800 MG: 400 CAPSULE ORAL at 17:26

## 2024-09-04 RX ADMIN — OXYCODONE HYDROCHLORIDE AND ACETAMINOPHEN 1 TABLET: 10; 325 TABLET ORAL at 17:26

## 2024-09-04 ASSESSMENT — PAIN DESCRIPTION - ORIENTATION
ORIENTATION: MID
ORIENTATION: MID

## 2024-09-04 ASSESSMENT — PAIN SCALES - GENERAL
PAINLEVEL_OUTOF10: 1
PAINLEVEL_OUTOF10: 3
PAINLEVEL_OUTOF10: 4

## 2024-09-04 ASSESSMENT — ENCOUNTER SYMPTOMS
EYES NEGATIVE: 1
RESPIRATORY NEGATIVE: 1
ALLERGIC/IMMUNOLOGIC NEGATIVE: 1
NAUSEA: 1

## 2024-09-04 ASSESSMENT — PAIN DESCRIPTION - LOCATION
LOCATION: BACK
LOCATION: ABDOMEN

## 2024-09-04 ASSESSMENT — PAIN DESCRIPTION - DESCRIPTORS
DESCRIPTORS: CRAMPING;BURNING
DESCRIPTORS: DISCOMFORT

## 2024-09-04 NOTE — PROGRESS NOTES
SUBJECTIVE:    Patient ID: Niecy Iniguez is a 64 y.o.female.    HPI:   Patient here for evaluation of multiple medical problems  Patient is a 64-year-old female.  I saw her recently.  Patient was found to have multiple CT abnormalities worrisome for metastatic disease and possible stomach malignancy.  At that time, I recommended for her to be referred to oncology and GI ASAP.  Patient was opposed to going to see anybody that is not in [place].  She was scheduled for office visit last month but apparently she missed her appointment secondary to a misunderstanding.  Her appointment is not rescheduled until the end of the month.  She have lost 15 pounds since last time she was here.  She is not able to eat.  She is progressively getting weaker.  She is unable to stand up on her own secondary to weakness.       Past Medical History:   Diagnosis Date   • Anxiety    • Cancer (HCC)     adenocarcirnoma right lung, abdomen, pelvis.    • Chronic back pain    • COPD (chronic obstructive pulmonary disease) (HCC)    • Neuropathy    • Osteoarthritis    • Restless legs syndrome       Current Outpatient Medications   Medication Sig Dispense Refill   • escitalopram (LEXAPRO) 20 MG tablet TAKE ONE TABLET BY MOUTH EVERY DAY 30 tablet 5   • ondansetron (ZOFRAN-ODT) 4 MG disintegrating tablet Take 1 tablet by mouth 3 times daily as needed for Nausea or Vomiting 21 tablet 0   • gabapentin (NEURONTIN) 800 MG tablet Take 1 tablet by mouth in the morning, at noon, in the evening, and at bedtime for 90 days. Max Daily Amount: 3,200 mg 120 tablet 2   • esomeprazole (NEXIUM) 40 MG delayed release capsule TAKE ONE CAPSULE BY MOUTH EVERY MORNING BEFORE BREAKFAST 180 capsule 0   • diclofenac sodium (VOLTAREN) 1 % GEL Apply 2 g topically 4 times daily as needed for Pain 150 g 1   • fexofenadine-pseudoephedrine (ALLEGRA-D 24HR) 180-240 MG per extended release tablet Take 1 tablet by mouth daily 30 tablet 5   • Budeson-Glycopyrrol-Formoterol

## 2024-09-05 ENCOUNTER — APPOINTMENT (OUTPATIENT)
Dept: CT IMAGING | Age: 64
DRG: 393 | End: 2024-09-05
Attending: STUDENT IN AN ORGANIZED HEALTH CARE EDUCATION/TRAINING PROGRAM
Payer: MEDICARE

## 2024-09-05 ENCOUNTER — ANESTHESIA EVENT (OUTPATIENT)
Dept: ENDOSCOPY | Age: 64
End: 2024-09-05
Payer: MEDICARE

## 2024-09-05 ENCOUNTER — ANESTHESIA (OUTPATIENT)
Dept: ENDOSCOPY | Age: 64
End: 2024-09-05
Payer: MEDICARE

## 2024-09-05 PROBLEM — E43 SEVERE MALNUTRITION (HCC): Status: ACTIVE | Noted: 2024-09-05

## 2024-09-05 PROBLEM — Z51.5 PALLIATIVE CARE PATIENT: Status: ACTIVE | Noted: 2024-09-05

## 2024-09-05 LAB
ANION GAP SERPL CALCULATED.3IONS-SCNC: 12 MMOL/L (ref 7–19)
BASOPHILS # BLD: 0.1 K/UL (ref 0–0.2)
BASOPHILS NFR BLD: 0.7 % (ref 0–1)
BUN SERPL-MCNC: 13 MG/DL (ref 8–23)
CALCIUM SERPL-MCNC: 8.4 MG/DL (ref 8.8–10.2)
CHLORIDE SERPL-SCNC: 96 MMOL/L (ref 98–111)
CO2 SERPL-SCNC: 24 MMOL/L (ref 22–29)
CREAT SERPL-MCNC: 0.3 MG/DL (ref 0.5–0.9)
EOSINOPHIL # BLD: 0.3 K/UL (ref 0–0.6)
EOSINOPHIL NFR BLD: 1.7 % (ref 0–5)
ERYTHROCYTE [DISTWIDTH] IN BLOOD BY AUTOMATED COUNT: 12.5 % (ref 11.5–14.5)
GLUCOSE SERPL-MCNC: 95 MG/DL (ref 70–99)
HCT VFR BLD AUTO: 35.3 % (ref 37–47)
HGB BLD-MCNC: 11.1 G/DL (ref 12–16)
IMM GRANULOCYTES # BLD: 0.2 K/UL
LYMPHOCYTES # BLD: 2.1 K/UL (ref 1.1–4.5)
LYMPHOCYTES NFR BLD: 10.8 % (ref 20–40)
MCH RBC QN AUTO: 29.9 PG (ref 27–31)
MCHC RBC AUTO-ENTMCNC: 31.4 G/DL (ref 33–37)
MCV RBC AUTO: 95.1 FL (ref 81–99)
MONOCYTES # BLD: 1.7 K/UL (ref 0–0.9)
MONOCYTES NFR BLD: 8.6 % (ref 0–10)
NEUTROPHILS # BLD: 14.9 K/UL (ref 1.5–7.5)
NEUTS SEG NFR BLD: 77.3 % (ref 50–65)
PLATELET # BLD AUTO: 440 K/UL (ref 130–400)
PMV BLD AUTO: 10.1 FL (ref 9.4–12.3)
POTASSIUM SERPL-SCNC: 3.8 MMOL/L (ref 3.5–5)
RBC # BLD AUTO: 3.71 M/UL (ref 4.2–5.4)
SODIUM SERPL-SCNC: 132 MMOL/L (ref 136–145)
WBC # BLD AUTO: 19.2 K/UL (ref 4.8–10.8)

## 2024-09-05 PROCEDURE — 6370000000 HC RX 637 (ALT 250 FOR IP): Performed by: INTERNAL MEDICINE

## 2024-09-05 PROCEDURE — 80048 BASIC METABOLIC PNL TOTAL CA: CPT

## 2024-09-05 PROCEDURE — 6360000004 HC RX CONTRAST MEDICATION: Performed by: INTERNAL MEDICINE

## 2024-09-05 PROCEDURE — 6360000002 HC RX W HCPCS: Performed by: INTERNAL MEDICINE

## 2024-09-05 PROCEDURE — 6360000002 HC RX W HCPCS

## 2024-09-05 PROCEDURE — 99222 1ST HOSP IP/OBS MODERATE 55: CPT | Performed by: INTERNAL MEDICINE

## 2024-09-05 PROCEDURE — 6370000000 HC RX 637 (ALT 250 FOR IP)

## 2024-09-05 PROCEDURE — 1200000000 HC SEMI PRIVATE

## 2024-09-05 PROCEDURE — 0DB68ZX EXCISION OF STOMACH, VIA NATURAL OR ARTIFICIAL OPENING ENDOSCOPIC, DIAGNOSTIC: ICD-10-PCS | Performed by: INTERNAL MEDICINE

## 2024-09-05 PROCEDURE — 36415 COLL VENOUS BLD VENIPUNCTURE: CPT

## 2024-09-05 PROCEDURE — 99232 SBSQ HOSP IP/OBS MODERATE 35: CPT | Performed by: INTERNAL MEDICINE

## 2024-09-05 PROCEDURE — 2580000003 HC RX 258: Performed by: INTERNAL MEDICINE

## 2024-09-05 PROCEDURE — 7100000001 HC PACU RECOVERY - ADDTL 15 MIN: Performed by: INTERNAL MEDICINE

## 2024-09-05 PROCEDURE — 94640 AIRWAY INHALATION TREATMENT: CPT

## 2024-09-05 PROCEDURE — 6360000002 HC RX W HCPCS: Performed by: STUDENT IN AN ORGANIZED HEALTH CARE EDUCATION/TRAINING PROGRAM

## 2024-09-05 PROCEDURE — 2500000003 HC RX 250 WO HCPCS: Performed by: NURSE ANESTHETIST, CERTIFIED REGISTERED

## 2024-09-05 PROCEDURE — 3700000000 HC ANESTHESIA ATTENDED CARE: Performed by: INTERNAL MEDICINE

## 2024-09-05 PROCEDURE — 99223 1ST HOSP IP/OBS HIGH 75: CPT

## 2024-09-05 PROCEDURE — 2700000000 HC OXYGEN THERAPY PER DAY

## 2024-09-05 PROCEDURE — 85025 COMPLETE CBC W/AUTO DIFF WBC: CPT

## 2024-09-05 PROCEDURE — 6360000002 HC RX W HCPCS: Performed by: NURSE ANESTHETIST, CERTIFIED REGISTERED

## 2024-09-05 PROCEDURE — 43239 EGD BIOPSY SINGLE/MULTIPLE: CPT | Performed by: INTERNAL MEDICINE

## 2024-09-05 PROCEDURE — 94760 N-INVAS EAR/PLS OXIMETRY 1: CPT

## 2024-09-05 PROCEDURE — 7100000000 HC PACU RECOVERY - FIRST 15 MIN: Performed by: INTERNAL MEDICINE

## 2024-09-05 PROCEDURE — 2580000003 HC RX 258: Performed by: NURSE ANESTHETIST, CERTIFIED REGISTERED

## 2024-09-05 PROCEDURE — 3609012400 HC EGD TRANSORAL BIOPSY SINGLE/MULTIPLE: Performed by: INTERNAL MEDICINE

## 2024-09-05 PROCEDURE — 3700000001 HC ADD 15 MINUTES (ANESTHESIA): Performed by: INTERNAL MEDICINE

## 2024-09-05 PROCEDURE — 2709999900 HC NON-CHARGEABLE SUPPLY: Performed by: INTERNAL MEDICINE

## 2024-09-05 PROCEDURE — 71260 CT THORAX DX C+: CPT

## 2024-09-05 RX ORDER — LIDOCAINE 4 G/G
1 PATCH TOPICAL DAILY
Status: DISCONTINUED | OUTPATIENT
Start: 2024-09-05 | End: 2024-09-11 | Stop reason: HOSPADM

## 2024-09-05 RX ORDER — MORPHINE SULFATE 2 MG/ML
2 INJECTION, SOLUTION INTRAMUSCULAR; INTRAVENOUS EVERY 4 HOURS PRN
Status: DISCONTINUED | OUTPATIENT
Start: 2024-09-05 | End: 2024-09-11 | Stop reason: HOSPADM

## 2024-09-05 RX ORDER — METOCLOPRAMIDE 10 MG/1
5 TABLET ORAL
Status: DISCONTINUED | OUTPATIENT
Start: 2024-09-05 | End: 2024-09-11 | Stop reason: HOSPADM

## 2024-09-05 RX ORDER — IOPAMIDOL 755 MG/ML
75 INJECTION, SOLUTION INTRAVASCULAR
Status: COMPLETED | OUTPATIENT
Start: 2024-09-05 | End: 2024-09-05

## 2024-09-05 RX ORDER — PROPOFOL 10 MG/ML
INJECTION, EMULSION INTRAVENOUS PRN
Status: DISCONTINUED | OUTPATIENT
Start: 2024-09-05 | End: 2024-09-05 | Stop reason: SDUPTHER

## 2024-09-05 RX ORDER — SODIUM CHLORIDE, SODIUM LACTATE, POTASSIUM CHLORIDE, CALCIUM CHLORIDE 600; 310; 30; 20 MG/100ML; MG/100ML; MG/100ML; MG/100ML
INJECTION, SOLUTION INTRAVENOUS CONTINUOUS PRN
Status: DISCONTINUED | OUTPATIENT
Start: 2024-09-05 | End: 2024-09-05 | Stop reason: SDUPTHER

## 2024-09-05 RX ORDER — FENTANYL CITRATE 50 UG/ML
INJECTION, SOLUTION INTRAMUSCULAR; INTRAVENOUS PRN
Status: DISCONTINUED | OUTPATIENT
Start: 2024-09-05 | End: 2024-09-05 | Stop reason: SDUPTHER

## 2024-09-05 RX ORDER — LIDOCAINE HYDROCHLORIDE 10 MG/ML
INJECTION, SOLUTION EPIDURAL; INFILTRATION; INTRACAUDAL; PERINEURAL PRN
Status: DISCONTINUED | OUTPATIENT
Start: 2024-09-05 | End: 2024-09-05 | Stop reason: SDUPTHER

## 2024-09-05 RX ORDER — OXYCODONE AND ACETAMINOPHEN 10; 325 MG/1; MG/1
1 TABLET ORAL EVERY 4 HOURS PRN
Status: DISCONTINUED | OUTPATIENT
Start: 2024-09-05 | End: 2024-09-08

## 2024-09-05 RX ADMIN — OXYCODONE HYDROCHLORIDE AND ACETAMINOPHEN 1 TABLET: 10; 325 TABLET ORAL at 05:16

## 2024-09-05 RX ADMIN — SODIUM CHLORIDE, SODIUM LACTATE, POTASSIUM CHLORIDE, AND CALCIUM CHLORIDE: 600; 310; 30; 20 INJECTION, SOLUTION INTRAVENOUS at 12:24

## 2024-09-05 RX ADMIN — FENTANYL CITRATE 25 MCG: 50 INJECTION INTRAMUSCULAR; INTRAVENOUS at 12:36

## 2024-09-05 RX ADMIN — IOPAMIDOL 75 ML: 755 INJECTION, SOLUTION INTRAVENOUS at 18:16

## 2024-09-05 RX ADMIN — ESCITALOPRAM OXALATE 20 MG: 10 TABLET ORAL at 08:09

## 2024-09-05 RX ADMIN — FENTANYL CITRATE 25 MCG: 50 INJECTION INTRAMUSCULAR; INTRAVENOUS at 12:29

## 2024-09-05 RX ADMIN — METOCLOPRAMIDE 5 MG: 10 TABLET ORAL at 20:44

## 2024-09-05 RX ADMIN — GUAIFENESIN 600 MG: 600 TABLET ORAL at 08:09

## 2024-09-05 RX ADMIN — SODIUM CHLORIDE, PRESERVATIVE FREE 10 ML: 5 INJECTION INTRAVENOUS at 20:47

## 2024-09-05 RX ADMIN — OXYCODONE HYDROCHLORIDE AND ACETAMINOPHEN 1 TABLET: 10; 325 TABLET ORAL at 14:08

## 2024-09-05 RX ADMIN — GABAPENTIN 800 MG: 400 CAPSULE ORAL at 20:44

## 2024-09-05 RX ADMIN — METOCLOPRAMIDE 5 MG: 10 TABLET ORAL at 16:20

## 2024-09-05 RX ADMIN — OXYCODONE HYDROCHLORIDE AND ACETAMINOPHEN 1 TABLET: 10; 325 TABLET ORAL at 22:43

## 2024-09-05 RX ADMIN — PROPOFOL 20 MG: 10 INJECTION, EMULSION INTRAVENOUS at 12:40

## 2024-09-05 RX ADMIN — LIDOCAINE HYDROCHLORIDE 25 MG: 10 INJECTION, SOLUTION EPIDURAL; INFILTRATION; INTRACAUDAL; PERINEURAL at 12:29

## 2024-09-05 RX ADMIN — PANTOPRAZOLE SODIUM 40 MG: 40 TABLET, DELAYED RELEASE ORAL at 08:09

## 2024-09-05 RX ADMIN — GABAPENTIN 800 MG: 400 CAPSULE ORAL at 16:20

## 2024-09-05 RX ADMIN — PROPOFOL 75 MG: 10 INJECTION, EMULSION INTRAVENOUS at 12:32

## 2024-09-05 RX ADMIN — GUAIFENESIN 600 MG: 600 TABLET ORAL at 20:44

## 2024-09-05 RX ADMIN — MORPHINE SULFATE 2 MG: 2 INJECTION, SOLUTION INTRAMUSCULAR; INTRAVENOUS at 08:09

## 2024-09-05 RX ADMIN — Medication 2 PUFF: at 17:18

## 2024-09-05 RX ADMIN — GABAPENTIN 800 MG: 400 CAPSULE ORAL at 08:09

## 2024-09-05 RX ADMIN — Medication 2 PUFF: at 07:07

## 2024-09-05 RX ADMIN — MORPHINE SULFATE 2 MG: 2 INJECTION, SOLUTION INTRAMUSCULAR; INTRAVENOUS at 20:46

## 2024-09-05 RX ADMIN — ALBUTEROL SULFATE 2.5 MG: 2.5 SOLUTION RESPIRATORY (INHALATION) at 05:28

## 2024-09-05 RX ADMIN — OXYCODONE HYDROCHLORIDE AND ACETAMINOPHEN 1 TABLET: 10; 325 TABLET ORAL at 18:20

## 2024-09-05 ASSESSMENT — COPD QUESTIONNAIRES: CAT_SEVERITY: SEVERE

## 2024-09-05 ASSESSMENT — PAIN DESCRIPTION - LOCATION
LOCATION: BACK

## 2024-09-05 ASSESSMENT — PAIN DESCRIPTION - DESCRIPTORS
DESCRIPTORS: DISCOMFORT
DESCRIPTORS: ACHING
DESCRIPTORS: ACHING;CRAMPING
DESCRIPTORS: ACHING
DESCRIPTORS: ACHING

## 2024-09-05 ASSESSMENT — PAIN DESCRIPTION - ORIENTATION: ORIENTATION: MID

## 2024-09-05 ASSESSMENT — PAIN SCALES - GENERAL
PAINLEVEL_OUTOF10: 5
PAINLEVEL_OUTOF10: 1
PAINLEVEL_OUTOF10: 4
PAINLEVEL_OUTOF10: 2
PAINLEVEL_OUTOF10: 5
PAINLEVEL_OUTOF10: 8
PAINLEVEL_OUTOF10: 7

## 2024-09-05 ASSESSMENT — ENCOUNTER SYMPTOMS: SHORTNESS OF BREATH: 1

## 2024-09-05 NOTE — ANESTHESIA PRE PROCEDURE
injection 5-40 mL  5-40 mL IntraVENous 2 times per day Hui Lord APRN - CNP       • sodium chloride flush 0.9 % injection 5-40 mL  5-40 mL IntraVENous PRN Hui Lord APRN - CNP       • 0.9 % sodium chloride infusion   IntraVENous PRN Hui Lord APRN - CNP       • potassium chloride (KLOR-CON M) extended release tablet 40 mEq  40 mEq Oral PRN Hui Lord APRN - CNP        Or   • potassium bicarb-citric acid (EFFER-K) effervescent tablet 40 mEq  40 mEq Oral PRN Hui Lord APRN - CNP        Or   • potassium chloride 10 mEq/100 mL IVPB (Peripheral Line)  10 mEq IntraVENous PRN Hui Lord APRN - CNP       • magnesium sulfate 2000 mg in 50 mL IVPB premix  2,000 mg IntraVENous PRN Hui Lord APRN - CNP       • [Held by provider] enoxaparin Sodium (LOVENOX) injection 30 mg  30 mg SubCUTAneous Daily Hui Lord APRN - CNP       • ondansetron (ZOFRAN-ODT) disintegrating tablet 4 mg  4 mg Oral Q8H PRN Hui Lord APRN - CNP        Or   • ondansetron (ZOFRAN) injection 4 mg  4 mg IntraVENous Q6H PRN Hui Lord APRN - CNP       • polyethylene glycol (GLYCOLAX) packet 17 g  17 g Oral Daily PRN Hui Lord APRN - CNP       • acetaminophen (TYLENOL) tablet 650 mg  650 mg Oral Q6H PRN Hui Lord APRN - CNP        Or   • acetaminophen (TYLENOL) suppository 650 mg  650 mg Rectal Q6H PRN Hui Lord APRN - CNP           Allergies:    Allergies   Allergen Reactions   • Cephalexin Other (See Comments)     Other reaction(s): blisters in mouth and nose  Blisters to nose and mouth  Blisters to nose and mouth     • Aspirin Other (See Comments) and Nausea And Vomiting     vomiting  Unknown reaction     • Nitrofurantoin Nausea And Vomiting   • Creatine Monohydrate Other (See Comments)       Problem List:    Patient Active Problem List   Diagnosis Code   • Gastroesophageal reflux disease without esophagitis K21.9   • Chronic back pain M54.9, G89.29   • Anxiety F41.9   •

## 2024-09-05 NOTE — ANESTHESIA POSTPROCEDURE EVALUATION
Department of Anesthesiology  Postprocedure Note    Patient: Niecy Iniguez  MRN: [id number]  YOB: 1960  Date of evaluation: 9/5/2024    Procedure Summary       Date: 09/05/24 Room / Location: [place] [address] / [facility]    Anesthesia Start: 1224 Anesthesia Stop:     Procedure: ESOPHAGOGASTRODUODENOSCOPY BIOPSY (Abdomen) Diagnosis:       Gastric mass      (Gastric mass [K31.89])    Surgeons: Fidencio Harrison MD Responsible Provider: Rashaun Fowler APRN - CRNA    Anesthesia Type: general, TIVA ASA Status: 4            Anesthesia Type: No value filed.    Renita Phase I:      Renita Phase II:      Anesthesia Post Evaluation    Patient location during evaluation: PACU  Patient participation: complete - patient participated  Level of consciousness: awake  Pain score: 0  Airway patency: patent  Nausea & Vomiting: no nausea and no vomiting  Cardiovascular status: hemodynamically stable  Respiratory status: acceptable, nonlabored ventilation, spontaneous ventilation and room air  Hydration status: stable  Comments: BP (!) 130/93   Pulse 87   Temp 97.3 °F (36.3 °C) (Temporal)   Resp 14   Ht 1.6 m (5' 3\")   Wt 48.5 kg (107 lb)   SpO2 92%   BMI 18.95 kg/m²     Multimodal analgesia pain management approach  Pain management: adequate    No notable events documented.

## 2024-09-06 ENCOUNTER — APPOINTMENT (OUTPATIENT)
Dept: CT IMAGING | Age: 64
DRG: 393 | End: 2024-09-06
Attending: STUDENT IN AN ORGANIZED HEALTH CARE EDUCATION/TRAINING PROGRAM
Payer: MEDICARE

## 2024-09-06 LAB
ANION GAP SERPL CALCULATED.3IONS-SCNC: 11 MMOL/L (ref 7–19)
BASOPHILS # BLD: 0.1 K/UL (ref 0–0.2)
BASOPHILS NFR BLD: 0.6 % (ref 0–1)
BUN SERPL-MCNC: 13 MG/DL (ref 8–23)
CALCIUM SERPL-MCNC: 9 MG/DL (ref 8.8–10.2)
CHLORIDE SERPL-SCNC: 96 MMOL/L (ref 98–111)
CO2 SERPL-SCNC: 26 MMOL/L (ref 22–29)
CREAT SERPL-MCNC: 0.3 MG/DL (ref 0.5–0.9)
EOSINOPHIL # BLD: 0.2 K/UL (ref 0–0.6)
EOSINOPHIL NFR BLD: 1.2 % (ref 0–5)
ERYTHROCYTE [DISTWIDTH] IN BLOOD BY AUTOMATED COUNT: 12.5 % (ref 11.5–14.5)
FERRITIN SERPL-MCNC: 167.2 NG/ML (ref 13–150)
FOLATE SERPL-MCNC: 3.6 NG/ML (ref 4.8–37.3)
GLUCOSE SERPL-MCNC: 96 MG/DL (ref 70–99)
HCT VFR BLD AUTO: 33.4 % (ref 37–47)
HCT VFR BLD AUTO: 34.1 % (ref 37–47)
HGB BLD-MCNC: 10.9 G/DL (ref 12–16)
IMM GRANULOCYTES # BLD: 0.2 K/UL
IRON SATN MFR SERPL: 17 % (ref 14–50)
IRON SERPL-MCNC: 34 UG/DL (ref 37–145)
LYMPHOCYTES # BLD: 2.2 K/UL (ref 1.1–4.5)
LYMPHOCYTES NFR BLD: 13.1 % (ref 20–40)
MCH RBC QN AUTO: 30.5 PG (ref 27–31)
MCHC RBC AUTO-ENTMCNC: 32.6 G/DL (ref 33–37)
MCV RBC AUTO: 93.6 FL (ref 81–99)
MONOCYTES # BLD: 1.5 K/UL (ref 0–0.9)
MONOCYTES NFR BLD: 9 % (ref 0–10)
NEUTROPHILS # BLD: 12.6 K/UL (ref 1.5–7.5)
NEUTS SEG NFR BLD: 75.2 % (ref 50–65)
PLATELET # BLD AUTO: 425 K/UL (ref 130–400)
PMV BLD AUTO: 9.9 FL (ref 9.4–12.3)
POTASSIUM SERPL-SCNC: 3.8 MMOL/L (ref 3.5–5)
RBC # BLD AUTO: 3.57 M/UL (ref 4.2–5.4)
RETICS # AUTO: 0.06 M/UL (ref 0.03–0.12)
RETICS/RBC NFR: 1.56 % (ref 0.5–1.5)
SODIUM SERPL-SCNC: 133 MMOL/L (ref 136–145)
TIBC SERPL-MCNC: 199 UG/DL (ref 250–400)
TSH SERPL DL<=0.005 MIU/L-ACNC: 2.75 UIU/ML (ref 0.27–4.2)
VIT B12 SERPL-MCNC: 1195 PG/ML (ref 232–1245)
WBC # BLD AUTO: 16.8 K/UL (ref 4.8–10.8)

## 2024-09-06 PROCEDURE — 6370000000 HC RX 637 (ALT 250 FOR IP)

## 2024-09-06 PROCEDURE — 82728 ASSAY OF FERRITIN: CPT

## 2024-09-06 PROCEDURE — 94760 N-INVAS EAR/PLS OXIMETRY 1: CPT

## 2024-09-06 PROCEDURE — 6370000000 HC RX 637 (ALT 250 FOR IP): Performed by: INTERNAL MEDICINE

## 2024-09-06 PROCEDURE — 99232 SBSQ HOSP IP/OBS MODERATE 35: CPT

## 2024-09-06 PROCEDURE — 2580000003 HC RX 258: Performed by: INTERNAL MEDICINE

## 2024-09-06 PROCEDURE — 1200000000 HC SEMI PRIVATE

## 2024-09-06 PROCEDURE — 82746 ASSAY OF FOLIC ACID SERUM: CPT

## 2024-09-06 PROCEDURE — 6370000000 HC RX 637 (ALT 250 FOR IP): Performed by: STUDENT IN AN ORGANIZED HEALTH CARE EDUCATION/TRAINING PROGRAM

## 2024-09-06 PROCEDURE — 84443 ASSAY THYROID STIM HORMONE: CPT

## 2024-09-06 PROCEDURE — 36415 COLL VENOUS BLD VENIPUNCTURE: CPT

## 2024-09-06 PROCEDURE — 6360000002 HC RX W HCPCS

## 2024-09-06 PROCEDURE — 72133 CT LUMBAR SPINE W/O & W/DYE: CPT

## 2024-09-06 PROCEDURE — 82607 VITAMIN B-12: CPT

## 2024-09-06 PROCEDURE — 99232 SBSQ HOSP IP/OBS MODERATE 35: CPT | Performed by: INTERNAL MEDICINE

## 2024-09-06 PROCEDURE — 85025 COMPLETE CBC W/AUTO DIFF WBC: CPT

## 2024-09-06 PROCEDURE — 6360000002 HC RX W HCPCS: Performed by: INTERNAL MEDICINE

## 2024-09-06 PROCEDURE — 80048 BASIC METABOLIC PNL TOTAL CA: CPT

## 2024-09-06 PROCEDURE — 94640 AIRWAY INHALATION TREATMENT: CPT

## 2024-09-06 PROCEDURE — 85045 AUTOMATED RETICULOCYTE COUNT: CPT

## 2024-09-06 PROCEDURE — 97161 PT EVAL LOW COMPLEX 20 MIN: CPT

## 2024-09-06 PROCEDURE — 83540 ASSAY OF IRON: CPT

## 2024-09-06 PROCEDURE — 83550 IRON BINDING TEST: CPT

## 2024-09-06 PROCEDURE — 6360000004 HC RX CONTRAST MEDICATION: Performed by: STUDENT IN AN ORGANIZED HEALTH CARE EDUCATION/TRAINING PROGRAM

## 2024-09-06 PROCEDURE — 72130 CT CHEST SPINE W/O & W/DYE: CPT

## 2024-09-06 RX ORDER — METHYLPREDNISOLONE 4 MG/1
4 TABLET ORAL
Status: COMPLETED | OUTPATIENT
Start: 2024-09-07 | End: 2024-09-09

## 2024-09-06 RX ORDER — METHYLPREDNISOLONE 4 MG/1
4 TABLET ORAL NIGHTLY
Status: COMPLETED | OUTPATIENT
Start: 2024-09-08 | End: 2024-09-10

## 2024-09-06 RX ORDER — METHYLPREDNISOLONE 4 MG/1
8 TABLET ORAL NIGHTLY
Status: COMPLETED | OUTPATIENT
Start: 2024-09-07 | End: 2024-09-07

## 2024-09-06 RX ORDER — IOPAMIDOL 755 MG/ML
70 INJECTION, SOLUTION INTRAVASCULAR
Status: COMPLETED | OUTPATIENT
Start: 2024-09-06 | End: 2024-09-06

## 2024-09-06 RX ORDER — METHYLPREDNISOLONE 4 MG/1
4 TABLET ORAL
Status: COMPLETED | OUTPATIENT
Start: 2024-09-07 | End: 2024-09-11

## 2024-09-06 RX ORDER — AMLODIPINE BESYLATE 5 MG/1
5 TABLET ORAL DAILY
Status: DISCONTINUED | OUTPATIENT
Start: 2024-09-06 | End: 2024-09-11 | Stop reason: HOSPADM

## 2024-09-06 RX ORDER — METHYLPREDNISOLONE 4 MG/1
24 TABLET ORAL ONCE
Status: COMPLETED | OUTPATIENT
Start: 2024-09-06 | End: 2024-09-06

## 2024-09-06 RX ORDER — METHYLPREDNISOLONE 4 MG/1
4 TABLET ORAL
Status: COMPLETED | OUTPATIENT
Start: 2024-09-07 | End: 2024-09-08

## 2024-09-06 RX ADMIN — METOCLOPRAMIDE 5 MG: 10 TABLET ORAL at 05:19

## 2024-09-06 RX ADMIN — METOCLOPRAMIDE 5 MG: 10 TABLET ORAL at 20:24

## 2024-09-06 RX ADMIN — OXYCODONE HYDROCHLORIDE AND ACETAMINOPHEN 1 TABLET: 10; 325 TABLET ORAL at 15:15

## 2024-09-06 RX ADMIN — SODIUM CHLORIDE, PRESERVATIVE FREE 10 ML: 5 INJECTION INTRAVENOUS at 20:23

## 2024-09-06 RX ADMIN — ESCITALOPRAM OXALATE 20 MG: 10 TABLET ORAL at 10:09

## 2024-09-06 RX ADMIN — Medication 2 PUFF: at 09:53

## 2024-09-06 RX ADMIN — GUAIFENESIN 600 MG: 600 TABLET ORAL at 10:09

## 2024-09-06 RX ADMIN — MORPHINE SULFATE 2 MG: 2 INJECTION, SOLUTION INTRAMUSCULAR; INTRAVENOUS at 10:07

## 2024-09-06 RX ADMIN — OXYCODONE HYDROCHLORIDE AND ACETAMINOPHEN 1 TABLET: 10; 325 TABLET ORAL at 03:34

## 2024-09-06 RX ADMIN — GABAPENTIN 800 MG: 400 CAPSULE ORAL at 20:23

## 2024-09-06 RX ADMIN — AMLODIPINE BESYLATE 5 MG: 5 TABLET ORAL at 12:05

## 2024-09-06 RX ADMIN — METOCLOPRAMIDE 5 MG: 10 TABLET ORAL at 10:10

## 2024-09-06 RX ADMIN — GABAPENTIN 800 MG: 400 CAPSULE ORAL at 12:05

## 2024-09-06 RX ADMIN — Medication 2 PUFF: at 18:59

## 2024-09-06 RX ADMIN — GUAIFENESIN 600 MG: 600 TABLET ORAL at 20:23

## 2024-09-06 RX ADMIN — OXYCODONE HYDROCHLORIDE AND ACETAMINOPHEN 1 TABLET: 10; 325 TABLET ORAL at 20:24

## 2024-09-06 RX ADMIN — OXYCODONE HYDROCHLORIDE AND ACETAMINOPHEN 1 TABLET: 10; 325 TABLET ORAL at 08:17

## 2024-09-06 RX ADMIN — METHYLPREDNISOLONE 24 MG: 4 TABLET ORAL at 12:05

## 2024-09-06 RX ADMIN — METOCLOPRAMIDE 5 MG: 10 TABLET ORAL at 17:21

## 2024-09-06 RX ADMIN — MORPHINE SULFATE 2 MG: 2 INJECTION, SOLUTION INTRAMUSCULAR; INTRAVENOUS at 05:30

## 2024-09-06 RX ADMIN — GABAPENTIN 800 MG: 400 CAPSULE ORAL at 10:10

## 2024-09-06 RX ADMIN — IOPAMIDOL 70 ML: 755 INJECTION, SOLUTION INTRAVENOUS at 16:09

## 2024-09-06 RX ADMIN — GABAPENTIN 800 MG: 400 CAPSULE ORAL at 17:21

## 2024-09-06 ASSESSMENT — PAIN DESCRIPTION - DESCRIPTORS
DESCRIPTORS: ACHING
DESCRIPTORS: DISCOMFORT
DESCRIPTORS: ACHING

## 2024-09-06 ASSESSMENT — PAIN SCALES - GENERAL
PAINLEVEL_OUTOF10: 7
PAINLEVEL_OUTOF10: 3
PAINLEVEL_OUTOF10: 6
PAINLEVEL_OUTOF10: 3
PAINLEVEL_OUTOF10: 3
PAINLEVEL_OUTOF10: 8
PAINLEVEL_OUTOF10: 3
PAINLEVEL_OUTOF10: 4
PAINLEVEL_OUTOF10: 6
PAINLEVEL_OUTOF10: 8
PAINLEVEL_OUTOF10: 6

## 2024-09-06 ASSESSMENT — PAIN DESCRIPTION - LOCATION
LOCATION: BACK
LOCATION: BACK
LOCATION: GENERALIZED
LOCATION: BACK

## 2024-09-07 LAB
ANION GAP SERPL CALCULATED.3IONS-SCNC: 14 MMOL/L (ref 7–19)
BASOPHILS # BLD: 0 K/UL (ref 0–0.2)
BASOPHILS NFR BLD: 0.2 % (ref 0–1)
BUN SERPL-MCNC: 14 MG/DL (ref 8–23)
CALCIUM SERPL-MCNC: 8.9 MG/DL (ref 8.8–10.2)
CHLORIDE SERPL-SCNC: 94 MMOL/L (ref 98–111)
CO2 SERPL-SCNC: 23 MMOL/L (ref 22–29)
CREAT SERPL-MCNC: 0.3 MG/DL (ref 0.5–0.9)
EOSINOPHIL # BLD: 0 K/UL (ref 0–0.6)
EOSINOPHIL NFR BLD: 0 % (ref 0–5)
ERYTHROCYTE [DISTWIDTH] IN BLOOD BY AUTOMATED COUNT: 12.5 % (ref 11.5–14.5)
GLUCOSE SERPL-MCNC: 95 MG/DL (ref 70–99)
HCT VFR BLD AUTO: 32.8 % (ref 37–47)
HGB BLD-MCNC: 10.7 G/DL (ref 12–16)
IMM GRANULOCYTES # BLD: 0.2 K/UL
LYMPHOCYTES # BLD: 1.9 K/UL (ref 1.1–4.5)
LYMPHOCYTES NFR BLD: 8.6 % (ref 20–40)
MCH RBC QN AUTO: 30.7 PG (ref 27–31)
MCHC RBC AUTO-ENTMCNC: 32.6 G/DL (ref 33–37)
MCV RBC AUTO: 94.3 FL (ref 81–99)
MONOCYTES # BLD: 1.8 K/UL (ref 0–0.9)
MONOCYTES NFR BLD: 8 % (ref 0–10)
NEUTROPHILS # BLD: 18.4 K/UL (ref 1.5–7.5)
NEUTS SEG NFR BLD: 82.2 % (ref 50–65)
PLATELET # BLD AUTO: 408 K/UL (ref 130–400)
PMV BLD AUTO: 11.1 FL (ref 9.4–12.3)
POTASSIUM SERPL-SCNC: 4.1 MMOL/L (ref 3.5–5)
RBC # BLD AUTO: 3.48 M/UL (ref 4.2–5.4)
SODIUM SERPL-SCNC: 131 MMOL/L (ref 136–145)
WBC # BLD AUTO: 22.4 K/UL (ref 4.8–10.8)

## 2024-09-07 PROCEDURE — 36415 COLL VENOUS BLD VENIPUNCTURE: CPT

## 2024-09-07 PROCEDURE — 2580000003 HC RX 258: Performed by: INTERNAL MEDICINE

## 2024-09-07 PROCEDURE — 6370000000 HC RX 637 (ALT 250 FOR IP)

## 2024-09-07 PROCEDURE — 6360000002 HC RX W HCPCS

## 2024-09-07 PROCEDURE — 80048 BASIC METABOLIC PNL TOTAL CA: CPT

## 2024-09-07 PROCEDURE — 6360000002 HC RX W HCPCS: Performed by: INTERNAL MEDICINE

## 2024-09-07 PROCEDURE — 6370000000 HC RX 637 (ALT 250 FOR IP): Performed by: INTERNAL MEDICINE

## 2024-09-07 PROCEDURE — 99232 SBSQ HOSP IP/OBS MODERATE 35: CPT | Performed by: INTERNAL MEDICINE

## 2024-09-07 PROCEDURE — 94760 N-INVAS EAR/PLS OXIMETRY 1: CPT

## 2024-09-07 PROCEDURE — 6370000000 HC RX 637 (ALT 250 FOR IP): Performed by: STUDENT IN AN ORGANIZED HEALTH CARE EDUCATION/TRAINING PROGRAM

## 2024-09-07 PROCEDURE — 1200000000 HC SEMI PRIVATE

## 2024-09-07 PROCEDURE — 2700000000 HC OXYGEN THERAPY PER DAY

## 2024-09-07 PROCEDURE — 6360000002 HC RX W HCPCS: Performed by: STUDENT IN AN ORGANIZED HEALTH CARE EDUCATION/TRAINING PROGRAM

## 2024-09-07 PROCEDURE — 94640 AIRWAY INHALATION TREATMENT: CPT

## 2024-09-07 PROCEDURE — 85025 COMPLETE CBC W/AUTO DIFF WBC: CPT

## 2024-09-07 RX ORDER — ENOXAPARIN SODIUM 100 MG/ML
30 INJECTION SUBCUTANEOUS DAILY
Status: DISCONTINUED | OUTPATIENT
Start: 2024-09-07 | End: 2024-09-11 | Stop reason: HOSPADM

## 2024-09-07 RX ADMIN — OXYCODONE HYDROCHLORIDE AND ACETAMINOPHEN 1 TABLET: 10; 325 TABLET ORAL at 01:12

## 2024-09-07 RX ADMIN — Medication 2 PUFF: at 06:16

## 2024-09-07 RX ADMIN — DICLOFENAC SODIUM 2 G: 10 GEL TOPICAL at 16:01

## 2024-09-07 RX ADMIN — GUAIFENESIN 600 MG: 600 TABLET ORAL at 20:17

## 2024-09-07 RX ADMIN — SODIUM CHLORIDE, PRESERVATIVE FREE 10 ML: 5 INJECTION INTRAVENOUS at 07:38

## 2024-09-07 RX ADMIN — OXYCODONE HYDROCHLORIDE AND ACETAMINOPHEN 1 TABLET: 10; 325 TABLET ORAL at 10:25

## 2024-09-07 RX ADMIN — METHYLPREDNISOLONE 4 MG: 4 TABLET ORAL at 17:07

## 2024-09-07 RX ADMIN — METOCLOPRAMIDE 5 MG: 10 TABLET ORAL at 05:13

## 2024-09-07 RX ADMIN — METHYLPREDNISOLONE 4 MG: 4 TABLET ORAL at 12:23

## 2024-09-07 RX ADMIN — METHYLPREDNISOLONE 4 MG: 4 TABLET ORAL at 05:13

## 2024-09-07 RX ADMIN — OXYCODONE HYDROCHLORIDE AND ACETAMINOPHEN 1 TABLET: 10; 325 TABLET ORAL at 05:13

## 2024-09-07 RX ADMIN — METOCLOPRAMIDE 5 MG: 10 TABLET ORAL at 20:17

## 2024-09-07 RX ADMIN — METOCLOPRAMIDE 5 MG: 10 TABLET ORAL at 10:25

## 2024-09-07 RX ADMIN — GABAPENTIN 800 MG: 400 CAPSULE ORAL at 20:17

## 2024-09-07 RX ADMIN — AMLODIPINE BESYLATE 5 MG: 5 TABLET ORAL at 07:38

## 2024-09-07 RX ADMIN — OXYCODONE HYDROCHLORIDE AND ACETAMINOPHEN 1 TABLET: 10; 325 TABLET ORAL at 20:17

## 2024-09-07 RX ADMIN — MORPHINE SULFATE 2 MG: 2 INJECTION, SOLUTION INTRAMUSCULAR; INTRAVENOUS at 07:35

## 2024-09-07 RX ADMIN — SODIUM CHLORIDE, PRESERVATIVE FREE 10 ML: 5 INJECTION INTRAVENOUS at 20:20

## 2024-09-07 RX ADMIN — ONDANSETRON 4 MG: 4 TABLET, ORALLY DISINTEGRATING ORAL at 20:20

## 2024-09-07 RX ADMIN — METHYLPREDNISOLONE 8 MG: 4 TABLET ORAL at 20:17

## 2024-09-07 RX ADMIN — METOCLOPRAMIDE 5 MG: 10 TABLET ORAL at 17:08

## 2024-09-07 RX ADMIN — Medication 2 PUFF: at 19:07

## 2024-09-07 RX ADMIN — GABAPENTIN 800 MG: 400 CAPSULE ORAL at 07:39

## 2024-09-07 RX ADMIN — GUAIFENESIN 600 MG: 600 TABLET ORAL at 07:38

## 2024-09-07 RX ADMIN — GABAPENTIN 800 MG: 400 CAPSULE ORAL at 12:23

## 2024-09-07 RX ADMIN — ENOXAPARIN SODIUM 30 MG: 100 INJECTION SUBCUTANEOUS at 16:08

## 2024-09-07 RX ADMIN — OXYCODONE HYDROCHLORIDE AND ACETAMINOPHEN 1 TABLET: 10; 325 TABLET ORAL at 14:29

## 2024-09-07 RX ADMIN — GABAPENTIN 800 MG: 400 CAPSULE ORAL at 17:07

## 2024-09-07 RX ADMIN — ESCITALOPRAM OXALATE 20 MG: 10 TABLET ORAL at 07:38

## 2024-09-07 ASSESSMENT — PAIN DESCRIPTION - DESCRIPTORS
DESCRIPTORS: DISCOMFORT
DESCRIPTORS: ACHING;DISCOMFORT
DESCRIPTORS: ACHING
DESCRIPTORS: ACHING;DISCOMFORT

## 2024-09-07 ASSESSMENT — PAIN DESCRIPTION - LOCATION
LOCATION: BACK
LOCATION: BACK
LOCATION: GENERALIZED
LOCATION: ABDOMEN
LOCATION: BACK
LOCATION: BACK;ABDOMEN;SHOULDER

## 2024-09-07 ASSESSMENT — PAIN DESCRIPTION - ORIENTATION
ORIENTATION: POSTERIOR;UPPER
ORIENTATION: POSTERIOR;UPPER
ORIENTATION: LOWER;UPPER
ORIENTATION: MID
ORIENTATION: POSTERIOR;UPPER

## 2024-09-07 ASSESSMENT — PAIN SCALES - WONG BAKER: WONGBAKER_NUMERICALRESPONSE: NO HURT

## 2024-09-07 ASSESSMENT — PAIN SCALES - GENERAL
PAINLEVEL_OUTOF10: 5
PAINLEVEL_OUTOF10: 8
PAINLEVEL_OUTOF10: 6
PAINLEVEL_OUTOF10: 5
PAINLEVEL_OUTOF10: 5

## 2024-09-07 ASSESSMENT — PAIN - FUNCTIONAL ASSESSMENT: PAIN_FUNCTIONAL_ASSESSMENT: ACTIVITIES ARE NOT PREVENTED

## 2024-09-08 LAB
ANION GAP SERPL CALCULATED.3IONS-SCNC: 12 MMOL/L (ref 7–19)
BASOPHILS # BLD: 0 K/UL (ref 0–0.2)
BASOPHILS NFR BLD: 0.2 % (ref 0–1)
BUN SERPL-MCNC: 15 MG/DL (ref 8–23)
CALCIUM SERPL-MCNC: 9.3 MG/DL (ref 8.8–10.2)
CHLORIDE SERPL-SCNC: 95 MMOL/L (ref 98–111)
CO2 SERPL-SCNC: 28 MMOL/L (ref 22–29)
CREAT SERPL-MCNC: 0.4 MG/DL (ref 0.5–0.9)
EOSINOPHIL # BLD: 0 K/UL (ref 0–0.6)
EOSINOPHIL NFR BLD: 0 % (ref 0–5)
ERYTHROCYTE [DISTWIDTH] IN BLOOD BY AUTOMATED COUNT: 12.9 % (ref 11.5–14.5)
GLUCOSE SERPL-MCNC: 171 MG/DL (ref 70–99)
HCT VFR BLD AUTO: 35.6 % (ref 37–47)
HGB BLD-MCNC: 11.4 G/DL (ref 12–16)
IMM GRANULOCYTES # BLD: 0.2 K/UL
LYMPHOCYTES # BLD: 2.2 K/UL (ref 1.1–4.5)
LYMPHOCYTES NFR BLD: 10.1 % (ref 20–40)
MCH RBC QN AUTO: 30.6 PG (ref 27–31)
MCHC RBC AUTO-ENTMCNC: 32 G/DL (ref 33–37)
MCV RBC AUTO: 95.4 FL (ref 81–99)
MONOCYTES # BLD: 1.1 K/UL (ref 0–0.9)
MONOCYTES NFR BLD: 5 % (ref 0–10)
NEUTROPHILS # BLD: 18.4 K/UL (ref 1.5–7.5)
NEUTS SEG NFR BLD: 83.8 % (ref 50–65)
PLATELET # BLD AUTO: 469 K/UL (ref 130–400)
PMV BLD AUTO: 10 FL (ref 9.4–12.3)
POTASSIUM SERPL-SCNC: 3.9 MMOL/L (ref 3.5–5)
RBC # BLD AUTO: 3.73 M/UL (ref 4.2–5.4)
SODIUM SERPL-SCNC: 135 MMOL/L (ref 136–145)
WBC # BLD AUTO: 21.9 K/UL (ref 4.8–10.8)

## 2024-09-08 PROCEDURE — 99232 SBSQ HOSP IP/OBS MODERATE 35: CPT | Performed by: INTERNAL MEDICINE

## 2024-09-08 PROCEDURE — 6360000002 HC RX W HCPCS: Performed by: INTERNAL MEDICINE

## 2024-09-08 PROCEDURE — 6370000000 HC RX 637 (ALT 250 FOR IP): Performed by: INTERNAL MEDICINE

## 2024-09-08 PROCEDURE — 2580000003 HC RX 258: Performed by: INTERNAL MEDICINE

## 2024-09-08 PROCEDURE — 6370000000 HC RX 637 (ALT 250 FOR IP)

## 2024-09-08 PROCEDURE — 6370000000 HC RX 637 (ALT 250 FOR IP): Performed by: HOSPITALIST

## 2024-09-08 PROCEDURE — 6360000002 HC RX W HCPCS

## 2024-09-08 PROCEDURE — 85025 COMPLETE CBC W/AUTO DIFF WBC: CPT

## 2024-09-08 PROCEDURE — 1200000000 HC SEMI PRIVATE

## 2024-09-08 PROCEDURE — 94640 AIRWAY INHALATION TREATMENT: CPT

## 2024-09-08 PROCEDURE — 36415 COLL VENOUS BLD VENIPUNCTURE: CPT

## 2024-09-08 PROCEDURE — 80048 BASIC METABOLIC PNL TOTAL CA: CPT

## 2024-09-08 PROCEDURE — 6360000002 HC RX W HCPCS: Performed by: STUDENT IN AN ORGANIZED HEALTH CARE EDUCATION/TRAINING PROGRAM

## 2024-09-08 PROCEDURE — 2700000000 HC OXYGEN THERAPY PER DAY

## 2024-09-08 PROCEDURE — 94760 N-INVAS EAR/PLS OXIMETRY 1: CPT

## 2024-09-08 PROCEDURE — 6370000000 HC RX 637 (ALT 250 FOR IP): Performed by: STUDENT IN AN ORGANIZED HEALTH CARE EDUCATION/TRAINING PROGRAM

## 2024-09-08 RX ORDER — FOLIC ACID 1 MG/1
1 TABLET ORAL DAILY
Status: DISCONTINUED | OUTPATIENT
Start: 2024-09-08 | End: 2024-09-11 | Stop reason: HOSPADM

## 2024-09-08 RX ORDER — SIMETHICONE 80 MG
80 TABLET,CHEWABLE ORAL EVERY 6 HOURS PRN
Status: DISCONTINUED | OUTPATIENT
Start: 2024-09-07 | End: 2024-09-11 | Stop reason: HOSPADM

## 2024-09-08 RX ORDER — ACETAMINOPHEN 500 MG
1000 TABLET ORAL EVERY 8 HOURS SCHEDULED
Status: DISCONTINUED | OUTPATIENT
Start: 2024-09-08 | End: 2024-09-11 | Stop reason: HOSPADM

## 2024-09-08 RX ADMIN — METOCLOPRAMIDE 5 MG: 10 TABLET ORAL at 16:55

## 2024-09-08 RX ADMIN — ONDANSETRON 4 MG: 2 INJECTION INTRAMUSCULAR; INTRAVENOUS at 08:38

## 2024-09-08 RX ADMIN — SODIUM CHLORIDE, PRESERVATIVE FREE 10 ML: 5 INJECTION INTRAVENOUS at 20:04

## 2024-09-08 RX ADMIN — Medication 2 PUFF: at 06:14

## 2024-09-08 RX ADMIN — OXYCODONE HYDROCHLORIDE 15 MG: 10 TABLET ORAL at 10:58

## 2024-09-08 RX ADMIN — SIMETHICONE 80 MG: 80 TABLET, CHEWABLE ORAL at 18:09

## 2024-09-08 RX ADMIN — SIMETHICONE 80 MG: 80 TABLET, CHEWABLE ORAL at 02:25

## 2024-09-08 RX ADMIN — OXYCODONE HYDROCHLORIDE 15 MG: 10 TABLET ORAL at 22:18

## 2024-09-08 RX ADMIN — FOLIC ACID 1 MG: 1 TABLET ORAL at 08:39

## 2024-09-08 RX ADMIN — GUAIFENESIN 600 MG: 600 TABLET ORAL at 08:32

## 2024-09-08 RX ADMIN — Medication 2 PUFF: at 19:01

## 2024-09-08 RX ADMIN — GABAPENTIN 800 MG: 400 CAPSULE ORAL at 16:55

## 2024-09-08 RX ADMIN — METHYLPREDNISOLONE 4 MG: 4 TABLET ORAL at 06:45

## 2024-09-08 RX ADMIN — METOCLOPRAMIDE 5 MG: 10 TABLET ORAL at 20:02

## 2024-09-08 RX ADMIN — SODIUM CHLORIDE, PRESERVATIVE FREE 10 ML: 5 INJECTION INTRAVENOUS at 08:32

## 2024-09-08 RX ADMIN — METHYLPREDNISOLONE 4 MG: 4 TABLET ORAL at 12:18

## 2024-09-08 RX ADMIN — OXYCODONE HYDROCHLORIDE AND ACETAMINOPHEN 1 TABLET: 10; 325 TABLET ORAL at 02:25

## 2024-09-08 RX ADMIN — OXYCODONE HYDROCHLORIDE AND ACETAMINOPHEN 1 TABLET: 10; 325 TABLET ORAL at 06:45

## 2024-09-08 RX ADMIN — SIMETHICONE 80 MG: 80 TABLET, CHEWABLE ORAL at 22:18

## 2024-09-08 RX ADMIN — OXYCODONE HYDROCHLORIDE 15 MG: 10 TABLET ORAL at 18:09

## 2024-09-08 RX ADMIN — METOCLOPRAMIDE 5 MG: 10 TABLET ORAL at 10:27

## 2024-09-08 RX ADMIN — GABAPENTIN 800 MG: 400 CAPSULE ORAL at 12:18

## 2024-09-08 RX ADMIN — ACETAMINOPHEN 1000 MG: 500 TABLET ORAL at 14:01

## 2024-09-08 RX ADMIN — ESCITALOPRAM OXALATE 20 MG: 10 TABLET ORAL at 08:32

## 2024-09-08 RX ADMIN — ENOXAPARIN SODIUM 30 MG: 100 INJECTION SUBCUTANEOUS at 08:32

## 2024-09-08 RX ADMIN — GABAPENTIN 800 MG: 400 CAPSULE ORAL at 20:02

## 2024-09-08 RX ADMIN — GABAPENTIN 800 MG: 400 CAPSULE ORAL at 08:32

## 2024-09-08 RX ADMIN — METHYLPREDNISOLONE 4 MG: 4 TABLET ORAL at 20:02

## 2024-09-08 RX ADMIN — DICLOFENAC SODIUM 2 G: 10 GEL TOPICAL at 08:34

## 2024-09-08 RX ADMIN — ACETAMINOPHEN 1000 MG: 500 TABLET ORAL at 22:18

## 2024-09-08 RX ADMIN — GUAIFENESIN 600 MG: 600 TABLET ORAL at 20:02

## 2024-09-08 RX ADMIN — AMLODIPINE BESYLATE 5 MG: 5 TABLET ORAL at 08:32

## 2024-09-08 RX ADMIN — METHYLPREDNISOLONE 4 MG: 4 TABLET ORAL at 16:55

## 2024-09-08 RX ADMIN — MORPHINE SULFATE 2 MG: 2 INJECTION, SOLUTION INTRAMUSCULAR; INTRAVENOUS at 08:40

## 2024-09-08 RX ADMIN — METOCLOPRAMIDE 5 MG: 10 TABLET ORAL at 06:45

## 2024-09-08 ASSESSMENT — PAIN DESCRIPTION - DESCRIPTORS
DESCRIPTORS: DISCOMFORT;ACHING
DESCRIPTORS: ACHING;DISCOMFORT
DESCRIPTORS: PATIENT UNABLE TO DESCRIBE

## 2024-09-08 ASSESSMENT — PAIN DESCRIPTION - ORIENTATION
ORIENTATION: UPPER
ORIENTATION: UPPER;POSTERIOR
ORIENTATION: MID
ORIENTATION: LEFT;RIGHT;MID;LOWER;UPPER
ORIENTATION: POSTERIOR;UPPER
ORIENTATION: MID;LOWER;UPPER

## 2024-09-08 ASSESSMENT — PAIN SCALES - GENERAL
PAINLEVEL_OUTOF10: 7
PAINLEVEL_OUTOF10: 5
PAINLEVEL_OUTOF10: 6

## 2024-09-08 ASSESSMENT — PAIN DESCRIPTION - LOCATION
LOCATION: BACK;ABDOMEN;SHOULDER
LOCATION: BACK
LOCATION: ABDOMEN;BACK;SHOULDER
LOCATION: BACK;SHOULDER
LOCATION: HEAD;BACK
LOCATION: ABDOMEN;BACK;SHOULDER

## 2024-09-08 ASSESSMENT — PAIN SCALES - WONG BAKER
WONGBAKER_NUMERICALRESPONSE: NO HURT
WONGBAKER_NUMERICALRESPONSE: NO HURT

## 2024-09-08 ASSESSMENT — PAIN - FUNCTIONAL ASSESSMENT: PAIN_FUNCTIONAL_ASSESSMENT: ACTIVITIES ARE NOT PREVENTED

## 2024-09-09 ENCOUNTER — APPOINTMENT (OUTPATIENT)
Dept: ULTRASOUND IMAGING | Age: 64
DRG: 393 | End: 2024-09-09
Attending: STUDENT IN AN ORGANIZED HEALTH CARE EDUCATION/TRAINING PROGRAM
Payer: MEDICARE

## 2024-09-09 LAB
ANION GAP SERPL CALCULATED.3IONS-SCNC: 12 MMOL/L (ref 7–19)
BASOPHILS # BLD: 0.1 K/UL (ref 0–0.2)
BASOPHILS NFR BLD: 0.2 % (ref 0–1)
BUN SERPL-MCNC: 15 MG/DL (ref 8–23)
CALCIUM SERPL-MCNC: 8.9 MG/DL (ref 8.8–10.2)
CHLORIDE SERPL-SCNC: 95 MMOL/L (ref 98–111)
CO2 SERPL-SCNC: 26 MMOL/L (ref 22–29)
CREAT SERPL-MCNC: 0.3 MG/DL (ref 0.5–0.9)
EOSINOPHIL # BLD: 0 K/UL (ref 0–0.6)
EOSINOPHIL NFR BLD: 0 % (ref 0–5)
ERYTHROCYTE [DISTWIDTH] IN BLOOD BY AUTOMATED COUNT: 12.9 % (ref 11.5–14.5)
GLUCOSE SERPL-MCNC: 115 MG/DL (ref 70–99)
HCT VFR BLD AUTO: 33.7 % (ref 37–47)
HGB BLD-MCNC: 10.5 G/DL (ref 12–16)
IMM GRANULOCYTES # BLD: 0.3 K/UL
INR PPP: 1.02 (ref 0.88–1.18)
LYMPHOCYTES # BLD: 1.6 K/UL (ref 1.1–4.5)
LYMPHOCYTES NFR BLD: 6.6 % (ref 20–40)
MCH RBC QN AUTO: 30.6 PG (ref 27–31)
MCHC RBC AUTO-ENTMCNC: 31.2 G/DL (ref 33–37)
MCV RBC AUTO: 98.3 FL (ref 81–99)
MONOCYTES # BLD: 1.4 K/UL (ref 0–0.9)
MONOCYTES NFR BLD: 5.9 % (ref 0–10)
NEUTROPHILS # BLD: 21.1 K/UL (ref 1.5–7.5)
NEUTS SEG NFR BLD: 86.3 % (ref 50–65)
PLATELET # BLD AUTO: 396 K/UL (ref 130–400)
PMV BLD AUTO: 10.9 FL (ref 9.4–12.3)
POTASSIUM SERPL-SCNC: 4.6 MMOL/L (ref 3.5–5)
PROTHROMBIN TIME: 13.2 SEC (ref 12–14.6)
RBC # BLD AUTO: 3.43 M/UL (ref 4.2–5.4)
SODIUM SERPL-SCNC: 133 MMOL/L (ref 136–145)
WBC # BLD AUTO: 24.4 K/UL (ref 4.8–10.8)

## 2024-09-09 PROCEDURE — 6370000000 HC RX 637 (ALT 250 FOR IP): Performed by: INTERNAL MEDICINE

## 2024-09-09 PROCEDURE — 2700000000 HC OXYGEN THERAPY PER DAY

## 2024-09-09 PROCEDURE — 88173 CYTOPATH EVAL FNA REPORT: CPT

## 2024-09-09 PROCEDURE — 60100 BIOPSY OF THYROID: CPT

## 2024-09-09 PROCEDURE — 88333 PATH CONSLTJ SURG CYTO XM 1: CPT

## 2024-09-09 PROCEDURE — 80048 BASIC METABOLIC PNL TOTAL CA: CPT

## 2024-09-09 PROCEDURE — 6370000000 HC RX 637 (ALT 250 FOR IP): Performed by: STUDENT IN AN ORGANIZED HEALTH CARE EDUCATION/TRAINING PROGRAM

## 2024-09-09 PROCEDURE — 85025 COMPLETE CBC W/AUTO DIFF WBC: CPT

## 2024-09-09 PROCEDURE — 94640 AIRWAY INHALATION TREATMENT: CPT

## 2024-09-09 PROCEDURE — 1200000000 HC SEMI PRIVATE

## 2024-09-09 PROCEDURE — 99232 SBSQ HOSP IP/OBS MODERATE 35: CPT

## 2024-09-09 PROCEDURE — 07D13ZX EXTRACTION OF RIGHT NECK LYMPHATIC, PERCUTANEOUS APPROACH, DIAGNOSTIC: ICD-10-PCS | Performed by: STUDENT IN AN ORGANIZED HEALTH CARE EDUCATION/TRAINING PROGRAM

## 2024-09-09 PROCEDURE — 88334 PATH CONSLTJ SURG CYTO XM EA: CPT

## 2024-09-09 PROCEDURE — 2580000003 HC RX 258: Performed by: INTERNAL MEDICINE

## 2024-09-09 PROCEDURE — 6370000000 HC RX 637 (ALT 250 FOR IP): Performed by: HOSPITALIST

## 2024-09-09 PROCEDURE — 99232 SBSQ HOSP IP/OBS MODERATE 35: CPT | Performed by: INTERNAL MEDICINE

## 2024-09-09 PROCEDURE — 6360000002 HC RX W HCPCS: Performed by: INTERNAL MEDICINE

## 2024-09-09 PROCEDURE — 6360000002 HC RX W HCPCS

## 2024-09-09 PROCEDURE — 36415 COLL VENOUS BLD VENIPUNCTURE: CPT

## 2024-09-09 PROCEDURE — 94760 N-INVAS EAR/PLS OXIMETRY 1: CPT

## 2024-09-09 PROCEDURE — 97116 GAIT TRAINING THERAPY: CPT

## 2024-09-09 PROCEDURE — 85610 PROTHROMBIN TIME: CPT

## 2024-09-09 PROCEDURE — 97530 THERAPEUTIC ACTIVITIES: CPT

## 2024-09-09 PROCEDURE — 97165 OT EVAL LOW COMPLEX 30 MIN: CPT

## 2024-09-09 RX ORDER — POLYETHYLENE GLYCOL 3350 17 G/17G
17 POWDER, FOR SOLUTION ORAL DAILY
Status: DISCONTINUED | OUTPATIENT
Start: 2024-09-09 | End: 2024-09-11 | Stop reason: HOSPADM

## 2024-09-09 RX ORDER — BISACODYL 10 MG
10 SUPPOSITORY, RECTAL RECTAL DAILY PRN
Status: DISCONTINUED | OUTPATIENT
Start: 2024-09-09 | End: 2024-09-11 | Stop reason: HOSPADM

## 2024-09-09 RX ADMIN — METOCLOPRAMIDE 5 MG: 10 TABLET ORAL at 16:46

## 2024-09-09 RX ADMIN — Medication 2 PUFF: at 06:59

## 2024-09-09 RX ADMIN — SIMETHICONE 80 MG: 80 TABLET, CHEWABLE ORAL at 01:36

## 2024-09-09 RX ADMIN — GABAPENTIN 800 MG: 400 CAPSULE ORAL at 16:46

## 2024-09-09 RX ADMIN — METOCLOPRAMIDE 5 MG: 10 TABLET ORAL at 06:08

## 2024-09-09 RX ADMIN — NALOXEGOL OXALATE 12.5 MG: 12.5 TABLET, FILM COATED ORAL at 13:14

## 2024-09-09 RX ADMIN — AMLODIPINE BESYLATE 5 MG: 5 TABLET ORAL at 10:41

## 2024-09-09 RX ADMIN — GABAPENTIN 800 MG: 400 CAPSULE ORAL at 13:14

## 2024-09-09 RX ADMIN — SODIUM CHLORIDE, PRESERVATIVE FREE 10 ML: 5 INJECTION INTRAVENOUS at 10:44

## 2024-09-09 RX ADMIN — OXYCODONE HYDROCHLORIDE 15 MG: 10 TABLET ORAL at 06:08

## 2024-09-09 RX ADMIN — METOCLOPRAMIDE 5 MG: 10 TABLET ORAL at 10:40

## 2024-09-09 RX ADMIN — OXYCODONE HYDROCHLORIDE 15 MG: 10 TABLET ORAL at 16:49

## 2024-09-09 RX ADMIN — FOLIC ACID 1 MG: 1 TABLET ORAL at 10:41

## 2024-09-09 RX ADMIN — POLYETHYLENE GLYCOL 3350 17 G: 17 POWDER, FOR SOLUTION ORAL at 13:14

## 2024-09-09 RX ADMIN — ACETAMINOPHEN 1000 MG: 500 TABLET ORAL at 21:14

## 2024-09-09 RX ADMIN — ACETAMINOPHEN 1000 MG: 500 TABLET ORAL at 13:15

## 2024-09-09 RX ADMIN — SODIUM CHLORIDE, PRESERVATIVE FREE 10 ML: 5 INJECTION INTRAVENOUS at 21:14

## 2024-09-09 RX ADMIN — GUAIFENESIN 600 MG: 600 TABLET ORAL at 21:13

## 2024-09-09 RX ADMIN — Medication 2 PUFF: at 18:54

## 2024-09-09 RX ADMIN — GABAPENTIN 800 MG: 400 CAPSULE ORAL at 21:13

## 2024-09-09 RX ADMIN — METHYLPREDNISOLONE 4 MG: 4 TABLET ORAL at 21:13

## 2024-09-09 RX ADMIN — METHYLPREDNISOLONE 4 MG: 4 TABLET ORAL at 13:14

## 2024-09-09 RX ADMIN — MORPHINE SULFATE 2 MG: 2 INJECTION, SOLUTION INTRAMUSCULAR; INTRAVENOUS at 13:23

## 2024-09-09 RX ADMIN — ESCITALOPRAM OXALATE 20 MG: 10 TABLET ORAL at 10:41

## 2024-09-09 RX ADMIN — OXYCODONE HYDROCHLORIDE 15 MG: 10 TABLET ORAL at 10:41

## 2024-09-09 RX ADMIN — METHYLPREDNISOLONE 4 MG: 4 TABLET ORAL at 06:08

## 2024-09-09 RX ADMIN — GABAPENTIN 800 MG: 400 CAPSULE ORAL at 10:41

## 2024-09-09 RX ADMIN — METOCLOPRAMIDE 5 MG: 10 TABLET ORAL at 21:13

## 2024-09-09 RX ADMIN — ACETAMINOPHEN 1000 MG: 500 TABLET ORAL at 06:08

## 2024-09-09 RX ADMIN — SIMETHICONE 80 MG: 80 TABLET, CHEWABLE ORAL at 10:40

## 2024-09-09 RX ADMIN — OXYCODONE HYDROCHLORIDE 15 MG: 10 TABLET ORAL at 01:36

## 2024-09-09 RX ADMIN — GUAIFENESIN 600 MG: 600 TABLET ORAL at 10:41

## 2024-09-09 RX ADMIN — OXYCODONE HYDROCHLORIDE 15 MG: 10 TABLET ORAL at 21:13

## 2024-09-09 ASSESSMENT — PAIN DESCRIPTION - PAIN TYPE
TYPE: CHRONIC PAIN
TYPE: CHRONIC PAIN;ACUTE PAIN

## 2024-09-09 ASSESSMENT — PAIN DESCRIPTION - DESCRIPTORS
DESCRIPTORS: ACHING;DISCOMFORT
DESCRIPTORS: ACHING;CRAMPING;DISCOMFORT
DESCRIPTORS: ACHING;DISCOMFORT
DESCRIPTORS: ACHING;DISCOMFORT;SORE
DESCRIPTORS: ACHING;SHARP;THROBBING

## 2024-09-09 ASSESSMENT — PAIN SCALES - GENERAL
PAINLEVEL_OUTOF10: 7
PAINLEVEL_OUTOF10: 6
PAINLEVEL_OUTOF10: 4
PAINLEVEL_OUTOF10: 5
PAINLEVEL_OUTOF10: 8
PAINLEVEL_OUTOF10: 5

## 2024-09-09 ASSESSMENT — PAIN SCALES - WONG BAKER: WONGBAKER_NUMERICALRESPONSE: NO HURT

## 2024-09-09 ASSESSMENT — PAIN - FUNCTIONAL ASSESSMENT
PAIN_FUNCTIONAL_ASSESSMENT: ACTIVITIES ARE NOT PREVENTED
PAIN_FUNCTIONAL_ASSESSMENT: PREVENTS OR INTERFERES SOME ACTIVE ACTIVITIES AND ADLS

## 2024-09-09 ASSESSMENT — PAIN DESCRIPTION - LOCATION
LOCATION: BACK
LOCATION: ABDOMEN;SHOULDER;BACK
LOCATION: ABDOMEN;BACK;SHOULDER
LOCATION: BACK
LOCATION: ABDOMEN;BACK;SHOULDER
LOCATION: NECK;ABDOMEN

## 2024-09-09 ASSESSMENT — PAIN DESCRIPTION - ORIENTATION
ORIENTATION: RIGHT;LEFT;MID;LOWER;UPPER
ORIENTATION: UPPER;LOWER
ORIENTATION: ANTERIOR
ORIENTATION: RIGHT;LEFT;MID;UPPER

## 2024-09-10 ENCOUNTER — APPOINTMENT (OUTPATIENT)
Dept: CT IMAGING | Age: 64
DRG: 393 | End: 2024-09-10
Attending: STUDENT IN AN ORGANIZED HEALTH CARE EDUCATION/TRAINING PROGRAM
Payer: MEDICARE

## 2024-09-10 LAB
ANION GAP SERPL CALCULATED.3IONS-SCNC: 9 MMOL/L (ref 7–19)
BASOPHILS # BLD: 0 K/UL (ref 0–0.2)
BASOPHILS NFR BLD: 0 % (ref 0–1)
BNP BLD-MCNC: 361 PG/ML (ref 0–124)
BUN SERPL-MCNC: 13 MG/DL (ref 8–23)
CALCIUM SERPL-MCNC: 9 MG/DL (ref 8.8–10.2)
CHLORIDE SERPL-SCNC: 95 MMOL/L (ref 98–111)
CO2 SERPL-SCNC: 30 MMOL/L (ref 22–29)
CREAT SERPL-MCNC: 0.4 MG/DL (ref 0.5–0.9)
EOSINOPHIL # BLD: 0 K/UL (ref 0–0.6)
EOSINOPHIL NFR BLD: 0 % (ref 0–5)
ERYTHROCYTE [DISTWIDTH] IN BLOOD BY AUTOMATED COUNT: 13 % (ref 11.5–14.5)
GLUCOSE SERPL-MCNC: 96 MG/DL (ref 70–99)
HCT VFR BLD AUTO: 33.8 % (ref 37–47)
HGB BLD-MCNC: 10.7 G/DL (ref 12–16)
HYPOCHROMIA BLD QL SMEAR: ABNORMAL
IMM GRANULOCYTES # BLD: 0.2 K/UL
LYMPHOCYTES # BLD: 1.9 K/UL (ref 1.1–4.5)
LYMPHOCYTES NFR BLD: 8 % (ref 20–40)
MCH RBC QN AUTO: 30.8 PG (ref 27–31)
MCHC RBC AUTO-ENTMCNC: 31.7 G/DL (ref 33–37)
MCV RBC AUTO: 97.4 FL (ref 81–99)
MONOCYTES # BLD: 1.4 K/UL (ref 0–0.9)
MONOCYTES NFR BLD: 6 % (ref 0–10)
NEUTROPHILS # BLD: 20.3 K/UL (ref 1.5–7.5)
NEUTS SEG NFR BLD: 86 % (ref 50–65)
PLATELET # BLD AUTO: 397 K/UL (ref 130–400)
PLATELET SLIDE REVIEW: ADEQUATE
PMV BLD AUTO: 10.5 FL (ref 9.4–12.3)
POTASSIUM SERPL-SCNC: 4.5 MMOL/L (ref 3.5–5)
RBC # BLD AUTO: 3.47 M/UL (ref 4.2–5.4)
SODIUM SERPL-SCNC: 134 MMOL/L (ref 136–145)
WBC # BLD AUTO: 23.6 K/UL (ref 4.8–10.8)

## 2024-09-10 PROCEDURE — 1200000000 HC SEMI PRIVATE

## 2024-09-10 PROCEDURE — 6360000002 HC RX W HCPCS

## 2024-09-10 PROCEDURE — 94640 AIRWAY INHALATION TREATMENT: CPT

## 2024-09-10 PROCEDURE — 94150 VITAL CAPACITY TEST: CPT

## 2024-09-10 PROCEDURE — 36415 COLL VENOUS BLD VENIPUNCTURE: CPT

## 2024-09-10 PROCEDURE — 6370000000 HC RX 637 (ALT 250 FOR IP): Performed by: INTERNAL MEDICINE

## 2024-09-10 PROCEDURE — 2580000003 HC RX 258: Performed by: INTERNAL MEDICINE

## 2024-09-10 PROCEDURE — 83880 ASSAY OF NATRIURETIC PEPTIDE: CPT

## 2024-09-10 PROCEDURE — 74177 CT ABD & PELVIS W/CONTRAST: CPT

## 2024-09-10 PROCEDURE — 2700000000 HC OXYGEN THERAPY PER DAY

## 2024-09-10 PROCEDURE — 6360000002 HC RX W HCPCS: Performed by: INTERNAL MEDICINE

## 2024-09-10 PROCEDURE — 99232 SBSQ HOSP IP/OBS MODERATE 35: CPT | Performed by: INTERNAL MEDICINE

## 2024-09-10 PROCEDURE — 6360000002 HC RX W HCPCS: Performed by: STUDENT IN AN ORGANIZED HEALTH CARE EDUCATION/TRAINING PROGRAM

## 2024-09-10 PROCEDURE — 6370000000 HC RX 637 (ALT 250 FOR IP): Performed by: STUDENT IN AN ORGANIZED HEALTH CARE EDUCATION/TRAINING PROGRAM

## 2024-09-10 PROCEDURE — 6360000004 HC RX CONTRAST MEDICATION: Performed by: INTERNAL MEDICINE

## 2024-09-10 PROCEDURE — 80048 BASIC METABOLIC PNL TOTAL CA: CPT

## 2024-09-10 PROCEDURE — 85025 COMPLETE CBC W/AUTO DIFF WBC: CPT

## 2024-09-10 PROCEDURE — 94760 N-INVAS EAR/PLS OXIMETRY 1: CPT

## 2024-09-10 PROCEDURE — 99232 SBSQ HOSP IP/OBS MODERATE 35: CPT

## 2024-09-10 RX ORDER — IOPAMIDOL 755 MG/ML
70 INJECTION, SOLUTION INTRAVASCULAR
Status: COMPLETED | OUTPATIENT
Start: 2024-09-10 | End: 2024-09-10

## 2024-09-10 RX ADMIN — ACETAMINOPHEN 1000 MG: 500 TABLET ORAL at 05:14

## 2024-09-10 RX ADMIN — POLYETHYLENE GLYCOL 3350 17 G: 17 POWDER, FOR SOLUTION ORAL at 07:48

## 2024-09-10 RX ADMIN — Medication 2 PUFF: at 07:09

## 2024-09-10 RX ADMIN — MAGNESIUM HYDROXIDE 30 ML: 400 SUSPENSION ORAL at 11:35

## 2024-09-10 RX ADMIN — GUAIFENESIN 600 MG: 600 TABLET ORAL at 20:56

## 2024-09-10 RX ADMIN — SODIUM CHLORIDE, PRESERVATIVE FREE 10 ML: 5 INJECTION INTRAVENOUS at 21:00

## 2024-09-10 RX ADMIN — METOCLOPRAMIDE 5 MG: 10 TABLET ORAL at 05:14

## 2024-09-10 RX ADMIN — ACETAMINOPHEN 1000 MG: 500 TABLET ORAL at 20:56

## 2024-09-10 RX ADMIN — METHYLPREDNISOLONE 4 MG: 4 TABLET ORAL at 20:56

## 2024-09-10 RX ADMIN — GABAPENTIN 800 MG: 400 CAPSULE ORAL at 20:56

## 2024-09-10 RX ADMIN — ACETAMINOPHEN 1000 MG: 500 TABLET ORAL at 14:16

## 2024-09-10 RX ADMIN — ESCITALOPRAM OXALATE 20 MG: 10 TABLET ORAL at 07:48

## 2024-09-10 RX ADMIN — OXYCODONE HYDROCHLORIDE 15 MG: 10 TABLET ORAL at 01:19

## 2024-09-10 RX ADMIN — MORPHINE SULFATE 2 MG: 2 INJECTION, SOLUTION INTRAMUSCULAR; INTRAVENOUS at 20:57

## 2024-09-10 RX ADMIN — ENOXAPARIN SODIUM 30 MG: 100 INJECTION SUBCUTANEOUS at 07:48

## 2024-09-10 RX ADMIN — AMLODIPINE BESYLATE 5 MG: 5 TABLET ORAL at 07:48

## 2024-09-10 RX ADMIN — METHYLPREDNISOLONE 4 MG: 4 TABLET ORAL at 05:14

## 2024-09-10 RX ADMIN — METOCLOPRAMIDE 5 MG: 10 TABLET ORAL at 17:57

## 2024-09-10 RX ADMIN — METOCLOPRAMIDE 5 MG: 10 TABLET ORAL at 20:56

## 2024-09-10 RX ADMIN — NALOXEGOL OXALATE 12.5 MG: 12.5 TABLET, FILM COATED ORAL at 05:15

## 2024-09-10 RX ADMIN — Medication 2 PUFF: at 19:37

## 2024-09-10 RX ADMIN — MORPHINE SULFATE 2 MG: 2 INJECTION, SOLUTION INTRAMUSCULAR; INTRAVENOUS at 07:39

## 2024-09-10 RX ADMIN — OXYCODONE HYDROCHLORIDE 15 MG: 10 TABLET ORAL at 05:15

## 2024-09-10 RX ADMIN — OXYCODONE HYDROCHLORIDE 15 MG: 10 TABLET ORAL at 17:58

## 2024-09-10 RX ADMIN — ONDANSETRON 4 MG: 2 INJECTION INTRAMUSCULAR; INTRAVENOUS at 07:58

## 2024-09-10 RX ADMIN — IOPAMIDOL 70 ML: 755 INJECTION, SOLUTION INTRAVENOUS at 10:59

## 2024-09-10 RX ADMIN — METOCLOPRAMIDE 5 MG: 10 TABLET ORAL at 11:35

## 2024-09-10 RX ADMIN — GABAPENTIN 800 MG: 400 CAPSULE ORAL at 11:35

## 2024-09-10 RX ADMIN — GABAPENTIN 800 MG: 400 CAPSULE ORAL at 17:57

## 2024-09-10 RX ADMIN — FOLIC ACID 1 MG: 1 TABLET ORAL at 07:48

## 2024-09-10 RX ADMIN — GUAIFENESIN 600 MG: 600 TABLET ORAL at 07:48

## 2024-09-10 RX ADMIN — GABAPENTIN 800 MG: 400 CAPSULE ORAL at 07:48

## 2024-09-10 RX ADMIN — SODIUM CHLORIDE, PRESERVATIVE FREE 10 ML: 5 INJECTION INTRAVENOUS at 07:39

## 2024-09-10 RX ADMIN — OXYCODONE HYDROCHLORIDE 15 MG: 10 TABLET ORAL at 11:36

## 2024-09-10 ASSESSMENT — PAIN SCALES - GENERAL
PAINLEVEL_OUTOF10: 4
PAINLEVEL_OUTOF10: 7
PAINLEVEL_OUTOF10: 5
PAINLEVEL_OUTOF10: 4
PAINLEVEL_OUTOF10: 5
PAINLEVEL_OUTOF10: 7

## 2024-09-10 ASSESSMENT — PAIN DESCRIPTION - LOCATION
LOCATION: BACK;HIP
LOCATION: BACK;KNEE;HIP
LOCATION: BACK;ABDOMEN
LOCATION: BACK;NECK
LOCATION: BACK;NECK

## 2024-09-10 ASSESSMENT — PAIN DESCRIPTION - DESCRIPTORS
DESCRIPTORS: ACHING;CRAMPING;DISCOMFORT;TENDER
DESCRIPTORS: ACHING;THROBBING;STABBING
DESCRIPTORS: SHOOTING;DULL;ACHING

## 2024-09-10 ASSESSMENT — PAIN DESCRIPTION - ORIENTATION: ORIENTATION: RIGHT;LEFT;LOWER;MID

## 2024-09-11 VITALS
HEIGHT: 63 IN | DIASTOLIC BLOOD PRESSURE: 79 MMHG | BODY MASS INDEX: 18.43 KG/M2 | SYSTOLIC BLOOD PRESSURE: 153 MMHG | RESPIRATION RATE: 18 BRPM | WEIGHT: 104 LBS | OXYGEN SATURATION: 95 % | TEMPERATURE: 96.8 F | HEART RATE: 83 BPM

## 2024-09-11 DIAGNOSIS — G89.3 CANCER ASSOCIATED PAIN: ICD-10-CM

## 2024-09-11 DIAGNOSIS — R59.0 CERVICAL LYMPHADENOPATHY: Primary | ICD-10-CM

## 2024-09-11 DIAGNOSIS — C80.1 ADENOCARCINOMA (HCC): Primary | ICD-10-CM

## 2024-09-11 PROCEDURE — 6360000002 HC RX W HCPCS

## 2024-09-11 PROCEDURE — 99232 SBSQ HOSP IP/OBS MODERATE 35: CPT | Performed by: INTERNAL MEDICINE

## 2024-09-11 PROCEDURE — 6370000000 HC RX 637 (ALT 250 FOR IP): Performed by: INTERNAL MEDICINE

## 2024-09-11 PROCEDURE — 2700000000 HC OXYGEN THERAPY PER DAY

## 2024-09-11 PROCEDURE — 99232 SBSQ HOSP IP/OBS MODERATE 35: CPT

## 2024-09-11 PROCEDURE — 94640 AIRWAY INHALATION TREATMENT: CPT

## 2024-09-11 PROCEDURE — 6360000002 HC RX W HCPCS: Performed by: INTERNAL MEDICINE

## 2024-09-11 PROCEDURE — 2580000003 HC RX 258: Performed by: INTERNAL MEDICINE

## 2024-09-11 PROCEDURE — 94760 N-INVAS EAR/PLS OXIMETRY 1: CPT

## 2024-09-11 PROCEDURE — 6370000000 HC RX 637 (ALT 250 FOR IP): Performed by: STUDENT IN AN ORGANIZED HEALTH CARE EDUCATION/TRAINING PROGRAM

## 2024-09-11 RX ORDER — METOCLOPRAMIDE 5 MG/1
5 TABLET ORAL
Qty: 120 TABLET | Refills: 3 | Status: SHIPPED | OUTPATIENT
Start: 2024-09-11

## 2024-09-11 RX ORDER — OXYCODONE HYDROCHLORIDE 15 MG/1
15 TABLET ORAL EVERY 4 HOURS PRN
Qty: 180 TABLET | Refills: 0 | Status: SHIPPED | OUTPATIENT
Start: 2024-09-11 | End: 2024-10-11

## 2024-09-11 RX ORDER — POLYETHYLENE GLYCOL 3350 17 G/17G
17 POWDER, FOR SOLUTION ORAL DAILY
Qty: 527 G | Refills: 1 | Status: SHIPPED | OUTPATIENT
Start: 2024-09-12 | End: 2024-11-13

## 2024-09-11 RX ORDER — AMLODIPINE BESYLATE 5 MG/1
5 TABLET ORAL DAILY
Qty: 30 TABLET | Refills: 3 | Status: SHIPPED | OUTPATIENT
Start: 2024-09-12

## 2024-09-11 RX ORDER — FOLIC ACID 1 MG/1
1 TABLET ORAL DAILY
Qty: 30 TABLET | Refills: 3 | Status: SHIPPED | OUTPATIENT
Start: 2024-09-12

## 2024-09-11 RX ADMIN — AMLODIPINE BESYLATE 5 MG: 5 TABLET ORAL at 10:24

## 2024-09-11 RX ADMIN — FOLIC ACID 1 MG: 1 TABLET ORAL at 10:25

## 2024-09-11 RX ADMIN — METOCLOPRAMIDE 5 MG: 10 TABLET ORAL at 05:48

## 2024-09-11 RX ADMIN — MORPHINE SULFATE 2 MG: 2 INJECTION, SOLUTION INTRAMUSCULAR; INTRAVENOUS at 13:10

## 2024-09-11 RX ADMIN — OXYCODONE HYDROCHLORIDE 15 MG: 10 TABLET ORAL at 10:24

## 2024-09-11 RX ADMIN — SODIUM CHLORIDE, PRESERVATIVE FREE 10 ML: 5 INJECTION INTRAVENOUS at 07:16

## 2024-09-11 RX ADMIN — Medication 2 PUFF: at 06:10

## 2024-09-11 RX ADMIN — METHYLPREDNISOLONE 4 MG: 4 TABLET ORAL at 05:48

## 2024-09-11 RX ADMIN — GUAIFENESIN 600 MG: 600 TABLET ORAL at 10:25

## 2024-09-11 RX ADMIN — GABAPENTIN 800 MG: 400 CAPSULE ORAL at 13:10

## 2024-09-11 RX ADMIN — ACETAMINOPHEN 1000 MG: 500 TABLET ORAL at 13:10

## 2024-09-11 RX ADMIN — GABAPENTIN 800 MG: 400 CAPSULE ORAL at 10:24

## 2024-09-11 RX ADMIN — METOCLOPRAMIDE 5 MG: 10 TABLET ORAL at 10:24

## 2024-09-11 RX ADMIN — ESCITALOPRAM OXALATE 20 MG: 10 TABLET ORAL at 10:24

## 2024-09-11 RX ADMIN — MORPHINE SULFATE 2 MG: 2 INJECTION, SOLUTION INTRAMUSCULAR; INTRAVENOUS at 07:15

## 2024-09-11 RX ADMIN — OXYCODONE HYDROCHLORIDE 15 MG: 10 TABLET ORAL at 01:46

## 2024-09-11 RX ADMIN — ACETAMINOPHEN 1000 MG: 500 TABLET ORAL at 05:48

## 2024-09-11 RX ADMIN — OXYCODONE HYDROCHLORIDE 15 MG: 10 TABLET ORAL at 05:49

## 2024-09-11 RX ADMIN — NALOXEGOL OXALATE 12.5 MG: 12.5 TABLET, FILM COATED ORAL at 05:49

## 2024-09-11 ASSESSMENT — PAIN SCALES - GENERAL
PAINLEVEL_OUTOF10: 6
PAINLEVEL_OUTOF10: 7
PAINLEVEL_OUTOF10: 5
PAINLEVEL_OUTOF10: 6
PAINLEVEL_OUTOF10: 8

## 2024-09-11 ASSESSMENT — PAIN DESCRIPTION - DESCRIPTORS
DESCRIPTORS: ACHING;SHOOTING
DESCRIPTORS: DULL;ACHING;SHOOTING
DESCRIPTORS: ACHING;DULL;SHOOTING
DESCRIPTORS: ACHING;DULL

## 2024-09-11 ASSESSMENT — PAIN DESCRIPTION - PAIN TYPE
TYPE: CHRONIC PAIN
TYPE: CHRONIC PAIN

## 2024-09-11 ASSESSMENT — PAIN DESCRIPTION - LOCATION
LOCATION: BACK
LOCATION: BACK;ABDOMEN
LOCATION: ABDOMEN;BACK
LOCATION: BACK;ABDOMEN

## 2024-09-11 ASSESSMENT — PAIN - FUNCTIONAL ASSESSMENT
PAIN_FUNCTIONAL_ASSESSMENT: ACTIVITIES ARE NOT PREVENTED
PAIN_FUNCTIONAL_ASSESSMENT: ACTIVITIES ARE NOT PREVENTED

## 2024-09-11 ASSESSMENT — PAIN DESCRIPTION - ORIENTATION
ORIENTATION: MID;LOWER
ORIENTATION: MID;LOWER

## 2024-09-12 ENCOUNTER — TELEPHONE (OUTPATIENT)
Dept: FAMILY MEDICINE CLINIC | Age: 64
End: 2024-09-12

## 2024-09-13 ENCOUNTER — CLINICAL DOCUMENTATION (OUTPATIENT)
Facility: HOSPITAL | Age: 64
End: 2024-09-13

## 2024-09-17 ENCOUNTER — OFFICE VISIT (OUTPATIENT)
Dept: FAMILY MEDICINE CLINIC | Age: 64
End: 2024-09-17

## 2024-09-17 VITALS
SYSTOLIC BLOOD PRESSURE: 128 MMHG | TEMPERATURE: 97.4 F | HEART RATE: 113 BPM | OXYGEN SATURATION: 84 % | BODY MASS INDEX: 17.79 KG/M2 | DIASTOLIC BLOOD PRESSURE: 52 MMHG | WEIGHT: 100.38 LBS

## 2024-09-17 DIAGNOSIS — J96.11 CHRONIC RESPIRATORY FAILURE WITH HYPOXIA (HCC): Primary | ICD-10-CM

## 2024-09-17 DIAGNOSIS — Z09 HOSPITAL DISCHARGE FOLLOW-UP: ICD-10-CM

## 2024-09-17 DIAGNOSIS — R59.9 ADENOPATHY: ICD-10-CM

## 2024-09-17 DIAGNOSIS — J44.9 CHRONIC OBSTRUCTIVE PULMONARY DISEASE, UNSPECIFIED COPD TYPE (HCC): ICD-10-CM

## 2024-09-17 DIAGNOSIS — K31.89 GASTRIC MASS: ICD-10-CM

## 2024-09-17 ASSESSMENT — ENCOUNTER SYMPTOMS
GASTROINTESTINAL NEGATIVE: 1
EYES NEGATIVE: 1
RESPIRATORY NEGATIVE: 1
ALLERGIC/IMMUNOLOGIC NEGATIVE: 1

## 2024-09-18 ENCOUNTER — OFFICE VISIT (OUTPATIENT)
Dept: ENT CLINIC | Age: 64
End: 2024-09-18
Payer: MEDICARE

## 2024-09-18 VITALS
DIASTOLIC BLOOD PRESSURE: 42 MMHG | WEIGHT: 100 LBS | SYSTOLIC BLOOD PRESSURE: 78 MMHG | HEIGHT: 63 IN | BODY MASS INDEX: 17.72 KG/M2

## 2024-09-18 DIAGNOSIS — R59.0 CERVICAL ADENOPATHY: ICD-10-CM

## 2024-09-18 DIAGNOSIS — J38.01 PARALYSIS OF LEFT VOCAL CORD: Primary | ICD-10-CM

## 2024-09-18 PROCEDURE — 99213 OFFICE O/P EST LOW 20 MIN: CPT | Performed by: NURSE PRACTITIONER

## 2024-09-18 PROCEDURE — 3078F DIAST BP <80 MM HG: CPT | Performed by: NURSE PRACTITIONER

## 2024-09-18 PROCEDURE — 3074F SYST BP LT 130 MM HG: CPT | Performed by: NURSE PRACTITIONER

## 2024-09-18 ASSESSMENT — ENCOUNTER SYMPTOMS
GASTROINTESTINAL NEGATIVE: 1
ALLERGIC/IMMUNOLOGIC NEGATIVE: 1
VOICE CHANGE: 1
TROUBLE SWALLOWING: 1
SHORTNESS OF BREATH: 1
EYES NEGATIVE: 1

## 2024-09-20 ENCOUNTER — HOSPITAL ENCOUNTER (OUTPATIENT)
Dept: INFUSION THERAPY | Age: 64
Discharge: HOME | End: 2024-09-20
Payer: MEDICARE

## 2024-09-20 ENCOUNTER — OFFICE VISIT (OUTPATIENT)
Dept: HEMATOLOGY | Age: 64
End: 2024-09-20

## 2024-09-20 VITALS
HEART RATE: 59 BPM | DIASTOLIC BLOOD PRESSURE: 79 MMHG | RESPIRATION RATE: 18 BRPM | OXYGEN SATURATION: 95 % | BODY MASS INDEX: 17.58 KG/M2 | SYSTOLIC BLOOD PRESSURE: 137 MMHG | TEMPERATURE: 98.1 F | HEIGHT: 63 IN | WEIGHT: 99.2 LBS

## 2024-09-20 DIAGNOSIS — E87.1 HYPONATREMIA: ICD-10-CM

## 2024-09-20 DIAGNOSIS — R69 LIFE THREATENING MEDICAL ILLNESS: ICD-10-CM

## 2024-09-20 DIAGNOSIS — G89.3 CANCER RELATED PAIN: ICD-10-CM

## 2024-09-20 DIAGNOSIS — R53.81 PHYSICAL DECONDITIONING: ICD-10-CM

## 2024-09-20 DIAGNOSIS — K59.03 THERAPEUTIC OPIOID INDUCED CONSTIPATION: ICD-10-CM

## 2024-09-20 DIAGNOSIS — C80.1 ADENOCARCINOMA (HCC): Primary | ICD-10-CM

## 2024-09-20 DIAGNOSIS — G89.3 CANCER ASSOCIATED PAIN: ICD-10-CM

## 2024-09-20 DIAGNOSIS — Z78.9 POOR PROGNOSIS: ICD-10-CM

## 2024-09-20 DIAGNOSIS — R63.4 UNINTENTIONAL WEIGHT LOSS: ICD-10-CM

## 2024-09-20 DIAGNOSIS — D64.9 NORMOCYTIC ANEMIA: ICD-10-CM

## 2024-09-20 DIAGNOSIS — D72.9 NEUTROPHILIC LEUKOCYTOSIS: ICD-10-CM

## 2024-09-20 DIAGNOSIS — T45.1X5A ADVERSE EFFECT OF CHEMOTHERAPY, INITIAL ENCOUNTER: ICD-10-CM

## 2024-09-20 DIAGNOSIS — T40.2X5A THERAPEUTIC OPIOID INDUCED CONSTIPATION: ICD-10-CM

## 2024-09-20 DIAGNOSIS — Z51.11 CHEMOTHERAPY MANAGEMENT, ENCOUNTER FOR: ICD-10-CM

## 2024-09-20 DIAGNOSIS — R49.0 CHRONIC HOARSENESS: ICD-10-CM

## 2024-09-20 DIAGNOSIS — D75.838 REACTIVE THROMBOCYTOSIS: ICD-10-CM

## 2024-09-20 DIAGNOSIS — Z71.89 CARE PLAN DISCUSSED WITH PATIENT: ICD-10-CM

## 2024-09-20 LAB
ALBUMIN SERPL-MCNC: 3.1 G/DL (ref 3.5–5.2)
ALP SERPL-CCNC: 265 U/L (ref 35–104)
ALT SERPL-CCNC: 20 U/L (ref 5–33)
ANION GAP SERPL CALCULATED.3IONS-SCNC: 13 MMOL/L (ref 7–19)
AST SERPL-CCNC: 26 U/L (ref 5–32)
BASOPHILS # BLD: 0.04 K/UL (ref 0.01–0.08)
BASOPHILS NFR BLD: 0.2 % (ref 0.1–1.2)
BILIRUB SERPL-MCNC: 0.3 MG/DL (ref 0–1.2)
BUN SERPL-MCNC: 11 MG/DL (ref 8–23)
CALCIUM SERPL-MCNC: 9.4 MG/DL (ref 8.8–10.2)
CHLORIDE SERPL-SCNC: 92 MMOL/L (ref 98–107)
CO2 SERPL-SCNC: 27 MMOL/L (ref 22–29)
CREAT SERPL-MCNC: <0.5 MG/DL (ref 0.5–0.9)
EOSINOPHIL # BLD: 0.07 K/UL (ref 0.04–0.54)
EOSINOPHIL NFR BLD: 0.4 % (ref 0.7–7)
ERYTHROCYTE [DISTWIDTH] IN BLOOD BY AUTOMATED COUNT: 13.3 % (ref 11.7–14.4)
GLUCOSE SERPL-MCNC: 92 MG/DL (ref 70–99)
HCT VFR BLD AUTO: 33.8 % (ref 34.1–44.9)
HGB BLD-MCNC: 11.1 G/DL (ref 11.2–15.7)
LYMPHOCYTES # BLD: 0.97 K/UL (ref 1.18–3.74)
LYMPHOCYTES NFR BLD: 5.3 % (ref 19.3–53.1)
MCH RBC QN AUTO: 30.4 PG (ref 25.6–32.2)
MCHC RBC AUTO-ENTMCNC: 32.8 G/DL (ref 32.3–35.5)
MCV RBC AUTO: 92.6 FL (ref 79.4–94.8)
MONOCYTES # BLD: 0.75 K/UL (ref 0.24–0.82)
MONOCYTES NFR BLD: 4.1 % (ref 4.7–12.5)
NEUTROPHILS # BLD: 16.29 K/UL (ref 1.56–6.13)
NEUTS SEG NFR BLD: 89.1 % (ref 34–71.1)
PLATELET # BLD AUTO: 420 K/UL (ref 182–369)
PMV BLD AUTO: 9.8 FL (ref 7.4–10.4)
POTASSIUM SERPL-SCNC: 4 MMOL/L (ref 3.5–5.1)
PROT SERPL-MCNC: 7.6 G/DL (ref 6.4–8.3)
RBC # BLD AUTO: 3.65 M/UL (ref 3.93–5.22)
SODIUM SERPL-SCNC: 132 MMOL/L (ref 136–145)
WBC # BLD AUTO: 18.29 K/UL (ref 3.98–10.04)

## 2024-09-20 PROCEDURE — 96367 TX/PROPH/DG ADDL SEQ IV INF: CPT

## 2024-09-20 PROCEDURE — 96417 CHEMO IV INFUS EACH ADDL SEQ: CPT

## 2024-09-20 PROCEDURE — 96375 TX/PRO/DX INJ NEW DRUG ADDON: CPT

## 2024-09-20 PROCEDURE — 96413 CHEMO IV INFUSION 1 HR: CPT

## 2024-09-20 PROCEDURE — 6360000002 HC RX W HCPCS: Performed by: INTERNAL MEDICINE

## 2024-09-20 PROCEDURE — 2580000003 HC RX 258: Performed by: INTERNAL MEDICINE

## 2024-09-20 PROCEDURE — 85025 COMPLETE CBC W/AUTO DIFF WBC: CPT

## 2024-09-20 PROCEDURE — 80053 COMPREHEN METABOLIC PANEL: CPT

## 2024-09-20 PROCEDURE — 36415 COLL VENOUS BLD VENIPUNCTURE: CPT

## 2024-09-20 RX ORDER — MEPERIDINE HYDROCHLORIDE 50 MG/ML
12.5 INJECTION INTRAMUSCULAR; INTRAVENOUS; SUBCUTANEOUS PRN
OUTPATIENT
Start: 2024-09-27

## 2024-09-20 RX ORDER — FAMOTIDINE 10 MG/ML
20 INJECTION, SOLUTION INTRAVENOUS
OUTPATIENT
Start: 2024-09-27

## 2024-09-20 RX ORDER — SODIUM CHLORIDE 0.9 % (FLUSH) 0.9 %
5-40 SYRINGE (ML) INJECTION PRN
Status: CANCELLED | OUTPATIENT
Start: 2024-09-20

## 2024-09-20 RX ORDER — SODIUM CHLORIDE 9 MG/ML
5-250 INJECTION, SOLUTION INTRAVENOUS PRN
Status: CANCELLED | OUTPATIENT
Start: 2024-09-20

## 2024-09-20 RX ORDER — DEXAMETHASONE SODIUM PHOSPHATE 10 MG/ML
10 INJECTION INTRAMUSCULAR; INTRAVENOUS ONCE
Start: 2024-09-27 | End: 2024-09-27

## 2024-09-20 RX ORDER — DEXAMETHASONE SODIUM PHOSPHATE 10 MG/ML
10 INJECTION, SOLUTION INTRAMUSCULAR; INTRAVENOUS ONCE
Status: COMPLETED | OUTPATIENT
Start: 2024-09-20 | End: 2024-09-20

## 2024-09-20 RX ORDER — SODIUM CHLORIDE 9 MG/ML
5-250 INJECTION, SOLUTION INTRAVENOUS PRN
Status: DISCONTINUED | OUTPATIENT
Start: 2024-09-20 | End: 2024-09-21 | Stop reason: HOSPADM

## 2024-09-20 RX ORDER — EPINEPHRINE 1 MG/ML
0.3 INJECTION, SOLUTION, CONCENTRATE INTRAVENOUS PRN
OUTPATIENT
Start: 2024-09-27

## 2024-09-20 RX ORDER — ONDANSETRON 2 MG/ML
8 INJECTION INTRAMUSCULAR; INTRAVENOUS
OUTPATIENT
Start: 2024-09-27

## 2024-09-20 RX ORDER — SODIUM CHLORIDE 9 MG/ML
5-250 INJECTION, SOLUTION INTRAVENOUS PRN
OUTPATIENT
Start: 2024-09-27

## 2024-09-20 RX ORDER — SODIUM CHLORIDE 0.9 % (FLUSH) 0.9 %
5-40 SYRINGE (ML) INJECTION PRN
OUTPATIENT
Start: 2024-09-27

## 2024-09-20 RX ORDER — DIPHENHYDRAMINE HYDROCHLORIDE 50 MG/ML
50 INJECTION INTRAMUSCULAR; INTRAVENOUS
Status: CANCELLED | OUTPATIENT
Start: 2024-09-20

## 2024-09-20 RX ORDER — PALONOSETRON 0.05 MG/ML
0.25 INJECTION, SOLUTION INTRAVENOUS ONCE
Start: 2024-09-27 | End: 2024-09-27

## 2024-09-20 RX ORDER — EPINEPHRINE 1 MG/ML
0.3 INJECTION, SOLUTION, CONCENTRATE INTRAVENOUS PRN
Status: CANCELLED | OUTPATIENT
Start: 2024-09-20

## 2024-09-20 RX ORDER — HEPARIN SODIUM (PORCINE) LOCK FLUSH IV SOLN 100 UNIT/ML 100 UNIT/ML
500 SOLUTION INTRAVENOUS PRN
Status: CANCELLED | OUTPATIENT
Start: 2024-09-20

## 2024-09-20 RX ORDER — PROCHLORPERAZINE EDISYLATE 5 MG/ML
5 INJECTION INTRAMUSCULAR; INTRAVENOUS
Status: CANCELLED | OUTPATIENT
Start: 2024-09-20

## 2024-09-20 RX ORDER — ACETAMINOPHEN 325 MG/1
650 TABLET ORAL
OUTPATIENT
Start: 2024-09-27

## 2024-09-20 RX ORDER — DIPHENHYDRAMINE HYDROCHLORIDE 50 MG/ML
50 INJECTION INTRAMUSCULAR; INTRAVENOUS
OUTPATIENT
Start: 2024-09-27

## 2024-09-20 RX ORDER — ONDANSETRON 2 MG/ML
8 INJECTION INTRAMUSCULAR; INTRAVENOUS
Status: CANCELLED | OUTPATIENT
Start: 2024-09-20

## 2024-09-20 RX ORDER — HEPARIN SODIUM (PORCINE) LOCK FLUSH IV SOLN 100 UNIT/ML 100 UNIT/ML
500 SOLUTION INTRAVENOUS PRN
OUTPATIENT
Start: 2024-09-27

## 2024-09-20 RX ORDER — FAMOTIDINE 10 MG/ML
20 INJECTION, SOLUTION INTRAVENOUS
Status: CANCELLED | OUTPATIENT
Start: 2024-09-20

## 2024-09-20 RX ORDER — MEPERIDINE HYDROCHLORIDE 50 MG/ML
12.5 INJECTION INTRAMUSCULAR; INTRAVENOUS; SUBCUTANEOUS PRN
Status: CANCELLED | OUTPATIENT
Start: 2024-09-20

## 2024-09-20 RX ORDER — PALONOSETRON 0.05 MG/ML
0.25 INJECTION, SOLUTION INTRAVENOUS ONCE
Status: CANCELLED | OUTPATIENT
Start: 2024-09-20 | End: 2024-09-20

## 2024-09-20 RX ORDER — ACETAMINOPHEN 325 MG/1
650 TABLET ORAL
Status: CANCELLED | OUTPATIENT
Start: 2024-09-20

## 2024-09-20 RX ORDER — PALONOSETRON 0.05 MG/ML
0.25 INJECTION, SOLUTION INTRAVENOUS ONCE
Status: COMPLETED | OUTPATIENT
Start: 2024-09-20 | End: 2024-09-20

## 2024-09-20 RX ORDER — SODIUM CHLORIDE 9 MG/ML
INJECTION, SOLUTION INTRAVENOUS CONTINUOUS
OUTPATIENT
Start: 2024-09-27

## 2024-09-20 RX ORDER — ALBUTEROL SULFATE 90 UG/1
4 INHALANT RESPIRATORY (INHALATION) PRN
Status: CANCELLED | OUTPATIENT
Start: 2024-09-20

## 2024-09-20 RX ORDER — SODIUM CHLORIDE 9 MG/ML
INJECTION, SOLUTION INTRAVENOUS CONTINUOUS
Status: CANCELLED | OUTPATIENT
Start: 2024-09-20

## 2024-09-20 RX ORDER — ALBUTEROL SULFATE 90 UG/1
4 INHALANT RESPIRATORY (INHALATION) PRN
OUTPATIENT
Start: 2024-09-27

## 2024-09-20 RX ADMIN — PALONOSETRON 0.25 MG: 0.05 INJECTION, SOLUTION INTRAVENOUS at 11:36

## 2024-09-20 RX ADMIN — SODIUM CHLORIDE 50 ML/HR: 9 INJECTION, SOLUTION INTRAVENOUS at 11:38

## 2024-09-20 RX ADMIN — DEXAMETHASONE SODIUM PHOSPHATE 10 MG: 10 INJECTION INTRAMUSCULAR; INTRAVENOUS at 11:36

## 2024-09-20 RX ADMIN — GEMCITABINE HYDROCHLORIDE 1160 MG: 1 INJECTION, SOLUTION INTRAVENOUS at 12:22

## 2024-09-20 RX ADMIN — CARBOPLATIN 330 MG: 10 INJECTION, SOLUTION INTRAVENOUS at 12:58

## 2024-09-20 RX ADMIN — SODIUM CHLORIDE 150 MG: 9 INJECTION, SOLUTION INTRAVENOUS at 11:55

## 2024-09-24 ENCOUNTER — APPOINTMENT (OUTPATIENT)
Dept: GENERAL RADIOLOGY | Facility: HOSPITAL | Age: 64
End: 2024-09-24
Payer: MEDICARE

## 2024-09-24 ENCOUNTER — OFFICE VISIT (OUTPATIENT)
Dept: OTOLARYNGOLOGY | Facility: CLINIC | Age: 64
End: 2024-09-24
Payer: MEDICARE

## 2024-09-24 ENCOUNTER — HOSPITAL ENCOUNTER (INPATIENT)
Facility: HOSPITAL | Age: 64
LOS: 14 days | Discharge: HOSPICE | End: 2024-10-08
Attending: INTERNAL MEDICINE | Admitting: INTERNAL MEDICINE
Payer: MEDICARE

## 2024-09-24 VITALS
SYSTOLIC BLOOD PRESSURE: 103 MMHG | BODY MASS INDEX: 18.77 KG/M2 | DIASTOLIC BLOOD PRESSURE: 90 MMHG | WEIGHT: 99.4 LBS | TEMPERATURE: 97.7 F | HEIGHT: 61 IN | HEART RATE: 136 BPM

## 2024-09-24 DIAGNOSIS — R13.10 DYSPHAGIA, UNSPECIFIED TYPE: ICD-10-CM

## 2024-09-24 DIAGNOSIS — C80.1 ADENOCARCINOMA: ICD-10-CM

## 2024-09-24 DIAGNOSIS — J38.00 VOCAL CORD WEAKNESS: ICD-10-CM

## 2024-09-24 DIAGNOSIS — J98.11 PATCHY ATELECTASIS: ICD-10-CM

## 2024-09-24 DIAGNOSIS — C80.1 ADENOCARCINOMA OF UNKNOWN ORIGIN: ICD-10-CM

## 2024-09-24 DIAGNOSIS — Z74.09 IMPAIRED MOBILITY: ICD-10-CM

## 2024-09-24 DIAGNOSIS — Z51.5 PALLIATIVE CARE STATUS: ICD-10-CM

## 2024-09-24 DIAGNOSIS — Z78.9 UNABLE TO CARE FOR SELF: ICD-10-CM

## 2024-09-24 DIAGNOSIS — R79.89 ELEVATED TSH: ICD-10-CM

## 2024-09-24 DIAGNOSIS — J43.9 PULMONARY EMPHYSEMA, UNSPECIFIED EMPHYSEMA TYPE: ICD-10-CM

## 2024-09-24 DIAGNOSIS — C80.1 ADENOCARCINOMA OF UNKNOWN ORIGIN: Primary | ICD-10-CM

## 2024-09-24 DIAGNOSIS — R59.1 LYMPHADENOPATHY: ICD-10-CM

## 2024-09-24 DIAGNOSIS — R13.10 DYSPHAGIA, UNSPECIFIED TYPE: Primary | ICD-10-CM

## 2024-09-24 LAB
ALBUMIN SERPL-MCNC: 3.6 G/DL (ref 3.5–5.2)
ALBUMIN/GLOB SERPL: 0.8 G/DL
ALP SERPL-CCNC: 238 U/L (ref 39–117)
ALT SERPL W P-5'-P-CCNC: 33 U/L (ref 1–33)
ANION GAP SERPL CALCULATED.3IONS-SCNC: 17 MMOL/L (ref 5–15)
ANISOCYTOSIS BLD QL: ABNORMAL
ARTERIAL PATENCY WRIST A: POSITIVE
AST SERPL-CCNC: 29 U/L (ref 1–32)
ATMOSPHERIC PRESS: 749 MMHG
B PARAPERT DNA SPEC QL NAA+PROBE: NOT DETECTED
B PERT DNA SPEC QL NAA+PROBE: NOT DETECTED
BACTERIA UR QL AUTO: ABNORMAL /HPF
BASE EXCESS BLDA CALC-SCNC: 5.4 MMOL/L (ref 0–2)
BASOPHILS # BLD MANUAL: 0 10*3/MM3 (ref 0–0.2)
BASOPHILS NFR BLD MANUAL: 0 % (ref 0–1.5)
BDY SITE: ABNORMAL
BILIRUB SERPL-MCNC: 0.3 MG/DL (ref 0–1.2)
BILIRUB UR QL STRIP: ABNORMAL
BODY TEMPERATURE: 37
BUN SERPL-MCNC: 15 MG/DL (ref 8–23)
BUN/CREAT SERPL: 50 (ref 7–25)
C PNEUM DNA NPH QL NAA+NON-PROBE: NOT DETECTED
CALCIUM SPEC-SCNC: 9.3 MG/DL (ref 8.6–10.5)
CHLORIDE SERPL-SCNC: 92 MMOL/L (ref 98–107)
CK SERPL-CCNC: 20 U/L (ref 20–180)
CLARITY UR: CLEAR
CO2 SERPL-SCNC: 26 MMOL/L (ref 22–29)
COLOR UR: ABNORMAL
CREAT SERPL-MCNC: 0.3 MG/DL (ref 0.57–1)
D-LACTATE SERPL-SCNC: 1.8 MMOL/L (ref 0.5–2)
DEPRECATED RDW RBC AUTO: 44.9 FL (ref 37–54)
EGFRCR SERPLBLD CKD-EPI 2021: 118.6 ML/MIN/1.73
EOSINOPHIL # BLD MANUAL: 0.15 10*3/MM3 (ref 0–0.4)
EOSINOPHIL NFR BLD MANUAL: 1 % (ref 0.3–6.2)
ERYTHROCYTE [DISTWIDTH] IN BLOOD BY AUTOMATED COUNT: 13.2 % (ref 12.3–15.4)
FLUAV SUBTYP SPEC NAA+PROBE: NOT DETECTED
FLUBV RNA ISLT QL NAA+PROBE: NOT DETECTED
GAS FLOW AIRWAY: 5 LPM
GLOBULIN UR ELPH-MCNC: 4.6 GM/DL
GLUCOSE SERPL-MCNC: 84 MG/DL (ref 65–99)
GLUCOSE UR STRIP-MCNC: NEGATIVE MG/DL
HADV DNA SPEC NAA+PROBE: NOT DETECTED
HCO3 BLDA-SCNC: 30.2 MMOL/L (ref 20–26)
HCOV 229E RNA SPEC QL NAA+PROBE: NOT DETECTED
HCOV HKU1 RNA SPEC QL NAA+PROBE: NOT DETECTED
HCOV NL63 RNA SPEC QL NAA+PROBE: NOT DETECTED
HCOV OC43 RNA SPEC QL NAA+PROBE: NOT DETECTED
HCT VFR BLD AUTO: 38.5 % (ref 34–46.6)
HGB BLD-MCNC: 12.3 G/DL (ref 12–15.9)
HGB UR QL STRIP.AUTO: NEGATIVE
HMPV RNA NPH QL NAA+NON-PROBE: NOT DETECTED
HPIV1 RNA ISLT QL NAA+PROBE: NOT DETECTED
HPIV2 RNA SPEC QL NAA+PROBE: NOT DETECTED
HPIV3 RNA NPH QL NAA+PROBE: NOT DETECTED
HPIV4 P GENE NPH QL NAA+PROBE: NOT DETECTED
HYPOCHROMIA BLD QL: ABNORMAL
KETONES UR QL STRIP: ABNORMAL
LEUKOCYTE ESTERASE UR QL STRIP.AUTO: ABNORMAL
LIPASE SERPL-CCNC: 34 U/L (ref 13–60)
LYMPHOCYTES # BLD MANUAL: 1.07 10*3/MM3 (ref 0.7–3.1)
LYMPHOCYTES NFR BLD MANUAL: 0 % (ref 5–12)
Lab: ABNORMAL
M PNEUMO IGG SER IA-ACNC: NOT DETECTED
MACROCYTES BLD QL SMEAR: ABNORMAL
MAGNESIUM SERPL-MCNC: 1.9 MG/DL (ref 1.6–2.4)
MCH RBC QN AUTO: 29.6 PG (ref 26.6–33)
MCHC RBC AUTO-ENTMCNC: 31.9 G/DL (ref 31.5–35.7)
MCV RBC AUTO: 92.8 FL (ref 79–97)
MODALITY: ABNORMAL
MONOCYTES # BLD: 0 10*3/MM3 (ref 0.1–0.9)
NEUTROPHILS # BLD AUTO: 13.82 10*3/MM3 (ref 1.7–7)
NEUTROPHILS NFR BLD MANUAL: 89.9 % (ref 42.7–76)
NEUTS BAND NFR BLD MANUAL: 2 % (ref 0–5)
NITRITE UR QL STRIP: NEGATIVE
PCO2 BLDA: 44.4 MM HG (ref 35–45)
PCO2 TEMP ADJ BLD: 44.4 MM HG (ref 35–45)
PH BLDA: 7.44 PH UNITS (ref 7.35–7.45)
PH UR STRIP.AUTO: 6 [PH] (ref 5–8)
PH, TEMP CORRECTED: 7.44 PH UNITS (ref 7.35–7.45)
PLAT MORPH BLD: NORMAL
PLATELET # BLD AUTO: 292 10*3/MM3 (ref 140–450)
PMV BLD AUTO: 9.8 FL (ref 6–12)
PO2 BLDA: 55.5 MM HG (ref 83–108)
PO2 TEMP ADJ BLD: 55.5 MM HG (ref 83–108)
POTASSIUM SERPL-SCNC: 4 MMOL/L (ref 3.5–5.2)
PROCALCITONIN SERPL-MCNC: 0.2 NG/ML (ref 0–0.25)
PROT SERPL-MCNC: 8.2 G/DL (ref 6–8.5)
PROT UR QL STRIP: ABNORMAL
RBC # BLD AUTO: 4.15 10*6/MM3 (ref 3.77–5.28)
RBC # UR STRIP: ABNORMAL /HPF
REF LAB TEST METHOD: ABNORMAL
RHINOVIRUS RNA SPEC NAA+PROBE: NOT DETECTED
RSV RNA NPH QL NAA+NON-PROBE: NOT DETECTED
SAO2 % BLDCOA: 88.5 % (ref 94–99)
SARS-COV-2 RNA NPH QL NAA+NON-PROBE: NOT DETECTED
SODIUM SERPL-SCNC: 135 MMOL/L (ref 136–145)
SP GR UR STRIP: >1.03 (ref 1–1.03)
SQUAMOUS #/AREA URNS HPF: ABNORMAL /HPF
T4 FREE SERPL-MCNC: 1.17 NG/DL (ref 0.93–1.7)
TSH SERPL DL<=0.05 MIU/L-ACNC: 4.77 UIU/ML (ref 0.27–4.2)
UROBILINOGEN UR QL STRIP: ABNORMAL
VARIANT LYMPHS NFR BLD MANUAL: 7.1 % (ref 19.6–45.3)
VENTILATOR MODE: ABNORMAL
WBC # UR STRIP: ABNORMAL /HPF
WBC MORPH BLD: NORMAL
WBC NRBC COR # BLD AUTO: 15.04 10*3/MM3 (ref 3.4–10.8)

## 2024-09-24 PROCEDURE — 25810000003 LACTATED RINGERS PER 1000 ML: Performed by: INTERNAL MEDICINE

## 2024-09-24 PROCEDURE — 99285 EMERGENCY DEPT VISIT HI MDM: CPT

## 2024-09-24 PROCEDURE — 25810000003 SODIUM CHLORIDE 0.9 % SOLUTION: Performed by: PHYSICIAN ASSISTANT

## 2024-09-24 PROCEDURE — 85007 BL SMEAR W/DIFF WBC COUNT: CPT | Performed by: PHYSICIAN ASSISTANT

## 2024-09-24 PROCEDURE — 83690 ASSAY OF LIPASE: CPT | Performed by: PHYSICIAN ASSISTANT

## 2024-09-24 PROCEDURE — 87186 SC STD MICRODIL/AGAR DIL: CPT | Performed by: PHYSICIAN ASSISTANT

## 2024-09-24 PROCEDURE — 80053 COMPREHEN METABOLIC PANEL: CPT | Performed by: PHYSICIAN ASSISTANT

## 2024-09-24 PROCEDURE — 71045 X-RAY EXAM CHEST 1 VIEW: CPT

## 2024-09-24 PROCEDURE — 87040 BLOOD CULTURE FOR BACTERIA: CPT | Performed by: PHYSICIAN ASSISTANT

## 2024-09-24 PROCEDURE — 84145 PROCALCITONIN (PCT): CPT | Performed by: PHYSICIAN ASSISTANT

## 2024-09-24 PROCEDURE — 82803 BLOOD GASES ANY COMBINATION: CPT

## 2024-09-24 PROCEDURE — 1159F MED LIST DOCD IN RCRD: CPT | Performed by: NURSE PRACTITIONER

## 2024-09-24 PROCEDURE — 94799 UNLISTED PULMONARY SVC/PX: CPT

## 2024-09-24 PROCEDURE — 3074F SYST BP LT 130 MM HG: CPT | Performed by: NURSE PRACTITIONER

## 2024-09-24 PROCEDURE — 81001 URINALYSIS AUTO W/SCOPE: CPT | Performed by: PHYSICIAN ASSISTANT

## 2024-09-24 PROCEDURE — 3080F DIAST BP >= 90 MM HG: CPT | Performed by: NURSE PRACTITIONER

## 2024-09-24 PROCEDURE — 99214 OFFICE O/P EST MOD 30 MIN: CPT | Performed by: NURSE PRACTITIONER

## 2024-09-24 PROCEDURE — 1160F RVW MEDS BY RX/DR IN RCRD: CPT | Performed by: NURSE PRACTITIONER

## 2024-09-24 PROCEDURE — 83605 ASSAY OF LACTIC ACID: CPT | Performed by: PHYSICIAN ASSISTANT

## 2024-09-24 PROCEDURE — 0202U NFCT DS 22 TRGT SARS-COV-2: CPT | Performed by: PHYSICIAN ASSISTANT

## 2024-09-24 PROCEDURE — 83735 ASSAY OF MAGNESIUM: CPT | Performed by: PHYSICIAN ASSISTANT

## 2024-09-24 PROCEDURE — 87154 CUL TYP ID BLD PTHGN 6+ TRGT: CPT | Performed by: PHYSICIAN ASSISTANT

## 2024-09-24 PROCEDURE — 82550 ASSAY OF CK (CPK): CPT | Performed by: PHYSICIAN ASSISTANT

## 2024-09-24 PROCEDURE — 31575 DIAGNOSTIC LARYNGOSCOPY: CPT | Performed by: OTOLARYNGOLOGY

## 2024-09-24 PROCEDURE — 84439 ASSAY OF FREE THYROXINE: CPT | Performed by: PHYSICIAN ASSISTANT

## 2024-09-24 PROCEDURE — 25010000002 LEVOFLOXACIN PER 250 MG: Performed by: INTERNAL MEDICINE

## 2024-09-24 PROCEDURE — 85025 COMPLETE CBC W/AUTO DIFF WBC: CPT | Performed by: PHYSICIAN ASSISTANT

## 2024-09-24 PROCEDURE — 87077 CULTURE AEROBIC IDENTIFY: CPT | Performed by: PHYSICIAN ASSISTANT

## 2024-09-24 PROCEDURE — 84443 ASSAY THYROID STIM HORMONE: CPT | Performed by: PHYSICIAN ASSISTANT

## 2024-09-24 PROCEDURE — 25010000002 ONDANSETRON PER 1 MG: Performed by: PHYSICIAN ASSISTANT

## 2024-09-24 PROCEDURE — 36415 COLL VENOUS BLD VENIPUNCTURE: CPT

## 2024-09-24 PROCEDURE — 36600 WITHDRAWAL OF ARTERIAL BLOOD: CPT

## 2024-09-24 PROCEDURE — 25010000002 HYDROMORPHONE PER 4 MG: Performed by: INTERNAL MEDICINE

## 2024-09-24 RX ORDER — BISACODYL 5 MG/1
5 TABLET, DELAYED RELEASE ORAL DAILY PRN
Status: DISCONTINUED | OUTPATIENT
Start: 2024-09-24 | End: 2024-10-08 | Stop reason: HOSPADM

## 2024-09-24 RX ORDER — HYDROMORPHONE HYDROCHLORIDE 1 MG/ML
0.5 INJECTION, SOLUTION INTRAMUSCULAR; INTRAVENOUS; SUBCUTANEOUS EVERY 4 HOURS PRN
Status: DISCONTINUED | OUTPATIENT
Start: 2024-09-24 | End: 2024-10-06

## 2024-09-24 RX ORDER — SODIUM CHLORIDE 9 MG/ML
125 INJECTION, SOLUTION INTRAVENOUS CONTINUOUS
Status: DISCONTINUED | OUTPATIENT
Start: 2024-09-24 | End: 2024-09-27

## 2024-09-24 RX ORDER — SODIUM CHLORIDE 0.9 % (FLUSH) 0.9 %
10 SYRINGE (ML) INJECTION AS NEEDED
Status: DISCONTINUED | OUTPATIENT
Start: 2024-09-24 | End: 2024-10-08 | Stop reason: HOSPADM

## 2024-09-24 RX ORDER — LEVOFLOXACIN 5 MG/ML
750 INJECTION, SOLUTION INTRAVENOUS
Status: DISCONTINUED | OUTPATIENT
Start: 2024-09-24 | End: 2024-09-24 | Stop reason: DRUGHIGH

## 2024-09-24 RX ORDER — SODIUM CHLORIDE 0.9 % (FLUSH) 0.9 %
10 SYRINGE (ML) INJECTION AS NEEDED
Status: DISCONTINUED | OUTPATIENT
Start: 2024-09-24 | End: 2024-10-04 | Stop reason: SDUPTHER

## 2024-09-24 RX ORDER — ONDANSETRON 2 MG/ML
4 INJECTION INTRAMUSCULAR; INTRAVENOUS ONCE
Status: COMPLETED | OUTPATIENT
Start: 2024-09-24 | End: 2024-09-24

## 2024-09-24 RX ORDER — FAMOTIDINE 10 MG/ML
20 INJECTION, SOLUTION INTRAVENOUS EVERY 12 HOURS SCHEDULED
Status: DISCONTINUED | OUTPATIENT
Start: 2024-09-24 | End: 2024-09-29

## 2024-09-24 RX ORDER — IPRATROPIUM BROMIDE AND ALBUTEROL SULFATE 2.5; .5 MG/3ML; MG/3ML
3 SOLUTION RESPIRATORY (INHALATION)
Status: DISCONTINUED | OUTPATIENT
Start: 2024-09-24 | End: 2024-09-25

## 2024-09-24 RX ORDER — SODIUM CHLORIDE 0.9 % (FLUSH) 0.9 %
10 SYRINGE (ML) INJECTION EVERY 12 HOURS SCHEDULED
Status: DISCONTINUED | OUTPATIENT
Start: 2024-09-24 | End: 2024-10-08 | Stop reason: HOSPADM

## 2024-09-24 RX ORDER — SODIUM CHLORIDE 9 MG/ML
40 INJECTION, SOLUTION INTRAVENOUS AS NEEDED
Status: DISCONTINUED | OUTPATIENT
Start: 2024-09-24 | End: 2024-10-08 | Stop reason: HOSPADM

## 2024-09-24 RX ORDER — POLYETHYLENE GLYCOL 3350 17 G/17G
17 POWDER, FOR SOLUTION ORAL DAILY PRN
Status: DISCONTINUED | OUTPATIENT
Start: 2024-09-24 | End: 2024-10-08 | Stop reason: HOSPADM

## 2024-09-24 RX ORDER — BISACODYL 10 MG
10 SUPPOSITORY, RECTAL RECTAL DAILY PRN
Status: DISCONTINUED | OUTPATIENT
Start: 2024-09-24 | End: 2024-10-08 | Stop reason: HOSPADM

## 2024-09-24 RX ORDER — ONDANSETRON 2 MG/ML
4 INJECTION INTRAMUSCULAR; INTRAVENOUS EVERY 6 HOURS PRN
Status: DISCONTINUED | OUTPATIENT
Start: 2024-09-24 | End: 2024-10-08 | Stop reason: HOSPADM

## 2024-09-24 RX ORDER — LEVOFLOXACIN 5 MG/ML
750 INJECTION, SOLUTION INTRAVENOUS
Status: DISCONTINUED | OUTPATIENT
Start: 2024-09-24 | End: 2024-09-25

## 2024-09-24 RX ORDER — AMOXICILLIN 250 MG
2 CAPSULE ORAL 2 TIMES DAILY PRN
Status: DISCONTINUED | OUTPATIENT
Start: 2024-09-24 | End: 2024-10-08 | Stop reason: HOSPADM

## 2024-09-24 RX ORDER — SODIUM CHLORIDE, SODIUM LACTATE, POTASSIUM CHLORIDE, CALCIUM CHLORIDE 600; 310; 30; 20 MG/100ML; MG/100ML; MG/100ML; MG/100ML
100 INJECTION, SOLUTION INTRAVENOUS CONTINUOUS
Status: DISCONTINUED | OUTPATIENT
Start: 2024-09-24 | End: 2024-09-27

## 2024-09-24 RX ORDER — METOCLOPRAMIDE 5 MG/1
5 TABLET ORAL 4 TIMES DAILY
Status: ON HOLD | COMMUNITY
Start: 2024-09-11

## 2024-09-24 RX ADMIN — FAMOTIDINE 20 MG: 10 INJECTION INTRAVENOUS at 22:14

## 2024-09-24 RX ADMIN — ONDANSETRON 4 MG: 2 INJECTION INTRAMUSCULAR; INTRAVENOUS at 19:21

## 2024-09-24 RX ADMIN — SODIUM CHLORIDE 1000 ML: 9 INJECTION, SOLUTION INTRAVENOUS at 19:16

## 2024-09-24 RX ADMIN — LEVOFLOXACIN 750 MG: 5 INJECTION, SOLUTION INTRAVENOUS at 22:14

## 2024-09-24 RX ADMIN — SODIUM CHLORIDE 125 ML/HR: 9 INJECTION, SOLUTION INTRAVENOUS at 19:20

## 2024-09-24 RX ADMIN — HYDROMORPHONE HYDROCHLORIDE 0.5 MG: 1 INJECTION, SOLUTION INTRAMUSCULAR; INTRAVENOUS; SUBCUTANEOUS at 23:36

## 2024-09-24 RX ADMIN — Medication 10 ML: at 22:18

## 2024-09-24 RX ADMIN — IPRATROPIUM BROMIDE AND ALBUTEROL SULFATE 3 ML: .5; 3 SOLUTION RESPIRATORY (INHALATION) at 23:44

## 2024-09-24 RX ADMIN — SODIUM CHLORIDE, POTASSIUM CHLORIDE, SODIUM LACTATE AND CALCIUM CHLORIDE 100 ML/HR: 600; 310; 30; 20 INJECTION, SOLUTION INTRAVENOUS at 22:13

## 2024-09-24 NOTE — ED NOTES
MEDICAL SCREENING:    Reason for Visit: This is a 64-year-old female who presents to the ER with complaints of shortness of breath and dehydration.  Patient has had nausea with vomiting since Friday.  She had her first chemotherapy treatment last Friday.  She has also had cough with congestion and shortness of breath.  She arrived to the ER and was very hypoxic and apparently oxygen tank was either turned off or not functioning properly.  She is scheduled for lymph node removal for new diagnosis of cancer-followed by Dr. Khan with oncology.  See note for details.  This is just a Pit note-see provider note for details.  Labs were ordered.    Patient initially seen in triage.  The patient was advised further evaluation and diagnostic testing will be needed, some of the treatment and testing will be initiated in the lobby in order to begin the process.  The patient will be returned to the waiting area for the time being and possibly be re-assessed by a subsequent ED provider.  The patient will be brought back to the treatment area in as timely manner as possible.      Stacy Leyva, APRN  09/24/24 1828

## 2024-09-24 NOTE — H&P (VIEW-ONLY)
YOB: 1960  Location: [facility]  Location Address: [address] [place] [address], [facility], [address] [place] [address]  Location Phone: [phone]    Chief Complaint   Patient presents with    Adenopathy       History of Present Illness  Arminda Jiménez is a 64 y.o. female.  Arminda Jiménez is here for evaluation of ENT complaints. The patient has had problems with enlarged cervical lymph nodes   Patient is currently being treated by Dr. Khan for metastatic disease with mediastinal adenopathy, retroperitoneal adenopathy, gastrohepatic mass and cervical adenopathy   Patient is currently receiving palliative chemotherapy for adenocarcinoma with unknown primary   She was seen by [facility] several weeks ago and diagnosed with paralysis of left vocal cord. She does report ongoing hoarseness and difficulty swallowing for the past couple of months     History of carcinoma unknown primary site, 2010  Patient received 1 cycle carboplatin and paclitaxel  Patient was switched to carboplatin Gemzar after reaction to Taxol and completed 6 additional cycles.  Patient had a complete response at that time     US BIOPSY LYMPH NODE (2024 14:30 EDT)   CT Soft Tissue Neck With Contrast (2024 11:10 EDT) - reviewed and discussed    Tissue Pathology Exam (2024 12:41 EDT)   Past Medical History:   Diagnosis Date    Adenocarcinoma of unknown origin 2022    Anxiety     Arthritis     Asthma     Cancer     right lung,abd,and pelvic area    Chronic back pain     COPD (chronic obstructive pulmonary disease)     Depression     Environmental allergies 2022    Facet arthropathy     Foraminal stenosis of lumbosacral region     GERD (gastroesophageal reflux disease)     H/O degenerative disc disease     History of blood transfusion     PVD (peripheral vascular disease)     Radiculopathy     Thyroid nodule        Past Surgical History:   Procedure Laterality Date    ANKLE ARTHROPLASTY Left 2023     Procedure: TOTAL ANKLE ARTHROPLASTY WITH INBONE IMPLANT, GASTROCNEMIUS RECESSION, REMOVAL OF HARDWARE DEEP. LEFT ANKLE;  Surgeon: Bartolome Olguin DPM;  Location:  [facility];  Service: Podiatry;  Laterality: Left;    ANKLE OPEN REDUCTION INTERNAL FIXATION Left 01/05/2023    Procedure: REPAIR OF NONUNION MEDIAL MALLEOLUS, REPAIR NONUNION FIBULA, BONE GRAFT HARVEST, LEFT ANKLE;  Surgeon: Bartolome Olguin DPM;  Location:  [facility];  Service: Podiatry;  Laterality: Left;    ANKLE SURGERY Left     ANTERIOR LUMBAR EXPOSURE N/A 05/07/2018    Procedure: ANTERIOR LUMBAR EXPOSURE;  Surgeon: Chris Haley DO;  Location:  [facility];  Service: Vascular    APPENDECTOMY      CARPAL TUNNEL RELEASE Bilateral     INCISION AND DRAINAGE OF WOUND Left 05/08/2022    Procedure: Incision and Drainage Left Open Ankle Fracture, Open Reduction Internal Fixation Bimalleolar Ankle Fracture;  Surgeon: Leland Aranda MD;  Location:  [facility];  Service: Orthopedics;  Laterality: Left;    LUMBAR FUSION N/A 05/07/2018    Procedure: ANTERIOR DECOMPRESSION, ANTERIOR LUMBAR INTERBODY FUSION WITH INSTRUMENTATION L5-S1;  Surgeon: LUIS CARLOS Zheng MD;  Location:  [facility];  Service: Orthopedic Spine    LUMBAR FUSION Right 01/02/2019    Procedure: RIGHT LATERAL LUMBAR INTERBODY FUSION L3-5;  Surgeon: LUIS CARLOS Zheng MD;  Location:  [facility];  Service: Orthopedic Spine    LUMBAR LAMINECTOMY WITH FUSION N/A 05/09/2018    Procedure: POSTERIOR SPINAL FUSION WITH INSTRUMENTATION L5-S1;  Surgeon: LUIS CARLOS Zheng MD;  Location:  [facility];  Service: Orthopedic Spine    LUMBAR LAMINECTOMY WITH FUSION Right 01/04/2019    Procedure: POSTERIOR SPINAL FUSION WITH INSTRUMENTATION RIGHT L3-S1;  Surgeon: LUIS CARLOS Zheng MD;  Location:  [facility];  Service: Orthopedic Spine    OTHER SURGICAL HISTORY Bilateral 2017    Stent implants BLE [place] Dr Alvarado    PAIN PUMP INSERTION/REVISION Right     FENTANYL    WRIST FUSION Right        Outpatient Medications  Marked as Taking for the 9/24/24 encounter (Office Visit) with Marek Roberts MD   Medication Sig Dispense Refill    Albuterol Sulfate (PROAIR HFA IN) Inhale 2 puffs 4 (Four) Times a Day As Needed (Wheezing/Shortness of breath).      Budeson-Glycopyrrol-Formoterol (Breztri Aerosphere) 160-9-4.8 MCG/ACT aerosol inhaler Inhale 2 puffs 2 (Two) Times a Day.      CALCIUM CITRATE PO Take 600 mg by mouth 3 (Three) Times a Day.      cholecalciferol (VITAMIN D3) 1000 units tablet Take 3 tablets by mouth Daily.      escitalopram (LEXAPRO) 20 MG tablet Take 1 tablet by mouth Daily.      esomeprazole (nexIUM) 40 MG capsule Take 1 capsule by mouth Every Morning Before Breakfast.      gabapentin (NEURONTIN) 800 MG tablet Take 1 tablet by mouth 4 (Four) Times a Day. Takes 800mg at 5am and 1pm and 1600mg at 9pm      metoclopramide (REGLAN) 5 MG tablet Take 1 tablet by mouth.      naloxone (NARCAN) 4 MG/0.1ML nasal spray Call 911. Don't prime. [place] in 1 nostril for overdose. Repeat in 2-3 minutes in other nostril if no or minimal breathing/responsiveness. 2 each 0    oxyCODONE-acetaminophen (PERCOCET)  MG per tablet Take 1 tablet by mouth Every 4 (Four) Hours As Needed for Moderate Pain. Take one tablet every four hours first 24 hours 28 tablet 0    [DISCONTINUED] HYDROmorphone (Dilaudid) 2 MG tablet Take 1 tablet by mouth Every 4 (Four) Hours As Needed for Severe Pain (Breakthrough pain only.). 20 tablet 0       Aspirin, Keflex [cephalexin], Nitrofurantoin, and Creatine monohydrate    Family History   Problem Relation Age of Onset    Heart disease Other     Arthritis Other     Cancer Other     Alcohol abuse Other     Stroke Other     Migraines Other     COPD Other     Heart failure Other     Depression Other     Asthma Other     Heart disease Mother     Kidney disease Mother     Heart disease Father     Heart disease Sister     Kidney disease Sister     Stroke Sister     Heart disease Sister     Cancer Brother         Social History     Socioeconomic History    Marital status:    Tobacco Use    Smoking status: Former     Current packs/day: 0.00     Average packs/day: 1.5 packs/day for 30.0 years (45.0 ttl pk-yrs)     Types: Cigarettes     Start date:      Quit date: 2010     Years since quittin.7    Smokeless tobacco: Never   Vaping Use    Vaping status: Never Used   Substance and Sexual Activity    Alcohol use: No    Drug use: No    Sexual activity: Defer       Review of Systems   Constitutional:  Positive for fatigue.   HENT:  Positive for trouble swallowing and voice change.    Respiratory:  Positive for shortness of breath.        Vitals:    24 1554   BP: 103/90   Pulse: (!) 136   Temp: 97.7 °F (36.5 °C)       Body mass index is 18.53 kg/m².    Objective     Physical Exam  Vitals reviewed.   Constitutional:       Appearance: She is ill-appearing.   HENT:      Head: Normocephalic.      Right Ear: External ear normal.      Left Ear: External ear normal.      Nose:      Comments: Oxygen in place        Mouth/Throat:      Lips: Pink.      Mouth: Mucous membranes are dry.      Comments: Ulcerations noted to tongue and oral mucosa         Dr. Roberts has examined and assessed the patient and agrees with current treatment plan        In office patient complains of weakness, difficulty eating/drinking, shortness of breath and is requesting admission   Spoke with Dr. Khan who recommends patient going [facility]   ER provider called and notified       Assessment & Plan   Diagnoses and all orders for this visit:    1. Adenocarcinoma of unknown origin (Primary)  -     Case Request; Standing  -     Follow Anesthesia Guidelines / Protocol; Standing  -     Verify NPO Status; Standing  -     Obtain Informed Consent; Standing  -     SCD (Sequential Compression Device) - To Be Placed on Patient in Pre-Op; Standing  -     Patient to Void Prior to Transfer to OR; Standing  -     Instructions for Nursing;  Standing  -     Case Request    2. Lymphadenopathy  -     Case Request; Standing  -     Follow Anesthesia Guidelines / Protocol; Standing  -     Verify NPO Status; Standing  -     Obtain Informed Consent; Standing  -     SCD (Sequential Compression Device) - To Be Placed on Patient in Pre-Op; Standing  -     Patient to Void Prior to Transfer to OR; Standing  -     Instructions for Nursing; Standing  -     Case Request    3. Dysphagia, unspecified type  -     Case Request; Standing  -     Follow Anesthesia Guidelines / Protocol; Standing  -     Verify NPO Status; Standing  -     Obtain Informed Consent; Standing  -     SCD (Sequential Compression Device) - To Be Placed on Patient in Pre-Op; Standing  -     Patient to Void Prior to Transfer to OR; Standing  -     Instructions for Nursing; Standing  -     Case Request    4. Vocal cord weakness  -     Case Request; Standing  -     Follow Anesthesia Guidelines / Protocol; Standing  -     Verify NPO Status; Standing  -     Obtain Informed Consent; Standing  -     SCD (Sequential Compression Device) - To Be Placed on Patient in Pre-Op; Standing  -     Patient to Void Prior to Transfer to OR; Standing  -     Instructions for Nursing; Standing  -     Case Request      RIGHT CERVICAL LYMPH NODE BIOPSY/EXCISION (Right)  No orders of the defined types were placed in this encounter.    No follow-ups on file.     Risks vs benefits of cervical lymph node excision discussed     Patient Instructions   LYMPH NODE BIOPSY: The risks, benefits, and alternatives of the procedure including but not limited to pain, numbness, nerve injury, scarring, bleeding, infection, persistent symptoms, and risks of the anesthesia were discussed full with the patient and questions were answered. No guarantees were made or implied.

## 2024-09-24 NOTE — ED PROVIDER NOTES
Subjective   History of Present Illness    Patient is a 64-year-old female presenting to ED with decreased oral intake, decreased ability to care for self, cancer-related fatigue, dysphagia.  PMH significant for metastatic adenocarcinoma of unknown source, anxiety, arthritis, asthma, chronic back pain, COPD, GERD, PVD, history of thyroid nodule, history of previous blood transfusions, presence of fentanyl pain pump.  Multiple family members at bedside to provide additional history.  Patient states that 4 days ago she received her first IV chemo infusion for her adenocarcinoma as well as IV hydration and she felt significantly better however she felt that this was due to the hydration.  Patient states in the last month she has had an unintentional 25 pound weight loss due to inability to eat, drink.  Patient is planned to have her next IV chemotherapy treatment this upcoming day however per recommendations of her oncologist was sent to ENT today for further evaluation.  ENT is plan to do a biopsy of her lymph nodes in her neck this upcoming Friday and felt that she required hospitalization until that time due to concern for significant decline.  Patient states that she is having worsening soreness within her throat which is no longer controlled with her pain pump for which she administers fentanyl.  Patient states that she is having nausea but denies any vomiting.  Patient denies fevers, chills, diaphoresis or any other changes to her chronic ill state over the weekend and presents at this time for further evaluation.    Records reviewed show patient was seen earlier today at the ENT office for adenocarcinoma of unknown origin, lymphadenopathy, dysphagia, vocal cord weakness.  Patient was advised to present to the ER for admission with consult from her oncologist for planned upcoming biopsy.    Records show patient had a previous carcinoma of an unknown primary site in October 2010 for which she received 1 cycle of  carboplatin and paclitaxel. Patient was switched to carboplatin Gemzar after reaction to Taxol and completed 6 additional cycles. Patient had a complete response at that time.    Patient was last seen outpatient at [facility] on 9/20/2024 for adenocarcinoma, cancer associated pain, chemotherapy management, adverse effects of chemo, care plan, chronic coarseness, unintentional weight loss, Reactive thrombocytosis, neutrophilic leukocytosis, normocytic anemia, hyponatremia, cancer related pain, physical deconditioning, poor prognosis, life-threatening medical illness.    Patient with no previous ED visits.    Review of Systems   Constitutional:  Positive for fatigue and unexpected weight change (25 pound weight loss over the past month). Negative for chills, diaphoresis and fever.   HENT:  Positive for trouble swallowing.    Eyes: Negative.    Respiratory: Negative.  Negative for shortness of breath.    Cardiovascular: Negative.  Negative for palpitations.   Gastrointestinal:  Positive for nausea. Negative for abdominal pain and vomiting.   Genitourinary:  Positive for decreased urine volume.   Musculoskeletal:  Positive for myalgias.   Skin: Negative.  Negative for rash.   Neurological:  Positive for weakness (generalized, progressively worsening).   Psychiatric/Behavioral: Negative.     All other systems reviewed and are negative.      Past Medical History:   Diagnosis Date    Adenocarcinoma of unknown origin 04/04/2022    Anxiety     Arthritis     Asthma     Cancer     right lung,abd,and pelvic area    Chronic back pain     COPD (chronic obstructive pulmonary disease)     Depression     Environmental allergies 04/11/2022    Facet arthropathy     Foraminal stenosis of lumbosacral region     GERD (gastroesophageal reflux disease)     H/O degenerative disc disease     History of blood transfusion     PVD (peripheral vascular disease)     Radiculopathy     Thyroid nodule        Allergies   Allergen Reactions    Aspirin  Nausea And Vomiting     Unknown reaction    Keflex [Cephalexin] Other (See Comments)     Blisters to nose and mouth    Nitrofurantoin GI Intolerance and Nausea And Vomiting    Creatine Monohydrate Unknown - Low Severity       Past Surgical History:   Procedure Laterality Date    ANKLE ARTHROPLASTY Left 8/29/2023    Procedure: TOTAL ANKLE ARTHROPLASTY WITH INBONE IMPLANT, GASTROCNEMIUS RECESSION, REMOVAL OF HARDWARE DEEP. LEFT ANKLE;  Surgeon: Bartolome Olguin DPM;  Location:  [facility] OR;  Service: Podiatry;  Laterality: Left;    ANKLE OPEN REDUCTION INTERNAL FIXATION Left 01/05/2023    Procedure: REPAIR OF NONUNION MEDIAL MALLEOLUS, REPAIR NONUNION FIBULA, BONE GRAFT HARVEST, LEFT ANKLE;  Surgeon: Bartolome Olguin DPM;  Location:  [facility] OR;  Service: Podiatry;  Laterality: Left;    ANKLE SURGERY Left     ANTERIOR LUMBAR EXPOSURE N/A 05/07/2018    Procedure: ANTERIOR LUMBAR EXPOSURE;  Surgeon: Chris Haley DO;  Location:  [facility] OR;  Service: Vascular    APPENDECTOMY      CARPAL TUNNEL RELEASE Bilateral     INCISION AND DRAINAGE OF WOUND Left 05/08/2022    Procedure: Incision and Drainage Left Open Ankle Fracture, Open Reduction Internal Fixation Bimalleolar Ankle Fracture;  Surgeon: Leland Aranda MD;  Location:  [facility] OR;  Service: Orthopedics;  Laterality: Left;    LUMBAR FUSION N/A 05/07/2018    Procedure: ANTERIOR DECOMPRESSION, ANTERIOR LUMBAR INTERBODY FUSION WITH INSTRUMENTATION L5-S1;  Surgeon: LUIS CARLOS Zheng MD;  Location:  [facility] OR;  Service: Orthopedic Spine    LUMBAR FUSION Right 01/02/2019    Procedure: RIGHT LATERAL LUMBAR INTERBODY FUSION L3-5;  Surgeon: LUIS CARLOS Zheng MD;  Location:  [facility] OR;  Service: Orthopedic Spine    LUMBAR LAMINECTOMY WITH FUSION N/A 05/09/2018    Procedure: POSTERIOR SPINAL FUSION WITH INSTRUMENTATION L5-S1;  Surgeon: LUIS CARLOS Zheng MD;  Location:  [facility] OR;  Service: Orthopedic Spine    LUMBAR LAMINECTOMY WITH FUSION Right 01/04/2019    Procedure: POSTERIOR  SPINAL FUSION WITH INSTRUMENTATION RIGHT L3-S1;  Surgeon: LUIS CARLOS Zheng MD;  Location: [facility] OR;  Service: Orthopedic Spine    OTHER SURGICAL HISTORY Bilateral 2017    Stent implants BLE [place] [address] [place]    PAIN PUMP INSERTION/REVISION Right     FENTANYL    WRIST FUSION Right        Family History   Problem Relation Age of Onset    Heart disease Other     Arthritis Other     Cancer Other     Alcohol abuse Other     Stroke Other     Migraines Other     COPD Other     Heart failure Other     Depression Other     Asthma Other     Heart disease Mother     Kidney disease Mother     Heart disease Father     Heart disease Sister     Kidney disease Sister     Stroke Sister     Heart disease Sister     Cancer Brother        Social History     Socioeconomic History    Marital status:    Tobacco Use    Smoking status: Former     Current packs/day: 0.00     Average packs/day: 1.5 packs/day for 30.0 years (45.0 ttl pk-yrs)     Types: Cigarettes     Start date:      Quit date:      Years since quittin.7    Smokeless tobacco: Never   Vaping Use    Vaping status: Never Used   Substance and Sexual Activity    Alcohol use: No    Drug use: No    Sexual activity: Defer           Objective   Physical Exam  Vitals and nursing note reviewed.   Constitutional:       Appearance: Normal appearance. She is well-developed and well-groomed. She is cachectic. She is ill-appearing. She is not toxic-appearing or diaphoretic.   HENT:      Mouth/Throat:      Mouth: Mucous membranes are dry.      Pharynx: Oropharynx is clear.      Comments: Hoarse voice  Eyes:      Conjunctiva/sclera: Conjunctivae normal.      Pupils: Pupils are equal, round, and reactive to light.   Cardiovascular:      Rate and Rhythm: Regular rhythm. Tachycardia present.      Heart sounds: No murmur heard.  Pulmonary:      Effort: Pulmonary effort is normal. No respiratory distress.      Breath sounds: Normal breath sounds.   Abdominal:      General:  Bowel sounds are normal. There is no distension.      Palpations: Abdomen is soft.      Tenderness: There is no abdominal tenderness.   Musculoskeletal:         General: Normal range of motion.      Cervical back: Normal range of motion and neck supple.      Right lower leg: No edema.      Left lower leg: No edema.   Skin:     Findings: Bruising (Scattered bruising to all 4 extremities in various stages of healing with no acute injuries) and petechiae present. No signs of injury.   Neurological:      Mental Status: She is alert and oriented to person, place, and time.      Motor: Weakness present.   Psychiatric:         Mood and Affect: Mood normal.         Behavior: Behavior normal. Behavior is cooperative.         Procedures           ED Course                                             Medical Decision Making  Problems Addressed:  Adenocarcinoma: complicated acute illness or injury  Dysphagia, unspecified type: complicated acute illness or injury  Elevated TSH: complicated acute illness or injury  Patchy atelectasis: complicated acute illness or injury  Pulmonary emphysema, unspecified emphysema type: complicated acute illness or injury  Unable to care for self: complicated acute illness or injury    Amount and/or Complexity of Data Reviewed  External Data Reviewed: labs, radiology and notes.  Labs: ordered. Decision-making details documented in ED Course.  Radiology: ordered.  ECG/medicine tests: ordered. Decision-making details documented in ED Course.  Discussion of management or test interpretation with external provider(s): Mrs. Carolina PÉREZ (ENT)  Dr. Taylor (hospitalist)    Risk  Prescription drug management.      Patient is a 64-year-old female presenting to ED with decreased oral intake, decreased ability to care for self, cancer-related fatigue.  PMH significant for metastatic adenocarcinoma of unknown source, anxiety, arthritis, asthma, chronic back pain, COPD, GERD, PVD, history of thyroid  nodule, history of previous blood transfusions, presence of fentanyl pain pump.  Upon initial evaluation patient ill-appearing in the bed and cachectic with no diaphoresis.  Patient nontoxic-appearing.  Patient with hypertensive systolic pressures in the 150s and tachycardia.  Patient was noted upon arrival to triage to be hypoxic as her oxygen was not appropriately turned on however after turning onto 5 L she improved to 93% on room air.  Examination finds dry posterior oropharynx mucous membranes with hoarse voice which patient states is at her baseline.  Scattered bruising to all 4 extremities in various stages of healing with no acute injuries. No other acute findings.  Discussed with patient ENT consult and need for IV hydration, lab work, and admission for  further evaluation and monitoring until her biopsy for which she is amenable with no further questions, concerns, or needs.    Differential diagnosis: Abnormal thyroid hormones, electrolyte disturbance, urinary tract infection, unable to swallow, dysphagia, unable to care for self, bronchitis, bronchiolitis, pneumonia, viral illness, systemic infection, other    Lab work revealed leukocytosis 15.05, stable H&H, normal platelets.  2% elevated relative bands with no further acute findings.  CMP with alk phos 238, anion gap 17, no further abnormalities including no further hepatic dysfunction, no renal dysfunction and no electrolyte disturbances including normal magnesium.  TSH elevated at 4.77 with normal free T4.  Low concern for pancreatic abnormality such as pancreatitis with normal lipase.  Low concern for systemic or ischemic process with normal lactic acid and further low concern for systemic bacterial process with normal procalcitonin.  Urinalysis with trace leukocytes, 3-5 WBC however with negative nitrates and no bacteria as well as 3-6 squamous epithelium a culture will be sent prior to initiation of antibiotic therapy.  100 proteinuria, 80 ketones  and small bilirubin.  Patient was given an initial fluid bolus and started on maintenance fluids.  Patient given a dose of Zofran for Jihad significant improvement in her nausea.  Respiratory panel unremarkable.  Chest x-ray showed: Emphysematous changes of the lungs, patchy bibasilar infiltrates are present.  Discussed with patient per recommendations of oncology and ENT plan for admission for further evaluation and treatment until her upcoming biopsy for which she is amenable with no further questions, concerns, or needs.  Case discussed with Dr. Silva, hospitalist, who will kindly accept patient for admission under her services.            Final diagnoses:   Dysphagia, unspecified type   Unable to care for self   Adenocarcinoma   Elevated TSH   Pulmonary emphysema, unspecified emphysema type   Patchy atelectasis       ED Disposition  ED Disposition       ED Disposition   Decision to Admit    Condition   --    Comment   Level of Care: Med/Surg [1]   Diagnosis: Dysphagia [[id number]]   Admitting Physician: GEORGE WEBER [[id number]]   Certification: I Certify That Inpatient Hospital Services Are Medically Necessary For Greater Than 2 Midnights                 No follow-up provider specified.       Medication List      No changes were made to your prescriptions during this visit.            Dom Albright PA-C  09/24/24 2014

## 2024-09-25 ENCOUNTER — APPOINTMENT (OUTPATIENT)
Dept: GENERAL RADIOLOGY | Facility: HOSPITAL | Age: 64
End: 2024-09-25
Payer: MEDICARE

## 2024-09-25 PROBLEM — J38.00 VOCAL CORD WEAKNESS: Status: ACTIVE | Noted: 2024-09-24

## 2024-09-25 PROBLEM — R59.1 LYMPHADENOPATHY: Status: ACTIVE | Noted: 2024-09-24

## 2024-09-25 LAB
ALBUMIN SERPL-MCNC: 2.6 G/DL (ref 3.5–5.2)
ALBUMIN/GLOB SERPL: 0.8 G/DL
ALP SERPL-CCNC: 151 U/L (ref 39–117)
ALT SERPL W P-5'-P-CCNC: 20 U/L (ref 1–33)
ANION GAP SERPL CALCULATED.3IONS-SCNC: 9 MMOL/L (ref 5–15)
AST SERPL-CCNC: 18 U/L (ref 1–32)
BACTERIA BLD CULT: NORMAL
BASOPHILS # BLD AUTO: 0.01 10*3/MM3 (ref 0–0.2)
BASOPHILS NFR BLD AUTO: 0.1 % (ref 0–1.5)
BILIRUB SERPL-MCNC: 0.3 MG/DL (ref 0–1.2)
BOTTLE TYPE: NORMAL
BUN SERPL-MCNC: 10 MG/DL (ref 8–23)
BUN/CREAT SERPL: 32.3 (ref 7–25)
CALCIUM SPEC-SCNC: 8.1 MG/DL (ref 8.6–10.5)
CHLORIDE SERPL-SCNC: 96 MMOL/L (ref 98–107)
CO2 SERPL-SCNC: 29 MMOL/L (ref 22–29)
CREAT SERPL-MCNC: 0.31 MG/DL (ref 0.57–1)
DEPRECATED RDW RBC AUTO: 47.4 FL (ref 37–54)
EGFRCR SERPLBLD CKD-EPI 2021: 117.7 ML/MIN/1.73
EOSINOPHIL # BLD AUTO: 0.21 10*3/MM3 (ref 0–0.4)
EOSINOPHIL NFR BLD AUTO: 2.9 % (ref 0.3–6.2)
ERYTHROCYTE [DISTWIDTH] IN BLOOD BY AUTOMATED COUNT: 13.3 % (ref 12.3–15.4)
GLOBULIN UR ELPH-MCNC: 3.2 GM/DL
GLUCOSE SERPL-MCNC: 90 MG/DL (ref 65–99)
HCT VFR BLD AUTO: 28 % (ref 34–46.6)
HGB BLD-MCNC: 8.6 G/DL (ref 12–15.9)
IMM GRANULOCYTES # BLD AUTO: 0.07 10*3/MM3 (ref 0–0.05)
IMM GRANULOCYTES NFR BLD AUTO: 1 % (ref 0–0.5)
LYMPHOCYTES # BLD AUTO: 0.76 10*3/MM3 (ref 0.7–3.1)
LYMPHOCYTES NFR BLD AUTO: 10.5 % (ref 19.6–45.3)
MAGNESIUM SERPL-MCNC: 1.6 MG/DL (ref 1.6–2.4)
MCH RBC QN AUTO: 29.9 PG (ref 26.6–33)
MCHC RBC AUTO-ENTMCNC: 30.7 G/DL (ref 31.5–35.7)
MCV RBC AUTO: 97.2 FL (ref 79–97)
MONOCYTES # BLD AUTO: 0.1 10*3/MM3 (ref 0.1–0.9)
MONOCYTES NFR BLD AUTO: 1.4 % (ref 5–12)
NEUTROPHILS NFR BLD AUTO: 6.07 10*3/MM3 (ref 1.7–7)
NEUTROPHILS NFR BLD AUTO: 84.1 % (ref 42.7–76)
NRBC BLD AUTO-RTO: 0 /100 WBC (ref 0–0.2)
PHOSPHATE SERPL-MCNC: 2.4 MG/DL (ref 2.5–4.5)
PLATELET # BLD AUTO: 156 10*3/MM3 (ref 140–450)
PMV BLD AUTO: 9.9 FL (ref 6–12)
POTASSIUM SERPL-SCNC: 3.8 MMOL/L (ref 3.5–5.2)
PROT SERPL-MCNC: 5.8 G/DL (ref 6–8.5)
RBC # BLD AUTO: 2.88 10*6/MM3 (ref 3.77–5.28)
SODIUM SERPL-SCNC: 134 MMOL/L (ref 136–145)
WBC NRBC COR # BLD AUTO: 7.22 10*3/MM3 (ref 3.4–10.8)

## 2024-09-25 PROCEDURE — 25010000002 HYDROMORPHONE PER 4 MG: Performed by: INTERNAL MEDICINE

## 2024-09-25 PROCEDURE — 94799 UNLISTED PULMONARY SVC/PX: CPT

## 2024-09-25 PROCEDURE — 92610 EVALUATE SWALLOWING FUNCTION: CPT

## 2024-09-25 PROCEDURE — 36415 COLL VENOUS BLD VENIPUNCTURE: CPT | Performed by: INTERNAL MEDICINE

## 2024-09-25 PROCEDURE — 99222 1ST HOSP IP/OBS MODERATE 55: CPT | Performed by: NURSE PRACTITIONER

## 2024-09-25 PROCEDURE — 85025 COMPLETE CBC W/AUTO DIFF WBC: CPT | Performed by: INTERNAL MEDICINE

## 2024-09-25 PROCEDURE — 84100 ASSAY OF PHOSPHORUS: CPT | Performed by: INTERNAL MEDICINE

## 2024-09-25 PROCEDURE — 25010000002 PIPERACILLIN SOD-TAZOBACTAM PER 1 G: Performed by: INTERNAL MEDICINE

## 2024-09-25 PROCEDURE — 74230 X-RAY XM SWLNG FUNCJ C+: CPT

## 2024-09-25 PROCEDURE — 83735 ASSAY OF MAGNESIUM: CPT | Performed by: INTERNAL MEDICINE

## 2024-09-25 PROCEDURE — 92611 MOTION FLUOROSCOPY/SWALLOW: CPT

## 2024-09-25 PROCEDURE — 80053 COMPREHEN METABOLIC PANEL: CPT | Performed by: INTERNAL MEDICINE

## 2024-09-25 PROCEDURE — 94664 DEMO&/EVAL PT USE INHALER: CPT

## 2024-09-25 PROCEDURE — 94761 N-INVAS EAR/PLS OXIMETRY MLT: CPT

## 2024-09-25 PROCEDURE — 25810000003 LACTATED RINGERS PER 1000 ML: Performed by: INTERNAL MEDICINE

## 2024-09-25 RX ORDER — IPRATROPIUM BROMIDE AND ALBUTEROL SULFATE 2.5; .5 MG/3ML; MG/3ML
3 SOLUTION RESPIRATORY (INHALATION)
Status: DISCONTINUED | OUTPATIENT
Start: 2024-09-26 | End: 2024-10-07

## 2024-09-25 RX ORDER — ACETAMINOPHEN 650 MG/1
650 SUPPOSITORY RECTAL EVERY 4 HOURS PRN
Status: DISCONTINUED | OUTPATIENT
Start: 2024-09-25 | End: 2024-09-27 | Stop reason: HOSPADM

## 2024-09-25 RX ORDER — OXYCODONE HYDROCHLORIDE 15 MG/1
15 TABLET ORAL EVERY 4 HOURS PRN
COMMUNITY
End: 2024-10-08 | Stop reason: HOSPADM

## 2024-09-25 RX ORDER — ALBUTEROL SULFATE 0.83 MG/ML
2.5 SOLUTION RESPIRATORY (INHALATION) EVERY 4 HOURS PRN
Status: DISCONTINUED | OUTPATIENT
Start: 2024-09-25 | End: 2024-10-08 | Stop reason: HOSPADM

## 2024-09-25 RX ORDER — GABAPENTIN 800 MG/1
1600 TABLET ORAL NIGHTLY
COMMUNITY
End: 2024-10-08 | Stop reason: HOSPADM

## 2024-09-25 RX ADMIN — IPRATROPIUM BROMIDE AND ALBUTEROL SULFATE 3 ML: .5; 3 SOLUTION RESPIRATORY (INHALATION) at 14:24

## 2024-09-25 RX ADMIN — IPRATROPIUM BROMIDE AND ALBUTEROL SULFATE 3 ML: .5; 3 SOLUTION RESPIRATORY (INHALATION) at 06:30

## 2024-09-25 RX ADMIN — HYDROMORPHONE HYDROCHLORIDE 0.5 MG: 1 INJECTION, SOLUTION INTRAMUSCULAR; INTRAVENOUS; SUBCUTANEOUS at 03:36

## 2024-09-25 RX ADMIN — HYDROMORPHONE HYDROCHLORIDE 0.5 MG: 1 INJECTION, SOLUTION INTRAMUSCULAR; INTRAVENOUS; SUBCUTANEOUS at 11:34

## 2024-09-25 RX ADMIN — SODIUM CHLORIDE, POTASSIUM CHLORIDE, SODIUM LACTATE AND CALCIUM CHLORIDE 100 ML/HR: 600; 310; 30; 20 INJECTION, SOLUTION INTRAVENOUS at 08:57

## 2024-09-25 RX ADMIN — HYDROMORPHONE HYDROCHLORIDE 0.5 MG: 1 INJECTION, SOLUTION INTRAMUSCULAR; INTRAVENOUS; SUBCUTANEOUS at 19:38

## 2024-09-25 RX ADMIN — IPRATROPIUM BROMIDE AND ALBUTEROL SULFATE 3 ML: .5; 3 SOLUTION RESPIRATORY (INHALATION) at 18:31

## 2024-09-25 RX ADMIN — HYDROMORPHONE HYDROCHLORIDE 0.5 MG: 1 INJECTION, SOLUTION INTRAMUSCULAR; INTRAVENOUS; SUBCUTANEOUS at 15:45

## 2024-09-25 RX ADMIN — Medication 10 ML: at 21:14

## 2024-09-25 RX ADMIN — BARIUM SULFATE 25 ML: 400 PASTE ORAL at 10:25

## 2024-09-25 RX ADMIN — BARIUM SULFATE 55 ML: 0.81 POWDER, FOR SUSPENSION ORAL at 10:25

## 2024-09-25 RX ADMIN — BARIUM SULFATE 250 ML: 400 SUSPENSION ORAL at 10:24

## 2024-09-25 RX ADMIN — IPRATROPIUM BROMIDE AND ALBUTEROL SULFATE 3 ML: .5; 3 SOLUTION RESPIRATORY (INHALATION) at 03:19

## 2024-09-25 RX ADMIN — IPRATROPIUM BROMIDE AND ALBUTEROL SULFATE 3 ML: .5; 3 SOLUTION RESPIRATORY (INHALATION) at 10:50

## 2024-09-25 RX ADMIN — Medication 10 ML: at 08:56

## 2024-09-25 RX ADMIN — FAMOTIDINE 20 MG: 10 INJECTION INTRAVENOUS at 21:14

## 2024-09-25 RX ADMIN — HYDROMORPHONE HYDROCHLORIDE 0.5 MG: 1 INJECTION, SOLUTION INTRAMUSCULAR; INTRAVENOUS; SUBCUTANEOUS at 23:24

## 2024-09-25 RX ADMIN — HYDROMORPHONE HYDROCHLORIDE 0.5 MG: 1 INJECTION, SOLUTION INTRAMUSCULAR; INTRAVENOUS; SUBCUTANEOUS at 07:34

## 2024-09-25 RX ADMIN — FAMOTIDINE 20 MG: 10 INJECTION INTRAVENOUS at 08:55

## 2024-09-25 RX ADMIN — PIPERACILLIN AND TAZOBACTAM 4.5 G: 4; .5 INJECTION, POWDER, FOR SOLUTION INTRAVENOUS at 23:24

## 2024-09-25 RX ADMIN — PIPERACILLIN AND TAZOBACTAM 4.5 G: 4; .5 INJECTION, POWDER, FOR SOLUTION INTRAVENOUS at 19:38

## 2024-09-25 RX ADMIN — SODIUM CHLORIDE, POTASSIUM CHLORIDE, SODIUM LACTATE AND CALCIUM CHLORIDE 100 ML/HR: 600; 310; 30; 20 INJECTION, SOLUTION INTRAVENOUS at 23:24

## 2024-09-25 RX ADMIN — BARIUM SULFATE 250 ML: 400 SUSPENSION ORAL at 10:25

## 2024-09-25 NOTE — PROGRESS NOTES
"Pharmacy Dosing Service  Antimicrobial Dosing  Levaquin    Assessment/Action/Plan:  Based on indication and renal function, Levaquin 750 mg IV every 48 hours has been adjusted to 750 mg IV every 24 hours for patient with CrCl ? 50 ml/min. Pharmacy will continue to monitor daily and make further adjustment(s) accordingly.     Subjective:  Arminda Jiménez is a 64 y.o. female  initiated on Levaquin 750 mg IV every 24 hours for the treatment of Pneumonia , day 1 of 3 of treatment.    Objective:  Ht: 154.9 cm (60.98\"); Wt: 44.9 kg (99 lb)  Estimated Creatinine Clearance: 134.3 mL/min (A) (by C-G formula based on SCr of 0.3 mg/dL (L)).   Creatinine   Date Value Ref Range Status   09/24/2024 0.30 (L) 0.57 - 1.00 mg/dL Final      Lab Results   Component Value Date    WBC 15.04 (H) 09/24/2024      Baseline culture results:  Microbiology Results (last 10 days)       Procedure Component Value - Date/Time    Respiratory Panel PCR w/COVID-19(SARS-CoV-2) [facility]-House, NP Swab in UTM/VTM, 2 HR TAT - Swab, Nasopharynx [[id number]]  (Normal) Collected: 09/24/24 1814    Lab Status: Final result Specimen: Swab from Nasopharynx Updated: 09/24/24 1934     ADENOVIRUS, PCR Not Detected     Coronavirus 229E Not Detected     Coronavirus HKU1 Not Detected     Coronavirus NL63 Not Detected     Coronavirus OC43 Not Detected     COVID19 Not Detected     Human Metapneumovirus Not Detected     Human Rhinovirus/Enterovirus Not Detected     Influenza A PCR Not Detected     Influenza B PCR Not Detected     Parainfluenza Virus 1 Not Detected     Parainfluenza Virus 2 Not Detected     Parainfluenza Virus 3 Not Detected     Parainfluenza Virus 4 Not Detected     RSV, PCR Not Detected     Bordetella pertussis pcr Not Detected     Bordetella parapertussis PCR Not Detected     Chlamydophila pneumoniae PCR Not Detected     Mycoplasma pneumo by PCR Not Detected    Narrative:      In the setting of a positive respiratory panel with a " viral infection PLUS a negative procalcitonin without other underlying concern for bacterial infection, consider observing off antibiotics or discontinuation of antibiotics and continue supportive care. If the respiratory panel is positive for atypical bacterial infection (Bordetella pertussis, Chlamydophila pneumoniae, or Mycoplasma pneumoniae), consider antibiotic de-escalation to target atypical bacterial infection.            Terence Gaffney, PharmD  09/24/24 21:57 CDT

## 2024-09-25 NOTE — PLAN OF CARE
Goal Outcome Evaluation:  Plan of Care Reviewed With: patient        Progress: no change  Outcome Evaluation: Pt c/o pain throughout shift. Medicated with IV pain meds per pt request. Safety maintained. A/o x4. IVF infusing per MD order. Cont IV abx as ordered. O2 @5L,  in place. Pt w/c bound baseline. PT/OT ordered. Voiding per bedpan. ST south completed this shift. Pt started on pureed diet with honey thick liquids. Cont to monitor.

## 2024-09-25 NOTE — PROGRESS NOTES
[facility] Medicine Services  INPATIENT PROGRESS NOTE    Patient Name: Arminda Jiménez  Date of Admission: 9/24/2024  Today's Date: 09/25/24  Length of Stay: 1  Primary Care Physician: Teddy Sanchez MD    Subjective   Chief Complaint: Unable to swallow    Today  Patient has no new complaint 1 for swallow evaluation and showed me he passed the evaluation.    HPI   64-year-old female presents the emergency department stating that she is unable to swallow. She notes that when she tries to swallow she gets choked and can only take tiny sips. She has had weight loss. She feels dehydrated. She has adenocarcinoma of unknown origin and is was to have a biopsy on Friday with ENT, Dr. Roberts. She also notes that she is post to get chemotherapy on Friday and also received chemotherapy last Friday. The patient chronically wears 2 L of nasal cannula oxygen at home, now is requiring 5 L to maintain O2 saturations. The patient has generalized weakness. 3 days ago she had a bowel movement. She has no nausea and no vomiting.       Review of Systems   All pertinent negatives and positives are as above. All other systems have been reviewed and are negative unless otherwise stated.     Objective    Temp:  [97.8 °F (36.6 °C)-98.9 °F (37.2 °C)] 97.8 °F (36.6 °C)  Heart Rate:  [] 108  Resp:  [12-24] 16  BP: (100-176)/(58-87) 174/76  Physical Exam  Constitutional:       Appearance: She is ill-appearing.      Comments: Thin and frail   HENT:      Head: Normocephalic and atraumatic.      Nose: Nose normal.      Mouth/Throat:      Comments: Hoarse voice  Eyes:      General: No scleral icterus.     Conjunctiva/sclera: Conjunctivae normal.   Cardiovascular:      Rate and Rhythm: Normal rate and regular rhythm.      Heart sounds: Normal heart sounds.   Pulmonary:      Effort: Pulmonary effort is normal.      Breath sounds: Normal breath sounds.   Abdominal:      General: There is no distension.       Palpations: Abdomen is soft.   Musculoskeletal:         General: No swelling or tenderness.      Cervical back: Normal range of motion and neck supple.   Skin:     General: Skin is warm and dry.   Neurological:      Mental Status: She is alert and oriented to person, place, and time.      Cranial Nerves: No cranial nerve deficit.   Psychiatric:         Mood and Affect: Mood normal.         Behavior: Behavior normal.       Results Review:  I have reviewed the labs, radiology results, and diagnostic studies.    Laboratory Data:   Results from last 7 days   Lab Units 09/25/24 0728 09/24/24 1833 09/20/24  1052   WBC 10*3/mm3 7.22 15.04* 18.29*   HEMOGLOBIN g/dL 8.6* 12.3 11.1*   HEMATOCRIT % 28.0* 38.5 33.8*   PLATELETS 10*3/mm3 156 292 420*        Results from last 7 days   Lab Units 09/25/24 0728 09/24/24 1833 09/20/24  1052   SODIUM mmol/L 134* 135* 132*   POTASSIUM mmol/L 3.8 4.0 4.0   CHLORIDE mmol/L 96* 92* 92*   TOTAL CO2 mmol/L  --   --  27   CO2 mmol/L 29.0 26.0  --    BUN mg/dL 10 15 11   CREATININE mg/dL 0.31* 0.30* <0.5   CALCIUM mg/dL 8.1* 9.3 9.4   BILIRUBIN mg/dL 0.3 0.3 0.3   ALK PHOS U/L 151* 238* 265*   ALT (SGPT) U/L 20 33 20   AST (SGOT) U/L 18 29 26   GLUCOSE mg/dL 90 84 92       Culture Data:   Blood Culture   Date Value Ref Range Status   09/24/2024 Abnormal Stain (C)  Preliminary       Radiology Data:   Imaging Results (Last 24 Hours)       Procedure Component Value Units Date/Time    FL Video Swallow With Speech Single Contrast [[id number]] Collected: 09/25/24 1032     Updated: 09/25/24 1037    Narrative:      EXAMINATION: FL VIDEO SWALLOW W SPEECH SINGLE-CONTRAST-     9/25/2024 9:22 AM     HISTORY: dysphagia; R13.10-Dysphagia, unspecified; Z78.9-Other specified  health status; C80.1-Malignant (primary) neoplasm, unspecified;  R79.89-Other specified abnormal findings of blood chemistry;  J43.9-Emphysema, unspecified; J98.11-Atelectasis     Fluoroscopy was performed during ingestion of thin,  nectar consistency,  and honey consistency pudding consistency and mechanical soft contrast  boluses.     There is no previous study for comparison.     There is no difficulty in initiating the swallowing.     Normal propagation through oropharynx. No nasopharyngeal reflux.     There is significant laryngeal penetration and aspiration with thin and  nectar consistency contrast bolus. This was followed by cough and  expectoration.     There is moderate residue with the honey consistency, pudding  consistency and mechanical soft boluses which cleared on subsequent  swallows. No laryngeal penetration or aspiration.     The study was performed under supervision of speech therapist.       Impression:      1. Abnormal swallow function study as detailed above.  2. Fluoroscopy time: 3 minutes 14 seconds.  3. The dose: 6.83mGy.  4. Number of images: 1        This report was signed and finalized on 9/25/2024 10:34 AM by Dr. Arminda Lester MD.       XR Chest 1 View [[id number]] Collected: 09/24/24 1927     Updated: 09/24/24 1931    Narrative:      EXAMINATION: XR CHEST 1 VW-  9/24/2024 7:27 PM     HISTORY: Weakness.     FINDINGS: Upright frontal projection of the chest is compared to prior  exam of 5/7/2022. There are emphysematous changes of the lungs. There  has been development of patchy bibasilar airspace disease worrisome for  pneumonia. Blunting of the right lateral costophrenic angle is also  suspicious for a small right effusion. The thoracic aorta is ectatic.       Impression:      1.. Emphysematous changes of the lungs. Patchy bibasilar infiltrates are  present.     This report was signed and finalized on 9/24/2024 7:28 PM by Dr. Bill Benitez MD.               I have reviewed the patient's current medications.     Assessment/Plan   Assessment  Active Hospital Problems    Diagnosis     **Dysphagia     Lymphadenopathy     Vocal cord weakness     Adenocarcinoma of unknown origin        Treatment  Plan  -Dysphagia  Dr. Roberts of ENT was consulted from the emergency room and he evaluated patient with the flexible fiberoptic laryngoscopy with no significant findings.  Speech therapy was consulted and patient was evaluated further with modified barium swallow which reported mild, oral dysphagia, severe pharyngeal dysphagia.  We will follow-up with speech therapy on appropriate diet.    -Acute hypoxic respiratory failure [history of COPD]  This is likely secondary to ongoing pneumonia, patient is currently needing about 3.5 L of oxygen to maintain saturation above 94%.  We will continue oxygen support and wean as tolerated.  If patient does not improve in the next 24 hours, we will start treatment for COPD exacerbation.    -Suspected pneumonia on chest x-ray  In view of pharyngeal dysfunction, aspiration pneumonia is highly suspected.  Patient is currently on Levaquin, will discontinue Levaquin and start Zosyn.  Follow blood cultures.    -Gram-positive bacteremia  This is positive in one anaerobic bottle, suspect contamination.  Patient will be on Zosyn only and will follow culture and adjust antibiotic as appropriate.    -History of adenocarcinoma of unknown primary  ENT is planning biopsy of lymph node in the neck region on Friday.    -Sudden drop in hemoglobin level  Patient has no obvious source of bleeding, hemoglobin has been in the range of 10-11, but dropped to 8.6 today.  We will follow H&H in the morning.    Other chronic medical conditions-  COPD  Anxiety/depression  GERD  History of degenerative disc disease  Peripheral vascular disease  Thyroid nodule    DVT prophylaxis-we will start patient on Lovenox    Medical Decision Making  Number and Complexity of problems: 4, complex  Differential Diagnosis: Dehydration     Conditions and Status        Unchanged     MDM Data  External documents reviewed: None  Cardiac tracing (EKG, telemetry) interpretation: None  Radiology interpretation: None  Labs reviewed:  Reviewed  Any tests that were considered but not ordered: None     Decision rules/scores evaluated (example [id number], Wells, etc): None     Discussed with: Patient     Care Planning  Shared decision making: Patient   Code status and discussions: Full     Disposition  Social Determinants of Health that impact treatment or disposition: None  Estimated length of stay is 2 to 3 days.      I confirmed that the patient's advanced care plan is present, code status is documented, and a surrogate decision maker is listed in the patient's medical record.      The patient's surrogate decision maker is family.     Disposition  Social Determinants of Health that impact treatment or disposition: No  I expect the patient to be discharged to home in unknown days.     Electronically signed by Jonathon Chew MD, 09/25/24, 17:00 CDT.

## 2024-09-25 NOTE — PLAN OF CARE
Problem: Adult Inpatient Plan of Care  Goal: Plan of Care Review  Outcome: Progressing  Flowsheets (Taken 9/25/2024 0809)  Progress: no change (Pended)  Plan of Care Reviewed With: patient (Pended)  Outcome Evaluation: see note (Pended)   Goal Outcome Evaluation:  Plan of Care Reviewed With: (P) patient        Progress: (P) no change  Outcome Evaluation: (P) see note      Anticipated Discharge Disposition (SLP): (P) unknown          SLP Swallowing Diagnosis: (P) R/O pharyngeal dysphagia (09/25/24 0811)

## 2024-09-25 NOTE — CONSULTS
[facility]   Consult Note    Patient Name: Arminda Jiménez  : 1960  MRN: [id number]  Primary Care Physician:  Teddy Sanchez MD  Referring Physician: No ref. provider found  Date of admission: 2024    Inpatient ENT Consult  Consult performed by: Carolina Fitzgerald APRN  Consult ordered by: Hortencia Taylor DO        Subjective   Subjective     Reason for Consult/ Chief Complaint: dysphagia    History of Present Illness  Arminda Jiménez is a 64 y.o. female currently being treated for metastatic adenocarcinoma of unknown primary site   She is scheduled for cervical lymph node excision 2024  Patient has undergone one chemotherapy treatment and complains of increased difficulty swallowing, weakness and shortness of breath   Difficulty swallowing started 2 months ago, at that time she was found to have left vocal cord paresis       Review of Systems   Constitutional:  Positive for fatigue.   HENT:  Positive for trouble swallowing and voice change.    Respiratory:  Positive for cough and shortness of breath.         Personal History     Past Medical History:   Diagnosis Date    Adenocarcinoma of unknown origin 2022    Anxiety     Arthritis     Asthma     Cancer     right lung,abd,and pelvic area    Chronic back pain     COPD (chronic obstructive pulmonary disease)     Depression     Environmental allergies 2022    Facet arthropathy     Foraminal stenosis of lumbosacral region     GERD (gastroesophageal reflux disease)     H/O degenerative disc disease     History of blood transfusion     PVD (peripheral vascular disease)     Radiculopathy     Thyroid nodule        Past Surgical History:   Procedure Laterality Date    ANKLE ARTHROPLASTY Left 2023    Procedure: TOTAL ANKLE ARTHROPLASTY WITH INBONE IMPLANT, GASTROCNEMIUS RECESSION, REMOVAL OF HARDWARE DEEP. LEFT ANKLE;  Surgeon: Bartolome Olguin DPM;  Location: [facility];  Service: Podiatry;  Laterality: Left;    ANKLE OPEN  REDUCTION INTERNAL FIXATION Left 01/05/2023    Procedure: REPAIR OF NONUNION MEDIAL MALLEOLUS, REPAIR NONUNION FIBULA, BONE GRAFT HARVEST, LEFT ANKLE;  Surgeon: Bartolome Olguin DPM;  Location:  [facility];  Service: Podiatry;  Laterality: Left;    ANKLE SURGERY Left     ANTERIOR LUMBAR EXPOSURE N/A 05/07/2018    Procedure: ANTERIOR LUMBAR EXPOSURE;  Surgeon: Chris Haley DO;  Location:  [facility];  Service: Vascular    APPENDECTOMY      CARPAL TUNNEL RELEASE Bilateral     INCISION AND DRAINAGE OF WOUND Left 05/08/2022    Procedure: Incision and Drainage Left Open Ankle Fracture, Open Reduction Internal Fixation Bimalleolar Ankle Fracture;  Surgeon: Leland Aranda MD;  Location:  [facility];  Service: Orthopedics;  Laterality: Left;    LUMBAR FUSION N/A 05/07/2018    Procedure: ANTERIOR DECOMPRESSION, ANTERIOR LUMBAR INTERBODY FUSION WITH INSTRUMENTATION L5-S1;  Surgeon: LUIS CARLOS Zheng MD;  Location:  [facility];  Service: Orthopedic Spine    LUMBAR FUSION Right 01/02/2019    Procedure: RIGHT LATERAL LUMBAR INTERBODY FUSION L3-5;  Surgeon: LUIS CARLOS Zheng MD;  Location:  [facility];  Service: Orthopedic Spine    LUMBAR LAMINECTOMY WITH FUSION N/A 05/09/2018    Procedure: POSTERIOR SPINAL FUSION WITH INSTRUMENTATION L5-S1;  Surgeon: LUIS CARLOS Zheng MD;  Location:  [facility];  Service: Orthopedic Spine    LUMBAR LAMINECTOMY WITH FUSION Right 01/04/2019    Procedure: POSTERIOR SPINAL FUSION WITH INSTRUMENTATION RIGHT L3-S1;  Surgeon: LUIS CARLOS Zheng MD;  Location:  [facility];  Service: Orthopedic Spine    OTHER SURGICAL HISTORY Bilateral 2017    Stent implants [facility] [place]    PAIN PUMP INSERTION/REVISION Right     FENTANYL    WRIST FUSION Right        Family History: Her family history includes Alcohol abuse in an other family member; Arthritis in an other family member; Asthma in an other family member; COPD in an other family member; Cancer in her brother and another family member; Depression in  an other family member; Heart disease in her father, mother, sister, sister, and another family member; Heart failure in an other family member; Kidney disease in her mother and sister; Migraines in an other family member; Stroke in her sister and another family member.     Social History: She  reports that she quit smoking about 14 years ago. Her smoking use included cigarettes. She started smoking about 44 years ago. She has a 45 pack-year smoking history. She has never used smokeless tobacco. She reports that she does not drink alcohol and does not use drugs.    Home Medications:  Albuterol Sulfate, Budeson-Glycopyrrol-Formoterol, Calcium Citrate, Vitamin D3, escitalopram, esomeprazole, gabapentin, metoclopramide, naloxone, and oxyCODONE    Allergies:  She is allergic to aspirin, keflex [cephalexin], nitrofurantoin, and creatine monohydrate.    Objective    Objective     Vitals:    Temp:  [97.7 °F (36.5 °C)-98.9 °F (37.2 °C)] 97.8 °F (36.6 °C)  Heart Rate:  [] 104  Resp:  [12-24] 16  BP: (100-176)/(58-90) 176/87  Flow (L/min):  [2-5] 3.5    Physical Exam  Vitals reviewed.   Constitutional:       Appearance: She is ill-appearing.   HENT:      Head: Normocephalic.      Right Ear: External ear normal.      Left Ear: External ear normal.      Nose: Nose normal.      Mouth/Throat:      Lips: Pink.      Mouth: Mucous membranes are dry.         Result Review    Result Review:  I have personally reviewed the results from the time of this admission to 9/25/2024 13:53 CDT and agree with these findings:  []  Laboratory list / accordion  []  Microbiology  []  Radiology  []  EKG/Telemetry   []  Cardiology/Vascular   []  Pathology  []  Old records  []  Other:  Most notable findings include:       Assessment & Plan   Assessment / Plan     Brief Patient Summary:  Arminda Jiménez is a 64 y.o. female currently admitted for weakness, difficulty swallowing, shortness of breath and cough   She is scheduled for cervical lymph  node biopsy 9/27/2024      Active Hospital Problems:  Active Hospital Problems    Diagnosis     **Dysphagia     Lymphadenopathy     Vocal cord weakness     Adenocarcinoma of unknown origin        Plan:   Will continue current plan of cervical lymph node biopsy/excision 9/27/2024 pending clearance from admitting service       Carolina Fitzgerald, APRN

## 2024-09-25 NOTE — PLAN OF CARE
Goal Outcome Evaluation:  Plan of Care Reviewed With: patient           Outcome Evaluation: See MBS note      Anticipated Discharge Disposition (SLP): unknown          SLP Swallowing Diagnosis: mild, oral dysphagia, severe, pharyngeal dysphagia (09/25/24 1019)

## 2024-09-25 NOTE — PROGRESS NOTES
"Pharmacy Dosing Service  Antimicrobial Dosing  Zosyn    Assessment/Action/Plan:  Based on indication and renal function, Zosyn 4.5 gm IV every 8 hours (extended infusion). Pharmacy will continue to monitor daily and make further adjustment(s) accordingly.     Subjective:  Arminda Jiménez is a 64 y.o. female with a  \"Pharmacy to Dose Zosyn\" consult for the treatment of PNA , day 1 of 7 of treatment.    Objective:  Ht: 154.9 cm (60.98\"); Wt: 45.6 kg (100 lb 9.6 oz)  Estimated Creatinine Clearance: 132 mL/min (A) (by C-G formula based on SCr of 0.31 mg/dL (L)).   Creatinine   Date Value Ref Range Status   09/25/2024 0.31 (L) 0.57 - 1.00 mg/dL Final   09/24/2024 0.30 (L) 0.57 - 1.00 mg/dL Final   09/20/2024 <0.5 0.5 - 0.9 mg/dL Final   07/30/2024 0.6 0.5 - 0.9 mg/dL Final   12/30/2022 0.59 0.57 - 1.00 mg/dL Final      Lab Results   Component Value Date    WBC 7.22 09/25/2024    WBC 15.04 (H) 09/24/2024    WBC 18.29 (H) 09/20/2024      Baseline culture results:  Microbiology Results (last 10 days)       Procedure Component Value - Date/Time    Blood Culture - Blood, Arm, Right [[id number]]  (Abnormal) Collected: 09/24/24 1911    Lab Status: Preliminary result Specimen: Blood from Arm, Right Updated: 09/25/24 1350     Blood Culture Abnormal Stain     Gram Stain Anaerobic Bottle Gram positive cocci in chains    Blood Culture ID, PCR - Blood, Arm, Right [[id number]] Collected: 09/24/24 1911    Lab Status: Final result Specimen: Blood from Arm, Right Updated: 09/25/24 1519     BCID, PCR Negative by BCID PCR. Culture to Follow.     BOTTLE TYPE Anaerobic Bottle    Respiratory Panel PCR w/COVID-19(SARS-CoV-2) [facility] In-House, NP Swab in UTM/VTM, 2 HR TAT - Swab, Nasopharynx [[id number]]  (Normal) Collected: 09/24/24 1814    Lab Status: Final result Specimen: Swab from Nasopharynx Updated: 09/24/24 1934     ADENOVIRUS, PCR Not Detected     Coronavirus 229E Not Detected     Coronavirus HKU1 Not Detected     " Coronavirus NL63 Not Detected     Coronavirus OC43 Not Detected     COVID19 Not Detected     Human Metapneumovirus Not Detected     Human Rhinovirus/Enterovirus Not Detected     Influenza A PCR Not Detected     Influenza B PCR Not Detected     Parainfluenza Virus 1 Not Detected     Parainfluenza Virus 2 Not Detected     Parainfluenza Virus 3 Not Detected     Parainfluenza Virus 4 Not Detected     RSV, PCR Not Detected     Bordetella pertussis pcr Not Detected     Bordetella parapertussis PCR Not Detected     Chlamydophila pneumoniae PCR Not Detected     Mycoplasma pneumo by PCR Not Detected    Narrative:      In the setting of a positive respiratory panel with a viral infection PLUS a negative procalcitonin without other underlying concern for bacterial infection, consider observing off antibiotics or discontinuation of antibiotics and continue supportive care. If the respiratory panel is positive for atypical bacterial infection (Bordetella pertussis, Chlamydophila pneumoniae, or Mycoplasma pneumoniae), consider antibiotic de-escalation to target atypical bacterial infection.            Tim Branham, PharmD  09/25/24 17:30 CDT

## 2024-09-25 NOTE — PROGRESS NOTES
Malnutrition Severity Assessment    Patient Name:  Arminda Jiménez  YOB: 1960  MRN: [id number]  Admit Date:  9/24/2024    Patient meets criteria for : Severe Malnutrition (Secondary signs: Generalized weakness,limited mobility)    Malnutrition Severity Assessment  Malnutrition Type: Chronic Disease - Related Malnutrition  Malnutrition Type (Last 8 Hours)       Malnutrition Severity Assessment       Row Name 09/25/24 1615       Malnutrition Severity Assessment    Malnutrition Type Chronic Disease - Related Malnutrition      Row Name 09/25/24 1615       Insufficient Energy Intake     Insufficient Energy Intake Findings Severe    Insufficient Energy Intake  <75% of est. energy requirement for > or equal to 1 month      Row Name 09/25/24 1615       Unintentional Weight Loss     Unintentional Weight Loss Findings Severe    Unintentional Weight Loss  Weight loss greater than 7.5% in three months  33 lbs (25%) wt loss over the past 8 months      Row Name 09/25/24 1615       Muscle Loss    Loss of Muscle Mass Findings Severe    [place] Region Severe - deep hollowing/scooping, lack of muscle to touch, facial bones well defined    Clavicle Bone Region Severe - protruding prominent bone    Acromion Bone Region Severe - squared shoulders, bones, and acromion process protrusion prominent    Dorsal Hand Region Moderate - slight depression    Patellar Region Severe - prominent bone, square looking, very little muscle definition    Anterior Thigh Region Severe - line/depression along thigh, obviously thin    Posterior Calf Region Severe - thin with very little definition/firmness      Row Name 09/25/24 1615       Fat Loss    Subcutaneous Fat Loss Findings Severe    Orbital Region  Severe - pronounced hollowness/depression, dark circles, loose saggy skin    Upper Arm Region Severe - mostly skin, very little space between folds, fingers touch      Row Name 09/25/24 1615       Criteria Met (Must meet criteria for severity  in at least 2 of these categories: M Wasting, Fat Loss, Fluid, Secondary Signs, Wt. Status, Intake)    Patient meets criteria for  Severe Malnutrition  Secondary signs: Generalized weakness,limited mobility                    Electronically signed by:  Rowena Hill RDN, LD  09/25/24 16:26 CDT

## 2024-09-25 NOTE — ED NOTES
Patient arrived and patients home oxygen tank empty. At time of arrival patient oxygen 80%- patient placed on 4L at this time- oxygen raised to 88% via nasal canula.

## 2024-09-25 NOTE — ED NOTES
Nursing report ED to floor  Arminda Jiménez  64 y.o.  female    HPI:   Chief Complaint   Patient presents with    Dehydration       Admitting doctor:   Hortencia Taylor DO    Consulting provider(s):  Consults       Date and Time Order Name Status Description    9/24/2024  9:13 PM Inpatient Hematology & Oncology Consult      9/24/2024  9:13 PM Inpatient ENT Consult               Admitting diagnosis:   The primary encounter diagnosis was Dysphagia, unspecified type. Diagnoses of Unable to care for self, Adenocarcinoma, Elevated TSH, Pulmonary emphysema, unspecified emphysema type, and Patchy atelectasis were also pertinent to this visit.    Code status:   Current Code Status       Date Active Code Status Order ID Comments User Context       9/24/2024 2113 CPR (Attempt to Resuscitate) [id number]  Hortencia Taylor DO ED        Question Answer    Code Status (Patient has no pulse and is not breathing) CPR (Attempt to Resuscitate)    Medical Interventions (Patient has pulse or is breathing) Full Support    Level Of Support Discussed With Patient                    Allergies:   Aspirin, Keflex [cephalexin], Nitrofurantoin, and Creatine monohydrate    Intake and Output    Intake/Output Summary (Last 24 hours) at 9/24/2024 2143  Last data filed at 9/24/2024 2049  Gross per 24 hour   Intake 1000 ml   Output --   Net 1000 ml       Weight:       09/24/24  1715   Weight: 44.9 kg (99 lb)       Most recent vitals:   Vitals:    09/24/24 1946 09/24/24 2001 09/24/24 2014 09/24/24 2039   BP: 158/75   100/60   BP Location:    Left arm   Patient Position:    Sitting   Pulse: 107 105 106 105   Resp:    12   Temp:    98 °F (36.7 °C)   TempSrc:    Oral   SpO2: 92% 92% 93% 96%   Weight:       Height:         Oxygen Therapy: .    Active LDAs/IV Access:   Lines, Drains & Airways       Active LDAs       Name Placement date Placement time Site Days    Peripheral IV 09/24/24 1916 Right Antecubital 09/24/24 1916  Antecubital  less than 1                     Labs (abnormal labs have a star):   Labs Reviewed   COMPREHENSIVE METABOLIC PANEL - Abnormal; Notable for the following components:       Result Value    Creatinine 0.30 (*)     Sodium 135 (*)     Chloride 92 (*)     Alkaline Phosphatase 238 (*)     BUN/Creatinine Ratio 50.0 (*)     Anion Gap 17.0 (*)     All other components within normal limits    Narrative:     GFR Normal >60  Chronic Kidney Disease <60  Kidney Failure <15     URINALYSIS W/ CULTURE IF INDICATED - Abnormal; Notable for the following components:    Color, UA Dark Yellow (*)     Specific Gravity, UA >1.030 (*)     Ketones, UA 80 mg/dL (3+) (*)     Bilirubin, UA Small (1+) (*)     Protein,  mg/dL (2+) (*)     Leuk Esterase, UA Trace (*)     All other components within normal limits    Narrative:     In absence of clinical symptoms, the presence of pyuria, bacteria, and/or nitrites on the urinalysis result does not correlate with infection.   TSH - Abnormal; Notable for the following components:    TSH 4.770 (*)     All other components within normal limits   CBC WITH AUTO DIFFERENTIAL - Abnormal; Notable for the following components:    WBC 15.04 (*)     All other components within normal limits   MANUAL DIFFERENTIAL - Abnormal; Notable for the following components:    Neutrophil % 89.9 (*)     Lymphocyte % 7.1 (*)     Monocyte % 0.0 (*)     Neutrophils Absolute 13.82 (*)     Monocytes Absolute 0.00 (*)     All other components within normal limits   URINALYSIS, MICROSCOPIC ONLY - Abnormal; Notable for the following components:    WBC, UA 3-5 (*)     Squamous Epithelial Cells, UA 3-6 (*)     All other components within normal limits   BLOOD GAS, ARTERIAL - Abnormal; Notable for the following components:    pO2, Arterial 55.5 (*)     HCO3, Arterial 30.2 (*)     Base Excess, Arterial 5.4 (*)     O2 Saturation, Arterial 88.5 (*)     pO2, Temperature Corrected 55.5 (*)     All other components within normal limits   RESPIRATORY PANEL  "PCR W/ COVID-19 (SARS-COV-2), NP SWAB IN UTM/VTP, 2 HR TAT - Normal    Narrative:     In the setting of a positive respiratory panel with a viral infection PLUS a negative procalcitonin without other underlying concern for bacterial infection, consider observing off antibiotics or discontinuation of antibiotics and continue supportive care. If the respiratory panel is positive for atypical bacterial infection (Bordetella pertussis, Chlamydophila pneumoniae, or Mycoplasma pneumoniae), consider antibiotic de-escalation to target atypical bacterial infection.   LIPASE - Normal   PROCALCITONIN - Normal    Narrative:     As a Marker for Sepsis (Non-Neonates):    1. <0.5 ng/mL represents a low risk of severe sepsis and/or septic shock.  2. >2 ng/mL represents a high risk of severe sepsis and/or septic shock.    As a Marker for Lower Respiratory Tract Infections that require antibiotic therapy:    PCT on Admission    Antibiotic Therapy       6-12 Hrs later    >0.5                Strongly Recommended  >0.25 - <0.5        Recommended   0.1 - 0.25          Discouraged              Remeasure/reassess PCT  <0.1                Strongly Discouraged     Remeasure/reassess PCT    As 28 day mortality risk marker: \"Change in Procalcitonin Result\" (>80% or <=80%) if Day 0 (or Day 1) and Day 4 values are available. Refer to http://www.[employer] [email or web address] [place]-pct-calculator.com    Change in PCT <=80%  A decrease of PCT levels below or equal to 80% defines a positive change in PCT test result representing a higher risk for 28-day all-cause mortality of patients diagnosed with severe sepsis for septic shock.    Change in PCT >80%  A decrease of PCT levels of more than 80% defines a negative change in PCT result representing a lower risk for 28-day all-cause mortality of patients diagnosed with severe sepsis or septic shock.      LACTIC ACID, PLASMA - Normal   T4, FREE - Normal   MAGNESIUM - Normal   CK - Normal   BLOOD CULTURE   BLOOD CULTURE   BLOOD GAS, " ARTERIAL   CBC AND DIFFERENTIAL    Narrative:     The following orders were created for panel order CBC & Differential.  Procedure                               Abnormality         Status                     ---------                               -----------         ------                     CBC Auto Differential[[id number]]        Abnormal            Final result                 Please view results for these tests on the individual orders.       Meds given in ED:   Medications   sodium chloride 0.9 % flush 10 mL (has no administration in time range)   sodium chloride 0.9 % infusion (125 mL/hr Intravenous New Bag 9/24/24 1920)   sodium chloride 0.9 % flush 10 mL (has no administration in time range)   sodium chloride 0.9 % flush 10 mL (has no administration in time range)   sodium chloride 0.9 % infusion 40 mL (has no administration in time range)   lactated ringers infusion (has no administration in time range)   sennosides-docusate (PERICOLACE) 8.6-50 MG per tablet 2 tablet (has no administration in time range)     And   polyethylene glycol (MIRALAX) packet 17 g (has no administration in time range)     And   bisacodyl (DULCOLAX) EC tablet 5 mg (has no administration in time range)     And   bisacodyl (DULCOLAX) suppository 10 mg (has no administration in time range)   ondansetron (ZOFRAN) injection 4 mg (has no administration in time range)   famotidine (PEPCID) injection 20 mg (has no administration in time range)   HYDROmorphone (DILAUDID) injection 0.5 mg (has no administration in time range)   ipratropium-albuterol (DUO-NEB) nebulizer solution 3 mL (has no administration in time range)   levoFLOXacin (LEVAQUIN) 750 mg/150 mL D5W (premix) (LEVAQUIN) 750 mg (has no administration in time range)   sodium chloride 0.9 % bolus 1,000 mL (0 mL Intravenous Stopped 9/24/24 2049)   ondansetron (ZOFRAN) injection 4 mg (4 mg Intravenous Given 9/24/24 1921)     lactated ringers, 100 mL/hr  sodium chloride, 125 mL/hr,  Last Rate: 125 mL/hr (09/24/24 1920)         NIH Stroke Scale:       Isolation/Infection(s):  No active isolations   No active infections     COVID Testing  Collected .  Resulted .    Nursing report ED to floor:  Mental status: .  Ambulatory status: .  Precautions: .    ED nurse phone extentsion- ..

## 2024-09-25 NOTE — CASE MANAGEMENT/SOCIAL WORK
Discharge Planning Assessment  [facility]     Patient Name: Arminda Jiménez  MRN: [id number]  Today's Date: 9/25/2024    Admit Date: 9/24/2024        Discharge Needs Assessment       Row Name 09/25/24 1010       Living Environment    People in Home child(leno), adult    Name(s) of People in Home Anay    Current Living Arrangements home    Primary Care Provided by child(leno)    Provides Primary Care For no one    Family Caregiver if Needed child(leno), adult    Family Caregiver Names Anay    Able to Return to Prior Arrangements yes       Resource/Environmental Concerns    Resource/Environmental Concerns none       Transition Planning    Patient/Family Anticipates Transition to home with family    Transportation Anticipated family or friend will provide       Discharge Needs Assessment    Readmission Within the Last 30 Days no previous admission in last 30 days    Equipment Currently Used at Home commode;walker, rolling;wheelchair;shower chair;oxygen    Concerns to be Addressed discharge planning    Equipment Needed After Discharge none    Outpatient/Agency/Support Group Needs homecare agency    Discharge Facility/Level of Care Needs home with home health    Discharge Coordination/Progress spoke to patient who lives with her daughter; daughter is her caregiver; has RX coverage oxygen 2LNC continuously with [facility] and has a PCP; may need home health at DC will follow for DC needs                   Discharge Plan    No documentation.                 Continued Care and Services - Admitted Since 9/24/2024    No active coordination exists for this encounter.       Expected Discharge Date and Time       Expected Discharge Date Expected Discharge Time    Sep 27, 2024            Demographic Summary    No documentation.                  Functional Status    No documentation.                  Psychosocial    No documentation.                  Abuse/Neglect    No documentation.                  Legal    No  documentation.                  Substance Abuse    No documentation.                  Patient Forms    No documentation.                     Shirley Warren RN

## 2024-09-25 NOTE — CONSULTS
MEDICAL ONCOLOGY CONSULTATION    Pt Name: Arminda Jiménez  MRN: [id number]  [id number]  YOB: 1960  Date of evaluation: 9/25/2024    Reason for Consultation: Continuity of care regarding a diagnosis of stage IV metastatic adenocarcinoma of unknown primary    Requesting Physician: Hortencia Taylor DO    History Obtained From: The patient, Dr. 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  Dorcas was found to have stage IV adenocarcinoma of unknown primary in 2010.  She was treated successfully with 4 courses of carboplatin Gemzar obtaining a complete remission that was sustained for 14 years.  Dorcas experienced a heel fracture 2 years ago.  After that event she has had chronic abdominal pain and has lost 50 pounds.  25 of the 50 pounds were lost over the course of the previous month.  Workup has documented widespread metastatic disease with mediastinal, retroperitoneal, gastrohepatic as well as left neck adenopathy.  An FNA of the left neck performed on 9/9/2024 reported malignant cells present consistent with metastatic carcinoma.  Further tissue was unavailable for molecular studies and further special stains.   Due to delays in further workup, Dr. Khan felt the patient needed to get on with his treatment and rechallenged her with carboplatin and Gemzar cycle #1 on 9/20/2024.   She has been referred to Dr. Marek Roberts with ENT at [facility] to obtain an excisional biopsy to be able to get tissue for molecular studies that will include tissue of origin, NGS, IHC studies etc.   Dorcas was seen by Dr. Marek Roberts who recommended that she be evaluated in the ED at [facility] for admission due to difficulty swallowing getting choked on her food, pronounced weight loss and dehydration anticipating an open excisional biopsy on Friday, 9/27/2024.  Medical oncology consultation  requested in continuity of care.        Detailed history copied from Dr. Khan's office note 9/20/2024    The patient is a pleasant 64 years old female who has a history of stage IV carcinoma of unknown primary site diagnosed in 2010 status post 4 cycles of carboplatin gemcitabine with complete response.  She has been in remission for 14 years.  More recently, she was found to have widespread metastatic disease with mediastinal adenopathy, retroperitoneal adenopathy, gastrohepatic mass and neck adenopathy.  She underwent ultrasound-guided FNA biopsy.  This result only in a scant amount of tissue for analysis.  She was then sent to ENT at [facility] for excisional biopsy.  Unfortunate, the patient missed appointment.  The patient was then sent to ENT at [facility].  ENT at [facility] referred the patient to [facility], [facility] for excisional biopsy.     Diagnosis  History of carcinoma no primary site, 2010  Liver lesions  Gastrohepatic space mass  Mediastinal adenopathy consistent cycles normal  Metastatic carcinoma, left neck lymph node, Sept 2024     Disease target  Mediastinal adenopathy  Hepatic lobe lesions  Pancreatic body/tail hypodense mass in the gastrohepatic ligament     Treatment summary  9/20/24 Initiate Carbo AUC 4 D1, Gemzar 800mg D1, D8 every 21 days. Treatment consent signed.          Cancer history  Arminda Jiménez was first seen by me on 9/5/2024.  I was consulted for findings of a gastric mass during patient was positioned with hospital.  The patient has a history of lung cancer, COPD.  She was a direct admit from her PCP office.  Patient has a 20 pounds weight loss.  She also had complained of dysphagia, nausea, dizziness decreased appetite.  The patient reports that she has a history of stage IV lung cancer diagnosed 10 years ago at [facility].  She received chemotherapy and stay in remission.  She tells me that she did not have a biopsy performed at that time.  Apparently, review of medical  record showed that she had a carcinoma of unknown primary site diagnosed October 2010.  She received 2 cycles of carboplatin and paclitaxel.  She had a reaction to Taxol during cycle #2.  She was switched to carboplatin gemcitabine, completed March 2019.  Sites of disease in the past involve the pericardial, pleural, mediastinal, and cervical involvement.   7/30/24 CT chest: Extensive necrotic mediastinal and bilateral hilar lymphadenopathy consistent with metastatic disease. Lymph node in the right right paratracheal lymph node measures 1 cm short axis axial image 34 at the thoracic inlet.  AP window lymph node measures 1.2 cm short axis on axial image 71 with additional 1 cm AP window lymph node axial image 76.  Precarinal lymph node measures 1.1 cm on axial image 85.  Partially necrotic anterior mediastinal lymph node measures 1.4 cm short axis image 88 with similar appearing anterior mediastinal lymph node measuring 1.3 cm short axis image 69.  A partially necrotic subcarinal lymph node measures 1.8 cm short axis on axial image 96.  Right hilar lymph node measures 1.1 cm short axis image 95.  Partially necrotic and calcified left hilar lymph node measures 1.4 cm short axis image 118.  Left hilar lymph node measures 1.1 cm short axis on axial image 96. These lymph nodes are new since 2022. There is a 2.2 x 1.5 cm nodule abutting the left heart border axial image 140, which is either within the pericardial region were within the left lung parenchyma.  There is a spiculated 0.8 x 0.5 x 0.8 cm left upper lobe nodule axial image 89 and coronal  image 31 which is also new.  Probable focal bronchiectasis left upper lobe on axial image 76, less likely cavitary nodule.  0.3 cm left lower lobe nodule image 151 which may be endobronchial.  Question a 0.6 meters left lower lobe nodule along the diaphragm axial image 158.  Subpleural 0.4 cm right middle lobe nodule image 165.  Bilateral bronchial thickening some areas of  subsegmental atelectasis noted in both lobes.  Moderate emphysema.  There is no pleural effusion or pneumothorax. Centrally low density gastrohepatic ligament mass measures 3.3 x 3.2 cm axial image 193 with some adjacent wall thickening suggested in the proximal stomach.   7/30/24 CT abdomen/pelvis: Marked gastric wall thickening proximal stomach and gastric body; neoplasm is suspected, Correlation with symptomatology and EGD advised, Cystic structure in the gastrohepatic ligament measuring up to 4.9 cm, may represent a necrotic lymph node or mass less likely pancreatic suggestive. Correlation with PET scan and/or MRI advised.    08/12/24 CT soft tissue neck:  Mild asymmetry of the vocal cords, secondary to the vocal cord paralysis.  No visible nodule or mass of the larynx, Mild necrotic adenopathy of the right neck, consistent with metastatic disease, Since the prior CT chest from 07/30/2024, grossly stable right paratracheal necrotic node at level of the thoracic inlet, also consistent with metastatic disease.   9/5/2024-upper EGD, GI-gastric bezoar to explain the CT scan of the abdomen findings.  No gastric tumor.  No active ulcer disease to explain patient's nausea vomiting.  Probable gastroparesis.  9/5/2024-CT chest, [facility]:  There is a hypodense nodule in the left thyroid lobe measuring 1.2 cm.  A right tracheoesophageal lymph node is 1.9 cm in short axis on axial image number 16 with interval increase size from 1.2 cm in short axis when remeasured.  A right hilar lymph node is 1.1 cm in short axis without change.  A subcarinal lymph node is partially calcified and measures 2.5 cm in short axis without change.  This has low attenuation.  An additional right hilar lymph node is 1.5 cm in short axis and is probably unchanged.  A left hilar lymph node is 1 cm in short axis without change.  An AP window lymph node is at least 2.3 cm and has increased in size.  Anterior mediastinal lymph node is now 1.6 cm in short  axis, previously 1.3 cm.  A solid nodule in the lingula abuts the free wall of the left ventricle and measures 2.9 x 1.8 cm, previously 2.4 x 1.6 cm when remeasured. There is a small cavitary nodule in the left upper lobe measuring a cavitary nodule in the left upper lobe on series 604 image number 23  is unchanged measuring 0.6 cm. A solid spiculated nodule in the left upper lobe is probably overall unchanged measuring 0.8 cm x 0.7 cm on image number 26.  On there is a new solid juxta fissural nodule in the right middle lobe measuring 0.4 cm on image number 34.  There is an enlarging juxta fissural nodule in the right middle lobe measuring 0.3 cm image number 35.  The small to moderate pleural effusion layers dependently.  No left pleural fluid.  Bibasilar infiltrates are suggested. There is a large hypodense mass arising from the pancreatic body/tail extending to the gastrohepatic ligament measuring up to 5.4 x 4.1 cm.  This has increased in size.  There are multiple new hypodense hepatic lesions.    9/6/2024-CT thoracic and lumbar spine, [facility]: Multiple chronic thoracic compression fractures, diffuse degenerative disc disease.  Postop changes of the lumbar spine.  Loosening of the right L3 transpedicular screw.  Remaining hardware appears well-positioned.  Rim-enhancing complex cystic lesion in the gastrohepatic space.  9/9/2024-FNA biopsy by radiology: Consistent metastatic carcinoma.  However, further characterization of tumor could not be performed due to scant material.  9/10/2024-CT abdomen pelvis with contrast, [facility]: Interval development of mild to moderate dependent atelectasis of both lower  lobes and mild patchy ground-glass and consolidation of the lingula and to a lesser extent the right middle lobe.  3.1 cm left epicardial nodule, increased in size from 2.5 cm.  Interval development of multiple ill-defined hypodense liver masses.  The largest, of the left lobe near the IVC measures 3.3 cm.  Stable mild  prominence of the intrahepatic biliary tree and mild distension of the common duct, measuring up to 10 mm in diameter.  No visible obstructing stone or mass.  Cholecystectomy, again noted.  Interval increase in size of the previously seen mass between the liver left lobe, stomach, aorta, and superior aspect of the pancreatic body.  Measures 6.0 x 4.5 cm cross section, increased from 3.5 x 3.3 cm.  Inferiorly, the mass abuts and blends in per separately of the pancreas.  Calcified granulomas of the spleen, again noted.  The adrenal glands have a normal appearance.  The kidneys enhance symmetrically.  0.9 cm cyst of the left kidney, again noted.  No ureteral stones or hydronephrosis.  The uterus and ovaries have a normal appearance.  No evidence of bowel obstruction.  Moderate amount of stool in the colon, similar to before.  Small fat and fluid containing right inguinal hernia, stable.  No free air.  Interval development of trace fluid in the pelvis.  Electronic device in subcutaneous fat of the right flank, with catheter entering the spinal canal, again noted.  Bone window images show no significant lytic or sclerotic bone lesions.  Surgical fixation of the lumbar spine, again noted.  Chronic mild compression fracture of the L1 superior endplate, stable.  Impression:  Interval increase in size of the mass of the epigastric region, most consistent with a malignant process, potentially metastatic adenopathy or a pancreatic mass.  Interval development of multiple hepatic metastases. 3.  3.1 cm left epicardial nodule, increased in size, suspicious for metastatic adenopathy or metastatic pleural disease.   Interval development of small to moderate dependent right pleural effusion, with atelectasis versus pneumonia of both lower lobes.   9/20/24 Initiate palliative chemotherapy Carbo AUC 4 D1, Gemzar 800mg D1, D8 every 21 days. Treatment consent signed.    9/20/2024-I have called [facility]NT and refer the patient back.  She  will be seen early next week.            Past Medical History:    Past Medical History:   Diagnosis Date    Adenocarcinoma of unknown origin 04/04/2022    Anxiety     Arthritis     Asthma     Cancer     right lung,abd,and pelvic area    Chronic back pain     COPD (chronic obstructive pulmonary disease)     Depression     Environmental allergies 04/11/2022    Facet arthropathy     Foraminal stenosis of lumbosacral region     GERD (gastroesophageal reflux disease)     H/O degenerative disc disease     History of blood transfusion     PVD (peripheral vascular disease)     Radiculopathy     Thyroid nodule        Past Surgical History:    Past Surgical History:   Procedure Laterality Date    ANKLE ARTHROPLASTY Left 8/29/2023    Procedure: TOTAL ANKLE ARTHROPLASTY WITH INBONE IMPLANT, GASTROCNEMIUS RECESSION, REMOVAL OF HARDWARE DEEP. LEFT ANKLE;  Surgeon: Bartolome Olguin DPM;  Location:  [facility] OR;  Service: Podiatry;  Laterality: Left;    ANKLE OPEN REDUCTION INTERNAL FIXATION Left 01/05/2023    Procedure: REPAIR OF NONUNION MEDIAL MALLEOLUS, REPAIR NONUNION FIBULA, BONE GRAFT HARVEST, LEFT ANKLE;  Surgeon: Bartolome Olguin DPM;  Location:  [facility];  Service: Podiatry;  Laterality: Left;    ANKLE SURGERY Left     ANTERIOR LUMBAR EXPOSURE N/A 05/07/2018    Procedure: ANTERIOR LUMBAR EXPOSURE;  Surgeon: Chris Haley DO;  Location:  [facility] OR;  Service: Vascular    APPENDECTOMY      CARPAL TUNNEL RELEASE Bilateral     INCISION AND DRAINAGE OF WOUND Left 05/08/2022    Procedure: Incision and Drainage Left Open Ankle Fracture, Open Reduction Internal Fixation Bimalleolar Ankle Fracture;  Surgeon: Leland Aranda MD;  Location:  [facility];  Service: Orthopedics;  Laterality: Left;    LUMBAR FUSION N/A 05/07/2018    Procedure: ANTERIOR DECOMPRESSION, ANTERIOR LUMBAR INTERBODY FUSION WITH INSTRUMENTATION L5-S1;  Surgeon: LUIS CARLOS Zheng MD;  Location:  [facility];  Service: Orthopedic Spine    LUMBAR FUSION Right  01/02/2019    Procedure: RIGHT LATERAL LUMBAR INTERBODY FUSION L3-5;  Surgeon: LUIS CARLOS Zheng MD;  Location:  [facility];  Service: Orthopedic Spine    LUMBAR LAMINECTOMY WITH FUSION N/A 05/09/2018    Procedure: POSTERIOR SPINAL FUSION WITH INSTRUMENTATION L5-S1;  Surgeon: LUIS CARLOS Zheng MD;  Location:  [facility];  Service: Orthopedic Spine    LUMBAR LAMINECTOMY WITH FUSION Right 01/04/2019    Procedure: POSTERIOR SPINAL FUSION WITH INSTRUMENTATION RIGHT L3-S1;  Surgeon: LUIS CARLOS Zheng MD;  Location:  [facility];  Service: Orthopedic Spine    OTHER SURGICAL HISTORY Bilateral 2017    Stent implants BLE [place] Dr [place]    PAIN PUMP INSERTION/REVISION Right     FENTANYL    WRIST FUSION Right        Current Hospital Medications:      Current Facility-Administered Medications:     sennosides-docusate (PERICOLACE) 8.6-50 MG per tablet 2 tablet, 2 tablet, Oral, BID PRN **AND** polyethylene glycol (MIRALAX) packet 17 g, 17 g, Oral, Daily PRN **AND** bisacodyl (DULCOLAX) EC tablet 5 mg, 5 mg, Oral, Daily PRN **AND** bisacodyl (DULCOLAX) suppository 10 mg, 10 mg, Rectal, Daily PRN, Hortencia Taylor DO    famotidine (PEPCID) injection 20 mg, 20 mg, Intravenous, Q12H, Hortencia Taylor DO, 20 mg at 09/24/24 2214    HYDROmorphone (DILAUDID) injection 0.5 mg, 0.5 mg, Intravenous, Q4H PRN, Hortencia Taylor DO, 0.5 mg at 09/25/24 0336    ipratropium-albuterol (DUO-NEB) nebulizer solution 3 mL, 3 mL, Nebulization, Q4H - RT, Hortencia Taylor DO, 3 mL at 09/25/24 0319    lactated ringers infusion, 100 mL/hr, Intravenous, Continuous, Hortencia Taylor DO, Last Rate: 100 mL/hr at 09/24/24 2213, 100 mL/hr at 09/24/24 2213    levoFLOXacin (LEVAQUIN) 750 mg/150 mL D5W (premix) (LEVAQUIN) 750 mg, 750 mg, Intravenous, Q24H, Hortencia Taylor DO, Last Rate: 100 mL/hr at 09/24/24 2214, 750 mg at 09/24/24 2214    ondansetron (ZOFRAN) injection 4 mg, 4 mg, Intravenous, Q6H PRN, Hortencia Taylor DO    [COMPLETED]  Insert Peripheral IV, , , Once **AND** sodium chloride 0.9 % flush 10 mL, 10 mL, Intravenous, PRN, Dom Albright PA-C    sodium chloride 0.9 % flush 10 mL, 10 mL, Intravenous, Q12H, Hortencia Taylor, , 10 mL at 24 2218    sodium chloride 0.9 % flush 10 mL, 10 mL, Intravenous, PRN, Hortencia Taylor,     sodium chloride 0.9 % infusion 40 mL, 40 mL, Intravenous, PRN, Hortencia Taylor,     sodium chloride 0.9 % infusion, 125 mL/hr, Intravenous, Continuous, Dom Albright PA-C, Last Rate: 125 mL/hr at 24, 125 mL/hr at 24    Allergies:   Allergies   Allergen Reactions    Aspirin Nausea And Vomiting     Unknown reaction    Keflex [Cephalexin] Other (See Comments)     Blisters to nose and mouth    Nitrofurantoin GI Intolerance and Nausea And Vomiting    Creatine Monohydrate Unknown - Low Severity       Social History:    Social History     Socioeconomic History    Marital status:    Tobacco Use    Smoking status: Former     Current packs/day: 0.00     Average packs/day: 1.5 packs/day for 30.0 years (45.0 ttl pk-yrs)     Types: Cigarettes     Start date:      Quit date:      Years since quittin.7    Smokeless tobacco: Never   Vaping Use    Vaping status: Never Used   Substance and Sexual Activity    Alcohol use: No    Drug use: No    Sexual activity: Defer       Family History:   Family History   Problem Relation Age of Onset    Heart disease Other     Arthritis Other     Cancer Other     Alcohol abuse Other     Stroke Other     Migraines Other     COPD Other     Heart failure Other     Depression Other     Asthma Other     Heart disease Mother     Kidney disease Mother     Heart disease Father     Heart disease Sister     Kidney disease Sister     Stroke Sister     Heart disease Sister     Cancer Brother        REVIEW OF SYSTEMS:    Constitutional: Pronounced weight loss fatigue failure to thrive  HEENT:  dysphagia and hoarseness    Lungs: no cough, no  "shortness of breath, no wheeze  CVS: no palpitation, no chest pain, no shortness of breath  GI: no abdominal pain, no nausea , no vomiting, no constipation  MERCEDES: no dysuria, frequency and urgency, no hematuria, no kidney stones  Musculoskeletal: no joint pain, swelling , stiffness  Endocrine: no polyuria, polydipsia, no cold or heat intolerance  Hematology: no anemia, no easy bruising or bleeding, no history of clotting disorder  Dermatology: no skin rash, no eczema, no pruritus  Psychiatry: no depression, no anxiety,no panic attacks, no suicide ideation  Neurology: no syncope, no seizures, no numbness or tingling of hands, no numbness or tingling of feet, no paresis    Vitals:    /69 (BP Location: Left arm, Patient Position: Lying)   Pulse 100   Temp 98.2 °F (36.8 °C) (Oral)   Resp 16   Ht 154.9 cm (60.98\")   Wt 45.6 kg (100 lb 9.6 oz)   SpO2 96%   BMI 19.02 kg/m²     PHYSICAL EXAM:    CONSTITUTIONAL: Alert, appropriate, no acute distress, very thin, cachectic  EYES: Anicteric, EOM intact, pupils equal round   ENT: Mucous membranes moist, no oropharyngeal lesions   NECK: Supple, Cervical lymphadenopathy on the left  CHEST/LUNGS: CTA bilaterally, normal respiratory effort   CARDIOVASCULAR: RRR, no murmurs  ABDOMEN: soft nontender, active bowel sounds, no HSM  EXTREMITIES: warm, moves all fours  SKIN: warm, dry with no rashes or lesions  LYMPH: No cervical, clavicular, axillary, or inguinal lymphadenopathy  NEUROLOGIC: follows commands, nonfocal   PSYCH: mood and affect appropriate    CBC  Results from last 7 days   Lab Units 09/24/24  1833 09/20/24  1052   WBC 10*3/mm3 15.04* 18.29*   HEMOGLOBIN g/dL 12.3 11.1*   HEMATOCRIT % 38.5 33.8*   PLATELETS 10*3/mm3 292 420*         Lab Results   Component Value Date     (L) 09/24/2024    K 4.0 09/24/2024    CL 92 (L) 09/24/2024    CO2 26.0 09/24/2024    BUN 15 09/24/2024    CREATININE 0.30 (L) 09/24/2024    GLUCOSE 84 09/24/2024    CALCIUM 9.3 09/24/2024 " "   BILITOT 0.3 09/24/2024    ALKPHOS 238 (H) 09/24/2024    AST 29 09/24/2024    ALT 33 09/24/2024    AGRATIO 0.8 09/24/2024    GLOB 4.6 09/24/2024       Lab Results   Component Value Date    INR 1.02 09/09/2024    INR 1.23 (H) 09/04/2024    INR 1.39 (H) 05/11/2022    PROTIME 13.2 09/09/2024    PROTIME 15.2 (H) 09/04/2024    PROTIME 16.5 (H) 05/11/2022       Cultures:    No results found for: \"BLOODCX\"  No components found for: \"URINCX\"    ASSESSMENT/PLAN:    #1  Stage IV carcinoma of unknown primary    Arminda Jiménez is a 64-year-old  female with a diagnosis of recurrent stage IV metastatic carcinoma of unknown primary.  Dorcas was found to have stage IV adenocarcinoma of unknown primary in 2010.  She was treated successfully with 4 courses of carboplatin Gemzar obtaining a complete remission that was sustained for 14 years.  Dorcas experienced a heel fracture 2 years ago.  After that event she has had chronic abdominal pain and has lost 50 pounds.  25 of the 50 pounds were lost over the course of the previous month.  Workup has documented widespread metastatic disease with mediastinal, retroperitoneal, gastrohepatic as well as left neck adenopathy.  An FNA of the left neck performed on 9/9/2024 reported malignant cells present consistent with metastatic carcinoma.  Further tissue was unavailable for molecular studies and further special stains.   Due to delays in further workup, Dr. Khan felt the patient needed to get on with his treatment and rechallenged her with carboplatin and Gemzar cycle #1 on 9/20/2024.   She has been referred to Dr. Marek Roberts with ENT at [facility] to obtain an excisional biopsy to be able to get tissue for molecular studies that will include tissue of origin, NGS, IHC studies etc.   Dorcas was seen by Dr. Marek Roberts who recommended that she be evaluated in the ED at [facility] for admission due to difficulty swallowing getting choked on her food, pronounced weight loss and dehydration " anticipating an open excisional biopsy on Friday, 9/27/2024.      Agree with plans as outlined.  Will discuss further with Dr. Khan and attempt to complete cycle #1 with Gemzar as well on 9/27/2024 when day 8 of treatment is due.       Bartolome Zambrano MD    09/25/24  05:12 CDT

## 2024-09-25 NOTE — THERAPY EVALUATION
Acute Care - Speech Language Pathology   Swallow Initial Evaluation [facility]     Patient Name: Arminda Jiménez  : 1960  MRN: [id number]  Today's Date: 2024               Admit Date: 2024    SPEECH-LANGUAGE PATHOLOGY EVALUATION - SWALLOW    Subjective: The patient was seen on this date for a Clinical Swallow evaluation.  Patient was alert and cooperative.  Significant history: Dysphagia, Adenocarcinoma, Pulmonary emphysema, unspecified emphysema type, Patchy atelectasis, cancer-related fatque, COPD, GERD, PVD, thryoid nodule.   Objective: Textures given included thin liquid, nectar thick liquid, honey thick liquid, and puree consistency.  Assessment: Difficulties were noted with thin liquid, nectar thick liquid, and honey thick liquid.  Observations: inefficient mastication, cough, and wet vocal quality  SLP Findings:  Patient presents with suspected pharyngeal dysphasia. R/O for pharyngeal dysphasia.   Recommendations: SLP recommends thin and clear liquid diet until further testing.  Medications should be taken whole with puree.   Recommended Strategies: Upright for PO, small bites and sips, alternate liquids and solids, and supervision with all PO. Oral care before breakfast, after all meals and PRN.  Other Recommended Evaluations: VFSS      Dysphagia therapy is recommended.     Ernesto Henderson, Speech Therapy Student 2024 09:09 CDT      Visit Dx:     ICD-10-CM ICD-9-CM   1. Dysphagia, unspecified type  R13.10 787.20   2. Unable to care for self  Z78.9 V49.89   3. Adenocarcinoma  C80.1 199.1   4. Elevated TSH  R79.89 794.5   5. Pulmonary emphysema, unspecified emphysema type  J43.9 492.8   6. Patchy atelectasis  J98.11 518.0     Patient Active Problem List   Diagnosis    Foraminal stenosis of lumbosacral region    Chronic back pain    Radiculopathy    Lumbar stenosis    Panlobular emphysema    Gastroesophageal reflux disease without esophagitis    Constipation due to opioid therapy    PAD  (peripheral artery disease)    Primary osteoarthritis involving multiple joints    Recurrent major depressive disorder    Overweight    Personal history of nicotine dependence    Adenocarcinoma of unknown origin    Environmental allergies    Primary hypertension    Senile osteoporosis    Open left ankle fracture    Fall    Postoperative anemia due to acute blood loss    Acute foot pain, left    Impaired gait and mobility due to left ankle fracture    Chest pain, unspecified type    Overweight (BMI 25.0-29.9)    Displaced fracture of medial malleolus of left tibia, subsequent encounter for closed fracture with nonunion    Dysphagia    Lymphadenopathy    Vocal cord weakness     Past Medical History:   Diagnosis Date    Adenocarcinoma of unknown origin 04/04/2022    Anxiety     Arthritis     Asthma     Cancer     right lung,abd,and pelvic area    Chronic back pain     COPD (chronic obstructive pulmonary disease)     Depression     Environmental allergies 04/11/2022    Facet arthropathy     Foraminal stenosis of lumbosacral region     GERD (gastroesophageal reflux disease)     H/O degenerative disc disease     History of blood transfusion     PVD (peripheral vascular disease)     Radiculopathy     Thyroid nodule      Past Surgical History:   Procedure Laterality Date    ANKLE ARTHROPLASTY Left 8/29/2023    Procedure: TOTAL ANKLE ARTHROPLASTY WITH INBONE IMPLANT, GASTROCNEMIUS RECESSION, REMOVAL OF HARDWARE DEEP. LEFT ANKLE;  Surgeon: Bartolome Olguin DPM;  Location:  [facility];  Service: Podiatry;  Laterality: Left;    ANKLE OPEN REDUCTION INTERNAL FIXATION Left 01/05/2023    Procedure: REPAIR OF NONUNION MEDIAL MALLEOLUS, REPAIR NONUNION FIBULA, BONE GRAFT HARVEST, LEFT ANKLE;  Surgeon: Bartolome Olguin DPM;  Location:  [facility];  Service: Podiatry;  Laterality: Left;    ANKLE SURGERY Left     ANTERIOR LUMBAR EXPOSURE N/A 05/07/2018    Procedure: ANTERIOR LUMBAR EXPOSURE;  Surgeon: Chris Haley DO;  Location:   [facility];  Service: Vascular    APPENDECTOMY      CARPAL TUNNEL RELEASE Bilateral     INCISION AND DRAINAGE OF WOUND Left 05/08/2022    Procedure: Incision and Drainage Left Open Ankle Fracture, Open Reduction Internal Fixation Bimalleolar Ankle Fracture;  Surgeon: Leland Aranda MD;  Location:  [facility];  Service: Orthopedics;  Laterality: Left;    LUMBAR FUSION N/A 05/07/2018    Procedure: ANTERIOR DECOMPRESSION, ANTERIOR LUMBAR INTERBODY FUSION WITH INSTRUMENTATION L5-S1;  Surgeon: LUIS CARLOS Zheng MD;  Location:  [facility];  Service: Orthopedic Spine    LUMBAR FUSION Right 01/02/2019    Procedure: RIGHT LATERAL LUMBAR INTERBODY FUSION L3-5;  Surgeon: LUIS CARLOS Zheng MD;  Location:  [facility];  Service: Orthopedic Spine    LUMBAR LAMINECTOMY WITH FUSION N/A 05/09/2018    Procedure: POSTERIOR SPINAL FUSION WITH INSTRUMENTATION L5-S1;  Surgeon: LUIS CARLOS Zheng MD;  Location:  [facility];  Service: Orthopedic Spine    LUMBAR LAMINECTOMY WITH FUSION Right 01/04/2019    Procedure: POSTERIOR SPINAL FUSION WITH INSTRUMENTATION RIGHT L3-S1;  Surgeon: LUIS CARLOS Zheng MD;  Location:  [facility];  Service: Orthopedic Spine    OTHER SURGICAL HISTORY Bilateral 2017    Stent implants BLE [place]    PAIN PUMP INSERTION/REVISION Right     FENTANYL    WRIST FUSION Right        SLP Recommendation and Plan  SLP Swallowing Diagnosis: (P) R/O pharyngeal dysphagia (09/25/24 0811)  SLP Diet Recommendation: (P) clear liquid diet, thin liquids (until MBS completed) (09/25/24 0811)  Recommended Precautions and Strategies: (P) upright posture during/after eating, small bites of food and sips of liquid, general aspiration precautions, alternate between small bites of food and sips of liquid (09/25/24 0811)  SLP Rec. for Method of Medication Administration: (P) meds whole, with puree (09/25/24 0811)     Monitor for Signs of Aspiration: (P) yes, notify SLP if any concerns, cough, gurgly voice, throat clearing, pneumonia  (09/25/24 0811)  Recommended Diagnostics: (P) VFSS (MBS) (09/25/24 0811)     Anticipated Discharge Disposition (SLP): (P) unknown (09/25/24 0811)  Rehab Potential/Prognosis, Swallowing: (P) adequate, monitor progress closely (09/25/24 0811)        Oral Care Recommendations: (P) Oral Care BID/PRN (09/25/24 0811)                  Patient/Family Concerns, Anticipated Discharge Disposition (SLP): (P) no family present (09/25/24 0811)                     Plan of Care Reviewed With: (P) patient  Progress: (P) no change  Outcome Evaluation: (P) see note      SWALLOW EVALUATION (Last 72 Hours)       SLP Adult Swallow Evaluation       Row Name 09/25/24 0811                   Clinical Swallow Eval    Oral Prep Phase impaired (P)   -KG        Oral Transit impaired (P)   -KG        Oral Residue WFL (P)   -KG        Pharyngeal Phase suspected pharyngeal impairment (P)   -KG        Esophageal Phase unremarkable (P)   -KG        Clinical Swallow Evaluation Summary see note (P)   -KG           Oral Prep Concerns    Oral Prep Concerns prolonged mastication (P)   -KG        Prolonged Mastication all consistencies (P)   -KG        Oral Prep Concerns, Comment see note (P)   -KG           Oral Transit Concerns    Oral Transit Concerns increased oral transit time (P)   -KG        Increased Oral Transit Time all consistencies (P)   -KG        Oral Transit Concerns, Comment see note (P)   -KG           Pharyngeal Phase Concerns    Pharyngeal Phase Concerns wet vocal quality;cough;acute vocal quality changes;multiple swallows (P)   -KG        Acute Vocal Quality Changes dysphonia (P)   -KG        Multiple Swallows all consistencies (P)   -KG        Cough honey;nectar;thin (P)   -KG        Pharyngeal Phase Concerns, Comment see note (P)   -KG           SLP Evaluation Clinical Impression    SLP Swallowing Diagnosis R/O pharyngeal dysphagia (P)   -KG        Functional Impact risk of aspiration/pneumonia;risk of malnutrition;risk of dehydration  (P)   -KG        Rehab Potential/Prognosis, Swallowing adequate, monitor progress closely (P)   -KG           Recommendations    SLP Diet Recommendation clear liquid diet;thin liquids (P)   until MBS completed  -KG        Recommended Diagnostics VFSS (MBS) (P)   -KG        Recommended Precautions and Strategies upright posture during/after eating;small bites of food and sips of liquid;general aspiration precautions;alternate between small bites of food and sips of liquid (P)   -KG        Oral Care Recommendations Oral Care BID/PRN (P)   -KG        SLP Rec. for Method of Medication Administration meds whole;with puree (P)   -KG        Monitor for Signs of Aspiration yes;notify SLP if any concerns;cough;gurgly voice;throat clearing;pneumonia (P)   -KG        Anticipated Discharge Disposition (SLP) unknown (P)   -KG        Patient/Family Concerns, Anticipated Discharge Disposition (SLP) no family present (P)   -KG                  User Key  (r) = Recorded By, (t) = Taken By, (c) = Cosigned By      Initials Name Effective Dates    Ernesto Cagle Speech Therapy Student 08/12/24 -                     EDUCATION  The patient has been educated in the following areas:   Dysphagia (Swallowing Impairment).                Time Calculation:    Time Calculation- SLP       Row Name 09/25/24 0908             Time Calculation- SLP    SLP Start Time 0811 (P)   -KG      SLP Stop Time 0908 (P)   -KG      SLP Time Calculation (min) 57 min (P)   -KG      SLP Received On 09/25/24 (P)   -KG         Untimed Charges    17420-GC Eval Oral Pharyng Swallow Minutes 57 (P)   -KG         Total Minutes    Untimed Charges Total Minutes 57 (P)   -KG       Total Minutes 57 (P)   -KG                User Key  (r) = Recorded By, (t) = Taken By, (c) = Cosigned By      Initials Name Provider Type    Ernesto Cagle Speech Therapy Student SLP Student                             Ernesto Henderson Speech Therapy Student  9/25/2024

## 2024-09-25 NOTE — PLAN OF CARE
Goal Outcome Evaluation:  Plan of Care Reviewed With: patient, caregiver        Progress: no change  Outcome Evaluation: Pt identified with unintentional wt loss and decreased appetite. Pt admitted with dysphagia. Pt reports she has had difficulty swallowing for about the past two months. Pt reports poor appetite/intake due to difficulty swallowing. SLP evaluation today; diet advanced Pureed,HTL's. Pt agreeable to oral supplements. Novasource Renal BID acceptable on honey thick liquids. Malnutrition assessment completed this visit. Con to follow for plan of care.

## 2024-09-25 NOTE — NURSING NOTE
[facility]  INPATIENT WOUND & OSTOMY CARE    Today's Date: 09/25/24    Patient Name: Arminda Jiménez  MRN: [id number]  CSN: [id number]  PCP: Teddy Sanchez MD  Attending Provider: Jonathon Chew MD  Length of Stay: 1    Pressure injury prevention measures ordered per protocol due to patient being at risk for skin breakdown.    Apply silicone foam border dressing per protocol to sacral spine/bilateral heels for protection.  Nursing to change dressing every 3 days and PRN if soiled. Nursing is to peel back dressing with every assessment to assess skin underneath dressing. No barrier cream under dressing.     Please reach out to wound care nurse if any skin issues arise.       This document has been electronically signed by Michelle Carrillo RN on 9/25/2024 06:45 CDT

## 2024-09-25 NOTE — PLAN OF CARE
Goal Outcome Evaluation:      Alert and oriented x4. O2 on per nasal cannula. VSS. Safety maintained.

## 2024-09-25 NOTE — MBS/VFSS/FEES
Acute Care - Speech Language Pathology   Swallow Initial Evaluation [facility]     Patient Name: Arminda Jiménez  : 1960  MRN: [id number]  Today's Date: 2024               Admit Date: 2024  SPEECH-LANGUAGE PATHOLOGY EVALUTION - VFSS  Subjective: The patient was seen on this date for a VFSS(Videofluoroscopic Swallowing Study).  Patient was alert and cooperative.    Significant history: Dysphagia, Adenocarcinoma, Pulmonary emphysema, unspecified emphysema type, vocal cord weakness, asthma, weight loss, Patchy atelectasis, cancer-related fatigue, COPD, GERD, PVD, thryoid nodule.  Objective: Risks/benefits were reviewed with the patient, and consent was obtained. The study was completed with SLP and Radiologist present. The patient was seen in lateral view(s). Textures given included  pudding thick, thin liquid, nectar thick liquid, honey thick liquid, and mechanical soft consistency.  Assessment: Consistencies were presented in the following order with the following results:   Trial 1: Honey Thick - Spoon  Decreased bolus formation and lingual pumping with oral transit. Premature loss to the vallecula secondary to decreased back of tongue control. Decreased base of tongue retraction and pharyngeal stripping wave. No definite laryngeal penetration or aspiration observed. Mild oropharyngeal residue remained.      Trial 2: Nectar Thick - Single Straw  Small bolus obtained. Poor bolus formation with premature loss to the vallecula, lateral channels, and pyriform sinuses secondary to decreased back of tongue control resulting in silent flash laryngeal penetration during the swallow. Decreased base of tongue retraction and pharyngeal stripping wave. Mild oropharyngeal residue remained.      Trial 4: Thin Liquid - Single Straw  Poor bolus formation with premature loss to the vallecula, lateral channels, and pyriform sinuses secondary to decreased back of tongue control then further loss secondary to a delay in  swallow initiation resulting in profound laryngeal penetration and aspiration during the swallow. An immediate cough was observed, but was ineffective in fully clearing residue from the airway. Decreased base of tongue retraction and pharyngeal stripping wave.      Trial 5: Residue following thin  Mild residue remained throughout the oropharynx and airway.     Trial 6: Pudding Thick Liquid - Spoon  Decreased  bolus formation, increased oral transit time, and lingual pumping with oral transit. Decreased base of tongue retraction and pharyngeal stripping wave. No definite new laryngeal penetration or aspiration. Mild to moderate oropharyngeal residue remained.      Trial 8: Soft Solids  Decreased rotary chew and anterior mastication due to dentition. Decreased base of tongue retraction and pharyngeal stripping wave. No definite new laryngeal penetration or aspiration. Mild oropharyngeal residue remained.      Trial 10: Nectar Thick - Single Straw  Premature loss to the vallecula secondary to decreased back of tongue control. Further loss over the epiglottis secondary to a slight delay in swallow initiation resulted in profound laryngeal penetration and aspiration during the swallow. She exhibited immediate coughing, but was not effective in clearing barium from the airway. Decreased base of tongue retraction and pharyngeal stripping wave also noted. Mild to moderate residue remained throughout the oropharynx and in the airway.      Trial 11: Esophageal screen after nectar thick  See radiology report for further details     Trial 12: Honey Thick - Spoon  Decreased bolus formation, increased oral transit time, and lingual pumping with oral transit. Decreased base of tongue retraction and pharyngeal stripping wave. No definite new laryngeal penetration or aspiration observed. Mild oropharyngeal residue remained.      Trial 13: Honey Thick - Single Straw  Difficulty obtaining honey thick barium via straw. Lingual rocking  with oral transit. Decreased base of tongue retraction and pharyngeal stripping wave. No definite new laryngeal penetration or aspiration observed. Mild oropharyngeal residue remained.      Trial 14: Honey Thick - Spoon  Decreased bolus formation, increased oral transit time, and lingual pumping with oral transit. Decreased base of tongue retraction and pharyngeal stripping wave. No definite new laryngeal penetration or aspiration observed. Mild oropharyngeal residue remained.      Trial 15: Nectar Thick - Single Straw with chin tuck  Lingual pumping with oral transit. Sluggish epiglottic inversion resulting in laryngeal penetration and aspiration during the swallow. Decreased base of tongue retraction and pharyngeal stripping wave. Pt did have an immediate, ineffective cough. Mild residue remained in the oropharynx and airway.   SLP Findings: Patient presents with severe pharyngeal dysphagia.   Recommendations: Diet Textures: honey thick liquid, puree consistency food. Medications should be taken whole with puree. May have Ice between meals after oral care, under staff or family supervision and with the recommended strategies for safe swallowing.  Recommended Strategies: Upright for PO, small bites and sips, and alternate liquids and solids. Oral care before breakfast, after all meals and PRN.  Dysphagia therapy is recommended.   Soheila Cordova, CCC-SLP 9/25/2024 13:54 CDT     Visit Dx:     ICD-10-CM ICD-9-CM   1. Dysphagia, unspecified type  R13.10 787.20   2. Unable to care for self  Z78.9 V49.89   3. Adenocarcinoma  C80.1 199.1   4. Elevated TSH  R79.89 794.5   5. Pulmonary emphysema, unspecified emphysema type  J43.9 492.8   6. Patchy atelectasis  J98.11 518.0     Patient Active Problem List   Diagnosis    Foraminal stenosis of lumbosacral region    Chronic back pain    Radiculopathy    Lumbar stenosis    Panlobular emphysema    Gastroesophageal reflux disease without esophagitis    Constipation due to  opioid therapy    PAD (peripheral artery disease)    Primary osteoarthritis involving multiple joints    Recurrent major depressive disorder    Overweight    Personal history of nicotine dependence    Adenocarcinoma of unknown origin    Environmental allergies    Primary hypertension    Senile osteoporosis    Open left ankle fracture    Fall    Postoperative anemia due to acute blood loss    Acute foot pain, left    Impaired gait and mobility due to left ankle fracture    Chest pain, unspecified type    Overweight (BMI 25.0-29.9)    Displaced fracture of medial malleolus of left tibia, subsequent encounter for closed fracture with nonunion    Dysphagia    Lymphadenopathy    Vocal cord weakness     Past Medical History:   Diagnosis Date    Adenocarcinoma of unknown origin 04/04/2022    Anxiety     Arthritis     Asthma     Cancer     right lung,abd,and pelvic area    Chronic back pain     COPD (chronic obstructive pulmonary disease)     Depression     Environmental allergies 04/11/2022    Facet arthropathy     Foraminal stenosis of lumbosacral region     GERD (gastroesophageal reflux disease)     H/O degenerative disc disease     History of blood transfusion     PVD (peripheral vascular disease)     Radiculopathy     Thyroid nodule      Past Surgical History:   Procedure Laterality Date    ANKLE ARTHROPLASTY Left 8/29/2023    Procedure: TOTAL ANKLE ARTHROPLASTY WITH INBONE IMPLANT, GASTROCNEMIUS RECESSION, REMOVAL OF HARDWARE DEEP. LEFT ANKLE;  Surgeon: Bartolome Olguin DPM;  Location:  [facility];  Service: Podiatry;  Laterality: Left;    ANKLE OPEN REDUCTION INTERNAL FIXATION Left 01/05/2023    Procedure: REPAIR OF NONUNION MEDIAL MALLEOLUS, REPAIR NONUNION FIBULA, BONE GRAFT HARVEST, LEFT ANKLE;  Surgeon: Bartolome Olguin DPM;  Location:  [facility];  Service: Podiatry;  Laterality: Left;    ANKLE SURGERY Left     ANTERIOR LUMBAR EXPOSURE N/A 05/07/2018    Procedure: ANTERIOR LUMBAR EXPOSURE;  Surgeon: Chris ALDRIDGE  DO Tera;  Location:  [facility];  Service: Vascular    APPENDECTOMY      CARPAL TUNNEL RELEASE Bilateral     INCISION AND DRAINAGE OF WOUND Left 05/08/2022    Procedure: Incision and Drainage Left Open Ankle Fracture, Open Reduction Internal Fixation Bimalleolar Ankle Fracture;  Surgeon: Leland Aranda MD;  Location:  [facility];  Service: Orthopedics;  Laterality: Left;    LUMBAR FUSION N/A 05/07/2018    Procedure: ANTERIOR DECOMPRESSION, ANTERIOR LUMBAR INTERBODY FUSION WITH INSTRUMENTATION L5-S1;  Surgeon: LUIS CARLOS Zheng MD;  Location:  [facility];  Service: Orthopedic Spine    LUMBAR FUSION Right 01/02/2019    Procedure: RIGHT LATERAL LUMBAR INTERBODY FUSION L3-5;  Surgeon: LUIS CARLOS Zheng MD;  Location:  [facility];  Service: Orthopedic Spine    LUMBAR LAMINECTOMY WITH FUSION N/A 05/09/2018    Procedure: POSTERIOR SPINAL FUSION WITH INSTRUMENTATION L5-S1;  Surgeon: LUIS CARLOS Zheng MD;  Location:  [facility];  Service: Orthopedic Spine    LUMBAR LAMINECTOMY WITH FUSION Right 01/04/2019    Procedure: POSTERIOR SPINAL FUSION WITH INSTRUMENTATION RIGHT L3-S1;  Surgeon: LUIS CARLOS Zheng MD;  Location:  [facility];  Service: Orthopedic Spine    OTHER SURGICAL HISTORY Bilateral 2017    Stent implants BLE [address] Alvarado    PAIN PUMP INSERTION/REVISION Right     FENTANYL    WRIST FUSION Right        SLP Recommendation and Plan  SLP Swallowing Diagnosis: mild, oral dysphagia, severe, pharyngeal dysphagia (09/25/24 1017)  SLP Diet Recommendation: puree, honey thick liquids, ice chips between meals after oral care, with supervision (09/25/24 1017)  Recommended Precautions and Strategies: upright posture during/after eating, small bites of food and sips of liquid, alternate between small bites of food and sips of liquid, general aspiration precautions (09/25/24 1017)  SLP Rec. for Method of Medication Administration: meds whole, meds crushed, with puree (09/25/24 1017)     Monitor for Signs of Aspiration: yes,  cough, gurgly voice, throat clearing, notify SLP if any concerns (09/25/24 1017)     Swallow Criteria for Skilled Therapeutic Interventions Met: demonstrates skilled criteria (09/25/24 1017)  Anticipated Discharge Disposition (SLP): unknown (09/25/24 1017)  Rehab Potential/Prognosis, Swallowing: adequate, monitor progress closely (09/25/24 1017)  Therapy Frequency (Swallow): at least, 3 days per week (09/25/24 1017)  Predicted Duration Therapy Intervention (Days): until discharge (09/25/24 1017)  Oral Care Recommendations: Oral Care BID/PRN (09/25/24 1017)                                        Plan of Care Reviewed With: patient  Outcome Evaluation: See MBS note      SWALLOW EVALUATION (Last 72 Hours)       SLP Adult Swallow Evaluation       Row Name 09/25/24 1017 09/25/24 0811                Rehab Evaluation    Document Type evaluation  -MB --       Subjective Information no complaints  -MB --       Patient Observations alert;cooperative  -MB --       Patient/Family/Caregiver Comments/Observations No family present  -MB --          General Information    Patient Profile Reviewed yes  -MB --       Pertinent History Of Current Problem Dysphagia, Adenocarcinoma, Pulmonary emphysema, unspecified emphysema type, vocal cord weakness, asthma, weight loss, Patchy atelectasis, cancer-related fatigue, COPD, GERD, PVD, thryoid nodule  -MB Dysphagia, Adenocarcinoma, Pulmonary emphysema, unspecified emphysema type, vocal cord weakness, asthma, weight loss, Patchy atelectasis, cancer-related fatigue, COPD, GERD, PVD, thryoid nodule  -MB       Current Method of Nutrition thin liquids;clear liquids  -MB thin liquids;clear liquids  -MB       Precautions/Limitations, Vision WFL;for purposes of eval  -MB --       Precautions/Limitations, Hearing WFL;for purposes of eval  -MB --       Prior Level of Function-Communication other (see comments)  Voice impairment  -MB other (see comments)  Voice impairment  -MB       Prior Level of  Function-Swallowing no diet consistency restrictions  -MB no diet consistency restrictions  -MB       Plans/Goals Discussed with patient  -MB --       Barriers to Rehab medically complex  -MB --          Pain    Additional Documentation Pain Scale: FACES Pre/Post-Treatment (Group)  -MB --          Pain Scale: FACES Pre/Post-Treatment    Pain: FACES Scale, Pretreatment 0-->no hurt  -MB --          Oral Motor Structure and Function    Dentition Assessment missing teeth  -MB missing teeth  -MB       Secretion Management WNL/WFL  -MB WNL/WFL  -MB       Mucosal Quality moist, healthy  -MB moist, healthy  -MB          Oral Musculature and Cranial Nerve Assessment    Oral Motor General Assessment lingual impairment;vocal impairment  -MB --       Lingual Impairment, Detail. Cranial Nerves IX, XII (Glossopharyngeal and Hypoglossal) reduced strength;reduced lingual ROM  -MB --       Vocal Impairment, Detail. Cranial Nerve X (Vagus) vocal quality abnormality (see comments);impaired throat clear/cough (see comments)  -MB --          Clinical Swallow Eval    Oral Prep Phase -- impaired  -MB (r) KG (t) MB (c)       Oral Transit -- impaired  -MB (r) KG (t) MB (c)       Oral Residue -- WFL  -MB (r) KG (t) MB (c)       Pharyngeal Phase -- suspected pharyngeal impairment  -MB (r) KG (t) MB (c)       Esophageal Phase -- unremarkable  -MB (r) KG (t) MB (c)       Clinical Swallow Evaluation Summary -- see note  -MB (r) KG (t) MB (c)          Oral Prep Concerns    Oral Prep Concerns -- prolonged mastication  -MB (r) KG (t) MB (c)       Prolonged Mastication -- all consistencies  -MB (r) KG (t) MB (c)       Oral Prep Concerns, Comment -- see note  -MB (r) KG (t) MB (c)          Oral Transit Concerns    Oral Transit Concerns -- increased oral transit time  -MB (r) KG (t) MB (c)       Increased Oral Transit Time -- all consistencies  -MB (r) KG (t) MB (c)       Oral Transit Concerns, Comment -- see note  -MB (r) KG (t) MB (c)           Pharyngeal Phase Concerns    Pharyngeal Phase Concerns -- wet vocal quality;cough;acute vocal quality changes;multiple swallows  -MB (r) KG (t) MB (c)       Acute Vocal Quality Changes -- dysphonia  -MB (r) KG (t) MB (c)       Multiple Swallows -- all consistencies  -MB (r) KG (t) MB (c)       Cough -- honey;nectar;thin  -MB (r) KG (t) MB (c)       Pharyngeal Phase Concerns, Comment -- see note  -MB (r) KG (t) MB (c)          MBS/VFSS    Utensils Used spoon;straw  -MB --       Consistencies Trialed soft to chew textures;thin liquids;nectar/syrup-thick liquids;honey-thick liquids;pudding thick  -MB --          MBS/VFSS Interpretation    Oral Prep Phase impaired oral phase of swallowing  -MB --       Oral Transit Phase impaired  -MB --       Oral Residue impaired  -MB --       VFSS Summary See note  -MB --          Oral Preparatory Phase    Oral Preparatory Phase poor rotary chew  -MB --          Oral Transit Phase    Impaired Oral Transit Phase increased A-P transit time;tongue pumping;premature spillage of liquids into pharynx  -MB --          Oral Residue    Impaired Oral Residue diffuse residue throughout oral cavity  -MB --          Initiation of Pharyngeal Swallow    Initiation of Pharyngeal Swallow bolus in valleculae;bolus in pyriform sinuses  -MB --       Pharyngeal Phase impaired pharyngeal phase of swallowing  -MB --       Penetration During the Swallow thin liquids;nectar-thick liquids  -MB --       Aspiration During the Swallow thin liquids;nectar-thick liquids  -MB --       Depth of Penetration deep  -MB --       Response to Penetration Yes  -MB --       Responsed to penetration with cough;non-effective  -MB --       Response to Aspiration Yes  -MB --       Responded to aspiration with cough;non-effective  -MB --       Pharyngeal Residue all consistencies tested  -MB --       Response to Residue partial residue clearance  -MB --       Attempted Compensatory Maneuvers chin tuck  -MB --       Response to  Attempted Compensatory Maneuvers did not prevent penetration;did not prevent aspiration  -MB --          SLP Evaluation Clinical Impression    SLP Swallowing Diagnosis mild;oral dysphagia;severe;pharyngeal dysphagia  -MB R/O pharyngeal dysphagia  -MB (r) KG (t) MB (c)       Functional Impact risk of aspiration/pneumonia;risk of malnutrition;risk of dehydration  -MB risk of aspiration/pneumonia;risk of malnutrition;risk of dehydration  -MB (r) KG (t) MB (c)       Rehab Potential/Prognosis, Swallowing adequate, monitor progress closely  -MB adequate, monitor progress closely  -MB (r) KG (t) MB (c)       Swallow Criteria for Skilled Therapeutic Interventions Met demonstrates skilled criteria  -MB --          Recommendations    Therapy Frequency (Swallow) at least;3 days per week  -MB --       Predicted Duration Therapy Intervention (Days) until discharge  -MB --       SLP Diet Recommendation puree;honey thick liquids;ice chips between meals after oral care, with supervision  -MB clear liquid diet;thin liquids  until MBS completed  -MB (r) KG (t) MB (c)       Recommended Diagnostics -- VFSS (MBS)  -MB (r) KG (t) MB (c)       Recommended Precautions and Strategies upright posture during/after eating;small bites of food and sips of liquid;alternate between small bites of food and sips of liquid;general aspiration precautions  -MB upright posture during/after eating;small bites of food and sips of liquid;general aspiration precautions;alternate between small bites of food and sips of liquid  -MB (r) KG (t) MB (c)       Oral Care Recommendations Oral Care BID/PRN  -MB Oral Care BID/PRN  -MB (r) KG (t) MB (c)       SLP Rec. for Method of Medication Administration meds whole;meds crushed;with puree  -MB meds whole;with puree  -MB (r) KG (t) MB (c)       Monitor for Signs of Aspiration yes;cough;gurgly voice;throat clearing;notify SLP if any concerns  -MB yes;notify SLP if any concerns;cough;gurgly voice;throat  clearing;pneumonia  -MB (r) KG (t) MB (c)       Anticipated Discharge Disposition (SLP) unknown  -MB unknown  -MB (r) KG (t) MB (c)       Patient/Family Concerns, Anticipated Discharge Disposition (SLP) -- no family present  -MB (r) KG (t) MB (c)                 User Key  (r) = Recorded By, (t) = Taken By, (c) = Cosigned By      Initials Name Effective Dates    Soheila Andino CCC-SLP 02/03/23 -     Ernesto Cagle, Speech Therapy Student 08/12/24 -                     EDUCATION  The patient has been educated in the following areas:   Dysphagia (Swallowing Impairment).                Time Calculation:    Time Calculation- SLP       Row Name 09/25/24 1348 09/25/24 0908          Time Calculation- SLP    SLP Start Time 1017  -MB 0811  -MB (r) KG (t) MB (c)     SLP Stop Time 1141  -MB 0908  -MB (r) KG (t) MB (c)     SLP Time Calculation (min) 84 min  -MB 57 min  -MB (r) KG (t)     SLP Received On 09/25/24  -MB 09/25/24  -MB (r) KG (t) MB (c)     SLP Goal Re-Cert Due Date 10/05/24  -MB --        Untimed Charges    03361-RT Eval Oral Pharyng Swallow Minutes -- 57  -MB (r) KG (t) MB (c)     54320-MV Motion Fluoro Eval Swallow Minutes 84  -MB --        Total Minutes    Untimed Charges Total Minutes 84  -MB 57  -MB (r) KG (t)      Total Minutes 84  -MB 57  -MB (r) KG (t)               User Key  (r) = Recorded By, (t) = Taken By, (c) = Cosigned By      Initials Name Provider Type    Soheila Andino CCC-SLP Speech and Language Pathologist    Ernesto Cagle, Speech Therapy Student SLP Student                    Therapy Charges for Today       Code Description Service Date Service Provider Modifiers Qty    [id number]  ST MOTION FLUORO EVAL SWALLOW 6 9/25/2024 Soheila Cordova CCC-PANTERA GN 1                 CASPER Diaz  9/25/2024

## 2024-09-25 NOTE — H&P
[facility] Medicine Services  HISTORY AND PHYSICAL    Date of Admission: 9/24/2024  Primary Care Physician: Teddy Sanchez MD    Subjective   Primary Historian: Patient    Chief Complaint: Unable to swallow    Dehydration  Associated symptoms include weakness.   64-year-old female presents the emergency department stating that she is unable to swallow.  She notes that when she tries to swallow she gets choked and can only take tiny sips.  She has had weight loss.  She feels dehydrated.  She has adenocarcinoma of unknown origin and is was to have a biopsy on Friday with ENT, Dr. Roberts.  She also notes that she is post to get chemotherapy on Friday and also received chemotherapy last Friday.  The patient chronically wears 2 L of nasal cannula oxygen at home, now is requiring 5 L to maintain O2 saturations.  The patient has generalized weakness.  3 days ago she had a bowel movement.  She has no nausea and no vomiting.        Review of Systems   Gastrointestinal:         Choking   Neurological:  Positive for weakness.      Otherwise complete ROS reviewed and negative except as mentioned in the HPI.    Past Medical History:   Past Medical History:   Diagnosis Date    Adenocarcinoma of unknown origin 04/04/2022    Anxiety     Arthritis     Asthma     Cancer     right lung,abd,and pelvic area    Chronic back pain     COPD (chronic obstructive pulmonary disease)     Depression     Environmental allergies 04/11/2022    Facet arthropathy     Foraminal stenosis of lumbosacral region     GERD (gastroesophageal reflux disease)     H/O degenerative disc disease     History of blood transfusion     PVD (peripheral vascular disease)     Radiculopathy     Thyroid nodule      Past Surgical History:  Past Surgical History:   Procedure Laterality Date    ANKLE ARTHROPLASTY Left 8/29/2023    Procedure: TOTAL ANKLE ARTHROPLASTY WITH INBONE IMPLANT, GASTROCNEMIUS RECESSION, REMOVAL OF HARDWARE  DEEP. LEFT ANKLE;  Surgeon: Bartolome Olguin DPM;  Location:  [facility];  Service: Podiatry;  Laterality: Left;    ANKLE OPEN REDUCTION INTERNAL FIXATION Left 01/05/2023    Procedure: REPAIR OF NONUNION MEDIAL MALLEOLUS, REPAIR NONUNION FIBULA, BONE GRAFT HARVEST, LEFT ANKLE;  Surgeon: Bartolome Olguin DPM;  Location:  [facility];  Service: Podiatry;  Laterality: Left;    ANKLE SURGERY Left     ANTERIOR LUMBAR EXPOSURE N/A 05/07/2018    Procedure: ANTERIOR LUMBAR EXPOSURE;  Surgeon: Chris Haley DO;  Location:  [facility];  Service: Vascular    APPENDECTOMY      CARPAL TUNNEL RELEASE Bilateral     INCISION AND DRAINAGE OF WOUND Left 05/08/2022    Procedure: Incision and Drainage Left Open Ankle Fracture, Open Reduction Internal Fixation Bimalleolar Ankle Fracture;  Surgeon: Leland Aranda MD;  Location:  [facility];  Service: Orthopedics;  Laterality: Left;    LUMBAR FUSION N/A 05/07/2018    Procedure: ANTERIOR DECOMPRESSION, ANTERIOR LUMBAR INTERBODY FUSION WITH INSTRUMENTATION L5-S1;  Surgeon: LUIS CARLOS Zheng MD;  Location:  [facility];  Service: Orthopedic Spine    LUMBAR FUSION Right 01/02/2019    Procedure: RIGHT LATERAL LUMBAR INTERBODY FUSION L3-5;  Surgeon: LUIS CARLOS Zheng MD;  Location:  [facility];  Service: Orthopedic Spine    LUMBAR LAMINECTOMY WITH FUSION N/A 05/09/2018    Procedure: POSTERIOR SPINAL FUSION WITH INSTRUMENTATION L5-S1;  Surgeon: LUIS CARLOS Zheng MD;  Location:  [facility];  Service: Orthopedic Spine    LUMBAR LAMINECTOMY WITH FUSION Right 01/04/2019    Procedure: POSTERIOR SPINAL FUSION WITH INSTRUMENTATION RIGHT L3-S1;  Surgeon: LUIS CARLOS Zheng MD;  Location:  [facility];  Service: Orthopedic Spine    OTHER SURGICAL HISTORY Bilateral 2017    Stent implants BLE [address] [place]    PAIN PUMP INSERTION/REVISION Right     FENTANYL    WRIST FUSION Right      Social History:  reports that she quit smoking about 14 years ago. Her smoking use included cigarettes. She started smoking  about 44 years ago. She has a 45 pack-year smoking history. She has never used smokeless tobacco. She reports that she does not drink alcohol and does not use drugs.    Family History: family history includes Alcohol abuse in an other family member; Arthritis in an other family member; Asthma in an other family member; COPD in an other family member; Cancer in her brother and another family member; Depression in an other family member; Heart disease in her father, mother, sister, sister, and another family member; Heart failure in an other family member; Kidney disease in her mother and sister; Migraines in an other family member; Stroke in her sister and another family member.       Allergies:  Allergies   Allergen Reactions    Aspirin Nausea And Vomiting     Unknown reaction    Keflex [Cephalexin] Other (See Comments)     Blisters to nose and mouth    Nitrofurantoin GI Intolerance and Nausea And Vomiting    Creatine Monohydrate Unknown - Low Severity       Medications:  Prior to Admission medications    Medication Sig Start Date End Date Taking? Authorizing Provider   Albuterol Sulfate (PROAIR HFA IN) Inhale 2 puffs 4 (Four) Times a Day As Needed (Wheezing/Shortness of breath). 12/15/15   Mark Lopez MD   Budeson-Glycopyrrol-Formoterol (Breztri Aerosphere) 160-9-4.8 MCG/ACT aerosol inhaler Inhale 2 puffs 2 (Two) Times a Day. 2/25/22   Mark Lopez MD   CALCIUM CITRATE PO Take 600 mg by mouth 3 (Three) Times a Day.    Mark Lopez MD   cholecalciferol (VITAMIN D3) 1000 units tablet Take 3 tablets by mouth Daily.    Mark Lopez MD   escitalopram (LEXAPRO) 20 MG tablet Take 1 tablet by mouth Daily.    Mark Lopez MD   esomeprazole (nexIUM) 40 MG capsule Take 1 capsule by mouth Every Morning Before Breakfast.    Mark Lopez MD   fexofenadine-pseudoephedrine (ALLEGRA-D 24) 180-240 MG per 24 hr tablet Take 1 tablet by mouth Daily As Needed for Allergies.  Patient  "not taking: Reported on 9/24/2024    Mark Lopez MD   gabapentin (NEURONTIN) 800 MG tablet Take 1 tablet by mouth 4 (Four) Times a Day. Takes 800mg at 5am and 1pm and 1600mg at 9pm 3/29/18   Mark Lopez MD   metoclopramide (REGLAN) 5 MG tablet Take 1 tablet by mouth. 9/11/24   Mark Lopez MD   naloxone (NARCAN) 4 MG/0.1ML nasal spray Call 911. Don't prime. Bunker in 1 nostril for overdose. Repeat in 2-3 minutes in other nostril if no or minimal breathing/responsiveness. 8/29/23   Bartolome Olguin DPM   oxyCODONE-acetaminophen (PERCOCET)  MG per tablet Take 1 tablet by mouth Every 4 (Four) Hours As Needed for Moderate Pain. Take one tablet every four hours first 24 hours 8/29/23   Bartolome Olguin DPM   HYDROmorphone (Dilaudid) 2 MG tablet Take 1 tablet by mouth Every 4 (Four) Hours As Needed for Severe Pain (Breakthrough pain only.). 8/29/23 9/24/24  Bartolome Olguin DPM     I have utilized all available immediate resources to obtain, update, or review the patient's current medications (including all prescriptions, over-the-counter products, herbals, cannabis/cannabidiol products, and vitamin/mineral/dietary (nutritional) supplements).    Objective     Vital Signs: /60 (BP Location: Left arm, Patient Position: Sitting)   Pulse 105   Temp 98 °F (36.7 °C) (Oral)   Resp 12   Ht 154.9 cm (60.98\")   Wt 44.9 kg (99 lb)   SpO2 96%   BMI 18.72 kg/m²   Physical Exam  Vitals reviewed.   Constitutional:       Appearance: She is ill-appearing.      Comments: Thin and frail   HENT:      Head: Normocephalic and atraumatic.      Nose: Nose normal.      Mouth/Throat:      Comments: Hoarse voice  Eyes:      General: No scleral icterus.     Conjunctiva/sclera: Conjunctivae normal.   Cardiovascular:      Rate and Rhythm: Normal rate and regular rhythm.      Heart sounds: Normal heart sounds.   Pulmonary:      Effort: Pulmonary effort is normal.      Breath sounds: Normal breath " sounds.   Abdominal:      General: There is no distension.      Palpations: Abdomen is soft.   Musculoskeletal:         General: No swelling or tenderness.      Cervical back: Normal range of motion and neck supple.   Skin:     General: Skin is warm and dry.   Neurological:      Mental Status: She is alert and oriented to person, place, and time.      Cranial Nerves: No cranial nerve deficit.   Psychiatric:         Mood and Affect: Mood normal.         Behavior: Behavior normal.          Results Reviewed:  Lab Results (last 24 hours)       Procedure Component Value Units Date/Time    Blood Gas, Arterial - [[id number]]  (Abnormal) Collected: 09/24/24 1951    Specimen: Arterial Blood Updated: 09/24/24 1952     Site Left Brachial     Gucci's Test Positive     pH, Arterial 7.441 pH units      pCO2, Arterial 44.4 mm Hg      pO2, Arterial 55.5 mm Hg      Comment: 84 Value below reference range        HCO3, Arterial 30.2 mmol/L      Comment: 83 Value above reference range        Base Excess, Arterial 5.4 mmol/L      Comment: 83 Value above reference range        O2 Saturation, Arterial 88.5 %      Comment: 84 Value below reference range        Temperature 37.0     Barometric Pressure for Blood Gas 749 mmHg      Modality Nasal Cannula     Flow Rate 5.0 lpm      Ventilator Mode NA     Collected by [id number]     Comment: Meter: [id number]     :  Brendan Lang, CRT        pCO2, Temperature Corrected 44.4 mm Hg      pH, Temp Corrected 7.441 pH Units      pO2, Temperature Corrected 55.5 mm Hg     Urinalysis With Culture If Indicated - Urine, Clean Catch [[id number]]  (Abnormal) Collected: 09/24/24 1855    Specimen: Urine, Clean Catch Updated: 09/24/24 1944     Color, UA Dark Yellow     Appearance, UA Clear     pH, UA 6.0     Specific Gravity, UA >1.030     Glucose, UA Negative     Ketones, UA 80 mg/dL (3+)     Bilirubin, UA Small (1+)     Blood, UA Negative     Protein,  mg/dL (2+)     Leuk Esterase, UA Trace      Nitrite, UA Negative     Urobilinogen, UA 1.0 E.U./dL    Narrative:      In absence of clinical symptoms, the presence of pyuria, bacteria, and/or nitrites on the urinalysis result does not correlate with infection.    Urinalysis, Microscopic Only - Urine, Clean Catch [[id number]]  (Abnormal) Collected: 09/24/24 1855    Specimen: Urine, Clean Catch Updated: 09/24/24 1944     RBC, UA None Seen /HPF      WBC, UA 3-5 /HPF      Comment: Urine culture not indicated.        Bacteria, UA None Seen /HPF      Squamous Epithelial Cells, UA 3-6 /HPF      Methodology Manual Light Microscopy    Respiratory Panel PCR w/COVID-19(SARS-CoV-2) [facility]-House, NP Swab in UTM/VTM, 2 HR TAT - Swab, Nasopharynx [[id number]]  (Normal) Collected: 09/24/24 1814    Specimen: Swab from Nasopharynx Updated: 09/24/24 1934     ADENOVIRUS, PCR Not Detected     Coronavirus 229E Not Detected     Coronavirus HKU1 Not Detected     Coronavirus NL63 Not Detected     Coronavirus OC43 Not Detected     COVID19 Not Detected     Human Metapneumovirus Not Detected     Human Rhinovirus/Enterovirus Not Detected     Influenza A PCR Not Detected     Influenza B PCR Not Detected     Parainfluenza Virus 1 Not Detected     Parainfluenza Virus 2 Not Detected     Parainfluenza Virus 3 Not Detected     Parainfluenza Virus 4 Not Detected     RSV, PCR Not Detected     Bordetella pertussis pcr Not Detected     Bordetella parapertussis PCR Not Detected     Chlamydophila pneumoniae PCR Not Detected     Mycoplasma pneumo by PCR Not Detected    Narrative:      In the setting of a positive respiratory panel with a viral infection PLUS a negative procalcitonin without other underlying concern for bacterial infection, consider observing off antibiotics or discontinuation of antibiotics and continue supportive care. If the respiratory panel is positive for atypical bacterial infection (Bordetella pertussis, Chlamydophila pneumoniae, or Mycoplasma pneumoniae),  consider antibiotic de-escalation to target atypical bacterial infection.    CBC & Differential [[id number]]  (Abnormal) Collected: 09/24/24 1833    Specimen: Blood Updated: 09/24/24 1934    Narrative:      The following orders were created for panel order CBC & Differential.  Procedure                               Abnormality         Status                     ---------                               -----------         ------                     CBC Auto Differential[[id number]]        Abnormal            Final result                 Please view results for these tests on the individual orders.    CBC Auto Differential [[id number]]  (Abnormal) Collected: 09/24/24 1833    Specimen: Blood Updated: 09/24/24 1934     WBC 15.04 10*3/mm3      RBC 4.15 10*6/mm3      Hemoglobin 12.3 g/dL      Hematocrit 38.5 %      MCV 92.8 fL      MCH 29.6 pg      MCHC 31.9 g/dL      RDW 13.2 %      RDW-SD 44.9 fl      MPV 9.8 fL      Platelets 292 10*3/mm3     Manual Differential [[id number]]  (Abnormal) Collected: 09/24/24 1833    Specimen: Blood Updated: 09/24/24 1934     Neutrophil % 89.9 %      Lymphocyte % 7.1 %      Monocyte % 0.0 %      Eosinophil % 1.0 %      Basophil % 0.0 %      Bands %  2.0 %      Neutrophils Absolute 13.82 10*3/mm3      Lymphocytes Absolute 1.07 10*3/mm3      Monocytes Absolute 0.00 10*3/mm3      Eosinophils Absolute 0.15 10*3/mm3      Basophils Absolute 0.00 10*3/mm3      Anisocytosis Slight/1+     Hypochromia Slight/1+     Macrocytes Slight/1+     WBC Morphology Normal     Platelet Morphology Normal    Blood Culture - Blood, Arm, Right [[id number]] Collected: 09/24/24 1911    Specimen: Blood from Arm, Right Updated: 09/24/24 1919    Comprehensive Metabolic Panel [[id number]]  (Abnormal) Collected: 09/24/24 1833    Specimen: Blood Updated: 09/24/24 1914     Glucose 84 mg/dL      BUN 15 mg/dL      Creatinine 0.30 mg/dL      Sodium 135 mmol/L      Potassium 4.0 mmol/L      Chloride 92 mmol/L      CO2 26.0 mmol/L   "    Calcium 9.3 mg/dL      Total Protein 8.2 g/dL      Albumin 3.6 g/dL      ALT (SGPT) 33 U/L      AST (SGOT) 29 U/L      Alkaline Phosphatase 238 U/L      Total Bilirubin 0.3 mg/dL      Globulin 4.6 gm/dL      A/G Ratio 0.8 g/dL      BUN/Creatinine Ratio 50.0     Anion Gap 17.0 mmol/L      eGFR 118.6 mL/min/1.73     Narrative:      GFR Normal >60  Chronic Kidney Disease <60  Kidney Failure <15      Lipase [[id number]]  (Normal) Collected: 09/24/24 1833    Specimen: Blood Updated: 09/24/24 1914     Lipase 34 U/L     Procalcitonin [[id number]]  (Normal) Collected: 09/24/24 1833    Specimen: Blood Updated: 09/24/24 1914     Procalcitonin 0.20 ng/mL     Narrative:      As a Marker for Sepsis (Non-Neonates):    1. <0.5 ng/mL represents a low risk of severe sepsis and/or septic shock.  2. >2 ng/mL represents a high risk of severe sepsis and/or septic shock.    As a Marker for Lower Respiratory Tract Infections that require antibiotic therapy:    PCT on Admission    Antibiotic Therapy       6-12 Hrs later    >0.5                Strongly Recommended  >0.25 - <0.5        Recommended   0.1 - 0.25          Discouraged              Remeasure/reassess PCT  <0.1                Strongly Discouraged     Remeasure/reassess PCT    As 28 day mortality risk marker: \"Change in Procalcitonin Result\" (>80% or <=80%) if Day 0 (or Day 1) and Day 4 values are available. Refer to http://www.[employer]-pct-calculator.com    Change in PCT <=80%  A decrease of PCT levels below or equal to 80% defines a positive change in PCT test result representing a higher risk for 28-day all-cause mortality of patients diagnosed with severe sepsis for septic shock.    Change in PCT >80%  A decrease of PCT levels of more than 80% defines a negative change in PCT result representing a lower risk for 28-day all-cause mortality of patients diagnosed with severe sepsis or septic shock.       T4, Free [[id number]]  (Normal) Collected: 09/24/24 1833    Specimen: Blood " Updated: 09/24/24 1914     Free T4 1.17 ng/dL     TSH [[id number]]  (Abnormal) Collected: 09/24/24 1833    Specimen: Blood Updated: 09/24/24 1914     TSH 4.770 uIU/mL     Magnesium [[id number]]  (Normal) Collected: 09/24/24 1833    Specimen: Blood Updated: 09/24/24 1914     Magnesium 1.9 mg/dL     CK [[id number]]  (Normal) Collected: 09/24/24 1833    Specimen: Blood Updated: 09/24/24 1914     Creatine Kinase 20 U/L     Lactic Acid, Plasma [[id number]]  (Normal) Collected: 09/24/24 1833    Specimen: Blood Updated: 09/24/24 1905     Lactate 1.8 mmol/L     Blood Culture - Blood, Arm, Right [[id number]] Collected: 09/24/24 1833    Specimen: Blood from Arm, Right Updated: 09/24/24 1846          Imaging Results (Last 24 Hours)       Procedure Component Value Units Date/Time    XR Chest 1 View [[id number]] Collected: 09/24/24 1927     Updated: 09/24/24 1931    Narrative:      EXAMINATION: XR CHEST 1 VW-  9/24/2024 7:27 PM     HISTORY: Weakness.     FINDINGS: Upright frontal projection of the chest is compared to prior  exam of 5/7/2022. There are emphysematous changes of the lungs. There  has been development of patchy bibasilar airspace disease worrisome for  pneumonia. Blunting of the right lateral costophrenic angle is also  suspicious for a small right effusion. The thoracic aorta is ectatic.       Impression:      1.. Emphysematous changes of the lungs. Patchy bibasilar infiltrates are  present.     This report was signed and finalized on 9/24/2024 7:28 PM by Dr. Bill Benitez MD.             I have personally reviewed and interpreted the radiology studies and ECG obtained at time of admission.     Assessment / Plan   Assessment:   Active Hospital Problems    Diagnosis     **Dysphagia      Impression:  Hypoxia requiring increased O2 support  Pneumonia with patchy infiltrates noted on chest x-ray  Dysphagia/choking  Adenocarcinoma of unknown origin    Treatment Plan  Admit to the hospital  O2 support  DuoNebs  Levaquin  IV  Liquid diet  Nutrition consult  Speech consult  ENT consult  Oncology consult  Fall and skin precautions   PT consult    The patient will be admitted to my service here at [facility].  Primary team to take over in the morning    Medical Decision Making  Number and Complexity of problems: 4, complex  Differential Diagnosis: Dehydration    Conditions and Status        Unchanged     MDM Data  External documents reviewed: None  Cardiac tracing (EKG, telemetry) interpretation: None  Radiology interpretation: None  Labs reviewed: Reviewed  Any tests that were considered but not ordered: None     Decision rules/scores evaluated (example [id number], Wells, etc): None     Discussed with: Patient     Care Planning  Shared decision making: Patient and ED staff  Code status and discussions: Full    Disposition  Social Determinants of Health that impact treatment or disposition: None  Estimated length of stay is 2 to 3 days.     I confirmed that the patient's advanced care plan is present, code status is documented, and a surrogate decision maker is listed in the patient's medical record.     The patient's surrogate decision maker is family.     The patient was seen and examined by me on 9/24/2024 at 9.    Electronically signed by Hortencia Taylor DO, 09/24/24, 21:14 CDT.

## 2024-09-26 LAB
ANION GAP SERPL CALCULATED.3IONS-SCNC: 8 MMOL/L (ref 5–15)
BASOPHILS # BLD AUTO: 0.02 10*3/MM3 (ref 0–0.2)
BASOPHILS NFR BLD AUTO: 0.3 % (ref 0–1.5)
BUN SERPL-MCNC: 6 MG/DL (ref 8–23)
BUN/CREAT SERPL: 25 (ref 7–25)
CALCIUM SPEC-SCNC: 8 MG/DL (ref 8.6–10.5)
CHLORIDE SERPL-SCNC: 95 MMOL/L (ref 98–107)
CO2 SERPL-SCNC: 30 MMOL/L (ref 22–29)
CREAT SERPL-MCNC: 0.24 MG/DL (ref 0.57–1)
DEPRECATED RDW RBC AUTO: 45.4 FL (ref 37–54)
EGFRCR SERPLBLD CKD-EPI 2021: 125.2 ML/MIN/1.73
EOSINOPHIL # BLD AUTO: 0.19 10*3/MM3 (ref 0–0.4)
EOSINOPHIL NFR BLD AUTO: 2.4 % (ref 0.3–6.2)
ERYTHROCYTE [DISTWIDTH] IN BLOOD BY AUTOMATED COUNT: 13 % (ref 12.3–15.4)
GLUCOSE SERPL-MCNC: 95 MG/DL (ref 65–99)
HCT VFR BLD AUTO: 31.1 % (ref 34–46.6)
HGB BLD-MCNC: 9.5 G/DL (ref 12–15.9)
IMM GRANULOCYTES # BLD AUTO: 0.03 10*3/MM3 (ref 0–0.05)
IMM GRANULOCYTES NFR BLD AUTO: 0.4 % (ref 0–0.5)
LYMPHOCYTES # BLD AUTO: 0.78 10*3/MM3 (ref 0.7–3.1)
LYMPHOCYTES NFR BLD AUTO: 9.8 % (ref 19.6–45.3)
MAGNESIUM SERPL-MCNC: 1.4 MG/DL (ref 1.6–2.4)
MCH RBC QN AUTO: 29.1 PG (ref 26.6–33)
MCHC RBC AUTO-ENTMCNC: 30.5 G/DL (ref 31.5–35.7)
MCV RBC AUTO: 95.4 FL (ref 79–97)
MONOCYTES # BLD AUTO: 0.22 10*3/MM3 (ref 0.1–0.9)
MONOCYTES NFR BLD AUTO: 2.8 % (ref 5–12)
MRSA DNA SPEC QL NAA+PROBE: NORMAL
NEUTROPHILS NFR BLD AUTO: 6.74 10*3/MM3 (ref 1.7–7)
NEUTROPHILS NFR BLD AUTO: 84.3 % (ref 42.7–76)
PLATELET # BLD AUTO: 114 10*3/MM3 (ref 140–450)
PMV BLD AUTO: 10.5 FL (ref 6–12)
POTASSIUM SERPL-SCNC: 3.7 MMOL/L (ref 3.5–5.2)
RBC # BLD AUTO: 3.26 10*6/MM3 (ref 3.77–5.28)
SODIUM SERPL-SCNC: 133 MMOL/L (ref 136–145)
WBC NRBC COR # BLD AUTO: 7.98 10*3/MM3 (ref 3.4–10.8)

## 2024-09-26 PROCEDURE — 94799 UNLISTED PULMONARY SVC/PX: CPT

## 2024-09-26 PROCEDURE — 92526 ORAL FUNCTION THERAPY: CPT

## 2024-09-26 PROCEDURE — 87641 MR-STAPH DNA AMP PROBE: CPT | Performed by: INTERNAL MEDICINE

## 2024-09-26 PROCEDURE — 97166 OT EVAL MOD COMPLEX 45 MIN: CPT | Performed by: OCCUPATIONAL THERAPIST

## 2024-09-26 PROCEDURE — 83735 ASSAY OF MAGNESIUM: CPT | Performed by: INTERNAL MEDICINE

## 2024-09-26 PROCEDURE — 85025 COMPLETE CBC W/AUTO DIFF WBC: CPT | Performed by: INTERNAL MEDICINE

## 2024-09-26 PROCEDURE — 94664 DEMO&/EVAL PT USE INHALER: CPT

## 2024-09-26 PROCEDURE — 94761 N-INVAS EAR/PLS OXIMETRY MLT: CPT

## 2024-09-26 PROCEDURE — 25010000002 MAGNESIUM SULFATE 2 GM/50ML SOLUTION: Performed by: INTERNAL MEDICINE

## 2024-09-26 PROCEDURE — 25010000002 HYDROMORPHONE PER 4 MG: Performed by: INTERNAL MEDICINE

## 2024-09-26 PROCEDURE — 25010000002 PIPERACILLIN SOD-TAZOBACTAM PER 1 G: Performed by: INTERNAL MEDICINE

## 2024-09-26 PROCEDURE — 80048 BASIC METABOLIC PNL TOTAL CA: CPT | Performed by: INTERNAL MEDICINE

## 2024-09-26 PROCEDURE — 25810000003 LACTATED RINGERS PER 1000 ML: Performed by: INTERNAL MEDICINE

## 2024-09-26 RX ORDER — MAGNESIUM SULFATE HEPTAHYDRATE 40 MG/ML
2 INJECTION, SOLUTION INTRAVENOUS ONCE
Status: COMPLETED | OUTPATIENT
Start: 2024-09-26 | End: 2024-09-26

## 2024-09-26 RX ORDER — HYDROCODONE BITARTRATE AND ACETAMINOPHEN 5; 325 MG/1; MG/1
1 TABLET ORAL EVERY 6 HOURS PRN
Status: DISCONTINUED | OUTPATIENT
Start: 2024-09-26 | End: 2024-09-27

## 2024-09-26 RX ADMIN — HYDROMORPHONE HYDROCHLORIDE 0.5 MG: 1 INJECTION, SOLUTION INTRAMUSCULAR; INTRAVENOUS; SUBCUTANEOUS at 10:34

## 2024-09-26 RX ADMIN — FAMOTIDINE 20 MG: 10 INJECTION INTRAVENOUS at 20:53

## 2024-09-26 RX ADMIN — MAGNESIUM SULFATE HEPTAHYDRATE 2 G: 2 INJECTION, SOLUTION INTRAVENOUS at 14:57

## 2024-09-26 RX ADMIN — HYDROMORPHONE HYDROCHLORIDE 0.5 MG: 1 INJECTION, SOLUTION INTRAMUSCULAR; INTRAVENOUS; SUBCUTANEOUS at 06:27

## 2024-09-26 RX ADMIN — PIPERACILLIN AND TAZOBACTAM 4.5 G: 4; .5 INJECTION, POWDER, FOR SOLUTION INTRAVENOUS at 17:52

## 2024-09-26 RX ADMIN — HYDROMORPHONE HYDROCHLORIDE 0.5 MG: 1 INJECTION, SOLUTION INTRAMUSCULAR; INTRAVENOUS; SUBCUTANEOUS at 03:10

## 2024-09-26 RX ADMIN — HYDROMORPHONE HYDROCHLORIDE 0.5 MG: 1 INJECTION, SOLUTION INTRAMUSCULAR; INTRAVENOUS; SUBCUTANEOUS at 14:56

## 2024-09-26 RX ADMIN — HYDROMORPHONE HYDROCHLORIDE 0.5 MG: 1 INJECTION, SOLUTION INTRAMUSCULAR; INTRAVENOUS; SUBCUTANEOUS at 22:45

## 2024-09-26 RX ADMIN — SODIUM CHLORIDE, POTASSIUM CHLORIDE, SODIUM LACTATE AND CALCIUM CHLORIDE 100 ML/HR: 600; 310; 30; 20 INJECTION, SOLUTION INTRAVENOUS at 22:45

## 2024-09-26 RX ADMIN — IPRATROPIUM BROMIDE AND ALBUTEROL SULFATE 3 ML: 2.5; .5 SOLUTION RESPIRATORY (INHALATION) at 06:10

## 2024-09-26 RX ADMIN — HYDROMORPHONE HYDROCHLORIDE 0.5 MG: 1 INJECTION, SOLUTION INTRAMUSCULAR; INTRAVENOUS; SUBCUTANEOUS at 19:41

## 2024-09-26 RX ADMIN — PIPERACILLIN AND TAZOBACTAM 4.5 G: 4; .5 INJECTION, POWDER, FOR SOLUTION INTRAVENOUS at 10:34

## 2024-09-26 RX ADMIN — IPRATROPIUM BROMIDE AND ALBUTEROL SULFATE 3 ML: 2.5; .5 SOLUTION RESPIRATORY (INHALATION) at 10:07

## 2024-09-26 RX ADMIN — Medication 10 ML: at 20:53

## 2024-09-26 RX ADMIN — FAMOTIDINE 20 MG: 10 INJECTION INTRAVENOUS at 10:34

## 2024-09-26 RX ADMIN — PIPERACILLIN AND TAZOBACTAM 4.5 G: 4; .5 INJECTION, POWDER, FOR SOLUTION INTRAVENOUS at 22:45

## 2024-09-26 RX ADMIN — IPRATROPIUM BROMIDE AND ALBUTEROL SULFATE 3 ML: 2.5; .5 SOLUTION RESPIRATORY (INHALATION) at 18:54

## 2024-09-26 RX ADMIN — IPRATROPIUM BROMIDE AND ALBUTEROL SULFATE 3 ML: 2.5; .5 SOLUTION RESPIRATORY (INHALATION) at 14:29

## 2024-09-26 RX ADMIN — HYDROCODONE BITARTRATE AND ACETAMINOPHEN 1 TABLET: 5; 325 TABLET ORAL at 13:20

## 2024-09-26 RX ADMIN — Medication 10 ML: at 10:38

## 2024-09-26 NOTE — THERAPY EVALUATION
Patient Name: Arminda Jiménez  : 1960    MRN: [id number]                              Today's Date: 2024       Admit Date: 2024    Visit Dx:     ICD-10-CM ICD-9-CM   1. Dysphagia, unspecified type  R13.10 787.20   2. Unable to care for self  Z78.9 V49.89   3. Adenocarcinoma  C80.1 199.1   4. Elevated TSH  R79.89 794.5   5. Pulmonary emphysema, unspecified emphysema type  J43.9 492.8   6. Patchy atelectasis  J98.11 518.0     Patient Active Problem List   Diagnosis    Foraminal stenosis of lumbosacral region    Chronic back pain    Radiculopathy    Lumbar stenosis    Panlobular emphysema    Gastroesophageal reflux disease without esophagitis    Constipation due to opioid therapy    PAD (peripheral artery disease)    Primary osteoarthritis involving multiple joints    Recurrent major depressive disorder    Overweight    Personal history of nicotine dependence    Adenocarcinoma of unknown origin    Environmental allergies    Primary hypertension    Senile osteoporosis    Open left ankle fracture    Fall    Postoperative anemia due to acute blood loss    Acute foot pain, left    Impaired gait and mobility due to left ankle fracture    Chest pain, unspecified type    Overweight (BMI 25.0-29.9)    Displaced fracture of medial malleolus of left tibia, subsequent encounter for closed fracture with nonunion    Dysphagia    Lymphadenopathy    Vocal cord weakness     Past Medical History:   Diagnosis Date    Adenocarcinoma of unknown origin 2022    Anxiety     Arthritis     Asthma     Cancer     right lung,abd,and pelvic area    Chronic back pain     COPD (chronic obstructive pulmonary disease)     Depression     Environmental allergies 2022    Facet arthropathy     Foraminal stenosis of lumbosacral region     GERD (gastroesophageal reflux disease)     H/O degenerative disc disease     History of blood transfusion     PVD (peripheral vascular disease)     Radiculopathy     Thyroid nodule      Past  Surgical History:   Procedure Laterality Date    ANKLE ARTHROPLASTY Left 8/29/2023    Procedure: TOTAL ANKLE ARTHROPLASTY WITH INBONE IMPLANT, GASTROCNEMIUS RECESSION, REMOVAL OF HARDWARE DEEP. LEFT ANKLE;  Surgeon: Bartolome Olguin DPM;  Location:  [facility];  Service: Podiatry;  Laterality: Left;    ANKLE OPEN REDUCTION INTERNAL FIXATION Left 01/05/2023    Procedure: REPAIR OF NONUNION MEDIAL MALLEOLUS, REPAIR NONUNION FIBULA, BONE GRAFT HARVEST, LEFT ANKLE;  Surgeon: Bartolome Olguin DPM;  Location:  [facility];  Service: Podiatry;  Laterality: Left;    ANKLE SURGERY Left     ANTERIOR LUMBAR EXPOSURE N/A 05/07/2018    Procedure: ANTERIOR LUMBAR EXPOSURE;  Surgeon: Chris Haley DO;  Location:  [facility];  Service: Vascular    APPENDECTOMY      CARPAL TUNNEL RELEASE Bilateral     INCISION AND DRAINAGE OF WOUND Left 05/08/2022    Procedure: Incision and Drainage Left Open Ankle Fracture, Open Reduction Internal Fixation Bimalleolar Ankle Fracture;  Surgeon: Leland Aranda MD;  Location:  [facility];  Service: Orthopedics;  Laterality: Left;    LUMBAR FUSION N/A 05/07/2018    Procedure: ANTERIOR DECOMPRESSION, ANTERIOR LUMBAR INTERBODY FUSION WITH INSTRUMENTATION L5-S1;  Surgeon: LUIS CARLOS Zheng MD;  Location:  [facility];  Service: Orthopedic Spine    LUMBAR FUSION Right 01/02/2019    Procedure: RIGHT LATERAL LUMBAR INTERBODY FUSION L3-5;  Surgeon: LUIS CARLOS Zheng MD;  Location:  [facility];  Service: Orthopedic Spine    LUMBAR LAMINECTOMY WITH FUSION N/A 05/09/2018    Procedure: POSTERIOR SPINAL FUSION WITH INSTRUMENTATION L5-S1;  Surgeon: LUIS CARLOS Zheng MD;  Location:  [facility];  Service: Orthopedic Spine    LUMBAR LAMINECTOMY WITH FUSION Right 01/04/2019    Procedure: POSTERIOR SPINAL FUSION WITH INSTRUMENTATION RIGHT L3-S1;  Surgeon: LUIS CARLOS Zheng MD;  Location:  [facility];  Service: Orthopedic Spine    OTHER SURGICAL HISTORY Bilateral 2017    Stent implants [facility] [place]    PAIN PUMP  INSERTION/REVISION Right     FENTANYL    WRIST FUSION Right       General Information       [facility] Name 09/26/24 1113          OT Time and Intention    Document Type evaluation  -     Mode of Treatment occupational therapy  -[facility] Name 09/26/24 [facility]          General Information    Patient Profile Reviewed yes  -J     Prior Level of Function independent:;transfer;bed mobility;min assist:;ADL's  w/c for mobility at baseline.  -J     Existing Precautions/Restrictions fall;oxygen therapy device and L/min  -     Barriers to Rehab medically complex;previous functional deficit  -[facility] Name 09/26/24 1113          Living Environment    People in Home child(leno), adult  -[facility] Name 09/26/24 [facility]          Cognition    Orientation Status (Cognition) oriented x 4  -JJ       Row Name 09/26/24 1113          Safety Issues, Functional Mobility    Impairments Affecting Function (Mobility) balance;endurance/activity tolerance;strength;pain  -               User Key  (r) = Recorded By, (t) = Taken By, (c) = Cosigned By      Initials Name Provider Type     Betsy Renee, OTR/L, CSRS Occupational Therapist                     Mobility/ADL's       [facility] 09/26/24 [facility]          Bed Mobility    Bed Mobility supine-sit  -JJ       Row Name 09/26/24 1113          Transfers    Transfers --  -J     Comment, (Transfers) declined attempt due to pain  -JJ       Row Name 09/26/24 Replaced by [facility]          Functional Mobility    Functional Mobility- Safety Issues other (see comments)  -     Functional Mobility- Comment declined due to pain.  -JJ       Row Name 09/26/24 [facility]          Activities of Daily Living    BADL Assessment/Intervention lower body dressing  -JJ       Row Name 09/26/24 [facility]          Lower Body Dressing Assessment/Training    Position (Lower Body Dressing) --  -JJ     Comment, (Lower Body Dressing) attempted level of A: Min A  -               User Key  (r) = Recorded By, (t) = Taken By, (c) = Cosigned By       Initials Name Provider Type    LIBRADOBetsy Vidal OTR/L, CSRS Occupational Therapist                   Obj/Interventions       Row Name 09/26/24 1113          Sensory Assessment (Somatosensory)    Sensory Assessment (Somatosensory) UE sensation intact  -JJ       Row Name 09/26/24 1113          Vision Assessment/Intervention    Visual Impairment/Limitations L  -JJ       Row Name 09/26/24 1113          Range of Motion Comprehensive    General Range of Motion bilateral upper extremity ROM L  -JJ       Row Name 09/26/24 1113          Strength Comprehensive (MMT)    Comment, General Manual Muscle Testing (MMT) Assessment B UE strength 4/5  -JJ       Row Name 09/26/24 [facility]          Balance    Balance Assessment sitting static balance;sitting dynamic balance  -     Static Sitting Balance independent  -     Dynamic Sitting Balance standby assist  -     Position, Sitting Balance unsupported;sitting edge of bed  -               User Key  (r) = Recorded By, (t) = Taken By, (c) = Cosigned By      Initials Name Provider Type    LIBRADOBetsy Vidal, OTR/L, CSRS Occupational Therapist                   Goals/Plan       Row Name 09/26/24 [facility]          Bathing Goal 1 (OT)    Activity/Device (Bathing Goal 1, OT) bathing skills, all  -JJ     [place] Level/Cues Needed (Bathing Goal 1, OT) standby assist  -JJ     Time Frame (Bathing Goal 1, OT) long term goal (LTG);by discharge  -JJ     Progress/Outcomes (Bathing Goal 1, OT) new goal  -JJ       Row Name 09/26/24 1113          Dressing Goal 1 (OT)    Activity/Device (Dressing Goal 1, OT) dressing skills, all  -JJ     [place]/Cues Needed (Dressing Goal 1, OT) standby assist  -JJ     Time Frame (Dressing Goal 1, OT) long term goal (LTG);by discharge  -JJ     Progress/Outcome (Dressing Goal 1, OT) new goal  -JJ       Row Name 09/26/24 1113          Toileting Goal 1 (OT)    Activity/Device (Toileting Goal 1, OT) toileting skills, all  -JJ     [place]  "Level/Cues Needed (Toileting Goal 1, OT) standby assist  -J     Time Frame (Toileting Goal 1, OT) long term goal (LTG);by discharge  -J     Progress/Outcome (Toileting Goal 1, OT) new goal  -       Row Name 09/26/24 1113          Therapy Assessment/Plan (OT)    Planned Therapy Interventions (OT) activity tolerance training;adaptive equipment training;BADL retraining;functional balance retraining;transfer/mobility retraining;strengthening exercise;occupation/activity based interventions;patient/caregiver education/training  -               User Key  (r) = Recorded By, (t) = Taken By, (c) = Cosigned By      Initials Name Provider Type    Betsy Rodriguez, OTR/L, CSRS Occupational Therapist                   Clinical Impression       Row Name 09/26/24 1113          Pain Assessment    Additional Documentation Pain Scale: FACES Pre/Post-Treatment (Group)  -       Row Name 09/26/24 1113          Pain Scale: FACES Pre/Post-Treatment    Pain: FACES Scale, Pretreatment 6-->hurts even more  -JJ     Posttreatment Pain Rating 6-->hurts even more  -J     Pain Location upper  -JJ     Pain Location - back;chest  -     Pre/Posttreatment Pain Comment \"lungs\"  -       Row Name 09/26/24 1113          Plan of Care Review    Plan of Care Reviewed With patient  -J     Outcome Evaluation OT eval completed. Pt presents alert and oriented x4, sitting up in bed on 3L O2/NC. She reports at baseline being independent with all bed mobility, sit <> stand t/fs and w/c mobility but required Min A with adls. She was recently d/c'd from [facility] rehab approx 15 days ago but reports increased weakness since then. Today she demonstrates impaired balance, strength, activity tolerance and increased SOA with activity. She was able to bring self to sitting at EOB with SBA. She declined further activity at this time due to her pain. She would benefit from skilled OT services to address these deficits. Recommend d/c home with assist and " HH.  -       Row Name 09/26/24 1113          Therapy Assessment/Plan (OT)    Rehab Potential (OT) good, to achieve stated therapy goals  -     Criteria for Skilled Therapeutic Interventions Met (OT) yes;skilled treatment is necessary  -     Therapy Frequency (OT) 5 times/wk  -     Predicted Duration of Therapy Intervention (OT) 10 days  -       Row Name 09/26/24 1113          Therapy Plan Review/Discharge Plan (OT)    Anticipated Discharge Disposition (OT) home with assist  -       Row Name 09/26/24 1113          Positioning and Restraints    Pre-Treatment Position in bed  -     Post Treatment Position bed  -JJ     In Bed notified nsg;fowlers;call light within reach;encouraged to call for assist;side rails up x3;with family/caregiver  -               User Key  (r) = Recorded By, (t) = Taken By, (c) = Cosigned By      Initials Name Provider Type    Betsy Rodriguez OTR/L, [place]BA[place]ENS Occupational Therapist                   Outcome Measures       Row Name 09/26/24 1113          How much help from another is currently needed...    Putting on and taking off regular lower body clothing? 3  -JJ     Bathing (including washing, rinsing, and drying) 3  -JJ     Toileting (which includes using toilet bed pan or urinal) 3  -JJ     Putting on and taking off regular upper body clothing 3  -JJ     Taking care of personal grooming (such as brushing teeth) 3  -JJ     Eating meals 4  -JJ     AM-PAC 6 Clicks Score (OT) 19  -       Row Name 09/26/24 1113          Functional Assessment    Outcome Measure Options AM-PAC 6 Clicks Daily Activity (OT)  -               User Key  (r) = Recorded By, (t) = Taken By, (c) = Cosigned By      Initials Name Provider Type    Betsy Rodriguez OTR/L, CSRS Occupational Therapist                    Occupational Therapy Education       Title: PT OT SLP Therapies (In Progress)       Topic: Occupational Therapy (In Progress)       Point: ADL training (Done)       Description:    Instruct learner(s) on proper safety adaptation and remediation techniques during self care or transfers.   Instruct in proper use of assistive devices.                  Learning Progress Summary             Patient Acceptance, E, VU by LIBRADO at 9/26/2024 1330    Acceptance, E, VU by  at 9/26/2024 1148                         Point: Home exercise program (Not Started)       Description:   Instruct learner(s) on appropriate technique for monitoring, assisting and/or progressing therapeutic exercises/activities.                  Learner Progress:  Not documented in this visit.              Point: Precautions (Done)       Description:   Instruct learner(s) on prescribed precautions during self-care and functional transfers.                  Learning Progress Summary             Patient Acceptance, E, VU by JOSE at 9/26/2024 1330    Acceptance, E, VU by LIBRADO at 9/26/2024 1148                         Point: Body mechanics (Done)       Description:   Instruct learner(s) on proper positioning and spine alignment during self-care, functional mobility activities and/or exercises.                  Learning Progress Summary             Patient Acceptance, E, VU by  at 9/26/2024 1148                                         User Key       Initials Effective Dates Name Provider Type Discipline     07/11/23 -  Betsy Renee, OTR/L, CSRS Occupational Therapist OT                  OT Recommendation and Plan  Planned Therapy Interventions (OT): activity tolerance training, adaptive equipment training, BADL retraining, functional balance retraining, transfer/mobility retraining, strengthening exercise, occupation/activity based interventions, patient/caregiver education/training  Therapy Frequency (OT): 5 times/wk  Plan of Care Review  Plan of Care Reviewed With: patient  Outcome Evaluation: OT eval completed. Pt presents alert and oriented x4, sitting up in bed on 3L O2/NC. She reports at baseline being independent with all bed  mobility, sit <> stand t/fs and w/c mobility but required Min A with adls. [facility] but reports increased weakness since then. Today she demonstrates impaired balance, strength, activity tolerance and increased SOA with activity. She was able to bring self to sitting at EOB with SBA. She declined further activity at this time due to her pain. She would benefit from skilled OT services to address these deficits. Recommend d/c home with assist and HH.     Time Calculation:         Time Calculation- OT       Row Name 09/26/24 1113             Time Calculation- OT    OT Start Time 1113  Subjective information gathered from [id number]. Objective/mobility porition of eval completed from [id number]  -JJ      OT Stop Time 1140  -JJ      OT Time Calculation (min) 27 min  -JJ      OT Received On 09/26/24  -JJ         Untimed Charges    OT Eval/Re-eval Minutes 47  -JJ         Total Minutes    Untimed Charges Total Minutes 47  -JJ       Total Minutes 47  -JJ                User Key  (r) = Recorded By, (t) = Taken By, (c) = Cosigned By      Initials Name Provider Type    Betsy Rodriguez OTR/L, CSRS Occupational Therapist                  Therapy Charges for Today       Code Description Service Date Service Provider Modifiers Qty    [id number] HC OT EVAL MOD COMPLEXITY 3 9/26/2024 Betsy Renee OTR/L, CSRS GO 1                 BACILIO Lam/L, CSRS  9/26/2024

## 2024-09-26 NOTE — THERAPY TREATMENT NOTE
Acute Care - Speech Language Pathology   Swallow Treatment Note [facility]     Patient Name: Arminda Jiménez  : 1960  MRN: [id number]  Today's Date: 2024               Admit Date: 2024  ST tx completed. Attempted to see pt earlier this morning and pt politely refused. Returned to pt room this afternoon after receiving call from RITCHIE Gould that pt was not being complaint with ST recommendations. Pt had requested to take medications with thin liquids. RN and PCT have provided repeated education on aspiration risk but felt that pt would benefit from further education from SLP. Education provided on results of swallow study performed yesterday, reasoning for diet modification, aspiration risks, and aspiration precautions. Pt expressed understanding and stated that she would be agreeable to taking medications in puree or with honey thick liquids in the future. She did express disinterest in current diet which was confirmed by RN report of poor PO intake this date. Pt expressed no further questions or concerns. ST will continue to follow.     Lizette Morataya MS CCC-SLP 2024 16:03 CDT      Visit Dx:     ICD-10-CM ICD-9-CM   1. Dysphagia, unspecified type  R13.10 787.20   2. Unable to care for self  Z78.9 V49.89   3. Adenocarcinoma  C80.1 199.1   4. Elevated TSH  R79.89 794.5   5. Pulmonary emphysema, unspecified emphysema type  J43.9 492.8   6. Patchy atelectasis  J98.11 518.0     Patient Active Problem List   Diagnosis    Foraminal stenosis of lumbosacral region    Chronic back pain    Radiculopathy    Lumbar stenosis    Panlobular emphysema    Gastroesophageal reflux disease without esophagitis    Constipation due to opioid therapy    PAD (peripheral artery disease)    Primary osteoarthritis involving multiple joints    Recurrent major depressive disorder    Overweight    Personal history of nicotine dependence    Adenocarcinoma of unknown origin    Environmental allergies    Primary hypertension    Senile  osteoporosis    Open left ankle fracture    Fall    Postoperative anemia due to acute blood loss    Acute foot pain, left    Impaired gait and mobility due to left ankle fracture    Chest pain, unspecified type    Overweight (BMI 25.0-29.9)    Displaced fracture of medial malleolus of left tibia, subsequent encounter for closed fracture with nonunion    Dysphagia    Lymphadenopathy    Vocal cord weakness     Past Medical History:   Diagnosis Date    Adenocarcinoma of unknown origin 04/04/2022    Anxiety     Arthritis     Asthma     Cancer     right lung,abd,and pelvic area    Chronic back pain     COPD (chronic obstructive pulmonary disease)     Depression     Environmental allergies 04/11/2022    Facet arthropathy     Foraminal stenosis of lumbosacral region     GERD (gastroesophageal reflux disease)     H/O degenerative disc disease     History of blood transfusion     PVD (peripheral vascular disease)     Radiculopathy     Thyroid nodule      Past Surgical History:   Procedure Laterality Date    ANKLE ARTHROPLASTY Left 8/29/2023    Procedure: TOTAL ANKLE ARTHROPLASTY WITH INBONE IMPLANT, GASTROCNEMIUS RECESSION, REMOVAL OF HARDWARE DEEP. LEFT ANKLE;  Surgeon: Bartolome Olguin DPM;  Location:  [facility];  Service: Podiatry;  Laterality: Left;    ANKLE OPEN REDUCTION INTERNAL FIXATION Left 01/05/2023    Procedure: REPAIR OF NONUNION MEDIAL MALLEOLUS, REPAIR NONUNION FIBULA, BONE GRAFT HARVEST, LEFT ANKLE;  Surgeon: Bartolome Olguin DPM;  Location:  [facility];  Service: Podiatry;  Laterality: Left;    ANKLE SURGERY Left     ANTERIOR LUMBAR EXPOSURE N/A 05/07/2018    Procedure: ANTERIOR LUMBAR EXPOSURE;  Surgeon: Chris Haley DO;  Location:  [facility];  Service: Vascular    APPENDECTOMY      CARPAL TUNNEL RELEASE Bilateral     INCISION AND DRAINAGE OF WOUND Left 05/08/2022    Procedure: Incision and Drainage Left Open Ankle Fracture, Open Reduction Internal Fixation Bimalleolar Ankle Fracture;  Surgeon: Manoj  MD Leland;  Location:  [facility];  Service: Orthopedics;  Laterality: Left;    LUMBAR FUSION N/A 05/07/2018    Procedure: ANTERIOR DECOMPRESSION, ANTERIOR LUMBAR INTERBODY FUSION WITH INSTRUMENTATION L5-S1;  Surgeon: LUIS CARLOS Zheng MD;  Location:  [facility];  Service: Orthopedic Spine    LUMBAR FUSION Right 01/02/2019    Procedure: RIGHT LATERAL LUMBAR INTERBODY FUSION L3-5;  Surgeon: LUIS CARLOS Zheng MD;  Location:  [facility];  Service: Orthopedic Spine    LUMBAR LAMINECTOMY WITH FUSION N/A 05/09/2018    Procedure: POSTERIOR SPINAL FUSION WITH INSTRUMENTATION L5-S1;  Surgeon: LUIS CARLOS Zheng MD;  Location:  [facility];  Service: Orthopedic Spine    LUMBAR LAMINECTOMY WITH FUSION Right 01/04/2019    Procedure: POSTERIOR SPINAL FUSION WITH INSTRUMENTATION RIGHT L3-S1;  Surgeon: LUIS CARLOS Zheng MD;  Location:  [facility];  Service: Orthopedic Spine    OTHER SURGICAL HISTORY Bilateral 2017    Stent implants [facility]    PAIN PUMP INSERTION/REVISION Right     FENTANYL    WRIST FUSION Right        SLP Recommendation and Plan                                               Daily Summary of Progress (SLP): progress towards functional goals is fair (09/26/24 1540)               Treatment Assessment (SLP): continued (09/26/24 1540)  Treatment Assessment Comments (SLP): see note (09/26/24 1540)  Plan for Continued Treatment (SLP): continue treatment per plan of care (09/26/24 1540)         Progress: no change  Outcome Evaluation: see note      SWALLOW EVALUATION (Last 72 Hours)       SLP Adult Swallow Evaluation       Row Name 09/26/24 1540 09/25/24 1017 09/25/24 0811             Rehab Evaluation    Document Type therapy note (daily note)  -JR evaluation  -MB --      Subjective Information no complaints  -JR no complaints  -MB --      Patient Observations alert;cooperative  -JR alert;cooperative  -MB --      Patient/Family/Caregiver Comments/Observations no family present  -JR No family present  -MB --       Patient Effort good  -JR -- --      Symptoms Noted During/After Treatment none  -JR -- --         General Information    Patient Profile Reviewed yes  -JR yes  -MB --      Pertinent History Of Current Problem -- Dysphagia, Adenocarcinoma, Pulmonary emphysema, unspecified emphysema type, vocal cord weakness, asthma, weight loss, Patchy atelectasis, cancer-related fatigue, COPD, GERD, PVD, thryoid nodule  -MB Dysphagia, Adenocarcinoma, Pulmonary emphysema, unspecified emphysema type, vocal cord weakness, asthma, weight loss, Patchy atelectasis, cancer-related fatigue, COPD, GERD, PVD, thryoid nodule  -MB      Current Method of Nutrition -- thin liquids;clear liquids  -MB thin liquids;clear liquids  -MB      Precautions/Limitations, Vision -- WFL;for purposes of eval  -MB --      Precautions/Limitations, Hearing -- WFL;for purposes of eval  -MB --      Prior Level of Function-Communication -- other (see comments)  Voice impairment  -MB other (see comments)  Voice impairment  -MB      Prior Level of Function-Swallowing -- no diet consistency restrictions  -MB no diet consistency restrictions  -MB      Plans/Goals Discussed with -- patient  -MB --      Barriers to Rehab -- medically complex  -MB --         Pain    Additional Documentation Pain Scale: FACES Pre/Post-Treatment (Group)  - Pain Scale: FACES Pre/Post-Treatment (Group)  -MB --         Pain Scale: FACES Pre/Post-Treatment    Pain: FACES Scale, Pretreatment 0-->no hurt  -JR 0-->no hurt  -MB --      Posttreatment Pain Rating 0-->no hurt  -JR -- --         Oral Motor Structure and Function    Dentition Assessment -- missing teeth  -MB missing teeth  -MB      Secretion Management -- WNL/WFL  -MB WNL/WFL  -MB      Mucosal Quality -- moist, healthy  -MB moist, healthy  -MB         Oral Musculature and Cranial Nerve Assessment    Oral Motor General Assessment -- lingual impairment;vocal impairment  -MB --      Lingual Impairment, Detail. Cranial Nerves IX, XII  (Glossopharyngeal and Hypoglossal) -- reduced strength;reduced lingual ROM  -MB --      Vocal Impairment, Detail. Cranial Nerve X (Vagus) -- vocal quality abnormality (see comments);impaired throat clear/cough (see comments)  -MB --         Clinical Swallow Eval    Oral Prep Phase -- -- impaired  -MB (r) KG (t) MB (c)      Oral Transit -- -- impaired  -MB (r) KG (t) MB (c)      Oral Residue -- -- WFL  -MB (r) KG (t) MB (c)      Pharyngeal Phase -- -- suspected pharyngeal impairment  -MB (r) KG (t) MB (c)      Esophageal Phase -- -- unremarkable  -MB (r) KG (t) MB (c)      Clinical Swallow Evaluation Summary -- -- see note  -MB (r) KG (t) MB (c)         Oral Prep Concerns    Oral Prep Concerns -- -- prolonged mastication  -MB (r) KG (t) MB (c)      Prolonged Mastication -- -- all consistencies  -MB (r) KG (t) MB (c)      Oral Prep Concerns, Comment -- -- see note  -MB (r) KG (t) MB (c)         Oral Transit Concerns    Oral Transit Concerns -- -- increased oral transit time  -MB (r) KG (t) MB (c)      Increased Oral Transit Time -- -- all consistencies  -MB (r) KG (t) MB (c)      Oral Transit Concerns, Comment -- -- see note  -MB (r) KG (t) MB (c)         Pharyngeal Phase Concerns    Pharyngeal Phase Concerns -- -- wet vocal quality;cough;acute vocal quality changes;multiple swallows  -MB (r) KG (t) MB (c)      Acute Vocal Quality Changes -- -- dysphonia  -MB (r) KG (t) MB (c)      Multiple Swallows -- -- all consistencies  -MB (r) KG (t) MB (c)      Cough -- -- honey;nectar;thin  -MB (r) KG (t) MB (c)      Pharyngeal Phase Concerns, Comment -- -- see note  -MB (r) KG (t) MB (c)         MBS/VFSS    Utensils Used -- spoon;straw  -MB --      Consistencies Trialed -- soft to chew textures;thin liquids;nectar/syrup-thick liquids;honey-thick liquids;pudding thick  -MB --         MBS/VFSS Interpretation    Oral Prep Phase -- impaired oral phase of swallowing  -MB --      Oral Transit Phase -- impaired  -MB --      Oral  Residue -- impaired  -MB --      VFSS Summary -- See note  -MB --         Oral Preparatory Phase    Oral Preparatory Phase -- poor rotary chew  -MB --         Oral Transit Phase    Impaired Oral Transit Phase -- increased A-P transit time;tongue pumping;premature spillage of liquids into pharynx  -MB --         Oral Residue    Impaired Oral Residue -- diffuse residue throughout oral cavity  -MB --         Initiation of Pharyngeal Swallow    Initiation of Pharyngeal Swallow -- bolus in valleculae;bolus in pyriform sinuses  -MB --      Pharyngeal Phase -- impaired pharyngeal phase of swallowing  -MB --      Penetration During the Swallow -- thin liquids;nectar-thick liquids  -MB --      Aspiration During the Swallow -- thin liquids;nectar-thick liquids  -MB --      Depth of Penetration -- deep  -MB --      Response to Penetration -- Yes  -MB --      Responsed to penetration with -- cough;non-effective  -MB --      Response to Aspiration -- Yes  -MB --      Responded to aspiration with -- cough;non-effective  -MB --      Pharyngeal Residue -- all consistencies tested  -MB --      Response to Residue -- partial residue clearance  -MB --      Attempted Compensatory Maneuvers -- chin tuck  -MB --      Response to Attempted Compensatory Maneuvers -- did not prevent penetration;did not prevent aspiration  -MB --         SLP Evaluation Clinical Impression    SLP Swallowing Diagnosis -- mild;oral dysphagia;severe;pharyngeal dysphagia  -MB R/O pharyngeal dysphagia  -MB (r) KG (t) MB (c)      Functional Impact -- risk of aspiration/pneumonia;risk of malnutrition;risk of dehydration  -MB risk of aspiration/pneumonia;risk of malnutrition;risk of dehydration  -MB (r) KG (t) MB (c)      Rehab Potential/Prognosis, Swallowing -- adequate, monitor progress closely  -MB adequate, monitor progress closely  -MB (r) KG (t) MB (c)      Swallow Criteria for Skilled Therapeutic Interventions Met -- demonstrates skilled criteria  -MB --          SLP Treatment Clinical Impressions    Treatment Assessment (SLP) continued  -JR -- --      Treatment Assessment Comments (SLP) see note  -JR -- --      Daily Summary of Progress (SLP) progress towards functional goals is fair  -JR -- --      Barriers to Overall Progress (SLP) Resistant to information  -JR -- --      Plan for Continued Treatment (SLP) continue treatment per plan of care  - -- --      Care Plan Review evaluation/treatment results reviewed;care plan/treatment goals reviewed;risks/benefits reviewed;current/potential barriers reviewed  -JR -- --         Recommendations    Therapy Frequency (Swallow) -- at least;3 days per week  -MB --      Predicted Duration Therapy Intervention (Days) -- until discharge  -MB --      SLP Diet Recommendation -- puree;honey thick liquids;ice chips between meals after oral care, with supervision  -MB clear liquid diet;thin liquids  until MBS completed  -MB (r) KG (t) MB (c)      Recommended Diagnostics -- -- VFSS (MBS)  -MB (r) KG (t) MB (c)      Recommended Precautions and Strategies -- upright posture during/after eating;small bites of food and sips of liquid;alternate between small bites of food and sips of liquid;general aspiration precautions  -MB upright posture during/after eating;small bites of food and sips of liquid;general aspiration precautions;alternate between small bites of food and sips of liquid  -MB (r) KG (t) MB (c)      Oral Care Recommendations -- Oral Care BID/PRN  -MB Oral Care BID/PRN  -MB (r) KG (t) MB (c)      SLP Rec. for Method of Medication Administration -- meds whole;meds crushed;with puree  -MB meds whole;with puree  -MB (r) KG (t) MB (c)      Monitor for Signs of Aspiration -- yes;cough;gurgly voice;throat clearing;notify SLP if any concerns  -MB yes;notify SLP if any concerns;cough;gurgly voice;throat clearing;pneumonia  -MB (r) KG (t) MB (c)      Anticipated Discharge Disposition (SLP) -- unknown  -MB unknown  -MB (r) KG (t) MB (c)       Patient/Family Concerns, Anticipated Discharge Disposition (SLP) -- -- no family present  -MB (r) KG (t) MB (c)         Swallow Goals (SLP)    Swallow LTGs Patient will demonstrate functional swallow for  -JR -- --      Swallow STGs diet tolerance goal selection (SLP);swallow management recall goal selection (SLP)  -JR -- --      Diet Tolerance Goal Selection (SLP) Patient will tolerate trials of  -JR -- --      Swallow Management Recall Goal Selection (SLP) swallow management recall, SLP goal 1  -JR -- --         (LTG) Patient will demonstrate functional swallow for    Diet Texture (Demonstrate functional swallow) pureed textures  -JR -- --      Liquid viscosity (Demonstrate functional swallow) honey/  moderately thick liquids  -JR -- --      Henry (Demonstrate functional swallow) independently (over 90% accuracy)  -JR -- --      Time Frame (Demonstrate functional swallow) by discharge  -JR -- --      Progress/Outcomes (Demonstrate functional swallow) new goal  -JR -- --         (STG) Patient will tolerate trials of    Consistencies Trialed (Tolerate trials) pureed textures;honey/ moderately thick liquids  -JR -- --      Desired Outcome (Tolerate trials) without signs/symptoms of aspiration  -JR -- --      Henry (Tolerate trials) independently (over 90% accuracy)  -JR -- --      Progress/Outcomes (Tolerate trials) new goal  -JR -- --         (STG) Swallow Management Recall Goal 1 (SLP)    Activity (Swallow Management Recall Goal 1, SLP) recall of;aspiration precautions;oral care recommendations;safe diet level/texture;safe liquid viscosity  -JR -- --      Henry/Accuracy (Swallow Management Recall Goal 1, SLP) independently (over 90% accuracy)  -JR -- --      Time Frame (Swallow Management Recall Goal 1, SLP) by discharge  -JR -- --      Progress/Outcomes (Swallow Management Recall Goal 1, SLP) new goal  -JR -- --                User Key  (r) = Recorded By, (t) = Taken By, (c) = Cosigned By       Initials Name Effective Dates    Soheila Andino, CCC-SLP 02/03/23 -     Lizette Lund MS CCC-SLP 08/22/23 -     Ernesto Cagle, Speech Therapy Student 08/12/24 -                     EDUCATION  The patient has been educated in the following areas:   Dysphagia (Swallowing Impairment).        SLP GOALS       Row Name 09/26/24 1540             (LTG) Patient will demonstrate functional swallow for    Diet Texture (Demonstrate functional swallow) pureed textures  -JR      Liquid viscosity (Demonstrate functional swallow) honey/  moderately thick liquids  -JR      Cable (Demonstrate functional swallow) independently (over 90% accuracy)  -JR      Time Frame (Demonstrate functional swallow) by discharge  -JR      Progress/Outcomes (Demonstrate functional swallow) new goal  -JR         (STG) Patient will tolerate trials of    Consistencies Trialed (Tolerate trials) pureed textures;honey/ moderately thick liquids  -JR      Desired Outcome (Tolerate trials) without signs/symptoms of aspiration  -JR      Cable (Tolerate trials) independently (over 90% accuracy)  -JR      Progress/Outcomes (Tolerate trials) new goal  -JR         (STG) Swallow Management Recall Goal 1 (SLP)    Activity (Swallow Management Recall Goal 1, SLP) recall of;aspiration precautions;oral care recommendations;safe diet level/texture;safe liquid viscosity  -JR      Cable/Accuracy (Swallow Management Recall Goal 1, SLP) independently (over 90% accuracy)  -JR      Time Frame (Swallow Management Recall Goal 1, SLP) by discharge  -JR      Progress/Outcomes (Swallow Management Recall Goal 1, SLP) new goal  -JR                User Key  (r) = Recorded By, (t) = Taken By, (c) = Cosigned By      Initials Name Provider Type    Lizette Lund MS CCC-SLP Speech and Language Pathologist                         Time Calculation:    Time Calculation- SLP       Row Name 09/26/24 1609             Time Calculation- SLP    SLP Start Time  1540  -      SLP Stop Time 1618  -      SLP Time Calculation (min) 38 min  -JR      SLP Received On 09/26/24  -JR         Untimed Charges    09010-PB Treatment Swallow Minutes 38  -JR         Total Minutes    Untimed Charges Total Minutes 38  -JR       Total Minutes 38  -JR                User Key  (r) = Recorded By, (t) = Taken By, (c) = Cosigned By      Initials Name Provider Type    Lizette Lund MS CCC-SLP Speech and Language Pathologist                    Therapy Charges for Today       Code Description Service Date Service Provider Modifiers Qty    [id number]  ST TREATMENT SWALLOW 3 9/26/2024 Lizette Morataya MS CCC-SLP GN 1                 Lizette Morataya MS CCC-SLP  9/26/2024

## 2024-09-26 NOTE — PLAN OF CARE
Goal Outcome Evaluation:  Plan of Care Reviewed With: patient        Progress: no change  Outcome Evaluation: Pt remains A&Ox4 this shift. VSS on 3L per NC. Several c/o pain this shift, medicated per MAR. PIV to right AC remains in place, CDI and infusing.  remains in place. Pt w/c bound baseline. PT/OT ordered. Voiding per bedpan, several BMs this shift. Pt started on pureed diet with honey thick liquids. Call light within reach and safety maintained.

## 2024-09-26 NOTE — PROGRESS NOTES
Progress Note    Patient name: Arminda Jiménez  Patient : 1960  VISIT # [id number]  MR #[id number]  Room:     Subjective   Symptomatically stable.  No new complaints this morning.    CBC today, 2024 has a WBC of 7.98, hemoglobin 9.5 with a platelet count of 114,000  Anticipating excisional biopsy, left neck tomorrow, 2024 with Dr. 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  Dorcas was found to have stage IV adenocarcinoma of unknown primary in .  She was treated successfully with 4 courses of carboplatin Gemzar obtaining a complete remission that was sustained for 14 years.  Dorcas experienced a heel fracture 2 years ago.  After that event she has had chronic abdominal pain and has lost 50 pounds.  25 of the 50 pounds were lost over the course of the previous month.  Workup has documented widespread metastatic disease with mediastinal, retroperitoneal, gastrohepatic as well as left neck adenopathy.  An FNA of the left neck performed on 2024 reported malignant cells present consistent with metastatic carcinoma.  Further tissue was unavailable for molecular studies and further special stains.   Due to delays in further workup, Dr. Khan felt the patient needed to get on with his treatment and rechallenged her with carboplatin and Gemzar cycle #1 on 2024.   She has been referred to Dr. Marek Roberts with ENT at [facility] to obtain an excisional biopsy to be able to get tissue for molecular studies that will include tissue of origin, NGS, IHC studies etc.   Dorcas was seen by Dr. Marek Roberts who recommended that she be evaluated in the ED at [facility] for admission due to difficulty swallowing getting choked on her food, pronounced weight loss and dehydration anticipating an open excisional biopsy on Friday, 2024.  Medical oncology consultation requested  in continuity of care.           Detailed history copied from Dr. Khan's office note 9/20/2024     The patient is a pleasant 64 years old female who has a history of stage IV carcinoma of unknown primary site diagnosed in 2010 status post 4 cycles of carboplatin gemcitabine with complete response.  She has been in remission for 14 years.  More recently, she was found to have widespread metastatic disease with mediastinal adenopathy, retroperitoneal adenopathy, gastrohepatic mass and neck adenopathy.  She underwent ultrasound-guided FNA biopsy.  This result only in a scant amount of tissue for analysis.  She was then sent to ENT at [facility] for excisional biopsy.  Unfortunate, the patient missed appointment.  The patient was then sent to ENT at [facility].  ENT at [facility] referred the patient to [facility], [facility] for excisional biopsy.     Diagnosis  History of carcinoma no primary site, 2010  Liver lesions  Gastrohepatic space mass  Mediastinal adenopathy consistent cycles normal  Metastatic carcinoma, left neck lymph node, Sept 2024     Disease target  Mediastinal adenopathy  Hepatic lobe lesions  Pancreatic body/tail hypodense mass in the gastrohepatic ligament     Treatment summary  9/20/24 Initiate Carbo AUC 4 D1, Gemzar 800mg D1, D8 every 21 days. Treatment consent signed.          Cancer history  Arminda Jiménez was first seen by me on 9/5/2024.  I was consulted for findings of a gastric mass during patient was positioned with hospital.  The patient has a history of lung cancer, COPD.  She was a direct admit from her PCP office.  Patient has a 20 pounds weight loss.  She also had complained of dysphagia, nausea, dizziness decreased appetite.  The patient reports that she has a history of stage IV lung cancer diagnosed 10 years ago at [facility].  She received chemotherapy and stay in remission.  She tells me that she did not have a biopsy performed at that time.  Apparently, review of medical record  showed that she had a carcinoma of unknown primary site diagnosed October 2010.  She received 2 cycles of carboplatin and paclitaxel.  She had a reaction to Taxol during cycle #2.  She was switched to carboplatin gemcitabine, completed March 2019.  Sites of disease in the past involve the pericardial, pleural, mediastinal, and cervical involvement.   7/30/24 CT chest: Extensive necrotic mediastinal and bilateral hilar lymphadenopathy consistent with metastatic disease. Lymph node in the right right paratracheal lymph node measures 1 cm short axis axial image 34 at the thoracic inlet.  AP window lymph node measures 1.2 cm short axis on axial image 71 with additional 1 cm AP window lymph node axial image 76.  Precarinal lymph node measures 1.1 cm on axial image 85.  Partially necrotic anterior mediastinal lymph node measures 1.4 cm short axis image 88 with similar appearing anterior mediastinal lymph node measuring 1.3 cm short axis image 69.  A partially necrotic subcarinal lymph node measures 1.8 cm short axis on axial image 96.  Right hilar lymph node measures 1.1 cm short axis image 95.  Partially necrotic and calcified left hilar lymph node measures 1.4 cm short axis image 118.  Left hilar lymph node measures 1.1 cm short axis on axial image 96. These lymph nodes are new since 2022. There is a 2.2 x 1.5 cm nodule abutting the left heart border axial image 140, which is either within the pericardial region were within the left lung parenchyma.  There is a spiculated 0.8 x 0.5 x 0.8 cm left upper lobe nodule axial image 89 and coronal  image 31 which is also new.  Probable focal bronchiectasis left upper lobe on axial image 76, less likely cavitary nodule.  0.3 cm left lower lobe nodule image 151 which may be endobronchial.  Question a 0.6 meters left lower lobe nodule along the diaphragm axial image 158.  Subpleural 0.4 cm right middle lobe nodule image 165.  Bilateral bronchial thickening some areas of  subsegmental atelectasis noted in both lobes.  Moderate emphysema.  There is no pleural effusion or pneumothorax. Centrally low density gastrohepatic ligament mass measures 3.3 x 3.2 cm axial image 193 with some adjacent wall thickening suggested in the proximal stomach.   7/30/24 CT abdomen/pelvis: Marked gastric wall thickening proximal stomach and gastric body; neoplasm is suspected, Correlation with symptomatology and EGD advised, Cystic structure in the gastrohepatic ligament measuring up to 4.9 cm, may represent a necrotic lymph node or mass less likely pancreatic suggestive. Correlation with PET scan and/or MRI advised.    08/12/24 CT soft tissue neck:  Mild asymmetry of the vocal cords, secondary to the vocal cord paralysis.  No visible nodule or mass of the larynx, Mild necrotic adenopathy of the right neck, consistent with metastatic disease, Since the prior CT chest from 07/30/2024, grossly stable right paratracheal necrotic node at level of the thoracic inlet, also consistent with metastatic disease.   9/5/2024-upper EGD, GI-gastric bezoar to explain the CT scan of the abdomen findings.  No gastric tumor.  No active ulcer disease to explain patient's nausea vomiting.  Probable gastroparesis.  9/5/2024-CT chest, [facility]:  There is a hypodense nodule in the left thyroid lobe measuring 1.2 cm.  A right tracheoesophageal lymph node is 1.9 cm in short axis on axial image number 16 with interval increase size from 1.2 cm in short axis when remeasured.  A right hilar lymph node is 1.1 cm in short axis without change.  A subcarinal lymph node is partially calcified and measures 2.5 cm in short axis without change.  This has low attenuation.  An additional right hilar lymph node is 1.5 cm in short axis and is probably unchanged.  A left hilar lymph node is 1 cm in short axis without change.  An AP window lymph node is at least 2.3 cm and has increased in size.  Anterior mediastinal lymph node is now 1.6 cm in short  axis, previously 1.3 cm.  A solid nodule in the lingula abuts the free wall of the left ventricle and measures 2.9 x 1.8 cm, previously 2.4 x 1.6 cm when remeasured. There is a small cavitary nodule in the left upper lobe measuring a cavitary nodule in the left upper lobe on series 604 image number 23  is unchanged measuring 0.6 cm. A solid spiculated nodule in the left upper lobe is probably overall unchanged measuring 0.8 cm x 0.7 cm on image number 26.  On there is a new solid juxta fissural nodule in the right middle lobe measuring 0.4 cm on image number 34.  There is an enlarging juxta fissural nodule in the right middle lobe measuring 0.3 cm image number 35.  The small to moderate pleural effusion layers dependently.  No left pleural fluid.  Bibasilar infiltrates are suggested. There is a large hypodense mass arising from the pancreatic body/tail extending to the gastrohepatic ligament measuring up to 5.4 x 4.1 cm.  This has increased in size.  There are multiple new hypodense hepatic lesions.    9/6/2024-CT thoracic and lumbar spine, [facility]: Multiple chronic thoracic compression fractures, diffuse degenerative disc disease.  Postop changes of the lumbar spine.  Loosening of the right L3 transpedicular screw.  Remaining hardware appears well-positioned.  Rim-enhancing complex cystic lesion in the gastrohepatic space.  9/9/2024-FNA biopsy by radiology: Consistent metastatic carcinoma.  However, further characterization of tumor could not be performed due to scant material.  9/10/2024-CT abdomen pelvis with contrast, [facility]: Interval development of mild to moderate dependent atelectasis of both lower  lobes and mild patchy ground-glass and consolidation of the lingula and to a lesser extent the right middle lobe.  3.1 cm left epicardial nodule, increased in size from 2.5 cm.  Interval development of multiple ill-defined hypodense liver masses.  The largest, of the left lobe near the IVC measures 3.3 cm.  Stable mild  prominence of the intrahepatic biliary tree and mild distension of the common duct, measuring up to 10 mm in diameter.  No visible obstructing stone or mass.  Cholecystectomy, again noted.  Interval increase in size of the previously seen mass between the liver left lobe, stomach, aorta, and superior aspect of the pancreatic body.  Measures 6.0 x 4.5 cm cross section, increased from 3.5 x 3.3 cm.  Inferiorly, the mass abuts and blends in per separately of the pancreas.  Calcified granulomas of the spleen, again noted.  The adrenal glands have a normal appearance.  The kidneys enhance symmetrically.  0.9 cm cyst of the left kidney, again noted.  No ureteral stones or hydronephrosis.  The uterus and ovaries have a normal appearance.  No evidence of bowel obstruction.  Moderate amount of stool in the colon, similar to before.  Small fat and fluid containing right inguinal hernia, stable.  No free air.  Interval development of trace fluid in the pelvis.  Electronic device in subcutaneous fat of the right flank, with catheter entering the spinal canal, again noted.  Bone window images show no significant lytic or sclerotic bone lesions.  Surgical fixation of the lumbar spine, again noted.  Chronic mild compression fracture of the L1 superior endplate, stable.  Impression:  Interval increase in size of the mass of the epigastric region, most consistent with a malignant process, potentially metastatic adenopathy or a pancreatic mass.  Interval development of multiple hepatic metastases. 3.  3.1 cm left epicardial nodule, increased in size, suspicious for metastatic adenopathy or metastatic pleural disease.   Interval development of small to moderate dependent right pleural effusion, with atelectasis versus pneumonia of both lower lobes.   9/20/24 Initiate palliative chemotherapy Carbo AUC 4 D1, Gemzar 800mg D1, D8 every 21 days. Treatment consent signed.    9/20/2024-I have called [facility] and refer the patient back.  She  will be seen early next week.        REVIEW OF SYSTEMS:   Constitutional: Pronounced weight loss fatigue failure to thrive  HEENT:  dysphagia and hoarseness               Lungs: no cough, no shortness of breath, no wheeze  CVS: no palpitation, no chest pain, no shortness of breath  GI: no abdominal pain, no nausea , no vomiting, no constipation  MERCEDES: no dysuria, frequency and urgency, no hematuria, no kidney stones  Musculoskeletal: no joint pain, swelling , stiffness  Endocrine: no polyuria, polydipsia, no cold or heat intolerance  Hematology: no anemia, no easy bruising or bleeding, no history of clotting disorder  Dermatology: no skin rash, no eczema, no pruritus  Psychiatry: no depression, no anxiety,no panic attacks, no suicide ideation  Neurology: no syncope, no seizures, no numbness or tingling of hands, no numbness or tingling of feet, no paresis  Objective     Vital Signs  Temp:  [97.8 °F (36.6 °C)-98.8 °F (37.1 °C)] 98.3 °F (36.8 °C)  Heart Rate:  [] 104  Resp:  [16-20] 20  BP: (130-176)/(65-88) 138/88    Intake/Output Summary (Last 24 hours) at 9/26/2024 0551  Last data filed at 9/25/2024 2114  Gross per 24 hour   Intake 320 ml   Output --   Net 320 ml       PHYSICAL EXAM:  CONSTITUTIONAL: Alert, appropriate, no acute distress, very thin, cachectic  EYES: Anicteric, EOM intact, pupils equal round   ENT: Mucous membranes moist, no oropharyngeal lesions   NECK: Supple, Cervical lymphadenopathy on the left  CHEST/LUNGS: CTA bilaterally, normal respiratory effort   CARDIOVASCULAR: RRR, no murmurs  ABDOMEN: soft nontender, active bowel sounds, no HSM  EXTREMITIES: warm, moves all fours  SKIN: warm, dry with no rashes or lesions  LYMPH: No cervical, clavicular, axillary, or inguinal lymphadenopathy  NEUROLOGIC: follows commands, nonfocal   PSYCH: mood and affect appropriate      CBC  Results from last 7 days   Lab Units 09/25/24  0728 09/24/24  1833 09/20/24  1052   WBC 10*3/mm3 7.22 15.04* 18.29*    HEMOGLOBIN g/dL 8.6* 12.3 11.1*   HEMATOCRIT % 28.0* 38.5 33.8*   PLATELETS 10*3/mm3 156 292 420*   LYMPHOCYTE % %  --  7.1*  --    MONOCYTES % %  --  0.0*  --        CMP  Lab Results   Component Value Date    GLUCOSE 90 09/25/2024    BUN 10 09/25/2024    CREATININE 0.31 (L) 09/25/2024    EGFRIFNONA 114 01/07/2019    BCR 32.3 (H) 09/25/2024    CO2 29.0 09/25/2024    CALCIUM 8.1 (L) 09/25/2024    ALBUMIN 2.6 (L) 09/25/2024    AST 18 09/25/2024    ALT 20 09/25/2024             Blood Culture   Date Value Ref Range Status   09/24/2024 Abnormal Stain (C)  Preliminary   09/24/2024 No growth at 24 hours  Preliminary       Imaging Results (Last 72 Hours)       Procedure Component Value Units Date/Time    FL Video Swallow With Speech Single Contrast [[id number]] Collected: 09/25/24 1032     Updated: 09/25/24 1037    Narrative:      EXAMINATION: FL VIDEO SWALLOW W SPEECH SINGLE-CONTRAST-     9/25/2024 9:22 AM     HISTORY: dysphagia; R13.10-Dysphagia, unspecified; Z78.9-Other specified  health status; C80.1-Malignant (primary) neoplasm, unspecified;  R79.89-Other specified abnormal findings of blood chemistry;  J43.9-Emphysema, unspecified; J98.11-Atelectasis     Fluoroscopy was performed during ingestion of thin, nectar consistency,  and honey consistency pudding consistency and mechanical soft contrast  boluses.     There is no previous study for comparison.     There is no difficulty in initiating the swallowing.     Normal propagation through oropharynx. No nasopharyngeal reflux.     There is significant laryngeal penetration and aspiration with thin and  nectar consistency contrast bolus. This was followed by cough and  expectoration.     There is moderate residue with the honey consistency, pudding  consistency and mechanical soft boluses which cleared on subsequent  swallows. No laryngeal penetration or aspiration.     The study was performed under supervision of speech therapist.       Impression:      1. Abnormal  swallow function study as detailed above.  2. Fluoroscopy time: 3 minutes 14 seconds.  3. The dose: 6.83mGy.  4. Number of images: 1        This report was signed and finalized on 9/25/2024 10:34 AM by Dr. Arminda Lester MD.       XR Chest 1 View [[id number]] Collected: 09/24/24 1927     Updated: 09/24/24 1931    Narrative:      EXAMINATION: XR CHEST 1 VW-  9/24/2024 7:27 PM     HISTORY: Weakness.     FINDINGS: Upright frontal projection of the chest is compared to prior  exam of 5/7/2022. There are emphysematous changes of the lungs. There  has been development of patchy bibasilar airspace disease worrisome for  pneumonia. Blunting of the right lateral costophrenic angle is also  suspicious for a small right effusion. The thoracic aorta is ectatic.       Impression:      1.. Emphysematous changes of the lungs. Patchy bibasilar infiltrates are  present.     This report was signed and finalized on 9/24/2024 7:28 PM by Dr. Bill Benitez MD.                 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  Dorcas was found to have stage IV adenocarcinoma of unknown primary in 2010.  She was treated successfully with 4 courses of carboplatin Gemzar obtaining a complete remission that was sustained for 14 years.  Dorcas experienced a heel fracture 2 years ago.  After that event she has had chronic abdominal pain and has lost 50 pounds.  25 of the 50 pounds were lost over the course of the previous month.  Workup has documented widespread metastatic disease with mediastinal, retroperitoneal, gastrohepatic as well as left neck adenopathy.  An FNA of the left neck performed on 9/9/2024 reported malignant cells present consistent with metastatic carcinoma.  Further tissue was  "unavailable for molecular studies and further special stains.   Due to delays in further workup, Dr. Khan felt the patient needed to get on with his treatment and rechallenged her with carboplatin and Gemzar cycle #1 on 9/20/2024.   She has been referred to Dr. Marek Roberts with ENT at [facility] to obtain an excisional biopsy to be able to get tissue for molecular studies that will include tissue of origin, NGS, IHC studies etc.   Dorcas was seen by Dr. Marek Roberts who recommended that she be evaluated in the ED at [facility] for admission due to difficulty swallowing getting choked on her food, pronounced weight loss and dehydration anticipating an open excisional biopsy on Friday, 9/27/2024.      CBC today, 9/26/2024 has a WBC of 7.98, hemoglobin 9.5 with a platelet count of 114,000  Anticipating excisional biopsy, left neck tomorrow, 9/27/2024 with Dr. Marek Roberts     Agree with plans as outlined.    To complete cycle #1 with Gemzar  \"day 8\"  of treatment she is now scheduled at 12:15 on 9/30/2024 at Dr. Khan's office.        Bartolome Zambrano MD  09/26/24  05:51 CDT        "

## 2024-09-26 NOTE — PROGRESS NOTES
Assessment tool to be used for patients with existing breathing treatments ordered by hospitalist                               Respiratory Therapist Driven Protocol - RT to Assess and Treat Algorithm    Item 0 Points 1 Point 2 Points 3 Points 4 Points Subtotal   Mental Status Alert, orientated, cooperative Lethargic, follows commands Confused, not following commands Obtunded or Somnolent Comatose 0   Respiratory Pattern Regular RR  8-16 breaths/minute Increased RR  18-25 breaths/minute Dyspnea on exertion, irregular RR  26-30/minute Shortness of breath,  RR 31-35 breaths/minute Accessory muscle use, severe SOB  RR > 35 breath/minute 0   Breath Sounds Clear Decreased unilaterally Decreased bilaterally Basilar crackles Wheezing and/or rhonchi 2   Cough Strong, spontaneous non-productive Strong productive Weak, non-productive Weak productive or weak with rhonchi Absent or may require suctioning 2   Pulmonary Status Nonsmoker or no previous history > 1 year quit < 1 PPD  < 1 year quit >  or = 1 PPD Diagnosed pulmonary disease (severe or chronic) Severe or chronic pulmonary disease with exacerbation 3   Surgical Status None General surgery (non-abdominal or non-thoracic) Lower abdominal Thoracic or upper abdominal Thoracic with pulmonary disease 0   Chest X-ray Clear Chronic changes Infiltrates, atelectasis or pleural effusion Infiltrates > 1 lobe Diffuse infiltrates and atelectasis and/or effusions 4   Activity level Ambulatory Ambulatory with assistance Non-ambulatory Paraplegic Quadriplegic 2                     Total Score 13   Score    Drug Therapy Frequency  20 or >    Q4 Duoneb & Q3 Albuterol PRN 15 - 19     Q6 Duoneb & Q4 Albuterol PRN 10 - 14    QID Duoneb & Q4 Albuterol PRN 5 - 9    TID Duoneb & Q6 Albuterol PRN 0 - 4    Q4 PRN Duoneb or Q4 PRN Albuterol    Incentive Spirometry - Initial RT instruct    Lung Expansion Therapy (PEP) Bronchopulmonary Hygiene (CPT)   Q4 & PRN - Severe  atelectasis, poor oxygenation Q4 - copious secretions, dyspnea, unable to sleep, mucus plugging   QID - High risk for persistent atelectasis, existence of atelectasis QID & Q4 PRN - Moderate secretion production   TID - At risk for developing atelectasis TID - small amounts of secretions with poor cough   BID - prevention of atelectasis BID - unable to breathe deeply and cough spontaneously   *RT Protocol patients will be re-assessed/re-evaluated every 48 hours.    *Patients who are home nebulizer treatments will be protocoled to no less than their home regimen and will remain     on their home regimen with re-evaluations as needed with changes in patient condition.    RT Comments/Recommendations: change frequency to   Duoneb QID & Q4PRN per protocol.

## 2024-09-26 NOTE — PLAN OF CARE
Goal Outcome Evaluation:  Plan of Care Reviewed With: patient        Progress: no change  Outcome Evaluation: Pt c/o increased pain throughout shift. MD notified, PO meds ordered. Medicated with IV and PO pain meds per pt request. Safety maintained. A/o x4. IVF infusing per MD order. Cont IV abx as ordered. Mag infusing per MD order. O2 @3L,  in place. Pt w/c bound baseline. PT/OT following. Voiding per bedpan/BSC. ST following. Pt on pureed diet with honey thick liquids. Pt requesting to take meds with thin liquids, education provided. St contacted to provide additional education regarding aspiration risks with thin liquids. MRSA swab completed, awaiting results. C/o diarrhea, MD notified-cdiff stool ordered. Pt placed in R/O c-diff isolation. Cont to monitor.

## 2024-09-26 NOTE — PROGRESS NOTES
[facility] Medicine Services  INPATIENT PROGRESS NOTE    Patient Name: Arminda Jiménez  Date of Admission: 9/24/2024  Today's Date: 09/26/24  Length of Stay: 2  Primary Care Physician: Teddy Sanchez MD    Subjective   Chief Complaint: Unable to swallow    Today  Patient has no new complaint, discussed proposed procedure for tomorrow and patient is in agreement.    Dehydration       64-year-old female presents the emergency department stating that she is unable to swallow. She notes that when she tries to swallow she gets choked and can only take tiny sips. She has had weight loss. She feels dehydrated. She has adenocarcinoma of unknown origin and is was to have a biopsy on Friday with ENT, Dr. Roberts. She also notes that she is post to get chemotherapy on Friday and also received chemotherapy last Friday. The patient chronically wears 2 L of nasal cannula oxygen at home, now is requiring 5 L to maintain O2 saturations. The patient has generalized weakness. 3 days ago she had a bowel movement. She has no nausea and no vomiting.       Review of Systems   All pertinent negatives and positives are as above. All other systems have been reviewed and are negative unless otherwise stated.     Objective    Temp:  [97.8 °F (36.6 °C)-98.8 °F (37.1 °C)] 98.1 °F (36.7 °C)  Heart Rate:  [] 106  Resp:  [16-20] 18  BP: (130-174)/(65-88) 134/83  Physical Exam  Constitutional:       Appearance: She is ill-appearing.      Comments: Thin and frail   HENT:      Head: Normocephalic and atraumatic.      Nose: Nose normal.      Mouth/Throat:      Comments: Hoarse voice  Eyes:      General: No scleral icterus.     Conjunctiva/sclera: Conjunctivae normal.   Cardiovascular:      Rate and Rhythm: Normal rate and regular rhythm.      Heart sounds: Normal heart sounds.   Pulmonary:      Effort: Pulmonary effort is normal.      Breath sounds: Normal breath sounds.   Abdominal:      General: There is  no distension.      Palpations: Abdomen is soft.   Musculoskeletal:         General: No swelling or tenderness.      Cervical back: Normal range of motion and neck supple.   Skin:     General: Skin is warm and dry.   Neurological:      Mental Status: She is alert and oriented to person, place, and time.      Cranial Nerves: No cranial nerve deficit.   Psychiatric:         Mood and Affect: Mood normal.         Behavior: Behavior normal.       Results Review:  I have reviewed the labs, radiology results, and diagnostic studies.    Laboratory Data:   Results from last 7 days   Lab Units 09/26/24  0542 09/25/24  0728 09/24/24  1833   WBC 10*3/mm3 7.98 7.22 15.04*   HEMOGLOBIN g/dL 9.5* 8.6* 12.3   HEMATOCRIT % 31.1* 28.0* 38.5   PLATELETS 10*3/mm3 114* 156 292        Results from last 7 days   Lab Units 09/26/24  0542 09/25/24  0728 09/24/24  1833 09/20/24  1052   SODIUM mmol/L 133* 134* 135* 132*   POTASSIUM mmol/L 3.7 3.8 4.0 4.0   CHLORIDE mmol/L 95* 96* 92* 92*   TOTAL CO2 mmol/L  --   --   --  27   CO2 mmol/L 30.0* 29.0 26.0  --    BUN mg/dL 6* 10 15 11   CREATININE mg/dL 0.24* 0.31* 0.30* <0.5   CALCIUM mg/dL 8.0* 8.1* 9.3 9.4   BILIRUBIN mg/dL  --  0.3 0.3 0.3   ALK PHOS U/L  --  151* 238* 265*   ALT (SGPT) U/L  --  20 33 20   AST (SGOT) U/L  --  18 29 26   GLUCOSE mg/dL 95 90 84 92       Culture Data:   Blood Culture   Date Value Ref Range Status   09/24/2024 Abnormal Stain (C)  Preliminary       Radiology Data:   Imaging Results (Last 24 Hours)       ** No results found for the last 24 hours. **            I have reviewed the patient's current medications.     Assessment/Plan   Assessment  Active Hospital Problems    Diagnosis     **Dysphagia     Lymphadenopathy     Vocal cord weakness     Adenocarcinoma of unknown origin        Treatment Plan  -Dysphagia  Dr. Roberts of ENT was consulted from the emergency room and he evaluated patient with the flexible fiberoptic laryngoscopy with no significant  findings.  Speech therapy was consulted and patient was evaluated further with modified barium swallow which reported mild, oral dysphagia, severe pharyngeal dysphagia.  Patient has been started on oral diet as per speech recommendations    -Acute hypoxic respiratory failure [history of COPD]  This is likely secondary to ongoing pneumonia, patient is currently needing about 3 L of oxygen to maintain saturation above 94%.  We will continue oxygen support and wean as tolerated.  If patient does not improve in the next 24 hours, we will start treatment for COPD exacerbation.    -Suspected pneumonia on chest x-ray  In view of pharyngeal dysfunction, aspiration pneumonia is highly suspected.  Discontinue Levaquin and started Zosyn.  Follow blood cultures.    -Gram-positive bacteremia [gram-positive cocci in chains]  This is positive in one anaerobic bottle, suspect contamination.  Patient will be on Zosyn only and will follow culture and adjust antibiotic as appropriate.  We will get MRSA swab to rule out MSSA/MRSA    -History of adenocarcinoma of unknown primary  ENT is planning biopsy of lymph node in the neck region on Friday.  Oncology on consult, follow recommendations    -Sudden drop in hemoglobin level  Patient has no obvious source of bleeding, hemoglobin has been in the range of 10-11, but dropped to 8.6 [went up to 9.5 today].  We will follow H&H in the morning.    Other chronic medical conditions-  COPD  Anxiety/depression  GERD  History of degenerative disc disease  Peripheral vascular disease  Thyroid nodule    DVT prophylaxis-we will start patient on Lovenox    Medical Decision Making  Number and Complexity of problems: 4, complex  Differential Diagnosis: Dehydration     Conditions and Status        Unchanged     MDM Data  External documents reviewed: None  Cardiac tracing (EKG, telemetry) interpretation: None  Radiology interpretation: None  Labs reviewed: Reviewed  Any tests that were considered but not  ordered: None     Decision rules/scores evaluated (example [id number], Wells, etc): None     Discussed with: Patient     Care Planning  Shared decision making: Patient   Code status and discussions: Full     Disposition  Social Determinants of Health that impact treatment or disposition: None  Estimated length of stay is 2 to 3 days.      I confirmed that the patient's advanced care plan is present, code status is documented, and a surrogate decision maker is listed in the patient's medical record.      The patient's surrogate decision maker is family.     Disposition  Social Determinants of Health that impact treatment or disposition: No  I expect the patient to be discharged to home in unknown days.     Electronically signed by Jonathon Chew MD, 09/26/24, 12:54 CDT.

## 2024-09-26 NOTE — PLAN OF CARE
Goal Outcome Evaluation:           Progress: no change  Outcome Evaluation: see note                    Treatment Assessment (SLP): continued (09/26/24 1540)  Treatment Assessment Comments (SLP): see note (09/26/24 1540)  Plan for Continued Treatment (SLP): continue treatment per plan of care (09/26/24 1540)

## 2024-09-26 NOTE — PLAN OF CARE
Goal Outcome Evaluation:  Plan of Care Reviewed With: patient           Outcome Evaluation: OT eval completed. Pt presents alert and oriented x4, sitting up in bed on 3L O2/NC. She reports at baseline being independent with all bed mobility, sit <> stand t/fs and w/c mobility but required Min A with adls. [facility] but reports increased weakness since then. Today she demonstrates impaired balance, strength, activity tolerance and increased SOA with activity. She was able to bring self to sitting at EOB with SBA. She declined further activity at this time due to her pain. She would benefit from skilled OT services to address these deficits. Recommend d/c home with assist and HH.      Anticipated Discharge Disposition (OT): home with assist

## 2024-09-27 ENCOUNTER — ANESTHESIA (OUTPATIENT)
Dept: PERIOP | Facility: HOSPITAL | Age: 64
End: 2024-09-27
Payer: MEDICARE

## 2024-09-27 ENCOUNTER — ANESTHESIA EVENT (OUTPATIENT)
Dept: PERIOP | Facility: HOSPITAL | Age: 64
End: 2024-09-27
Payer: MEDICARE

## 2024-09-27 ENCOUNTER — TELEPHONE (OUTPATIENT)
Age: 64
End: 2024-09-27
Payer: MEDICARE

## 2024-09-27 PROBLEM — R78.81 BACTEREMIA DUE TO ENTEROCOCCUS: Status: ACTIVE | Noted: 2024-09-27

## 2024-09-27 PROBLEM — E43 SEVERE MALNUTRITION: Status: ACTIVE | Noted: 2024-09-27

## 2024-09-27 PROBLEM — J69.0 ASPIRATION PNEUMONIA: Status: ACTIVE | Noted: 2024-09-27

## 2024-09-27 PROBLEM — G89.29 CHRONIC PAIN: Status: ACTIVE | Noted: 2024-09-27

## 2024-09-27 PROBLEM — G89.3 CANCER ASSOCIATED PAIN: Status: ACTIVE | Noted: 2024-09-27

## 2024-09-27 PROBLEM — C79.9 METASTATIC CARCINOMA: Status: ACTIVE | Noted: 2024-09-27

## 2024-09-27 PROBLEM — B95.2 BACTEREMIA DUE TO ENTEROCOCCUS: Status: ACTIVE | Noted: 2024-09-27

## 2024-09-27 PROBLEM — J44.9 COPD (CHRONIC OBSTRUCTIVE PULMONARY DISEASE): Status: ACTIVE | Noted: 2022-05-11

## 2024-09-27 PROBLEM — R63.4 WEIGHT LOSS: Status: ACTIVE | Noted: 2024-09-27

## 2024-09-27 LAB
ANION GAP SERPL CALCULATED.3IONS-SCNC: 13 MMOL/L (ref 5–15)
ANISOCYTOSIS BLD QL: ABNORMAL
BACTERIA SPEC AEROBE CULT: ABNORMAL
BASOPHILS # BLD MANUAL: 0 10*3/MM3 (ref 0–0.2)
BASOPHILS NFR BLD MANUAL: 0 % (ref 0–1.5)
BUN SERPL-MCNC: 3 MG/DL (ref 8–23)
BUN/CREAT SERPL: 13.6 (ref 7–25)
CALCIUM SPEC-SCNC: 8.3 MG/DL (ref 8.6–10.5)
CHLORIDE SERPL-SCNC: 88 MMOL/L (ref 98–107)
CO2 SERPL-SCNC: 29 MMOL/L (ref 22–29)
CREAT SERPL-MCNC: 0.22 MG/DL (ref 0.57–1)
DEPRECATED RDW RBC AUTO: 44.5 FL (ref 37–54)
EGFRCR SERPLBLD CKD-EPI 2021: 127.8 ML/MIN/1.73
EOSINOPHIL # BLD MANUAL: 0.49 10*3/MM3 (ref 0–0.4)
EOSINOPHIL NFR BLD MANUAL: 4 % (ref 0.3–6.2)
ERYTHROCYTE [DISTWIDTH] IN BLOOD BY AUTOMATED COUNT: 13 % (ref 12.3–15.4)
GLUCOSE SERPL-MCNC: 84 MG/DL (ref 65–99)
GRAM STN SPEC: ABNORMAL
HCT VFR BLD AUTO: 34.2 % (ref 34–46.6)
HGB BLD-MCNC: 11 G/DL (ref 12–15.9)
ISOLATED FROM: ABNORMAL
LYMPHOCYTES # BLD MANUAL: 0.75 10*3/MM3 (ref 0.7–3.1)
LYMPHOCYTES NFR BLD MANUAL: 1 % (ref 5–12)
MACROCYTES BLD QL SMEAR: ABNORMAL
MAGNESIUM SERPL-MCNC: 1.7 MG/DL (ref 1.6–2.4)
MCH RBC QN AUTO: 30 PG (ref 26.6–33)
MCHC RBC AUTO-ENTMCNC: 32.2 G/DL (ref 31.5–35.7)
MCV RBC AUTO: 93.2 FL (ref 79–97)
MONOCYTES # BLD: 0.12 10*3/MM3 (ref 0.1–0.9)
NEUTROPHILS # BLD AUTO: 10.91 10*3/MM3 (ref 1.7–7)
NEUTROPHILS NFR BLD MANUAL: 88.9 % (ref 42.7–76)
PLATELET # BLD AUTO: 91 10*3/MM3 (ref 140–450)
PMV BLD AUTO: 10.3 FL (ref 6–12)
POIKILOCYTOSIS BLD QL SMEAR: ABNORMAL
POTASSIUM SERPL-SCNC: 2.9 MMOL/L (ref 3.5–5.2)
RBC # BLD AUTO: 3.67 10*6/MM3 (ref 3.77–5.28)
SMALL PLATELETS BLD QL SMEAR: ABNORMAL
SODIUM SERPL-SCNC: 130 MMOL/L (ref 136–145)
TARGETS BLD QL SMEAR: ABNORMAL
VARIANT LYMPHS NFR BLD MANUAL: 6.1 % (ref 19.6–45.3)
WBC MORPH BLD: NORMAL
WBC NRBC COR # BLD AUTO: 12.27 10*3/MM3 (ref 3.4–10.8)

## 2024-09-27 PROCEDURE — 25010000002 PROPOFOL 10 MG/ML EMULSION: Performed by: NURSE ANESTHETIST, CERTIFIED REGISTERED

## 2024-09-27 PROCEDURE — 85025 COMPLETE CBC W/AUTO DIFF WBC: CPT | Performed by: INTERNAL MEDICINE

## 2024-09-27 PROCEDURE — 97535 SELF CARE MNGMENT TRAINING: CPT

## 2024-09-27 PROCEDURE — 25010000002 POTASSIUM CHLORIDE 10 MEQ/100ML SOLUTION: Performed by: INTERNAL MEDICINE

## 2024-09-27 PROCEDURE — 97161 PT EVAL LOW COMPLEX 20 MIN: CPT | Performed by: PHYSICAL THERAPIST

## 2024-09-27 PROCEDURE — 94799 UNLISTED PULMONARY SVC/PX: CPT

## 2024-09-27 PROCEDURE — 25010000002 DEXAMETHASONE PER 1 MG: Performed by: NURSE ANESTHETIST, CERTIFIED REGISTERED

## 2024-09-27 PROCEDURE — 07B10ZX EXCISION OF RIGHT NECK LYMPHATIC, OPEN APPROACH, DIAGNOSTIC: ICD-10-PCS | Performed by: OTOLARYNGOLOGY

## 2024-09-27 PROCEDURE — 94664 DEMO&/EVAL PT USE INHALER: CPT

## 2024-09-27 PROCEDURE — 38510 BIOPSY/REMOVAL LYMPH NODES: CPT | Performed by: OTOLARYNGOLOGY

## 2024-09-27 PROCEDURE — 88341 IMHCHEM/IMCYTCHM EA ADD ANTB: CPT | Performed by: OTOLARYNGOLOGY

## 2024-09-27 PROCEDURE — 25010000002 POTASSIUM CHLORIDE PER 2 MEQ: Performed by: OTOLARYNGOLOGY

## 2024-09-27 PROCEDURE — 25010000002 HYDROMORPHONE PER 4 MG: Performed by: INTERNAL MEDICINE

## 2024-09-27 PROCEDURE — 25010000002 AMPICILLIN-SULBACTAM PER 1.5 G: Performed by: OTOLARYNGOLOGY

## 2024-09-27 PROCEDURE — 25810000003 LACTATED RINGERS PER 1000 ML: Performed by: ANESTHESIOLOGY

## 2024-09-27 PROCEDURE — 25010000002 HYDROMORPHONE PER 4 MG: Performed by: OTOLARYNGOLOGY

## 2024-09-27 PROCEDURE — 25010000002 GLYCOPYRROLATE 0.4 MG/2ML SOLUTION: Performed by: NURSE ANESTHETIST, CERTIFIED REGISTERED

## 2024-09-27 PROCEDURE — 94761 N-INVAS EAR/PLS OXIMETRY MLT: CPT

## 2024-09-27 PROCEDURE — 88342 IMHCHEM/IMCYTCHM 1ST ANTB: CPT | Performed by: OTOLARYNGOLOGY

## 2024-09-27 PROCEDURE — 25010000002 PIPERACILLIN SOD-TAZOBACTAM PER 1 G: Performed by: INTERNAL MEDICINE

## 2024-09-27 PROCEDURE — 25010000002 FUROSEMIDE PER 20 MG: Performed by: NURSE ANESTHETIST, CERTIFIED REGISTERED

## 2024-09-27 PROCEDURE — 25010000002 ONDANSETRON PER 1 MG: Performed by: NURSE ANESTHETIST, CERTIFIED REGISTERED

## 2024-09-27 PROCEDURE — 85007 BL SMEAR W/DIFF WBC COUNT: CPT | Performed by: INTERNAL MEDICINE

## 2024-09-27 PROCEDURE — 80048 BASIC METABOLIC PNL TOTAL CA: CPT | Performed by: INTERNAL MEDICINE

## 2024-09-27 PROCEDURE — 25010000002 ALBUMIN HUMAN 5% PER 50 ML: Performed by: NURSE ANESTHETIST, CERTIFIED REGISTERED

## 2024-09-27 PROCEDURE — P9041 ALBUMIN (HUMAN),5%, 50ML: HCPCS | Performed by: NURSE ANESTHETIST, CERTIFIED REGISTERED

## 2024-09-27 PROCEDURE — 83735 ASSAY OF MAGNESIUM: CPT | Performed by: INTERNAL MEDICINE

## 2024-09-27 PROCEDURE — 88305 TISSUE EXAM BY PATHOLOGIST: CPT | Performed by: OTOLARYNGOLOGY

## 2024-09-27 PROCEDURE — 25010000002 SUGAMMADEX 200 MG/2ML SOLUTION: Performed by: NURSE ANESTHETIST, CERTIFIED REGISTERED

## 2024-09-27 DEVICE — SEAL HEMO SURG ARISTA/AH ABS/PWDR 3GM: Type: IMPLANTABLE DEVICE | Site: NECK | Status: FUNCTIONAL

## 2024-09-27 RX ORDER — SODIUM CHLORIDE AND POTASSIUM CHLORIDE 150; 450 MG/100ML; MG/100ML
100 INJECTION, SOLUTION INTRAVENOUS CONTINUOUS
Status: DISCONTINUED | OUTPATIENT
Start: 2024-09-27 | End: 2024-09-28

## 2024-09-27 RX ORDER — MAGNESIUM HYDROXIDE 1200 MG/15ML
LIQUID ORAL AS NEEDED
Status: DISCONTINUED | OUTPATIENT
Start: 2024-09-27 | End: 2024-09-27 | Stop reason: HOSPADM

## 2024-09-27 RX ORDER — SODIUM CHLORIDE AND POTASSIUM CHLORIDE 150; 900 MG/100ML; MG/100ML
100 INJECTION, SOLUTION INTRAVENOUS CONTINUOUS
Status: DISCONTINUED | OUTPATIENT
Start: 2024-09-27 | End: 2024-09-27 | Stop reason: SDUPTHER

## 2024-09-27 RX ORDER — LIDOCAINE HYDROCHLORIDE AND EPINEPHRINE 10; 10 MG/ML; UG/ML
INJECTION, SOLUTION INFILTRATION; PERINEURAL AS NEEDED
Status: DISCONTINUED | OUTPATIENT
Start: 2024-09-27 | End: 2024-09-27 | Stop reason: HOSPADM

## 2024-09-27 RX ORDER — ONDANSETRON 2 MG/ML
INJECTION INTRAMUSCULAR; INTRAVENOUS AS NEEDED
Status: DISCONTINUED | OUTPATIENT
Start: 2024-09-27 | End: 2024-09-27 | Stop reason: SURG

## 2024-09-27 RX ORDER — ALBUTEROL SULFATE 90 UG/1
INHALANT RESPIRATORY (INHALATION) AS NEEDED
Status: DISCONTINUED | OUTPATIENT
Start: 2024-09-27 | End: 2024-09-27 | Stop reason: SURG

## 2024-09-27 RX ORDER — NALOXONE HCL 0.4 MG/ML
0.04 VIAL (ML) INJECTION AS NEEDED
Status: DISCONTINUED | OUTPATIENT
Start: 2024-09-27 | End: 2024-09-27 | Stop reason: HOSPADM

## 2024-09-27 RX ORDER — ROCURONIUM BROMIDE 10 MG/ML
INJECTION, SOLUTION INTRAVENOUS AS NEEDED
Status: DISCONTINUED | OUTPATIENT
Start: 2024-09-27 | End: 2024-09-27 | Stop reason: SURG

## 2024-09-27 RX ORDER — FLUMAZENIL 0.1 MG/ML
0.2 INJECTION INTRAVENOUS AS NEEDED
Status: DISCONTINUED | OUTPATIENT
Start: 2024-09-27 | End: 2024-09-27 | Stop reason: HOSPADM

## 2024-09-27 RX ORDER — ONDANSETRON 2 MG/ML
4 INJECTION INTRAMUSCULAR; INTRAVENOUS ONCE AS NEEDED
Status: DISCONTINUED | OUTPATIENT
Start: 2024-09-27 | End: 2024-09-27 | Stop reason: SDUPTHER

## 2024-09-27 RX ORDER — ALBUMIN, HUMAN INJ 5% 5 %
SOLUTION INTRAVENOUS CONTINUOUS PRN
Status: DISCONTINUED | OUTPATIENT
Start: 2024-09-27 | End: 2024-09-27 | Stop reason: SURG

## 2024-09-27 RX ORDER — SODIUM CHLORIDE 0.9 % (FLUSH) 0.9 %
3-10 SYRINGE (ML) INJECTION AS NEEDED
Status: DISCONTINUED | OUTPATIENT
Start: 2024-09-27 | End: 2024-09-27 | Stop reason: HOSPADM

## 2024-09-27 RX ORDER — IPRATROPIUM BROMIDE AND ALBUTEROL SULFATE 2.5; .5 MG/3ML; MG/3ML
3 SOLUTION RESPIRATORY (INHALATION) ONCE
Status: COMPLETED | OUTPATIENT
Start: 2024-09-27 | End: 2024-09-27

## 2024-09-27 RX ORDER — SUCCINYLCHOLINE/SOD CL,ISO/PF 200MG/10ML
SYRINGE (ML) INTRAVENOUS AS NEEDED
Status: DISCONTINUED | OUTPATIENT
Start: 2024-09-27 | End: 2024-09-27 | Stop reason: SURG

## 2024-09-27 RX ORDER — ONDANSETRON 2 MG/ML
4 INJECTION INTRAMUSCULAR; INTRAVENOUS
Status: DISCONTINUED | OUTPATIENT
Start: 2024-09-27 | End: 2024-09-27 | Stop reason: HOSPADM

## 2024-09-27 RX ORDER — FUROSEMIDE 10 MG/ML
INJECTION INTRAMUSCULAR; INTRAVENOUS AS NEEDED
Status: DISCONTINUED | OUTPATIENT
Start: 2024-09-27 | End: 2024-09-27 | Stop reason: SURG

## 2024-09-27 RX ORDER — MUPIROCIN 20 MG/G
1 OINTMENT TOPICAL EVERY 12 HOURS SCHEDULED
Status: DISCONTINUED | OUTPATIENT
Start: 2024-09-28 | End: 2024-10-08 | Stop reason: HOSPADM

## 2024-09-27 RX ORDER — DROPERIDOL 2.5 MG/ML
0.62 INJECTION, SOLUTION INTRAMUSCULAR; INTRAVENOUS ONCE AS NEEDED
Status: DISCONTINUED | OUTPATIENT
Start: 2024-09-27 | End: 2024-09-27 | Stop reason: HOSPADM

## 2024-09-27 RX ORDER — POTASSIUM CHLORIDE 7.45 MG/ML
10 INJECTION INTRAVENOUS
Status: DISCONTINUED | OUTPATIENT
Start: 2024-09-27 | End: 2024-09-27 | Stop reason: SDUPTHER

## 2024-09-27 RX ORDER — SODIUM CHLORIDE 0.9 % (FLUSH) 0.9 %
3 SYRINGE (ML) INJECTION EVERY 12 HOURS SCHEDULED
Status: DISCONTINUED | OUTPATIENT
Start: 2024-09-27 | End: 2024-09-27 | Stop reason: HOSPADM

## 2024-09-27 RX ORDER — LABETALOL HYDROCHLORIDE 5 MG/ML
5 INJECTION, SOLUTION INTRAVENOUS
Status: DISCONTINUED | OUTPATIENT
Start: 2024-09-27 | End: 2024-09-27 | Stop reason: HOSPADM

## 2024-09-27 RX ORDER — GLYCOPYRROLATE 0.2 MG/ML
INJECTION INTRAMUSCULAR; INTRAVENOUS AS NEEDED
Status: DISCONTINUED | OUTPATIENT
Start: 2024-09-27 | End: 2024-09-27 | Stop reason: SURG

## 2024-09-27 RX ORDER — POTASSIUM CHLORIDE 7.45 MG/ML
10 INJECTION INTRAVENOUS
Status: COMPLETED | OUTPATIENT
Start: 2024-09-27 | End: 2024-09-27

## 2024-09-27 RX ORDER — LIDOCAINE HYDROCHLORIDE 20 MG/ML
INJECTION, SOLUTION EPIDURAL; INFILTRATION; INTRACAUDAL; PERINEURAL AS NEEDED
Status: DISCONTINUED | OUTPATIENT
Start: 2024-09-27 | End: 2024-09-27 | Stop reason: SURG

## 2024-09-27 RX ORDER — SODIUM CHLORIDE 9 MG/ML
40 INJECTION, SOLUTION INTRAVENOUS AS NEEDED
Status: DISCONTINUED | OUTPATIENT
Start: 2024-09-27 | End: 2024-09-27 | Stop reason: HOSPADM

## 2024-09-27 RX ORDER — FENTANYL CITRATE 50 UG/ML
50 INJECTION, SOLUTION INTRAMUSCULAR; INTRAVENOUS
Status: DISCONTINUED | OUTPATIENT
Start: 2024-09-27 | End: 2024-09-27 | Stop reason: HOSPADM

## 2024-09-27 RX ORDER — PHENYLEPHRINE HCL IN 0.9% NACL 1 MG/10 ML
SYRINGE (ML) INTRAVENOUS AS NEEDED
Status: DISCONTINUED | OUTPATIENT
Start: 2024-09-27 | End: 2024-09-27 | Stop reason: SURG

## 2024-09-27 RX ORDER — MUPIROCIN 20 MG/G
OINTMENT TOPICAL AS NEEDED
Status: DISCONTINUED | OUTPATIENT
Start: 2024-09-27 | End: 2024-09-27 | Stop reason: HOSPADM

## 2024-09-27 RX ORDER — HYDROCODONE BITARTRATE AND ACETAMINOPHEN 5; 325 MG/1; MG/1
1 TABLET ORAL EVERY 4 HOURS PRN
Status: DISCONTINUED | OUTPATIENT
Start: 2024-09-27 | End: 2024-09-29

## 2024-09-27 RX ORDER — DEXAMETHASONE SODIUM PHOSPHATE 4 MG/ML
INJECTION, SOLUTION INTRA-ARTICULAR; INTRALESIONAL; INTRAMUSCULAR; INTRAVENOUS; SOFT TISSUE AS NEEDED
Status: DISCONTINUED | OUTPATIENT
Start: 2024-09-27 | End: 2024-09-27 | Stop reason: SURG

## 2024-09-27 RX ORDER — PROPOFOL 10 MG/ML
VIAL (ML) INTRAVENOUS AS NEEDED
Status: DISCONTINUED | OUTPATIENT
Start: 2024-09-27 | End: 2024-09-27 | Stop reason: SURG

## 2024-09-27 RX ORDER — SODIUM CHLORIDE, SODIUM LACTATE, POTASSIUM CHLORIDE, CALCIUM CHLORIDE 600; 310; 30; 20 MG/100ML; MG/100ML; MG/100ML; MG/100ML
100 INJECTION, SOLUTION INTRAVENOUS CONTINUOUS
Status: DISCONTINUED | OUTPATIENT
Start: 2024-09-27 | End: 2024-09-27

## 2024-09-27 RX ORDER — HYDROMORPHONE HYDROCHLORIDE 1 MG/ML
0.25 INJECTION, SOLUTION INTRAMUSCULAR; INTRAVENOUS; SUBCUTANEOUS
Status: DISCONTINUED | OUTPATIENT
Start: 2024-09-27 | End: 2024-09-27 | Stop reason: HOSPADM

## 2024-09-27 RX ADMIN — ROCURONIUM 20 MG: 50 INJECTION, SOLUTION INTRAVENOUS at 13:59

## 2024-09-27 RX ADMIN — IPRATROPIUM BROMIDE AND ALBUTEROL SULFATE 3 ML: 2.5; .5 SOLUTION RESPIRATORY (INHALATION) at 07:09

## 2024-09-27 RX ADMIN — DEXAMETHASONE SODIUM PHOSPHATE 8 MG: 4 INJECTION INTRA-ARTICULAR; INTRALESIONAL; INTRAMUSCULAR; INTRAVENOUS; SOFT TISSUE at 13:52

## 2024-09-27 RX ADMIN — HYDROMORPHONE HYDROCHLORIDE 0.5 MG: 1 INJECTION, SOLUTION INTRAMUSCULAR; INTRAVENOUS; SUBCUTANEOUS at 15:53

## 2024-09-27 RX ADMIN — IPRATROPIUM BROMIDE AND ALBUTEROL SULFATE 3 ML: 2.5; .5 SOLUTION RESPIRATORY (INHALATION) at 19:36

## 2024-09-27 RX ADMIN — PROPOFOL 80 MG: 10 INJECTION, EMULSION INTRAVENOUS at 13:43

## 2024-09-27 RX ADMIN — PIPERACILLIN AND TAZOBACTAM 4.5 G: 4; .5 INJECTION, POWDER, FOR SOLUTION INTRAVENOUS at 09:23

## 2024-09-27 RX ADMIN — PROPOFOL 20 MG: 10 INJECTION, EMULSION INTRAVENOUS at 14:35

## 2024-09-27 RX ADMIN — POTASSIUM CHLORIDE 10 MEQ: 10 INJECTION, SOLUTION INTRAVENOUS at 18:35

## 2024-09-27 RX ADMIN — SODIUM CHLORIDE, POTASSIUM CHLORIDE, SODIUM LACTATE AND CALCIUM CHLORIDE 100 ML/HR: 600; 310; 30; 20 INJECTION, SOLUTION INTRAVENOUS at 12:48

## 2024-09-27 RX ADMIN — HYDROMORPHONE HYDROCHLORIDE 0.5 MG: 1 INJECTION, SOLUTION INTRAMUSCULAR; INTRAVENOUS; SUBCUTANEOUS at 09:35

## 2024-09-27 RX ADMIN — ROCURONIUM 5 MG: 50 INJECTION, SOLUTION INTRAVENOUS at 13:46

## 2024-09-27 RX ADMIN — HYDROMORPHONE HYDROCHLORIDE 0.5 MG: 1 INJECTION, SOLUTION INTRAMUSCULAR; INTRAVENOUS; SUBCUTANEOUS at 02:10

## 2024-09-27 RX ADMIN — LIDOCAINE HYDROCHLORIDE 3 ML: 20 INJECTION, SOLUTION EPIDURAL; INFILTRATION; INTRACAUDAL; PERINEURAL at 13:43

## 2024-09-27 RX ADMIN — Medication 100 MG: at 13:44

## 2024-09-27 RX ADMIN — POTASSIUM CHLORIDE 10 MEQ: 10 INJECTION, SOLUTION INTRAVENOUS at 15:44

## 2024-09-27 RX ADMIN — POTASSIUM CHLORIDE 10 MEQ: 10 INJECTION, SOLUTION INTRAVENOUS at 21:10

## 2024-09-27 RX ADMIN — AMPICILLIN SODIUM, SULBACTAM SODIUM 3 G: 2; 1 INJECTION, POWDER, FOR SOLUTION INTRAMUSCULAR; INTRAVENOUS at 17:35

## 2024-09-27 RX ADMIN — POTASSIUM CHLORIDE AND SODIUM CHLORIDE 100 ML/HR: 450; 150 INJECTION, SOLUTION INTRAVENOUS at 15:53

## 2024-09-27 RX ADMIN — Medication 100 MCG: at 13:50

## 2024-09-27 RX ADMIN — IPRATROPIUM BROMIDE AND ALBUTEROL SULFATE 3 ML: .5; 3 SOLUTION RESPIRATORY (INHALATION) at 12:51

## 2024-09-27 RX ADMIN — HYDROCODONE BITARTRATE AND ACETAMINOPHEN 1 TABLET: 5; 325 TABLET ORAL at 18:40

## 2024-09-27 RX ADMIN — ALBUMIN (HUMAN): 12.5 INJECTION, SOLUTION INTRAVENOUS at 14:16

## 2024-09-27 RX ADMIN — IPRATROPIUM BROMIDE AND ALBUTEROL SULFATE 3 ML: 2.5; .5 SOLUTION RESPIRATORY (INHALATION) at 10:58

## 2024-09-27 RX ADMIN — HYDROMORPHONE HYDROCHLORIDE 0.5 MG: 1 INJECTION, SOLUTION INTRAMUSCULAR; INTRAVENOUS; SUBCUTANEOUS at 20:03

## 2024-09-27 RX ADMIN — GLYCOPYRROLATE 0.2 MG: 0.2 INJECTION INTRAMUSCULAR; INTRAVENOUS at 13:43

## 2024-09-27 RX ADMIN — Medication 10 ML: at 20:04

## 2024-09-27 RX ADMIN — HYDROMORPHONE HYDROCHLORIDE 0.5 MG: 1 INJECTION, SOLUTION INTRAMUSCULAR; INTRAVENOUS; SUBCUTANEOUS at 06:02

## 2024-09-27 RX ADMIN — FUROSEMIDE 10 MG: 10 INJECTION, SOLUTION INTRAVENOUS at 12:49

## 2024-09-27 RX ADMIN — AMPICILLIN SODIUM, SULBACTAM SODIUM 3 G: 2; 1 INJECTION, POWDER, FOR SOLUTION INTRAMUSCULAR; INTRAVENOUS at 23:28

## 2024-09-27 RX ADMIN — HYDROCODONE BITARTRATE AND ACETAMINOPHEN 1 TABLET: 5; 325 TABLET ORAL at 22:25

## 2024-09-27 RX ADMIN — FAMOTIDINE 20 MG: 10 INJECTION INTRAVENOUS at 20:03

## 2024-09-27 RX ADMIN — HYDROMORPHONE HYDROCHLORIDE 0.5 MG: 1 INJECTION, SOLUTION INTRAMUSCULAR; INTRAVENOUS; SUBCUTANEOUS at 23:31

## 2024-09-27 RX ADMIN — FAMOTIDINE 20 MG: 10 INJECTION INTRAVENOUS at 09:31

## 2024-09-27 RX ADMIN — POTASSIUM CHLORIDE 10 MEQ: 10 INJECTION, SOLUTION INTRAVENOUS at 22:25

## 2024-09-27 RX ADMIN — POTASSIUM CHLORIDE 10 MEQ: 10 INJECTION, SOLUTION INTRAVENOUS at 20:03

## 2024-09-27 RX ADMIN — SUGAMMADEX 200 MG: 100 INJECTION, SOLUTION INTRAVENOUS at 14:35

## 2024-09-27 RX ADMIN — DEXMEDETOMIDINE HYDROCHLORIDE 20 MCG: 4 INJECTION, SOLUTION INTRAVENOUS at 13:43

## 2024-09-27 RX ADMIN — ONDANSETRON 4 MG: 2 INJECTION INTRAMUSCULAR; INTRAVENOUS at 13:52

## 2024-09-27 RX ADMIN — Medication 100 MCG: at 13:59

## 2024-09-27 RX ADMIN — ALBUTEROL SULFATE 4 PUFF: 108 INHALANT RESPIRATORY (INHALATION) at 12:49

## 2024-09-27 RX ADMIN — ALBUMIN (HUMAN): 12.5 INJECTION, SOLUTION INTRAVENOUS at 13:56

## 2024-09-27 RX ADMIN — Medication 10 ML: at 09:29

## 2024-09-27 NOTE — THERAPY TREATMENT NOTE
Acute Care - Occupational Therapy Treatment Note  [facility]     Patient Name: Arminda Jiménez  : 1960  MRN: [id number]  Today's Date: 2024             Admit Date: 2024       ICD-10-CM ICD-9-CM   1. Dysphagia, unspecified type  R13.10 787.20   2. Unable to care for self  Z78.9 V49.89   3. Adenocarcinoma  C80.1 199.1   4. Elevated TSH  R79.89 794.5   5. Pulmonary emphysema, unspecified emphysema type  J43.9 492.8   6. Patchy atelectasis  J98.11 518.0     Patient Active Problem List   Diagnosis    Foraminal stenosis of lumbosacral region    Chronic back pain    Radiculopathy    Lumbar stenosis    Panlobular emphysema    Gastroesophageal reflux disease without esophagitis    Constipation due to opioid therapy    PAD (peripheral artery disease)    Primary osteoarthritis involving multiple joints    Recurrent major depressive disorder    Overweight    Personal history of nicotine dependence    Adenocarcinoma of unknown origin    Environmental allergies    Primary hypertension    Senile osteoporosis    Open left ankle fracture    Fall    Postoperative anemia due to acute blood loss    Acute foot pain, left    Impaired gait and mobility due to left ankle fracture    Chest pain, unspecified type    Overweight (BMI 25.0-29.9)    Displaced fracture of medial malleolus of left tibia, subsequent encounter for closed fracture with nonunion    Dysphagia    Lymphadenopathy    Vocal cord weakness     Past Medical History:   Diagnosis Date    Adenocarcinoma of unknown origin 2022    Anxiety     Arthritis     Asthma     Cancer     right lung,abd,and pelvic area    Chronic back pain     COPD (chronic obstructive pulmonary disease)     Depression     Environmental allergies 2022    Facet arthropathy     Foraminal stenosis of lumbosacral region     GERD (gastroesophageal reflux disease)     H/O degenerative disc disease     History of blood transfusion     PVD (peripheral vascular disease)     Radiculopathy      Thyroid nodule      Past Surgical History:   Procedure Laterality Date    ANKLE ARTHROPLASTY Left 8/29/2023    Procedure: TOTAL ANKLE ARTHROPLASTY WITH INBONE IMPLANT, GASTROCNEMIUS RECESSION, REMOVAL OF HARDWARE DEEP. LEFT ANKLE;  Surgeon: Bartolome Olguin DPM;  Location:  [facility];  Service: Podiatry;  Laterality: Left;    ANKLE OPEN REDUCTION INTERNAL FIXATION Left 01/05/2023    Procedure: REPAIR OF NONUNION MEDIAL MALLEOLUS, REPAIR NONUNION FIBULA, BONE GRAFT HARVEST, LEFT ANKLE;  Surgeon: Bartolome Olguin DPM;  Location:  [facility];  Service: Podiatry;  Laterality: Left;    ANKLE SURGERY Left     ANTERIOR LUMBAR EXPOSURE N/A 05/07/2018    Procedure: ANTERIOR LUMBAR EXPOSURE;  Surgeon: Chris Haley DO;  Location:  [facility];  Service: Vascular    APPENDECTOMY      CARPAL TUNNEL RELEASE Bilateral     INCISION AND DRAINAGE OF WOUND Left 05/08/2022    Procedure: Incision and Drainage Left Open Ankle Fracture, Open Reduction Internal Fixation Bimalleolar Ankle Fracture;  Surgeon: Leland Aranda MD;  Location:  [facility];  Service: Orthopedics;  Laterality: Left;    LUMBAR FUSION N/A 05/07/2018    Procedure: ANTERIOR DECOMPRESSION, ANTERIOR LUMBAR INTERBODY FUSION WITH INSTRUMENTATION L5-S1;  Surgeon: LUIS CARLOS Zheng MD;  Location:  [facility];  Service: Orthopedic Spine    LUMBAR FUSION Right 01/02/2019    Procedure: RIGHT LATERAL LUMBAR INTERBODY FUSION L3-5;  Surgeon: LUIS CARLOS Zheng MD;  Location:  [facility];  Service: Orthopedic Spine    LUMBAR LAMINECTOMY WITH FUSION N/A 05/09/2018    Procedure: POSTERIOR SPINAL FUSION WITH INSTRUMENTATION L5-S1;  Surgeon: LUIS CARLOS Zheng MD;  Location:  [facility];  Service: Orthopedic Spine    LUMBAR LAMINECTOMY WITH FUSION Right 01/04/2019    Procedure: POSTERIOR SPINAL FUSION WITH INSTRUMENTATION RIGHT L3-S1;  Surgeon: LUIS CARLOS Zheng MD;  Location:  [facility];  Service: Orthopedic Spine    OTHER SURGICAL HISTORY Bilateral 2017    Stent implants BLE  Dragan Alvarado    PAIN PUMP INSERTION/REVISION Right     FENTANYL    WRIST FUSION Right          OT ASSESSMENT FLOWSHEET (Last 12 Hours)       OT Evaluation and Treatment       Row Name 09/27/24 1041                   OT Time and Intention    Subjective Information complains of;weakness;fatigue;pain  -AC        Document Type therapy note (daily note)  -AC        Mode of Treatment occupational therapy  -AC           General Information    Existing Precautions/Restrictions fall;oxygen therapy device and L/min  -AC           Pain Assessment    Pretreatment Pain Rating 10/10  -AC        Posttreatment Pain Rating 10/10  -AC        Pain Location - chest  -AC        Pain Intervention(s) Repositioned;Rest  -AC           Activities of Daily Living    BADL Assessment/Intervention toileting  -AC           Toileting Assessment/Training    [place] Level (Toileting) change pad/brief;moderate assist (50% patient effort);perform perineal hygiene;minimum assist (75% patient effort)  -AC        Position (Toileting) supine  -AC           Bed Mobility    Bed Mobility rolling left;rolling right;scooting/bridging  -AC        Rolling Left [place] (Bed Mobility) independent  -AC        Rolling Right [place] (Bed Mobility) independent  -AC        Scooting/Bridging [place] (Bed Mobility) minimum assist (75% patient effort);2 person assist  -AC        Assistive Device (Bed Mobility) head of bed elevated;bed rails;draw sheet  -AC           Functional Mobility    Functional Mobility- Comment declined OOB activity due to pain  -AC           Plan of Care Review    Plan of Care Reviewed With patient  -AC        Progress no change  -AC        Outcome Evaluation OT tx completed.  Pt c/o 10/10 pain in chest and trunk from coughing, per pt.  She was initially agreeable to therapy but after changing brief and rolling in bed, pt was in too much pain for any additional activity.  She changed brief and completed toileting hygiene  with min-modA in supine position.  Pt able to roll L and R independently and scoot up in bed with Santosh.  She is severely limited in her endurance and activity tolerance from pain but was also significantly SOA after minimal activity.  OT will continue to treat as pt is able to participate. Will need to time treatment around pt's pain meds  -AC           Positioning and Restraints    Pre-Treatment Position in bed  -AC        Post Treatment Position bed  -AC        In Bed fowlers;call light within reach;encouraged to call for assist;side rails up x2  -AC                  User Key  (r) = Recorded By, (t) = Taken By, (c) = Cosigned By      Initials Name Effective Dates    AC Norma Slade N, OTR/L, CNT 02/03/23 -                      Occupational Therapy Education       Title: PT OT SLP Therapies (In Progress)       Topic: Occupational Therapy (In Progress)       Point: ADL training (Done)       Description:   Instruct learner(s) on proper safety adaptation and remediation techniques during self care or transfers.   Instruct in proper use of assistive devices.                  Learning Progress Summary             Patient Acceptance, E, VU by JOSE at 9/26/2024 1330    Acceptance, E, VU by JOSE at 9/26/2024 1148                         Point: Home exercise program (Not Started)       Description:   Instruct learner(s) on appropriate technique for monitoring, assisting and/or progressing therapeutic exercises/activities.                  Learner Progress:  Not documented in this visit.              Point: Precautions (Done)       Description:   Instruct learner(s) on prescribed precautions during self-care and functional transfers.                  Learning Progress Summary             Patient Acceptance, E, VU by JOSE at 9/26/2024 1330    Acceptance, E, VU by JOSE at 9/26/2024 1148                         Point: Body mechanics (Done)       Description:   Instruct learner(s) on proper positioning and spine alignment during  self-care, functional mobility activities and/or exercises.                  Learning Progress Summary             Patient Acceptance, E, VU by JOSE at 9/26/2024 2780                                         User Key       Initials Effective Dates Name Provider Type Discipline    JOSE 07/11/23 -  Betsy Renee OTR/L, CSRS Occupational Therapist OT                      OT Recommendation and Plan     Plan of Care Review  Plan of Care Reviewed With: patient  Progress: no change  Outcome Evaluation: OT tx completed.  Pt c/o 10/10 pain in chest and trunk from coughing, per pt.  She was initially agreeable to therapy but after changing brief and rolling in bed, pt was in too much pain for any additional activity.  She changed brief and completed toileting hygiene with min-modA in supine position.  Pt able to roll L and R independently and scoot up in bed with Santosh.  She is severely limited in her endurance and activity tolerance from pain but was also significantly SOA after minimal activity.  OT will continue to treat as pt is able to participate. Will need to time treatment around pt's pain meds  Plan of Care Reviewed With: patient  Outcome Evaluation: OT tx completed.  Pt c/o 10/10 pain in chest and trunk from coughing, per pt.  She was initially agreeable to therapy but after changing brief and rolling in bed, pt was in too much pain for any additional activity.  She changed brief and completed toileting hygiene with min-modA in supine position.  Pt able to roll L and R independently and scoot up in bed with Santosh.  She is severely limited in her endurance and activity tolerance from pain but was also significantly SOA after minimal activity.  OT will continue to treat as pt is able to participate. Will need to time treatment around pt's pain meds     Outcome Measures       Row Name 09/27/24 1041             How much help from another is currently needed...    Putting on and taking off regular lower body clothing? 3   -AC      Bathing (including washing, rinsing, and drying) 3  -AC      Toileting (which includes using toilet bed pan or urinal) 3  -AC      Putting on and taking off regular upper body clothing 3  -AC      Taking care of personal grooming (such as brushing teeth) 3  -AC      Eating meals 4  -AC      AM-PAC 6 Clicks Score (OT) 19  -AC         Functional Assessment    Outcome Measure Options AM-PAC 6 Clicks Daily Activity (OT)  -AC                User Key  (r) = Recorded By, (t) = Taken By, (c) = Cosigned By      Initials Name Provider Type    Slade Marrufo OTR/L, BETZAIDA Occupational Therapist                    Time Calculation:    Time Calculation- OT       Row Name 09/27/24 1157             Time Calculation- OT    OT Start Time 1041  -AC      OT Stop Time 1105  -AC      OT Time Calculation (min) 24 min  -AC      Total Timed Code Minutes- OT 24 minute(s)  -AC      OT Received On 09/27/24  -AC         Timed Charges    28437 - OT Self Care/Mgmt Minutes 24  -AC         Total Minutes    Timed Charges Total Minutes 24  -AC       Total Minutes 24  -AC                User Key  (r) = Recorded By, (t) = Taken By, (c) = Cosigned By      Initials Name Provider Type    Slade Marrufo OTR/L, BETZAIDA Occupational Therapist                  Therapy Charges for Today       Code Description Service Date Service Provider Modifiers Qty    [id number] HC OT SELF CARE/MGMT/TRAIN EA 15 MIN 9/27/2024 Slade Green OTR/L, CNT GO 2                 BACILIO Simeon/L, CNT  9/27/2024

## 2024-09-27 NOTE — THERAPY EVALUATION
Patient Name: Arminda Jiménez  : 1960    MRN: [id number]                              Today's Date: 2024       Admit Date: 2024    Visit Dx:     ICD-10-CM ICD-9-CM   1. Dysphagia, unspecified type  R13.10 787.20   2. Unable to care for self  Z78.9 V49.89   3. Adenocarcinoma  C80.1 199.1   4. Elevated TSH  R79.89 794.5   5. Pulmonary emphysema, unspecified emphysema type  J43.9 492.8   6. Patchy atelectasis  J98.11 518.0   7. Adenocarcinoma of unknown origin  C80.1 199.1   8. Lymphadenopathy  R59.1 785.6   9. Vocal cord weakness  J38.00 478.30   10. Impaired mobility [Z74.09]  Z74.09 799.89     Patient Active Problem List   Diagnosis    Foraminal stenosis of lumbosacral region    Chronic back pain    Radiculopathy    Lumbar stenosis    Panlobular emphysema    Gastroesophageal reflux disease without esophagitis    Constipation due to opioid therapy    PAD (peripheral artery disease)    Primary osteoarthritis involving multiple joints    Recurrent major depressive disorder    Overweight    Personal history of nicotine dependence    Adenocarcinoma of unknown origin    Environmental allergies    Primary hypertension    Senile osteoporosis    Open left ankle fracture    Fall    Postoperative anemia due to acute blood loss    Acute foot pain, left    Impaired gait and mobility due to left ankle fracture    Chest pain, unspecified type    COPD (chronic obstructive pulmonary disease)    Overweight (BMI 25.0-29.9)    Displaced fracture of medial malleolus of left tibia, subsequent encounter for closed fracture with nonunion    Dysphagia    Lymphadenopathy    Vocal cord weakness    Metastatic carcinoma    Weight loss    Cancer associated pain    Bacteremia due to Enterococcus    Aspiration pneumonia    Chronic pain    Severe malnutrition     Past Medical History:   Diagnosis Date    Adenocarcinoma of unknown origin 2022    Anxiety     Arthritis     Asthma     Cancer     right lung,abd,and pelvic area     Chronic back pain     COPD (chronic obstructive pulmonary disease)     Depression     Environmental allergies 04/11/2022    Facet arthropathy     Foraminal stenosis of lumbosacral region     GERD (gastroesophageal reflux disease)     H/O degenerative disc disease     History of blood transfusion     PVD (peripheral vascular disease)     Radiculopathy     Thyroid nodule      Past Surgical History:   Procedure Laterality Date    ANKLE ARTHROPLASTY Left 8/29/2023    Procedure: TOTAL ANKLE ARTHROPLASTY WITH INBONE IMPLANT, GASTROCNEMIUS RECESSION, REMOVAL OF HARDWARE DEEP. LEFT ANKLE;  Surgeon: Bartolome Olguin DPM;  Location:  [facility];  Service: Podiatry;  Laterality: Left;    ANKLE OPEN REDUCTION INTERNAL FIXATION Left 01/05/2023    Procedure: REPAIR OF NONUNION MEDIAL MALLEOLUS, REPAIR NONUNION FIBULA, BONE GRAFT HARVEST, LEFT ANKLE;  Surgeon: Bartolome Olguin DPM;  Location:  [facility];  Service: Podiatry;  Laterality: Left;    ANKLE SURGERY Left     ANTERIOR LUMBAR EXPOSURE N/A 05/07/2018    Procedure: ANTERIOR LUMBAR EXPOSURE;  Surgeon: Chris Haley DO;  Location:  [facility];  Service: Vascular    APPENDECTOMY      CARPAL TUNNEL RELEASE Bilateral     INCISION AND DRAINAGE OF WOUND Left 05/08/2022    Procedure: Incision and Drainage Left Open Ankle Fracture, Open Reduction Internal Fixation Bimalleolar Ankle Fracture;  Surgeon: Leland Aranda MD;  Location:  [facility];  Service: Orthopedics;  Laterality: Left;    LUMBAR FUSION N/A 05/07/2018    Procedure: ANTERIOR DECOMPRESSION, ANTERIOR LUMBAR INTERBODY FUSION WITH INSTRUMENTATION L5-S1;  Surgeon: LUIS CARLOS Zheng MD;  Location:  [facility];  Service: Orthopedic Spine    LUMBAR FUSION Right 01/02/2019    Procedure: RIGHT LATERAL LUMBAR INTERBODY FUSION L3-5;  Surgeon: LUIS CARLOS Zheng MD;  Location:  [facility];  Service: Orthopedic Spine    LUMBAR LAMINECTOMY WITH FUSION N/A 05/09/2018    Procedure: POSTERIOR SPINAL FUSION WITH INSTRUMENTATION L5-S1;   Surgeon: LUIS CARLOS Zheng MD;  Location:  [facility];  Service: Orthopedic Spine    LUMBAR LAMINECTOMY WITH FUSION Right 01/04/2019    Procedure: POSTERIOR SPINAL FUSION WITH INSTRUMENTATION RIGHT L3-S1;  Surgeon: LUIS CARLOS Zheng MD;  Location:  [facility];  Service: Orthopedic Spine    OTHER SURGICAL HISTORY Bilateral 2017    Stent implants [facility] [place]    PAIN PUMP INSERTION/REVISION Right     FENTANYL    WRIST FUSION Right       General Information       Row Name 09/27/24 1040          Physical Therapy Time and Intention    Document Type evaluation  dysphagia, vocal cord weakness, adenocarcinoma of unknown origin  -MS     Mode of Treatment physical therapy;co-treatment  -MS       Row Name 09/27/24 1040          General Information    Patient Profile Reviewed yes  -MS     Prior Level of Function independent:;transfer;bed mobility;min assist:;ADL's  w/c for mobility  -MS     Existing Precautions/Restrictions fall;oxygen therapy device and L/min  -MS     Barriers to Rehab medically complex;previous functional deficit;physical barrier  -MS       Row Name 09/27/24 1040          Living Environment    People in Home child(leno), adult  -MS       Row Name 09/27/24 1040          Cognition    Orientation Status (Cognition) oriented x 4  -MS       Row Name 09/27/24 1040          Safety Issues, Functional Mobility    Impairments Affecting Function (Mobility) balance;endurance/activity tolerance;strength;pain  -MS               User Key  (r) = Recorded By, (t) = Taken By, (c) = Cosigned By      Initials Name Provider Type    Betty Dill, PT, DPT, NCS Physical Therapist                   Mobility       Row Name 09/27/24 1040          Bed Mobility    Bed Mobility rolling left;rolling right;scooting/bridging  -MS     Rolling Left [place] (Bed Mobility) independent  -MS     Rolling Right [place] (Bed Mobility) independent  -MS     Scooting/Bridging [place] (Bed Mobility) minimum assist (75%  patient effort);2 person assist  -MS     Assistive Device (Bed Mobility) head of bed elevated;bed rails;draw sheet  -MS               User Key  (r) = Recorded By, (t) = Taken By, (c) = Cosigned By      Initials Name Provider Type    Betty Dill LE, PT, DPT, NCS Physical Therapist                   Obj/Interventions       Row Name 09/27/24 1040          Range of Motion Comprehensive    General Range of Motion bilateral upper extremity ROM WFL;bilateral lower extremity ROM WFL  -MS       Row Name 09/27/24 1040          Strength Comprehensive (MMT)    Comment, General Manual Muscle Testing (MMT) Assessment grossly 4/5  -MS       Row Name 09/27/24 1040          Sensory Assessment (Somatosensory)    Sensory Assessment (Somatosensory) sensation intact  -MS               User Key  (r) = Recorded By, (t) = Taken By, (c) = Cosigned By      Initials Name Provider Type    Betty Dill LE, PT, DPT, NCS Physical Therapist                   Goals/Plan       [facility] Name 09/27/24 1040          Bed Mobility Goal 1 (PT)    Activity/Assistive Device (Bed Mobility Goal 1, PT) bed mobility activities, all  -MS     [place]/Cues Needed (Bed Mobility Goal 1, PT) independent  -MS     Time Frame (Bed Mobility Goal 1, PT) long term goal (LTG);by discharge  -MS     Progress/Outcomes (Bed Mobility Goal 1, PT) new goal  -MS       [facility] Name 09/27/24 1040          Transfer Goal 1 (PT)    Activity/Assistive Device (Transfer Goal 1, PT) sit-to-stand/stand-to-sit;bed-to-chair/chair-to-bed;wheelchair transfer  -MS     [place]/Cues Needed (Transfer Goal 1, PT) contact guard required  -MS     Time Frame (Transfer Goal 1, PT) long term goal (LTG);by discharge  -MS     Progress/Outcome (Transfer Goal 1, PT) new goal  -MS       Row Name 09/27/24 1040          Therapy Assessment/Plan (PT)    Planned Therapy Interventions (PT) balance training;bed mobility training;patient/family education;transfer training;wheelchair  management/propulsion training  -MS               User Key  (r) = Recorded By, (t) = Taken By, (c) = Cosigned By      Initials Name Provider Type    MS Betty Kuhn R, PT, DPT, NCS Physical Therapist                   Clinical Impression       Row Name 09/27/24 1040          Pain    Pretreatment Pain Rating 10/10  -MS     Posttreatment Pain Rating 10/10  -MS     Pain Location - chest  -MS     Pain Intervention(s) Repositioned;Ambulation/increased activity  -MS       Row Name 09/27/24 1040          Plan of Care Review    Plan of Care Reviewed With patient  -MS     Progress no change  -MS     Outcome Evaluation The patient presents alert and oriented x4 lying in bed. She reports pain 10/10 in her chest from coughing. She was given IV pain medication at 9:30 and cannot have her pain pills due to being NPO for a procedure. She does not want to move much due to her pain. She demonstrated ability to roll side to side and bridge. This fatigued her and increased her pain too much to perform any other activity. She would like to continue to working with therapy as she can tolerate. PT will continue checking on her and try to coordinate with pain medication. Recommend discharge home with 24/7 care and if this is not available then to SNF.  -MS       Row Name 09/27/24 1040          Therapy Assessment/Plan (PT)    Patient/Family Therapy Goals Statement (PT) decrease pain  -MS     Rehab Potential (PT) good, to achieve stated therapy goals  -MS     Criteria for Skilled Interventions Met (PT) yes;meets criteria;skilled treatment is necessary  -MS     Therapy Frequency (PT) daily  -MS     Predicted Duration of Therapy Intervention (PT) until discharge  -MS       Row Name 09/27/24 1040          Positioning and Restraints    Post Treatment Position bed  -MS     In Bed fowlers;call light within reach;encouraged to call for assist;side rails up x2;notified nsg  -MS               User Key  (r) = Recorded By, (t) = Taken By, (c) =  Cosigned By      Initials Name Provider Type    Betty Dill LE, PT, DPT, NCS Physical Therapist                   Outcome Measures       Row Name 09/27/24 1040 09/27/24 0855       How much help from another person do you currently need...    Turning from your back to your side while in flat bed without using bedrails? 3  -MS 4  -SS    Moving from lying on back to sitting on the side of a flat bed without bedrails? 3  -MS 4  -SS    Moving to and from a bed to a chair (including a wheelchair)? 1  -MS 4  -SS    Standing up from a chair using your arms (e.g., wheelchair, bedside chair)? 1  -MS 3  -SS    Climbing 3-5 steps with a railing? 1  -MS 1  -SS    To walk in hospital room? 1  -MS 1  -SS    AM-PAC 6 Clicks Score (PT) 10  -MS 17  -SS    Highest Level of Mobility Goal 4 --> Transfer to chair/commode  -MS 5 --> Static standing  -SS      Row Name 09/27/24 1041 09/27/24 1040       Functional Assessment    Outcome Measure Options AM-PAC 6 Clicks Daily Activity (OT)  -AC AM-PAC 6 Clicks Basic Mobility (PT)  -MS              User Key  (r) = Recorded By, (t) = Taken By, (c) = Cosigned By      Initials Name Provider Type    AC Slade Green, OTR/L, CNT Occupational Therapist    MS Kuhn Betty R, PT, DPT, NCS Physical Therapist    SS Sheri Barajas, RN Registered Nurse                                 Physical Therapy Education       Title: PT OT SLP Therapies (In Progress)       Topic: Physical Therapy (In Progress)       Point: Mobility training (Done)       Learning Progress Summary             Patient Acceptance, E, VU by MS at 9/27/2024 1430    Comment: role of PT in her care                         Point: Home exercise program (Not Started)       Learner Progress:  Not documented in this visit.              Point: Body mechanics (Not Started)       Learner Progress:  Not documented in this visit.              Point: Precautions (Not Started)       Learner Progress:  Not documented in this visit.                               User Key       Initials Effective Dates Name Provider Type Discipline    MS 07/11/23 -  Betty Kuhn, PT, DPT, NCS Physical Therapist PT                  PT Recommendation and Plan  Planned Therapy Interventions (PT): balance training, bed mobility training, patient/family education, transfer training, wheelchair management/propulsion training  Plan of Care Reviewed With: patient  Progress: no change  Outcome Evaluation: The patient presents alert and oriented x4 lying in bed. She reports pain 10/10 in her chest from coughing. She was given IV pain medication at 9:30 and cannot have her pain pills due to being NPO for a procedure. She does not want to move much due to her pain. She demonstrated ability to roll side to side and bridge. This fatigued her and increased her pain too much to perform any other activity. She would like to continue to working with therapy as she can tolerate. PT will continue checking on her and try to coordinate with pain medication. Recommend discharge home with 24/7 care and if this is not available then to SNF.     Time Calculation:         PT Charges       Row Name 09/27/24 1040             Time Calculation    Start Time 1040  15 min checking on pt yesterday  -MS      Stop Time 1120  -MS      Time Calculation (min) 40 min  -MS      PT Received On 09/27/24  -MS      PT Goal Re-Cert Due Date 10/07/24  -MS         Untimed Charges    PT Eval/Re-eval Minutes 55  -MS         Total Minutes    Untimed Charges Total Minutes 55  -MS       Total Minutes 55  -MS                User Key  (r) = Recorded By, (t) = Taken By, (c) = Cosigned By      Initials Name Provider Type    MS Kuhn Betty LE, PT, DPT, NCS Physical Therapist                      PT G-Codes  Outcome Measure Options: AM-PAC 6 Clicks Daily Activity (OT)  AM-PAC 6 Clicks Score (PT): 10  AM-PAC 6 Clicks Score (OT): 19  PT Discharge Summary  Anticipated Discharge Disposition (PT): home with 24/7 care, skilled  nursing facility    Betty Kuhn, PT, DPT, NCS  9/27/2024

## 2024-09-27 NOTE — CONSULTS
[facility] Palliative Care Services    Palliative Care Initial Consult   Attending Physician: Telly Villela MD  Referring Provider: Kentrell Villela MD    Reason for Referral: assistance with clarification of goals of care  Family/Support: To be determined    Code Status and Medical Interventions: CPR (Attempt to Resuscitate); Full Support   Ordered at: 09/24/24 2113     Level Of Support Discussed With:    Patient     Code Status (Patient has no pulse and is not breathing):    CPR (Attempt to Resuscitate)     Medical Interventions (Patient has pulse or is breathing):    Full Support     Goals of Care: TBD.    HPI:   64 y.o. 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 Khan has received 4 courses of carboplatin, Gemzar obtaining remission x 14 years.  FNA left neck 9/9 consistent with metastatic carcinoma rechallenged with systemic therapy carboplatin and Gemzar cycle 9/20/2024.  She was seen by Dr. Roberts who recommended ED evaluation leading to current hospitalization due to difficulty swallowing, getting choked on her food, pronounced weight loss, and dehydration with plans for excisional biopsy 9/27.  Additional health history positive for COPD, oxygen dependency anxiety, Arthritis, Asthma, Chronic back pain-followed by pain management s/p pain pump, Depression, Foraminal stenosis of lumbosacral region, GERD, H/O degenerative disc disease, PVD, Radiculopathy, left vocal cord paresis, and Thyroid nodule. Patient presented to [facility] on 9/24/2024 related to unable to swallow and weakness.  According to chart review planned biopsy 9/27 with ENT, Dr. Roberts and receiving chemotherapy.  ED workup shows ABGs with pO2 55/pCO2 44/O2 saturation 88% on 5 L.  Urinalysis shows 3+ ketones/1+ bilirubin/2+ protein/trace leuk  esterase/nitrite negative.  Respiratory panel negative.  Blood cultures pending.  Leukocytosis, hyponatremia, elevated alk phos, TSH 4.77.  Chest imaging shows emphysema attic changes of the lungs.  Patchy bibasilar infiltrates.  Started on IV antibiotics, liquid diet, supplemental oxygen, DuoNebs.  Nutrition, speech, ENT, and oncology consulted.  Evaluated by speech therapy who completed a video swallow study with findings of profound laryngeal penetration and aspiration, started on modified diet with honey thick liquid, purée consistency food on 9/25.  ENT plan for cervical lymph node biopsy/excision 9/27 as previously planned.  Evaluated by therapy who recommends home with assist and home health.  Infectious disease consulted.  Currently off unit for planned procedure.     1- Pain Assessment  Nonverbal Indicators of Pain: nonverbal indicators absent  Pain Description: constant, aching, sore    Past Medical History:   Diagnosis Date    Adenocarcinoma of unknown origin 04/04/2022    Anxiety     Arthritis     Asthma     Cancer     right lung,abd,and pelvic area    Chronic back pain     COPD (chronic obstructive pulmonary disease)     Depression     Environmental allergies 04/11/2022    Facet arthropathy     Foraminal stenosis of lumbosacral region     GERD (gastroesophageal reflux disease)     H/O degenerative disc disease     History of blood transfusion     PVD (peripheral vascular disease)     Radiculopathy     Thyroid nodule      Past Surgical History:   Procedure Laterality Date    ANKLE ARTHROPLASTY Left 8/29/2023    Procedure: TOTAL ANKLE ARTHROPLASTY WITH INBONE IMPLANT, GASTROCNEMIUS RECESSION, REMOVAL OF HARDWARE DEEP. LEFT ANKLE;  Surgeon: Bartolome Olguin DPM;  Location: [facility];  Service: Podiatry;  Laterality: Left;    ANKLE OPEN REDUCTION INTERNAL FIXATION Left 01/05/2023    Procedure: REPAIR OF NONUNION MEDIAL MALLEOLUS, REPAIR NONUNION FIBULA, BONE GRAFT HARVEST, LEFT ANKLE;  Surgeon: Sharif  Bartolome GALAN DPM;  Location:  [facility];  Service: Podiatry;  Laterality: Left;    ANKLE SURGERY Left     ANTERIOR LUMBAR EXPOSURE N/A 2018    Procedure: ANTERIOR LUMBAR EXPOSURE;  Surgeon: Chris Haley DO;  Location:  [facility];  Service: Vascular    APPENDECTOMY      CARPAL TUNNEL RELEASE Bilateral     INCISION AND DRAINAGE OF WOUND Left 2022    Procedure: Incision and Drainage Left Open Ankle Fracture, Open Reduction Internal Fixation Bimalleolar Ankle Fracture;  Surgeon: Leland Aranda MD;  Location:  [facility];  Service: Orthopedics;  Laterality: Left;    LUMBAR FUSION N/A 2018    Procedure: ANTERIOR DECOMPRESSION, ANTERIOR LUMBAR INTERBODY FUSION WITH INSTRUMENTATION L5-S1;  Surgeon: LUIS CARLOS Zheng MD;  Location:  [facility];  Service: Orthopedic Spine    LUMBAR FUSION Right 2019    Procedure: RIGHT LATERAL LUMBAR INTERBODY FUSION L3-5;  Surgeon: LUIS CARLOS Zheng MD;  Location:  [facility];  Service: Orthopedic Spine    LUMBAR LAMINECTOMY WITH FUSION N/A 2018    Procedure: POSTERIOR SPINAL FUSION WITH INSTRUMENTATION L5-S1;  Surgeon: LUIS CARLOS Zheng MD;  Location:  [facility];  Service: Orthopedic Spine    LUMBAR LAMINECTOMY WITH FUSION Right 2019    Procedure: POSTERIOR SPINAL FUSION WITH INSTRUMENTATION RIGHT L3-S1;  Surgeon: LUIS CARLOS Zheng MD;  Location:  [facility];  Service: Orthopedic Spine    OTHER SURGICAL HISTORY Bilateral 2017    Stent implants BLE [address] [place]    PAIN PUMP INSERTION/REVISION Right     FENTANYL    WRIST FUSION Right      Social History     Socioeconomic History    Marital status:    Tobacco Use    Smoking status: Former     Current packs/day: 0.00     Average packs/day: 1.5 packs/day for 30.0 years (45.0 ttl pk-yrs)     Types: Cigarettes     Start date:      Quit date: 2010     Years since quittin.7    Smokeless tobacco: Never   Vaping Use    Vaping status: Never Used   Substance and Sexual Activity    Alcohol use: No     Drug use: No    Sexual activity: Defer         Current Facility-Administered Medications:     [Transfer Hold] acetaminophen (TYLENOL) suppository 650 mg, 650 mg, Rectal, Q4H PRN, Hortencia Taylor DO    [Transfer Hold] albuterol (PROVENTIL) nebulizer solution 0.083% 2.5 mg/3mL, 2.5 mg, Nebulization, Q4H PRN, Jonathon Chew MD    [Transfer Hold] sennosides-docusate (PERICOLACE) 8.6-50 MG per tablet 2 tablet, 2 tablet, Oral, BID PRN **AND** [Transfer Hold] polyethylene glycol (MIRALAX) packet 17 g, 17 g, Oral, Daily PRN **AND** [Transfer Hold] bisacodyl (DULCOLAX) EC tablet 5 mg, 5 mg, Oral, Daily PRN **AND** [Transfer Hold] bisacodyl (DULCOLAX) suppository 10 mg, 10 mg, Rectal, Daily PRN, Hortencia Taylor DO    famotidine (PEPCID) injection 20 mg, 20 mg, Intravenous, Q12H, Hortencia Taylor DO, 20 mg at 09/27/24 0931    [Transfer Hold] HYDROcodone-acetaminophen (NORCO) 5-325 MG per tablet 1 tablet, 1 tablet, Oral, Q6H PRN, Jonathon Chew MD, 1 tablet at 09/26/24 1320    [Transfer Hold] HYDROmorphone (DILAUDID) injection 0.5 mg, 0.5 mg, Intravenous, Q4H PRN, Hortencia Taylor DO, 0.5 mg at 09/27/24 0935    [Transfer Hold] ipratropium-albuterol (DUO-NEB) nebulizer solution 3 mL, 3 mL, Nebulization, 4x Daily - RT, Jonathon Chew MD, 3 mL at 09/27/24 1058    lactated ringers infusion, 100 mL/hr, Intravenous, Continuous, Hortencia Taylor DO, Last Rate: 100 mL/hr at 09/27/24 1240, Currently Infusing at 09/27/24 1240    lactated ringers infusion, 100 mL/hr, Intravenous, Continuous, Shira Renee MD, Last Rate: 100 mL/hr at 09/27/24 1248, 100 mL/hr at 09/27/24 1248    [Transfer Hold] ondansetron (ZOFRAN) injection 4 mg, 4 mg, Intravenous, Q6H PRN, Hortencia Taylor, DO    Pharmacy to Dose Zosyn, , Does not apply, Continuous PRN, Jonathon Chew MD    piperacillin-tazobactam (ZOSYN) 4.5 g IVPB in 100 mL NS MBP (CD), 4.5 g, Intravenous, Q8H, Jonathon Chew MD, Currently  "Infusing at 09/27/24 1227    [COMPLETED] Insert Peripheral IV, , , Once **AND** [Transfer Hold] sodium chloride 0.9 % flush 10 mL, 10 mL, Intravenous, PRN, Dom Albright PA-C    [Transfer Hold] sodium chloride 0.9 % flush 10 mL, 10 mL, Intravenous, Q12H, Hortencia Taylor, , 10 mL at 09/27/24 0929    [Transfer Hold] sodium chloride 0.9 % flush 10 mL, 10 mL, Intravenous, PRN, Hortencia Taylor DO    sodium chloride 0.9 % flush 3 mL, 3 mL, Intravenous, Q12H, Shira Renee MD    sodium chloride 0.9 % flush 3-10 mL, 3-10 mL, Intravenous, PRN, Shira Renee MD    [Transfer Hold] sodium chloride 0.9 % infusion 40 mL, 40 mL, Intravenous, PRN, Hortencia Taylor DO    sodium chloride 0.9 % infusion 40 mL, 40 mL, Intravenous, PRN, Shira Renee MD    sodium chloride 0.9 % infusion, 125 mL/hr, Intravenous, Continuous, Dom Albright PA-C, Last Rate: 125 mL/hr at 09/24/24 1920, 125 mL/hr at 09/24/24 1920    Allergies   Allergen Reactions    Aspirin Nausea And Vomiting     Unknown reaction    Keflex [Cephalexin] Other (See Comments)     Blisters to nose and mouth    Nitrofurantoin GI Intolerance and Nausea And Vomiting    Creatine Monohydrate Unknown - Low Severity     I have utilized all available immediate resources to obtain, update, or review the patient's current medications (including all prescriptions, over-the-counter products, herbals, cannabis/cannabidiol products, and vitamin/mineral/dietary (nutritional) supplements) for name, route of administration, type, dose and frequency.      Intake/Output Summary (Last 24 hours) at 9/27/2024 1303  Last data filed at 9/27/2024 1105  Gross per 24 hour   Intake 240 ml   Output 1750 ml   Net -1510 ml       Physical Exam:    Diagnostics: Reviewed  /83 (BP Location: Right arm, Patient Position: Lying) Comment: nurse notified  Pulse 108   Temp 97.8 °F (36.6 °C) (Oral)   Resp 16   Ht 154.9 cm (60.98\")   Wt 45.6 kg (100 lb 9.6 oz)   " SpO2 94%   BMI 19.02 kg/m²     Nutritional status: Albumin 2.6 Body mass index is 19.02 kg/m².         Hospital Problem List      Dysphagia    Adenocarcinoma of unknown origin    Lymphadenopathy    Vocal cord weakness    Impression/Problem List:    Stage IV adenocarcinoma of unknown origin (2010) with metastatic disease to mediastinal, retroperitoneal, gastrohepatic, as well as, left neck adenopathy      Acute respiratory failure  Oxygen dependency  Anemia  Dysphagia  Vocal cord weakness  Unable to care for self  Elevated TSH  COPD/emphysema  Unintended weight loss, 50 pounds over the last 1 year  Anxiety/depression  Asthma  Chronic back pain-H/O degenerative disc disease  GERD  PVD       Recommendations/Plan:  1. plan: Goals of care include to be determined.    Family support: The patient receives support from her daughter..  Advance Directives: Advance Directive Status: Patient does not have advance directive   POA/Healthcare surrogate-Anay barber-next of kin.    2.  Palliative care encounter  - Prognosis is poor long-term secondary to stage IV adenocarcinoma, respiratory failure, oxygen dependency, unintended weight loss, and multiple comorbidities.    -followed by Dr. Khan has received 4 courses of carboplatin, Gemzar obtaining remission x 14 years, now with progression of disease.     9/9- FNA left neck consistent with metastatic carcinoma   9/20-rechallenged with systemic therapy carboplatin and Gemzar     -She was seen by Dr. Roberts who recommended ED evaluation leading to current hospitalization due to difficulty swallowing, getting choked on her food, pronounced weight loss, and dehydration with plans for excisional biopsy 9/27.      -Started on IV antibiotics, liquid diet, supplemental oxygen, DuoNebs.  -Evaluated by speech therapy who completed a video swallow study with findings of profound laryngeal penetration and aspiration, started on modified diet with honey thick liquid, purée consistency  food on 9/25.    9/27-ENT plan for cervical lymph node biopsy/excision as previously planned.  -Evaluated by therapy who recommends home with assist and home health.      9/27-Currently off unit for planned procedure.   Will plan to follow-up with patient and family on Monday.      Thank you for this consult and allowing us to participate in patient's plan of care. Palliative Care Team will continue to follow patient.     Nika Castaneda, ELISA  9/27/2024  13:03 CDT

## 2024-09-27 NOTE — PLAN OF CARE
Goal Outcome Evaluation:           Progress: no change   Pt A/Ox4. VSS on 3L NC. IV to RAC, infusing. Soft spoken.  Biopsy today, R neck incision, marked drainage. Purewick. Meds whole in applesauce. C/o pain, PRN meds given. K+ replacement for low K+. Cough with congestion, nebs completed. Wheelchair bound. Palliative care consulted today. Last BM 9/27. SCDs. Will have chemo outpatient per oncology. Safety maintained. Call light in reach.

## 2024-09-27 NOTE — PROGRESS NOTES
[facility] Emergency Medicine Services  INPATIENT PROGRESS NOTE    Patient Name: Arminda Jiménez  Date of Admission: 9/24/2024  Today's Date: 09/27/24  Length of Stay: 3  Primary Care Physician: Teddy Sanchez MD    Subjective   Chief Complaint: Pain  HPI   I saw patient in the preop area as she was getting ready to go back for her excisional biopsy with Dr. Roberts with ENT.  Her main complaint has been pain.  She reports having pain in her back, pain is located in the midline, and more involving her upper back.  She states that she is followed by pain management in the outpatient setting and has a pain pump.  She also reports having generalized abdominal discomfort.  She does endorse having episodes of aspiration, particularly with most liquid intake.  She has an ongoing cough with congestion, currently on 3 L by nasal cannula, and states that she was only recently started on 2 L by nasal cannula at home.    Review of Systems   All pertinent negatives and positives are as above. All other systems have been reviewed and are negative unless otherwise stated.     Objective    Temp:  [97.8 °F (36.6 °C)-98.4 °F (36.9 °C)] 97.8 °F (36.6 °C)  Heart Rate:  [] 108  Resp:  [16-18] 16  BP: (162-175)/(78-83) 174/83  Physical Exam  Vitals reviewed.   Constitutional:       Appearance: She is ill-appearing.   HENT:      Head: Normocephalic.      Mouth/Throat:      Mouth: Mucous membranes are moist.   Cardiovascular:      Rate and Rhythm: Regular rhythm. Tachycardia present.   Pulmonary:      Effort: Pulmonary effort is normal.      Breath sounds: Rhonchi present.      Comments: On 3LNC; coarse bilateral BSs  Abdominal:      Tenderness: There is abdominal tenderness. There is no guarding or rebound.      Comments: Thin; some generalized TTP (nonfocal)   Musculoskeletal:         General: No deformity.   Skin:     General: Skin is warm.   Neurological:      Mental Status: She is alert.      Motor:  Weakness present.   Psychiatric:         Mood and Affect: Mood normal.         Results Review:  I have reviewed the labs, radiology results, and diagnostic studies.    Laboratory Data:   Results from last 7 days   Lab Units 09/27/24  0606 09/26/24  0542 09/25/24  0728   WBC 10*3/mm3 12.27* 7.98 7.22   HEMOGLOBIN g/dL 11.0* 9.5* 8.6*   HEMATOCRIT % 34.2 31.1* 28.0*   PLATELETS 10*3/mm3 91* 114* 156        Results from last 7 days   Lab Units 09/27/24  0606 09/26/24  0542 09/25/24  0728 09/24/24  1833   SODIUM mmol/L 130* 133* 134* 135*   POTASSIUM mmol/L 2.9* 3.7 3.8 4.0   CHLORIDE mmol/L 88* 95* 96* 92*   CO2 mmol/L 29.0 30.0* 29.0 26.0   BUN mg/dL 3* 6* 10 15   CREATININE mg/dL 0.22* 0.24* 0.31* 0.30*   CALCIUM mg/dL 8.3* 8.0* 8.1* 9.3   BILIRUBIN mg/dL  --   --  0.3 0.3   ALK PHOS U/L  --   --  151* 238*   ALT (SGPT) U/L  --   --  20 33   AST (SGOT) U/L  --   --  18 29   GLUCOSE mg/dL 84 95 90 84       Culture Data:   Blood Culture   Date Value Ref Range Status   09/24/2024 Enterococcus hirae (C)  Final     Comment:       Infectious disease consultation is highly recommended.   09/24/2024 No growth at 2 days  Preliminary       Radiology Data:   Imaging Results (Last 24 Hours)       ** No results found for the last 24 hours. **            I have reviewed the patient's current medications.     Assessment/Plan   Assessment  Active Hospital Problems    Diagnosis     **Dysphagia     Metastatic carcinoma     Weight loss     Cancer associated pain     Bacteremia due to Enterococcus     Aspiration pneumonia     Chronic pain     Lymphadenopathy     Vocal cord weakness     COPD (chronic obstructive pulmonary disease)     Adenocarcinoma of unknown origin        Treatment Plan  To OR today (I saw patient in preop area this afternoon) for excisional biopsy with Dr. Roberts  Follow-up pathology  Currently on 3LNC -patient states that she was recently started on 2 L by nasal cannula in the outpatient setting.  Patient has been on  IV Zosyn for coverage of aspiration pneumonia in the setting of known dysphagia.  1 blood culture has returned positive for Enterococcus hirae which is susceptible to Ampicillin.  Plan to transition to IV Unasyn today as this should provide coverage against Enterococcus hirae but also provide adequate coverage for aspiration pneumonia as well.  Infectious disease consultation  Patient indicated to me that she underwent her first cycle of chemotherapy with Dr. Khan on 9/20/2024; next cycle anticipated on 9/30/2024  She estimates that she has had about 50 pounds of weight loss over the past 1+ year.  Nutrition consulted  Scheduled Duonebs  ST following  Repeat CXR in AM tomorrow  K replacement  CBC, BMP, Mg in AM  Appreciate consultants  Pain control has been an issue; will adjust her Norco to every 4 hours PRN; IV Dilaudid available for breakthrough pain  Palliative care consultation  Dispo planning    Medical Decision Making  Number and Complexity of problems: 3 acute; high complexity      Conditions and Status        Condition is unchanged.     MDM Data  External documents reviewed: none  Cardiac tracing (EKG, telemetry) interpretation: no new EKGs  Radiology interpretation: admission CXR reviewed revealed emphysematous changes and some scattered bilateral infiltrates  Labs reviewed: as above  Any tests that were considered but not ordered: none     Decision rules/scores evaluated (example [id number], Wells, etc): none     Discussed with: patient, Dr. Jacobs with Anesthesiology, bedside nurse Sheri     Care Planning  Shared decision making: Discussed with patient with agreement to proceed with treatment plan as outlined  Code status and discussions: Full code    Disposition  Social Determinants of Health that impact treatment or disposition: None apparent at this time  I expect the patient to be discharged: Still to be determined    Electronically signed by Telly Villela MD, 09/27/24, 13:19 CDT.

## 2024-09-27 NOTE — OP NOTE
OPERATIVE NOTE  9/27/2024    NAME: Arminda Jiménez    YOB: 1960  MRN: [id number]    PRE-OPERATIVE DIAGNOSIS:    Adenocarcinoma of unknown origin [C80.1]  Lymphadenopathy [R59.1]  Dysphagia, unspecified type [R13.10]  Vocal cord weakness [J38.00]    POST-OPERATIVE DIAGNOSIS:   Post-Op Diagnosis Codes:     * Adenocarcinoma of unknown origin [C80.1]     * Lymphadenopathy [R59.1]     * Dysphagia, unspecified type [R13.10]     * Vocal cord weakness [J38.00]    PROCEDURE PERFORMED:   Excisional biopsy right cervical lymph node    SURGEON:   Marek Roberts MD    ASSISTANT(S):   None    ANESTHESIA:   General Anesthesia via Endotracheal Tube    INDICATIONS: The patient is a 64 y.o. female with Adenocarcinoma of unknown origin [C80.1]  Lymphadenopathy [R59.1]  Dysphagia, unspecified type [R13.10]  Vocal cord weakness [J38.00]    PROCEDURE:  The patient was brought to the operating room, given General Anesthesia via Endotracheal Tube, and prepped and draped in the usual manner.       Approximately 5 mL of 1% Xylocaine with epinephrine was injected subcutaneously under planned excision site the right level III neck.  The incision was oriented horizontally made with #15 blade carried the incision down through the superficial layer of deep cervical fascia.  Minimal bleeding was encountered which was controlled with a combination of electrocautery, 3-0 silk ties and the [employer] Harm[employer] scalpel.  Circumferential dissection was accomplished of the III lymph node and it was carefully dissected from the internal jugular vein and surrounding fascial attachments.  The middle thyroid vein was encountered adherent to the node on its medial surface and was clamped cut and suture-ligated with 3-0 silk and irregular 3-0 silk tie.  The node was delivered subsequently and submitted fresh for permanent pathologic examination. Communication was made with the pathology department regarding treatment of the specimen.  The wound  was irrigated with copious amounts of normal saline solution and a small amount of Jana placed in the depths of the wound.  The incision was reapproximated utilizing interrupted 4-0 Monocryl subcutaneously and a running locked 5-0 nylon to reapproximate the epidermis.  Bactroban ointment, Steri-Strips, Mastisol and sterile dressing were applied.  The procedure was terminated.     The patient was transported upon extubation to the postanesthesia care unit in stable condition.      SPECIMENS:  Specimens       ID Source Type Tests Collected By Collected At Frozen?    A Neck Tissue TISSUE PATHOLOGY EXAM   Marek Roberts MD 9/27/24 1420 No    Description: fresh Right neck mass            COMPLICATIONS: NONE    ESTIMATED BLOOD LOSS:  Minimal         Marek Roberts MD  9/27/2024

## 2024-09-27 NOTE — PLAN OF CARE
Goal Outcome Evaluation:  Plan of Care Reviewed With: patient        Progress: no change  Outcome Evaluation: The patient presents alert and oriented x4 lying in bed. She reports pain 10/10 in her chest from coughing. She was given IV pain medication at 9:30 and cannot have her pain pills due to being NPO for a procedure. She does not want to move much due to her pain. She demonstrated ability to roll side to side and bridge. This fatigued her and increased her pain too much to perform any other activity. She would like to continue to working with therapy as she can tolerate. PT will continue checking on her and try to coordinate with pain medication. Recommend discharge home with 24/7 care and if this is not available then to SNF.      Anticipated Discharge Disposition (PT): home with 24/7 care, skilled nursing facility

## 2024-09-27 NOTE — CASE MANAGEMENT/SOCIAL WORK
Continued Stay Note   Ander     Patient Name: Arminda Jiménez  MRN: [id number]  Today's Date: 9/27/2024    Admit Date: 9/24/2024        Discharge Plan       Row Name 09/27/24 1016       Plan    Plan Comments Plan is for pt to return home with dtr when medically stable. Pt has dme/home oxygen. Pt may benefit from HH.                   Discharge Codes    No documentation.                 Expected Discharge Date and Time       Expected Discharge Date Expected Discharge Time    Sep 27, 2024               NIYA Quinn

## 2024-09-27 NOTE — ANESTHESIA PREPROCEDURE EVALUATION
Anesthesia Evaluation     Patient summary reviewed                Airway   Dental      Pulmonary    (+) pneumonia , COPD, asthma,home oxygen  Cardiovascular   Exercise tolerance: poor (<4 METS)    (+) hypertension, PVD      Neuro/Psych  GI/Hepatic/Renal/Endo    (+) GERD, thyroid problem thyroid nodules    ROS Comment: H/o stage IV adenocarcinoma of unknown primary in 2010.  She was treated successfully with 4 courses of carboplatin Gemzar obtaining a complete remission that was sustained for 14 years.  Workup has documented widespread metastatic disease with mediastinal, retroperitoneal, gastrohepatic as well as left neck adenopathy.  FNA of cervical node was c/w adenocarcinoma, additional tissue is needed for molecular studies. She was seen in office by Dr Roberts and sent to ER for overall weakness. Found to have UTI, pneumonia    Currently spo2 98 on 3L    Ct chest 9/5/24  1. Worsening metastatic mediastinal lymphadenopathy and probable stable bilateral hilar lymphadenopathy.   2. Enlarging solid nodule in the lingula measuring 2.9 x 1.8 cm, recent 2.41 0.6 cm is likely metastatic.   3. Stable cavitary nodule in the left upper lobe measuring 0.6 cm.   4. Stable solid spiculated nodule in the left upper lobe measuring 0.8 x 0.7 cm.   5. Possible new juxta fissural nodule in the right middle lobe and enlarging juxta fissural nodule measuring 0.3 cm.   6. New small moderate pleural effusion and bibasilar infiltrates.   7. Enlarging pancreatic mass and new metastatic hepatic lesions.       Musculoskeletal     (+) chronic pain  Abdominal    Substance History      OB/GYN          Other                      Anesthesia Plan    ASA 4     general   Rapid sequence  (Discussed possible post op ventilation and ICU transfer)  intravenous induction     Anesthetic plan, risks, benefits, and alternatives have been provided, discussed and informed consent has been obtained with: patient.      CODE STATUS:    Level Of Support  Discussed With: Patient  Code Status (Patient has no pulse and is not breathing): CPR (Attempt to Resuscitate)  Medical Interventions (Patient has pulse or is breathing): Full Support

## 2024-09-27 NOTE — PLAN OF CARE
Goal Outcome Evaluation:  Plan of Care Reviewed With: patient        Progress: no change  Outcome Evaluation: OT tx completed.  Pt c/o 10/10 pain in chest and trunk from coughing, per pt.  She was initially agreeable to therapy but after changing brief and rolling in bed, pt was in too much pain for any additional activity.  She changed brief and completed toileting hygiene with min-modA in supine position.  Pt able to roll L and R independently and scoot up in bed with Santosh.  She is severely limited in her endurance and activity tolerance from pain but was also significantly SOA after minimal activity.  OT will continue to treat as pt is able to participate. Will need to time treatment around pt's pain meds

## 2024-09-27 NOTE — PROGRESS NOTES
Her          Progress Note    Patient name: Arminda Jiménez  Patient : 1960  VISIT # [id number]  MR #[id number]  Room:     Subjective     Discussed at length with Dr. Kentrell Villela and her nurse Sheri DUGAN.    CBC today, 2024 has a WBC of 12.27 with a hemoglobin of 11 and a platelet count of 91,000    Excisional biopsy, right neck today, 2024 by Dr. 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  Dorcas was found to have stage IV adenocarcinoma of unknown primary in .  She was treated successfully with 4 courses of carboplatin Gemzar obtaining a complete remission that was sustained for 14 years.  Dorcas experienced a heel fracture 2 years ago.  After that event she has had chronic abdominal pain and has lost 50 pounds.  25 of the 50 pounds were lost over the course of the previous month.  Workup has documented widespread metastatic disease with mediastinal, retroperitoneal, gastrohepatic as well as left neck adenopathy.  An FNA of the left neck performed on 2024 reported malignant cells present consistent with metastatic carcinoma.  Further tissue was unavailable for molecular studies and further special stains.   Due to delays in further workup, Dr. Khan felt the patient needed to get on with his treatment and rechallenged her with carboplatin and Gemzar cycle #1 on 2024.   She has been referred to Dr. Marek Roberts with ENT at [facility] to obtain an excisional biopsy to be able to get tissue for molecular studies that will include tissue of origin, NGS, IHC studies etc.   Dorcas was seen by Dr. Marek Roberts who recommended that she be evaluated in the ED at [facility] for admission due to difficulty swallowing getting choked on her food, pronounced weight loss and dehydration anticipating an open excisional biopsy on Friday, 2024.  Medical oncology consultation  requested in continuity of care.           Detailed history copied from Dr. Khan's office note 9/20/2024     The patient is a pleasant 64 years old female who has a history of stage IV carcinoma of unknown primary site diagnosed in 2010 status post 4 cycles of carboplatin gemcitabine with complete response.  She has been in remission for 14 years.  More recently, she was found to have widespread metastatic disease with mediastinal adenopathy, retroperitoneal adenopathy, gastrohepatic mass and neck adenopathy.  She underwent ultrasound-guided FNA biopsy.  This result only in a scant amount of tissue for analysis.  She was then sent to ENT at [facility] for excisional biopsy.  Unfortunate, the patient missed appointment.  The patient was then sent to ENT at [facility].  ENT at [facility] referred the patient to [place], ENT for excisional biopsy.     Diagnosis  History of carcinoma no primary site, 2010  Liver lesions  Gastrohepatic space mass  Mediastinal adenopathy consistent cycles normal  Metastatic carcinoma, left neck lymph node, Sept 2024     Disease target  Mediastinal adenopathy  Hepatic lobe lesions  Pancreatic body/tail hypodense mass in the gastrohepatic ligament     Treatment summary  9/20/24 Initiate Carbo AUC 4 D1, Gemzar 800mg D1, D8 every 21 days. Treatment consent signed.          Cancer history  Arminda Jiménez was first seen by me on 9/5/2024.  I was consulted for findings of a gastric mass during patient was positioned with hospital.  The patient has a history of lung cancer, COPD.  She was a direct admit from her PCP office.  Patient has a 20 pounds weight loss.  She also had complained of dysphagia, nausea, dizziness decreased appetite.  The patient reports that she has a history of stage IV lung cancer diagnosed 10 years ago at [place].  She received chemotherapy and stay in remission.  She tells me that she did not have a biopsy performed at that time.  Apparently, review of  medical record showed that she had a carcinoma of unknown primary site diagnosed October 2010.  She received 2 cycles of carboplatin and paclitaxel.  She had a reaction to Taxol during cycle #2.  She was switched to carboplatin gemcitabine, completed March 2019.  Sites of disease in the past involve the pericardial, pleural, mediastinal, and cervical involvement.   7/30/24 CT chest: Extensive necrotic mediastinal and bilateral hilar lymphadenopathy consistent with metastatic disease. Lymph node in the right right paratracheal lymph node measures 1 cm short axis axial image 34 at the thoracic inlet.  AP window lymph node measures 1.2 cm short axis on axial image 71 with additional 1 cm AP window lymph node axial image 76.  Precarinal lymph node measures 1.1 cm on axial image 85.  Partially necrotic anterior mediastinal lymph node measures 1.4 cm short axis image 88 with similar appearing anterior mediastinal lymph node measuring 1.3 cm short axis image 69.  A partially necrotic subcarinal lymph node measures 1.8 cm short axis on axial image 96.  Right hilar lymph node measures 1.1 cm short axis image 95.  Partially necrotic and calcified left hilar lymph node measures 1.4 cm short axis image 118.  Left hilar lymph node measures 1.1 cm short axis on axial image 96. These lymph nodes are new since 2022. There is a 2.2 x 1.5 cm nodule abutting the left heart border axial image 140, which is either within the pericardial region were within the left lung parenchyma.  There is a spiculated 0.8 x 0.5 x 0.8 cm left upper lobe nodule axial image 89 and coronal  image 31 which is also new.  Probable focal bronchiectasis left upper lobe on axial image 76, less likely cavitary nodule.  0.3 cm left lower lobe nodule image 151 which may be endobronchial.  Question a 0.6 meters left lower lobe nodule along the diaphragm axial image 158.  Subpleural 0.4 cm right middle lobe nodule image 165.  Bilateral bronchial thickening some  areas of subsegmental atelectasis noted in both lobes.  Moderate emphysema.  There is no pleural effusion or pneumothorax. Centrally low density gastrohepatic ligament mass measures 3.3 x 3.2 cm axial image 193 with some adjacent wall thickening suggested in the proximal stomach.   7/30/24 CT abdomen/pelvis: Marked gastric wall thickening proximal stomach and gastric body; neoplasm is suspected, Correlation with symptomatology and EGD advised, Cystic structure in the gastrohepatic ligament measuring up to 4.9 cm, may represent a necrotic lymph node or mass less likely pancreatic suggestive. Correlation with PET scan and/or MRI advised.    08/12/24 CT soft tissue neck:  Mild asymmetry of the vocal cords, secondary to the vocal cord paralysis.  No visible nodule or mass of the larynx, Mild necrotic adenopathy of the right neck, consistent with metastatic disease, Since the prior CT chest from 07/30/2024, grossly stable right paratracheal necrotic node at level of the thoracic inlet, also consistent with metastatic disease.   9/5/2024-upper EGD, GI-gastric bezoar to explain the CT scan of the abdomen findings.  No gastric tumor.  No active ulcer disease to explain patient's nausea vomiting.  Probable gastroparesis.  9/5/2024-CT chest, [facility]:  There is a hypodense nodule in the left thyroid lobe measuring 1.2 cm.  A right tracheoesophageal lymph node is 1.9 cm in short axis on axial image number 16 with interval increase size from 1.2 cm in short axis when remeasured.  A right hilar lymph node is 1.1 cm in short axis without change.  A subcarinal lymph node is partially calcified and measures 2.5 cm in short axis without change.  This has low attenuation.  An additional right hilar lymph node is 1.5 cm in short axis and is probably unchanged.  A left hilar lymph node is 1 cm in short axis without change.  An AP window lymph node is at least 2.3 cm and has increased in size.  Anterior mediastinal lymph node is now 1.6 cm  in short axis, previously 1.3 cm.  A solid nodule in the lingula abuts the free wall of the left ventricle and measures 2.9 x 1.8 cm, previously 2.4 x 1.6 cm when remeasured. There is a small cavitary nodule in the left upper lobe measuring a cavitary nodule in the left upper lobe on series 604 image number 23  is unchanged measuring 0.6 cm. A solid spiculated nodule in the left upper lobe is probably overall unchanged measuring 0.8 cm x 0.7 cm on image number 26.  On there is a new solid juxta fissural nodule in the right middle lobe measuring 0.4 cm on image number 34.  There is an enlarging juxta fissural nodule in the right middle lobe measuring 0.3 cm image number 35.  The small to moderate pleural effusion layers dependently.  No left pleural fluid.  Bibasilar infiltrates are suggested. There is a large hypodense mass arising from the pancreatic body/tail extending to the gastrohepatic ligament measuring up to 5.4 x 4.1 cm.  This has increased in size.  There are multiple new hypodense hepatic lesions.    9/6/2024-CT thoracic and lumbar spine, [facility]: Multiple chronic thoracic compression fractures, diffuse degenerative disc disease.  Postop changes of the lumbar spine.  Loosening of the right L3 transpedicular screw.  Remaining hardware appears well-positioned.  Rim-enhancing complex cystic lesion in the gastrohepatic space.  9/9/2024-FNA biopsy by radiology: Consistent metastatic carcinoma.  However, further characterization of tumor could not be performed due to scant material.  9/10/2024-CT abdomen pelvis with contrast, [facility]: Interval development of mild to moderate dependent atelectasis of both lower  lobes and mild patchy ground-glass and consolidation of the lingula and to a lesser extent the right middle lobe.  3.1 cm left epicardial nodule, increased in size from 2.5 cm.  Interval development of multiple ill-defined hypodense liver masses.  The largest, of the left lobe near the IVC measures 3.3 cm.   Stable mild prominence of the intrahepatic biliary tree and mild distension of the common duct, measuring up to 10 mm in diameter.  No visible obstructing stone or mass.  Cholecystectomy, again noted.  Interval increase in size of the previously seen mass between the liver left lobe, stomach, aorta, and superior aspect of the pancreatic body.  Measures 6.0 x 4.5 cm cross section, increased from 3.5 x 3.3 cm.  Inferiorly, the mass abuts and blends in per separately of the pancreas.  Calcified granulomas of the spleen, again noted.  The adrenal glands have a normal appearance.  The kidneys enhance symmetrically.  0.9 cm cyst of the left kidney, again noted.  No ureteral stones or hydronephrosis.  The uterus and ovaries have a normal appearance.  No evidence of bowel obstruction.  Moderate amount of stool in the colon, similar to before.  Small fat and fluid containing right inguinal hernia, stable.  No free air.  Interval development of trace fluid in the pelvis.  Electronic device in subcutaneous fat of the right flank, with catheter entering the spinal canal, again noted.  Bone window images show no significant lytic or sclerotic bone lesions.  Surgical fixation of the lumbar spine, again noted.  Chronic mild compression fracture of the L1 superior endplate, stable.  Impression:  Interval increase in size of the mass of the epigastric region, most consistent with a malignant process, potentially metastatic adenopathy or a pancreatic mass.  Interval development of multiple hepatic metastases. 3.  3.1 cm left epicardial nodule, increased in size, suspicious for metastatic adenopathy or metastatic pleural disease.   Interval development of small to moderate dependent right pleural effusion, with atelectasis versus pneumonia of both lower lobes.   9/20/24 Initiate palliative chemotherapy Carbo AUC 4 D1, Gemzar 800mg D1, D8 every 21 days. Treatment consent signed.    9/20/2024-I have called [facility] and refer the patient  back.  She will be seen early next week.        REVIEW OF SYSTEMS:   Constitutional: Pronounced weight loss fatigue failure to thrive  HEENT:  dysphagia and hoarseness               Lungs: no cough, no shortness of breath, no wheeze  CVS: no palpitation, no chest pain, no shortness of breath  GI: no abdominal pain, no nausea , no vomiting, no constipation  MERCEDES: no dysuria, frequency and urgency, no hematuria, no kidney stones  Musculoskeletal: no joint pain, swelling , stiffness  Endocrine: no polyuria, polydipsia, no cold or heat intolerance  Hematology: no anemia, no easy bruising or bleeding, no history of clotting disorder  Dermatology: no skin rash, no eczema, no pruritus  Psychiatry: no depression, no anxiety,no panic attacks, no suicide ideation  Neurology: no syncope, no seizures, no numbness or tingling of hands, no numbness or tingling of feet, no paresis  Objective     Vital Signs  Temp:  [97.8 °F (36.6 °C)-98.4 °F (36.9 °C)] 97.8 °F (36.6 °C)  Heart Rate:  [] 108  Resp:  [16-18] 16  BP: (162-175)/(78-83) 174/83    Intake/Output Summary (Last 24 hours) at 9/27/2024 1158  Last data filed at 9/27/2024 1105  Gross per 24 hour   Intake 240 ml   Output 1750 ml   Net -1510 ml       PHYSICAL EXAM:  CONSTITUTIONAL: Alert, appropriate, no acute distress, very thin, cachectic  EYES: Anicteric, EOM intact, pupils equal round   ENT: Mucous membranes moist, no oropharyngeal lesions   NECK: Supple, Cervical lymphadenopathy on the right   CHEST/LUNGS: CTA bilaterally, normal respiratory effort   CARDIOVASCULAR: RRR, no murmurs  ABDOMEN: soft nontender, active bowel sounds, no HSM  EXTREMITIES: warm, moves all fours  SKIN: warm, dry with no rashes or lesions  LYMPH: No cervical, clavicular, axillary, or inguinal lymphadenopathy  NEUROLOGIC: follows commands, nonfocal   PSYCH: mood and affect appropriate      CBC  Results from last 7 days   Lab Units 09/27/24  0606 09/26/24  0542 09/25/24  0728 09/24/24  1833   WBC  10*3/mm3 12.27* 7.98 7.22 15.04*   HEMOGLOBIN g/dL 11.0* 9.5* 8.6* 12.3   HEMATOCRIT % 34.2 31.1* 28.0* 38.5   PLATELETS 10*3/mm3 91* 114* 156 292   LYMPHOCYTE % % 6.1*  --   --  7.1*   MONOCYTES % % 1.0*  --   --  0.0*       CMP  Lab Results   Component Value Date    GLUCOSE 84 09/27/2024    BUN 3 (L) 09/27/2024    CREATININE 0.22 (L) 09/27/2024    EGFRIFNONA 114 01/07/2019    BCR 13.6 09/27/2024    CO2 29.0 09/27/2024    CALCIUM 8.3 (L) 09/27/2024    ALBUMIN 2.6 (L) 09/25/2024    AST 18 09/25/2024    ALT 20 09/25/2024             Blood Culture   Date Value Ref Range Status   09/24/2024 Abnormal Stain (C)  Preliminary   09/24/2024 No growth at 24 hours  Preliminary       Imaging Results (Last 72 Hours)       Procedure Component Value Units Date/Time    FL Video Swallow With Speech Single Contrast [[id number]] Collected: 09/25/24 1032     Updated: 09/25/24 1037    Narrative:      EXAMINATION: FL VIDEO SWALLOW W SPEECH SINGLE-CONTRAST-     9/25/2024 9:22 AM     HISTORY: dysphagia; R13.10-Dysphagia, unspecified; Z78.9-Other specified  health status; C80.1-Malignant (primary) neoplasm, unspecified;  R79.89-Other specified abnormal findings of blood chemistry;  J43.9-Emphysema, unspecified; J98.11-Atelectasis     Fluoroscopy was performed during ingestion of thin, nectar consistency,  and honey consistency pudding consistency and mechanical soft contrast  boluses.     There is no previous study for comparison.     There is no difficulty in initiating the swallowing.     Normal propagation through oropharynx. No nasopharyngeal reflux.     There is significant laryngeal penetration and aspiration with thin and  nectar consistency contrast bolus. This was followed by cough and  expectoration.     There is moderate residue with the honey consistency, pudding  consistency and mechanical soft boluses which cleared on subsequent  swallows. No laryngeal penetration or aspiration.     The study was performed under supervision of  speech therapist.       Impression:      1. Abnormal swallow function study as detailed above.  2. Fluoroscopy time: 3 minutes 14 seconds.  3. The dose: 6.83mGy.  4. Number of images: 1        This report was signed and finalized on 9/25/2024 10:34 AM by Dr. Arminda Lester MD.       XR Chest 1 View [[id number]] Collected: 09/24/24 1927     Updated: 09/24/24 1931    Narrative:      EXAMINATION: XR CHEST 1 VW-  9/24/2024 7:27 PM     HISTORY: Weakness.     FINDINGS: Upright frontal projection of the chest is compared to prior  exam of 5/7/2022. There are emphysematous changes of the lungs. There  has been development of patchy bibasilar airspace disease worrisome for  pneumonia. Blunting of the right lateral costophrenic angle is also  suspicious for a small right effusion. The thoracic aorta is ectatic.       Impression:      1.. Emphysematous changes of the lungs. Patchy bibasilar infiltrates are  present.     This report was signed and finalized on 9/24/2024 7:28 PM by Dr. Bill Benitez MD.                 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  Dorcas was found to have stage IV adenocarcinoma of unknown primary in 2010.  She was treated successfully with 4 courses of carboplatin Gemzar obtaining a complete remission that was sustained for 14 years.  Dorcas experienced a heel fracture 2 years ago.  After that event she has had chronic abdominal pain and has lost 50 pounds.  25 of the 50 pounds were lost over the course of the previous month.  Workup has documented widespread metastatic disease with mediastinal, retroperitoneal, gastrohepatic as well as left neck adenopathy.  An FNA of the left neck performed on 9/9/2024 reported malignant cells present  "consistent with metastatic carcinoma.  Further tissue was unavailable for molecular studies and further special stains.   Due to delays in further workup, Dr. Khan felt the patient needed to get on with his treatment and rechallenged her with carboplatin and Gemzar cycle #1 on 9/20/2024.   She has been referred to Dr. Marek Roberts with ENT at [facility] to obtain an excisional biopsy to be able to get tissue for molecular studies that will include tissue of origin, NGS, IHC studies etc.   Dorcas was seen by Dr. Marek Roberts who recommended that she be evaluated in the ED at [facility] for admission due to difficulty swallowing getting choked on her food, pronounced weight loss and dehydration anticipating an open excisional biopsy on Friday, 9/27/2024.      Discussed at length with Dr. Kentrell Villela and her nurse Sheri DUGAN today, 9/27/2024.    CBC today, 9/27/2024 has a WBC of 12.27 with a hemoglobin of 11 and a platelet count of 91,000    Excisional biopsy, right neck today, 9/27/2024 by Dr. Marek Roberts    To complete cycle #1 with Gemzar  \"day 8\"  of treatment she is now scheduled at 12:15 on 9/30/2024 at Dr. Khan's office, if able to be discharged by that time.  Otherwise, will reschedule for another day at patient's convenience.    #2  Dysphagia and aspiration    Patient states that she coughs every time she tries to eat something and swallow  Swallow evaluation 9/25/2024-documented laryngeal penetration and aspiration during swallow.  SLP Findings: Patient presents with severe pharyngeal dysphagia.   Recommendations: Diet Textures: honey thick liquid, puree consistency food. Medications should be taken whole with puree. May have Ice between meals after oral care, under staff or family supervision and with the recommended strategies for safe swallowing.  Recommended Strategies: Upright for PO, small bites and sips, and alternate liquids and solids. Oral care before breakfast, after all meals and PRN.  Dysphagia therapy " is recommended.      Discussed with Dr. Kentrell Villela      #3  Aspiration pneumonia    Chest x-ray, 1 view 9/24/2024 reported the following  Emphysematous changes of the lungs.   Patchy bibasilar infiltrates are present.    Blood culture 9/24/2024 reported the following:  Enterococcus hirae Critical      Discussed with Dr. Kentrell Villela, ID consult requested  She was on Zosyn, placed on Unasyn     #4  Pain secondary to #1    Dilaudid 0.5 mg IV every 4 Hrs PRN  Norco 5 mg PO Q 4 Hrs PRN          Bartolome Zambrano MD  09/27/24  11:58 CDT

## 2024-09-27 NOTE — PLAN OF CARE
Goal Outcome Evaluation:  Plan of Care Reviewed With: patient        Progress: no change  Outcome Evaluation: Pt remains A&Ox4 this shift. VSS on 3L per NC. Several c/o pain this shift, medicated per MAR. PIV to right AC remains in place, CDI and infusing.  remains in place. Pt w/c bound baseline. PT/OT ordered. Voiding per purewick and bedpan. Pt NPO since MN for scheduled biopsy today. Call light within reach and safety maintained.

## 2024-09-27 NOTE — ANESTHESIA PROCEDURE NOTES
Airway  Urgency: elective    Date/Time: 9/27/2024 1:46 PM  End Time:9/27/2024 1:47 PM  Airway not difficult    General Information and Staff    Patient location during procedure: OR  CRNA/CAA: TL Stahl CRNA    Indications and Patient Condition  Indications for airway management: airway protection    Preoxygenated: yes  MILS maintained throughout  Mask difficulty assessment: 0 - not attempted    Final Airway Details  Final airway type: endotracheal airway      Successful airway: ETT  Cuffed: yes   Successful intubation technique: RSI and direct laryngoscopy  Facilitating devices/methods: intubating stylet  Endotracheal tube insertion site: oral  Blade: Myers  Blade size: 3  ETT size (mm): 7.0  Cormack-Lehane Classification: grade I - full view of glottis  Placement verified by: chest auscultation and capnometry   Cuff volume (mL): 7  Measured from: teeth  ETT/EBT  to teeth (cm): 21  Number of attempts at approach: 1  Assessment: lips, teeth, and gum same as pre-op and atraumatic intubation    Additional Comments  Placed by ISAI Colon SRNA

## 2024-09-28 ENCOUNTER — APPOINTMENT (OUTPATIENT)
Dept: GENERAL RADIOLOGY | Facility: HOSPITAL | Age: 64
End: 2024-09-28
Payer: MEDICARE

## 2024-09-28 LAB
ANION GAP SERPL CALCULATED.3IONS-SCNC: 16 MMOL/L (ref 5–15)
BUN SERPL-MCNC: 4 MG/DL (ref 8–23)
BUN/CREAT SERPL: 19 (ref 7–25)
CALCIUM SPEC-SCNC: 8.4 MG/DL (ref 8.6–10.5)
CHLORIDE SERPL-SCNC: 89 MMOL/L (ref 98–107)
CO2 SERPL-SCNC: 24 MMOL/L (ref 22–29)
CREAT SERPL-MCNC: 0.21 MG/DL (ref 0.57–1)
DEPRECATED RDW RBC AUTO: 45.5 FL (ref 37–54)
EGFRCR SERPLBLD CKD-EPI 2021: 129.3 ML/MIN/1.73
ERYTHROCYTE [DISTWIDTH] IN BLOOD BY AUTOMATED COUNT: 12.7 % (ref 12.3–15.4)
GLUCOSE SERPL-MCNC: 75 MG/DL (ref 65–99)
HCT VFR BLD AUTO: 31.6 % (ref 34–46.6)
HGB BLD-MCNC: 9.6 G/DL (ref 12–15.9)
MAGNESIUM SERPL-MCNC: 1.3 MG/DL (ref 1.6–2.4)
MCH RBC QN AUTO: 29.4 PG (ref 26.6–33)
MCHC RBC AUTO-ENTMCNC: 30.4 G/DL (ref 31.5–35.7)
MCV RBC AUTO: 96.9 FL (ref 79–97)
PLATELET # BLD AUTO: 59 10*3/MM3 (ref 140–450)
PMV BLD AUTO: 10.8 FL (ref 6–12)
POTASSIUM SERPL-SCNC: 3.2 MMOL/L (ref 3.5–5.2)
RBC # BLD AUTO: 3.26 10*6/MM3 (ref 3.77–5.28)
SODIUM SERPL-SCNC: 129 MMOL/L (ref 136–145)
WBC NRBC COR # BLD AUTO: 13.75 10*3/MM3 (ref 3.4–10.8)

## 2024-09-28 PROCEDURE — 25010000002 HYDROMORPHONE PER 4 MG: Performed by: OTOLARYNGOLOGY

## 2024-09-28 PROCEDURE — 94799 UNLISTED PULMONARY SVC/PX: CPT

## 2024-09-28 PROCEDURE — 94761 N-INVAS EAR/PLS OXIMETRY MLT: CPT

## 2024-09-28 PROCEDURE — 25010000002 MAGNESIUM SULFATE 2 GM/50ML SOLUTION: Performed by: INTERNAL MEDICINE

## 2024-09-28 PROCEDURE — 25010000002 AMPICILLIN-SULBACTAM PER 1.5 G: Performed by: OTOLARYNGOLOGY

## 2024-09-28 PROCEDURE — 80048 BASIC METABOLIC PNL TOTAL CA: CPT | Performed by: OTOLARYNGOLOGY

## 2024-09-28 PROCEDURE — 25010000002 POTASSIUM CHLORIDE 10 MEQ/100ML SOLUTION: Performed by: INTERNAL MEDICINE

## 2024-09-28 PROCEDURE — 71045 X-RAY EXAM CHEST 1 VIEW: CPT

## 2024-09-28 PROCEDURE — 99222 1ST HOSP IP/OBS MODERATE 55: CPT | Performed by: INTERNAL MEDICINE

## 2024-09-28 PROCEDURE — 94664 DEMO&/EVAL PT USE INHALER: CPT

## 2024-09-28 PROCEDURE — 25010000002 POTASSIUM CHLORIDE PER 2 MEQ: Performed by: OTOLARYNGOLOGY

## 2024-09-28 PROCEDURE — 85027 COMPLETE CBC AUTOMATED: CPT | Performed by: OTOLARYNGOLOGY

## 2024-09-28 PROCEDURE — 83735 ASSAY OF MAGNESIUM: CPT | Performed by: OTOLARYNGOLOGY

## 2024-09-28 RX ORDER — DEXTROSE MONOHYDRATE, SODIUM CHLORIDE, AND POTASSIUM CHLORIDE 50; 1.49; 9 G/1000ML; G/1000ML; G/1000ML
40 INJECTION, SOLUTION INTRAVENOUS CONTINUOUS
Status: DISCONTINUED | OUTPATIENT
Start: 2024-09-28 | End: 2024-10-05

## 2024-09-28 RX ORDER — POTASSIUM CHLORIDE 7.45 MG/ML
10 INJECTION INTRAVENOUS
Status: COMPLETED | OUTPATIENT
Start: 2024-09-28 | End: 2024-09-28

## 2024-09-28 RX ORDER — MAGNESIUM SULFATE HEPTAHYDRATE 40 MG/ML
2 INJECTION, SOLUTION INTRAVENOUS ONCE
Status: COMPLETED | OUTPATIENT
Start: 2024-09-28 | End: 2024-09-28

## 2024-09-28 RX ORDER — MAGNESIUM SULFATE HEPTAHYDRATE 40 MG/ML
2 INJECTION, SOLUTION INTRAVENOUS ONCE
Status: DISCONTINUED | OUTPATIENT
Start: 2024-09-28 | End: 2024-09-28

## 2024-09-28 RX ADMIN — HYDROMORPHONE HYDROCHLORIDE 0.5 MG: 1 INJECTION, SOLUTION INTRAMUSCULAR; INTRAVENOUS; SUBCUTANEOUS at 07:59

## 2024-09-28 RX ADMIN — AMPICILLIN SODIUM, SULBACTAM SODIUM 3 G: 2; 1 INJECTION, POWDER, FOR SOLUTION INTRAMUSCULAR; INTRAVENOUS at 23:29

## 2024-09-28 RX ADMIN — IPRATROPIUM BROMIDE AND ALBUTEROL SULFATE 3 ML: 2.5; .5 SOLUTION RESPIRATORY (INHALATION) at 18:50

## 2024-09-28 RX ADMIN — IPRATROPIUM BROMIDE AND ALBUTEROL SULFATE 3 ML: 2.5; .5 SOLUTION RESPIRATORY (INHALATION) at 10:42

## 2024-09-28 RX ADMIN — HYDROCODONE BITARTRATE AND ACETAMINOPHEN 1 TABLET: 5; 325 TABLET ORAL at 10:09

## 2024-09-28 RX ADMIN — AMPICILLIN SODIUM, SULBACTAM SODIUM 3 G: 2; 1 INJECTION, POWDER, FOR SOLUTION INTRAMUSCULAR; INTRAVENOUS at 10:25

## 2024-09-28 RX ADMIN — IPRATROPIUM BROMIDE AND ALBUTEROL SULFATE 3 ML: 2.5; .5 SOLUTION RESPIRATORY (INHALATION) at 14:35

## 2024-09-28 RX ADMIN — FAMOTIDINE 20 MG: 10 INJECTION INTRAVENOUS at 20:33

## 2024-09-28 RX ADMIN — Medication 10 ML: at 08:02

## 2024-09-28 RX ADMIN — POTASSIUM CHLORIDE 10 MEQ: 10 INJECTION, SOLUTION INTRAVENOUS at 21:33

## 2024-09-28 RX ADMIN — FAMOTIDINE 20 MG: 10 INJECTION INTRAVENOUS at 08:00

## 2024-09-28 RX ADMIN — MAGNESIUM SULFATE HEPTAHYDRATE 2 G: 2 INJECTION, SOLUTION INTRAVENOUS at 17:34

## 2024-09-28 RX ADMIN — IPRATROPIUM BROMIDE AND ALBUTEROL SULFATE 3 ML: 2.5; .5 SOLUTION RESPIRATORY (INHALATION) at 07:26

## 2024-09-28 RX ADMIN — HYDROCODONE BITARTRATE AND ACETAMINOPHEN 1 TABLET: 5; 325 TABLET ORAL at 02:40

## 2024-09-28 RX ADMIN — POTASSIUM CHLORIDE 10 MEQ: 10 INJECTION, SOLUTION INTRAVENOUS at 19:33

## 2024-09-28 RX ADMIN — POTASSIUM CHLORIDE AND SODIUM CHLORIDE 100 ML/HR: 450; 150 INJECTION, SOLUTION INTRAVENOUS at 01:43

## 2024-09-28 RX ADMIN — POTASSIUM CHLORIDE, DEXTROSE MONOHYDRATE AND SODIUM CHLORIDE 75 ML/HR: 150; 5; 900 INJECTION, SOLUTION INTRAVENOUS at 16:39

## 2024-09-28 RX ADMIN — HYDROMORPHONE HYDROCHLORIDE 0.5 MG: 1 INJECTION, SOLUTION INTRAMUSCULAR; INTRAVENOUS; SUBCUTANEOUS at 15:59

## 2024-09-28 RX ADMIN — HYDROCODONE BITARTRATE AND ACETAMINOPHEN 1 TABLET: 5; 325 TABLET ORAL at 15:14

## 2024-09-28 RX ADMIN — Medication 10 ML: at 20:33

## 2024-09-28 RX ADMIN — HYDROMORPHONE HYDROCHLORIDE 0.5 MG: 1 INJECTION, SOLUTION INTRAMUSCULAR; INTRAVENOUS; SUBCUTANEOUS at 20:33

## 2024-09-28 RX ADMIN — HYDROMORPHONE HYDROCHLORIDE 0.5 MG: 1 INJECTION, SOLUTION INTRAMUSCULAR; INTRAVENOUS; SUBCUTANEOUS at 12:01

## 2024-09-28 RX ADMIN — AMPICILLIN SODIUM, SULBACTAM SODIUM 3 G: 2; 1 INJECTION, POWDER, FOR SOLUTION INTRAMUSCULAR; INTRAVENOUS at 04:10

## 2024-09-28 RX ADMIN — POTASSIUM CHLORIDE 10 MEQ: 10 INJECTION, SOLUTION INTRAVENOUS at 22:39

## 2024-09-28 RX ADMIN — HYDROMORPHONE HYDROCHLORIDE 0.5 MG: 1 INJECTION, SOLUTION INTRAMUSCULAR; INTRAVENOUS; SUBCUTANEOUS at 04:11

## 2024-09-28 RX ADMIN — AMPICILLIN SODIUM, SULBACTAM SODIUM 3 G: 2; 1 INJECTION, POWDER, FOR SOLUTION INTRAMUSCULAR; INTRAVENOUS at 16:31

## 2024-09-28 RX ADMIN — HYDROCODONE BITARTRATE AND ACETAMINOPHEN 1 TABLET: 5; 325 TABLET ORAL at 23:29

## 2024-09-28 RX ADMIN — POTASSIUM CHLORIDE AND SODIUM CHLORIDE 100 ML/HR: 450; 150 INJECTION, SOLUTION INTRAVENOUS at 11:59

## 2024-09-28 RX ADMIN — MUPIROCIN 1 APPLICATION: 20 OINTMENT TOPICAL at 21:33

## 2024-09-28 RX ADMIN — HYDROCODONE BITARTRATE AND ACETAMINOPHEN 1 TABLET: 5; 325 TABLET ORAL at 06:31

## 2024-09-28 RX ADMIN — HYDROCODONE BITARTRATE AND ACETAMINOPHEN 1 TABLET: 5; 325 TABLET ORAL at 19:28

## 2024-09-28 RX ADMIN — POTASSIUM CHLORIDE 10 MEQ: 10 INJECTION, SOLUTION INTRAVENOUS at 20:32

## 2024-09-28 NOTE — CONSULTS
"INFECTIOUS DISEASES CONSULT NOTE    Patient:  Arminda Jiménez 64 y.o. female  ROOM # 329/1  YOB: 1960  MRN: [id number]  CSN:  [id number]  Admit date: 9/24/2024   Admitting Physician: Telly Villela MD  Primary Care Physician: Teddy Sanchez MD  REFERRING PROVIDER: No ref. provider found    Reason for Consultation: \"Enterococcus hirae bacteremia\"    History of Present Illness/Chief Complaint: Pleasant 64-year-old woman.  She indicates her reason for admission, \"I ran out of gas.\"  She indicates over the past 2 years she has had a 60 pound weight loss.  She indicates evaluation revealed some abnormalities.  It sounds like she underwent aspirate of a lymph node in the neck.  She was diagnosed with metastatic carcinoma-as best I can tell primary unknown at this point.  She underwent additional lymph node removal from the neck yesterday.  She indicates she just ran out of energy.  She was admitted to the hospital for further management.  She acknowledges having some difficulty swallowing both solids and liquids.  It sounds like liquids are more difficult.  She has had some cough.  She has had some dyspnea particular with exertion.  She does not describe high fever.  She does not describe nausea, vomiting, or diarrhea.  She indicates no problems with urinary tract complaints.  She had blood cultures drawn near the time of admission.  1 of 2 sets are growing an Enterococcus species.  She has been on antibiotic treatment primarily to cover probable aspiration pneumonia.  Antibiotic should also have activity against the Enterococcus.  She did not come in the hospital with any type of more permanent line since a PICC line report.  She has had no history of heart valve problems or valve replacement.  Infectious diseases asked to evaluate and offer antibiotic recommendations.    Current Scheduled Medications:   ampicillin-sulbactam, 3 g, Intravenous, Q6H  famotidine, 20 mg, Intravenous, " "Q12H  ipratropium-albuterol, 3 mL, Nebulization, 4x Daily - RT  mupirocin, 1 Application, Topical, Q12H  sodium chloride, 10 mL, Intravenous, Q12H      Current PRN Medications:    albuterol    senna-docusate sodium **AND** polyethylene glycol **AND** bisacodyl **AND** bisacodyl    HYDROcodone-acetaminophen    HYDROmorphone    ondansetron    [COMPLETED] Insert Peripheral IV **AND** sodium chloride    sodium chloride    sodium chloride    Allergies:    Allergies   Allergen Reactions    Aspirin Nausea And Vomiting     Unknown reaction    Keflex [Cephalexin] Other (See Comments)     Blisters to nose and mouth    Nitrofurantoin GI Intolerance and Nausea And Vomiting    Creatine Monohydrate Unknown - Low Severity     Past Medical History: Adenocarcinoma of unknown primary.  Degenerative arthritis.  Previous history of right lung/abdomen/pelvic area cancer.  Chronic obstructive pulmonary disease.  Lumbar foraminal stenosis.  Peripheral vascular disease.  Thyroid nodule.    Past Surgical History: Ankle arthroplasty.  Anterior lumbar fusion.  Appendectomy.  Carpal tunnel release.  Pain pump insertion.  Excisional lymph node biopsy performed right neck yesterday.    Social History: .  Previous history of tobacco use.  No alcohol use.  Lives with her daughter in [facility].    Family History: Heart disease.  Kidney disease.  Cancer.  Stroke.  Alcohol use.    Exposure History: No close contacts have been ill    Review of Systems see history of present illness.    Vital Signs:  /83 (BP Location: Right arm, Patient Position: Lying)   Pulse 108   Temp 98.2 °F (36.8 °C) (Oral)   Resp 16   Ht 154.9 cm (60.98\")   Wt 45.6 kg (100 lb 9.6 oz)   SpO2 98%   BMI 19.02 kg/m²  Temp (24hrs), Av °F (36.7 °C), Min:97.6 °F (36.4 °C), Max:98.4 °F (36.9 °C)    Physical Exam  Vital signs - reviewed.  Line/IV site - No erythema or tenderness.  Sclera nonicteric  No conjunctival hemorrhages  Lungs with some coarse breath " sounds bilaterally  Few crackles on right greater than left  Heart regular rhythm without murmur  Abdomen thin, soft, nontender  No suprapubic or flank tenderness  Extremities without significant edema  No splinter hemorrhages or Janeway lesions  She has hoarse sounding voice  Bandage in place right neck from recent biopsy    Lab Results:  CBC:   Results from last 7 days   Lab Units 09/28/24  1246 09/27/24  0606 09/26/24  0542 09/25/24  0728 09/24/24  1833   WBC 10*3/mm3 13.75* 12.27* 7.98 7.22 15.04*   HEMOGLOBIN g/dL 9.6* 11.0* 9.5* 8.6* 12.3   HEMATOCRIT % 31.6* 34.2 31.1* 28.0* 38.5   PLATELETS 10*3/mm3 59* 91* 114* 156 292   LYMPHOCYTE % %  --  6.1*  --   --  7.1*   MONOCYTES % %  --  1.0*  --   --  0.0*     CMP:   Results from last 7 days   Lab Units 09/28/24  1246 09/27/24  0606 09/26/24  0542 09/25/24  0728 09/24/24  1833   SODIUM mmol/L 129* 130* 133* 134* 135*   POTASSIUM mmol/L 3.2* 2.9* 3.7 3.8 4.0   CHLORIDE mmol/L 89* 88* 95* 96* 92*   CO2 mmol/L 24.0 29.0 30.0* 29.0 26.0   BUN mg/dL 4* 3* 6* 10 15   CREATININE mg/dL 0.21* 0.22* 0.24* 0.31* 0.30*   CALCIUM mg/dL 8.4* 8.3* 8.0* 8.1* 9.3   BILIRUBIN mg/dL  --   --   --  0.3 0.3   ALK PHOS U/L  --   --   --  151* 238*   ALT (SGPT) U/L  --   --   --  20 33   AST (SGOT) U/L  --   --   --  18 29   GLUCOSE mg/dL 75 84 95 90 84     Culture results:  Blood cultures right arm drawn September 24, 2024 - Enterococcus hirae (susceptibiltiy below)  Blood culture September 24, 2024-no growth    Gram Stain  Critical    Lab   Anaerobic Bottle Gram positive cocci in chains  PAD LAB              Susceptibility       Enterococcus hirae     SHERRIE     Ampicillin <=2 ug/ml Susceptible     Gentamicin High Level Synergy SYN-S ug/ml Susceptible     Vancomycin <=0.5 ug/ml Susceptible            Radiology:     CT abdomen and pelvis with contrast September 10, 2024:  Impression      1.  Interval increase in size of the mass of the epigastric region, most consistent with a  malignant process, potentially metastatic adenopathy or a pancreatic mass.  2.  Interval development of multiple hepatic metastases.  3.  3.1 cm left epicardial nodule, increased in size, suspicious for metastatic adenopathy or metastatic pleural disease.  4.  Interval development of small to moderate dependent right pleural effusion, with atelectasis versus pneumonia of both lower lobes    Chest x-ray done September 24, 2024:  FINDINGS: Upright frontal projection of the chest is compared to prior  exam of 5/7/2022. There are emphysematous changes of the lungs. There  has been development of patchy bibasilar airspace disease worrisome for  pneumonia. Blunting of the right lateral costophrenic angle is also  suspicious for a small right effusion. The thoracic aorta is ectatic.  IMPRESSION:  1.. Emphysematous changes of the lungs. Patchy bibasilar infiltrates are  present.    Chest x-ray done today personally reviewed:  FINDINGS:  Cardiac silhouette is within normal limits and stable.  Slight increase in small right-sided pleural effusion. No evidence of  left-sided effusion. No visible pneumothorax. No definite change in  patchy bilateral lower lobe consolidation. The upper lungs are clear.  No acute osseous finding.  IMPRESSION:  No significant change in patchy bilateral lower lobe consolidation.  Slight increase in small right-sided pleural effusion.      Additional Studies Reviewed:     Fine-needle aspirate from the neck done September 10, 2024:      Impression:   1.  Metastatic carcinoma-unknown primary.  Excisional lymph node biopsy results pending.  2.  Positive blood culture for Enterococcus-she does not describe significant fever on admission.  Only 1 of 2 sets of blood cultures were positive.  Leaning toward the positive blood culture representing contaminant.  3.  History suggestive of aspiration  4.  Probable aspiration pneumonia    Recommendations:    Do not feel any additional treatment necessary with  regard to the positive blood culture  Would continue treatment with Unasyn which would have excellent coverage for aspiration  Await excisional biopsy results  Continue supportive care  Continue to follow    Chan Martinez MD  09/28/24  13:59 CDT

## 2024-09-28 NOTE — PLAN OF CARE
Goal Outcome Evaluation:  Plan of Care Reviewed With: patient        Progress: no change  Outcome Evaluation: Pt remains A&Ox4 this shift. VSS on 3L per NC. Several c/o pain this shift, medicated per MAR. PIV to right AC remains in place, CDI and infusing.  remains in place. Pt w/c bound baseline. Voiding per purewick and bedpan. Dressing to right neck remains in place with dried drainage noted. Potassium replaced this shift per MD orders. Call light within reach and safety maintained.

## 2024-09-28 NOTE — PROGRESS NOTES
"    [facility] Medicine Services  INPATIENT PROGRESS NOTE    Patient Name: Arminda Jiménez  Date of Admission: 9/24/2024  Today's Date: 09/28/24  Length of Stay: 4  Primary Care Physician: Teddy Sanchez MD    Subjective   Chief Complaint: Pain  HPI   Patient does state that her pain is little bit better today.  She states that her cough is starting to loosen up.  She remains on supplemental oxygen via nasal cannula.  She denies any worsening shortness of breath symptoms.  She states that she remains very weak.  She has been working with physical therapy, and reports that she could probably get up and \"make it to the door\" but not much farther.  She states that she was struggling with worsening generalized weakness \"even before my cancer diagnosis.\"    Review of Systems   All pertinent negatives and positives are as above. All other systems have been reviewed and are negative unless otherwise stated.     Objective    Temp:  [97.6 °F (36.4 °C)-98.2 °F (36.8 °C)] 98.2 °F (36.8 °C)  Heart Rate:  [] 113  Resp:  [16-18] 16  BP: (142-181)/(71-86) 154/74  Physical Exam  Vitals reviewed.   Constitutional:       Appearance: She is ill-appearing.   HENT:      Head: Normocephalic.      Mouth/Throat:      Mouth: Mucous membranes are moist.   Cardiovascular:      Rate and Rhythm: Regular rhythm. Tachycardia present.   Pulmonary:      Effort: Pulmonary effort is normal.      Breath sounds: Rhonchi present.      Comments: coarse bilateral BSs persist; clears slightly with cough  Musculoskeletal:         General: No deformity.   Skin:     General: Skin is warm.   Neurological:      Mental Status: She is alert.      Motor: Weakness present.   Psychiatric:         Mood and Affect: Mood normal.         Results Review:  I have reviewed the labs, radiology results, and diagnostic studies.    Laboratory Data:   Results from last 7 days   Lab Units 09/28/24  1246 09/27/24  0606 09/26/24  0542 "   WBC 10*3/mm3 13.75* 12.27* 7.98   HEMOGLOBIN g/dL 9.6* 11.0* 9.5*   HEMATOCRIT % 31.6* 34.2 31.1*   PLATELETS 10*3/mm3 59* 91* 114*        Results from last 7 days   Lab Units 09/28/24  1246 09/27/24  0606 09/26/24  0542 09/25/24  0728 09/24/24  1833   SODIUM mmol/L 129* 130* 133* 134* 135*   POTASSIUM mmol/L 3.2* 2.9* 3.7 3.8 4.0   CHLORIDE mmol/L 89* 88* 95* 96* 92*   CO2 mmol/L 24.0 29.0 30.0* 29.0 26.0   BUN mg/dL 4* 3* 6* 10 15   CREATININE mg/dL 0.21* 0.22* 0.24* 0.31* 0.30*   CALCIUM mg/dL 8.4* 8.3* 8.0* 8.1* 9.3   BILIRUBIN mg/dL  --   --   --  0.3 0.3   ALK PHOS U/L  --   --   --  151* 238*   ALT (SGPT) U/L  --   --   --  20 33   AST (SGOT) U/L  --   --   --  18 29   GLUCOSE mg/dL 75 84 95 90 84       Culture Data:   Blood Culture   Date Value Ref Range Status   09/24/2024 Enterococcus hirae (C)  Final     Comment:       Infectious disease consultation is highly recommended.   09/24/2024 No growth at 2 days  Preliminary       Radiology Data:   Imaging Results (Last 24 Hours)       Procedure Component Value Units Date/Time    XR Chest 1 View [[id number]] Collected: 09/28/24 0833     Updated: 09/28/24 0838    Narrative:      EXAM/TECHNIQUE: XR CHEST 1 VW-     INDICATION: follow-up infiltrates     COMPARISON: 9/24/2024     FINDINGS:     Cardiac silhouette is within normal limits and stable.     Slight increase in small right-sided pleural effusion. No evidence of  left-sided effusion. No visible pneumothorax. No definite change in  patchy bilateral lower lobe consolidation. The upper lungs are clear.     No acute osseous finding.       Impression:         No significant change in patchy bilateral lower lobe consolidation.  Slight increase in small right-sided pleural effusion.     This report was signed and finalized on 9/28/2024 8:35 AM by Dr. Bam Ortega MD.               I have reviewed the patient's current medications.     Assessment/Plan   Assessment  Active Hospital Problems    Diagnosis      **Dysphagia     Metastatic carcinoma     Weight loss     Cancer associated pain     Bacteremia due to Enterococcus     Aspiration pneumonia     Chronic pain     Severe malnutrition     Lymphadenopathy     Vocal cord weakness     COPD (chronic obstructive pulmonary disease)     Adenocarcinoma of unknown origin        Treatment Plan  Discussed with Dr. Martinez today - appreciate his assistance  Follow-up pathology from excisional biopsy of right cervical lymph node on 9/27  Currently on 3LNC -patient states that she was recently started on 2 L by nasal cannula in the outpatient setting.  Patient has been on IV Zosyn for coverage of aspiration pneumonia in the setting of known dysphagia.  1 blood culture has returned positive for Enterococcus hirae and this is likely contaminant.    Will continue IV Unasyn for suspected aspiration pneumonia.  Patient indicated to me that she underwent her first cycle of chemotherapy with Dr. Khan on 9/20/2024; next cycle anticipated on 9/30/2024.  Case discussed with Dr. Zambrano on 9/27  She estimates that she has had about 50 pounds of weight loss over the past 1+ year.  Nutrition consulted  ST following  Change IVFs (remove 1/2 NS given sodium trend).  Start D5NS + KCl supplement  K and Mg replacement today  11.  Repeat CXR personally reviewed - patchy bibasilar infiltrates; small appearing effusion noted on the right      Palliative care consultation  PT and OT  Dispo planning    Medical Decision Making  Number and Complexity of problems: 3 acute; high complexity      Conditions and Status        Condition is unchanged.     [facility] Data  External documents reviewed: none  Cardiac tracing (EKG, telemetry) interpretation: no new EKGs  Radiology interpretation: as above  Labs reviewed: as above  Any tests that were considered but not ordered: none     Decision rules/scores evaluated (example [id number], Wells, etc): none     Discussed with: patient, Dr. Martinez; bedside nurse Sheri      Care Planning  Shared decision making: Discussed with patient with agreement to proceed with treatment plan as outlined  Code status and discussions: Full code    Disposition  Social Determinants of Health that impact treatment or disposition: None apparent at this time  I expect the patient to be discharged: Still to be determined.  Patient reports that she lives at home in a [place] with her daughter.    Electronically signed by Telly Villela MD, 09/28/24, 16:18 CDT.

## 2024-09-28 NOTE — PLAN OF CARE
Goal Outcome Evaluation:           Progress: no change   Pt A/Ox4. VSS on 3L NC. IV to RAC, infusing. Soft spoken. R neck incision, marked dried drainage. Purewick. Meds whole in applesauce. C/o pain, PRN meds given. Trouble with lab draws, was able to get after 4th try. Cough with congestion, nebs completed. Wheelchair bound. Chest Xray done this shift. Consulted ID for + culture. Last BM 9/27. SCDs. Will have chemo outpatient per oncology. Safety maintained. Call light in reach.

## 2024-09-28 NOTE — PROGRESS NOTES
Her          Progress Note    Patient name: Arminda Jiménez  Patient : 1960  VISIT # [id number]  MR #[id number]  Room:     Subjective     Discussed at length with Dr. Kentrell Villela 2024 and her nurse Sheri DUGAN this morning, 2024.    CBC  2024 has a WBC of 12.27 with a hemoglobin of 11 and a platelet count of 91,000    POD #1 excisional biopsy, right neck 2024 by Dr. 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  Dorcas was found to have stage IV adenocarcinoma of unknown primary in .  She was treated successfully with 4 courses of carboplatin Gemzar obtaining a complete remission that was sustained for 14 years.  Dorcas experienced a heel fracture 2 years ago.  After that event she has had chronic abdominal pain and has lost 50 pounds.  25 of the 50 pounds were lost over the course of the previous month.  Workup has documented widespread metastatic disease with mediastinal, retroperitoneal, gastrohepatic as well as left neck adenopathy.  An FNA of the left neck performed on 2024 reported malignant cells present consistent with metastatic carcinoma.  Further tissue was unavailable for molecular studies and further special stains.   Due to delays in further workup, Dr. Khan felt the patient needed to get on with his treatment and rechallenged her with carboplatin and Gemzar cycle #1 on 2024.   She has been referred to Dr. Marek Roberts with ENT at [facility] to obtain an excisional biopsy to be able to get tissue for molecular studies that will include tissue of origin, NGS, IHC studies etc.   Dorcas was seen by Dr. Marek Roberts who recommended that she be evaluated in the ED at [facility] for admission due to difficulty swallowing getting choked on her food, pronounced weight loss and dehydration anticipating an open excisional biopsy on Friday, 2024.   Medical oncology consultation requested in continuity of care.           Detailed history copied from Dr. Khan's office note 9/20/2024     The patient is a pleasant 64 years old female who has a history of stage IV carcinoma of unknown primary site diagnosed in 2010 status post 4 cycles of carboplatin gemcitabine with complete response.  She has been in remission for 14 years.  More recently, she was found to have widespread metastatic disease with mediastinal adenopathy, retroperitoneal adenopathy, gastrohepatic mass and neck adenopathy.  She underwent ultrasound-guided FNA biopsy.  This result only in a scant amount of tissue for analysis.  She was then sent to ENT at [facility] for excisional biopsy.  Unfortunate, the patient missed appointment.  The patient was then sent to ENT at [facility].  ENT at [facility] referred the patient to [place], [facility] for excisional biopsy.     Diagnosis  History of carcinoma no primary site, 2010  Liver lesions  Gastrohepatic space mass  Mediastinal adenopathy consistent cycles normal  Metastatic carcinoma, left neck lymph node, Sept 2024     Disease target  Mediastinal adenopathy  Hepatic lobe lesions  Pancreatic body/tail hypodense mass in the gastrohepatic ligament     Treatment summary  9/20/24 Initiate Carbo AUC 4 D1, Gemzar 800mg D1, D8 every 21 days. Treatment consent signed.          Cancer history  Arminda Jiménez was first seen by me on 9/5/2024.  I was consulted for findings of a gastric mass during patient was positioned with hospital.  The patient has a history of lung cancer, COPD.  She was a direct admit from her PCP office.  Patient has a 20 pounds weight loss.  She also had complained of dysphagia, nausea, dizziness decreased appetite.  The patient reports that she has a history of stage IV lung cancer diagnosed 10 years ago at [place].  She received chemotherapy and stay in remission.  She tells me that she did not have a biopsy performed at that  time.  Apparently, review of medical record showed that she had a carcinoma of unknown primary site diagnosed October 2010.  She received 2 cycles of carboplatin and paclitaxel.  She had a reaction to Taxol during cycle #2.  She was switched to carboplatin gemcitabine, completed March 2019.  Sites of disease in the past involve the pericardial, pleural, mediastinal, and cervical involvement.   7/30/24 CT chest: Extensive necrotic mediastinal and bilateral hilar lymphadenopathy consistent with metastatic disease. Lymph node in the right right paratracheal lymph node measures 1 cm short axis axial image 34 at the thoracic inlet.  AP window lymph node measures 1.2 cm short axis on axial image 71 with additional 1 cm AP window lymph node axial image 76.  Precarinal lymph node measures 1.1 cm on axial image 85.  Partially necrotic anterior mediastinal lymph node measures 1.4 cm short axis image 88 with similar appearing anterior mediastinal lymph node measuring 1.3 cm short axis image 69.  A partially necrotic subcarinal lymph node measures 1.8 cm short axis on axial image 96.  Right hilar lymph node measures 1.1 cm short axis image 95.  Partially necrotic and calcified left hilar lymph node measures 1.4 cm short axis image 118.  Left hilar lymph node measures 1.1 cm short axis on axial image 96. These lymph nodes are new since 2022. There is a 2.2 x 1.5 cm nodule abutting the left heart border axial image 140, which is either within the pericardial region were within the left lung parenchyma.  There is a spiculated 0.8 x 0.5 x 0.8 cm left upper lobe nodule axial image 89 and coronal  image 31 which is also new.  Probable focal bronchiectasis left upper lobe on axial image 76, less likely cavitary nodule.  0.3 cm left lower lobe nodule image 151 which may be endobronchial.  Question a 0.6 meters left lower lobe nodule along the diaphragm axial image 158.  Subpleural 0.4 cm right middle lobe nodule image 165.  Bilateral  bronchial thickening some areas of subsegmental atelectasis noted in both lobes.  Moderate emphysema.  There is no pleural effusion or pneumothorax. Centrally low density gastrohepatic ligament mass measures 3.3 x 3.2 cm axial image 193 with some adjacent wall thickening suggested in the proximal stomach.   7/30/24 CT abdomen/pelvis: Marked gastric wall thickening proximal stomach and gastric body; neoplasm is suspected, Correlation with symptomatology and EGD advised, Cystic structure in the gastrohepatic ligament measuring up to 4.9 cm, may represent a necrotic lymph node or mass less likely pancreatic suggestive. Correlation with PET scan and/or MRI advised.    08/12/24 CT soft tissue neck:  Mild asymmetry of the vocal cords, secondary to the vocal cord paralysis.  No visible nodule or mass of the larynx, Mild necrotic adenopathy of the right neck, consistent with metastatic disease, Since the prior CT chest from 07/30/2024, grossly stable right paratracheal necrotic node at level of the thoracic inlet, also consistent with metastatic disease.   9/5/2024-upper EGD, GI-gastric bezoar to explain the CT scan of the abdomen findings.  No gastric tumor.  No active ulcer disease to explain patient's nausea vomiting.  Probable gastroparesis.  9/5/2024-CT chest, [facility]:  There is a hypodense nodule in the left thyroid lobe measuring 1.2 cm.  A right tracheoesophageal lymph node is 1.9 cm in short axis on axial image number 16 with interval increase size from 1.2 cm in short axis when remeasured.  A right hilar lymph node is 1.1 cm in short axis without change.  A subcarinal lymph node is partially calcified and measures 2.5 cm in short axis without change.  This has low attenuation.  An additional right hilar lymph node is 1.5 cm in short axis and is probably unchanged.  A left hilar lymph node is 1 cm in short axis without change.  An AP window lymph node is at least 2.3 cm and has increased in size.  Anterior mediastinal  lymph node is now 1.6 cm in short axis, previously 1.3 cm.  A solid nodule in the lingula abuts the free wall of the left ventricle and measures 2.9 x 1.8 cm, previously 2.4 x 1.6 cm when remeasured. There is a small cavitary nodule in the left upper lobe measuring a cavitary nodule in the left upper lobe on series 604 image number 23  is unchanged measuring 0.6 cm. A solid spiculated nodule in the left upper lobe is probably overall unchanged measuring 0.8 cm x 0.7 cm on image number 26.  On there is a new solid juxta fissural nodule in the right middle lobe measuring 0.4 cm on image number 34.  There is an enlarging juxta fissural nodule in the right middle lobe measuring 0.3 cm image number 35.  The small to moderate pleural effusion layers dependently.  No left pleural fluid.  Bibasilar infiltrates are suggested. There is a large hypodense mass arising from the pancreatic body/tail extending to the gastrohepatic ligament measuring up to 5.4 x 4.1 cm.  This has increased in size.  There are multiple new hypodense hepatic lesions.    9/6/2024-CT thoracic and lumbar spine, [facility]: Multiple chronic thoracic compression fractures, diffuse degenerative disc disease.  Postop changes of the lumbar spine.  Loosening of the right L3 transpedicular screw.  Remaining hardware appears well-positioned.  Rim-enhancing complex cystic lesion in the gastrohepatic space.  9/9/2024-FNA biopsy by radiology: Consistent metastatic carcinoma.  However, further characterization of tumor could not be performed due to scant material.  9/10/2024-CT abdomen pelvis with contrast, [facility]: Interval development of mild to moderate dependent atelectasis of both lower  lobes and mild patchy ground-glass and consolidation of the lingula and to a lesser extent the right middle lobe.  3.1 cm left epicardial nodule, increased in size from 2.5 cm.  Interval development of multiple ill-defined hypodense liver masses.  The largest, of the left lobe near the  IVC measures 3.3 cm.  Stable mild prominence of the intrahepatic biliary tree and mild distension of the common duct, measuring up to 10 mm in diameter.  No visible obstructing stone or mass.  Cholecystectomy, again noted.  Interval increase in size of the previously seen mass between the liver left lobe, stomach, aorta, and superior aspect of the pancreatic body.  Measures 6.0 x 4.5 cm cross section, increased from 3.5 x 3.3 cm.  Inferiorly, the mass abuts and blends in per separately of the pancreas.  Calcified granulomas of the spleen, again noted.  The adrenal glands have a normal appearance.  The kidneys enhance symmetrically.  0.9 cm cyst of the left kidney, again noted.  No ureteral stones or hydronephrosis.  The uterus and ovaries have a normal appearance.  No evidence of bowel obstruction.  Moderate amount of stool in the colon, similar to before.  Small fat and fluid containing right inguinal hernia, stable.  No free air.  Interval development of trace fluid in the pelvis.  Electronic device in subcutaneous fat of the right flank, with catheter entering the spinal canal, again noted.  Bone window images show no significant lytic or sclerotic bone lesions.  Surgical fixation of the lumbar spine, again noted.  Chronic mild compression fracture of the L1 superior endplate, stable.  Impression:  Interval increase in size of the mass of the epigastric region, most consistent with a malignant process, potentially metastatic adenopathy or a pancreatic mass.  Interval development of multiple hepatic metastases. 3.  3.1 cm left epicardial nodule, increased in size, suspicious for metastatic adenopathy or metastatic pleural disease.   Interval development of small to moderate dependent right pleural effusion, with atelectasis versus pneumonia of both lower lobes.   9/20/24 Initiate palliative chemotherapy Carbo AUC 4 D1, Gemzar 800mg D1, D8 every 21 days. Treatment consent signed.    9/20/2024-I have called [facility]  and refer the patient back.  She will be seen early next week.        REVIEW OF SYSTEMS:   Constitutional: Pronounced weight loss fatigue failure to thrive  HEENT:  dysphagia and hoarseness               Lungs: no cough, no shortness of breath, no wheeze  CVS: no palpitation, no chest pain, no shortness of breath  GI: no abdominal pain, no nausea , no vomiting, no constipation  MERCEDES: no dysuria, frequency and urgency, no hematuria, no kidney stones  Musculoskeletal: no joint pain, swelling , stiffness  Endocrine: no polyuria, polydipsia, no cold or heat intolerance  Hematology: no anemia, no easy bruising or bleeding, no history of clotting disorder  Dermatology: no skin rash, no eczema, no pruritus  Psychiatry: no depression, no anxiety,no panic attacks, no suicide ideation  Neurology: no syncope, no seizures, no numbness or tingling of hands, no numbness or tingling of feet, no paresis  Objective     Vital Signs  Temp:  [97.6 °F (36.4 °C)-98.4 °F (36.9 °C)] 98.2 °F (36.8 °C)  Heart Rate:  [] 97  Resp:  [16-20] 17  BP: ()/(63-86) 142/86    Intake/Output Summary (Last 24 hours) at 9/28/2024 0656  Last data filed at 9/27/2024 2003  Gross per 24 hour   Intake 720 ml   Output 1700 ml   Net -980 ml       PHYSICAL EXAM:  CONSTITUTIONAL: Alert, appropriate, no acute distress, very thin, cachectic  EYES: Anicteric, EOM intact, pupils equal round   ENT: Mucous membranes moist, no oropharyngeal lesions   NECK: Supple, Cervical lymphadenopathy on the right   CHEST/LUNGS: CTA bilaterally, normal respiratory effort   CARDIOVASCULAR: RRR, no murmurs  ABDOMEN: soft nontender, active bowel sounds, no HSM  EXTREMITIES: warm, moves all fours  SKIN: warm, dry with no rashes or lesions  LYMPH: No cervical, clavicular, axillary, or inguinal lymphadenopathy  NEUROLOGIC: follows commands, nonfocal   PSYCH: mood and affect appropriate      CBC  Results from last 7 days   Lab Units 09/27/24  0606 09/26/24  0542 09/25/24  6923  09/24/24  1833   WBC 10*3/mm3 12.27* 7.98 7.22 15.04*   HEMOGLOBIN g/dL 11.0* 9.5* 8.6* 12.3   HEMATOCRIT % 34.2 31.1* 28.0* 38.5   PLATELETS 10*3/mm3 91* 114* 156 292   LYMPHOCYTE % % 6.1*  --   --  7.1*   MONOCYTES % % 1.0*  --   --  0.0*       CMP  Lab Results   Component Value Date    GLUCOSE 84 09/27/2024    BUN 3 (L) 09/27/2024    CREATININE 0.22 (L) 09/27/2024    EGFRIFNONA 114 01/07/2019    BCR 13.6 09/27/2024    CO2 29.0 09/27/2024    CALCIUM 8.3 (L) 09/27/2024    ALBUMIN 2.6 (L) 09/25/2024    AST 18 09/25/2024    ALT 20 09/25/2024             Blood Culture   Date Value Ref Range Status   09/24/2024 Abnormal Stain (C)  Preliminary   09/24/2024 No growth at 24 hours  Preliminary       Imaging Results (Last 72 Hours)       Procedure Component Value Units Date/Time    FL Video Swallow With Speech Single Contrast [[id number]] Collected: 09/25/24 1032     Updated: 09/25/24 1037    Narrative:      EXAMINATION: FL VIDEO SWALLOW W SPEECH SINGLE-CONTRAST-     9/25/2024 9:22 AM     HISTORY: dysphagia; R13.10-Dysphagia, unspecified; Z78.9-Other specified  health status; C80.1-Malignant (primary) neoplasm, unspecified;  R79.89-Other specified abnormal findings of blood chemistry;  J43.9-Emphysema, unspecified; J98.11-Atelectasis     Fluoroscopy was performed during ingestion of thin, nectar consistency,  and honey consistency pudding consistency and mechanical soft contrast  boluses.     There is no previous study for comparison.     There is no difficulty in initiating the swallowing.     Normal propagation through oropharynx. No nasopharyngeal reflux.     There is significant laryngeal penetration and aspiration with thin and  nectar consistency contrast bolus. This was followed by cough and  expectoration.     There is moderate residue with the honey consistency, pudding  consistency and mechanical soft boluses which cleared on subsequent  swallows. No laryngeal penetration or aspiration.     The study was performed  under supervision of speech therapist.       Impression:      1. Abnormal swallow function study as detailed above.  2. Fluoroscopy time: 3 minutes 14 seconds.  3. The dose: 6.83mGy.  4. Number of images: 1        This report was signed and finalized on 9/25/2024 10:34 AM by Dr. Arminda Lester MD.                 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  Dorcas was found to have stage IV adenocarcinoma of unknown primary in 2010.  She was treated successfully with 4 courses of carboplatin Gemzar obtaining a complete remission that was sustained for 14 years.  Dorcas experienced a heel fracture 2 years ago.  After that event she has had chronic abdominal pain and has lost 50 pounds.  25 of the 50 pounds were lost over the course of the previous month.  Workup has documented widespread metastatic disease with mediastinal, retroperitoneal, gastrohepatic as well as left neck adenopathy.  An FNA of the left neck performed on 9/9/2024 reported malignant cells present consistent with metastatic carcinoma.  Further tissue was unavailable for molecular studies and further special stains.   Due to delays in further workup, Dr. Khan felt the patient needed to get on with his treatment and rechallenged her with carboplatin and Gemzar cycle #1 on 9/20/2024.   She has been referred to Dr. Marek Roberts with ENT at [facility] to obtain an excisional biopsy to be able to get tissue for molecular studies that will include tissue of origin, NGS, IHC studies etc.   Dorcas was seen  "by Dr. Marek Roberts who recommended that she be evaluated in the ED at [facility] for admission due to difficulty swallowing getting choked on her food, pronounced weight loss and dehydration anticipating an open excisional biopsy on Friday, 9/27/2024.      Discussed at length with Dr. Kentrell Villela and her nurse Sheri DUGAN today, 9/27/2024.    CBC today, 9/27/2024 has a WBC of 12.27 with a hemoglobin of 11 and a platelet count of 91,000    POD #1 excisional biopsy, right neck 9/27/2024 by Dr. Marek Roberts    To complete cycle #1 with Gemzar  \"day 8\"  of treatment she is now scheduled at 12:15 on 9/30/2024 at Dr. Khan's office, if able to be discharged by that time.  Otherwise, will reschedule for another day at patient's convenience.    Given the aspiration issues being addressed, the positive blood culture under treatment with IV antibiotics, it might be in her best interest to hold off on Gemzar and resume cycle #2 of chemotherapy in a couple weeks.      #2  Dysphagia and aspiration    Patient states that she coughs every time she tries to eat something and swallow  Swallow evaluation 9/25/2024-documented laryngeal penetration and aspiration during swallow.  SLP Findings: Patient presents with severe pharyngeal dysphagia.   Recommendations: Diet Textures: honey thick liquid, puree consistency food. Medications should be taken whole with puree. May have Ice between meals after oral care, under staff or family supervision and with the recommended strategies for safe swallowing.  Recommended Strategies: Upright for PO, small bites and sips, and alternate liquids and solids. Oral care before breakfast, after all meals and PRN.  Dysphagia therapy is recommended.      Discussed with Dr. Kentrell Villela      #3  Aspiration pneumonia    Chest x-ray, 1 view 9/24/2024 reported the following  Emphysematous changes of the lungs.   Patchy bibasilar infiltrates are present.    Blood culture 9/24/2024 reported the following:  Enterococcus " antonio Critical      Discussed with Dr. Kentrell Villela, ID consult requested  She was on Zosyn, placed on Unasyn     #4  Pain secondary to #1    Dilaudid 0.5 mg IV every 4 Hrs PRN  Norco 5 mg PO Q 4 Hrs PRN          Bartolome Zambrano MD  09/28/24  06:56 CDT

## 2024-09-29 ENCOUNTER — APPOINTMENT (OUTPATIENT)
Dept: GENERAL RADIOLOGY | Facility: HOSPITAL | Age: 64
End: 2024-09-29
Payer: MEDICARE

## 2024-09-29 PROBLEM — D69.6 THROMBOCYTOPENIA: Status: ACTIVE | Noted: 2024-09-29

## 2024-09-29 LAB
ANION GAP SERPL CALCULATED.3IONS-SCNC: 12 MMOL/L (ref 5–15)
BACTERIA SPEC AEROBE CULT: NORMAL
BASOPHILS # BLD AUTO: 0.02 10*3/MM3 (ref 0–0.2)
BASOPHILS NFR BLD AUTO: 0.1 % (ref 0–1.5)
BUN SERPL-MCNC: 3 MG/DL (ref 8–23)
BUN/CREAT SERPL: ABNORMAL
CALCIUM SPEC-SCNC: 7.9 MG/DL (ref 8.6–10.5)
CHLORIDE SERPL-SCNC: 93 MMOL/L (ref 98–107)
CO2 SERPL-SCNC: 25 MMOL/L (ref 22–29)
CREAT SERPL-MCNC: <0.17 MG/DL (ref 0.57–1)
DEPRECATED RDW RBC AUTO: 41.4 FL (ref 37–54)
EOSINOPHIL # BLD AUTO: 0.23 10*3/MM3 (ref 0–0.4)
EOSINOPHIL NFR BLD AUTO: 1.5 % (ref 0.3–6.2)
ERYTHROCYTE [DISTWIDTH] IN BLOOD BY AUTOMATED COUNT: 12.6 % (ref 12.3–15.4)
GLUCOSE SERPL-MCNC: 111 MG/DL (ref 65–99)
HCT VFR BLD AUTO: 28.4 % (ref 34–46.6)
HGB BLD-MCNC: 9.5 G/DL (ref 12–15.9)
IMM GRANULOCYTES # BLD AUTO: 0.11 10*3/MM3 (ref 0–0.05)
IMM GRANULOCYTES NFR BLD AUTO: 0.7 % (ref 0–0.5)
LYMPHOCYTES # BLD AUTO: 1.42 10*3/MM3 (ref 0.7–3.1)
LYMPHOCYTES NFR BLD AUTO: 9.1 % (ref 19.6–45.3)
MAGNESIUM SERPL-MCNC: 1.5 MG/DL (ref 1.6–2.4)
MCH RBC QN AUTO: 29.7 PG (ref 26.6–33)
MCHC RBC AUTO-ENTMCNC: 33.5 G/DL (ref 31.5–35.7)
MCV RBC AUTO: 88.8 FL (ref 79–97)
MONOCYTES # BLD AUTO: 1.04 10*3/MM3 (ref 0.1–0.9)
MONOCYTES NFR BLD AUTO: 6.7 % (ref 5–12)
NEUTROPHILS NFR BLD AUTO: 12.78 10*3/MM3 (ref 1.7–7)
NEUTROPHILS NFR BLD AUTO: 81.9 % (ref 42.7–76)
PLATELET # BLD AUTO: 48 10*3/MM3 (ref 140–450)
PMV BLD AUTO: 12.2 FL (ref 6–12)
POTASSIUM SERPL-SCNC: 4.1 MMOL/L (ref 3.5–5.2)
RBC # BLD AUTO: 3.2 10*6/MM3 (ref 3.77–5.28)
SODIUM SERPL-SCNC: 130 MMOL/L (ref 136–145)
WBC NRBC COR # BLD AUTO: 15.6 10*3/MM3 (ref 3.4–10.8)

## 2024-09-29 PROCEDURE — 25010000002 HYDROMORPHONE PER 4 MG: Performed by: INTERNAL MEDICINE

## 2024-09-29 PROCEDURE — 80048 BASIC METABOLIC PNL TOTAL CA: CPT | Performed by: INTERNAL MEDICINE

## 2024-09-29 PROCEDURE — 83735 ASSAY OF MAGNESIUM: CPT | Performed by: INTERNAL MEDICINE

## 2024-09-29 PROCEDURE — 94799 UNLISTED PULMONARY SVC/PX: CPT

## 2024-09-29 PROCEDURE — 94761 N-INVAS EAR/PLS OXIMETRY MLT: CPT

## 2024-09-29 PROCEDURE — 85025 COMPLETE CBC W/AUTO DIFF WBC: CPT | Performed by: INTERNAL MEDICINE

## 2024-09-29 PROCEDURE — 94664 DEMO&/EVAL PT USE INHALER: CPT

## 2024-09-29 PROCEDURE — 72100 X-RAY EXAM L-S SPINE 2/3 VWS: CPT

## 2024-09-29 PROCEDURE — 25010000002 MAGNESIUM SULFATE 2 GM/50ML SOLUTION: Performed by: INTERNAL MEDICINE

## 2024-09-29 PROCEDURE — 72070 X-RAY EXAM THORAC SPINE 2VWS: CPT

## 2024-09-29 PROCEDURE — 99232 SBSQ HOSP IP/OBS MODERATE 35: CPT | Performed by: INTERNAL MEDICINE

## 2024-09-29 PROCEDURE — 25010000002 AMPICILLIN-SULBACTAM PER 1.5 G: Performed by: OTOLARYNGOLOGY

## 2024-09-29 PROCEDURE — 25010000002 HYDROMORPHONE PER 4 MG: Performed by: OTOLARYNGOLOGY

## 2024-09-29 RX ORDER — MAGNESIUM SULFATE HEPTAHYDRATE 40 MG/ML
2 INJECTION, SOLUTION INTRAVENOUS
Status: COMPLETED | OUTPATIENT
Start: 2024-09-29 | End: 2024-09-29

## 2024-09-29 RX ORDER — FAMOTIDINE 20 MG/1
20 TABLET, FILM COATED ORAL
Status: DISCONTINUED | OUTPATIENT
Start: 2024-09-29 | End: 2024-10-07

## 2024-09-29 RX ORDER — GABAPENTIN 400 MG/1
800 CAPSULE ORAL EVERY 6 HOURS SCHEDULED
Status: DISCONTINUED | OUTPATIENT
Start: 2024-09-29 | End: 2024-10-07

## 2024-09-29 RX ORDER — OXYCODONE HYDROCHLORIDE 5 MG/1
15 TABLET ORAL EVERY 4 HOURS PRN
Status: DISCONTINUED | OUTPATIENT
Start: 2024-09-29 | End: 2024-10-06

## 2024-09-29 RX ORDER — GUAIFENESIN 600 MG/1
1200 TABLET, EXTENDED RELEASE ORAL EVERY 12 HOURS SCHEDULED
Status: DISCONTINUED | OUTPATIENT
Start: 2024-09-29 | End: 2024-10-07

## 2024-09-29 RX ADMIN — HYDROMORPHONE HYDROCHLORIDE 0.5 MG: 1 INJECTION, SOLUTION INTRAMUSCULAR; INTRAVENOUS; SUBCUTANEOUS at 08:35

## 2024-09-29 RX ADMIN — FAMOTIDINE 20 MG: 20 TABLET, FILM COATED ORAL at 17:05

## 2024-09-29 RX ADMIN — MUPIROCIN 1 APPLICATION: 20 OINTMENT TOPICAL at 21:55

## 2024-09-29 RX ADMIN — FAMOTIDINE 20 MG: 10 INJECTION INTRAVENOUS at 08:35

## 2024-09-29 RX ADMIN — Medication 10 ML: at 21:55

## 2024-09-29 RX ADMIN — Medication 10 ML: at 09:21

## 2024-09-29 RX ADMIN — GABAPENTIN 800 MG: 400 CAPSULE ORAL at 13:23

## 2024-09-29 RX ADMIN — HYDROCODONE BITARTRATE AND ACETAMINOPHEN 1 TABLET: 5; 325 TABLET ORAL at 07:27

## 2024-09-29 RX ADMIN — GABAPENTIN 800 MG: 400 CAPSULE ORAL at 18:05

## 2024-09-29 RX ADMIN — HYDROMORPHONE HYDROCHLORIDE 0.5 MG: 1 INJECTION, SOLUTION INTRAMUSCULAR; INTRAVENOUS; SUBCUTANEOUS at 00:30

## 2024-09-29 RX ADMIN — IPRATROPIUM BROMIDE AND ALBUTEROL SULFATE 3 ML: 2.5; .5 SOLUTION RESPIRATORY (INHALATION) at 18:31

## 2024-09-29 RX ADMIN — GUAIFENESIN 1200 MG: 600 TABLET, EXTENDED RELEASE ORAL at 13:22

## 2024-09-29 RX ADMIN — IPRATROPIUM BROMIDE AND ALBUTEROL SULFATE 3 ML: 2.5; .5 SOLUTION RESPIRATORY (INHALATION) at 06:08

## 2024-09-29 RX ADMIN — HYDROMORPHONE HYDROCHLORIDE 0.5 MG: 1 INJECTION, SOLUTION INTRAMUSCULAR; INTRAVENOUS; SUBCUTANEOUS at 04:32

## 2024-09-29 RX ADMIN — AMPICILLIN SODIUM, SULBACTAM SODIUM 3 G: 2; 1 INJECTION, POWDER, FOR SOLUTION INTRAMUSCULAR; INTRAVENOUS at 10:36

## 2024-09-29 RX ADMIN — OXYCODONE HYDROCHLORIDE 15 MG: 5 TABLET ORAL at 15:16

## 2024-09-29 RX ADMIN — GUAIFENESIN 1200 MG: 600 TABLET, EXTENDED RELEASE ORAL at 21:54

## 2024-09-29 RX ADMIN — IPRATROPIUM BROMIDE AND ALBUTEROL SULFATE 3 ML: 2.5; .5 SOLUTION RESPIRATORY (INHALATION) at 10:15

## 2024-09-29 RX ADMIN — HYDROCODONE BITARTRATE AND ACETAMINOPHEN 1 TABLET: 5; 325 TABLET ORAL at 11:19

## 2024-09-29 RX ADMIN — HYDROMORPHONE HYDROCHLORIDE 0.5 MG: 1 INJECTION, SOLUTION INTRAMUSCULAR; INTRAVENOUS; SUBCUTANEOUS at 12:30

## 2024-09-29 RX ADMIN — MAGNESIUM SULFATE HEPTAHYDRATE 2 G: 2 INJECTION, SOLUTION INTRAVENOUS at 15:04

## 2024-09-29 RX ADMIN — AMPICILLIN SODIUM, SULBACTAM SODIUM 3 G: 2; 1 INJECTION, POWDER, FOR SOLUTION INTRAMUSCULAR; INTRAVENOUS at 04:32

## 2024-09-29 RX ADMIN — AMPICILLIN SODIUM, SULBACTAM SODIUM 3 G: 2; 1 INJECTION, POWDER, FOR SOLUTION INTRAMUSCULAR; INTRAVENOUS at 17:04

## 2024-09-29 RX ADMIN — Medication 400 MG: at 13:23

## 2024-09-29 RX ADMIN — HYDROMORPHONE HYDROCHLORIDE 0.5 MG: 1 INJECTION, SOLUTION INTRAMUSCULAR; INTRAVENOUS; SUBCUTANEOUS at 18:01

## 2024-09-29 RX ADMIN — HYDROCODONE BITARTRATE AND ACETAMINOPHEN 1 TABLET: 5; 325 TABLET ORAL at 03:31

## 2024-09-29 RX ADMIN — IPRATROPIUM BROMIDE AND ALBUTEROL SULFATE 3 ML: 2.5; .5 SOLUTION RESPIRATORY (INHALATION) at 15:05

## 2024-09-29 RX ADMIN — MAGNESIUM SULFATE HEPTAHYDRATE 2 G: 2 INJECTION, SOLUTION INTRAVENOUS at 13:23

## 2024-09-29 RX ADMIN — MUPIROCIN 1 APPLICATION: 20 OINTMENT TOPICAL at 09:21

## 2024-09-29 NOTE — PROGRESS NOTES
[facility] Medicine Services  INPATIENT PROGRESS NOTE    Patient Name: Arminda Jiménez  Date of Admission: 9/24/2024  Today's Date: 09/29/24  Length of Stay: 5  Primary Care Physician: Teddy Sanchez MD    Subjective   Chief Complaint: Pain  HPI   Patient feels like her pain is worse today.  She reports that her pain is primarily located in the middle of her back.  After further discussion with her regarding her pain medications she indicated she was accustomed to taking 15 mg of Roxicodone in addition to Neurontin.  She had only been getting Norco 5/325 mg tablets every 4 hours as needed here.  I discussed a plan with her to adjust her pain regimen.  She did indicate to me that she has an implanted pain pump administering fentanyl followed by pain management in the outpatient setting.  She does report that coughing is very difficult for her, as it exacerbates her pain as well.    Review of Systems   All pertinent negatives and positives are as above. All other systems have been reviewed and are negative unless otherwise stated.     Objective    Temp:  [97.7 °F (36.5 °C)-99.1 °F (37.3 °C)] 97.8 °F (36.6 °C)  Heart Rate:  [100-113] 108  Resp:  [16-19] 18  BP: (132-162)/(74-87) 137/87  Physical Exam  Vitals reviewed.   Constitutional:       Appearance: She is ill-appearing.      Comments: Thin and frail appearing   HENT:      Head: Normocephalic.      Mouth/Throat:      Mouth: Mucous membranes are moist.   Neck:      Comments: Some erythema noted at side of excisional biopsy right side of neck  Cardiovascular:      Rate and Rhythm: Regular rhythm. Tachycardia present.   Pulmonary:      Effort: Pulmonary effort is normal.      Breath sounds: Rhonchi present.      Comments: coarse bilateral BSs and upper airway crackly noise   Musculoskeletal:         General: No deformity.   Skin:     General: Skin is warm.   Neurological:      Mental Status: She is alert.      Motor: Weakness  present.   Psychiatric:         Mood and Affect: Mood normal.         Results Review:  I have reviewed the labs, radiology results, and diagnostic studies.    Laboratory Data:   Results from last 7 days   Lab Units 09/28/24  1246 09/27/24  0606 09/26/24  0542   WBC 10*3/mm3 13.75* 12.27* 7.98   HEMOGLOBIN g/dL 9.6* 11.0* 9.5*   HEMATOCRIT % 31.6* 34.2 31.1*   PLATELETS 10*3/mm3 59* 91* 114*        Results from last 7 days   Lab Units 09/29/24  0443 09/28/24  1246 09/27/24  0606 09/26/24  0542 09/25/24  0728 09/24/24  1833   SODIUM mmol/L 130* 129* 130*   < > 134* 135*   POTASSIUM mmol/L 4.1 3.2* 2.9*   < > 3.8 4.0   CHLORIDE mmol/L 93* 89* 88*   < > 96* 92*   CO2 mmol/L 25.0 24.0 29.0   < > 29.0 26.0   BUN mg/dL 3* 4* 3*   < > 10 15   CREATININE mg/dL <0.17* 0.21* 0.22*   < > 0.31* 0.30*   CALCIUM mg/dL 7.9* 8.4* 8.3*   < > 8.1* 9.3   BILIRUBIN mg/dL  --   --   --   --  0.3 0.3   ALK PHOS U/L  --   --   --   --  151* 238*   ALT (SGPT) U/L  --   --   --   --  20 33   AST (SGOT) U/L  --   --   --   --  18 29   GLUCOSE mg/dL 111* 75 84   < > 90 84    < > = values in this interval not displayed.       Culture Data:   Blood Culture   Date Value Ref Range Status   09/24/2024 Enterococcus hirae (C)  Final     Comment:       Infectious disease consultation is highly recommended.   09/24/2024 No growth at 2 days  Preliminary       Radiology Data:   Imaging Results (Last 24 Hours)       ** No results found for the last 24 hours. **            I have reviewed the patient's current medications.     Assessment/Plan   Assessment  Active Hospital Problems    Diagnosis     **Dysphagia     Thrombocytopenia     Metastatic carcinoma     Weight loss     Cancer associated pain     Bacteremia due to Enterococcus     Aspiration pneumonia     Chronic pain     Severe malnutrition     Lymphadenopathy     Vocal cord weakness     COPD (chronic obstructive pulmonary disease)     Adenocarcinoma of unknown origin        Treatment Plan  Appreciate  consultants  Follow-up pathology from excisional biopsy of right cervical lymph node on 9/27  Currently on 2.5 LNC -patient states that she was recently started on 2 L by nasal cannula in the outpatient setting.  Patient has been on IV Zosyn for coverage of aspiration pneumonia in the setting of known dysphagia.  1 blood culture has returned positive for Enterococcus hirae -- likely contaminant.    Will continue IV Unasyn for suspected aspiration pneumonia.  She had 3 days of IV Zosyn and now on day 3 of IV Unasyn  Case discussed with Dr. Zambrano on 9/27  Nutrition supplements  ST following - modified diet including pureed diet with honey thick liquids  Gentle IVFs  Mg replacement today  11.  Palliative care following  12.  Adjust pain medicines to oral Roxicodone; restart her outpatient Neurontin.  Family reported to me today that patient does have an implanted pain pump administering fentanyl-followed by Dr. Chahal of pain management  13.  XR of T and L spine today  14.  PT and OT  15.  Dispo planning    Medical Decision Making  Number and Complexity of problems: 3 acute; moderate complexity      Conditions and Status        Condition is unchanged.     [facility] Data  External documents reviewed: none  Cardiac tracing (EKG, telemetry) interpretation: no new EKGs  Radiology interpretation: as above  Labs reviewed: as above  Any tests that were considered but not ordered: none     Decision rules/scores evaluated (example [id number], Wells, etc): none     Discussed with: patient, bedside nurse Sheri, sister and daughter at bedside     Care Planning  Shared decision making: Discussed with patient with agreement to proceed with treatment plan as outlined  Code status and discussions: Full code    Disposition  Social Determinants of Health that impact treatment or disposition: None apparent at this time  I expect the patient to be discharged: Still to be determined.  Patient reports that she lives at home with her  daughter and would like to return there at discharge.    Electronically signed by Telly Villela MD, 09/29/24, 12:39 CDT.

## 2024-09-29 NOTE — PLAN OF CARE
Goal Outcome Evaluation:           Progress: no change   Pt A/Ox4. VSS on 3L NC. IV to RAC, infusing. Soft spoken. R neck incision, Bactroban applied TID. Purewick. Meds whole in applesauce. C/o pain, PRN meds given. Cough with congestion, nebs completed. Wheelchair bound. Thoracic spine Xray done this shift due to c/o back pain, no acute findings. Meds adjusted by MD. Had a bed bath today.  Last BM 9/27. SCDs. Will have chemo outpatient per oncology. Safety maintained. Call light in reach.

## 2024-09-29 NOTE — PROGRESS NOTES
"Infectious Diseases Progress Note    Patient:  Arminda Jiménez  YOB: 1960  MRN: [id number]   Admit date: 9/24/2024   Admitting Physician: Telly Villela MD  Primary Care Physician: Teddy Sanchez MD    Chief Complaint/Interval History: She remains weak.  Talked with physical therapy and nursing.  She has been offered therapy and she has been offered opportunity to get up to chair.  She has been declining because she just does not feel well.  She reports no pain or discomfort.  She remains very fatigued.  She is using her incentive spirometer some but not a lot.  She indicates she continues to drink some water and liquids but that \"trying to be careful.\"  She still has some hoarse sounding cough and voice.  She is not experiencing fever.  No new positive blood cultures.  Continues to tolerate Unasyn without side effect.    Intake/Output Summary (Last 24 hours) at 9/29/2024 0935  Last data filed at 9/29/2024 0356  Gross per 24 hour   Intake --   Output 1900 ml   Net -1900 ml     Allergies:   Allergies   Allergen Reactions    Aspirin Nausea And Vomiting     Unknown reaction    Keflex [Cephalexin] Other (See Comments)     Blisters to nose and mouth    Nitrofurantoin GI Intolerance and Nausea And Vomiting    Creatine Monohydrate Unknown - Low Severity     Current Scheduled Medications:   ampicillin-sulbactam, 3 g, Intravenous, Q6H  famotidine, 20 mg, Intravenous, Q12H  ipratropium-albuterol, 3 mL, Nebulization, 4x Daily - RT  mupirocin, 1 Application, Topical, Q12H  sodium chloride, 10 mL, Intravenous, Q12H    dextrose 5 % and sodium chloride 0.9 % with KCl 20 mEq, 75 mL/hr, Last Rate: 75 mL/hr (09/28/24 1639)    Current PRN Medications:    albuterol    senna-docusate sodium **AND** polyethylene glycol **AND** bisacodyl **AND** bisacodyl    HYDROcodone-acetaminophen    HYDROmorphone    ondansetron    [COMPLETED] Insert Peripheral IV **AND** sodium chloride    sodium chloride    sodium " "chloride    Review of Systems See HPI    Vital Signs:  Temp (24hrs), Av.4 °F (36.9 °C), Min:97.7 °F (36.5 °C), Max:99.1 °F (37.3 °C)    /81 (BP Location: Right arm, Patient Position: Lying)   Pulse 107   Temp 97.7 °F (36.5 °C) (Oral)   Resp 19   Ht 154.9 cm (60.98\")   Wt 45.6 kg (100 lb 9.6 oz)   SpO2 95%   BMI 19.02 kg/m²     Physical Exam  Vital signs - reviewed.  Line/IV site - No erythema, warmth, induration, or tenderness.  There are a few coarse upper airway crackles  She generally looks weak  She does not appear dyspneic  Abdomen soft and nondistended    Lab Results:  CBC:   Results from last 7 days   Lab Units 24  1246 24  0606 24  0542 24  0728 24  1833   WBC 10*3/mm3 13.75* 12.27* 7.98 7.22 15.04*   HEMOGLOBIN g/dL 9.6* 11.0* 9.5* 8.6* 12.3   HEMATOCRIT % 31.6* 34.2 31.1* 28.0* 38.5   PLATELETS 10*3/mm3 59* 91* 114* 156 292     BMP:  Results from last 7 days   Lab Units 24  0443 24  1246 24  0606 24  0542 24  0728 24  1833   SODIUM mmol/L 130* 129* 130* 133* 134* 135*   POTASSIUM mmol/L 4.1 3.2* 2.9* 3.7 3.8 4.0   CHLORIDE mmol/L 93* 89* 88* 95* 96* 92*   CO2 mmol/L 25.0 24.0 29.0 30.0* 29.0 26.0   BUN mg/dL 3* 4* 3* 6* 10 15   CREATININE mg/dL <0.17* 0.21* 0.22* 0.24* 0.31* 0.30*   GLUCOSE mg/dL 111* 75 84 95 90 84   CALCIUM mg/dL 7.9* 8.4* 8.3* 8.0* 8.1* 9.3   ALT (SGPT) U/L  --   --   --   --  20 33     Culture Results:   Blood Culture   Date Value Ref Range Status   2024 Enterococcus hirae (C)  Final     Comment:       Infectious disease consultation is highly recommended.   2024 No growth at 4 days  Preliminary     Radiology:     Thoracic spine films:  IMPRESSION:  1.  No acute fracture or subluxation.  2.  Mild multilevel thoracic spine degenerative change.    Lumbar spine films done today:  IMPRESSION:  1. No acute osseous findings.  2. Postoperative change spanning L3-S1 without evidence of " complication.  3. Mild multilevel lumbar spine degenerative change.    Additional Studies Reviewed: None    Impression:   Metastatic carcinoma  Positive blood culture for Enterococcus-1 of 2 sets positive-likely contaminant  History suggestive of aspiration  Probable aspiration pneumonia    Recommendations:   Encourage coughing, deep breathing, incentive spirometry  She has not been mobilizing much  Encouraging her to get up to chair  Discussed with physical therapy and nursing  Continue Unasyn  Continue to follow    Chan Martinez MD

## 2024-09-29 NOTE — PROGRESS NOTES
Her          Progress Note    Patient name: Arminda Jiménez  Patient : 1960  VISIT # [id number]  MR #[id number]  Room:     Subjective   Awake, alert, talkative.  Still with deep cough and upper airway secretions, having difficulty mobilizing secretions.    Discussed at length with Dr. Kentrell Villela 2024 and her nurse Sheri DUGAN this morning, 2024.    CBC  2024 has a WBC of 12.27 with a hemoglobin of 11 and a platelet count of 91,000    POD #2 excisional biopsy, right neck 2024 by Dr. 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  Dorcas was found to have stage IV adenocarcinoma of unknown primary in .  She was treated successfully with 4 courses of carboplatin Gemzar obtaining a complete remission that was sustained for 14 years.  Dorcas experienced a heel fracture 2 years ago.  After that event she has had chronic abdominal pain and has lost 50 pounds.  25 of the 50 pounds were lost over the course of the previous month.  Workup has documented widespread metastatic disease with mediastinal, retroperitoneal, gastrohepatic as well as left neck adenopathy.  An FNA of the left neck performed on 2024 reported malignant cells present consistent with metastatic carcinoma.  Further tissue was unavailable for molecular studies and further special stains.   Due to delays in further workup, Dr. Khan felt the patient needed to get on with his treatment and rechallenged her with carboplatin and Gemzar cycle #1 on 2024.   She has been referred to Dr. Marek Roberts with ENT at [facility] to obtain an excisional biopsy to be able to get tissue for molecular studies that will include tissue of origin, NGS, IHC studies etc.   Dorcas was seen by Dr. Marek Roberts who recommended that she be evaluated in the ED at [facility] for admission due to difficulty swallowing getting choked  on her food, pronounced weight loss and dehydration anticipating an open excisional biopsy on Friday, 9/27/2024.  Medical oncology consultation requested in continuity of care.           Detailed history copied from Dr. Khan's office note 9/20/2024     The patient is a pleasant 64 years old female who has a history of stage IV carcinoma of unknown primary site diagnosed in 2010 status post 4 cycles of carboplatin gemcitabine with complete response.  She has been in remission for 14 years.  More recently, she was found to have widespread metastatic disease with mediastinal adenopathy, retroperitoneal adenopathy, gastrohepatic mass and neck adenopathy.  She underwent ultrasound-guided FNA biopsy.  This result only in a scant amount of tissue for analysis.  She was then sent to ENT at [facility] for excisional biopsy.  Unfortunate, the patient missed appointment.  The patient was then sent to ENT at [facility].  ENT at [facility] referred the patient to [facility] for excisional biopsy.     Diagnosis  History of carcinoma no primary site, 2010  Liver lesions  Gastrohepatic space mass  Mediastinal adenopathy consistent cycles normal  Metastatic carcinoma, left neck lymph node, Sept 2024     Disease target  Mediastinal adenopathy  Hepatic lobe lesions  Pancreatic body/tail hypodense mass in the gastrohepatic ligament     Treatment summary  9/20/24 Initiate Carbo AUC 4 D1, Gemzar 800mg D1, D8 every 21 days. Treatment consent signed.          Cancer history  Arminda Jiménez was first seen by me on 9/5/2024.  I was consulted for findings of a gastric mass during patient was positioned with hospital.  The patient has a history of lung cancer, COPD.  She was a direct admit from her PCP office.  Patient has a 20 pounds weight loss.  She also had complained of dysphagia, nausea, dizziness decreased appetite.  The patient reports that she has a history of stage IV lung cancer diagnosed 10 years ago at [facility].  She  received chemotherapy and stay in remission.  She tells me that she did not have a biopsy performed at that time.  Apparently, review of medical record showed that she had a carcinoma of unknown primary site diagnosed October 2010.  She received 2 cycles of carboplatin and paclitaxel.  She had a reaction to Taxol during cycle #2.  She was switched to carboplatin gemcitabine, completed March 2019.  Sites of disease in the past involve the pericardial, pleural, mediastinal, and cervical involvement.   7/30/24 CT chest: Extensive necrotic mediastinal and bilateral hilar lymphadenopathy consistent with metastatic disease. Lymph node in the right right paratracheal lymph node measures 1 cm short axis axial image 34 at the thoracic inlet.  AP window lymph node measures 1.2 cm short axis on axial image 71 with additional 1 cm AP window lymph node axial image 76.  Precarinal lymph node measures 1.1 cm on axial image 85.  Partially necrotic anterior mediastinal lymph node measures 1.4 cm short axis image 88 with similar appearing anterior mediastinal lymph node measuring 1.3 cm short axis image 69.  A partially necrotic subcarinal lymph node measures 1.8 cm short axis on axial image 96.  Right hilar lymph node measures 1.1 cm short axis image 95.  Partially necrotic and calcified left hilar lymph node measures 1.4 cm short axis image 118.  Left hilar lymph node measures 1.1 cm short axis on axial image 96. These lymph nodes are new since 2022. There is a 2.2 x 1.5 cm nodule abutting the left heart border axial image 140, which is either within the pericardial region were within the left lung parenchyma.  There is a spiculated 0.8 x 0.5 x 0.8 cm left upper lobe nodule axial image 89 and coronal  image 31 which is also new.  Probable focal bronchiectasis left upper lobe on axial image 76, less likely cavitary nodule.  0.3 cm left lower lobe nodule image 151 which may be endobronchial.  Question a 0.6 meters left lower lobe  nodule along the diaphragm axial image 158.  Subpleural 0.4 cm right middle lobe nodule image 165.  Bilateral bronchial thickening some areas of subsegmental atelectasis noted in both lobes.  Moderate emphysema.  There is no pleural effusion or pneumothorax. Centrally low density gastrohepatic ligament mass measures 3.3 x 3.2 cm axial image 193 with some adjacent wall thickening suggested in the proximal stomach.   7/30/24 CT abdomen/pelvis: Marked gastric wall thickening proximal stomach and gastric body; neoplasm is suspected, Correlation with symptomatology and EGD advised, Cystic structure in the gastrohepatic ligament measuring up to 4.9 cm, may represent a necrotic lymph node or mass less likely pancreatic suggestive. Correlation with PET scan and/or MRI advised.    08/12/24 CT soft tissue neck:  Mild asymmetry of the vocal cords, secondary to the vocal cord paralysis.  No visible nodule or mass of the larynx, Mild necrotic adenopathy of the right neck, consistent with metastatic disease, Since the prior CT chest from 07/30/2024, grossly stable right paratracheal necrotic node at level of the thoracic inlet, also consistent with metastatic disease.   9/5/2024-upper EGD, GI-gastric bezoar to explain the CT scan of the abdomen findings.  No gastric tumor.  No active ulcer disease to explain patient's nausea vomiting.  Probable gastroparesis.  9/5/2024-CT chest, [facility]:  There is a hypodense nodule in the left thyroid lobe measuring 1.2 cm.  A right tracheoesophageal lymph node is 1.9 cm in short axis on axial image number 16 with interval increase size from 1.2 cm in short axis when remeasured.  A right hilar lymph node is 1.1 cm in short axis without change.  A subcarinal lymph node is partially calcified and measures 2.5 cm in short axis without change.  This has low attenuation.  An additional right hilar lymph node is 1.5 cm in short axis and is probably unchanged.  A left hilar lymph node is 1 cm in short axis  without change.  An AP window lymph node is at least 2.3 cm and has increased in size.  Anterior mediastinal lymph node is now 1.6 cm in short axis, previously 1.3 cm.  A solid nodule in the lingula abuts the free wall of the left ventricle and measures 2.9 x 1.8 cm, previously 2.4 x 1.6 cm when remeasured. There is a small cavitary nodule in the left upper lobe measuring a cavitary nodule in the left upper lobe on series 604 image number 23  is unchanged measuring 0.6 cm. A solid spiculated nodule in the left upper lobe is probably overall unchanged measuring 0.8 cm x 0.7 cm on image number 26.  On there is a new solid juxta fissural nodule in the right middle lobe measuring 0.4 cm on image number 34.  There is an enlarging juxta fissural nodule in the right middle lobe measuring 0.3 cm image number 35.  The small to moderate pleural effusion layers dependently.  No left pleural fluid.  Bibasilar infiltrates are suggested. There is a large hypodense mass arising from the pancreatic body/tail extending to the gastrohepatic ligament measuring up to 5.4 x 4.1 cm.  This has increased in size.  There are multiple new hypodense hepatic lesions.    9/6/2024-CT thoracic and lumbar spine, [facility]: Multiple chronic thoracic compression fractures, diffuse degenerative disc disease.  Postop changes of the lumbar spine.  Loosening of the right L3 transpedicular screw.  Remaining hardware appears well-positioned.  Rim-enhancing complex cystic lesion in the gastrohepatic space.  9/9/2024-FNA biopsy by radiology: Consistent metastatic carcinoma.  However, further characterization of tumor could not be performed due to scant material.  9/10/2024-CT abdomen pelvis with contrast, [facility]: Interval development of mild to moderate dependent atelectasis of both lower  lobes and mild patchy ground-glass and consolidation of the lingula and to a lesser extent the right middle lobe.  3.1 cm left epicardial nodule, increased in size from 2.5 cm.   Interval development of multiple ill-defined hypodense liver masses.  The largest, of the left lobe near the IVC measures 3.3 cm.  Stable mild prominence of the intrahepatic biliary tree and mild distension of the common duct, measuring up to 10 mm in diameter.  No visible obstructing stone or mass.  Cholecystectomy, again noted.  Interval increase in size of the previously seen mass between the liver left lobe, stomach, aorta, and superior aspect of the pancreatic body.  Measures 6.0 x 4.5 cm cross section, increased from 3.5 x 3.3 cm.  Inferiorly, the mass abuts and blends in per separately of the pancreas.  Calcified granulomas of the spleen, again noted.  The adrenal glands have a normal appearance.  The kidneys enhance symmetrically.  0.9 cm cyst of the left kidney, again noted.  No ureteral stones or hydronephrosis.  The uterus and ovaries have a normal appearance.  No evidence of bowel obstruction.  Moderate amount of stool in the colon, similar to before.  Small fat and fluid containing right inguinal hernia, stable.  No free air.  Interval development of trace fluid in the pelvis.  Electronic device in subcutaneous fat of the right flank, with catheter entering the spinal canal, again noted.  Bone window images show no significant lytic or sclerotic bone lesions.  Surgical fixation of the lumbar spine, again noted.  Chronic mild compression fracture of the L1 superior endplate, stable.  Impression:  Interval increase in size of the mass of the epigastric region, most consistent with a malignant process, potentially metastatic adenopathy or a pancreatic mass.  Interval development of multiple hepatic metastases. 3.  3.1 cm left epicardial nodule, increased in size, suspicious for metastatic adenopathy or metastatic pleural disease.   Interval development of small to moderate dependent right pleural effusion, with atelectasis versus pneumonia of both lower lobes.   9/20/24 Initiate palliative chemotherapy  Carbo AUC 4 D1, Gemzar 800mg D1, D8 every 21 days. Treatment consent signed.    9/20/2024-I have called [facility] and refer the patient back.  She will be seen early next week.        REVIEW OF SYSTEMS:   Constitutional: Pronounced weight loss fatigue failure to thrive  HEENT:  dysphagia and hoarseness               Lungs: no cough, no shortness of breath, no wheeze  CVS: no palpitation, no chest pain, no shortness of breath  GI: no abdominal pain, no nausea , no vomiting, no constipation  MERCEDES: no dysuria, frequency and urgency, no hematuria, no kidney stones  Musculoskeletal: no joint pain, swelling , stiffness  Endocrine: no polyuria, polydipsia, no cold or heat intolerance  Hematology: no anemia, no easy bruising or bleeding, no history of clotting disorder  Dermatology: no skin rash, no eczema, no pruritus  Psychiatry: no depression, no anxiety,no panic attacks, no suicide ideation  Neurology: no syncope, no seizures, no numbness or tingling of hands, no numbness or tingling of feet, no paresis  Objective     Vital Signs  Temp:  [98 °F (36.7 °C)-99.1 °F (37.3 °C)] 99.1 °F (37.3 °C)  Heart Rate:  [100-113] 101  Resp:  [16-18] 18  BP: (132-181)/(74-83) 142/83    Intake/Output Summary (Last 24 hours) at 9/29/2024 0626  Last data filed at 9/29/2024 0356  Gross per 24 hour   Intake --   Output 1900 ml   Net -1900 ml       PHYSICAL EXAM:  CONSTITUTIONAL: Alert, appropriate, no acute distress, very thin, cachectic  EYES: Anicteric, EOM intact, pupils equal round   ENT: Mucous membranes moist, no oropharyngeal lesions   NECK: Supple, Cervical lymphadenopathy on the right   CHEST/LUNGS: CTA bilaterally, normal respiratory effort   CARDIOVASCULAR: RRR, no murmurs  ABDOMEN: soft nontender, active bowel sounds, no HSM  EXTREMITIES: warm, moves all fours  SKIN: warm, dry with no rashes or lesions  LYMPH: No cervical, clavicular, axillary, or inguinal lymphadenopathy  NEUROLOGIC: follows commands, nonfocal   PSYCH: mood and  affect appropriate      CBC  Results from last 7 days   Lab Units 09/28/24  1246 09/27/24  0606 09/26/24  0542 09/25/24  0728 09/24/24  1833   WBC 10*3/mm3 13.75* 12.27* 7.98   < > 15.04*   HEMOGLOBIN g/dL 9.6* 11.0* 9.5*   < > 12.3   HEMATOCRIT % 31.6* 34.2 31.1*   < > 38.5   PLATELETS 10*3/mm3 59* 91* 114*   < > 292   LYMPHOCYTE % %  --  6.1*  --   --  7.1*   MONOCYTES % %  --  1.0*  --   --  0.0*    < > = values in this interval not displayed.       CMP  Lab Results   Component Value Date    GLUCOSE 111 (H) 09/29/2024    BUN 3 (L) 09/29/2024    CREATININE <0.17 (L) 09/29/2024    EGFRIFNONA 114 01/07/2019    BCR  09/29/2024      Comment:      Unable to calculate Bun/Crea Ratio.    CO2 25.0 09/29/2024    CALCIUM 7.9 (L) 09/29/2024    ALBUMIN 2.6 (L) 09/25/2024    AST 18 09/25/2024    ALT 20 09/25/2024             Blood Culture   Date Value Ref Range Status   09/24/2024 Abnormal Stain (C)  Preliminary   09/24/2024 No growth at 24 hours  Preliminary       Imaging Results (Last 72 Hours)       Procedure Component Value Units Date/Time    XR Chest 1 View [[id number]] Collected: 09/28/24 0833     Updated: 09/28/24 0838    Narrative:      EXAM/TECHNIQUE: XR CHEST 1 VW-     INDICATION: follow-up infiltrates     COMPARISON: 9/24/2024     FINDINGS:     Cardiac silhouette is within normal limits and stable.     Slight increase in small right-sided pleural effusion. No evidence of  left-sided effusion. No visible pneumothorax. No definite change in  patchy bilateral lower lobe consolidation. The upper lungs are clear.     No acute osseous finding.       Impression:         No significant change in patchy bilateral lower lobe consolidation.  Slight increase in small right-sided pleural effusion.     This report was signed and finalized on 9/28/2024 8:35 AM by Dr. Bam Ortega MD.                 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  Dorcas was found to have stage IV adenocarcinoma of unknown primary in 2010.  She was treated successfully with 4 courses of carboplatin Gemzar obtaining a complete remission that was sustained for 14 years.  Dorcas experienced a heel fracture 2 years ago.  After that event she has had chronic abdominal pain and has lost 50 pounds.  25 of the 50 pounds were lost over the course of the previous month.  Workup has documented widespread metastatic disease with mediastinal, retroperitoneal, gastrohepatic as well as left neck adenopathy.  An FNA of the left neck performed on 9/9/2024 reported malignant cells present consistent with metastatic carcinoma.  Further tissue was unavailable for molecular studies and further special stains.   Due to delays in further workup, Dr. Khan felt the patient needed to get on with his treatment and rechallenged her with carboplatin and Gemzar cycle #1 on 9/20/2024.   She has been referred to Dr. Marek Roberts with ENT at [facility] to obtain an excisional biopsy to be able to get tissue for molecular studies that will include tissue of origin, NGS, IHC studies etc.   Dorcas was seen by Dr. Marek Roberts who recommended that she be evaluated in the ED at [facility] for admission due to difficulty swallowing getting choked on her food, pronounced weight loss and dehydration anticipating an open excisional biopsy on Friday, 9/27/2024.      Discussed at length with Dr. Kentrell Villela, 9/27/2024, and her nurse Sheri DUGAN today.    CBC  9/28/2024 has a WBC of 13.75 with a hemoglobin of 9.6 and a platelet count of 59,000    POD #2  excisional biopsy, right neck 9/27/2024 by Dr. Marek Roberts    Given the aspiration issues being addressed, the positive blood culture under treatment with IV antibiotics, it might be in her best interest to hold off on Gemzar and resume cycle #2 of chemotherapy in a couple weeks.      #2  Dysphagia and aspiration    Patient states that she coughs every time she tries to eat something and swallow  Swallow evaluation 9/25/2024-documented laryngeal penetration and aspiration during swallow.  SLP Findings: Patient presents with severe pharyngeal dysphagia.   Recommendations: Diet Textures: honey thick liquid, puree consistency food. Medications should be taken whole with puree. May have Ice between meals after oral care, under staff or family supervision and with the recommended strategies for safe swallowing.  Recommended Strategies: Upright for PO, small bites and sips, and alternate liquids and solids. Oral care before breakfast, after all meals and PRN.  Dysphagia therapy is recommended.      Discussed at length with Dr. Kentrell Villela, 9/27/2024, and her nurse Sheri DUGAN today.      #3  Aspiration pneumonia    Chest x-ray, 1 view 9/24/2024 reported the following  Emphysematous changes of the lungs.   Patchy bibasilar infiltrates are present.    Blood culture 9/24/2024 reported the following:  Enterococcus hirae Critical      Discussed with Dr. Kentrell Villela, 9/27/2024  ID consult notes reviewed  She is on Unasyn     #4  Pain secondary to #1    Dilaudid 0.5 mg IV every 4 Hrs PRN  Norco 5 mg PO Q 4 Hrs PRN          Bartolome Zambrano MD  09/29/24  06:26 CDT

## 2024-09-29 NOTE — PLAN OF CARE
Goal Outcome Evaluation:  Plan of Care Reviewed With: patient        Progress: no change  Outcome Evaluation: Pt A&OX4. VSS on 3L via NC. Tele on. C/O pain managed W/ PRN meds.Dressing to Rt side of neck W/ moderate drainage present. IV to Rt AC.Voiding via PW. Call light within reach, safety maintained.

## 2024-09-30 LAB
ANION GAP SERPL CALCULATED.3IONS-SCNC: 17 MMOL/L (ref 5–15)
BASOPHILS # BLD AUTO: 0.05 10*3/MM3 (ref 0–0.2)
BASOPHILS NFR BLD AUTO: 0.4 % (ref 0–1.5)
BUN SERPL-MCNC: 6 MG/DL (ref 8–23)
BUN/CREAT SERPL: 30 (ref 7–25)
CALCIUM SPEC-SCNC: 8.2 MG/DL (ref 8.6–10.5)
CHLORIDE SERPL-SCNC: 96 MMOL/L (ref 98–107)
CO2 SERPL-SCNC: 21 MMOL/L (ref 22–29)
CREAT SERPL-MCNC: 0.2 MG/DL (ref 0.57–1)
DEPRECATED RDW RBC AUTO: 42.8 FL (ref 37–54)
EGFRCR SERPLBLD CKD-EPI 2021: 130.8 ML/MIN/1.73
EOSINOPHIL # BLD AUTO: 0.2 10*3/MM3 (ref 0–0.4)
EOSINOPHIL NFR BLD AUTO: 1.5 % (ref 0.3–6.2)
ERYTHROCYTE [DISTWIDTH] IN BLOOD BY AUTOMATED COUNT: 13.1 % (ref 12.3–15.4)
GLUCOSE SERPL-MCNC: 102 MG/DL (ref 65–99)
HCT VFR BLD AUTO: 28 % (ref 34–46.6)
HGB BLD-MCNC: 9.2 G/DL (ref 12–15.9)
IMM GRANULOCYTES # BLD AUTO: 0.21 10*3/MM3 (ref 0–0.05)
IMM GRANULOCYTES NFR BLD AUTO: 1.6 % (ref 0–0.5)
LYMPHOCYTES # BLD AUTO: 1.18 10*3/MM3 (ref 0.7–3.1)
LYMPHOCYTES NFR BLD AUTO: 9.1 % (ref 19.6–45.3)
MAGNESIUM SERPL-MCNC: 2 MG/DL (ref 1.6–2.4)
MCH RBC QN AUTO: 29.4 PG (ref 26.6–33)
MCHC RBC AUTO-ENTMCNC: 32.9 G/DL (ref 31.5–35.7)
MCV RBC AUTO: 89.5 FL (ref 79–97)
MONOCYTES # BLD AUTO: 0.96 10*3/MM3 (ref 0.1–0.9)
MONOCYTES NFR BLD AUTO: 7.4 % (ref 5–12)
NEUTROPHILS NFR BLD AUTO: 10.36 10*3/MM3 (ref 1.7–7)
NEUTROPHILS NFR BLD AUTO: 80 % (ref 42.7–76)
PLATELET # BLD AUTO: 45 10*3/MM3 (ref 140–450)
PMV BLD AUTO: 11.5 FL (ref 6–12)
POTASSIUM SERPL-SCNC: 4.2 MMOL/L (ref 3.5–5.2)
RBC # BLD AUTO: 3.13 10*6/MM3 (ref 3.77–5.28)
SODIUM SERPL-SCNC: 134 MMOL/L (ref 136–145)
WBC NRBC COR # BLD AUTO: 12.96 10*3/MM3 (ref 3.4–10.8)

## 2024-09-30 PROCEDURE — 99497 ADVNCD CARE PLAN 30 MIN: CPT | Performed by: CLINICAL NURSE SPECIALIST

## 2024-09-30 PROCEDURE — 83735 ASSAY OF MAGNESIUM: CPT | Performed by: INTERNAL MEDICINE

## 2024-09-30 PROCEDURE — 80048 BASIC METABOLIC PNL TOTAL CA: CPT | Performed by: INTERNAL MEDICINE

## 2024-09-30 PROCEDURE — 25010000002 HYDROMORPHONE PER 4 MG: Performed by: INTERNAL MEDICINE

## 2024-09-30 PROCEDURE — 94761 N-INVAS EAR/PLS OXIMETRY MLT: CPT

## 2024-09-30 PROCEDURE — 25010000002 AMPICILLIN-SULBACTAM PER 1.5 G: Performed by: OTOLARYNGOLOGY

## 2024-09-30 PROCEDURE — 94664 DEMO&/EVAL PT USE INHALER: CPT

## 2024-09-30 PROCEDURE — 94799 UNLISTED PULMONARY SVC/PX: CPT

## 2024-09-30 PROCEDURE — 99222 1ST HOSP IP/OBS MODERATE 55: CPT | Performed by: CLINICAL NURSE SPECIALIST

## 2024-09-30 PROCEDURE — 92526 ORAL FUNCTION THERAPY: CPT

## 2024-09-30 PROCEDURE — 99232 SBSQ HOSP IP/OBS MODERATE 35: CPT | Performed by: INTERNAL MEDICINE

## 2024-09-30 PROCEDURE — 85025 COMPLETE CBC W/AUTO DIFF WBC: CPT | Performed by: INTERNAL MEDICINE

## 2024-09-30 RX ORDER — ESCITALOPRAM OXALATE 10 MG/1
20 TABLET ORAL DAILY
Status: DISCONTINUED | OUTPATIENT
Start: 2024-09-30 | End: 2024-10-07

## 2024-09-30 RX ADMIN — IPRATROPIUM BROMIDE AND ALBUTEROL SULFATE 3 ML: 2.5; .5 SOLUTION RESPIRATORY (INHALATION) at 19:16

## 2024-09-30 RX ADMIN — HYDROMORPHONE HYDROCHLORIDE 0.5 MG: 1 INJECTION, SOLUTION INTRAMUSCULAR; INTRAVENOUS; SUBCUTANEOUS at 20:33

## 2024-09-30 RX ADMIN — IPRATROPIUM BROMIDE AND ALBUTEROL SULFATE 3 ML: 2.5; .5 SOLUTION RESPIRATORY (INHALATION) at 14:47

## 2024-09-30 RX ADMIN — ESCITALOPRAM OXALATE 20 MG: 10 TABLET ORAL at 11:48

## 2024-09-30 RX ADMIN — AMPICILLIN SODIUM, SULBACTAM SODIUM 3 G: 2; 1 INJECTION, POWDER, FOR SOLUTION INTRAMUSCULAR; INTRAVENOUS at 23:13

## 2024-09-30 RX ADMIN — HYDROMORPHONE HYDROCHLORIDE 0.5 MG: 1 INJECTION, SOLUTION INTRAMUSCULAR; INTRAVENOUS; SUBCUTANEOUS at 16:11

## 2024-09-30 RX ADMIN — GABAPENTIN 800 MG: 400 CAPSULE ORAL at 00:13

## 2024-09-30 RX ADMIN — MUPIROCIN 1 APPLICATION: 20 OINTMENT TOPICAL at 20:20

## 2024-09-30 RX ADMIN — HYDROMORPHONE HYDROCHLORIDE 0.5 MG: 1 INJECTION, SOLUTION INTRAMUSCULAR; INTRAVENOUS; SUBCUTANEOUS at 05:59

## 2024-09-30 RX ADMIN — OXYCODONE HYDROCHLORIDE 15 MG: 5 TABLET ORAL at 14:33

## 2024-09-30 RX ADMIN — AMPICILLIN SODIUM, SULBACTAM SODIUM 3 G: 2; 1 INJECTION, POWDER, FOR SOLUTION INTRAMUSCULAR; INTRAVENOUS at 00:13

## 2024-09-30 RX ADMIN — OXYCODONE HYDROCHLORIDE 15 MG: 5 TABLET ORAL at 08:36

## 2024-09-30 RX ADMIN — OXYCODONE HYDROCHLORIDE 15 MG: 5 TABLET ORAL at 19:01

## 2024-09-30 RX ADMIN — OXYCODONE HYDROCHLORIDE 15 MG: 5 TABLET ORAL at 04:43

## 2024-09-30 RX ADMIN — FAMOTIDINE 20 MG: 20 TABLET, FILM COATED ORAL at 08:36

## 2024-09-30 RX ADMIN — Medication 10 ML: at 08:37

## 2024-09-30 RX ADMIN — GABAPENTIN 800 MG: 400 CAPSULE ORAL at 19:01

## 2024-09-30 RX ADMIN — Medication 400 MG: at 08:36

## 2024-09-30 RX ADMIN — GABAPENTIN 800 MG: 400 CAPSULE ORAL at 05:59

## 2024-09-30 RX ADMIN — MUPIROCIN 1 APPLICATION: 20 OINTMENT TOPICAL at 08:37

## 2024-09-30 RX ADMIN — Medication 10 ML: at 20:19

## 2024-09-30 RX ADMIN — FAMOTIDINE 20 MG: 20 TABLET, FILM COATED ORAL at 19:01

## 2024-09-30 RX ADMIN — IPRATROPIUM BROMIDE AND ALBUTEROL SULFATE 3 ML: 2.5; .5 SOLUTION RESPIRATORY (INHALATION) at 05:58

## 2024-09-30 RX ADMIN — HYDROMORPHONE HYDROCHLORIDE 0.5 MG: 1 INJECTION, SOLUTION INTRAMUSCULAR; INTRAVENOUS; SUBCUTANEOUS at 10:38

## 2024-09-30 RX ADMIN — GUAIFENESIN 1200 MG: 600 TABLET, EXTENDED RELEASE ORAL at 20:19

## 2024-09-30 RX ADMIN — GABAPENTIN 800 MG: 400 CAPSULE ORAL at 11:48

## 2024-09-30 RX ADMIN — AMPICILLIN SODIUM, SULBACTAM SODIUM 3 G: 2; 1 INJECTION, POWDER, FOR SOLUTION INTRAMUSCULAR; INTRAVENOUS at 11:48

## 2024-09-30 RX ADMIN — AMPICILLIN SODIUM, SULBACTAM SODIUM 3 G: 2; 1 INJECTION, POWDER, FOR SOLUTION INTRAMUSCULAR; INTRAVENOUS at 05:59

## 2024-09-30 RX ADMIN — GABAPENTIN 800 MG: 400 CAPSULE ORAL at 23:13

## 2024-09-30 RX ADMIN — AMPICILLIN SODIUM, SULBACTAM SODIUM 3 G: 2; 1 INJECTION, POWDER, FOR SOLUTION INTRAMUSCULAR; INTRAVENOUS at 19:01

## 2024-09-30 RX ADMIN — IPRATROPIUM BROMIDE AND ALBUTEROL SULFATE 3 ML: 2.5; .5 SOLUTION RESPIRATORY (INHALATION) at 10:02

## 2024-09-30 RX ADMIN — OXYCODONE HYDROCHLORIDE 15 MG: 5 TABLET ORAL at 23:13

## 2024-09-30 RX ADMIN — GUAIFENESIN 1200 MG: 600 TABLET, EXTENDED RELEASE ORAL at 08:37

## 2024-09-30 NOTE — THERAPY TREATMENT NOTE
Acute Care - Speech Language Pathology   Swallow Treatment Note Psychiatric     Patient Name: Arminda Jiménez  : 1960  MRN: [id number]  Today's Date: 2024               Admit Date: 2024  Pt seen to assess safety and tolerance of current diet. Upon arrival, pt was upright in bed with multiple family members present. RITCHIE Ledesma reported that she and RITCHIE Garcia had removed thin liquids from pt room this AM. No lunch tray present d/t patient refusing lunch. When asked what she had been eating she reported that she had been eating [employer] which were seen on her bedside table. Pt also mentioned consuming green beans and apples with a visitor recently. Honey thick water also noted on bedside table. Pt stated that she would prefer ice chips over thickened water. SLP retrieved small cup of ice chips for pt as well as regular solid trials. Pt demonstrated poor rotary chew of regular solids resulting in increased bolus prep time. Pt reached for ice chips to clear regular solid residue from oral cavity. Pt encouraged to only consume thickened liquids with consumption of solids d/t potential aspiration of food residue when oral wash completed with thin liquids. SLP also stressed importance of waiting at least 30 minutes after a meal and completing oral care prior to consuming ice chips to reduce potential of food particles and bacteria from entering lungs if aspiration occurs. She expressed understanding. Pt would benefit from ongoing education on aspiration risks as she continually chooses not to comply with recommendations.     Pt may upgrade to soft to chew solids with chopped meat but must continue honey thick liquids at this time. Okay for ice chips following meals and after oral care. ST will continue to follow.     Lizette Morataya MS CCC-SLP 2024 13:28 CDT    Visit Dx:     ICD-10-CM ICD-9-CM   1. Dysphagia, unspecified type  R13.10 787.20   2. Unable to care for self  Z78.9 V49.89   3. Adenocarcinoma   C80.1 199.1   4. Elevated TSH  R79.89 794.5   5. Pulmonary emphysema, unspecified emphysema type  J43.9 492.8   6. Patchy atelectasis  J98.11 518.0   7. Adenocarcinoma of unknown origin  C80.1 199.1   8. Lymphadenopathy  R59.1 785.6   9. Vocal cord weakness  J38.00 478.30   10. Impaired mobility [Z74.09]  Z74.09 799.89     Patient Active Problem List   Diagnosis    Foraminal stenosis of lumbosacral region    Chronic back pain    Radiculopathy    Lumbar stenosis    Panlobular emphysema    Gastroesophageal reflux disease without esophagitis    Constipation due to opioid therapy    PAD (peripheral artery disease)    Primary osteoarthritis involving multiple joints    Recurrent major depressive disorder    Overweight    Personal history of nicotine dependence    Adenocarcinoma of unknown origin    Environmental allergies    Primary hypertension    Senile osteoporosis    Open left ankle fracture    Fall    Postoperative anemia due to acute blood loss    Acute foot pain, left    Impaired gait and mobility due to left ankle fracture    Chest pain, unspecified type    COPD (chronic obstructive pulmonary disease)    Overweight (BMI 25.0-29.9)    Displaced fracture of medial malleolus of left tibia, subsequent encounter for closed fracture with nonunion    Dysphagia    Lymphadenopathy    Vocal cord weakness    Metastatic carcinoma    Weight loss    Cancer associated pain    Bacteremia due to Enterococcus    Aspiration pneumonia    Chronic pain    Severe malnutrition    Thrombocytopenia     Past Medical History:   Diagnosis Date    Adenocarcinoma of unknown origin 04/04/2022    Anxiety     Arthritis     Asthma     Cancer     right lung,abd,and pelvic area    Chronic back pain     COPD (chronic obstructive pulmonary disease)     Depression     Environmental allergies 04/11/2022    Facet arthropathy     Foraminal stenosis of lumbosacral region     GERD (gastroesophageal reflux disease)     H/O degenerative disc disease      History of blood transfusion     PVD (peripheral vascular disease)     Radiculopathy     Thyroid nodule      Past Surgical History:   Procedure Laterality Date    ANKLE ARTHROPLASTY Left 8/29/2023    Procedure: TOTAL ANKLE ARTHROPLASTY WITH INBONE IMPLANT, GASTROCNEMIUS RECESSION, REMOVAL OF HARDWARE DEEP. LEFT ANKLE;  Surgeon: Bartolmoe Olguin DPM;  Location:  [facility];  Service: Podiatry;  Laterality: Left;    ANKLE OPEN REDUCTION INTERNAL FIXATION Left 01/05/2023    Procedure: REPAIR OF NONUNION MEDIAL MALLEOLUS, REPAIR NONUNION FIBULA, BONE GRAFT HARVEST, LEFT ANKLE;  Surgeon: Bartolome Olguin DPM;  Location:  [facility];  Service: Podiatry;  Laterality: Left;    ANKLE SURGERY Left     ANTERIOR LUMBAR EXPOSURE N/A 05/07/2018    Procedure: ANTERIOR LUMBAR EXPOSURE;  Surgeon: Chris Haley DO;  Location:  [facility];  Service: Vascular    APPENDECTOMY      CARPAL TUNNEL RELEASE Bilateral     CERVICAL LYMPH NODE BIOPSY/EXCISION Right 9/27/2024    Procedure: RIGHT CERVICAL LYMPH NODE BIOPSY/EXCISION;  Surgeon: Marek Roberts MD;  Location:  [facility];  Service: ENT;  Laterality: Right;    INCISION AND DRAINAGE OF WOUND Left 05/08/2022    Procedure: Incision and Drainage Left Open Ankle Fracture, Open Reduction Internal Fixation Bimalleolar Ankle Fracture;  Surgeon: Leland Aranda MD;  Location:  [facility];  Service: Orthopedics;  Laterality: Left;    LUMBAR FUSION N/A 05/07/2018    Procedure: ANTERIOR DECOMPRESSION, ANTERIOR LUMBAR INTERBODY FUSION WITH INSTRUMENTATION L5-S1;  Surgeon: LUIS CARLOS Zheng MD;  Location:  [facility];  Service: Orthopedic Spine    LUMBAR FUSION Right 01/02/2019    Procedure: RIGHT LATERAL LUMBAR INTERBODY FUSION L3-5;  Surgeon: LUIS CARLOS Zheng MD;  Location:  [facility];  Service: Orthopedic Spine    LUMBAR LAMINECTOMY WITH FUSION N/A 05/09/2018    Procedure: POSTERIOR SPINAL FUSION WITH INSTRUMENTATION L5-S1;  Surgeon: LUIS CARLOS Zheng MD;  Location:  [facility];  Service:  Orthopedic Spine    LUMBAR LAMINECTOMY WITH FUSION Right 01/04/2019    Procedure: POSTERIOR SPINAL FUSION WITH INSTRUMENTATION RIGHT L3-S1;  Surgeon: LUIS CARLOS Zheng MD;  Location: [facility];  Service: Orthopedic Spine    OTHER SURGICAL HISTORY Bilateral 2017    Stent implants BLE [place] [address] [place]    PAIN PUMP INSERTION/REVISION Right     FENTANYL    WRIST FUSION Right        SLP Recommendation and Plan                                               Daily Summary of Progress (SLP): progress towards functional goals is fair (09/30/24 1217)               Treatment Assessment (SLP): continued (09/30/24 1217)  Treatment Assessment Comments (SLP): see note (09/30/24 1217)  Plan for Continued Treatment (SLP): continue treatment per plan of care (09/30/24 1217)         Plan of Care Reviewed With: patient  Progress: no change  Outcome Evaluation: see note      SWALLOW EVALUATION (Last 72 Hours)       SLP Adult Swallow Evaluation       Row Name 09/30/24 1217                   Rehab Evaluation    Document Type therapy note (daily note)  -JR        Subjective Information no complaints  -JR        Patient Observations alert;cooperative  -JR        Patient/Family/Caregiver Comments/Observations several family members present  -JR        Patient Effort good  -JR        Symptoms Noted During/After Treatment none  -JR           General Information    Patient Profile Reviewed yes  -JR           Pain    Additional Documentation Pain Scale: FACES Pre/Post-Treatment (Group)  -JR           Pain Scale: FACES Pre/Post-Treatment    Pain: FACES Scale, Pretreatment 0-->no hurt  -JR        Posttreatment Pain Rating 0-->no hurt  -JR           SLP Treatment Clinical Impressions    Treatment Assessment (SLP) continued  -JR        Treatment Assessment Comments (SLP) see note  -JR        Daily Summary of Progress (SLP) progress towards functional goals is fair  -JR        Barriers to Overall Progress (SLP) Resistant to information  -JR         Plan for Continued Treatment (SLP) continue treatment per plan of care  -JR        Care Plan Review evaluation/treatment results reviewed;care plan/treatment goals reviewed;risks/benefits reviewed;current/potential barriers reviewed  -JR           Swallow Goals (SLP)    Swallow LTGs Patient will demonstrate functional swallow for  -JR        Swallow STGs diet tolerance goal selection (SLP);swallow management recall goal selection (SLP)  -JR        Diet Tolerance Goal Selection (SLP) Patient will tolerate trials of  -JR        Swallow Management Recall Goal Selection (SLP) swallow management recall, SLP goal 1  -JR           (LTG) Patient will demonstrate functional swallow for    Diet Texture (Demonstrate functional swallow) soft to chew (chopped) textures  -JR        Liquid viscosity (Demonstrate functional swallow) honey/  moderately thick liquids  -JR        [place] (Demonstrate functional swallow) independently (over 90% accuracy)  -JR        Time Frame (Demonstrate functional swallow) by discharge  -JR        Progress/Outcomes (Demonstrate functional swallow) goal revised this date  -           (STG) Patient will tolerate trials of    Consistencies Trialed (Tolerate trials) pureed textures;honey/ moderately thick liquids  -JR        Desired Outcome (Tolerate trials) without signs/symptoms of aspiration  -JR        [place] (Tolerate trials) independently (over 90% accuracy)  -JR        Time Frame (Tolerate trials) by discharge  -JR        Progress/Outcomes (Tolerate trials) goal revised this date  -           (STG) Swallow Management Recall Goal 1 (SLP)    Activity (Swallow Management Recall Goal 1, SLP) recall of;aspiration precautions;oral care recommendations;safe diet level/texture;safe liquid viscosity  -JR        [place]/Accuracy (Swallow Management Recall Goal 1, SLP) independently (over 90% accuracy)  -JR        Time Frame (Swallow Management Recall Goal 1, SLP) by discharge  -         Progress/Outcomes (Swallow Management Recall Goal 1, SLP) continuing progress toward goal  -JR                  User Key  (r) = Recorded By, (t) = Taken By, (c) = Cosigned By      Initials Name Effective Dates    Lizette Lund MS CCC-SLP 08/22/23 -                     EDUCATION  The patient has been educated in the following areas:   Dysphagia (Swallowing Impairment).        SLP GOALS       Row Name 09/30/24 1217             (LTG) Patient will demonstrate functional swallow for    Diet Texture (Demonstrate functional swallow) soft to chew (chopped) textures  -JR      Liquid viscosity (Demonstrate functional swallow) honey/  moderately thick liquids  -JR      [place] (Demonstrate functional swallow) independently (over 90% accuracy)  -JR      Time Frame (Demonstrate functional swallow) by discharge  -JR      Progress/Outcomes (Demonstrate functional swallow) goal revised this date  -JR         (STG) Patient will tolerate trials of    Consistencies Trialed (Tolerate trials) pureed textures;honey/ moderately thick liquids  -JR      Desired Outcome (Tolerate trials) without signs/symptoms of aspiration  -JR      [place] (Tolerate trials) independently (over 90% accuracy)  -JR      Time Frame (Tolerate trials) by discharge  -JR      Progress/Outcomes (Tolerate trials) goal revised this date  -JR         (STG) Swallow Management Recall Goal 1 (SLP)    Activity (Swallow Management Recall Goal 1, SLP) recall of;aspiration precautions;oral care recommendations;safe diet level/texture;safe liquid viscosity  -JR      [place]/Accuracy (Swallow Management Recall Goal 1, SLP) independently (over 90% accuracy)  -JR      Time Frame (Swallow Management Recall Goal 1, SLP) by discharge  -JR      Progress/Outcomes (Swallow Management Recall Goal 1, SLP) continuing progress toward goal  -JR                User Key  (r) = Recorded By, (t) = Taken By, (c) = Cosigned By      Initials Name Provider Type    Lizette Lund,  MS CCC-SLP Speech and Language Pathologist                         Time Calculation:    Time Calculation- SLP       Row Name 09/30/24 1327             Time Calculation- SLP    SLP Start Time 1217  -JR      SLP Stop Time 1310  -JR      SLP Time Calculation (min) 53 min  -JR      SLP Received On 09/30/24  -JR         Untimed Charges    61823-BI Treatment Swallow Minutes 53  -JR         Total Minutes    Untimed Charges Total Minutes 53  -JR       Total Minutes 53  -JR                User Key  (r) = Recorded By, (t) = Taken By, (c) = Cosigned By      Initials Name Provider Type    Lizette Lund MS CCC-SLP Speech and Language Pathologist                    Therapy Charges for Today       Code Description Service Date Service Provider Modifiers Qty    [id number] HC ST TREATMENT SWALLOW 4 9/30/2024 Lizette Morataya MS CCC-SLP GN 1                 MS CASPER Shields  9/30/2024

## 2024-09-30 NOTE — ANESTHESIA POSTPROCEDURE EVALUATION
"Patient: Arminda Jiménez    Procedure Summary       Date: 09/27/24 [facility]    Anesthesia Start: 1339 Anesthesia Stop: 1448    Procedure: RIGHT CERVICAL LYMPH NODE BIOPSY/EXCISION (Right: Neck) Diagnosis:       Adenocarcinoma of unknown origin      Lymphadenopathy      Dysphagia, unspecified type      Vocal cord weakness      (Adenocarcinoma of unknown origin [C80.1])      (Lymphadenopathy [R59.1])      (Dysphagia, unspecified type [R13.10])      (Vocal cord weakness [J38.00])    Surgeons: Marek Roberts MD Provider: TL Stahl CRNA    Anesthesia Type: general ASA Status: 4            Anesthesia Type: general    Vitals  Vitals Value Taken Time   /68 09/27/24 1505   Temp 97.8 °F (36.6 °C) 09/27/24 1448   Pulse 100 09/27/24 1505   Resp 18 09/27/24 1505   SpO2 96 % 09/27/24 1505           Post Anesthesia Care and Evaluation    Patient location during evaluation: PACU  Patient participation: complete - patient participated  Level of consciousness: awake and alert  Pain management: adequate    Airway patency: patent  Anesthetic complications: No anesthetic complications    Cardiovascular status: acceptable  Respiratory status: acceptable  Hydration status: acceptable    Comments: Blood pressure 137/86, pulse 110, temperature 98 °F (36.7 °C), temperature source Oral, resp. rate 18, height 154.9 cm (60.98\"), weight 45.6 kg (100 lb 9.6 oz), SpO2 98%, not currently breastfeeding.    Pt discharged from PACU based on nick score >8    "

## 2024-09-30 NOTE — CASE MANAGEMENT/SOCIAL WORK
Continued Stay Note   Ander     Patient Name: Arminda Jiménez  MRN: [id number]  Today's Date: 9/30/2024    Admit Date: 9/24/2024    Plan: Home Health   Discharge Plan       Row Name 09/30/24 0906       Plan    Plan Home Health    Patient/Family in Agreement with Plan yes    Plan Comments Pt hoping to return home with dtr as her caregiver as before.  Pt does have home O2 already via [facility].  She might benefit from  care. Will follow.                   Discharge Codes    No documentation.                 Expected Discharge Date and Time       Expected Discharge Date Expected Discharge Time    Sep 27, 2024               LILO CarrasquilloW

## 2024-09-30 NOTE — PROGRESS NOTES
[facility]iative [facility]    Palliative Care Daily Progress Note   Chief complaint-follow up goals of care/advanced care planning, support for patient/family, and pain/symptom management    Code Status:   Code Status and Medical Interventions: CPR (Attempt to Resuscitate); Full Support   Ordered at: 09/24/24 2113     Level Of Support Discussed With:    Patient     Code Status (Patient has no pulse and is not breathing):    CPR (Attempt to Resuscitate)     Medical Interventions (Patient has pulse or is breathing):    Full Support      Advanced Directives: Advance Directive Status: Patient does not have advance directive   Goals of Care: Ongoing.     S: Medical record reviewed. Events noted.  Underwent excisional biopsy 9/27, pathology pending.  Per oncology given aspiration issues may be in best interest to hold off on systemic therapy for few weeks, plan to follow-up with Dr. Khan 10/14. Infectious disease consulted due to Enterococcus Hirai bacteremia, notes additional treatment not necessary, continue Unasyn for aspiration coverage.  Rotation of opiates over the weekend to home dosing adjuvants.  Received 108 mg oral morphine equivalent in the form of Dilaudid IV and oxycodone over the last 24 hours.  Leukocytosis improving, anemia stable, thrombocytopenia trending downward, platelets 45,000 from 48,000 from 59,000.  Laying in bed appears weak and fatigued.  Coarse audible pulmonary sounds.  Ecchymosis bilateral upper extremities.  She has a hoarse voice quality.  She has requested her Lexapro be restarted.  States she remains incredibly weak.  Pain better controlled with restarting home adjuvants.  Encouraged oral opiate dosing as needed rather than IV dosing.  No family at bedside. Due to the Palliative Care Topics Discussed: palliative care, goals of care, care options, resuscitation status, and discharge options we will establish an advance care plan.   Advance Care  "Planning   Advance Care Planning Discussion: Patient agreeable to conversation.  Explored events leading to current hospitalization, cancer journey, and overall decline over the last 1 year.  States she felt certain that she had reoccurrence of cancer prior to confirmatory diagnosis and notes significant weight loss.  We discussed the scenario of previous treatments and she admits she was in a much better state of health at that time.  She remains hopeful for continued efforts at systemic therapy and we discussed inherent risks to include but not limited to further decline in quality of life, increased hospitalizations and overall progressive decline despite aggressive care interventions.  We explored conversations with family members with regard to medical priorities, states \"I know I need to\" have conversation with daughter.  We reviewed CODE STATUS and medical interventions, at this juncture remains undecided with regard to CODE STATUS, particularly CPR but states she would wish to continue full interventions including feeding tube placement if warranted.  We discussed recommendations for completion of a living will directive and will provide a copy of most decision aid and living will directive to further assist conversation initiatives.  States she would wish for her daughter to be primary healthcare surrogate.  We discussed discharge options and patient is adamant to return home as prior with her daughter as caregiver.  She is not open to SNF placement at this juncture.     Review of Systems   Constitutional: Positive for malaise/fatigue.   Respiratory:  Positive for cough, shortness of breath and sputum production.    Musculoskeletal:  Positive for muscle weakness.   Gastrointestinal:  Positive for dysphagia.   Neurological:  Positive for weakness.   Psychiatric/Behavioral:  The patient is nervous/anxious.      Pain Assessment  Nonverbal Indicators of Pain: nonverbal indicators absent  CPOT Facial Expression: " 0-->relaxed, neutral  CPOT Body Movements: 0-->absence of movements  CPOT Muscle Tension: 0-->relaxed  Ventilator Compliance/Vocalization: 0-->talking in normal tone or no sound  CPOT Score: 0  Pain Location: back, neck  Pain Description: constant, aching, sore    O:     Intake/Output Summary (Last 24 hours) at 9/30/2024 0903  Last data filed at 9/30/2024 0600  Gross per 24 hour   Intake --   Output 1750 ml   Net -1750 ml       Diagnostics and current medications: Reviewed.      Current Facility-Administered Medications:     albuterol (PROVENTIL) nebulizer solution 0.083% 2.5 mg/3mL, 2.5 mg, Nebulization, Q4H PRN, Marek Roberts MD    ampicillin-sulbactam (UNASYN) 3 g in sodium chloride 0.9 % 100 mL MBP, 3 g, Intravenous, Q6H, Marek Roberts MD, 3 g at 09/30/24 0559    sennosides-docusate (PERICOLACE) 8.6-50 MG per tablet 2 tablet, 2 tablet, Oral, BID PRN **AND** polyethylene glycol (MIRALAX) packet 17 g, 17 g, Oral, Daily PRN **AND** bisacodyl (DULCOLAX) EC tablet 5 mg, 5 mg, Oral, Daily PRN **AND** bisacodyl (DULCOLAX) suppository 10 mg, 10 mg, Rectal, Daily PRN, Marek Roberts MD    dextrose 5 % and sodium chloride 0.9 % with KCl 20 mEq/L infusion, 75 mL/hr, Intravenous, Continuous, Telly Villela MD, Last Rate: 75 mL/hr at 09/28/24 1639, 75 mL/hr at 09/28/24 1639    famotidine (PEPCID) tablet 20 mg, 20 mg, Oral, BID AC, Telly Villela MD, 20 mg at 09/30/24 0836    gabapentin (NEURONTIN) capsule 800 mg, 800 mg, Oral, Q6H, Telly Villela MD, 800 mg at 09/30/24 0559    guaiFENesin (MUCINEX) 12 hr tablet 1,200 mg, 1,200 mg, Oral, Q12H, Telly Villela MD, 1,200 mg at 09/30/24 0837    HYDROmorphone (DILAUDID) injection 0.5 mg, 0.5 mg, Intravenous, Q4H PRN, Telly Villela MD, 0.5 mg at 09/30/24 0559    ipratropium-albuterol (DUO-NEB) nebulizer solution 3 mL, 3 mL, Nebulization, 4x Daily - RT, Marek Roberts MD, 3 mL at 09/30/24 0558    magnesium oxide (MAG-OX)  "tablet 400 mg, 400 mg, Oral, Daily, Telly Villela MD, 400 mg at 09/30/24 0836    mupirocin (BACTROBAN) 2 % ointment 1 Application, 1 Application, Topical, Q12H, Marek Roberts MD, 1 Application at 09/30/24 0837    ondansetron (ZOFRAN) injection 4 mg, 4 mg, Intravenous, Q6H PRN, Marek Roberts MD    oxyCODONE (ROXICODONE) immediate release tablet 15 mg, 15 mg, Oral, Q4H PRN, Telly Villela MD, 15 mg at 09/30/24 0836    [COMPLETED] Insert Peripheral IV, , , Once **AND** sodium chloride 0.9 % flush 10 mL, 10 mL, Intravenous, PRN, Marek Roberts MD    sodium chloride 0.9 % flush 10 mL, 10 mL, Intravenous, Q12H, Marek Roberts MD, 10 mL at 09/30/24 0837    sodium chloride 0.9 % flush 10 mL, 10 mL, Intravenous, PRN, Marek Roberts MD    sodium chloride 0.9 % infusion 40 mL, 40 mL, Intravenous, PRN, Marek Roberts MD    Allergies   Allergen Reactions    Aspirin Nausea And Vomiting     Unknown reaction    Keflex [Cephalexin] Other (See Comments)     Blisters to nose and mouth    Nitrofurantoin GI Intolerance and Nausea And Vomiting    Creatine Monohydrate Unknown - Low Severity     I have utilized all available immediate resources to obtain, update, or review the patient's current medications (including all prescriptions, over-the-counter products, herbals, cannabis/cannabidiol products, and vitamin/mineral/dietary (nutritional) supplements) for name, route of administration, type, dose and frequency.    A:    /84 (BP Location: Right arm, Patient Position: Lying)   Pulse 101   Temp 98.1 °F (36.7 °C) (Oral)   Resp 18   Ht 154.9 cm (60.98\")   Wt 45.6 kg (100 lb 9.6 oz)   SpO2 94%   BMI 19.02 kg/m²     Vitals and nursing note reviewed.   Constitutional:       Appearance: Not in distress. Cachectic and frail. Chronically ill-appearing.      Interventions: Nasal cannula in place.   Eyes:      General: Lids are normal.   HENT:      Head: Normocephalic.   Neck:      " Comments: Right-sided surgical incision from excisional biopsy 9/27, Steri-Strips in place  Pulmonary:      Breath sounds: Decreased breath sounds present. Rhonchi present. Rales present.   Cardiovascular:      Tachycardia present.   Edema:     Peripheral edema absent.   Abdominal:      Palpations: Abdomen is soft.   Musculoskeletal: Normal range of motion.      Cervical back: Neck supple. Skin:     General: Skin is warm.      Findings: Ecchymosis present.      Comments: Ecchymosis bilateral upper extremities   Genitourinary:     Comments: No reported dysuria  Neurological:      Mental Status: Alert and oriented to person, place, and time.   Psychiatric:      Comments: Hoarse voice quality, weak     Patient status: Disease state: Controlled with current treatments.  Functional status: Palliative Performance Scale Score: Performance 40% based on the following measures: Ambulation: Mainly in bed, Activity and Evidence of Disease: Unable to do any work, extensive evidence of disease, Self-Care: Mainly assistance required,  Intake: Normal or reduced, LOC: Full, drowsy or confusion   ECOG Status(4) Completely disabled, unable to carry out self-care.  Totally confined to bed or chair.  Nutritional status:  Albumin 2.6 . Body mass index is 19.02 kg/m².         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 plan: Goals of care  include to be determined.     Family support: The patient receives support from her daughter..  Advance Directives: Advance Directive Status: Patient does not have advance directive   POA/Healthcare surrogate-daughterAnay-next of kin.     2.  Palliative care encounter  - Prognosis is poor long-term secondary to stage IV adenocarcinoma, respiratory failure, oxygen dependency, unintended weight loss, and multiple comorbidities.     -followed by Dr. Khan has received 4 courses of carboplatin, Gemzar obtaining remission x 14 years, now with progression of disease.      9/9- FNA left neck consistent with metastatic carcinoma   9/20-rechallenged with systemic therapy carboplatin and Gemzar      -She was seen by Dr. Roberts who recommended ED evaluation leading to current hospitalization due to difficulty swallowing, getting choked on her food, pronounced weight loss, and dehydration with plans for excisional biopsy 9/27.       -Started on IV antibiotics, liquid diet, supplemental oxygen, DuoNebs.  -Evaluated by speech therapy who completed a video swallow study with findings of profound laryngeal penetration and aspiration, started on modified diet with honey thick liquid, purée consistency food on 9/25.    9/27-ENT plan for cervical lymph node biopsy/excision as previously planned.  -Evaluated by therapy who recommends home with assist and home health.   9/28-Per oncology given aspiration issues may be in best interest to hold off on systemic therapy for few weeks, appointment set 10/14 at 11 AM with Dr. Khan  -Infectious disease consulted     9/27-Currently off unit for planned procedure.       9/30-clarified CODE STATUS.  Encouraged ongoing conversation with regard to medical priorities.  Will provide a copy of MOST decision aid.  -remains hopeful for continued efforts at systemic therapy and we discussed inherent risks to include but not limited to further decline in quality of life, increased  hospitalizations and overall progressive decline despite aggressive care interventions.  -Recommended completion of living will document, a copy provided. States she would wish for her daughter to be primary healthcare surrogate.    -Anticipating home discharge as prior with her daughter as caregiver.  She is not open to SNF placement at this juncture.    3.  Pain  -Roxicodone and Neurontin medications have been restarted per attending  -Dilaudid IV as needed    4.  Depression/anxiety  -Restart Lexapro home medication      Thank you for allowing us to participate in patient's plan of care. Palliative Care Team will continue to follow patient.     20 minutes spent on advance care planning-discussing with patient and/or family, answering questions, providing some guidance about a plan and documentation of care, and coordinating care face to face.    Nika Castaneda, ELISA  9/30/2024  09:03 CDT

## 2024-09-30 NOTE — PROGRESS NOTES
Her          Progress Note    Patient name: Arminda Jiménez  Patient : 1960  VISIT # [id number]  MR #[id number]  Room:     Subjective   Awake, alert, talkative.  Says she feels better this morning, had a bath and is eating a little bit  Still with deep cough and upper airway secretions, having difficulty mobilizing secretions.    Discussed at length with Dr. Kentrell Villela 2024 and her nurse Sheri DUGAN this morning, 2024.    CBC  2024 has a WBC of 12.27 with a hemoglobin of 11 and a platelet count of 91,000    POD #3 excisional biopsy, right neck 2024 by Dr. 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  Dorcas was found to have stage IV adenocarcinoma of unknown primary in .  She was treated successfully with 4 courses of carboplatin Gemzar obtaining a complete remission that was sustained for 14 years.  Dorcas experienced a heel fracture 2 years ago.  After that event she has had chronic abdominal pain and has lost 50 pounds.  25 of the 50 pounds were lost over the course of the previous month.  Workup has documented widespread metastatic disease with mediastinal, retroperitoneal, gastrohepatic as well as left neck adenopathy.  An FNA of the left neck performed on 2024 reported malignant cells present consistent with metastatic carcinoma.  Further tissue was unavailable for molecular studies and further special stains.   Due to delays in further workup, Dr. Khan felt the patient needed to get on with his treatment and rechallenged her with carboplatin and Gemzar cycle #1 on 2024.   She has been referred to Dr. Marek Roberts with ENT at [facility] to obtain an excisional biopsy to be able to get tissue for molecular studies that will include tissue of origin, NGS, IHC studies etc.   Dorcas was seen by Dr. Marek Roberts who recommended that she be evaluated  in the ED at [facility] for admission due to difficulty swallowing getting choked on her food, pronounced weight loss and dehydration anticipating an open excisional biopsy on Friday, 9/27/2024.  Medical oncology consultation requested in continuity of care.           Detailed history copied from Dr. Khan's office note 9/20/2024     The patient is a pleasant 64 years old female who has a history of stage IV carcinoma of unknown primary site diagnosed in 2010 status post 4 cycles of carboplatin gemcitabine with complete response.  She has been in remission for 14 years.  More recently, she was found to have widespread metastatic disease with mediastinal adenopathy, retroperitoneal adenopathy, gastrohepatic mass and neck adenopathy.  She underwent ultrasound-guided FNA biopsy.  This result only in a scant amount of tissue for analysis.  She was then sent to ENT at [facility] for excisional biopsy.  Unfortunate, the patient missed appointment.  The patient was then sent to ENT at [facility].  ENT at [facility] referred the patient to [place], ENT for excisional biopsy.     Diagnosis  History of carcinoma no primary site, 2010  Liver lesions  Gastrohepatic space mass  Mediastinal adenopathy consistent cycles normal  Metastatic carcinoma, left neck lymph node, Sept 2024     Disease target  Mediastinal adenopathy  Hepatic lobe lesions  Pancreatic body/tail hypodense mass in the gastrohepatic ligament     Treatment summary  9/20/24 Initiate Carbo AUC 4 D1, Gemzar 800mg D1, D8 every 21 days. Treatment consent signed.          Cancer history  Arminda Jiménez was first seen by me on 9/5/2024.  I was consulted for findings of a gastric mass during patient was positioned with hospital.  The patient has a history of lung cancer, COPD.  She was a direct admit from her PCP office.  Patient has a 20 pounds weight loss.  She also had complained of dysphagia, nausea, dizziness decreased appetite.  The patient reports that she has a  history of stage IV lung cancer diagnosed 10 years ago at [place].  She received chemotherapy and stay in remission.  She tells me that she did not have a biopsy performed at that time.  Apparently, review of medical record showed that she had a carcinoma of unknown primary site diagnosed October 2010.  She received 2 cycles of carboplatin and paclitaxel.  She had a reaction to Taxol during cycle #2.  She was switched to carboplatin gemcitabine, completed March 2019.  Sites of disease in the past involve the pericardial, pleural, mediastinal, and cervical involvement.   7/30/24 CT chest: Extensive necrotic mediastinal and bilateral hilar lymphadenopathy consistent with metastatic disease. Lymph node in the right right paratracheal lymph node measures 1 cm short axis axial image 34 at the thoracic inlet.  AP window lymph node measures 1.2 cm short axis on axial image 71 with additional 1 cm AP window lymph node axial image 76.  Precarinal lymph node measures 1.1 cm on axial image 85.  Partially necrotic anterior mediastinal lymph node measures 1.4 cm short axis image 88 with similar appearing anterior mediastinal lymph node measuring 1.3 cm short axis image 69.  A partially necrotic subcarinal lymph node measures 1.8 cm short axis on axial image 96.  Right hilar lymph node measures 1.1 cm short axis image 95.  Partially necrotic and calcified left hilar lymph node measures 1.4 cm short axis image 118.  Left hilar lymph node measures 1.1 cm short axis on axial image 96. These lymph nodes are new since 2022. There is a 2.2 x 1.5 cm nodule abutting the left heart border axial image 140, which is either within the pericardial region were within the left lung parenchyma.  There is a spiculated 0.8 x 0.5 x 0.8 cm left upper lobe nodule axial image 89 and coronal  image 31 which is also new.  Probable focal bronchiectasis left upper lobe on axial image 76, less likely cavitary nodule.  0.3 cm left lower lobe nodule image  151 which may be endobronchial.  Question a 0.6 meters left lower lobe nodule along the diaphragm axial image 158.  Subpleural 0.4 cm right middle lobe nodule image 165.  Bilateral bronchial thickening some areas of subsegmental atelectasis noted in both lobes.  Moderate emphysema.  There is no pleural effusion or pneumothorax. Centrally low density gastrohepatic ligament mass measures 3.3 x 3.2 cm axial image 193 with some adjacent wall thickening suggested in the proximal stomach.   7/30/24 CT abdomen/pelvis: Marked gastric wall thickening proximal stomach and gastric body; neoplasm is suspected, Correlation with symptomatology and EGD advised, Cystic structure in the gastrohepatic ligament measuring up to 4.9 cm, may represent a necrotic lymph node or mass less likely pancreatic suggestive. Correlation with PET scan and/or MRI advised.    08/12/24 CT soft tissue neck:  Mild asymmetry of the vocal cords, secondary to the vocal cord paralysis.  No visible nodule or mass of the larynx, Mild necrotic adenopathy of the right neck, consistent with metastatic disease, Since the prior CT chest from 07/30/2024, grossly stable right paratracheal necrotic node at level of the thoracic inlet, also consistent with metastatic disease.   9/5/2024-upper EGD, GI-gastric bezoar to explain the CT scan of the abdomen findings.  No gastric tumor.  No active ulcer disease to explain patient's nausea vomiting.  Probable gastroparesis.  9/5/2024-CT chest, [facility]:  There is a hypodense nodule in the left thyroid lobe measuring 1.2 cm.  A right tracheoesophageal lymph node is 1.9 cm in short axis on axial image number 16 with interval increase size from 1.2 cm in short axis when remeasured.  A right hilar lymph node is 1.1 cm in short axis without change.  A subcarinal lymph node is partially calcified and measures 2.5 cm in short axis without change.  This has low attenuation.  An additional right hilar lymph node is 1.5 cm in short axis  and is probably unchanged.  A left hilar lymph node is 1 cm in short axis without change.  An AP window lymph node is at least 2.3 cm and has increased in size.  Anterior mediastinal lymph node is now 1.6 cm in short axis, previously 1.3 cm.  A solid nodule in the lingula abuts the free wall of the left ventricle and measures 2.9 x 1.8 cm, previously 2.4 x 1.6 cm when remeasured. There is a small cavitary nodule in the left upper lobe measuring a cavitary nodule in the left upper lobe on series 604 image number 23  is unchanged measuring 0.6 cm. A solid spiculated nodule in the left upper lobe is probably overall unchanged measuring 0.8 cm x 0.7 cm on image number 26.  On there is a new solid juxta fissural nodule in the right middle lobe measuring 0.4 cm on image number 34.  There is an enlarging juxta fissural nodule in the right middle lobe measuring 0.3 cm image number 35.  The small to moderate pleural effusion layers dependently.  No left pleural fluid.  Bibasilar infiltrates are suggested. There is a large hypodense mass arising from the pancreatic body/tail extending to the gastrohepatic ligament measuring up to 5.4 x 4.1 cm.  This has increased in size.  There are multiple new hypodense hepatic lesions.    9/6/2024-CT thoracic and lumbar spine, [facility]: Multiple chronic thoracic compression fractures, diffuse degenerative disc disease.  Postop changes of the lumbar spine.  Loosening of the right L3 transpedicular screw.  Remaining hardware appears well-positioned.  Rim-enhancing complex cystic lesion in the gastrohepatic space.  9/9/2024-FNA biopsy by radiology: Consistent metastatic carcinoma.  However, further characterization of tumor could not be performed due to scant material.  9/10/2024-CT abdomen pelvis with contrast, [facility]: Interval development of mild to moderate dependent atelectasis of both lower  lobes and mild patchy ground-glass and consolidation of the lingula and to a lesser extent the right  middle lobe.  3.1 cm left epicardial nodule, increased in size from 2.5 cm.  Interval development of multiple ill-defined hypodense liver masses.  The largest, of the left lobe near the IVC measures 3.3 cm.  Stable mild prominence of the intrahepatic biliary tree and mild distension of the common duct, measuring up to 10 mm in diameter.  No visible obstructing stone or mass.  Cholecystectomy, again noted.  Interval increase in size of the previously seen mass between the liver left lobe, stomach, aorta, and superior aspect of the pancreatic body.  Measures 6.0 x 4.5 cm cross section, increased from 3.5 x 3.3 cm.  Inferiorly, the mass abuts and blends in per separately of the pancreas.  Calcified granulomas of the spleen, again noted.  The adrenal glands have a normal appearance.  The kidneys enhance symmetrically.  0.9 cm cyst of the left kidney, again noted.  No ureteral stones or hydronephrosis.  The uterus and ovaries have a normal appearance.  No evidence of bowel obstruction.  Moderate amount of stool in the colon, similar to before.  Small fat and fluid containing right inguinal hernia, stable.  No free air.  Interval development of trace fluid in the pelvis.  Electronic device in subcutaneous fat of the right flank, with catheter entering the spinal canal, again noted.  Bone window images show no significant lytic or sclerotic bone lesions.  Surgical fixation of the lumbar spine, again noted.  Chronic mild compression fracture of the L1 superior endplate, stable.  Impression:  Interval increase in size of the mass of the epigastric region, most consistent with a malignant process, potentially metastatic adenopathy or a pancreatic mass.  Interval development of multiple hepatic metastases. 3.  3.1 cm left epicardial nodule, increased in size, suspicious for metastatic adenopathy or metastatic pleural disease.   Interval development of small to moderate dependent right pleural effusion, with atelectasis versus  pneumonia of both lower lobes.   9/20/24 Initiate palliative chemotherapy Carbo AUC 4 D1, Gemzar 800mg D1, D8 every 21 days. Treatment consent signed.    9/20/2024-I have called [facility] and refer the patient back.  She will be seen early next week.        REVIEW OF SYSTEMS:   Constitutional: Pronounced weight loss fatigue failure to thrive  HEENT:  dysphagia and hoarseness               Lungs: no cough, no shortness of breath, no wheeze  CVS: no palpitation, no chest pain, no shortness of breath  GI: no abdominal pain, no nausea , no vomiting, no constipation  MERCEDES: no dysuria, frequency and urgency, no hematuria, no kidney stones  Musculoskeletal: no joint pain, swelling , stiffness  Endocrine: no polyuria, polydipsia, no cold or heat intolerance  Hematology: no anemia, no easy bruising or bleeding, no history of clotting disorder  Dermatology: no skin rash, no eczema, no pruritus  Psychiatry: no depression, no anxiety,no panic attacks, no suicide ideation  Neurology: no syncope, no seizures, no numbness or tingling of hands, no numbness or tingling of feet, no paresis  Objective     Vital Signs  Temp:  [97.7 °F (36.5 °C)-98.2 °F (36.8 °C)] 98 °F (36.7 °C)  Heart Rate:  [100-112] 110  Resp:  [18-20] 18  BP: (137-162)/(81-87) 137/86    Intake/Output Summary (Last 24 hours) at 9/30/2024 0658  Last data filed at 9/30/2024 0600  Gross per 24 hour   Intake --   Output 1750 ml   Net -1750 ml       PHYSICAL EXAM:  CONSTITUTIONAL: Alert, appropriate, no acute distress, very thin, cachectic  EYES: Anicteric, EOM intact, pupils equal round   ENT: Mucous membranes moist, no oropharyngeal lesions   NECK: Supple, Cervical lymphadenopathy on the right   CHEST/LUNGS: CTA bilaterally, normal respiratory effort   CARDIOVASCULAR: RRR, no murmurs  ABDOMEN: soft nontender, active bowel sounds, no HSM  EXTREMITIES: warm, moves all fours  SKIN: warm, dry with no rashes or lesions  LYMPH: No cervical, clavicular, axillary, or inguinal  lymphadenopathy  NEUROLOGIC: follows commands, nonfocal   PSYCH: mood and affect appropriate      CBC  Results from last 7 days   Lab Units 09/29/24  1432 09/28/24  1246 09/27/24  0606 09/25/24  0728 09/24/24  1833   WBC 10*3/mm3 15.60* 13.75* 12.27*   < > 15.04*   HEMOGLOBIN g/dL 9.5* 9.6* 11.0*   < > 12.3   HEMATOCRIT % 28.4* 31.6* 34.2   < > 38.5   PLATELETS 10*3/mm3 48* 59* 91*   < > 292   LYMPHOCYTE % %  --   --  6.1*  --  7.1*   MONOCYTES % %  --   --  1.0*  --  0.0*    < > = values in this interval not displayed.       CMP  Lab Results   Component Value Date    GLUCOSE 111 (H) 09/29/2024    BUN 3 (L) 09/29/2024    CREATININE <0.17 (L) 09/29/2024    EGFRIFNONA 114 01/07/2019    BCR  09/29/2024      Comment:      Unable to calculate Bun/Crea Ratio.    CO2 25.0 09/29/2024    CALCIUM 7.9 (L) 09/29/2024    ALBUMIN 2.6 (L) 09/25/2024    AST 18 09/25/2024    ALT 20 09/25/2024             Blood Culture   Date Value Ref Range Status   09/24/2024 Abnormal Stain (C)  Preliminary   09/24/2024 No growth at 24 hours  Preliminary       Imaging Results (Last 72 Hours)       Procedure Component Value Units Date/Time    XR Spine Lumbar 2 or 3 View [[id number]] Collected: 09/29/24 1427     Updated: 09/29/24 1432    Narrative:      EXAM/TECHNIQUE: XR SPINE LUMBAR 2 OR 3 VW-     INDICATION: pain; history of metastatic carcinoma;      COMPARISON: MR thoracic spine 10/11/2023.     FINDINGS:     5 lumbar-type vertebral bodies. Mild straightening of normal lumbar  lordosis. No subluxations. Chronic mild anterior wedging of T11 and L1.  Vertebral body heights are otherwise maintained. No acute fracture. No  suspicious osseous lesion.     Postoperative change of instrumented fusion spanning L3-S1. No evidence  of hardware loosening or fracture.     Mild multilevel lumbar spine degenerative change with endplate  osteophytes and facet arthropathy noted. Atherosclerotic abdominal aorta  with bilateral common iliac stents. Pain device in  the right abdominal  soft tissues noted. Partially manage right-sided pleural effusion and  patchy opacities in both lower lungs.       Impression:      1. No acute osseous findings.  2. Postoperative change spanning L3-S1 without evidence of complication.  3. Mild multilevel lumbar spine degenerative change.     This report was signed and finalized on 9/29/2024 2:29 PM by Dr. Bam Ortega MD.       XR Spine Thoracic 2 View [[id number]] Collected: 09/29/24 1425     Updated: 09/29/24 1430    Narrative:      EXAM/TECHNIQUE: XR SPINE THORACIC 2 VW-     INDICATION: pain; history of metastatic carcinoma; R13.10-Dysphagia,  unspecified; Z78.9-Other specified health status; C80.1-Malignant  (primary) neoplasm, unspecified; R79.89-Other specified abnormal  findings of blood chemistry; J43.9-Emphysema, unspecified;  J98.11-Atelectasis; C80.1-Malignant (primary) neoplasm, unspecified;  R59.1-Generalized enlarged lymph nodes; J38.00-Paralysis of vocal cords  and larynx,     COMPARISON: MR 10/11/2023     FINDINGS:     Thoracic kyphosis and alignment are maintained. Mild chronic anterior  wedging of T3, T11, and L1. No acute vertebral body height loss. No  suspicious vertebral body lesion.      Mild multilevel thoracic spine degenerative change with disc space  narrowing and endplate osteophyte development. Partially imaged  bilateral lower lobe patchy consolidation and small right pleural  effusion.       Impression:      1.  No acute fracture or subluxation.  2.  Mild multilevel thoracic spine degenerative change.     This report was signed and finalized on 9/29/2024 2:27 PM by Dr. Bam Ortega MD.       XR Chest 1 View [[id number]] Collected: 09/28/24 0833     Updated: 09/28/24 0838    Narrative:      EXAM/TECHNIQUE: XR CHEST 1 VW-     INDICATION: follow-up infiltrates     COMPARISON: 9/24/2024     FINDINGS:     Cardiac silhouette is within normal limits and stable.     Slight increase in small right-sided pleural  effusion. No evidence of  left-sided effusion. No visible pneumothorax. No definite change in  patchy bilateral lower lobe consolidation. The upper lungs are clear.     No acute osseous finding.       Impression:         No significant change in patchy bilateral lower lobe consolidation.  Slight increase in small right-sided pleural effusion.     This report was signed and finalized on 9/28/2024 8:35 AM by Dr. Bam Ortega MD.                 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  Dorcas was found to have stage IV adenocarcinoma of unknown primary in 2010.  She was treated successfully with 4 courses of carboplatin Gemzar obtaining a complete remission that was sustained for 14 years.  Dorcas experienced a heel fracture 2 years ago.  After that event she has had chronic abdominal pain and has lost 50 pounds.  25 of the 50 pounds were lost over the course of the previous month.  Workup has documented widespread metastatic disease with mediastinal, retroperitoneal, gastrohepatic as well as left neck adenopathy.  An FNA of the left neck performed on 9/9/2024 reported malignant cells present consistent with metastatic carcinoma.  Further tissue was unavailable for molecular studies and further special stains.   Due to delays in further workup, Dr. Khan felt the patient needed to get on with his treatment and rechallenged her with carboplatin and Gemzar cycle #1 on 9/20/2024.   She has been referred to Dr. Marek Roberts with ENT at [facility] to  obtain an excisional biopsy to be able to get tissue for molecular studies that will include tissue of origin, NGS, IHC studies etc.   Dorcas was seen by Dr. Marek Roberts who recommended that she be evaluated in the ED at [facility] for admission due to difficulty swallowing getting choked on her food, pronounced weight loss and dehydration anticipating an open excisional biopsy on Friday, 9/27/2024.      Discussed at length with Dr. Kentrell Villela, 9/27/2024, and her nurse Sheri RN today.    CBC  9/28/2024 has a WBC of 13.75 with a hemoglobin of 9.6 and a platelet count of 59,000    POD #3 excisional biopsy, right neck 9/27/2024 by Dr. Marek Roberts    Given the aspiration issues being addressed, the positive blood culture under treatment with IV antibiotics, it is in her best interest to hold off on Gemzar and resume cycle #2 of chemotherapy on 10/14/2024 at 11 AM with Dr. Khan.      #2  Dysphagia and aspiration    Patient states that she coughs every time she tries to eat something and swallow  Swallow evaluation 9/25/2024-documented laryngeal penetration and aspiration during swallow.  SLP Findings: Patient presents with severe pharyngeal dysphagia.   Recommendations: Diet Textures: honey thick liquid, puree consistency food. Medications should be taken whole with puree. May have Ice between meals after oral care, under staff or family supervision and with the recommended strategies for safe swallowing.  Recommended Strategies: Upright for PO, small bites and sips, and alternate liquids and solids. Oral care before breakfast, after all meals and PRN.  Dysphagia therapy is recommended.      Discussed at length with Dr. Kentrell Villela, 9/27/2024, and her nurse Sheri DUGAN today.      #3  Aspiration pneumonia    Chest x-ray, 1 view 9/24/2024 reported the following  Emphysematous changes of the lungs.   Patchy bibasilar infiltrates are present.    Blood culture 9/24/2024 reported the following:  Enterococcus hirae Critical       Discussed with Dr. Kentrell Villela, 9/27/2024  ID consult notes reviewed  She is on Unasyn     #4  Pain secondary to #1    Dilaudid 0.5 mg IV every 4 Hrs PRN  Norco 5 mg PO Q 4 Hrs PRN          Bartolome Zambrano MD  09/30/24  06:58 CDT

## 2024-09-30 NOTE — PROGRESS NOTES
Infectious Diseases Progress Note    Patient:  Arminda Jiménez  YOB: 1960  MRN: [id number]   Admit date: 2024   Admitting Physician: Telly Villela MD  Primary Care Physician: Teddy Sanchez MD    Chief Complaint/Interval History: She is mobilizing some sputum.  She feels a little bit better.  She is not having fever.  She is eating a puréed nectar thick diet.  No pain or drainage from her lymph node removal site.  Tolerating Unasyn without diarrhea or rash.  No new positive blood culture results.    Intake/Output Summary (Last 24 hours) at 2024 0815  Last data filed at 2024 0600  Gross per 24 hour   Intake --   Output 1750 ml   Net -1750 ml     Allergies:   Allergies   Allergen Reactions    Aspirin Nausea And Vomiting     Unknown reaction    Keflex [Cephalexin] Other (See Comments)     Blisters to nose and mouth    Nitrofurantoin GI Intolerance and Nausea And Vomiting    Creatine Monohydrate Unknown - Low Severity     Current Scheduled Medications:   ampicillin-sulbactam, 3 g, Intravenous, Q6H  famotidine, 20 mg, Oral, BID AC  gabapentin, 800 mg, Oral, Q6H  guaiFENesin, 1,200 mg, Oral, Q12H  ipratropium-albuterol, 3 mL, Nebulization, 4x Daily - RT  magnesium oxide, 400 mg, Oral, Daily  mupirocin, 1 Application, Topical, Q12H  sodium chloride, 10 mL, Intravenous, Q12H      dextrose 5 % and sodium chloride 0.9 % with KCl 20 mEq, 75 mL/hr, Last Rate: 75 mL/hr (24 1639)       Current PRN Medications:    albuterol    senna-docusate sodium **AND** polyethylene glycol **AND** bisacodyl **AND** bisacodyl    HYDROmorphone    ondansetron    oxyCODONE    [COMPLETED] Insert Peripheral IV **AND** sodium chloride    sodium chloride    sodium chloride    Review of Systems see HPI    Vital Signs:  Temp (24hrs), Av °F (36.7 °C), Min:97.8 °F (36.6 °C), Max:98.2 °F (36.8 °C)    /84 (BP Location: Right arm, Patient Position: Lying)   Pulse 101   Temp 98.1 °F (36.7 °C) (Oral)    "Resp 18   Ht 154.9 cm (60.98\")   Wt 45.6 kg (100 lb 9.6 oz)   SpO2 94%   BMI 19.02 kg/m²     Physical Exam  Vital signs - reviewed.  Line/IV site - No erythema, warmth, induration, or tenderness.  Lungs with few coarse crackles  Her voice is a little bit stronger today  She looks a little bit better overall    Lab Results:  CBC:   Results from last 7 days   Lab Units 09/29/24  1432 09/28/24  1246 09/27/24  0606 09/26/24  0542 09/25/24  0728 09/24/24  1833   WBC 10*3/mm3 15.60* 13.75* 12.27* 7.98 7.22 15.04*   HEMOGLOBIN g/dL 9.5* 9.6* 11.0* 9.5* 8.6* 12.3   HEMATOCRIT % 28.4* 31.6* 34.2 31.1* 28.0* 38.5   PLATELETS 10*3/mm3 48* 59* 91* 114* 156 292     BMP:  Results from last 7 days   Lab Units 09/30/24  0519 09/29/24  0443 09/28/24  1246 09/27/24  0606 09/26/24  0542 09/25/24  0728 09/24/24  1833   SODIUM mmol/L 134* 130* 129* 130* 133* 134* 135*   POTASSIUM mmol/L 4.2 4.1 3.2* 2.9* 3.7 3.8 4.0   CHLORIDE mmol/L 96* 93* 89* 88* 95* 96* 92*   CO2 mmol/L 21.0* 25.0 24.0 29.0 30.0* 29.0 26.0   BUN mg/dL 6* 3* 4* 3* 6* 10 15   CREATININE mg/dL 0.20* <0.17* 0.21* 0.22* 0.24* 0.31* 0.30*   GLUCOSE mg/dL 102* 111* 75 84 95 90 84   CALCIUM mg/dL 8.2* 7.9* 8.4* 8.3* 8.0* 8.1* 9.3   ALT (SGPT) U/L  --   --   --   --   --  20 33     Culture Results:   Blood Culture   Date Value Ref Range Status   09/24/2024 Enterococcus hirae (C)  Final     Comment:       Infectious disease consultation is highly recommended.   09/24/2024 No growth at 5 days  Final     Radiology: None    Additional Studies Reviewed: Pathology report of lymph node removal pending    Impression:   1.  Metastatic carcinoma  2.  Positive blood culture for Enterococcus-suspect this was contaminant.  No additional cultures positive.  3.  Probable aspiration pneumonia    Recommendations:   Seems to be showing some improvement-only stabilization  Continue treatment Unasyn  Awaiting additional For lymph node removal  Encouraging up to chair, incentive spirometry, " and physical therapy    Chan Martinez MD

## 2024-09-30 NOTE — PLAN OF CARE
Problem: Malnutrition  Goal: Improved Nutritional Intake  Outcome: Not Progressing   Goal Outcome Evaluation: Nutrition follow-up. Modified ONS and adding daily weights.     Dislikes Novasource; causes upset stomach. Discontinued and replaced with yogurt and Magic Cup all meals. Reviewed ONS options limited given modified diet and intolerance to fluid milk. Pt understanding puree and HTL; however, Kit Leo and thin liquids in room. Encouraged to observe SLP recommendations for food/fluid texture/consistency; pt verbalized understanding. After RD left room, pt refused lunch tray. Advised FNS ambassador to offer Magic Cup and peanut butter packets. PO 25% x 1 meal during 72 hr review; 0% x 3 meals and minimal PO intake. BM three days ago. No pressure injuries at this time. No new weights since admission.     Nutrition services following per protocol.

## 2024-09-30 NOTE — PLAN OF CARE
Goal Outcome Evaluation:  Plan of Care Reviewed With: patient        Progress: no change  Outcome Evaluation: Pt A&OX4. VSS on 3L via NC. Tele on. C/O pain managed W/ PRN meds.Incision to right side of neck GLEN bactroban applied. IV to Rt AC.Voiding via PW. Call light within reach, safety maintained.

## 2024-09-30 NOTE — PLAN OF CARE
Goal Outcome Evaluation:  Plan of Care Reviewed With: patient        Progress: no change  Outcome Evaluation: see note                    Treatment Assessment (SLP): continued (09/30/24 1217)  Treatment Assessment Comments (SLP): see note (09/30/24 1217)  Plan for Continued Treatment (SLP): continue treatment per plan of care (09/30/24 1217)

## 2024-09-30 NOTE — PLAN OF CARE
Goal Outcome Evaluation:  Plan of Care Reviewed With: patient, spouse, sibling   Pt A&Ox4, 3L NC, tele, normal sinus tach. Pt did not ambulate or transfer today. Educated pt that not ambulating will increase risk for wounds, and encouraged pt to shift weight frequently. Pt voiced understanding. IVF infusing to R FA. C/o increased pain, managed by PRN PO and IV medications. Voiding via purwick.R neck incision with steri strips intact, GLEN. Generalized bruising and skin tear to LUE also noted. Soft to chew/honey thick diet. Call light within reach.

## 2024-09-30 NOTE — PROGRESS NOTES
[facility] Medicine Services  INPATIENT PROGRESS NOTE    Patient Name: Arminda Jiménez  Date of Admission: 9/24/2024  Today's Date: 09/30/24  Length of Stay: 6  Primary Care Physician: Teddy Sanchez MD    Subjective   Chief Complaint: Pain  HPI   Patient indicated that her pain is much better controlled today.  She also thinks that her breathing is starting to get better, her cough is starting to loosen up.  No family at bedside today.  She voices no other new complaints.    Review of Systems   All pertinent negatives and positives are as above. All other systems have been reviewed and are negative unless otherwise stated.     Objective    Temp:  [98 °F (36.7 °C)-98.1 °F (36.7 °C)] 98.1 °F (36.7 °C)  Heart Rate:  [100-112] 102  Resp:  [16-18] 16  BP: (137-156)/(81-86) 139/84  Physical Exam  Vitals reviewed.   Constitutional:       Appearance: She is ill-appearing.      Comments: She looks better today - playing a game on her smartphone   HENT:      Head: Normocephalic.      Mouth/Throat:      Mouth: Mucous membranes are moist.   Neck:      Comments: Some erythema noted at site of excisional biopsy right side of neck  Cardiovascular:      Rate and Rhythm: Normal rate and regular rhythm.   Pulmonary:      Effort: Pulmonary effort is normal.      Breath sounds: Rhonchi present.      Comments: coarse bilateral BSs and upper airway crackly noise   Musculoskeletal:         General: No deformity.   Skin:     General: Skin is warm.      Findings: Bruising present.   Neurological:      Mental Status: She is alert.      Motor: Weakness present.   Psychiatric:         Mood and Affect: Mood normal.         Results Review:  I have reviewed the labs, radiology results, and diagnostic studies.    Laboratory Data:   Results from last 7 days   Lab Units 09/30/24  0759 09/29/24  1432 09/28/24  1246   WBC 10*3/mm3 12.96* 15.60* 13.75*   HEMOGLOBIN g/dL 9.2* 9.5* 9.6*   HEMATOCRIT % 28.0* 28.4*  31.6*   PLATELETS 10*3/mm3 45* 48* 59*        Results from last 7 days   Lab Units 09/30/24  0519 09/29/24  0443 09/28/24  1246 09/26/24  0542 09/25/24  0728 09/24/24  1833   SODIUM mmol/L 134* 130* 129*   < > 134* 135*   POTASSIUM mmol/L 4.2 4.1 3.2*   < > 3.8 4.0   CHLORIDE mmol/L 96* 93* 89*   < > 96* 92*   CO2 mmol/L 21.0* 25.0 24.0   < > 29.0 26.0   BUN mg/dL 6* 3* 4*   < > 10 15   CREATININE mg/dL 0.20* <0.17* 0.21*   < > 0.31* 0.30*   CALCIUM mg/dL 8.2* 7.9* 8.4*   < > 8.1* 9.3   BILIRUBIN mg/dL  --   --   --   --  0.3 0.3   ALK PHOS U/L  --   --   --   --  151* 238*   ALT (SGPT) U/L  --   --   --   --  20 33   AST (SGOT) U/L  --   --   --   --  18 29   GLUCOSE mg/dL 102* 111* 75   < > 90 84    < > = values in this interval not displayed.       Culture Data:   Blood Culture   Date Value Ref Range Status   09/24/2024 Enterococcus hirae (C)  Final     Comment:       Infectious disease consultation is highly recommended.   09/24/2024 No growth at 2 days  Preliminary       Radiology Data:   Imaging Results (Last 24 Hours)       ** No results found for the last 24 hours. **            I have reviewed the patient's current medications.     Assessment/Plan   Assessment  Active Hospital Problems    Diagnosis     **Dysphagia     Thrombocytopenia     Metastatic carcinoma     Weight loss     Cancer associated pain     Bacteremia due to Enterococcus     Aspiration pneumonia     Chronic pain     Severe malnutrition     Lymphadenopathy     Vocal cord weakness     COPD (chronic obstructive pulmonary disease)     Adenocarcinoma of unknown origin        Treatment Plan  Follow-up pathology from excisional biopsy of right cervical lymph node on 9/27  Currently on 3LNC - patient states that she was recently started on 2 L by nasal cannula in the outpatient setting.  Patient has been on IV Zosyn for coverage of aspiration pneumonia in the setting of known dysphagia.  1 blood culture has returned positive for Enterococcus hirae --  likely contaminant.    IV Unasyn for suspected aspiration pneumonia.  She had 3 days of IV Zosyn and now on day 4 of IV Unasyn  Repeat CXR tomorrow  Add trial of Metanebs  Add flutter valve  Talked with patient about chest physiotherapy - I worry this will exacerbate her pain  Nutrition supplements  ST following - modified diet including pureed diet with honey thick liquids  Platelet trend noted.  Patient had chemotherapy with Gemzar on 9/20/2024.  Hem/Onc following.  Repeat CBC with diff in AM to monitor counts.  Remain mindful that Abxs, including Unasyn, can also contribute to thrombocytopenia.  Currently on no Lovenox or heparin products.  12.  Palliative care following - discussed with Nika Castaneda today  13.  Pain control.  oral Roxicodone; Neurontin.  She does have an implanted pain pump administering fentanyl-followed by Dr. Chahal of pain management  14.  XR of T and L spine with no acute findings  15.  PT and OT      Medical Decision Making  Number and Complexity of problems: 3 acute; moderate complexity      Conditions and Status        Condition is unchanged.     [facility] Data  External documents reviewed: none  Cardiac tracing (EKG, telemetry) interpretation: no new EKGs  Radiology interpretation: as above  Labs reviewed: as above  Any tests that were considered but not ordered: none     Decision rules/scores evaluated (example [id number], Wells, etc): none     Discussed with: Nika aguila with Palliative Care     Care Planning  Shared decision making: Discussed with patient with agreement to proceed with treatment plan as outlined  Code status and discussions: Full code    Disposition  Social Determinants of Health that impact treatment or disposition: None apparent at this time  I expect the patient to be discharged to home where she lives with her daughter hopefully in 2-3 days    Electronically signed by Telly Villela MD, 09/30/24, 15:55 CDT.

## 2024-10-01 ENCOUNTER — APPOINTMENT (OUTPATIENT)
Dept: GENERAL RADIOLOGY | Facility: HOSPITAL | Age: 64
End: 2024-10-01
Payer: MEDICARE

## 2024-10-01 LAB
ALBUMIN SERPL-MCNC: 2.7 G/DL (ref 3.5–5.2)
ALBUMIN/GLOB SERPL: 0.9 G/DL
ALP SERPL-CCNC: 297 U/L (ref 39–117)
ALT SERPL W P-5'-P-CCNC: 36 U/L (ref 1–33)
ANION GAP SERPL CALCULATED.3IONS-SCNC: 8 MMOL/L (ref 5–15)
AST SERPL-CCNC: 68 U/L (ref 1–32)
BASOPHILS # BLD AUTO: 0.02 10*3/MM3 (ref 0–0.2)
BASOPHILS NFR BLD AUTO: 0.2 % (ref 0–1.5)
BILIRUB SERPL-MCNC: 0.3 MG/DL (ref 0–1.2)
BUN SERPL-MCNC: 6 MG/DL (ref 8–23)
BUN/CREAT SERPL: ABNORMAL
CALCIUM SPEC-SCNC: 8.1 MG/DL (ref 8.6–10.5)
CHLORIDE SERPL-SCNC: 100 MMOL/L (ref 98–107)
CO2 SERPL-SCNC: 30 MMOL/L (ref 22–29)
CREAT SERPL-MCNC: <0.17 MG/DL (ref 0.57–1)
DEPRECATED RDW RBC AUTO: 45.4 FL (ref 37–54)
EOSINOPHIL # BLD AUTO: 0.28 10*3/MM3 (ref 0–0.4)
EOSINOPHIL NFR BLD AUTO: 3 % (ref 0.3–6.2)
ERYTHROCYTE [DISTWIDTH] IN BLOOD BY AUTOMATED COUNT: 13.2 % (ref 12.3–15.4)
GLOBULIN UR ELPH-MCNC: 3 GM/DL
GLUCOSE SERPL-MCNC: 102 MG/DL (ref 65–99)
HCT VFR BLD AUTO: 28.1 % (ref 34–46.6)
HGB BLD-MCNC: 8.7 G/DL (ref 12–15.9)
IMM GRANULOCYTES # BLD AUTO: 0.03 10*3/MM3 (ref 0–0.05)
IMM GRANULOCYTES NFR BLD AUTO: 0.3 % (ref 0–0.5)
LYMPHOCYTES # BLD AUTO: 1.09 10*3/MM3 (ref 0.7–3.1)
LYMPHOCYTES NFR BLD AUTO: 11.6 % (ref 19.6–45.3)
MCH RBC QN AUTO: 29.4 PG (ref 26.6–33)
MCHC RBC AUTO-ENTMCNC: 31 G/DL (ref 31.5–35.7)
MCV RBC AUTO: 94.9 FL (ref 79–97)
MONOCYTES # BLD AUTO: 0.69 10*3/MM3 (ref 0.1–0.9)
MONOCYTES NFR BLD AUTO: 7.3 % (ref 5–12)
NEUTROPHILS NFR BLD AUTO: 7.29 10*3/MM3 (ref 1.7–7)
NEUTROPHILS NFR BLD AUTO: 77.6 % (ref 42.7–76)
PLATELET # BLD AUTO: 80 10*3/MM3 (ref 140–450)
PMV BLD AUTO: 11.7 FL (ref 6–12)
POTASSIUM SERPL-SCNC: 3.2 MMOL/L (ref 3.5–5.2)
PROT SERPL-MCNC: 5.7 G/DL (ref 6–8.5)
RBC # BLD AUTO: 2.96 10*6/MM3 (ref 3.77–5.28)
SODIUM SERPL-SCNC: 138 MMOL/L (ref 136–145)
WBC NRBC COR # BLD AUTO: 9.4 10*3/MM3 (ref 3.4–10.8)

## 2024-10-01 PROCEDURE — 25010000002 AMPICILLIN-SULBACTAM PER 1.5 G: Performed by: OTOLARYNGOLOGY

## 2024-10-01 PROCEDURE — 94799 UNLISTED PULMONARY SVC/PX: CPT

## 2024-10-01 PROCEDURE — 85025 COMPLETE CBC W/AUTO DIFF WBC: CPT | Performed by: INTERNAL MEDICINE

## 2024-10-01 PROCEDURE — 97535 SELF CARE MNGMENT TRAINING: CPT

## 2024-10-01 PROCEDURE — 99232 SBSQ HOSP IP/OBS MODERATE 35: CPT | Performed by: CLINICAL NURSE SPECIALIST

## 2024-10-01 PROCEDURE — 25010000002 HYDROMORPHONE PER 4 MG: Performed by: INTERNAL MEDICINE

## 2024-10-01 PROCEDURE — 94640 AIRWAY INHALATION TREATMENT: CPT

## 2024-10-01 PROCEDURE — 92526 ORAL FUNCTION THERAPY: CPT | Performed by: SPEECH-LANGUAGE PATHOLOGIST

## 2024-10-01 PROCEDURE — 94761 N-INVAS EAR/PLS OXIMETRY MLT: CPT

## 2024-10-01 PROCEDURE — 71045 X-RAY EXAM CHEST 1 VIEW: CPT

## 2024-10-01 PROCEDURE — 80053 COMPREHEN METABOLIC PANEL: CPT | Performed by: INTERNAL MEDICINE

## 2024-10-01 PROCEDURE — 94664 DEMO&/EVAL PT USE INHALER: CPT

## 2024-10-01 RX ORDER — CALCIUM CARBONATE 500 MG/1
2 TABLET, CHEWABLE ORAL 3 TIMES DAILY PRN
Status: DISCONTINUED | OUTPATIENT
Start: 2024-10-01 | End: 2024-10-08 | Stop reason: HOSPADM

## 2024-10-01 RX ADMIN — HYDROMORPHONE HYDROCHLORIDE 0.5 MG: 1 INJECTION, SOLUTION INTRAMUSCULAR; INTRAVENOUS; SUBCUTANEOUS at 01:06

## 2024-10-01 RX ADMIN — IPRATROPIUM BROMIDE AND ALBUTEROL SULFATE 3 ML: 2.5; .5 SOLUTION RESPIRATORY (INHALATION) at 14:10

## 2024-10-01 RX ADMIN — IPRATROPIUM BROMIDE AND ALBUTEROL SULFATE 3 ML: 2.5; .5 SOLUTION RESPIRATORY (INHALATION) at 18:30

## 2024-10-01 RX ADMIN — OXYCODONE HYDROCHLORIDE 15 MG: 5 TABLET ORAL at 08:38

## 2024-10-01 RX ADMIN — Medication 400 MG: at 08:39

## 2024-10-01 RX ADMIN — AMPICILLIN SODIUM, SULBACTAM SODIUM 3 G: 2; 1 INJECTION, POWDER, FOR SOLUTION INTRAMUSCULAR; INTRAVENOUS at 04:21

## 2024-10-01 RX ADMIN — AMPICILLIN SODIUM, SULBACTAM SODIUM 3 G: 2; 1 INJECTION, POWDER, FOR SOLUTION INTRAMUSCULAR; INTRAVENOUS at 23:07

## 2024-10-01 RX ADMIN — GABAPENTIN 800 MG: 400 CAPSULE ORAL at 23:05

## 2024-10-01 RX ADMIN — HYDROMORPHONE HYDROCHLORIDE 0.5 MG: 1 INJECTION, SOLUTION INTRAMUSCULAR; INTRAVENOUS; SUBCUTANEOUS at 13:56

## 2024-10-01 RX ADMIN — Medication 10 ML: at 20:39

## 2024-10-01 RX ADMIN — FAMOTIDINE 20 MG: 20 TABLET, FILM COATED ORAL at 18:30

## 2024-10-01 RX ADMIN — IPRATROPIUM BROMIDE AND ALBUTEROL SULFATE 3 ML: 2.5; .5 SOLUTION RESPIRATORY (INHALATION) at 06:08

## 2024-10-01 RX ADMIN — GABAPENTIN 800 MG: 400 CAPSULE ORAL at 04:21

## 2024-10-01 RX ADMIN — OXYCODONE HYDROCHLORIDE 15 MG: 5 TABLET ORAL at 12:40

## 2024-10-01 RX ADMIN — GUAIFENESIN 1200 MG: 600 TABLET, EXTENDED RELEASE ORAL at 20:39

## 2024-10-01 RX ADMIN — POTASSIUM CHLORIDE, DEXTROSE MONOHYDRATE AND SODIUM CHLORIDE 75 ML/HR: 150; 5; 900 INJECTION, SOLUTION INTRAVENOUS at 06:42

## 2024-10-01 RX ADMIN — IPRATROPIUM BROMIDE AND ALBUTEROL SULFATE 3 ML: 2.5; .5 SOLUTION RESPIRATORY (INHALATION) at 10:18

## 2024-10-01 RX ADMIN — ESCITALOPRAM OXALATE 20 MG: 10 TABLET ORAL at 08:38

## 2024-10-01 RX ADMIN — HYDROMORPHONE HYDROCHLORIDE 0.5 MG: 1 INJECTION, SOLUTION INTRAMUSCULAR; INTRAVENOUS; SUBCUTANEOUS at 07:29

## 2024-10-01 RX ADMIN — OXYCODONE HYDROCHLORIDE 15 MG: 5 TABLET ORAL at 04:21

## 2024-10-01 RX ADMIN — FAMOTIDINE 20 MG: 20 TABLET, FILM COATED ORAL at 04:21

## 2024-10-01 RX ADMIN — GUAIFENESIN 1200 MG: 600 TABLET, EXTENDED RELEASE ORAL at 08:39

## 2024-10-01 RX ADMIN — ANTACID TABLETS 2 TABLET: 500 TABLET, CHEWABLE ORAL at 01:44

## 2024-10-01 RX ADMIN — MUPIROCIN 1 APPLICATION: 20 OINTMENT TOPICAL at 20:40

## 2024-10-01 RX ADMIN — POTASSIUM CHLORIDE, DEXTROSE MONOHYDRATE AND SODIUM CHLORIDE 75 ML/HR: 150; 5; 900 INJECTION, SOLUTION INTRAVENOUS at 12:42

## 2024-10-01 RX ADMIN — OXYCODONE HYDROCHLORIDE 15 MG: 5 TABLET ORAL at 19:02

## 2024-10-01 RX ADMIN — OXYCODONE HYDROCHLORIDE 15 MG: 5 TABLET ORAL at 23:12

## 2024-10-01 RX ADMIN — GABAPENTIN 800 MG: 400 CAPSULE ORAL at 12:41

## 2024-10-01 RX ADMIN — Medication 10 ML: at 08:39

## 2024-10-01 RX ADMIN — AMPICILLIN SODIUM, SULBACTAM SODIUM 3 G: 2; 1 INJECTION, POWDER, FOR SOLUTION INTRAMUSCULAR; INTRAVENOUS at 12:42

## 2024-10-01 RX ADMIN — AMPICILLIN SODIUM, SULBACTAM SODIUM: 2; 1 INJECTION, POWDER, FOR SOLUTION INTRAMUSCULAR; INTRAVENOUS at 17:20

## 2024-10-01 RX ADMIN — GABAPENTIN 800 MG: 400 CAPSULE ORAL at 18:30

## 2024-10-01 RX ADMIN — MUPIROCIN 1 APPLICATION: 20 OINTMENT TOPICAL at 08:39

## 2024-10-01 RX ADMIN — HYDROMORPHONE HYDROCHLORIDE 0.5 MG: 1 INJECTION, SOLUTION INTRAMUSCULAR; INTRAVENOUS; SUBCUTANEOUS at 20:38

## 2024-10-01 NOTE — PLAN OF CARE
Goal Outcome Evaluation:  Plan of Care Reviewed With: patient, friend        Progress: no change                       Treatment Assessment (SLP): continued (10/01/24 1345)  Treatment Assessment Comments (SLP): See note (10/01/24 1345)  Plan for Continued Treatment (SLP): continue treatment per plan of care (10/01/24 1345)    Swallow follow up completed. The patient was alert and cooperative. She had just completed a breathing treatment when I entered the room. Friend present at the bedside. She remains weak with weak wet vocal quality. Extensive education provided on recommendations and current concern for dysphagia. We discussed breath support to support cough ability. She was able to complete spirometer but only with minimal movement. Not drinking thickened liquids. Only utilizing ice intake between meals. We discussed importance of intake of liquids to reduce risk of dehydration, she said she would try. She did verbalized understanding of aspiration risk. Ice chips completed with SLP present with no large changes in vocal quality or significant coughing.     SLP will continue to follow.

## 2024-10-01 NOTE — PROGRESS NOTES
[facility] Palliative Care Services    Palliative Care Daily Progress Note   Chief complaint-follow up     Code Status:   Code Status and Medical Interventions: CPR (Attempt to Resuscitate); Full Support   Ordered at: 09/24/24 2113     Level Of Support Discussed With:    Patient     Code Status (Patient has no pulse and is not breathing):    CPR (Attempt to Resuscitate)     Medical Interventions (Patient has pulse or is breathing):    Full Support      Advanced Directives: Advance Directive Status: Patient does not have advance directive   Goals of Care: Ongoing.     S: Medical record reviewed. Events noted.  Underwent excisional biopsy 9/27, pathology pending.  Per oncology given aspiration issues may be in best interest to hold off on systemic therapy for few weeks, plan to follow-up with Dr. Khan 10/14. Infectious disease consulted due to Enterococcus Hirai bacteremia, continue Unasyn for aspiration coverage.  Received 163 mg (108 mg 9/30) oral morphine equivalent in the form of Dilaudid IV and oxycodone over the last 24 hours.  Leukocytosis resolved, anemia declining, thrombocytopenia improving, platelets 80,000 from 45,000 from 48,000 from 59,000.  Speech has advanced diet to soft to chew solids, chopped meat, and honey thick liquids.  Laying in bed without apparent distress, arouses easily.  Coarse voice quality and hoarse, unchanged from yesterday.  Complains she continues to receive cream of wheat when she orders oatmeal for breakfast in the morning.  States she is also ordered a banana and has not received that.  She also would be open to Jell-O, yogurt, ice cream, and toast for breakfast.  Electronic communication to dietitian.  No family at bedside.  She plans to review the MOST decision aid and living will documentation when her daughter arrives.     Review of Systems   Constitutional: Positive for malaise/fatigue.   Respiratory:  Positive for cough, shortness of breath and  sputum production.    Musculoskeletal:  Positive for muscle weakness.   Gastrointestinal:  Positive for dysphagia.   Neurological:  Positive for weakness.     Pain Assessment  Preferred Pain Scale: number (Numeric Rating Pain Scale)  Nonverbal Indicators of Pain: nonverbal indicators absent  CPOT Facial Expression: 0-->relaxed, neutral  CPOT Body Movements: 0-->absence of movements  CPOT Muscle Tension: 0-->relaxed  Ventilator Compliance/Vocalization: 0-->talking in normal tone or no sound  CPOT Score: 0  Pain Location: back, neck  Pain Description: constant, aching, sore    O:     Intake/Output Summary (Last 24 hours) at 10/1/2024 1057  Last data filed at 10/1/2024 0608  Gross per 24 hour   Intake 120 ml   Output 1850 ml   Net -1730 ml       Diagnostics and current medications: Reviewed.      Current Facility-Administered Medications:     albuterol (PROVENTIL) nebulizer solution 0.083% 2.5 mg/3mL, 2.5 mg, Nebulization, Q4H PRN, Marek Roberts MD    ampicillin-sulbactam (UNASYN) 3 g in sodium chloride 0.9 % 100 mL MBP, 3 g, Intravenous, Q6H, Marek Roberts MD, 3 g at 10/01/24 0421    sennosides-docusate (PERICOLACE) 8.6-50 MG per tablet 2 tablet, 2 tablet, Oral, BID PRN **AND** polyethylene glycol (MIRALAX) packet 17 g, 17 g, Oral, Daily PRN **AND** bisacodyl (DULCOLAX) EC tablet 5 mg, 5 mg, Oral, Daily PRN **AND** bisacodyl (DULCOLAX) suppository 10 mg, 10 mg, Rectal, Daily PRN, Marek Roberts MD    calcium carbonate (TUMS) chewable tablet 500 mg (200 mg elemental), 2 tablet, Oral, TID PRN, Jonathon Chew MD, 2 tablet at 10/01/24 0144    dextrose 5 % and sodium chloride 0.9 % with KCl 20 mEq/L infusion, 75 mL/hr, Intravenous, Continuous, Telly Villela MD, Last Rate: 75 mL/hr at 10/01/24 0642, 75 mL/hr at 10/01/24 0642    escitalopram (LEXAPRO) tablet 20 mg, 20 mg, Oral, Daily, Nika Castaneda APRN, 20 mg at 10/01/24 0838    famotidine (PEPCID) tablet 20 mg, 20 mg, Oral, BID AC,  Telly Villela MD, 20 mg at 10/01/24 0421    gabapentin (NEURONTIN) capsule 800 mg, 800 mg, Oral, Q6H, Telly Villela MD, 800 mg at 10/01/24 0421    guaiFENesin (MUCINEX) 12 hr tablet 1,200 mg, 1,200 mg, Oral, Q12H, Telly Villela MD, 1,200 mg at 10/01/24 0839    HYDROmorphone (DILAUDID) injection 0.5 mg, 0.5 mg, Intravenous, Q4H PRN, Telly Villela MD, 0.5 mg at 10/01/24 0729    ipratropium-albuterol (DUO-NEB) nebulizer solution 3 mL, 3 mL, Nebulization, 4x Daily - RT, Marek Roberts MD, 3 mL at 10/01/24 1018    magnesium oxide (MAG-OX) tablet 400 mg, 400 mg, Oral, Daily, Telly Villela MD, 400 mg at 10/01/24 0839    mupirocin (BACTROBAN) 2 % ointment 1 Application, 1 Application, Topical, Q12H, Marek Roberts MD, 1 Application at 10/01/24 0839    ondansetron (ZOFRAN) injection 4 mg, 4 mg, Intravenous, Q6H PRN, Marek Roberts MD    oxyCODONE (ROXICODONE) immediate release tablet 15 mg, 15 mg, Oral, Q4H PRN, Telly Villela MD, 15 mg at 10/01/24 0838    [COMPLETED] Insert Peripheral IV, , , Once **AND** sodium chloride 0.9 % flush 10 mL, 10 mL, Intravenous, PRN, Marek Roberts MD    sodium chloride 0.9 % flush 10 mL, 10 mL, Intravenous, Q12H, Marek Roberts MD, 10 mL at 10/01/24 0839    sodium chloride 0.9 % flush 10 mL, 10 mL, Intravenous, PRN, Marek Roberts MD    sodium chloride 0.9 % infusion 40 mL, 40 mL, Intravenous, PRN, Marek Roberts MD    Allergies   Allergen Reactions    Aspirin Nausea And Vomiting     Unknown reaction    Keflex [Cephalexin] Other (See Comments)     Blisters to nose and mouth    Nitrofurantoin GI Intolerance and Nausea And Vomiting    Creatine Monohydrate Unknown - Low Severity     I have utilized all available immediate resources to obtain, update, or review the patient's current medications (including all prescriptions, over-the-counter products, herbals, cannabis/cannabidiol products, and  "vitamin/mineral/dietary (nutritional) supplements) for name, route of administration, type, dose and frequency.    A:    /82 (BP Location: Right arm, Patient Position: Lying)   Pulse 110   Temp 98 °F (36.7 °C) (Oral)   Resp 22   Ht 154.9 cm (60.98\")   Wt 48 kg (105 lb 13.1 oz)   SpO2 100%   BMI 20.01 kg/m²     Vitals and nursing note reviewed.   Constitutional:       Appearance: Not in distress. Cachectic and frail. Chronically ill-appearing.      Interventions: Nasal cannula in place.   Eyes:      General: Lids are normal.   HENT:      Head: Normocephalic.   Neck:      Comments: Right-sided surgical incision from excisional biopsy 9/27  Pulmonary:      Comments: Coarse audible pulmonary sounds  Cardiovascular:      Tachycardia present.   Edema:     Peripheral edema absent.   Abdominal:      Palpations: Abdomen is soft.   Musculoskeletal: Normal range of motion.      Cervical back: Neck supple. Skin:     General: Skin is warm.      Findings: Ecchymosis present.      Comments: Ecchymosis bilateral upper extremities   Genitourinary:     Comments: No reported dysuria  Neurological:      Mental Status: Alert and oriented to person, place, and time.   Psychiatric:      Comments: Hoarse voice quality, weak     Patient status: Disease state: Controlled with current treatments.  Functional status: Palliative Performance Scale Score: Performance 40% based on the following measures: Ambulation: Mainly in bed, Activity and Evidence of Disease: Unable to do any work, extensive evidence of disease, Self-Care: Mainly assistance required,  Intake: Normal or reduced, LOC: Full, drowsy or confusion   ECOG Status(4) Completely disabled, unable to carry out self-care.  Totally confined to bed or chair.  Nutritional status:  Albumin 2.6 . Body mass index is 20.01 kg/m².  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 plan: Goals of care include to be determined.     Family support: The patient receives support from her daughter..  Advance Directives: Advance Directive Status: Patient does not have advance directive   POA/Healthcare surrogate-Anay barber-next of kin.     2.  Palliative care encounter  - Prognosis is poor long-term secondary to stage IV adenocarcinoma, respiratory failure, oxygen dependency, unintended weight loss, and multiple comorbidities.     -followed by Dr. Khan has received 4 courses of carboplatin, Gemzar obtaining remission x 14 years, now with progression of disease.      9/9- FNA left neck consistent with metastatic carcinoma   9/20-rechallenged with systemic therapy carboplatin and Gemzar      -She was seen by Dr. Roberts who recommended ED evaluation leading to current hospitalization due to difficulty swallowing, getting choked on her food, pronounced weight loss, and dehydration with plans for excisional biopsy 9/27.       -Started on IV antibiotics, liquid diet, supplemental oxygen, DuoNebs.  -Evaluated by speech therapy who completed a video swallow study with findings of profound laryngeal penetration and aspiration, started on modified diet with honey thick  liquid, purée consistency food on 9/25.    9/27-ENT plan for cervical lymph node biopsy/excision as previously planned.  -Evaluated by therapy who recommends home with assist and home health.   9/28-Per oncology given aspiration issues may be in best interest to hold off on systemic therapy for few weeks, appointment set 10/14 at 11 AM with Dr. Khan  -Infectious disease consulted     9/27-Currently off unit for planned procedure.       9/30-clarified CODE STATUS.  Encouraged ongoing conversation with regard to medical priorities.  Will provide a copy of MOST decision aid.  -remains hopeful for continued efforts at systemic therapy and we discussed inherent risks to include but not limited to further decline in quality of life, increased hospitalizations and overall progressive decline despite aggressive care interventions.  -Recommended completion of living will document, a copy provided. States she would wish for her daughter to be primary healthcare surrogate.    -Anticipating home discharge as prior with her daughter as caregiver.  She is not open to SNF placement at this juncture.    10/1-continue supportive measures    3.  Pain  -Roxicodone and Neurontin medications   -Implantable pain pump  -Dilaudid IV as needed    4.  Depression/anxiety  -Lexapro home medication restarted 9/30      Thank you for allowing us to participate in patient's plan of care. Palliative Care Team will continue to follow patient.     ELISA Ramirez  10/1/2024  10:57 CDT

## 2024-10-01 NOTE — PLAN OF CARE
Goal Outcome Evaluation:  Plan of Care Reviewed With: patient   Pt A&O x4, RA, NST. IVF infusing to L FA. Pt up x1 with gait belt to chair today. Very congested, but pt is unable to cough up secretions. Bedside deep oral suctioning attempted per DR Zambrano's request. Small amount of clear/yellow mucous obtained. Pt tolerated well. Yanker suctioning initiated and pt educated on how to use. Lungs coarse throughout. Plan for DC home with daughter when discharged.

## 2024-10-01 NOTE — PLAN OF CARE
Goal Outcome Evaluation:  Plan of Care Reviewed With: patient            Outcome Evaluation: Pt A&Ox4, voiding per PW, last BM 9/27 pt declined PRN stool softeners. On Tele ST/NSR, weened from 3L to baseline 2L. Scattered bruising noted, incision to R neck well approximated, ointment applied per orders. Medications given per orders, PRNs given with moderate effect. IV change from RAC to LFA per pt request, pt tolerated well. Fluids running [id number]@75 and ABX per orders. Tolerating soft chew diet, ice chips, meds in applesauce. Wet congested voice quality, unproductive cough. Declined fluids d/t being honey thick consistency. Safety maintained, call light within reach. Possible dc to home with HH and CG.

## 2024-10-01 NOTE — PROGRESS NOTES
[facility] Medicine Services  INPATIENT PROGRESS NOTE    Patient Name: Arminda Jiménez  Date of Admission: 9/24/2024  Today's Date: 10/01/24  Length of Stay: 7  Primary Care Physician: Teddy Sanchez MD    Subjective   Chief Complaint: Pain  HPI   Sitting up in bed undergoing treatment with respiratory therapist.    Review of Systems   All pertinent negatives and positives are as above. All other systems have been reviewed and are negative unless otherwise stated.     Objective    Temp:  [97.5 °F (36.4 °C)-98.3 °F (36.8 °C)] 97.5 °F (36.4 °C)  Heart Rate:  [] 102  Resp:  [18-22] 20  BP: (106-174)/(64-82) 106/64  Physical Exam  Constitutional:       Appearance: She is ill-appearing.      Comments: Appears chronically ill, not distressed  HENT:      Head: Normocephalic.      Mouth/Throat:      Mouth: Mucous membranes are moist.   Neck:      Comments: Site of excisional biopsy right side of neck clean and dry  Cardiovascular:      Rate and Rhythm: Normal rate and regular rhythm.   Pulmonary:      Effort: Pulmonary effort is normal.      Breath sounds: Rhonchi present.      Comments: coarse bilateral BSs and upper airway crackles   Musculoskeletal:         General: No deformity.   Skin:     General: Skin is warm.      Findings: Bruising present.   Neurological:      Mental Status: She is alert.      Motor: Weakness present.   Psychiatric:         Mood and Affect: Mood normal.       Results Review:  I have reviewed the labs, radiology results, and diagnostic studies.    Laboratory Data:   Results from last 7 days   Lab Units 10/01/24  0519 09/30/24  0759 09/29/24  1432   WBC 10*3/mm3 9.40 12.96* 15.60*   HEMOGLOBIN g/dL 8.7* 9.2* 9.5*   HEMATOCRIT % 28.1* 28.0* 28.4*   PLATELETS 10*3/mm3 80* 45* 48*        Results from last 7 days   Lab Units 10/01/24  0519 09/30/24  0519 09/29/24  0443 09/26/24  0542 09/25/24  0728 09/24/24  1833   SODIUM mmol/L 138 134* 130*   < > 134*  135*   POTASSIUM mmol/L 3.2* 4.2 4.1   < > 3.8 4.0   CHLORIDE mmol/L 100 96* 93*   < > 96* 92*   CO2 mmol/L 30.0* 21.0* 25.0   < > 29.0 26.0   BUN mg/dL 6* 6* 3*   < > 10 15   CREATININE mg/dL <0.17* 0.20* <0.17*   < > 0.31* 0.30*   CALCIUM mg/dL 8.1* 8.2* 7.9*   < > 8.1* 9.3   BILIRUBIN mg/dL 0.3  --   --   --  0.3 0.3   ALK PHOS U/L 297*  --   --   --  151* 238*   ALT (SGPT) U/L 36*  --   --   --  20 33   AST (SGOT) U/L 68*  --   --   --  18 29   GLUCOSE mg/dL 102* 102* 111*   < > 90 84    < > = values in this interval not displayed.       Culture Data:   Blood Culture   Date Value Ref Range Status   09/24/2024 Enterococcus hirae (C)  Final     Comment:       Infectious disease consultation is highly recommended.   09/24/2024 No growth at 5 days  Final       Radiology Data:   Imaging Results (Last 24 Hours)       Procedure Component Value Units Date/Time    XR Chest 1 View [[id number]] Collected: 10/01/24 0714     Updated: 10/01/24 0720    Narrative:      EXAMINATION: XR CHEST 1 VW- 10/1/2024 7:14 AM     HISTORY: follow-up infiltrates.     REPORT: A frontal view of the chest was obtained.     COMPARISON: Chest x-ray 9/28/2024.     The lungs remain hypoaerated, with bibasilar airspace opacities there is  mild improvement in the left basilar infiltrate, no lung consolidation  is identified. Heart size is normal. No pneumothorax is identified,  there appears to be trace right pleural effusion as before. No acute  osseous abnormality.       Impression:      Continued pulmonary hypoventilation with mild improvement in  the left basilar infiltrates, persistent trace right pleural effusion.  No pneumothorax is identified.     This report was signed and finalized on 10/1/2024 7:17 AM by Dr. Lizandro Smith MD.               I have reviewed the patient's current medications.     Assessment/Plan   Assessment  Active Hospital Problems    Diagnosis     **Dysphagia     Thrombocytopenia     Metastatic carcinoma     Weight loss      Cancer associated pain     Bacteremia due to Enterococcus     Aspiration pneumonia     Chronic pain     Severe malnutrition     Lymphadenopathy     Vocal cord weakness     COPD (chronic obstructive pulmonary disease)     Adenocarcinoma of unknown origin        Treatment Plan  Follow-up pathology from excisional biopsy of right cervical lymph node on 9/27  Currently on 3LNC -  recently started on 2 L by nasal cannula in the outpatient setting.  Patient has been on IV Zosyn for coverage of aspiration pneumonia in the setting of known dysphagia.  1 blood culture has returned positive for Enterococcus hirae -- likely contaminant.    IV Unasyn for suspected aspiration pneumonia.  She had 3 days of IV Zosyn and now on day 5 of IV Unasyn  Repeat CXR reviewed mild improvement in   the left basilar infiltrates   Trial of Metanebs ongoing  Added flutter valve  Chest physiotherapy  Nutrition supplements  ST following - modified diet including pureed diet with honey thick liquids  Platelet trend noted.  Patient had chemotherapy with Gemzar on 9/20/2024.  Hem/Onc following.  Repeat CBC with diff in AM to monitor counts.  Remain mindful that Abxs, including Unasyn, can also contribute to thrombocytopenia.  Currently on no Lovenox or heparin products. Dr Zambrano input noted.   12.  Palliative care following - discussed with Nika Castaneda  13.  Pain control.  oral Roxicodone; Neurontin.  She does have an implanted pain pump administering fentanyl-followed by Dr. Chahal of pain management  14.  XR of T and L spine with no acute findings  15.  PT and OT        Medical Decision Making  Number and Complexity of problems: 3 acute; moderate complexity        Conditions and Status        Condition is unchanged.     [facility] Data  External documents reviewed: none  Cardiac tracing (EKG, telemetry) interpretation: no new EKGs  Radiology interpretation: as above  Labs reviewed: as above  Any tests that were considered but not ordered:  none     Decision rules/scores evaluated (example [id number], [place], etc): none     Discussed with: patient, Nika with Palliative Care     Care Planning  Shared decision making: Discussed with patient with agreement to proceed with treatment plan as outlined  Code status and discussions: Full code     Disposition  Social Determinants of Health that impact treatment or disposition: None apparent at this time  I expect the patient to be discharged to home where she lives with her daughter hopefully in 2-3 days    Electronically signed by Flynn Quinones MD, 10/01/24, 18:26 CDT.

## 2024-10-01 NOTE — THERAPY TREATMENT NOTE
Acute Care - Speech Language Pathology   Swallow Treatment Note [facility]     Patient Name: Arminda Jiménez  : 1960  MRN: [id number]  Today's Date: 10/1/2024               Admit Date: 2024  Swallow follow up completed. The patient was alert and cooperative. She had just completed a breathing treatment when I entered the room. Friend present at the bedside. She remains weak with weak wet vocal quality. Extensive education provided on recommendations and current concern for dysphagia. We discussed breath support to support cough ability. She was able to complete spirometer but only with minimal movement. Not drinking thickened liquids. Only utilizing ice intake between meals. We discussed importance of intake of liquids to reduce risk of dehydration, she said she would try. She did verbalized understanding of aspiration risk. Ice chips completed with SLP present with no large changes in vocal quality or significant coughing.     SLP will continue to follow.   Betty Reynolds, MS CCC-SLP 10/1/2024 14:53 CDT    Visit Dx:     ICD-10-CM ICD-9-CM   1. Dysphagia, unspecified type  R13.10 787.20   2. Unable to care for self  Z78.9 V49.89   3. Adenocarcinoma  C80.1 199.1   4. Elevated TSH  R79.89 794.5   5. Pulmonary emphysema, unspecified emphysema type  J43.9 492.8   6. Patchy atelectasis  J98.11 518.0   7. Adenocarcinoma of unknown origin  C80.1 199.1   8. Lymphadenopathy  R59.1 785.6   9. Vocal cord weakness  J38.00 478.30   10. Impaired mobility [Z74.09]  Z74.09 799.89     Patient Active Problem List   Diagnosis    Foraminal stenosis of lumbosacral region    Chronic back pain    Radiculopathy    Lumbar stenosis    Panlobular emphysema    Gastroesophageal reflux disease without esophagitis    Constipation due to opioid therapy    PAD (peripheral artery disease)    Primary osteoarthritis involving multiple joints    Recurrent major depressive disorder    Overweight    Personal history of nicotine dependence     Adenocarcinoma of unknown origin    Environmental allergies    Primary hypertension    Senile osteoporosis    Open left ankle fracture    Fall    Postoperative anemia due to acute blood loss    Acute foot pain, left    Impaired gait and mobility due to left ankle fracture    Chest pain, unspecified type    COPD (chronic obstructive pulmonary disease)    Overweight (BMI 25.0-29.9)    Displaced fracture of medial malleolus of left tibia, subsequent encounter for closed fracture with nonunion    Dysphagia    Lymphadenopathy    Vocal cord weakness    Metastatic carcinoma    Weight loss    Cancer associated pain    Bacteremia due to Enterococcus    Aspiration pneumonia    Chronic pain    Severe malnutrition    Thrombocytopenia     Past Medical History:   Diagnosis Date    Adenocarcinoma of unknown origin 04/04/2022    Anxiety     Arthritis     Asthma     Cancer     right lung,abd,and pelvic area    Chronic back pain     COPD (chronic obstructive pulmonary disease)     Depression     Environmental allergies 04/11/2022    Facet arthropathy     Foraminal stenosis of lumbosacral region     GERD (gastroesophageal reflux disease)     H/O degenerative disc disease     History of blood transfusion     PVD (peripheral vascular disease)     Radiculopathy     Thyroid nodule      Past Surgical History:   Procedure Laterality Date    ANKLE ARTHROPLASTY Left 8/29/2023    Procedure: TOTAL ANKLE ARTHROPLASTY WITH INBONE IMPLANT, GASTROCNEMIUS RECESSION, REMOVAL OF HARDWARE DEEP. LEFT ANKLE;  Surgeon: Bartolome Olguin DPM;  Location:  [facility];  Service: Podiatry;  Laterality: Left;    ANKLE OPEN REDUCTION INTERNAL FIXATION Left 01/05/2023    Procedure: REPAIR OF NONUNION MEDIAL MALLEOLUS, REPAIR NONUNION FIBULA, BONE GRAFT HARVEST, LEFT ANKLE;  Surgeon: Bartolome Olguin DPM;  Location:  [facility];  Service: Podiatry;  Laterality: Left;    ANKLE SURGERY Left     ANTERIOR LUMBAR EXPOSURE N/A 05/07/2018    Procedure: ANTERIOR LUMBAR  EXPOSURE;  Surgeon: Chris Haley DO;  Location:  [facility];  Service: Vascular    APPENDECTOMY      CARPAL TUNNEL RELEASE Bilateral     CERVICAL LYMPH NODE BIOPSY/EXCISION Right 9/27/2024    Procedure: RIGHT CERVICAL LYMPH NODE BIOPSY/EXCISION;  Surgeon: Marek Roberts MD;  Location:  [facility];  Service: ENT;  Laterality: Right;    INCISION AND DRAINAGE OF WOUND Left 05/08/2022    Procedure: Incision and Drainage Left Open Ankle Fracture, Open Reduction Internal Fixation Bimalleolar Ankle Fracture;  Surgeon: Leland Aranda MD;  Location:  [facility];  Service: Orthopedics;  Laterality: Left;    LUMBAR FUSION N/A 05/07/2018    Procedure: ANTERIOR DECOMPRESSION, ANTERIOR LUMBAR INTERBODY FUSION WITH INSTRUMENTATION L5-S1;  Surgeon: LUIS CARLOS Zheng MD;  Location:  [facility];  Service: Orthopedic Spine    LUMBAR FUSION Right 01/02/2019    Procedure: RIGHT LATERAL LUMBAR INTERBODY FUSION L3-5;  Surgeon: LUIS CARLOS Zheng MD;  Location:  [facility];  Service: Orthopedic Spine    LUMBAR LAMINECTOMY WITH FUSION N/A 05/09/2018    Procedure: POSTERIOR SPINAL FUSION WITH INSTRUMENTATION L5-S1;  Surgeon: LUIS CARLOS Zheng MD;  Location:  [facility];  Service: Orthopedic Spine    LUMBAR LAMINECTOMY WITH FUSION Right 01/04/2019    Procedure: POSTERIOR SPINAL FUSION WITH INSTRUMENTATION RIGHT L3-S1;  Surgeon: LUIS CARLOS Zheng MD;  Location:  [facility];  Service: Orthopedic Spine    OTHER SURGICAL HISTORY Bilateral 2017    Stent implants BLE [address] [place]    PAIN PUMP INSERTION/REVISION Right     FENTANYL    WRIST FUSION Right        SLP Recommendation and Plan                                               Daily Summary of Progress (SLP): progress toward functional goals is gradual (10/01/24 1345)               Treatment Assessment (SLP): continued (10/01/24 1345)  Treatment Assessment Comments (SLP): See note (10/01/24 1345)  Plan for Continued Treatment (SLP): continue treatment per plan of care (10/01/24 1345)          Plan of Care Reviewed With: patient, friend  Progress: no change      SWALLOW EVALUATION (Last 72 Hours)       SLP Adult Swallow Evaluation       Row Name 10/01/24 1345 09/30/24 1217                Rehab Evaluation    Document Type therapy note (daily note)  -MG therapy note (daily note)  -JR       Subjective Information no complaints  -MG no complaints  -JR       Patient Observations alert;cooperative;agree to therapy  -MG alert;cooperative  -JR       Patient/Family/Caregiver Comments/Observations Friend present.  -MG several family members present  -JR       Patient Effort good  -MG good  -JR       Symptoms Noted During/After Treatment none  -MG none  -JR          General Information    Patient Profile Reviewed -- yes  -JR          Pain    Additional Documentation Pain Scale: FACES Pre/Post-Treatment (Group)  -MG Pain Scale: FACES Pre/Post-Treatment (Group)  -JR          Pain Scale: FACES Pre/Post-Treatment    Pain: FACES Scale, Pretreatment 0-->no hurt  -MG 0-->no hurt  -JR       Posttreatment Pain Rating 0-->no hurt  -MG 0-->no hurt  -JR          SLP Treatment Clinical Impressions    Treatment Assessment (SLP) continued  -MG continued  -JR       Treatment Assessment Comments (SLP) See note  -MG see note  -JR       Daily Summary of Progress (SLP) progress toward functional goals is gradual  -MG progress towards functional goals is fair  -JR       Barriers to Overall Progress (SLP) -- Resistant to information  -JR       Plan for Continued Treatment (SLP) continue treatment per plan of care  -MG continue treatment per plan of care  -JR       Care Plan Review care plan/treatment goals reviewed  -MG evaluation/treatment results reviewed;care plan/treatment goals reviewed;risks/benefits reviewed;current/potential barriers reviewed  -JR       Care Plan Review, Other Participant(s) friend  -MG --          Swallow Goals (SLP)    Swallow LTGs Patient will demonstrate functional swallow for  -MG Patient will  demonstrate functional swallow for  -JR       Swallow STGs diet tolerance goal selection (SLP);swallow management recall goal selection (SLP)  -MG diet tolerance goal selection (SLP);swallow management recall goal selection (SLP)  -JR       Diet Tolerance Goal Selection (SLP) Patient will tolerate trials of  -MG Patient will tolerate trials of  -JR       Swallow Management Recall Goal Selection (SLP) swallow management recall, SLP goal 1  -MG swallow management recall, SLP goal 1  -JR          (LTG) Patient will demonstrate functional swallow for    Diet Texture (Demonstrate functional swallow) soft to chew (chopped) textures  -MG soft to chew (chopped) textures  -JR       Liquid viscosity (Demonstrate functional swallow) honey/  moderately thick liquids  -MG honey/  moderately thick liquids  -JR       Long (Demonstrate functional swallow) independently (over 90% accuracy)  -MG independently (over 90% accuracy)  -JR       Time Frame (Demonstrate functional swallow) by discharge  -MG by discharge  -JR       Progress/Outcomes (Demonstrate functional swallow) continuing progress toward goal  -MG goal revised this date  -JR          (STG) Patient will tolerate trials of    Consistencies Trialed (Tolerate trials) pureed textures;honey/ moderately thick liquids  -MG pureed textures;honey/ moderately thick liquids  -JR       Desired Outcome (Tolerate trials) without signs/symptoms of aspiration  -MG without signs/symptoms of aspiration  -JR       Long (Tolerate trials) independently (over 90% accuracy)  -MG independently (over 90% accuracy)  -JR       Time Frame (Tolerate trials) by discharge  -MG by discharge  -JR       Progress/Outcomes (Tolerate trials) continuing progress toward goal  -MG goal revised this date  -JR          (STG) Swallow Management Recall Goal 1 (SLP)    Activity (Swallow Management Recall Goal 1, SLP) recall of;aspiration precautions;oral care recommendations;safe diet  level/texture;safe liquid viscosity  -MG recall of;aspiration precautions;oral care recommendations;safe diet level/texture;safe liquid viscosity  -JR       [place]/Accuracy (Swallow Management Recall Goal 1, SLP) independently (over 90% accuracy)  -MG independently (over 90% accuracy)  -JR       Time Frame (Swallow Management Recall Goal 1, SLP) by discharge  -MG by discharge  -JR       Progress/Outcomes (Swallow Management Recall Goal 1, SLP) continuing progress toward goal  -MG continuing progress toward goal  -JR                 User Key  (r) = Recorded By, (t) = Taken By, (c) = Cosigned By      Initials Name Effective Dates    MG Betty Reynolds, MS CCC-SLP 07/11/23 -     JR Lizette Morataya MS CCC-SLP 08/22/23 -                     EDUCATION  The patient has been educated in the following areas:   Dysphagia (Swallowing Impairment) Oral Care/Hydration Modified Diet Instruction.        SLP GOALS       Row Name 10/01/24 1345 09/30/24 1217          (LTG) Patient will demonstrate functional swallow for    Diet Texture (Demonstrate functional swallow) soft to chew (chopped) textures  -MG soft to chew (chopped) textures  -JR     Liquid viscosity (Demonstrate functional swallow) honey/  moderately thick liquids  -MG honey/  moderately thick liquids  -JR     [place] (Demonstrate functional swallow) independently (over 90% accuracy)  -MG independently (over 90% accuracy)  -JR     Time Frame (Demonstrate functional swallow) by discharge  -MG by discharge  -JR     Progress/Outcomes (Demonstrate functional swallow) continuing progress toward goal  -MG goal revised this date  -JR        (STG) Patient will tolerate trials of    Consistencies Trialed (Tolerate trials) pureed textures;honey/ moderately thick liquids  -MG pureed textures;honey/ moderately thick liquids  -JR     Desired Outcome (Tolerate trials) without signs/symptoms of aspiration  -MG without signs/symptoms of aspiration  -JR     [place] (Tolerate  trials) independently (over 90% accuracy)  -MG independently (over 90% accuracy)  -JR     Time Frame (Tolerate trials) by discharge  -MG by discharge  -JR     Progress/Outcomes (Tolerate trials) continuing progress toward goal  -MG goal revised this date  -JR        ([facility]) Swallow Management Recall Goal 1 (SLP)    Activity (Swallow Management Recall Goal 1, SLP) recall of;aspiration precautions;oral care recommendations;safe diet level/texture;safe liquid viscosity  -MG recall of;aspiration precautions;oral care recommendations;safe diet level/texture;safe liquid viscosity  -JR     [place]/Accuracy (Swallow Management Recall Goal 1, SLP) independently (over 90% accuracy)  -MG independently (over 90% accuracy)  -JR     Time Frame (Swallow Management Recall Goal 1, SLP) by discharge  -MG by discharge  -JR     Progress/Outcomes (Swallow Management Recall Goal 1, SLP) continuing progress toward goal  -MG continuing progress toward goal  -JR               User Key  (r) = Recorded By, (t) = Taken By, (c) = Cosigned By      Initials Name Provider Type    Betty Rogers MS CCC-SLP Speech and Language Pathologist    Lizette Lund MS CCC-SLP Speech and Language Pathologist                         Time Calculation:    Time Calculation- SLP       Row Name 10/01/24 1452             Time Calculation- SLP    SLP Start Time 1345  -MG      SLP Stop Time 1440  -MG      SLP Time Calculation (min) 55 min  -MG      SLP Received On 10/01/24  -MG         Untimed Charges    [id number] Treatment Swallow Minutes 55  -MG         Total Minutes    Untimed Charges Total Minutes 55  -MG       Total Minutes 55  -MG                User Key  (r) = Recorded By, (t) = Taken By, (c) = Cosigned By      Initials Name Provider Type    Betty Rogers MS CCC-SLP Speech and Language Pathologist                    Therapy Charges for Today       Code Description Service Date Service Provider Modifiers Qty    [id number]  ST TREATMENT  SWALLOW 4 10/1/2024 Betty Reynolds, MS CCC-SLP GN 1                 Betty Reynolds, MS GABY-SLP  10/1/2024

## 2024-10-01 NOTE — PROGRESS NOTES
Her          Progress Note    Patient name: Arminda Jiménez  Patient : 1960  VISIT # [id number]  MR #[id number]  Room:     Subjective   Awake, alert, talkative.    Still with deep cough and upper airway secretions, having difficulty mobilizing secretions.    Discussed at length with her nurse this morning, encouraging attempts at further suctioning to mobilize secretions.  Patient just does not have strength for cough to get her secretions up.    CBC today, 10/1/2024 has a WBC of 9.4, hemoglobin 8.7 and a platelet count of 80,000    POD #4 excisional biopsy, right neck 2024 by Dr. 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  Dorcas was found to have stage IV adenocarcinoma of unknown primary in .  She was treated successfully with 4 courses of carboplatin Gemzar obtaining a complete remission that was sustained for 14 years.  Dorcas experienced a heel fracture 2 years ago.  After that event she has had chronic abdominal pain and has lost 50 pounds.  25 of the 50 pounds were lost over the course of the previous month.  Workup has documented widespread metastatic disease with mediastinal, retroperitoneal, gastrohepatic as well as left neck adenopathy.  An FNA of the left neck performed on 2024 reported malignant cells present consistent with metastatic carcinoma.  Further tissue was unavailable for molecular studies and further special stains.   Due to delays in further workup, Dr. Khan felt the patient needed to get on with his treatment and rechallenged her with carboplatin and Gemzar cycle #1 on 2024.   She has been referred to Dr. Marek Roberts with ENT at [facility] to obtain an excisional biopsy to be able to get tissue for molecular studies that will include tissue of origin, NGS, IHC studies etc.   Dorcas was seen by Dr. Marek Roberts who recommended that she  be evaluated in the ED at [facility] for admission due to difficulty swallowing getting choked on her food, pronounced weight loss and dehydration anticipating an open excisional biopsy on Friday, 9/27/2024.  Medical oncology consultation requested in continuity of care.           Detailed history copied from Dr. Khan's office note 9/20/2024     The patient is a pleasant 64 years old female who has a history of stage IV carcinoma of unknown primary site diagnosed in 2010 status post 4 cycles of carboplatin gemcitabine with complete response.  She has been in remission for 14 years.  More recently, she was found to have widespread metastatic disease with mediastinal adenopathy, retroperitoneal adenopathy, gastrohepatic mass and neck adenopathy.  She underwent ultrasound-guided FNA biopsy.  This result only in a scant amount of tissue for analysis.  She was then sent to ENT at [facility] for excisional biopsy.  Unfortunate, the patient missed appointment.  The patient was then sent to ENT at [facility].  ENT at [facility] referred the patient to [place], ENT for excisional biopsy.     Diagnosis  History of carcinoma no primary site, 2010  Liver lesions  Gastrohepatic space mass  Mediastinal adenopathy consistent cycles normal  Metastatic carcinoma, left neck lymph node, Sept 2024     Disease target  Mediastinal adenopathy  Hepatic lobe lesions  Pancreatic body/tail hypodense mass in the gastrohepatic ligament     Treatment summary  9/20/24 Initiate Carbo AUC 4 D1, Gemzar 800mg D1, D8 every 21 days. Treatment consent signed.          Cancer history  Arminda Jiménez was first seen by me on 9/5/2024.  I was consulted for findings of a gastric mass during patient was positioned with hospital.  The patient has a history of lung cancer, COPD.  She was a direct admit from her PCP office.  Patient has a 20 pounds weight loss.  She also had complained of dysphagia, nausea, dizziness decreased appetite.  The patient reports  that she has a history of stage IV lung cancer diagnosed 10 years ago at [facility].  She received chemotherapy and stay in remission.  She tells me that she did not have a biopsy performed at that time.  Apparently, review of medical record showed that she had a carcinoma of unknown primary site diagnosed October 2010.  She received 2 cycles of carboplatin and paclitaxel.  She had a reaction to Taxol during cycle #2.  She was switched to carboplatin gemcitabine, completed March 2019.  Sites of disease in the past involve the pericardial, pleural, mediastinal, and cervical involvement.   7/30/24 CT chest: Extensive necrotic mediastinal and bilateral hilar lymphadenopathy consistent with metastatic disease. Lymph node in the right right paratracheal lymph node measures 1 cm short axis axial image 34 at the thoracic inlet.  AP window lymph node measures 1.2 cm short axis on axial image 71 with additional 1 cm AP window lymph node axial image 76.  Precarinal lymph node measures 1.1 cm on axial image 85.  Partially necrotic anterior mediastinal lymph node measures 1.4 cm short axis image 88 with similar appearing anterior mediastinal lymph node measuring 1.3 cm short axis image 69.  A partially necrotic subcarinal lymph node measures 1.8 cm short axis on axial image 96.  Right hilar lymph node measures 1.1 cm short axis image 95.  Partially necrotic and calcified left hilar lymph node measures 1.4 cm short axis image 118.  Left hilar lymph node measures 1.1 cm short axis on axial image 96. These lymph nodes are new since 2022. There is a 2.2 x 1.5 cm nodule abutting the left heart border axial image 140, which is either within the pericardial region were within the left lung parenchyma.  There is a spiculated 0.8 x 0.5 x 0.8 cm left upper lobe nodule axial image 89 and coronal  image 31 which is also new.  Probable focal bronchiectasis left upper lobe on axial image 76, less likely cavitary nodule.  0.3 cm left lower  lobe nodule image 151 which may be endobronchial.  Question a 0.6 meters left lower lobe nodule along the diaphragm axial image 158.  Subpleural 0.4 cm right middle lobe nodule image 165.  Bilateral bronchial thickening some areas of subsegmental atelectasis noted in both lobes.  Moderate emphysema.  There is no pleural effusion or pneumothorax. Centrally low density gastrohepatic ligament mass measures 3.3 x 3.2 cm axial image 193 with some adjacent wall thickening suggested in the proximal stomach.   7/30/24 CT abdomen/pelvis: Marked gastric wall thickening proximal stomach and gastric body; neoplasm is suspected, Correlation with symptomatology and EGD advised, Cystic structure in the gastrohepatic ligament measuring up to 4.9 cm, may represent a necrotic lymph node or mass less likely pancreatic suggestive. Correlation with PET scan and/or MRI advised.    08/12/24 CT soft tissue neck:  Mild asymmetry of the vocal cords, secondary to the vocal cord paralysis.  No visible nodule or mass of the larynx, Mild necrotic adenopathy of the right neck, consistent with metastatic disease, Since the prior CT chest from 07/30/2024, grossly stable right paratracheal necrotic node at level of the thoracic inlet, also consistent with metastatic disease.   9/5/2024-upper EGD, GI-gastric bezoar to explain the CT scan of the abdomen findings.  No gastric tumor.  No active ulcer disease to explain patient's nausea vomiting.  Probable gastroparesis.  9/5/2024-CT chest, [facility]:  There is a hypodense nodule in the left thyroid lobe measuring 1.2 cm.  A right tracheoesophageal lymph node is 1.9 cm in short axis on axial image number 16 with interval increase size from 1.2 cm in short axis when remeasured.  A right hilar lymph node is 1.1 cm in short axis without change.  A subcarinal lymph node is partially calcified and measures 2.5 cm in short axis without change.  This has low attenuation.  An additional right hilar lymph node is 1.5  cm in short axis and is probably unchanged.  A left hilar lymph node is 1 cm in short axis without change.  An AP window lymph node is at least 2.3 cm and has increased in size.  Anterior mediastinal lymph node is now 1.6 cm in short axis, previously 1.3 cm.  A solid nodule in the lingula abuts the free wall of the left ventricle and measures 2.9 x 1.8 cm, previously 2.4 x 1.6 cm when remeasured. There is a small cavitary nodule in the left upper lobe measuring a cavitary nodule in the left upper lobe on series 604 image number 23  is unchanged measuring 0.6 cm. A solid spiculated nodule in the left upper lobe is probably overall unchanged measuring 0.8 cm x 0.7 cm on image number 26.  On there is a new solid juxta fissural nodule in the right middle lobe measuring 0.4 cm on image number 34.  There is an enlarging juxta fissural nodule in the right middle lobe measuring 0.3 cm image number 35.  The small to moderate pleural effusion layers dependently.  No left pleural fluid.  Bibasilar infiltrates are suggested. There is a large hypodense mass arising from the pancreatic body/tail extending to the gastrohepatic ligament measuring up to 5.4 x 4.1 cm.  This has increased in size.  There are multiple new hypodense hepatic lesions.    9/6/2024-CT thoracic and lumbar spine, [facility]: Multiple chronic thoracic compression fractures, diffuse degenerative disc disease.  Postop changes of the lumbar spine.  Loosening of the right L3 transpedicular screw.  Remaining hardware appears well-positioned.  Rim-enhancing complex cystic lesion in the gastrohepatic space.  9/9/2024-FNA biopsy by radiology: Consistent metastatic carcinoma.  However, further characterization of tumor could not be performed due to scant material.  9/10/2024-CT abdomen pelvis with contrast, [facility]: Interval development of mild to moderate dependent atelectasis of both lower  lobes and mild patchy ground-glass and consolidation of the lingula and to a lesser  extent the right middle lobe.  3.1 cm left epicardial nodule, increased in size from 2.5 cm.  Interval development of multiple ill-defined hypodense liver masses.  The largest, of the left lobe near the IVC measures 3.3 cm.  Stable mild prominence of the intrahepatic biliary tree and mild distension of the common duct, measuring up to 10 mm in diameter.  No visible obstructing stone or mass.  Cholecystectomy, again noted.  Interval increase in size of the previously seen mass between the liver left lobe, stomach, aorta, and superior aspect of the pancreatic body.  Measures 6.0 x 4.5 cm cross section, increased from 3.5 x 3.3 cm.  Inferiorly, the mass abuts and blends in per separately of the pancreas.  Calcified granulomas of the spleen, again noted.  The adrenal glands have a normal appearance.  The kidneys enhance symmetrically.  0.9 cm cyst of the left kidney, again noted.  No ureteral stones or hydronephrosis.  The uterus and ovaries have a normal appearance.  No evidence of bowel obstruction.  Moderate amount of stool in the colon, similar to before.  Small fat and fluid containing right inguinal hernia, stable.  No free air.  Interval development of trace fluid in the pelvis.  Electronic device in subcutaneous fat of the right flank, with catheter entering the spinal canal, again noted.  Bone window images show no significant lytic or sclerotic bone lesions.  Surgical fixation of the lumbar spine, again noted.  Chronic mild compression fracture of the L1 superior endplate, stable.  Impression:  Interval increase in size of the mass of the epigastric region, most consistent with a malignant process, potentially metastatic adenopathy or a pancreatic mass.  Interval development of multiple hepatic metastases. 3.  3.1 cm left epicardial nodule, increased in size, suspicious for metastatic adenopathy or metastatic pleural disease.   Interval development of small to moderate dependent right pleural effusion, with  atelectasis versus pneumonia of both lower lobes.   9/20/24 Initiate palliative chemotherapy Carbo AUC 4 D1, Gemzar 800mg D1, D8 every 21 days. Treatment consent signed.    9/20/2024-I have called [facility] and refer the patient back.  She will be seen early next week.        REVIEW OF SYSTEMS:   Constitutional: Pronounced weight loss fatigue failure to thrive  HEENT:  dysphagia and hoarseness               Lungs: no cough, no shortness of breath, no wheeze  CVS: no palpitation, no chest pain, no shortness of breath  GI: no abdominal pain, no nausea , no vomiting, no constipation  MERCEDES: no dysuria, frequency and urgency, no hematuria, no kidney stones  Musculoskeletal: no joint pain, swelling , stiffness  Endocrine: no polyuria, polydipsia, no cold or heat intolerance  Hematology: no anemia, no easy bruising or bleeding, no history of clotting disorder  Dermatology: no skin rash, no eczema, no pruritus  Psychiatry: no depression, no anxiety,no panic attacks, no suicide ideation  Neurology: no syncope, no seizures, no numbness or tingling of hands, no numbness or tingling of feet, no paresis  Objective     Vital Signs  Temp:  [98 °F (36.7 °C)-98.3 °F (36.8 °C)] 98.3 °F (36.8 °C)  Heart Rate:  [] 107  Resp:  [16-20] 20  BP: (139-174)/(66-84) 174/66    Intake/Output Summary (Last 24 hours) at 10/1/2024 0700  Last data filed at 10/1/2024 0608  Gross per 24 hour   Intake 120 ml   Output 1850 ml   Net -1730 ml       PHYSICAL EXAM:  CONSTITUTIONAL: Alert, appropriate, no acute distress, very thin, cachectic  EYES: Anicteric, EOM intact, pupils equal round   ENT: Mucous membranes moist, no oropharyngeal lesions   NECK: Supple, Cervical lymphadenopathy on the right   CHEST/LUNGS: CTA bilaterally, normal respiratory effort   CARDIOVASCULAR: RRR, no murmurs  ABDOMEN: soft nontender, active bowel sounds, no HSM  EXTREMITIES: warm, moves all fours  SKIN: warm, dry with no rashes or lesions  LYMPH: No cervical, clavicular,  axillary, or inguinal lymphadenopathy  NEUROLOGIC: follows commands, nonfocal   PSYCH: mood and affect appropriate      CBC  Results from last 7 days   Lab Units 10/01/24  0519 09/30/24  0759 09/29/24  1432 09/28/24  1246 09/27/24  0606 09/25/24  0728 09/24/24  1833   WBC 10*3/mm3 9.40 12.96* 15.60*   < > 12.27*   < > 15.04*   HEMOGLOBIN g/dL 8.7* 9.2* 9.5*   < > 11.0*   < > 12.3   HEMATOCRIT % 28.1* 28.0* 28.4*   < > 34.2   < > 38.5   PLATELETS 10*3/mm3 80* 45* 48*   < > 91*   < > 292   LYMPHOCYTE % %  --   --   --   --  6.1*  --  7.1*   MONOCYTES % %  --   --   --   --  1.0*  --  0.0*    < > = values in this interval not displayed.       CMP  Lab Results   Component Value Date    GLUCOSE 102 (H) 10/01/2024    BUN 6 (L) 10/01/2024    CREATININE <0.17 (L) 10/01/2024    EGFRIFNONA 114 01/07/2019    BCR  10/01/2024      Comment:      Unable to calculate Bun/Crea Ratio.    CO2 30.0 (H) 10/01/2024    CALCIUM 8.1 (L) 10/01/2024    ALBUMIN 2.7 (L) 10/01/2024    AST 68 (H) 10/01/2024    ALT 36 (H) 10/01/2024             Blood Culture   Date Value Ref Range Status   09/24/2024 Abnormal Stain (C)  Preliminary   09/24/2024 No growth at 24 hours  Preliminary       Imaging Results (Last 72 Hours)       Procedure Component Value Units Date/Time    XR Chest 1 View [[id number]] Resulted: 10/01/24 0430     Updated: 10/01/24 0430    XR Spine Lumbar 2 or 3 View [[id number]] Collected: 09/29/24 1427     Updated: 09/29/24 1432    Narrative:      EXAM/TECHNIQUE: XR SPINE LUMBAR 2 OR 3 VW-     INDICATION: pain; history of metastatic carcinoma;      COMPARISON: MR thoracic spine 10/11/2023.     FINDINGS:     5 lumbar-type vertebral bodies. Mild straightening of normal lumbar  lordosis. No subluxations. Chronic mild anterior wedging of T11 and L1.  Vertebral body heights are otherwise maintained. No acute fracture. No  suspicious osseous lesion.     Postoperative change of instrumented fusion spanning L3-S1. No evidence  of hardware  loosening or fracture.     Mild multilevel lumbar spine degenerative change with endplate  osteophytes and facet arthropathy noted. Atherosclerotic abdominal aorta  with bilateral common iliac stents. Pain device in the right abdominal  soft tissues noted. Partially manage right-sided pleural effusion and  patchy opacities in both lower lungs.       Impression:      1. No acute osseous findings.  2. Postoperative change spanning L3-S1 without evidence of complication.  3. Mild multilevel lumbar spine degenerative change.     This report was signed and finalized on 9/29/2024 2:29 PM by Dr. Bam Ortega MD.       XR Spine Thoracic 2 View [[id number]] Collected: 09/29/24 1425     Updated: 09/29/24 1430    Narrative:      EXAM/TECHNIQUE: XR SPINE THORACIC 2 VW-     INDICATION: pain; history of metastatic carcinoma; R13.10-Dysphagia,  unspecified; Z78.9-Other specified health status; C80.1-Malignant  (primary) neoplasm, unspecified; R79.89-Other specified abnormal  findings of blood chemistry; J43.9-Emphysema, unspecified;  J98.11-Atelectasis; C80.1-Malignant (primary) neoplasm, unspecified;  R59.1-Generalized enlarged lymph nodes; J38.00-Paralysis of vocal cords  and larynx,     COMPARISON: MR 10/11/2023     FINDINGS:     Thoracic kyphosis and alignment are maintained. Mild chronic anterior  wedging of T3, T11, and L1. No acute vertebral body height loss. No  suspicious vertebral body lesion.      Mild multilevel thoracic spine degenerative change with disc space  narrowing and endplate osteophyte development. Partially imaged  bilateral lower lobe patchy consolidation and small right pleural  effusion.       Impression:      1.  No acute fracture or subluxation.  2.  Mild multilevel thoracic spine degenerative change.     This report was signed and finalized on 9/29/2024 2:27 PM by Dr. Bam Ortega MD.       XR Chest 1 View [[id number]] Collected: 09/28/24 0833     Updated: 09/28/24 0838    Narrative:       EXAM/TECHNIQUE: XR CHEST 1 VW-     INDICATION: follow-up infiltrates     COMPARISON: 9/24/2024     FINDINGS:     Cardiac silhouette is within normal limits and stable.     Slight increase in small right-sided pleural effusion. No evidence of  left-sided effusion. No visible pneumothorax. No definite change in  patchy bilateral lower lobe consolidation. The upper lungs are clear.     No acute osseous finding.       Impression:         No significant change in patchy bilateral lower lobe consolidation.  Slight increase in small right-sided pleural effusion.     This report was signed and finalized on 9/28/2024 8:35 AM by Dr. Bam Ortega MD.                 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  Dorcas was found to have stage IV adenocarcinoma of unknown primary in 2010.  She was treated successfully with 4 courses of carboplatin Gemzar obtaining a complete remission that was sustained for 14 years.  Dorcas experienced a heel fracture 2 years ago.  After that event she has had chronic abdominal pain and has lost 50 pounds.  25 of the 50 pounds were lost over the course of the previous month.  Workup has documented widespread metastatic disease with mediastinal, retroperitoneal, gastrohepatic as well as left neck adenopathy.  An FNA of the left neck performed on 9/9/2024 reported malignant cells present consistent with metastatic carcinoma.  Further tissue was unavailable for molecular studies and further special stains.   Due to delays in  further workup, Dr. Khan felt the patient needed to get on with his treatment and rechallenged her with carboplatin and Gemzar cycle #1 on 9/20/2024.   She has been referred to Dr. Marek Roberts with ENT at [facility] to obtain an excisional biopsy to be able to get tissue for molecular studies that will include tissue of origin, NGS, IHC studies etc.   Dorcas was seen by Dr. Marek Roberts who recommended that she be evaluated in the ED at [facility] for admission due to difficulty swallowing getting choked on her food, pronounced weight loss and dehydration anticipating an open excisional biopsy on Friday, 9/27/2024.      Discussed at length with Dr. Kentrell Villela, 9/27/2024, and her nurse Sheri RN today.    CBC  9/28/2024 has a WBC of 13.75 with a hemoglobin of 9.6 and a platelet count of 59,000    POD #3 excisional biopsy, right neck 9/27/2024 by Dr. Marek Roberts    Given the aspiration issues being addressed, the positive blood culture under treatment with IV antibiotics, it is in her best interest to hold off on Gemzar and resume cycle #2 of chemotherapy on 10/14/2024 at 11 AM with Dr. Khan.      #2  Dysphagia and aspiration    Patient states that she coughs every time she tries to eat something and swallow  Swallow evaluation 9/25/2024-documented laryngeal penetration and aspiration during swallow.  SLP Findings: Patient presents with severe pharyngeal dysphagia.   Recommendations: Diet Textures: honey thick liquid, puree consistency food. Medications should be taken whole with puree. May have Ice between meals after oral care, under staff or family supervision and with the recommended strategies for safe swallowing.  Recommended Strategies: Upright for PO, small bites and sips, and alternate liquids and solids. Oral care before breakfast, after all meals and PRN.  Dysphagia therapy is recommended.      Discussed at length with Dr. Kentrell Villela, 9/27/2024, and her nurse Sheri DUGAN today.    Discussed at length with her  nurse this morning, encouraging attempts at further suctioning to mobilize secretions.  Patient just does not have strength for cough to get her secretions up.    #3  Aspiration pneumonia    Chest x-ray, 1 view 9/24/2024 reported the following  Emphysematous changes of the lungs.   Patchy bibasilar infiltrates are present.    Blood culture 9/24/2024 reported the following:  Enterococcus hirae Critical      Discussed with Dr. Kentrell Villela, 9/27/2024  ID consult notes reviewed  She is on Unasyn     #4  Pain secondary to #1    Dilaudid 0.5 mg IV every 4 Hrs PRN  Norco 5 mg PO Q 4 Hrs PRN          Bartolome Zambrano MD  10/01/24  07:00 CDT

## 2024-10-01 NOTE — THERAPY TREATMENT NOTE
Acute Care - Occupational Therapy Treatment Note  [facility]     Patient Name: Arminda Jiménez  : 1960  MRN: [id number]  Today's Date: 10/1/2024             Admit Date: 2024       ICD-10-CM ICD-9-CM   1. Dysphagia, unspecified type  R13.10 787.20   2. Unable to care for self  Z78.9 V49.89   3. Adenocarcinoma  C80.1 199.1   4. Elevated TSH  R79.89 794.5   5. Pulmonary emphysema, unspecified emphysema type  J43.9 492.8   6. Patchy atelectasis  J98.11 518.0   7. Adenocarcinoma of unknown origin  C80.1 199.1   8. Lymphadenopathy  R59.1 785.6   9. Vocal cord weakness  J38.00 478.30   10. Impaired mobility [Z74.09]  Z74.09 799.89     Patient Active Problem List   Diagnosis    Foraminal stenosis of lumbosacral region    Chronic back pain    Radiculopathy    Lumbar stenosis    Panlobular emphysema    Gastroesophageal reflux disease without esophagitis    Constipation due to opioid therapy    PAD (peripheral artery disease)    Primary osteoarthritis involving multiple joints    Recurrent major depressive disorder    Overweight    Personal history of nicotine dependence    Adenocarcinoma of unknown origin    Environmental allergies    Primary hypertension    Senile osteoporosis    Open left ankle fracture    Fall    Postoperative anemia due to acute blood loss    Acute foot pain, left    Impaired gait and mobility due to left ankle fracture    Chest pain, unspecified type    COPD (chronic obstructive pulmonary disease)    Overweight (BMI 25.0-29.9)    Displaced fracture of medial malleolus of left tibia, subsequent encounter for closed fracture with nonunion    Dysphagia    Lymphadenopathy    Vocal cord weakness    Metastatic carcinoma    Weight loss    Cancer associated pain    Bacteremia due to Enterococcus    Aspiration pneumonia    Chronic pain    Severe malnutrition    Thrombocytopenia     Past Medical History:   Diagnosis Date    Adenocarcinoma of unknown origin 2022    Anxiety     Arthritis     Asthma      Cancer     right lung,abd,and pelvic area    Chronic back pain     COPD (chronic obstructive pulmonary disease)     Depression     Environmental allergies 04/11/2022    Facet arthropathy     Foraminal stenosis of lumbosacral region     GERD (gastroesophageal reflux disease)     H/O degenerative disc disease     History of blood transfusion     PVD (peripheral vascular disease)     Radiculopathy     Thyroid nodule      Past Surgical History:   Procedure Laterality Date    ANKLE ARTHROPLASTY Left 8/29/2023    Procedure: TOTAL ANKLE ARTHROPLASTY WITH INBONE IMPLANT, GASTROCNEMIUS RECESSION, REMOVAL OF HARDWARE DEEP. LEFT ANKLE;  Surgeon: Bartolome Olguin DPM;  Location:  [facility];  Service: Podiatry;  Laterality: Left;    ANKLE OPEN REDUCTION INTERNAL FIXATION Left 01/05/2023    Procedure: REPAIR OF NONUNION MEDIAL MALLEOLUS, REPAIR NONUNION FIBULA, BONE GRAFT HARVEST, LEFT ANKLE;  Surgeon: Bartolome Olguin DPM;  Location:  [facility];  Service: Podiatry;  Laterality: Left;    ANKLE SURGERY Left     ANTERIOR LUMBAR EXPOSURE N/A 05/07/2018    Procedure: ANTERIOR LUMBAR EXPOSURE;  Surgeon: Chris Haley DO;  Location:  [facility];  Service: Vascular    APPENDECTOMY      CARPAL TUNNEL RELEASE Bilateral     CERVICAL LYMPH NODE BIOPSY/EXCISION Right 9/27/2024    Procedure: RIGHT CERVICAL LYMPH NODE BIOPSY/EXCISION;  Surgeon: Marek Roberts MD;  Location:  [facility];  Service: ENT;  Laterality: Right;    INCISION AND DRAINAGE OF WOUND Left 05/08/2022    Procedure: Incision and Drainage Left Open Ankle Fracture, Open Reduction Internal Fixation Bimalleolar Ankle Fracture;  Surgeon: Leland Aranda MD;  Location:  [facility];  Service: Orthopedics;  Laterality: Left;    LUMBAR FUSION N/A 05/07/2018    Procedure: ANTERIOR DECOMPRESSION, ANTERIOR LUMBAR INTERBODY FUSION WITH INSTRUMENTATION L5-S1;  Surgeon: LUIS CARLOS Zheng MD;  Location:  [facility];  Service: Orthopedic Spine    LUMBAR FUSION Right 01/02/2019     Procedure: RIGHT LATERAL LUMBAR INTERBODY FUSION L3-5;  Surgeon: LUIS CARLOS Zheng MD;  Location:  [facility];  Service: Orthopedic Spine    LUMBAR LAMINECTOMY WITH FUSION N/A 05/09/2018    Procedure: POSTERIOR SPINAL FUSION WITH INSTRUMENTATION L5-S1;  Surgeon: LUIS CARLOS Zheng MD;  Location:  [facility];  Service: Orthopedic Spine    LUMBAR LAMINECTOMY WITH FUSION Right 01/04/2019    Procedure: POSTERIOR SPINAL FUSION WITH INSTRUMENTATION RIGHT L3-S1;  Surgeon: LUIS CARLOS Zheng MD;  Location:  [facility];  Service: Orthopedic Spine    OTHER SURGICAL HISTORY Bilateral 2017    Stent implants BLE [address] [place]ado    PAIN PUMP INSERTION/REVISION Right     FENTANYL    WRIST FUSION Right          OT ASSESSMENT FLOWSHEET (Last 12 Hours)       OT Evaluation and Treatment       Row Name 10/01/24 1410                   OT Time and Intention    Subjective Information complains of;weakness;fatigue  -TS        Document Type therapy note (daily note)  -TS        Mode of Treatment occupational therapy  -TS        Patient Effort good  -TS        Comment 3L  -TS           General Information    Existing Precautions/Restrictions fall;oxygen therapy device and L/min  -TS           Pain Assessment    Pretreatment Pain Rating 0/10 - no pain  -TS        Posttreatment Pain Rating 0/10 - no pain  -TS           Cognition    Orientation Status (Cognition) oriented x 4  -TS           Bed Mobility    Bed Mobility supine-sit  -TS        Supine-Sit [place] (Bed Mobility) supervision  -TS        Assistive Device (Bed Mobility) head of bed elevated  -TS           Functional Mobility    Functional Mobility- Ind. Level contact guard assist;standby assist  -TS           Transfer Assessment/Treatment    Transfers sit-stand transfer;stand-sit transfer  -TS           Sit-Stand Transfer    Sit-Stand [place] (Transfers) standby assist  -TS           Stand-Sit Transfer    Stand-Sit [place] (Transfers) standby assist  -TS           Wound  09/27/24 1403 Right neck Incision    Wound - Properties Group Placement Date: 09/27/24  -LB Placement Time: 1403  -LB Present on Original Admission: N  -LB Side: Right  -LB Location: neck  -LB Primary Wound Type: Incision  -LB    Retired Wound - Properties Group Placement Date: 09/27/24  -LB Placement Time: 1403  -LB Present on Original Admission: N  -LB Side: Right  -LB Location: neck  -LB Primary Wound Type: Incision  -LB    Retired Wound - Properties Group Date first assessed: 09/27/24  -LB Time first assessed: 1403  -LB Present on Original Admission: N  -LB Side: Right  -LB Location: neck  -LB Primary Wound Type: Incision  -LB       Plan of Care Review    Plan of Care Reviewed With patient  -TS        Progress improving  -TS           Positioning and Restraints    Pre-Treatment Position in bed  -TS        Post Treatment Position chair  -TS        In Chair sitting;call light within reach;encouraged to call for assist;waffle cushion;with family/caregiver  -TS                  User Key  (r) = Recorded By, (t) = Taken By, (c) = Cosigned By      Initials Name Effective Dates    TS Stacy Ogden, MCELROY 02/03/23 -     LB Julieta Evans RN 06/23/23 -                      Occupational Therapy Education       Title: PT OT SLP Therapies (In Progress)       Topic: Occupational Therapy (In Progress)       Point: ADL training (Done)       Description:   Instruct learner(s) on proper safety adaptation and remediation techniques during self care or transfers.   Instruct in proper use of assistive devices.                  Learning Progress Summary             Patient Acceptance, E, VU by JOSE at 9/26/2024 1330    Acceptance, E, VU by JOSE at 9/26/2024 1148                         Point: Home exercise program (Not Started)       Description:   Instruct learner(s) on appropriate technique for monitoring, assisting and/or progressing therapeutic exercises/activities.                  Learner Progress:  Not documented in this  visit.              Point: Precautions (Done)       Description:   Instruct learner(s) on prescribed precautions during self-care and functional transfers.                  Learning Progress Summary             Patient Acceptance, E, VU by  at 9/26/2024 1330    Acceptance, E, VU by  at 9/26/2024 1148                         Point: Body mechanics (Done)       Description:   Instruct learner(s) on proper positioning and spine alignment during self-care, functional mobility activities and/or exercises.                  Learning Progress Summary             Patient Acceptance, E, VU by  at 9/26/2024 1148                                         User Key       Initials Effective Dates Name Provider Type Discipline     07/11/23 -  Betsy Renee, OTR/L, CSRS Occupational Therapist OT                      OT Recommendation and Plan     Plan of Care Review  Plan of Care Reviewed With: patient  Progress: improving  Plan of Care Reviewed With: patient        Time Calculation:    Time Calculation- OT       Row Name 10/01/24 1553             Time Calculation- OT    OT Start Time 1410  -TS      OT Stop Time 1440  -TS      OT Time Calculation (min) 30 min  -TS      Total Timed Code Minutes- OT 30 minute(s)  -TS      OT Received On 10/01/24  -TS         Timed Charges    40824 - OT Self Care/Mgmt Minutes 30  -TS         Total Minutes    Timed Charges Total Minutes 30  -TS       Total Minutes 30  -TS                User Key  (r) = Recorded By, (t) = Taken By, (c) = Cosigned By      Initials Name Provider Type    TS Stacy Ogden COTA Occupational Therapist Assistant                  Therapy Charges for Today       Code Description Service Date Service Provider Modifiers Qty    [id number] HC OT SELF CARE/MGMT/TRAIN EA 15 MIN 10/1/2024 Stacy Ogden COTA GO 2                 NAVI Murphy  10/1/2024

## 2024-10-02 PROCEDURE — 99497 ADVNCD CARE PLAN 30 MIN: CPT | Performed by: CLINICAL NURSE SPECIALIST

## 2024-10-02 PROCEDURE — 94799 UNLISTED PULMONARY SVC/PX: CPT

## 2024-10-02 PROCEDURE — 94761 N-INVAS EAR/PLS OXIMETRY MLT: CPT

## 2024-10-02 PROCEDURE — 97116 GAIT TRAINING THERAPY: CPT

## 2024-10-02 PROCEDURE — 99232 SBSQ HOSP IP/OBS MODERATE 35: CPT | Performed by: INTERNAL MEDICINE

## 2024-10-02 PROCEDURE — 97535 SELF CARE MNGMENT TRAINING: CPT

## 2024-10-02 PROCEDURE — 25010000002 AMPICILLIN-SULBACTAM PER 1.5 G: Performed by: OTOLARYNGOLOGY

## 2024-10-02 PROCEDURE — 99232 SBSQ HOSP IP/OBS MODERATE 35: CPT | Performed by: CLINICAL NURSE SPECIALIST

## 2024-10-02 PROCEDURE — 25010000002 HYDROMORPHONE PER 4 MG: Performed by: INTERNAL MEDICINE

## 2024-10-02 RX ADMIN — Medication 10 ML: at 08:52

## 2024-10-02 RX ADMIN — GUAIFENESIN 1200 MG: 600 TABLET, EXTENDED RELEASE ORAL at 08:52

## 2024-10-02 RX ADMIN — AMPICILLIN SODIUM, SULBACTAM SODIUM 3 G: 2; 1 INJECTION, POWDER, FOR SOLUTION INTRAMUSCULAR; INTRAVENOUS at 17:36

## 2024-10-02 RX ADMIN — IPRATROPIUM BROMIDE AND ALBUTEROL SULFATE 3 ML: 2.5; .5 SOLUTION RESPIRATORY (INHALATION) at 09:58

## 2024-10-02 RX ADMIN — GUAIFENESIN 1200 MG: 600 TABLET, EXTENDED RELEASE ORAL at 20:48

## 2024-10-02 RX ADMIN — IPRATROPIUM BROMIDE AND ALBUTEROL SULFATE 3 ML: 2.5; .5 SOLUTION RESPIRATORY (INHALATION) at 14:08

## 2024-10-02 RX ADMIN — HYDROMORPHONE HYDROCHLORIDE 0.5 MG: 1 INJECTION, SOLUTION INTRAMUSCULAR; INTRAVENOUS; SUBCUTANEOUS at 21:48

## 2024-10-02 RX ADMIN — OXYCODONE HYDROCHLORIDE 15 MG: 5 TABLET ORAL at 04:33

## 2024-10-02 RX ADMIN — GABAPENTIN 800 MG: 400 CAPSULE ORAL at 12:11

## 2024-10-02 RX ADMIN — GABAPENTIN 800 MG: 400 CAPSULE ORAL at 17:36

## 2024-10-02 RX ADMIN — OXYCODONE HYDROCHLORIDE 15 MG: 5 TABLET ORAL at 08:51

## 2024-10-02 RX ADMIN — AMPICILLIN SODIUM, SULBACTAM SODIUM 3 G: 2; 1 INJECTION, POWDER, FOR SOLUTION INTRAMUSCULAR; INTRAVENOUS at 04:35

## 2024-10-02 RX ADMIN — FAMOTIDINE 20 MG: 20 TABLET, FILM COATED ORAL at 08:52

## 2024-10-02 RX ADMIN — AMPICILLIN SODIUM, SULBACTAM SODIUM 3 G: 2; 1 INJECTION, POWDER, FOR SOLUTION INTRAMUSCULAR; INTRAVENOUS at 10:48

## 2024-10-02 RX ADMIN — OXYCODONE HYDROCHLORIDE 15 MG: 5 TABLET ORAL at 14:34

## 2024-10-02 RX ADMIN — GABAPENTIN 800 MG: 400 CAPSULE ORAL at 23:00

## 2024-10-02 RX ADMIN — MUPIROCIN 1 APPLICATION: 20 OINTMENT TOPICAL at 20:48

## 2024-10-02 RX ADMIN — IPRATROPIUM BROMIDE AND ALBUTEROL SULFATE 3 ML: 2.5; .5 SOLUTION RESPIRATORY (INHALATION) at 05:54

## 2024-10-02 RX ADMIN — GABAPENTIN 800 MG: 400 CAPSULE ORAL at 05:10

## 2024-10-02 RX ADMIN — AMPICILLIN SODIUM, SULBACTAM SODIUM 3 G: 2; 1 INJECTION, POWDER, FOR SOLUTION INTRAMUSCULAR; INTRAVENOUS at 23:01

## 2024-10-02 RX ADMIN — IPRATROPIUM BROMIDE AND ALBUTEROL SULFATE 3 ML: 2.5; .5 SOLUTION RESPIRATORY (INHALATION) at 19:02

## 2024-10-02 RX ADMIN — HYDROMORPHONE HYDROCHLORIDE 0.5 MG: 1 INJECTION, SOLUTION INTRAMUSCULAR; INTRAVENOUS; SUBCUTANEOUS at 06:19

## 2024-10-02 RX ADMIN — Medication 10 ML: at 20:48

## 2024-10-02 RX ADMIN — POTASSIUM CHLORIDE, DEXTROSE MONOHYDRATE AND SODIUM CHLORIDE 75 ML/HR: 150; 5; 900 INJECTION, SOLUTION INTRAVENOUS at 04:35

## 2024-10-02 RX ADMIN — Medication 400 MG: at 08:52

## 2024-10-02 RX ADMIN — HYDROMORPHONE HYDROCHLORIDE 0.5 MG: 1 INJECTION, SOLUTION INTRAMUSCULAR; INTRAVENOUS; SUBCUTANEOUS at 17:36

## 2024-10-02 RX ADMIN — FAMOTIDINE 20 MG: 20 TABLET, FILM COATED ORAL at 17:36

## 2024-10-02 RX ADMIN — HYDROMORPHONE HYDROCHLORIDE 0.5 MG: 1 INJECTION, SOLUTION INTRAMUSCULAR; INTRAVENOUS; SUBCUTANEOUS at 10:50

## 2024-10-02 RX ADMIN — ESCITALOPRAM OXALATE 20 MG: 10 TABLET ORAL at 08:52

## 2024-10-02 RX ADMIN — MUPIROCIN 1 APPLICATION: 20 OINTMENT TOPICAL at 08:52

## 2024-10-02 NOTE — PROGRESS NOTES
[facility] Medicine Services  INPATIENT PROGRESS NOTE    Patient Name: Arminda Jiménez  Date of Admission: 9/24/2024  Today's Date: 10/02/24  Length of Stay: 8  Primary Care Physician: Teddy Sanchez MD    Subjective   Chief Complaint: Pain  HPI   10/1 Sitting up in bed undergoing treatment with respiratory therapist.    Today:  Patient appears less dyspneic still very weak speaking in a soft voice and having difficulty mobilizing upper airway secretions.    Review of Systems   All pertinent negatives and positives are as above. All other systems have been reviewed and are negative unless otherwise stated.     Objective    Temp:  [97.5 °F (36.4 °C)-98.4 °F (36.9 °C)] 98.1 °F (36.7 °C)  Heart Rate:  [] 99  Resp:  [18-22] 20  BP: (106-158)/(53-77) 135/53  Physical Exam  Constitutional:       Appearance: She is ill-appearing.      Comments: Appears chronically ill, not distressed  HENT:      Head: Normocephalic.      Mouth/Throat:      Mouth: Mucous membranes are moist.   Neck:      Comments: Site of excisional biopsy right side of neck clean and dry  Cardiovascular:      Rate and Rhythm: Normal rate and regular rhythm.   Pulmonary:      Effort: Pulmonary effort is normal.      Breath sounds: Rhonchi present.      Comments: coarse bilateral BSs and upper airway crackles   Musculoskeletal:         General: No deformity.   Skin:     General: Skin is warm.      Findings: Bruising present.   Neurological:      Mental Status: She is alert.      Motor: Weakness present.   Psychiatric:         Mood and Affect: Mood normal.       Results Review:  I have reviewed the labs, radiology results, and diagnostic studies.    Laboratory Data:   Results from last 7 days   Lab Units 10/01/24  0519 09/30/24  0759 09/29/24  1432   WBC 10*3/mm3 9.40 12.96* 15.60*   HEMOGLOBIN g/dL 8.7* 9.2* 9.5*   HEMATOCRIT % 28.1* 28.0* 28.4*   PLATELETS 10*3/mm3 80* 45* 48*        Results from last 7 days    Lab Units 10/01/24  0519 09/30/24  0519 09/29/24  0443   SODIUM mmol/L 138 134* 130*   POTASSIUM mmol/L 3.2* 4.2 4.1   CHLORIDE mmol/L 100 96* 93*   CO2 mmol/L 30.0* 21.0* 25.0   BUN mg/dL 6* 6* 3*   CREATININE mg/dL <0.17* 0.20* <0.17*   CALCIUM mg/dL 8.1* 8.2* 7.9*   BILIRUBIN mg/dL 0.3  --   --    ALK PHOS U/L 297*  --   --    ALT (SGPT) U/L 36*  --   --    AST (SGOT) U/L 68*  --   --    GLUCOSE mg/dL 102* 102* 111*       Culture Data:   Blood Culture   Date Value Ref Range Status   09/24/2024 Enterococcus hirae (C)  Final     Comment:       Infectious disease consultation is highly recommended.   09/24/2024 No growth at 5 days  Final       Radiology Data:   Imaging Results (Last 24 Hours)       ** No results found for the last 24 hours. **            I have reviewed the patient's current medications.     Assessment/Plan   Assessment  Active Hospital Problems    Diagnosis     **Dysphagia     Thrombocytopenia     Metastatic carcinoma     Weight loss     Cancer associated pain     Bacteremia due to Enterococcus     Aspiration pneumonia     Chronic pain     Severe malnutrition     Lymphadenopathy     Vocal cord weakness     COPD (chronic obstructive pulmonary disease)     Adenocarcinoma of unknown origin        Treatment Plan  Follow-up pathology from excisional biopsy of right cervical lymph node on 9/27 pending  Currently on 3LNC -  recently started on 2 L by nasal cannula in the outpatient setting.  Patient has been on IV Zosyn for coverage of aspiration pneumonia in the setting of known dysphagia.  1 blood culture has returned positive for Enterococcus hirae -- likely contaminant.  ID input noted.  IV Unasyn for suspected aspiration pneumonia.  She had 3 days of IV Zosyn and now on day 6 of IV Unasyn  Repeat CXR reviewed mild improvement in   the left basilar infiltrates   Trial of Metanebs ongoing  Added flutter valve  Chest physiotherapy  Nutrition supplements  ST following - modified diet including pureed  diet with honey thick liquids  Platelet trend noted.  Patient had chemotherapy with Gemzar on 9/20/2024.  Hem/Onc following.  Repeat CBC with diff in AM to monitor counts.  Remain mindful that Abxs, including Unasyn, can also contribute to thrombocytopenia.  Currently on no Lovenox or heparin products. Dr Zambrano input noted.   12.  Palliative care following - discussed with Nika Castaneda  13.  Pain control.  oral Roxicodone; Neurontin.  She does have an implanted pain pump administering fentanyl-followed by Dr. Chahal of pain management  14.  XR of T and L spine with no acute findings  15.  PT and OT        Medical Decision Making  Number and Complexity of problems: 3 acute; moderate complexity        Conditions and Status        Condition is unchanged.     [facility] Data  External documents reviewed: none  Cardiac tracing (EKG, telemetry) interpretation: no new EKGs  Radiology interpretation: as above  Labs reviewed: as above  Any tests that were considered but not ordered: none     Decision rules/scores evaluated (example [id number], [place], etc): none     Discussed with: Nika aguila with Palliative Care     Care Planning  Shared decision making: Discussed with patient with agreement to proceed with treatment plan as outlined  Code status and discussions: Full code     Disposition  Social Determinants of Health that impact treatment or disposition: None apparent at this time  I expect the patient to be discharged to home where she lives with her daughter hopefully in 2-3 days    Electronically signed by Flynn Quinones MD, 10/02/24, 11:07 CDT.

## 2024-10-02 NOTE — PROGRESS NOTES
[facility]    Palliative Care Daily Progress Note   Chief complaint-follow up     Code Status:   Code Status and Medical Interventions: CPR (Attempt to Resuscitate); Full Support   Ordered at: 09/24/24 2113     Level Of Support Discussed With:    Patient     Code Status (Patient has no pulse and is not breathing):    CPR (Attempt to Resuscitate)     Medical Interventions (Patient has pulse or is breathing):    Full Support      Advanced Directives: Advance Directive Status: Patient does not have advance directive   Goals of Care: Ongoing.     S: Medical record reviewed. Events noted.  Underwent excisional biopsy 9/27, pathology pending.  Per oncology given aspiration issues may be in best interest to hold off on systemic therapy for few weeks, plan to follow-up with Dr. Khan 10/14. Infectious disease consulted due to Enterococcus Hirai bacteremia, continue Unasyn for aspiration coverage.  Received 65 mg (163 mg 10/1,108 mg 9/30) oral morphine equivalent in the form of Dilaudid IV and oxycodone over the last 24 hours.  Sitting up in bed without apparent distress.  States that she feels with breathing treatments and flutter valve improvement in mobilizing sputum.  States she ate a fair breakfast this morning of oatmeal and banana.  Nursing staff at bedside attempting to restart an IV.  Her daughter, Anay is currently at bedside.  Advance Care Planning patient and daughter agreeable for conversation.  We reviewed documentation to include MOST decision aid and living will directive in its entirety.  Patient is able to make her wishes known that she would not wish to remain on life support measures long-term and states that she would wish to be an organ or tissue donor.  Daughter aware of patient's wishes.  Encouraged ongoing discussion and informed could assist with completion of documentation if so desired prior to discharge.  Questions encouraged and support  given.     Review of Systems   Constitutional: Positive for malaise/fatigue.   Respiratory:  Positive for cough, shortness of breath and sputum production.    Musculoskeletal:  Positive for muscle weakness.   Gastrointestinal:  Positive for dysphagia.   Neurological:  Positive for weakness.     Pain Assessment  Preferred Pain Scale: number (Numeric Rating Pain Scale)  Nonverbal Indicators of Pain: nonverbal indicators absent  CPOT Facial Expression: 0-->relaxed, neutral  CPOT Body Movements: 0-->absence of movements  CPOT Muscle Tension: 0-->relaxed  Ventilator Compliance/Vocalization: 0-->talking in normal tone or no sound  CPOT Score: 0  Pain Location: back, neck  Pain Description: constant, aching, sore    O:     Intake/Output Summary (Last 24 hours) at 10/2/2024 0852  Last data filed at 10/2/2024 0540  Gross per 24 hour   Intake --   Output 700 ml   Net -700 ml       Diagnostics and current medications: Reviewed.      Current Facility-Administered Medications:     albuterol (PROVENTIL) nebulizer solution 0.083% 2.5 mg/3mL, 2.5 mg, Nebulization, Q4H PRN, Marek Roberts MD    ampicillin-sulbactam (UNASYN) 3 g in sodium chloride 0.9 % 100 mL MBP, 3 g, Intravenous, Q6H, Marek Roberts MD, 3 g at 10/02/24 0435    sennosides-docusate (PERICOLACE) 8.6-50 MG per tablet 2 tablet, 2 tablet, Oral, BID PRN **AND** polyethylene glycol (MIRALAX) packet 17 g, 17 g, Oral, Daily PRN **AND** bisacodyl (DULCOLAX) EC tablet 5 mg, 5 mg, Oral, Daily PRN **AND** bisacodyl (DULCOLAX) suppository 10 mg, 10 mg, Rectal, Daily PRN, Marek Roberts MD    calcium carbonate (TUMS) chewable tablet 500 mg (200 mg elemental), 2 tablet, Oral, TID PRN, Jonathon Chew MD, 2 tablet at 10/01/24 0144    dextrose 5 % and sodium chloride 0.9 % with KCl 20 mEq/L infusion, 75 mL/hr, Intravenous, Continuous, Telly Villela MD, Last Rate: 75 mL/hr at 10/02/24 0435, 75 mL/hr at 10/02/24 0435    escitalopram (LEXAPRO) tablet 20  mg, 20 mg, Oral, Daily, Nika Castaneda, APRN, 20 mg at 10/02/24 0852    famotidine (PEPCID) tablet 20 mg, 20 mg, Oral, BID AC, Telly Villela MD, 20 mg at 10/02/24 0852    gabapentin (NEURONTIN) capsule 800 mg, 800 mg, Oral, Q6H, Telly Villela MD, 800 mg at 10/02/24 0510    guaiFENesin (MUCINEX) 12 hr tablet 1,200 mg, 1,200 mg, Oral, Q12H, Telly Villela MD, 1,200 mg at 10/02/24 0852    HYDROmorphone (DILAUDID) injection 0.5 mg, 0.5 mg, Intravenous, Q4H PRN, Telly Villela MD, 0.5 mg at 10/02/24 0619    ipratropium-albuterol (DUO-NEB) nebulizer solution 3 mL, 3 mL, Nebulization, 4x Daily - RT, Marek Roberts MD, 3 mL at 10/02/24 0554    magnesium oxide (MAG-OX) tablet 400 mg, 400 mg, Oral, Daily, Telly Villela MD, 400 mg at 10/02/24 0852    mupirocin (BACTROBAN) 2 % ointment 1 Application, 1 Application, Topical, Q12H, Marek Roberts MD, 1 Application at 10/02/24 0852    ondansetron (ZOFRAN) injection 4 mg, 4 mg, Intravenous, Q6H PRN, Marek Roberts MD    oxyCODONE (ROXICODONE) immediate release tablet 15 mg, 15 mg, Oral, Q4H PRN, Telly Villela MD, 15 mg at 10/02/24 0851    [COMPLETED] Insert Peripheral IV, , , Once **AND** sodium chloride 0.9 % flush 10 mL, 10 mL, Intravenous, PRN, Marek Roberts MD    sodium chloride 0.9 % flush 10 mL, 10 mL, Intravenous, Q12H, Marek Roberts MD, 10 mL at 10/02/24 0852    sodium chloride 0.9 % flush 10 mL, 10 mL, Intravenous, PRN, Marek Roberts MD    sodium chloride 0.9 % infusion 40 mL, 40 mL, Intravenous, PRN, Marek Roberts MD    Allergies   Allergen Reactions    Aspirin Nausea And Vomiting     Unknown reaction    Keflex [Cephalexin] Other (See Comments)     Blisters to nose and mouth    Nitrofurantoin GI Intolerance and Nausea And Vomiting    Creatine Monohydrate Unknown - Low Severity     I have utilized all available immediate resources to obtain, update, or review the patient's  "current medications (including all prescriptions, over-the-counter products, herbals, cannabis/cannabidiol products, and vitamin/mineral/dietary (nutritional) supplements) for name, route of administration, type, dose and frequency.    A:    /53 (BP Location: Right arm, Patient Position: Lying)   Pulse 95   Temp 98.1 °F (36.7 °C) (Oral)   Resp 22   Ht 154.9 cm (60.98\")   Wt 48 kg (105 lb 13.1 oz)   SpO2 95%   BMI 20.01 kg/m²     Vitals and nursing note reviewed.   Constitutional:       Appearance: Not in distress. Cachectic and frail. Chronically ill-appearing.      Interventions: Nasal cannula in place.   Eyes:      General: Lids are normal.   HENT:      Head: Normocephalic.   Neck:      Comments: Right-sided surgical incision from excisional biopsy 9/27  Pulmonary:      Comments: Coarse audible pulmonary sounds  Cardiovascular:      Normal rate.   Edema:     Peripheral edema absent.   Abdominal:      Palpations: Abdomen is soft.   Musculoskeletal: Normal range of motion.      Cervical back: Neck supple. Skin:     General: Skin is warm.      Findings: Ecchymosis present.      Comments: Ecchymosis bilateral upper extremities   Genitourinary:     Comments: No reported dysuria  Neurological:      Mental Status: Alert and oriented to person, place, and time.   Psychiatric:      Comments: Hoarse voice quality, weak     Patient status: Disease state: Controlled with current treatments.  Functional status: Palliative Performance Scale Score: Performance 40% based on the following measures: Ambulation: Mainly in bed, Activity and Evidence of Disease: Unable to do any work, extensive evidence of disease, Self-Care: Mainly assistance required,  Intake: Normal or reduced, LOC: Full, drowsy or confusion   ECOG Status(4) Completely disabled, unable to carry out self-care.  Totally confined to bed or chair.  Nutritional status:  Albumin 2.6 . Body mass index is 20.01 kg/m².  Screening Status/Interventions " Data  Psychosocial Needs: neg  Spiritual Needs: neg  Goals of Care/ACP: pos  Goals of Care/ACP Intervened: yes   Palliative Care Acuity Data  Psychosocial Acuity: normal complexity  Spiritual Acuity: normal complexity  Hospital Problem List      Dysphagia    Adenocarcinoma of unknown origin    Lymphadenopathy    Vocal cord weakness     Impression/Problem List:     Stage IV adenocarcinoma of unknown origin (2010) with metastatic disease to mediastinal, retroperitoneal, gastrohepatic, as well as, left neck adenopathy      Acute respiratory failure  Oxygen dependency  Anemia  Dysphagia  Vocal cord weakness  Unable to care for self  Elevated TSH  COPD/emphysema  Unintended weight loss, 50 pounds over the last 1 year  Anxiety/depression  Asthma  Chronic back pain-H/O degenerative disc disease  GERD  PVD     Poor performance status     Recommendations/Plan:  1. plan: Goals of care include to be determined.     Family support: The patient receives support from her daughter..  Advance Directives: Advance Directive Status: Patient does not have advance directive   POA/Healthcare surrogate-Anay barber-next of kin.     2.  Palliative care encounter  - Prognosis is poor long-term secondary to stage IV adenocarcinoma, respiratory failure, oxygen dependency, unintended weight loss, and multiple comorbidities.     -followed by Dr. Khan has received 4 courses of carboplatin, Gemzar obtaining remission x 14 years, now with progression of disease.      9/9- FNA left neck consistent with metastatic carcinoma   9/20-rechallenged with systemic therapy carboplatin and Gemzar      -She was seen by Dr. Roberts who recommended ED evaluation leading to current hospitalization due to difficulty swallowing, getting choked on her food, pronounced weight loss, and dehydration with plans for excisional biopsy 9/27.       -Started on IV antibiotics, liquid diet, supplemental oxygen, DuoNebs.  -Evaluated by speech therapy who completed a  video swallow study with findings of profound laryngeal penetration and aspiration, started on modified diet with honey thick liquid, purée consistency food on 9/25.    9/27-ENT plan for cervical lymph node biopsy/excision as previously planned.  -Evaluated by therapy who recommends home with assist and home health.   9/28-Per oncology given aspiration issues may be in best interest to hold off on systemic therapy for few weeks, appointment set 10/14 at 11 AM with Dr. Khan  -Infectious disease consulted     9/27-Currently off unit for planned procedure.       9/30-clarified CODE STATUS.  Encouraged ongoing conversation with regard to medical priorities.  Will provide a copy of MOST decision aid.  -remains hopeful for continued efforts at systemic therapy and we discussed inherent risks to include but not limited to further decline in quality of life, increased hospitalizations and overall progressive decline despite aggressive care interventions.  -Recommended completion of living will document, a copy provided. States she would wish for her daughter to be primary healthcare surrogate.    -Anticipating home discharge as prior with her daughter as caregiver.  She is not open to SNF placement at this juncture.    10/2-continue supportive measures  -reviewed MOST documentation and living will with patient and daughter. Encouraged ongoing discussion, aware can assist in completion of documentation if so desired.    3.  Pain  -Roxicodone and Neurontin medications   -Implantable pain pump  -Dilaudid IV as needed    4.  Depression/anxiety  -Lexapro home medication restarted 9/30    18 minutes spent on advance care planning-discussing with patient and/or family, answering questions, providing some guidance about a plan and documentation of care, and coordinating care face to face.       Thank you for allowing us to participate in patient's plan of care. Palliative Care Team will continue to follow patient.     Nika  ELISA Castaneda  10/2/2024  08:52 CDT

## 2024-10-02 NOTE — THERAPY TREATMENT NOTE
Acute Care - Physical Therapy Treatment Note  [facility]     Patient Name: Arminda Jiménez  : 1960  MRN: [id number]  Today's Date: 10/2/2024      Visit Dx:     ICD-10-CM ICD-9-CM   1. Dysphagia, unspecified type  R13.10 787.20   2. Unable to care for self  Z78.9 V49.89   3. Adenocarcinoma  C80.1 199.1   4. Elevated TSH  R79.89 794.5   5. Pulmonary emphysema, unspecified emphysema type  J43.9 492.8   6. Patchy atelectasis  J98.11 518.0   7. Adenocarcinoma of unknown origin  C80.1 199.1   8. Lymphadenopathy  R59.1 785.6   9. Vocal cord weakness  J38.00 478.30   10. Impaired mobility [Z74.09]  Z74.09 799.89     Patient Active Problem List   Diagnosis    Foraminal stenosis of lumbosacral region    Chronic back pain    Radiculopathy    Lumbar stenosis    Panlobular emphysema    Gastroesophageal reflux disease without esophagitis    Constipation due to opioid therapy    PAD (peripheral artery disease)    Primary osteoarthritis involving multiple joints    Recurrent major depressive disorder    Overweight    Personal history of nicotine dependence    Adenocarcinoma of unknown origin    Environmental allergies    Primary hypertension    Senile osteoporosis    Open left ankle fracture    Fall    Postoperative anemia due to acute blood loss    Acute foot pain, left    Impaired gait and mobility due to left ankle fracture    Chest pain, unspecified type    COPD (chronic obstructive pulmonary disease)    Overweight (BMI 25.0-29.9)    Displaced fracture of medial malleolus of left tibia, subsequent encounter for closed fracture with nonunion    Dysphagia    Lymphadenopathy    Vocal cord weakness    Metastatic carcinoma    Weight loss    Cancer associated pain    Bacteremia due to Enterococcus    Aspiration pneumonia    Chronic pain    Severe malnutrition    Thrombocytopenia     Past Medical History:   Diagnosis Date    Adenocarcinoma of unknown origin 2022    Anxiety     Arthritis     Asthma     Cancer     right  lung,abd,and pelvic area    Chronic back pain     COPD (chronic obstructive pulmonary disease)     Depression     Environmental allergies 04/11/2022    Facet arthropathy     Foraminal stenosis of lumbosacral region     GERD (gastroesophageal reflux disease)     H/O degenerative disc disease     History of blood transfusion     PVD (peripheral vascular disease)     Radiculopathy     Thyroid nodule      Past Surgical History:   Procedure Laterality Date    ANKLE ARTHROPLASTY Left 8/29/2023    Procedure: TOTAL ANKLE ARTHROPLASTY WITH INBONE IMPLANT, GASTROCNEMIUS RECESSION, REMOVAL OF HARDWARE DEEP. LEFT ANKLE;  Surgeon: Bartolome Olguin DPM;  Location:  [facility];  Service: Podiatry;  Laterality: Left;    ANKLE OPEN REDUCTION INTERNAL FIXATION Left 01/05/2023    Procedure: REPAIR OF NONUNION MEDIAL MALLEOLUS, REPAIR NONUNION FIBULA, BONE GRAFT HARVEST, LEFT ANKLE;  Surgeon: Bartolome Olguin DPM;  Location:  [facility];  Service: Podiatry;  Laterality: Left;    ANKLE SURGERY Left     ANTERIOR LUMBAR EXPOSURE N/A 05/07/2018    Procedure: ANTERIOR LUMBAR EXPOSURE;  Surgeon: Chris Haley DO;  Location:  [facility];  Service: Vascular    APPENDECTOMY      CARPAL TUNNEL RELEASE Bilateral     CERVICAL LYMPH NODE BIOPSY/EXCISION Right 9/27/2024    Procedure: RIGHT CERVICAL LYMPH NODE BIOPSY/EXCISION;  Surgeon: Marek Roberts MD;  Location:  [facility];  Service: ENT;  Laterality: Right;    INCISION AND DRAINAGE OF WOUND Left 05/08/2022    Procedure: Incision and Drainage Left Open Ankle Fracture, Open Reduction Internal Fixation Bimalleolar Ankle Fracture;  Surgeon: Leland Aranda MD;  Location:  [facility];  Service: Orthopedics;  Laterality: Left;    LUMBAR FUSION N/A 05/07/2018    Procedure: ANTERIOR DECOMPRESSION, ANTERIOR LUMBAR INTERBODY FUSION WITH INSTRUMENTATION L5-S1;  Surgeon: LUIS CARLOS Zheng MD;  Location:  [facility];  Service: Orthopedic Spine    LUMBAR FUSION Right 01/02/2019    Procedure: RIGHT LATERAL  LUMBAR INTERBODY FUSION L3-5;  Surgeon: LUIS CARLOS Zheng MD;  Location:  [facility];  Service: Orthopedic Spine    LUMBAR LAMINECTOMY WITH FUSION N/A 05/09/2018    Procedure: POSTERIOR SPINAL FUSION WITH INSTRUMENTATION L5-S1;  Surgeon: LUIS CARLOS Zheng MD;  Location:  [facility];  Service: Orthopedic Spine    LUMBAR LAMINECTOMY WITH FUSION Right 01/04/2019    Procedure: POSTERIOR SPINAL FUSION WITH INSTRUMENTATION RIGHT L3-S1;  Surgeon: LUIS CARLOS Zheng MD;  Location:  [facility];  Service: Orthopedic Spine    OTHER SURGICAL HISTORY Bilateral 2017    Stent implants BLE [address] [place]    PAIN PUMP INSERTION/REVISION Right     FENTANYL    WRIST FUSION Right      PT Assessment (Last 12 Hours)       PT Evaluation and Treatment       Row Name 10/02/24 0850          Physical Therapy Time and Intention    Subjective Information complains of;pain;weakness  -KJ     Document Type therapy note (daily note)  -KJ     Mode of Treatment physical therapy  -KJ     Patient Effort adequate  -KJ       Row Name 10/02/24 0850          General Information    Existing Precautions/Restrictions fall;oxygen therapy device and L/min  -KJ       Row Name 10/02/24 0850          Pain    Pretreatment Pain Rating 5/10  -KJ     Posttreatment Pain Rating 6/10  -KJ     Pain Location - back  -KJ     Pre/Posttreatment Pain Comment chest/abdomen  -KJ       Row Name 10/02/24 0850          Bed Mobility    Supine-Sit [place] (Bed Mobility) supervision  -KJ       Row Name 10/02/24 0850          Sit-Stand Transfer    Sit-Stand [place] (Transfers) contact guard  -KJ       Row Name 10/02/24 0850          Stand-Sit Transfer    Stand-Sit [place] (Transfers) contact guard  -KJ       Row Name 10/02/24 0850          Gait/Stairs (Locomotion)    [place] Level (Gait) minimum assist (75% patient effort)  -KJ     Assistive Device (Gait) --  HHA  -KJ     Distance in Feet (Gait) 15  -KJ     Comment, (Gait/Stairs) placed r wx in room for ambulation   -KJ       Row Name             Wound 09/27/24 1403 Right neck Incision    Wound - Properties Group Placement Date: 09/27/24  -LB Placement Time: 1403  -LB Present on Original Admission: N  -LB Side: Right  -LB Location: neck  -LB Primary Wound Type: Incision  -LB    Retired Wound - Properties Group Placement Date: 09/27/24  -LB Placement Time: 1403  -LB Present on Original Admission: N  -LB Side: Right  -LB Location: neck  -LB Primary Wound Type: Incision  -LB    Retired Wound - Properties Group Date first assessed: 09/27/24  -LB Time first assessed: 1403  -LB Present on Original Admission: N  -LB Side: Right  -LB Location: neck  -LB Primary Wound Type: Incision  -LB      Row Name 10/02/24 0850          Positioning and Restraints    Pre-Treatment Position in bed  -KJ     Post Treatment Position bathroom  -KJ               User Key  (r) = Recorded By, (t) = Taken By, (c) = Cosigned By      Initials Name Provider Type    KJ Shirley Rocha, PTA Physical Therapist Assistant    Julieta Montenegro RN Registered Nurse                    Physical Therapy Education       Title: PT OT SLP Therapies (In Progress)       Topic: Physical Therapy (In Progress)       Point: Mobility training (Done)       Learning Progress Summary             Patient Acceptance, E, VU by MS at 9/27/2024 1430    Comment: role of PT in her care                         Point: Home exercise program (Not Started)       Learner Progress:  Not documented in this visit.              Point: Body mechanics (Not Started)       Learner Progress:  Not documented in this visit.              Point: Precautions (Not Started)       Learner Progress:  Not documented in this visit.                              User Key       Initials Effective Dates Name Provider Type Discipline    MS 07/11/23 -  Betty Kuhn, PT, DPT, NCS Physical Therapist PT                  PT Recommendation and Plan             Time Calculation:    PT Charges       Row Name 10/02/24 0957              Time Calculation    Start Time 0850  -KJ      Stop Time 0905  -KJ      Time Calculation (min) 15 min  -KJ      PT Received On 10/09/24  -KJ      PT Goal Re-Cert Due Date 10/07/24  -KJ         Time Calculation- PT    Total Timed Code Minutes- PT 15 minute(s)  -KJ                User Key  (r) = Recorded By, (t) = Taken By, (c) = Cosigned By      Initials Name Provider Type    Shirley Martínez PTA Physical Therapist Assistant                  Therapy Charges for Today       Code Description Service Date Service Provider Modifiers Qty    [id number] HC GAIT TRAINING EA 15 MIN 10/2/2024 Shirley Rocha PTA GP 1            PT G-Codes  Outcome Measure Options: AM-PAC 6 Clicks Daily Activity (OT)  AM-PAC 6 Clicks Score (PT): 11  AM-PAC 6 Clicks Score (OT): 19    Shirley Rocha PTA  10/2/2024

## 2024-10-02 NOTE — PLAN OF CARE
Goal Outcome Evaluation:  Plan of Care Reviewed With: patient   Pt A&Ox4, 3L NC, VSS. Up x1 to chair today. Pt showered today. Incentive spirometer encouraged throughout the day. Pt with c/o increased SOB today, and asking to have O2 increased intermittently. O2 sats have remained stable @ 3LNC throughout shift. Breathing treatments and deep laryngeal suctioning completed by respiratory today. Appetite improved today.

## 2024-10-02 NOTE — PLAN OF CARE
Goal Outcome Evaluation:              Outcome Evaluation: A&O x4. RA. voiding per female purewick. congested, coughing up secretions.  Rishabhker suctioning at bedside.

## 2024-10-02 NOTE — PLAN OF CARE
Goal Outcome Evaluation:  Plan of Care Reviewed With: patient        Progress: improving  Outcome Evaluation: Pt agreeable to tx. Pt completed TB bathing while seated on tub bench with SBA and increased time for rest breaks. Pt CGA for LB dressing and set up for UB dressing. Pt educated work simplification and energy conservation techniques for home. Pt plans to discharge home with family assist. Continue OT POC

## 2024-10-02 NOTE — THERAPY TREATMENT NOTE
Acute Care - Occupational Therapy Treatment Note  [facility]     Patient Name: Arminda Jiménez  : 1960  MRN: [id number]  Today's Date: 10/2/2024             Admit Date: 2024       ICD-10-CM ICD-9-CM   1. Dysphagia, unspecified type  R13.10 787.20   2. Unable to care for self  Z78.9 V49.89   3. Adenocarcinoma  C80.1 199.1   4. Elevated TSH  R79.89 794.5   5. Pulmonary emphysema, unspecified emphysema type  J43.9 492.8   6. Patchy atelectasis  J98.11 518.0   7. Adenocarcinoma of unknown origin  C80.1 199.1   8. Lymphadenopathy  R59.1 785.6   9. Vocal cord weakness  J38.00 478.30   10. Impaired mobility [Z74.09]  Z74.09 799.89     Patient Active Problem List   Diagnosis    Foraminal stenosis of lumbosacral region    Chronic back pain    Radiculopathy    Lumbar stenosis    Panlobular emphysema    Gastroesophageal reflux disease without esophagitis    Constipation due to opioid therapy    PAD (peripheral artery disease)    Primary osteoarthritis involving multiple joints    Recurrent major depressive disorder    Overweight    Personal history of nicotine dependence    Adenocarcinoma of unknown origin    Environmental allergies    Primary hypertension    Senile osteoporosis    Open left ankle fracture    Fall    Postoperative anemia due to acute blood loss    Acute foot pain, left    Impaired gait and mobility due to left ankle fracture    Chest pain, unspecified type    COPD (chronic obstructive pulmonary disease)    Overweight (BMI 25.0-29.9)    Displaced fracture of medial malleolus of left tibia, subsequent encounter for closed fracture with nonunion    Dysphagia    Lymphadenopathy    Vocal cord weakness    Metastatic carcinoma    Weight loss    Cancer associated pain    Bacteremia due to Enterococcus    Aspiration pneumonia    Chronic pain    Severe malnutrition    Thrombocytopenia     Past Medical History:   Diagnosis Date    Adenocarcinoma of unknown origin 2022    Anxiety     Arthritis     Asthma      Cancer     right lung,abd,and pelvic area    Chronic back pain     COPD (chronic obstructive pulmonary disease)     Depression     Environmental allergies 04/11/2022    Facet arthropathy     Foraminal stenosis of lumbosacral region     GERD (gastroesophageal reflux disease)     H/O degenerative disc disease     History of blood transfusion     PVD (peripheral vascular disease)     Radiculopathy     Thyroid nodule      Past Surgical History:   Procedure Laterality Date    ANKLE ARTHROPLASTY Left 8/29/2023    Procedure: TOTAL ANKLE ARTHROPLASTY WITH INBONE IMPLANT, GASTROCNEMIUS RECESSION, REMOVAL OF HARDWARE DEEP. LEFT ANKLE;  Surgeon: Bartolome Olguin DPM;  Location:  [facility];  Service: Podiatry;  Laterality: Left;    ANKLE OPEN REDUCTION INTERNAL FIXATION Left 01/05/2023    Procedure: REPAIR OF NONUNION MEDIAL MALLEOLUS, REPAIR NONUNION FIBULA, BONE GRAFT HARVEST, LEFT ANKLE;  Surgeon: Bartolome Olguin DPM;  Location:  [facility];  Service: Podiatry;  Laterality: Left;    ANKLE SURGERY Left     ANTERIOR LUMBAR EXPOSURE N/A 05/07/2018    Procedure: ANTERIOR LUMBAR EXPOSURE;  Surgeon: Chris Haley DO;  Location:  [facility];  Service: Vascular    APPENDECTOMY      CARPAL TUNNEL RELEASE Bilateral     CERVICAL LYMPH NODE BIOPSY/EXCISION Right 9/27/2024    Procedure: RIGHT CERVICAL LYMPH NODE BIOPSY/EXCISION;  Surgeon: Marek Roberts MD;  Location:  [facility];  Service: ENT;  Laterality: Right;    INCISION AND DRAINAGE OF WOUND Left 05/08/2022    Procedure: Incision and Drainage Left Open Ankle Fracture, Open Reduction Internal Fixation Bimalleolar Ankle Fracture;  Surgeon: Leland Aranda MD;  Location:  [facility];  Service: Orthopedics;  Laterality: Left;    LUMBAR FUSION N/A 05/07/2018    Procedure: ANTERIOR DECOMPRESSION, ANTERIOR LUMBAR INTERBODY FUSION WITH INSTRUMENTATION L5-S1;  Surgeon: LUIS CARLOS Zheng MD;  Location:  [facility];  Service: Orthopedic Spine    LUMBAR FUSION Right 01/02/2019     Procedure: RIGHT LATERAL LUMBAR INTERBODY FUSION L3-5;  Surgeon: LUIS CARLOS Zheng MD;  Location:  [facility];  Service: Orthopedic Spine    LUMBAR LAMINECTOMY WITH FUSION N/A 05/09/2018    Procedure: POSTERIOR SPINAL FUSION WITH INSTRUMENTATION L5-S1;  Surgeon: LUIS CARLOS Zheng MD;  Location:  [facility];  Service: Orthopedic Spine    LUMBAR LAMINECTOMY WITH FUSION Right 01/04/2019    Procedure: POSTERIOR SPINAL FUSION WITH INSTRUMENTATION RIGHT L3-S1;  Surgeon: LUIS CARLOS Zheng MD;  Location:  [facility];  Service: Orthopedic Spine    OTHER SURGICAL HISTORY Bilateral 2017    Stent implants BLE [address] [place]    PAIN PUMP INSERTION/REVISION Right     FENTANYL    WRIST FUSION Right          OT ASSESSMENT FLOWSHEET (Last 12 Hours)       OT Evaluation and Treatment       Row Name 10/02/24 0820                   OT Time and Intention    Subjective Information complains of;weakness;fatigue  -TS        Document Type therapy note (daily note)  -TS        Mode of Treatment occupational therapy  -TS        Patient Effort adequate  -TS        Comment 3L at rest, increased to 4L with exertion  -TS           General Information    Existing Precautions/Restrictions fall;oxygen therapy device and L/min  -TS           Pain Assessment    Pretreatment Pain Rating 0/10 - no pain  -TS        Posttreatment Pain Rating 0/10 - no pain  -TS           Cognition    Orientation Status (Cognition) oriented x 4  -TS        Personal Safety Interventions fall prevention program maintained;gait belt;nonskid shoes/slippers when out of bed  -TS           Activities of Daily Living    BADL Assessment/Intervention bathing;upper body dressing;lower body dressing;grooming  -TS           Bathing Assessment/Intervention    [place] Level (Bathing) upper body;lower body;set up;standby assist  -TS        Assistive Devices (Bathing) hand-held shower spray hose;tub bench  -TS        Position (Bathing) unsupported sitting  -TS        Comment,  (Bathing) increased time with rest breaks  -TS           Upper Body Dressing Assessment/Training    [place] Level (Upper Body Dressing) don;pull-over garment;set up  -TS        Position (Upper Body Dressing) unsupported sitting  -TS           Lower Body Dressing Assessment/Training    [place] Level (Lower Body Dressing) don;pants/bottoms;socks;set up;contact guard assist  -TS        Position (Lower Body Dressing) unsupported sitting;supported standing  -TS           Grooming Assessment/Training    [place] Level (Grooming) grooming skills;set up  -TS        Position (Grooming) unsupported sitting  -TS           Transfer Assessment/Treatment    Transfers shower transfer  -TS           Shower Transfer    Type (Shower Transfer) sit-stand;stand-sit;stand pivot/stand step  -TS        [place] Level (Shower Transfer) contact guard  -TS        Assistive Device (Shower Transfer) tub bench;grab bar, tub/shower  -TS           Wound 09/27/24 1403 Right neck Incision    Wound - Properties Group Placement Date: 09/27/24  -LB Placement Time: 1403  -LB Present on Original Admission: N  -LB Side: Right  -LB Location: neck  -LB Primary Wound Type: Incision  -LB    Retired Wound - Properties Group Placement Date: 09/27/24  -LB Placement Time: 1403  -LB Present on Original Admission: N  -LB Side: Right  -LB Location: neck  -LB Primary Wound Type: Incision  -LB    Retired Wound - Properties Group Date first assessed: 09/27/24  -LB Time first assessed: 1403  -LB Present on Original Admission: N  -LB Side: Right  -LB Location: neck  -LB Primary Wound Type: Incision  -LB       Plan of Care Review    Plan of Care Reviewed With patient  -TS        Progress improving  -TS        Outcome Evaluation Pt agreeable to tx. Pt completed TB bathing while seated on tub bench with SBA and increased time for rest breaks. Pt CGA for LB dressing and set up for UB dressing. Pt educated work simplification and energy conservation  techniques for home. Pt plans to discharge home with family assist. Continue OT POC  -TS           Positioning and Restraints    Pre-Treatment Position bathroom  -TS        Post Treatment Position chair  -TS        In Chair reclined;call light within reach;encouraged to call for assist;with family/caregiver;notified nsg;waffle cushion  -TS                  User Key  (r) = Recorded By, (t) = Taken By, (c) = Cosigned By      Initials Name Effective Dates    Stacy Kimble COTA 02/03/23 -     LB Julieta Evans RN 06/23/23 -                      Occupational Therapy Education       Title: PT OT SLP Therapies (In Progress)       Topic: Occupational Therapy (In Progress)       Point: ADL training (Done)       Description:   Instruct learner(s) on proper safety adaptation and remediation techniques during self care or transfers.   Instruct in proper use of assistive devices.                  Learning Progress Summary             Patient Acceptance, E, VU by JODI at 10/2/2024 1347    Acceptance, E, VU by JOSE at 9/26/2024 1330    Acceptance, E, VU by JOSE at 9/26/2024 1148                         Point: Home exercise program (Not Started)       Description:   Instruct learner(s) on appropriate technique for monitoring, assisting and/or progressing therapeutic exercises/activities.                  Learner Progress:  Not documented in this visit.              Point: Precautions (Done)       Description:   Instruct learner(s) on prescribed precautions during self-care and functional transfers.                  Learning Progress Summary             Patient Acceptance, E, VU by JOSE at 9/26/2024 1330    Acceptance, E, VU by JOSE at 9/26/2024 1148                         Point: Body mechanics (Done)       Description:   Instruct learner(s) on proper positioning and spine alignment during self-care, functional mobility activities and/or exercises.                  Learning Progress Summary             Patient Acceptance, E, VU by  JOSE at 9/26/2024 1148                                         User Key       Initials Effective Dates Name Provider Type Discipline     02/03/23 -  Stacy Ogden COTA Occupational Therapist Assistant OT    JOSE 07/11/23 -  Betsy Renee OTR/L, CSRS Occupational Therapist OT                      OT Recommendation and Plan     Plan of Care Review  Plan of Care Reviewed With: patient  Progress: improving  Outcome Evaluation: Pt agreeable to tx. Pt completed TB bathing while seated on tub bench with SBA and increased time for rest breaks. Pt CGA for LB dressing and set up for UB dressing. Pt educated work simplification and energy conservation techniques for home. Pt plans to discharge home with family assist. Continue OT POC  Plan of Care Reviewed With: patient  Outcome Evaluation: Pt agreeable to tx. Pt completed TB bathing while seated on tub bench with SBA and increased time for rest breaks. Pt CGA for LB dressing and set up for UB dressing. Pt educated work simplification and energy conservation techniques for home. Pt plans to discharge home with family assist. Continue OT POC     Outcome Measures       Row Name 10/02/24 1300             How much help from another is currently needed...    Putting on and taking off regular lower body clothing? 3  -TS      Bathing (including washing, rinsing, and drying) 3  -TS      Toileting (which includes using toilet bed pan or urinal) 3  -TS      Putting on and taking off regular upper body clothing 3  -TS      Taking care of personal grooming (such as brushing teeth) 4  -TS      Eating meals 4  -TS      AM-PAC 6 Clicks Score (OT) 20  -TS                User Key  (r) = Recorded By, (t) = Taken By, (c) = Cosigned By      Initials Name Provider Type    TS Stacy Ogden COTA Occupational Therapist Assistant                    Time Calculation:    Time Calculation- OT       Row Name 10/02/24 1347             Time Calculation- OT    OT Start Time 0820  -TS       OT Stop Time 0900  -TS      OT Time Calculation (min) 40 min  -TS      Total Timed Code Minutes- OT 40 minute(s)  -TS      OT Received On 10/02/24  -TS         Timed Charges    72197 - OT Self Care/Mgmt Minutes 40  -TS         Total Minutes    Timed Charges Total Minutes 40  -TS       Total Minutes 40  -TS                User Key  (r) = Recorded By, (t) = Taken By, (c) = Cosigned By      Initials Name Provider Type     Stacy Ogden COTA Occupational Therapist Assistant                  Therapy Charges for Today       Code Description Service Date Service Provider Modifiers Qty    51291699249 HC OT SELF CARE/MGMT/TRAIN EA 15 MIN 10/1/2024 Stacy Ogden COTA GO 2    47825209604 HC OT SELF CARE/MGMT/TRAIN EA 15 MIN 10/2/2024 Stacy Ogden COTA GO 3                 Stacy ESTES. NAVI Ogden  10/2/2024

## 2024-10-02 NOTE — PROGRESS NOTES
Her          Progress Note    Patient name: Arminda Jiménez  Patient : 1960  VISIT # [id number]  MR #[id number]  Room:     Subjective   Awake, alert, talkative.    Still with deep cough and upper airway secretions, having difficulty mobilizing secretions.    Discussed at length with her nurse Baltazar RN this morning, 10/2/2024 who has been attempting  suction to mobilize secretions.  Patient states that the suctioning is helping and that she is now able to get up more secretions.    CBC 10/1/2024 has a WBC of 9.4, hemoglobin 8.7 and a platelet count of 80,000    POD #5 excisional biopsy, right neck 2024 by Dr. Marek Roberts  Pathology still pending  Molecular studies are being requested of pathology today, 10/2/2024, to be sent off.     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  Dorcas was found to have stage IV adenocarcinoma of unknown primary in .  She was treated successfully with 4 courses of carboplatin Gemzar obtaining a complete remission that was sustained for 14 years.  Dorcas experienced a heel fracture 2 years ago.  After that event she has had chronic abdominal pain and has lost 50 pounds.  25 of the 50 pounds were lost over the course of the previous month.  Workup has documented widespread metastatic disease with mediastinal, retroperitoneal, gastrohepatic as well as left neck adenopathy.  An FNA of the left neck performed on 2024 reported malignant cells present consistent with metastatic carcinoma.  Further tissue was unavailable for molecular studies and further special stains.   Due to delays in further workup, Dr. Khan felt the patient needed to get on with his treatment and rechallenged her with carboplatin and Gemzar cycle #1 on 2024.   She has been referred to Dr. Marek Roberts with ENT at [facility] to obtain an excisional biopsy to be able to get tissue for  molecular studies that will include tissue of origin, NGS, IHC studies etc.   Dorcas was seen by Dr. Marek Roberts who recommended that she be evaluated in the ED at [facility] for admission due to difficulty swallowing getting choked on her food, pronounced weight loss and dehydration anticipating an open excisional biopsy on Friday, 9/27/2024.  Medical oncology consultation requested in continuity of care.           Detailed history copied from Dr. Khan's office note 9/20/2024     The patient is a pleasant 64 years old female who has a history of stage IV carcinoma of unknown primary site diagnosed in 2010 status post 4 cycles of carboplatin gemcitabine with complete response.  She has been in remission for 14 years.  More recently, she was found to have widespread metastatic disease with mediastinal adenopathy, retroperitoneal adenopathy, gastrohepatic mass and neck adenopathy.  She underwent ultrasound-guided FNA biopsy.  This result only in a scant amount of tissue for analysis.  She was then sent to ENT at [facility] for excisional biopsy.  Unfortunate, the patient missed appointment.  The patient was then sent to ENT at [facility].  ENT at [facility] referred the patient to [place], ENT for excisional biopsy.     Diagnosis  History of carcinoma no primary site, 2010  Liver lesions  Gastrohepatic space mass  Mediastinal adenopathy consistent cycles normal  Metastatic carcinoma, left neck lymph node, Sept 2024     Disease target  Mediastinal adenopathy  Hepatic lobe lesions  Pancreatic body/tail hypodense mass in the gastrohepatic ligament     Treatment summary  9/20/24 Initiate Carbo AUC 4 D1, Gemzar 800mg D1, D8 every 21 days. Treatment consent signed.          Cancer history  Arminda Jiménez was first seen by me on 9/5/2024.  I was consulted for findings of a gastric mass during patient was positioned with hospital.  The patient has a history of lung cancer, COPD.  She was a direct admit from her PCP  office.  Patient has a 20 pounds weight loss.  She also had complained of dysphagia, nausea, dizziness decreased appetite.  The patient reports that she has a history of stage IV lung cancer diagnosed 10 years ago at [place].  She received chemotherapy and stay in remission.  She tells me that she did not have a biopsy performed at that time.  Apparently, review of medical record showed that she had a carcinoma of unknown primary site diagnosed October 2010.  She received 2 cycles of carboplatin and paclitaxel.  She had a reaction to Taxol during cycle #2.  She was switched to carboplatin gemcitabine, completed March 2019.  Sites of disease in the past involve the pericardial, pleural, mediastinal, and cervical involvement.   7/30/24 CT chest: Extensive necrotic mediastinal and bilateral hilar lymphadenopathy consistent with metastatic disease. Lymph node in the right right paratracheal lymph node measures 1 cm short axis axial image 34 at the thoracic inlet.  AP window lymph node measures 1.2 cm short axis on axial image 71 with additional 1 cm AP window lymph node axial image 76.  Precarinal lymph node measures 1.1 cm on axial image 85.  Partially necrotic anterior mediastinal lymph node measures 1.4 cm short axis image 88 with similar appearing anterior mediastinal lymph node measuring 1.3 cm short axis image 69.  A partially necrotic subcarinal lymph node measures 1.8 cm short axis on axial image 96.  Right hilar lymph node measures 1.1 cm short axis image 95.  Partially necrotic and calcified left hilar lymph node measures 1.4 cm short axis image 118.  Left hilar lymph node measures 1.1 cm short axis on axial image 96. These lymph nodes are new since 2022. There is a 2.2 x 1.5 cm nodule abutting the left heart border axial image 140, which is either within the pericardial region were within the left lung parenchyma.  There is a spiculated 0.8 x 0.5 x 0.8 cm left upper lobe nodule axial image 89 and coronal   image 31 which is also new.  Probable focal bronchiectasis left upper lobe on axial image 76, less likely cavitary nodule.  0.3 cm left lower lobe nodule image 151 which may be endobronchial.  Question a 0.6 meters left lower lobe nodule along the diaphragm axial image 158.  Subpleural 0.4 cm right middle lobe nodule image 165.  Bilateral bronchial thickening some areas of subsegmental atelectasis noted in both lobes.  Moderate emphysema.  There is no pleural effusion or pneumothorax. Centrally low density gastrohepatic ligament mass measures 3.3 x 3.2 cm axial image 193 with some adjacent wall thickening suggested in the proximal stomach.   7/30/24 CT abdomen/pelvis: Marked gastric wall thickening proximal stomach and gastric body; neoplasm is suspected, Correlation with symptomatology and EGD advised, Cystic structure in the gastrohepatic ligament measuring up to 4.9 cm, may represent a necrotic lymph node or mass less likely pancreatic suggestive. Correlation with PET scan and/or MRI advised.    08/12/24 CT soft tissue neck:  Mild asymmetry of the vocal cords, secondary to the vocal cord paralysis.  No visible nodule or mass of the larynx, Mild necrotic adenopathy of the right neck, consistent with metastatic disease, Since the prior CT chest from 07/30/2024, grossly stable right paratracheal necrotic node at level of the thoracic inlet, also consistent with metastatic disease.   9/5/2024-upper EGD, GI-gastric bezoar to explain the CT scan of the abdomen findings.  No gastric tumor.  No active ulcer disease to explain patient's nausea vomiting.  Probable gastroparesis.  9/5/2024-CT chest, L:  There is a hypodense nodule in the left thyroid lobe measuring 1.2 cm.  A right tracheoesophageal lymph node is 1.9 cm in short axis on axial image number 16 with interval increase size from 1.2 cm in short axis when remeasured.  A right hilar lymph node is 1.1 cm in short axis without change.  A subcarinal lymph node is  partially calcified and measures 2.5 cm in short axis without change.  This has low attenuation.  An additional right hilar lymph node is 1.5 cm in short axis and is probably unchanged.  A left hilar lymph node is 1 cm in short axis without change.  An AP window lymph node is at least 2.3 cm and has increased in size.  Anterior mediastinal lymph node is now 1.6 cm in short axis, previously 1.3 cm.  A solid nodule in the lingula abuts the free wall of the left ventricle and measures 2.9 x 1.8 cm, previously 2.4 x 1.6 cm when remeasured. There is a small cavitary nodule in the left upper lobe measuring a cavitary nodule in the left upper lobe on series 604 image number 23  is unchanged measuring 0.6 cm. A solid spiculated nodule in the left upper lobe is probably overall unchanged measuring 0.8 cm x 0.7 cm on image number 26.  On there is a new solid juxta fissural nodule in the right middle lobe measuring 0.4 cm on image number 34.  There is an enlarging juxta fissural nodule in the right middle lobe measuring 0.3 cm image number 35.  The small to moderate pleural effusion layers dependently.  No left pleural fluid.  Bibasilar infiltrates are suggested. There is a large hypodense mass arising from the pancreatic body/tail extending to the gastrohepatic ligament measuring up to 5.4 x 4.1 cm.  This has increased in size.  There are multiple new hypodense hepatic lesions.    9/6/2024-CT thoracic and lumbar spine, [facility]: Multiple chronic thoracic compression fractures, diffuse degenerative disc disease.  Postop changes of the lumbar spine.  Loosening of the right L3 transpedicular screw.  Remaining hardware appears well-positioned.  Rim-enhancing complex cystic lesion in the gastrohepatic space.  9/9/2024-FNA biopsy by radiology: Consistent metastatic carcinoma.  However, further characterization of tumor could not be performed due to scant material.  9/10/2024-CT abdomen pelvis with contrast, [facility]HL: Interval development of  mild to moderate dependent atelectasis of both lower  lobes and mild patchy ground-glass and consolidation of the lingula and to a lesser extent the right middle lobe.  3.1 cm left epicardial nodule, increased in size from 2.5 cm.  Interval development of multiple ill-defined hypodense liver masses.  The largest, of the left lobe near the IVC measures 3.3 cm.  Stable mild prominence of the intrahepatic biliary tree and mild distension of the common duct, measuring up to 10 mm in diameter.  No visible obstructing stone or mass.  Cholecystectomy, again noted.  Interval increase in size of the previously seen mass between the liver left lobe, stomach, aorta, and superior aspect of the pancreatic body.  Measures 6.0 x 4.5 cm cross section, increased from 3.5 x 3.3 cm.  Inferiorly, the mass abuts and blends in per separately of the pancreas.  Calcified granulomas of the spleen, again noted.  The adrenal glands have a normal appearance.  The kidneys enhance symmetrically.  0.9 cm cyst of the left kidney, again noted.  No ureteral stones or hydronephrosis.  The uterus and ovaries have a normal appearance.  No evidence of bowel obstruction.  Moderate amount of stool in the colon, similar to before.  Small fat and fluid containing right inguinal hernia, stable.  No free air.  Interval development of trace fluid in the pelvis.  Electronic device in subcutaneous fat of the right flank, with catheter entering the spinal canal, again noted.  Bone window images show no significant lytic or sclerotic bone lesions.  Surgical fixation of the lumbar spine, again noted.  Chronic mild compression fracture of the L1 superior endplate, stable.  Impression:  Interval increase in size of the mass of the epigastric region, most consistent with a malignant process, potentially metastatic adenopathy or a pancreatic mass.  Interval development of multiple hepatic metastases. 3.  3.1 cm left epicardial nodule, increased in size, suspicious for  metastatic adenopathy or metastatic pleural disease.   Interval development of small to moderate dependent right pleural effusion, with atelectasis versus pneumonia of both lower lobes.   9/20/24 Initiate palliative chemotherapy Carbo AUC 4 D1, Gemzar 800mg D1, D8 every 21 days. Treatment consent signed.    9/20/2024-I have called [facility]NT and refer the patient back.  She will be seen early next week.        REVIEW OF SYSTEMS:   Constitutional: Pronounced weight loss fatigue failure to thrive  HEENT:  dysphagia and hoarseness               Lungs: no cough, no shortness of breath, no wheeze  CVS: no palpitation, no chest pain, no shortness of breath  GI: no abdominal pain, no nausea , no vomiting, no constipation  MERCEDES: no dysuria, frequency and urgency, no hematuria, no kidney stones  Musculoskeletal: no joint pain, swelling , stiffness  Endocrine: no polyuria, polydipsia, no cold or heat intolerance  Hematology: no anemia, no easy bruising or bleeding, no history of clotting disorder  Dermatology: no skin rash, no eczema, no pruritus  Psychiatry: no depression, no anxiety,no panic attacks, no suicide ideation  Neurology: no syncope, no seizures, no numbness or tingling of hands, no numbness or tingling of feet, no paresis  Objective     Vital Signs  Temp:  [97.5 °F (36.4 °C)-98.4 °F (36.9 °C)] 97.9 °F (36.6 °C)  Heart Rate:  [] 97  Resp:  [18-22] 20  BP: (106-166)/(64-82) 158/69    Intake/Output Summary (Last 24 hours) at 10/2/2024 0719  Last data filed at 10/2/2024 0540  Gross per 24 hour   Intake --   Output 700 ml   Net -700 ml       PHYSICAL EXAM:  CONSTITUTIONAL: Alert, appropriate, no acute distress, very thin, cachectic  EYES: Anicteric, EOM intact, pupils equal round   ENT: Mucous membranes moist, no oropharyngeal lesions   NECK: Supple, Cervical lymphadenopathy on the right   CHEST/LUNGS: CTA bilaterally, normal respiratory effort   CARDIOVASCULAR: RRR, no murmurs  ABDOMEN: soft nontender, active bowel  sounds, no HSM  EXTREMITIES: warm, moves all fours  SKIN: warm, dry with no rashes or lesions  LYMPH: No cervical, clavicular, axillary, or inguinal lymphadenopathy  NEUROLOGIC: follows commands, nonfocal   PSYCH: mood and affect appropriate      CBC  Results from last 7 days   Lab Units 10/01/24  0519 09/30/24  0759 09/29/24  1432 09/28/24  1246 09/27/24  0606   WBC 10*3/mm3 9.40 12.96* 15.60*   < > 12.27*   HEMOGLOBIN g/dL 8.7* 9.2* 9.5*   < > 11.0*   HEMATOCRIT % 28.1* 28.0* 28.4*   < > 34.2   PLATELETS 10*3/mm3 80* 45* 48*   < > 91*   LYMPHOCYTE % %  --   --   --   --  6.1*   MONOCYTES % %  --   --   --   --  1.0*    < > = values in this interval not displayed.       CMP  Lab Results   Component Value Date    GLUCOSE 102 (H) 10/01/2024    BUN 6 (L) 10/01/2024    CREATININE <0.17 (L) 10/01/2024    EGFRIFNONA 114 01/07/2019    BCR  10/01/2024      Comment:      Unable to calculate Bun/Crea Ratio.    CO2 30.0 (H) 10/01/2024    CALCIUM 8.1 (L) 10/01/2024    ALBUMIN 2.7 (L) 10/01/2024    AST 68 (H) 10/01/2024    ALT 36 (H) 10/01/2024             Blood Culture   Date Value Ref Range Status   09/24/2024 Abnormal Stain (C)  Preliminary   09/24/2024 No growth at 24 hours  Preliminary       Imaging Results (Last 72 Hours)       Procedure Component Value Units Date/Time    XR Chest 1 View [[id number]] Collected: 10/01/24 0714     Updated: 10/01/24 0720    Narrative:      EXAMINATION: XR CHEST 1 VW- 10/1/2024 7:14 AM     HISTORY: follow-up infiltrates.     REPORT: A frontal view of the chest was obtained.     COMPARISON: Chest x-ray 9/28/2024.     The lungs remain hypoaerated, with bibasilar airspace opacities there is  mild improvement in the left basilar infiltrate, no lung consolidation  is identified. Heart size is normal. No pneumothorax is identified,  there appears to be trace right pleural effusion as before. No acute  osseous abnormality.       Impression:      Continued pulmonary hypoventilation with mild  improvement in  the left basilar infiltrates, persistent trace right pleural effusion.  No pneumothorax is identified.     This report was signed and finalized on 10/1/2024 7:17 AM by Dr. Lizandro Smith MD.       XR Spine Lumbar 2 or 3 View [[id number]] Collected: 09/29/24 1427     Updated: 09/29/24 1432    Narrative:      EXAM/TECHNIQUE: XR SPINE LUMBAR 2 OR 3 VW-     INDICATION: pain; history of metastatic carcinoma;      COMPARISON: MR thoracic spine 10/11/2023.     FINDINGS:     5 lumbar-type vertebral bodies. Mild straightening of normal lumbar  lordosis. No subluxations. Chronic mild anterior wedging of T11 and L1.  Vertebral body heights are otherwise maintained. No acute fracture. No  suspicious osseous lesion.     Postoperative change of instrumented fusion spanning L3-S1. No evidence  of hardware loosening or fracture.     Mild multilevel lumbar spine degenerative change with endplate  osteophytes and facet arthropathy noted. Atherosclerotic abdominal aorta  with bilateral common iliac stents. Pain device in the right abdominal  soft tissues noted. Partially manage right-sided pleural effusion and  patchy opacities in both lower lungs.       Impression:      1. No acute osseous findings.  2. Postoperative change spanning L3-S1 without evidence of complication.  3. Mild multilevel lumbar spine degenerative change.     This report was signed and finalized on 9/29/2024 2:29 PM by Dr. Bam Ortega MD.       XR Spine Thoracic 2 View [[id number]] Collected: 09/29/24 1425     Updated: 09/29/24 1430    Narrative:      EXAM/TECHNIQUE: XR SPINE THORACIC 2 VW-     INDICATION: pain; history of metastatic carcinoma; R13.10-Dysphagia,  unspecified; Z78.9-Other specified health status; C80.1-Malignant  (primary) neoplasm, unspecified; R79.89-Other specified abnormal  findings of blood chemistry; J43.9-Emphysema, unspecified;  J98.11-Atelectasis; C80.1-Malignant (primary) neoplasm, unspecified;  R59.1-Generalized  enlarged lymph nodes; J38.00-Paralysis of vocal cords  and larynx,     COMPARISON: MR 10/11/2023     FINDINGS:     Thoracic kyphosis and alignment are maintained. Mild chronic anterior  wedging of T3, T11, and L1. No acute vertebral body height loss. No  suspicious vertebral body lesion.      Mild multilevel thoracic spine degenerative change with disc space  narrowing and endplate osteophyte development. Partially imaged  bilateral lower lobe patchy consolidation and small right pleural  effusion.       Impression:      1.  No acute fracture or subluxation.  2.  Mild multilevel thoracic spine degenerative change.     This report was signed and finalized on 9/29/2024 2:27 PM by Dr. Bam Ortega MD.                 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  Dorcas was found to have stage IV adenocarcinoma of unknown primary in 2010.  She was treated successfully with 4 courses of carboplatin Gemzar obtaining a complete remission that was sustained for 14 years.  Dorcas experienced a heel fracture 2 years ago.  After that event she has had chronic abdominal pain and has lost 50 pounds.  25 of the 50 pounds were lost over the course of the previous month.  Workup has documented widespread metastatic disease with mediastinal, retroperitoneal, gastrohepatic as well as left neck adenopathy.  An FNA of the left neck performed on 9/9/2024 reported malignant cells present consistent with metastatic carcinoma.  Further tissue was unavailable for  molecular studies and further special stains.   Due to delays in further workup, Dr. Khan felt the patient needed to get on with his treatment and rechallenged her with carboplatin and Gemzar cycle #1 on 9/20/2024.   She has been referred to Dr. Marek Roberts with ENT at [facility] to obtain an excisional biopsy to be able to get tissue for molecular studies that will include tissue of origin, NGS, IHC studies etc.   Dorcas was seen by Dr. Marek Roberts who recommended that she be evaluated in the ED at [facility] for admission due to difficulty swallowing getting choked on her food, pronounced weight loss and dehydration anticipating an open excisional biopsy on Friday, 9/27/2024.      Discussed at length with Dr. eKntrell Villela, 9/27/2024, and her nurse Sheri DUGAN today.    CBC  9/28/2024 has a WBC of 13.75 with a hemoglobin of 9.6 and a platelet count of 59,000    POD #5 excisional biopsy, right neck 9/27/2024 by Dr. Marek Roberts  Pathology still pending  Molecular studies are being requested of pathology today, 10/2/2024, to be sent off.     Given the aspiration issues being addressed, the positive blood culture under treatment with IV antibiotics, it is in her best interest to hold off on Gemzar and resume cycle #2 of chemotherapy on 10/14/2024 at 11 AM with Dr. Khan.      #2  Dysphagia and aspiration    Patient states that she coughs every time she tries to eat something and swallow  Swallow evaluation 9/25/2024-documented laryngeal penetration and aspiration during swallow.  SLP Findings: Patient presents with severe pharyngeal dysphagia.   Recommendations: Diet Textures: honey thick liquid, puree consistency food. Medications should be taken whole with puree. May have Ice between meals after oral care, under staff or family supervision and with the recommended strategies for safe swallowing.  Recommended Strategies: Upright for PO, small bites and sips, and alternate liquids and solids. Oral care before breakfast, after  all meals and PRN.  Dysphagia therapy is recommended.      Discussed at length with Dr. Kentrell Villela, 9/27/2024, and her nurse Sheri DUGAN today.    Discussed at length with her nurse this morning, encouraging attempts at further suctioning to mobilize secretions.  Patient just does not have strength for cough to get her secretions up.    #3  Aspiration pneumonia    Chest x-ray, 1 view 9/24/2024 reported the following  Emphysematous changes of the lungs.   Patchy bibasilar infiltrates are present.    Blood culture 9/24/2024 reported the following:  Enterococcus hirae Critical      Discussed with Dr. Kentrell Villela, 9/27/2024  ID consult notes reviewed  She is on Unasyn     #4  Pain secondary to #1    Dilaudid 0.5 mg IV every 4 Hrs PRN  Norco 5 mg PO Q 4 Hrs PRN          Bartolome Zambrano MD  10/02/24  07:19 CDT

## 2024-10-02 NOTE — PROGRESS NOTES
Infectious Diseases Progress Note    Patient:  Arminda Jiménez  YOB: 1960  MRN: [id number]   Admit date: 9/24/2024   Admitting Physician: Flynn Quinones,*  Primary Care Physician: Teddy Sanchez MD    Chief Complaint/Interval History: She is without fever.  She feels she is mobilizing some sputum.  She feels she is gaining some strength.  She indicates she is tolerating her oral intake of soft diet.  She is not short of breath.  She is not having pain or discomfort.  Oxygen requirement stable at 2 to 3 L.  She remains without fever.    Intake/Output Summary (Last 24 hours) at 10/2/2024 1721  Last data filed at 10/2/2024 0540  Gross per 24 hour   Intake --   Output 350 ml   Net -350 ml     Allergies:   Allergies   Allergen Reactions    Aspirin Nausea And Vomiting     Unknown reaction    Keflex [Cephalexin] Other (See Comments)     Blisters to nose and mouth    Nitrofurantoin GI Intolerance and Nausea And Vomiting    Creatine Monohydrate Unknown - Low Severity     Current Scheduled Medications:   ampicillin-sulbactam, 3 g, Intravenous, Q6H  escitalopram, 20 mg, Oral, Daily  famotidine, 20 mg, Oral, BID AC  gabapentin, 800 mg, Oral, Q6H  guaiFENesin, 1,200 mg, Oral, Q12H  ipratropium-albuterol, 3 mL, Nebulization, 4x Daily - RT  magnesium oxide, 400 mg, Oral, Daily  mupirocin, 1 Application, Topical, Q12H  sodium chloride, 10 mL, Intravenous, Q12H      dextrose 5 % and sodium chloride 0.9 % with KCl 20 mEq, 40 mL/hr, Last Rate: 40 mL/hr (10/02/24 1440)       Current PRN Medications:    albuterol    senna-docusate sodium **AND** polyethylene glycol **AND** bisacodyl **AND** bisacodyl    calcium carbonate    HYDROmorphone    ondansetron    oxyCODONE    Phosphorus Replacement - Follow Nurse / BPA Driven Protocol    Potassium Replacement - Follow Nurse / BPA Driven Protocol    [COMPLETED] Insert Peripheral IV **AND** sodium chloride    sodium chloride    sodium chloride    Review of Systems she  "is not having any diarrhea or rash.  No pain at IV site.    Vital Signs:  Temp (24hrs), Av.2 °F (36.8 °C), Min:97.9 °F (36.6 °C), Max:98.4 °F (36.9 °C)    /64 (BP Location: Right arm, Patient Position: Lying)   Pulse 104   Temp 98.3 °F (36.8 °C) (Oral)   Resp 22   Ht 154.9 cm (60.98\")   Wt 48 kg (105 lb 13.1 oz)   SpO2 94%   BMI 20.01 kg/m²     Physical Exam  Vital signs - reviewed.  Line/IV (peripheral) site - No erythema, warmth, induration, or tenderness.  Abdomen nontender  Lungs are more clear than they were on previous exam  She had had some scattered crackles previously though seem to be showing improvement  She does not appear dyspneic and looks comfortable at rest  Skin without rash    Lab Results:  CBC:   Results from last 7 days   Lab Units 10/01/24  0524  0759 24  1432 24  1246 24  0606 24  0542   WBC 10*3/mm3 9.40 12.96* 15.60* 13.75* 12.27* 7.98   HEMOGLOBIN g/dL 8.7* 9.2* 9.5* 9.6* 11.0* 9.5*   HEMATOCRIT % 28.1* 28.0* 28.4* 31.6* 34.2 31.1*   PLATELETS 10*3/mm3 80* 45* 48* 59* 91* 114*     BMP:  Results from last 7 days   Lab Units 10/01/24  0524  0524  0443 24  1246 24  0606 24  0542   SODIUM mmol/L 138 134* 130* 129* 130* 133*   POTASSIUM mmol/L 3.2* 4.2 4.1 3.2* 2.9* 3.7   CHLORIDE mmol/L 100 96* 93* 89* 88* 95*   CO2 mmol/L 30.0* 21.0* 25.0 24.0 29.0 30.0*   BUN mg/dL 6* 6* 3* 4* 3* 6*   CREATININE mg/dL <0.17* 0.20* <0.17* 0.21* 0.22* 0.24*   GLUCOSE mg/dL 102* 102* 111* 75 84 95   CALCIUM mg/dL 8.1* 8.2* 7.9* 8.4* 8.3* 8.0*   ALT (SGPT) U/L 36*  --   --   --   --   --      Culture Results: No new culture results  Blood culture 2024-no growth  Blood culture 2024-Enterococcus hirae (susceptibility below)  Susceptibility       Enterococcus hirae     SHERRIE     Ampicillin <=2 ug/ml Susceptible     Gentamicin High Level Synergy SYN-S ug/ml Susceptible     Vancomycin <=0.5 ug/ml Susceptible  "     Radiology:   Chest x-ray yesterday personally reviewed:  IMPRESSION:  Continued pulmonary hypoventilation with mild improvement in  the left basilar infiltrates, persistent trace right pleural effusion.  No pneumothorax is identified.       Additional Studies Reviewed:   Tissue pathology exam September 27, 2024-pending      Impression:   1.  Metastatic carcinoma-lymph node removal right neck September 27-results pending  2.  Positive blood cultures for Enterococcus-most likely a contaminant.  If pathogen was likely transient.  Unasyn which she is receiving for the possibility of aspiration pneumonia has good activity against Enterococcus.  She is not having fever and her white blood cell count has normalized.  3.  Probable aspiration pneumonia under treatment    Recommendations:   Continue Unasyn  No change in antibiotic treatment at present  Await pathology report from lymph node removal  Continue to encourage increased activity, incentive spirometry, up to chair etc.    Chan Martinez MD

## 2024-10-03 DIAGNOSIS — C79.9 METASTATIC ADENOCARCINOMA (HCC): Primary | ICD-10-CM

## 2024-10-03 LAB
ANION GAP SERPL CALCULATED.3IONS-SCNC: 8 MMOL/L (ref 5–15)
BUN SERPL-MCNC: 5 MG/DL (ref 8–23)
BUN/CREAT SERPL: 26.3 (ref 7–25)
CALCIUM SPEC-SCNC: 8.3 MG/DL (ref 8.6–10.5)
CHLORIDE SERPL-SCNC: 100 MMOL/L (ref 98–107)
CO2 SERPL-SCNC: 31 MMOL/L (ref 22–29)
CREAT SERPL-MCNC: 0.19 MG/DL (ref 0.57–1)
CYTO UR: NORMAL
EGFRCR SERPLBLD CKD-EPI 2021: 132.4 ML/MIN/1.73
GLUCOSE SERPL-MCNC: 101 MG/DL (ref 65–99)
LAB AP CASE REPORT: NORMAL
LAB AP SPECIAL STAINS: NORMAL
Lab: NORMAL
PATH REPORT.FINAL DX SPEC: NORMAL
PATH REPORT.GROSS SPEC: NORMAL
POTASSIUM SERPL-SCNC: 3.2 MMOL/L (ref 3.5–5.2)
POTASSIUM SERPL-SCNC: 4.2 MMOL/L (ref 3.5–5.2)
SODIUM SERPL-SCNC: 139 MMOL/L (ref 136–145)

## 2024-10-03 PROCEDURE — 94664 DEMO&/EVAL PT USE INHALER: CPT

## 2024-10-03 PROCEDURE — 94799 UNLISTED PULMONARY SVC/PX: CPT

## 2024-10-03 PROCEDURE — 94761 N-INVAS EAR/PLS OXIMETRY MLT: CPT

## 2024-10-03 PROCEDURE — 84132 ASSAY OF SERUM POTASSIUM: CPT | Performed by: FAMILY MEDICINE

## 2024-10-03 PROCEDURE — 25010000002 HYDROMORPHONE PER 4 MG: Performed by: FAMILY MEDICINE

## 2024-10-03 PROCEDURE — 80048 BASIC METABOLIC PNL TOTAL CA: CPT | Performed by: FAMILY MEDICINE

## 2024-10-03 PROCEDURE — 25010000002 AMPICILLIN-SULBACTAM PER 1.5 G: Performed by: OTOLARYNGOLOGY

## 2024-10-03 RX ORDER — POTASSIUM CHLORIDE 1500 MG/1
40 TABLET, EXTENDED RELEASE ORAL EVERY 4 HOURS
Status: COMPLETED | OUTPATIENT
Start: 2024-10-03 | End: 2024-10-03

## 2024-10-03 RX ORDER — DILTIAZEM HYDROCHLORIDE 30 MG/1
30 TABLET, FILM COATED ORAL EVERY 8 HOURS SCHEDULED
Status: DISCONTINUED | OUTPATIENT
Start: 2024-10-03 | End: 2024-10-06

## 2024-10-03 RX ADMIN — FAMOTIDINE 20 MG: 20 TABLET, FILM COATED ORAL at 17:29

## 2024-10-03 RX ADMIN — GABAPENTIN 800 MG: 400 CAPSULE ORAL at 05:20

## 2024-10-03 RX ADMIN — Medication 10 ML: at 09:29

## 2024-10-03 RX ADMIN — MUPIROCIN 1 APPLICATION: 20 OINTMENT TOPICAL at 22:46

## 2024-10-03 RX ADMIN — IPRATROPIUM BROMIDE AND ALBUTEROL SULFATE 3 ML: 2.5; .5 SOLUTION RESPIRATORY (INHALATION) at 06:07

## 2024-10-03 RX ADMIN — DILTIAZEM HYDROCHLORIDE 30 MG: 30 TABLET, FILM COATED ORAL at 14:35

## 2024-10-03 RX ADMIN — IPRATROPIUM BROMIDE AND ALBUTEROL SULFATE 3 ML: 2.5; .5 SOLUTION RESPIRATORY (INHALATION) at 14:24

## 2024-10-03 RX ADMIN — AMPICILLIN SODIUM, SULBACTAM SODIUM 3 G: 2; 1 INJECTION, POWDER, FOR SOLUTION INTRAMUSCULAR; INTRAVENOUS at 11:28

## 2024-10-03 RX ADMIN — GABAPENTIN 800 MG: 400 CAPSULE ORAL at 22:44

## 2024-10-03 RX ADMIN — POTASSIUM CHLORIDE 40 MEQ: 1500 TABLET, EXTENDED RELEASE ORAL at 11:27

## 2024-10-03 RX ADMIN — Medication 400 MG: at 09:28

## 2024-10-03 RX ADMIN — MUPIROCIN 1 APPLICATION: 20 OINTMENT TOPICAL at 09:28

## 2024-10-03 RX ADMIN — OXYCODONE HYDROCHLORIDE 15 MG: 5 TABLET ORAL at 00:02

## 2024-10-03 RX ADMIN — GABAPENTIN 800 MG: 400 CAPSULE ORAL at 18:34

## 2024-10-03 RX ADMIN — Medication 10 ML: at 22:45

## 2024-10-03 RX ADMIN — ESCITALOPRAM OXALATE 20 MG: 10 TABLET ORAL at 09:27

## 2024-10-03 RX ADMIN — HYDROMORPHONE HYDROCHLORIDE 0.5 MG: 1 INJECTION, SOLUTION INTRAMUSCULAR; INTRAVENOUS; SUBCUTANEOUS at 17:14

## 2024-10-03 RX ADMIN — OXYCODONE HYDROCHLORIDE 15 MG: 5 TABLET ORAL at 18:34

## 2024-10-03 RX ADMIN — GABAPENTIN 800 MG: 400 CAPSULE ORAL at 11:27

## 2024-10-03 RX ADMIN — OXYCODONE HYDROCHLORIDE 15 MG: 5 TABLET ORAL at 05:20

## 2024-10-03 RX ADMIN — AMPICILLIN SODIUM, SULBACTAM SODIUM 3 G: 2; 1 INJECTION, POWDER, FOR SOLUTION INTRAMUSCULAR; INTRAVENOUS at 22:45

## 2024-10-03 RX ADMIN — IPRATROPIUM BROMIDE AND ALBUTEROL SULFATE 3 ML: 2.5; .5 SOLUTION RESPIRATORY (INHALATION) at 10:21

## 2024-10-03 RX ADMIN — OXYCODONE HYDROCHLORIDE 15 MG: 5 TABLET ORAL at 11:27

## 2024-10-03 RX ADMIN — GUAIFENESIN 1200 MG: 600 TABLET, EXTENDED RELEASE ORAL at 09:28

## 2024-10-03 RX ADMIN — FAMOTIDINE 20 MG: 20 TABLET, FILM COATED ORAL at 09:28

## 2024-10-03 RX ADMIN — OXYCODONE HYDROCHLORIDE 15 MG: 5 TABLET ORAL at 22:51

## 2024-10-03 RX ADMIN — POTASSIUM CHLORIDE, DEXTROSE MONOHYDRATE AND SODIUM CHLORIDE 40 ML/HR: 150; 5; 900 INJECTION, SOLUTION INTRAVENOUS at 05:25

## 2024-10-03 RX ADMIN — AMPICILLIN SODIUM, SULBACTAM SODIUM 3 G: 2; 1 INJECTION, POWDER, FOR SOLUTION INTRAMUSCULAR; INTRAVENOUS at 05:21

## 2024-10-03 RX ADMIN — HYDROMORPHONE HYDROCHLORIDE 0.5 MG: 1 INJECTION, SOLUTION INTRAMUSCULAR; INTRAVENOUS; SUBCUTANEOUS at 09:42

## 2024-10-03 RX ADMIN — DILTIAZEM HYDROCHLORIDE 30 MG: 30 TABLET, FILM COATED ORAL at 22:45

## 2024-10-03 RX ADMIN — GUAIFENESIN 1200 MG: 600 TABLET, EXTENDED RELEASE ORAL at 22:45

## 2024-10-03 RX ADMIN — POTASSIUM CHLORIDE 40 MEQ: 1500 TABLET, EXTENDED RELEASE ORAL at 17:14

## 2024-10-03 RX ADMIN — IPRATROPIUM BROMIDE AND ALBUTEROL SULFATE 3 ML: 2.5; .5 SOLUTION RESPIRATORY (INHALATION) at 18:14

## 2024-10-03 RX ADMIN — AMPICILLIN SODIUM, SULBACTAM SODIUM 3 G: 2; 1 INJECTION, POWDER, FOR SOLUTION INTRAMUSCULAR; INTRAVENOUS at 17:14

## 2024-10-03 NOTE — PLAN OF CARE
Goal Outcome Evaluation:              Outcome Evaluation: A&O x4. NC 3l. voiding per female purewick. prn meds given for pain. congested, coughing up secretions.  Yanker suctioning at bedside.

## 2024-10-03 NOTE — PROGRESS NOTES
[facility] Palliative Care Services    Palliative Care Daily Progress Note   Chief complaint-chart review    Code Status:   Code Status and Medical Interventions: CPR (Attempt to Resuscitate); Full Support   Ordered at: 09/24/24 2113     Level Of Support Discussed With:    Patient     Code Status (Patient has no pulse and is not breathing):    CPR (Attempt to Resuscitate)     Medical Interventions (Patient has pulse or is breathing):    Full Support      Advanced Directives: Advance Directive Status: Patient does not have advance directive   Goals of Care: Ongoing.     S: Medical record reviewed. Events noted.  Underwent excisional biopsy 9/27, pathology pending.  Per oncology given aspiration issues may be in best interest to hold off on systemic therapy for few weeks, plan to follow-up with Dr. Khan 10/14. Infectious disease consulted due to Enterococcus Hirai bacteremia, continue Unasyn for aspiration coverage, last dose 10/4.  Received 68 mg (65 mg 10/2, 163 mg 10/1,108 mg 9/30) oral morphine equivalent in the form of Dilaudid IV and oxycodone over the last 24 hours.  Schedule Cardizem has been started today. No new labs to review.     Pain Assessment  Preferred Pain Scale: number (Numeric Rating Pain Scale)  Nonverbal Indicators of Pain: nonverbal indicators absent  CPOT Facial Expression: 0-->relaxed, neutral  CPOT Body Movements: 0-->absence of movements  CPOT Muscle Tension: 0-->relaxed  Ventilator Compliance/Vocalization: 0-->talking in normal tone or no sound  CPOT Score: 0  Pain Location: back, neck  Pain Description: constant, aching, sore    O:     Intake/Output Summary (Last 24 hours) at 10/3/2024 0933  Last data filed at 10/3/2024 0900  Gross per 24 hour   Intake 2480.01 ml   Output 800 ml   Net 1680.01 ml       Diagnostics and current medications: Reviewed.      Current Facility-Administered Medications:     albuterol (PROVENTIL) nebulizer solution 0.083% 2.5 mg/3mL, 2.5  mg, Nebulization, Q4H PRN, Marek Roberts MD    ampicillin-sulbactam (UNASYN) 3 g in sodium chloride 0.9 % 100 mL MBP, 3 g, Intravenous, Q6H, Marek Roberts MD, 3 g at 10/03/24 0521    sennosides-docusate (PERICOLACE) 8.6-50 MG per tablet 2 tablet, 2 tablet, Oral, BID PRN **AND** polyethylene glycol (MIRALAX) packet 17 g, 17 g, Oral, Daily PRN **AND** bisacodyl (DULCOLAX) EC tablet 5 mg, 5 mg, Oral, Daily PRN **AND** bisacodyl (DULCOLAX) suppository 10 mg, 10 mg, Rectal, Daily PRN, Marek Roberts MD    calcium carbonate (TUMS) chewable tablet 500 mg (200 mg elemental), 2 tablet, Oral, TID PRN, Jonathon Chew MD, 2 tablet at 10/01/24 0144    dextrose 5 % and sodium chloride 0.9 % with KCl 20 mEq/L infusion, 40 mL/hr, Intravenous, Continuous, Flynn Quinones MD, Last Rate: 40 mL/hr at 10/03/24 0525, 40 mL/hr at 10/03/24 0525    dilTIAZem (CARDIZEM) tablet 30 mg, 30 mg, Oral, Q8H, Flynn Quinones MD    escitalopram (LEXAPRO) tablet 20 mg, 20 mg, Oral, Daily, Nika Castaneda APRN, 20 mg at 10/03/24 0927    famotidine (PEPCID) tablet 20 mg, 20 mg, Oral, BID AC, Telly Villela MD, 20 mg at 10/03/24 0928    gabapentin (NEURONTIN) capsule 800 mg, 800 mg, Oral, Q6H, Telly Villela MD, 800 mg at 10/03/24 0520    guaiFENesin (MUCINEX) 12 hr tablet 1,200 mg, 1,200 mg, Oral, Q12H, Telly Villela MD, 1,200 mg at 10/03/24 0928    HYDROmorphone (DILAUDID) injection 0.5 mg, 0.5 mg, Intravenous, Q4H PRN, Flynn Quinones MD, 0.5 mg at 10/02/24 2148    ipratropium-albuterol (DUO-NEB) nebulizer solution 3 mL, 3 mL, Nebulization, 4x Daily - RT, Marek Roberts MD, 3 mL at 10/03/24 0607    magnesium oxide (MAG-OX) tablet 400 mg, 400 mg, Oral, Daily, Telly Villela MD, 400 mg at 10/03/24 0928    mupirocin (BACTROBAN) 2 % ointment 1 Application, 1 Application, Topical, Q12H, Marek Roberts MD, 1 Application at 10/03/24 0928    ondansetron (ZOFRAN)  "injection 4 mg, 4 mg, Intravenous, Q6H PRN, Marek Roberts MD    oxyCODONE (ROXICODONE) immediate release tablet 15 mg, 15 mg, Oral, Q4H PRN, Flynn Quinones MD, 15 mg at 10/03/24 0520    Phosphorus Replacement - Follow Nurse / BPA Driven Protocol, , Does not apply, Stacie LUIS Mariano Ariel, MD    Potassium Replacement - Follow Nurse / BPA Driven Protocol, , Does not apply, Stacie LUIS Mariano Ariel, MD    [COMPLETED] Insert Peripheral IV, , , Once **AND** sodium chloride 0.9 % flush 10 mL, 10 mL, Intravenous, PRN, Marek Roberts MD    sodium chloride 0.9 % flush 10 mL, 10 mL, Intravenous, Q12H, Marek Roberts MD, 10 mL at 10/03/24 0929    sodium chloride 0.9 % flush 10 mL, 10 mL, Intravenous, PRN, Marek Roberts MD    sodium chloride 0.9 % infusion 40 mL, 40 mL, Intravenous, PRN, Marek Roberts MD    Allergies   Allergen Reactions    Aspirin Nausea And Vomiting     Unknown reaction    Keflex [Cephalexin] Other (See Comments)     Blisters to nose and mouth    Nitrofurantoin GI Intolerance and Nausea And Vomiting    Creatine Monohydrate Unknown - Low Severity     I have utilized all available immediate resources to obtain, update, or review the patient's current medications (including all prescriptions, over-the-counter products, herbals, cannabis/cannabidiol products, and vitamin/mineral/dietary (nutritional) supplements) for name, route of administration, type, dose and frequency.    A:    /87 (BP Location: Right arm, Patient Position: Lying)   Pulse 101   Temp 97.8 °F (36.6 °C) (Oral)   Resp 18   Ht 154.9 cm (60.98\")   Wt 48 kg (105 lb 13.1 oz)   SpO2 93%   BMI 20.01 kg/m²     Patient status: Disease state: Controlled with current treatments.  Functional status: Palliative Performance Scale Score: Performance 40% based on the following measures: Ambulation: Mainly in bed, Activity and Evidence of Disease: Unable to do any work, extensive evidence of " disease, Self-Care: Mainly assistance required,  Intake: Normal or reduced, LOC: Full, drowsy or confusion   ECOG Status(4) Completely disabled, unable to carry out self-care.  Totally confined to bed or chair.  Nutritional status:  Albumin 2.6 . Body mass index is 20.01 kg/m².  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 plan: Goals of care include to be determined.     Family support: The patient receives support from her daughter..  Advance Directives: Advance Directive Status: Patient does not have advance directive   POA/Healthcare surrogate-Anay barber-next of kin.     2.  Palliative care encounter  - Prognosis is poor long-term secondary to stage IV adenocarcinoma, respiratory failure, oxygen dependency, unintended weight loss, and multiple comorbidities.     -followed by Dr. Khan has received 4 courses of carboplatin, Gemzar obtaining remission x 14 years, now with progression of disease.      9/9- FNA left neck consistent with metastatic carcinoma   9/20-rechallenged with systemic therapy carboplatin and Gemzar      -She was seen by Dr. Roberts who recommended  ED evaluation leading to current hospitalization due to difficulty swallowing, getting choked on her food, pronounced weight loss, and dehydration with plans for excisional biopsy 9/27.       -Started on IV antibiotics, liquid diet, supplemental oxygen, DuoNebs.  -Evaluated by speech therapy who completed a video swallow study with findings of profound laryngeal penetration and aspiration, started on modified diet with honey thick liquid, purée consistency food on 9/25.    9/27-ENT plan for cervical lymph node biopsy/excision as previously planned.  -Evaluated by therapy who recommends home with assist and home health.   9/28-Per oncology given aspiration issues may be in best interest to hold off on systemic therapy for few weeks, appointment set 10/14 at 11 AM with Dr. Khan  -Infectious disease consulted     9/27-Currently off unit for planned procedure.       9/30-clarified CODE STATUS.  Encouraged ongoing conversation with regard to medical priorities.  Will provide a copy of MOST decision aid.  -remains hopeful for continued efforts at systemic therapy and we discussed inherent risks to include but not limited to further decline in quality of life, increased hospitalizations and overall progressive decline despite aggressive care interventions.  -Recommended completion of living will document, a copy provided. States she would wish for her daughter to be primary healthcare surrogate.    -Anticipating home discharge as prior with her daughter as caregiver.  She is not open to SNF placement at this juncture.    10/3-continue supportive measures  -reviewed MOST documentation and living will with patient and daughter 10/2. Encouraged ongoing discussion, aware can assist in completion of documentation if so desired.  -plan follow up 10/4    3.  Pain  -Roxicodone and Neurontin medications   -Implantable pain pump  -Dilaudid IV as needed    4.  Depression/anxiety  -Lexapro home medication restarted 9/30      Thank  you for allowing us to participate in patient's plan of care. Palliative Care Team will continue to follow patient.     Nika Castaneda, ELISA  10/3/2024  09:33 CDT

## 2024-10-03 NOTE — PROGRESS NOTES
Her          Progress Note    Patient name: Arminda Jiménez  Patient : 1960  VISIT # [id number]  MR #[id number]  Room:     Subjective   Resting comfortably.  In no acute distress.  No new voiced complaints.  Still with deep cough and upper airway secretions, having difficulty mobilizing secretions.      CBC 10/1/2024 has a WBC of 9.4, hemoglobin 8.7 and a platelet count of 80,000    POD #6 excisional biopsy, right neck 2024 by Dr. Marek Roberts  Pathology still pending  Molecular studies were requested of pathology 10/2/2024, to be sent off.     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  Dorcas was found to have stage IV adenocarcinoma of unknown primary in .  She was treated successfully with 4 courses of carboplatin Gemzar obtaining a complete remission that was sustained for 14 years.  Dorcas experienced a heel fracture 2 years ago.  After that event she has had chronic abdominal pain and has lost 50 pounds.  25 of the 50 pounds were lost over the course of the previous month.  Workup has documented widespread metastatic disease with mediastinal, retroperitoneal, gastrohepatic as well as left neck adenopathy.  An FNA of the left neck performed on 2024 reported malignant cells present consistent with metastatic carcinoma.  Further tissue was unavailable for molecular studies and further special stains.   Due to delays in further workup, Dr. Khan felt the patient needed to get on with his treatment and rechallenged her with carboplatin and Gemzar cycle #1 on 2024.   She has been referred to Dr. Marek Roberts with ENT at [facility] to obtain an excisional biopsy to be able to get tissue for molecular studies that will include tissue of origin, NGS, IHC studies etc.   Dorcas was seen by Dr. Marek Roberts who recommended that she be evaluated in the ED at [facility] for admission due to  difficulty swallowing getting choked on her food, pronounced weight loss and dehydration anticipating an open excisional biopsy on Friday, 9/27/2024.  Medical oncology consultation requested in continuity of care.           Detailed history copied from Dr. Khan's office note 9/20/2024     The patient is a pleasant 64 years old female who has a history of stage IV carcinoma of unknown primary site diagnosed in 2010 status post 4 cycles of carboplatin gemcitabine with complete response.  She has been in remission for 14 years.  More recently, she was found to have widespread metastatic disease with mediastinal adenopathy, retroperitoneal adenopathy, gastrohepatic mass and neck adenopathy.  She underwent ultrasound-guided FNA biopsy.  This result only in a scant amount of tissue for analysis.  She was then sent to ENT at [facility] for excisional biopsy.  Unfortunate, the patient missed appointment.  The patient was then sent to ENT at [facility].  ENT at [facility] referred the patient to [place], ENT for excisional biopsy.     Diagnosis  History of carcinoma no primary site, 2010  Liver lesions  Gastrohepatic space mass  Mediastinal adenopathy consistent cycles normal  Metastatic carcinoma, left neck lymph node, Sept 2024     Disease target  Mediastinal adenopathy  Hepatic lobe lesions  Pancreatic body/tail hypodense mass in the gastrohepatic ligament     Treatment summary  9/20/24 Initiate Carbo AUC 4 D1, Gemzar 800mg D1, D8 every 21 days. Treatment consent signed.          Cancer history  Arminda Jiménez was first seen by me on 9/5/2024.  I was consulted for findings of a gastric mass during patient was positioned with hospital.  The patient has a history of lung cancer, COPD.  She was a direct admit from her PCP office.  Patient has a 20 pounds weight loss.  She also had complained of dysphagia, nausea, dizziness decreased appetite.  The patient reports that she has a history of stage IV lung cancer  diagnosed 10 years ago at [place].  She received chemotherapy and stay in remission.  She tells me that she did not have a biopsy performed at that time.  Apparently, review of medical record showed that she had a carcinoma of unknown primary site diagnosed October 2010.  She received 2 cycles of carboplatin and paclitaxel.  She had a reaction to Taxol during cycle #2.  She was switched to carboplatin gemcitabine, completed March 2019.  Sites of disease in the past involve the pericardial, pleural, mediastinal, and cervical involvement.   7/30/24 CT chest: Extensive necrotic mediastinal and bilateral hilar lymphadenopathy consistent with metastatic disease. Lymph node in the right right paratracheal lymph node measures 1 cm short axis axial image 34 at the thoracic inlet.  AP window lymph node measures 1.2 cm short axis on axial image 71 with additional 1 cm AP window lymph node axial image 76.  Precarinal lymph node measures 1.1 cm on axial image 85.  Partially necrotic anterior mediastinal lymph node measures 1.4 cm short axis image 88 with similar appearing anterior mediastinal lymph node measuring 1.3 cm short axis image 69.  A partially necrotic subcarinal lymph node measures 1.8 cm short axis on axial image 96.  Right hilar lymph node measures 1.1 cm short axis image 95.  Partially necrotic and calcified left hilar lymph node measures 1.4 cm short axis image 118.  Left hilar lymph node measures 1.1 cm short axis on axial image 96. These lymph nodes are new since 2022. There is a 2.2 x 1.5 cm nodule abutting the left heart border axial image 140, which is either within the pericardial region were within the left lung parenchyma.  There is a spiculated 0.8 x 0.5 x 0.8 cm left upper lobe nodule axial image 89 and coronal  image 31 which is also new.  Probable focal bronchiectasis left upper lobe on axial image 76, less likely cavitary nodule.  0.3 cm left lower lobe nodule image 151 which may be endobronchial.   Question a 0.6 meters left lower lobe nodule along the diaphragm axial image 158.  Subpleural 0.4 cm right middle lobe nodule image 165.  Bilateral bronchial thickening some areas of subsegmental atelectasis noted in both lobes.  Moderate emphysema.  There is no pleural effusion or pneumothorax. Centrally low density gastrohepatic ligament mass measures 3.3 x 3.2 cm axial image 193 with some adjacent wall thickening suggested in the proximal stomach.   7/30/24 CT abdomen/pelvis: Marked gastric wall thickening proximal stomach and gastric body; neoplasm is suspected, Correlation with symptomatology and EGD advised, Cystic structure in the gastrohepatic ligament measuring up to 4.9 cm, may represent a necrotic lymph node or mass less likely pancreatic suggestive. Correlation with PET scan and/or MRI advised.    08/12/24 CT soft tissue neck:  Mild asymmetry of the vocal cords, secondary to the vocal cord paralysis.  No visible nodule or mass of the larynx, Mild necrotic adenopathy of the right neck, consistent with metastatic disease, Since the prior CT chest from 07/30/2024, grossly stable right paratracheal necrotic node at level of the thoracic inlet, also consistent with metastatic disease.   9/5/2024-upper EGD, GI-gastric bezoar to explain the CT scan of the abdomen findings.  No gastric tumor.  No active ulcer disease to explain patient's nausea vomiting.  Probable gastroparesis.  9/5/2024-CT chest, [facility]:  There is a hypodense nodule in the left thyroid lobe measuring 1.2 cm.  A right tracheoesophageal lymph node is 1.9 cm in short axis on axial image number 16 with interval increase size from 1.2 cm in short axis when remeasured.  A right hilar lymph node is 1.1 cm in short axis without change.  A subcarinal lymph node is partially calcified and measures 2.5 cm in short axis without change.  This has low attenuation.  An additional right hilar lymph node is 1.5 cm in short axis and is probably unchanged.  A left  hilar lymph node is 1 cm in short axis without change.  An AP window lymph node is at least 2.3 cm and has increased in size.  Anterior mediastinal lymph node is now 1.6 cm in short axis, previously 1.3 cm.  A solid nodule in the lingula abuts the free wall of the left ventricle and measures 2.9 x 1.8 cm, previously 2.4 x 1.6 cm when remeasured. There is a small cavitary nodule in the left upper lobe measuring a cavitary nodule in the left upper lobe on series 604 image number 23  is unchanged measuring 0.6 cm. A solid spiculated nodule in the left upper lobe is probably overall unchanged measuring 0.8 cm x 0.7 cm on image number 26.  On there is a new solid juxta fissural nodule in the right middle lobe measuring 0.4 cm on image number 34.  There is an enlarging juxta fissural nodule in the right middle lobe measuring 0.3 cm image number 35.  The small to moderate pleural effusion layers dependently.  No left pleural fluid.  Bibasilar infiltrates are suggested. There is a large hypodense mass arising from the pancreatic body/tail extending to the gastrohepatic ligament measuring up to 5.4 x 4.1 cm.  This has increased in size.  There are multiple new hypodense hepatic lesions.    9/6/2024-CT thoracic and lumbar spine, [facility]: Multiple chronic thoracic compression fractures, diffuse degenerative disc disease.  Postop changes of the lumbar spine.  Loosening of the right L3 transpedicular screw.  Remaining hardware appears well-positioned.  Rim-enhancing complex cystic lesion in the gastrohepatic space.  9/9/2024-FNA biopsy by radiology: Consistent metastatic carcinoma.  However, further characterization of tumor could not be performed due to scant material.  9/10/2024-CT abdomen pelvis with contrast, [facility]: Interval development of mild to moderate dependent atelectasis of both lower  lobes and mild patchy ground-glass and consolidation of the lingula and to a lesser extent the right middle lobe.  3.1 cm left epicardial  nodule, increased in size from 2.5 cm.  Interval development of multiple ill-defined hypodense liver masses.  The largest, of the left lobe near the IVC measures 3.3 cm.  Stable mild prominence of the intrahepatic biliary tree and mild distension of the common duct, measuring up to 10 mm in diameter.  No visible obstructing stone or mass.  Cholecystectomy, again noted.  Interval increase in size of the previously seen mass between the liver left lobe, stomach, aorta, and superior aspect of the pancreatic body.  Measures 6.0 x 4.5 cm cross section, increased from 3.5 x 3.3 cm.  Inferiorly, the mass abuts and blends in per separately of the pancreas.  Calcified granulomas of the spleen, again noted.  The adrenal glands have a normal appearance.  The kidneys enhance symmetrically.  0.9 cm cyst of the left kidney, again noted.  No ureteral stones or hydronephrosis.  The uterus and ovaries have a normal appearance.  No evidence of bowel obstruction.  Moderate amount of stool in the colon, similar to before.  Small fat and fluid containing right inguinal hernia, stable.  No free air.  Interval development of trace fluid in the pelvis.  Electronic device in subcutaneous fat of the right flank, with catheter entering the spinal canal, again noted.  Bone window images show no significant lytic or sclerotic bone lesions.  Surgical fixation of the lumbar spine, again noted.  Chronic mild compression fracture of the L1 superior endplate, stable.  Impression:  Interval increase in size of the mass of the epigastric region, most consistent with a malignant process, potentially metastatic adenopathy or a pancreatic mass.  Interval development of multiple hepatic metastases. 3.  3.1 cm left epicardial nodule, increased in size, suspicious for metastatic adenopathy or metastatic pleural disease.   Interval development of small to moderate dependent right pleural effusion, with atelectasis versus pneumonia of both lower lobes.    9/20/24 Initiate palliative chemotherapy Carbo AUC 4 D1, Gemzar 800mg D1, D8 every 21 days. Treatment consent signed.    9/20/2024-I have called [facility] and refer the patient back.  She will be seen early next week.        REVIEW OF SYSTEMS:   Constitutional: Pronounced weight loss fatigue failure to thrive  HEENT:  dysphagia and hoarseness               Lungs: no cough, no shortness of breath, no wheeze  CVS: no palpitation, no chest pain, no shortness of breath  GI: no abdominal pain, no nausea , no vomiting, no constipation  MERCEDES: no dysuria, frequency and urgency, no hematuria, no kidney stones  Musculoskeletal: no joint pain, swelling , stiffness  Endocrine: no polyuria, polydipsia, no cold or heat intolerance  Hematology: no anemia, no easy bruising or bleeding, no history of clotting disorder  Dermatology: no skin rash, no eczema, no pruritus  Psychiatry: no depression, no anxiety,no panic attacks, no suicide ideation  Neurology: no syncope, no seizures, no numbness or tingling of hands, no numbness or tingling of feet, no paresis  Objective     Vital Signs  Temp:  [98.1 °F (36.7 °C)-98.5 °F (36.9 °C)] 98.5 °F (36.9 °C)  Heart Rate:  [] 98  Resp:  [18-24] 18  BP: (135-170)/(53-85) 170/77    Intake/Output Summary (Last 24 hours) at 10/3/2024 0707  Last data filed at 10/3/2024 0617  Gross per 24 hour   Intake --   Output 800 ml   Net -800 ml       PHYSICAL EXAM:  CONSTITUTIONAL: Alert, appropriate, no acute distress, very thin, cachectic  EYES: Anicteric, EOM intact, pupils equal round   ENT: Mucous membranes moist, no oropharyngeal lesions   NECK: Supple, Cervical lymphadenopathy on the right   CHEST/LUNGS: CTA bilaterally, normal respiratory effort   CARDIOVASCULAR: RRR, no murmurs  ABDOMEN: soft nontender, active bowel sounds, no HSM  EXTREMITIES: warm, moves all fours  SKIN: warm, dry with no rashes or lesions  LYMPH: No cervical, clavicular, axillary, or inguinal lymphadenopathy  NEUROLOGIC: follows  commands, nonfocal   PSYCH: mood and affect appropriate      CBC  Results from last 7 days   Lab Units 10/01/24  0519 09/30/24  0759 09/29/24  1432 09/28/24  1246 09/27/24  0606   WBC 10*3/mm3 9.40 12.96* 15.60*   < > 12.27*   HEMOGLOBIN g/dL 8.7* 9.2* 9.5*   < > 11.0*   HEMATOCRIT % 28.1* 28.0* 28.4*   < > 34.2   PLATELETS 10*3/mm3 80* 45* 48*   < > 91*   LYMPHOCYTE % %  --   --   --   --  6.1*   MONOCYTES % %  --   --   --   --  1.0*    < > = values in this interval not displayed.       CMP  Lab Results   Component Value Date    GLUCOSE 102 (H) 10/01/2024    BUN 6 (L) 10/01/2024    CREATININE <0.17 (L) 10/01/2024    EGFRIFNONA 114 01/07/2019    BCR  10/01/2024      Comment:      Unable to calculate Bun/Crea Ratio.    CO2 30.0 (H) 10/01/2024    CALCIUM 8.1 (L) 10/01/2024    ALBUMIN 2.7 (L) 10/01/2024    AST 68 (H) 10/01/2024    ALT 36 (H) 10/01/2024             Blood Culture   Date Value Ref Range Status   09/24/2024 Abnormal Stain (C)  Preliminary   09/24/2024 No growth at 24 hours  Preliminary       Imaging Results (Last 72 Hours)       Procedure Component Value Units Date/Time    XR Chest 1 View [[id number]] Collected: 10/01/24 0714     Updated: 10/01/24 0720    Narrative:      EXAMINATION: XR CHEST 1 VW- 10/1/2024 7:14 AM     HISTORY: follow-up infiltrates.     REPORT: A frontal view of the chest was obtained.     COMPARISON: Chest x-ray 9/28/2024.     The lungs remain hypoaerated, with bibasilar airspace opacities there is  mild improvement in the left basilar infiltrate, no lung consolidation  is identified. Heart size is normal. No pneumothorax is identified,  there appears to be trace right pleural effusion as before. No acute  osseous abnormality.       Impression:      Continued pulmonary hypoventilation with mild improvement in  the left basilar infiltrates, persistent trace right pleural effusion.  No pneumothorax is identified.     This report was signed and finalized on 10/1/2024 7:17 AM by   Lizandro Smith MD.                 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  Dorcas was found to have stage IV adenocarcinoma of unknown primary in 2010.  She was treated successfully with 4 courses of carboplatin Gemzar obtaining a complete remission that was sustained for 14 years.  Dorcas experienced a heel fracture 2 years ago.  After that event she has had chronic abdominal pain and has lost 50 pounds.  25 of the 50 pounds were lost over the course of the previous month.  Workup has documented widespread metastatic disease with mediastinal, retroperitoneal, gastrohepatic as well as left neck adenopathy.  An FNA of the left neck performed on 9/9/2024 reported malignant cells present consistent with metastatic carcinoma.  Further tissue was unavailable for molecular studies and further special stains.   Due to delays in further workup, Dr. Khan felt the patient needed to get on with his treatment and rechallenged her with carboplatin and Gemzar cycle #1 on 9/20/2024.   She has been referred to Dr. Marek Roberts with ENT at [facility] to obtain an excisional biopsy to be able to get tissue for molecular studies that will include tissue of origin, NGS, IHC studies etc.   Dorcas was seen by Dr. Marek Roberts who recommended that she be evaluated in the ED at [facility] for admission due to difficulty swallowing getting choked on her food, pronounced weight loss and dehydration anticipating an open excisional biopsy on Friday, 9/27/2024.      Discussed at  length with Dr. Kentrell Villela, 9/27/2024, and her nurse Sheri DUGAN today.    CBC  9/28/2024 has a WBC of 13.75 with a hemoglobin of 9.6 and a platelet count of 59,000    POD #6 excisional biopsy, right neck 9/27/2024 by Dr. Marek Roberts  Pathology still pending  Molecular studies were requested of pathology 10/2/2024, to be sent off.     Given the aspiration issues being addressed, the positive blood culture under treatment with IV antibiotics, it is in her best interest to hold off on Gemzar and resume cycle #2 of chemotherapy on 10/14/2024 at 11 AM with Dr. Khan.  Discussed with Dr. Khan.     #2  Dysphagia and aspiration    Patient states that she coughs every time she tries to eat something and swallow  Swallow evaluation 9/25/2024-documented laryngeal penetration and aspiration during swallow.  SLP Findings: Patient presents with severe pharyngeal dysphagia.   Recommendations: Diet Textures: honey thick liquid, puree consistency food. Medications should be taken whole with puree. May have Ice between meals after oral care, under staff or family supervision and with the recommended strategies for safe swallowing.  Recommended Strategies: Upright for PO, small bites and sips, and alternate liquids and solids. Oral care before breakfast, after all meals and PRN.  Dysphagia therapy is recommended.      Discussed at length with Dr. Kentrell Villela, 9/27/2024, and her nurse Sheri DUGAN today.    Discussed at length with her nurse this morning, encouraging attempts at further suctioning to mobilize secretions.  Patient just does not have strength for cough to get her secretions up.    #3  Aspiration pneumonia    Chest x-ray, 1 view 9/24/2024 reported the following  Emphysematous changes of the lungs.   Patchy bibasilar infiltrates are present.    Blood culture 9/24/2024 reported the following:  Enterococcus hirae Critical      Discussed with Dr. Kentrell Villela, 9/27/2024  ID consult notes reviewed  She is on Unasyn      #4  Pain secondary to #1    Dilaudid 0.5 mg IV every 4 Hrs PRN  Norco 5 mg PO Q 4 Hrs PRN          Bartolome Zambrano MD  10/03/24  07:07 CDT

## 2024-10-03 NOTE — PLAN OF CARE
Goal Outcome Evaluation:  Plan of Care Reviewed With: patient        Progress: improving  Outcome Evaluation: Pt reports her throat is huring today. Soft to chew diet,chopped meats,honey thick liquids. Magic cup TID. Yogurt TID. Oral intake 0-25% of two available meals documented over the past two days. Encouraged oral intake. Pt reports she has not yet tried the magic cup. Will follow for plan of care.

## 2024-10-03 NOTE — PROGRESS NOTES
[facility] Medicine Services  INPATIENT PROGRESS NOTE    Patient Name: Arminda Jiménez  Date of Admission: 9/24/2024  Today's Date: 10/03/24  Length of Stay: 9  Primary Care Physician: Teddy Sanchez MD    Subjective   Chief Complaint: Pain  HPI   10/1 Sitting up in bed undergoing treatment with respiratory therapist.  10/2 Patient appears less dyspneic still very weak speaking in a soft voice and having difficulty mobilizing upper airway secretions.    Today:  Continues to make slow progress.  Still very weak cough effort and voice.    Review of Systems   All pertinent negatives and positives are as above. All other systems have been reviewed and are negative unless otherwise stated.     Objective    Temp:  [97.8 °F (36.6 °C)-98.5 °F (36.9 °C)] 97.8 °F (36.6 °C)  Heart Rate:  [] 101  Resp:  [18-24] 18  BP: (148-170)/(64-87) 148/87  Physical Exam  Constitutional:       Appearance: She is ill-appearing.      Comments: Appears chronically ill, not distressed  HENT:      Head: Normocephalic.      Mouth/Throat:      Mouth: Mucous membranes are moist.   Neck:      Comments: Site of excisional biopsy right side of neck clean and dry  Cardiovascular:      Rate and Rhythm: Normal rate and regular rhythm.   Pulmonary:      Effort: Pulmonary effort is normal.      Breath sounds: Rhonchi present.      Comments: coarse bilateral BSs and upper airway crackles   Musculoskeletal:         General: No deformity.   Skin:     General: Skin is warm.      Findings: Bruising present.   Neurological:      Mental Status: She is alert.      Motor: Weakness present.   Psychiatric:         Mood and Affect: Mood normal.       Results Review:  I have reviewed the labs, radiology results, and diagnostic studies.    Laboratory Data:   Results from last 7 days   Lab Units 10/01/24  0519 09/30/24  0759 09/29/24  1432   WBC 10*3/mm3 9.40 12.96* 15.60*   HEMOGLOBIN g/dL 8.7* 9.2* 9.5*   HEMATOCRIT %  28.1* 28.0* 28.4*   PLATELETS 10*3/mm3 80* 45* 48*        Results from last 7 days   Lab Units 10/01/24  0519 09/30/24  0519 09/29/24  0443   SODIUM mmol/L 138 134* 130*   POTASSIUM mmol/L 3.2* 4.2 4.1   CHLORIDE mmol/L 100 96* 93*   CO2 mmol/L 30.0* 21.0* 25.0   BUN mg/dL 6* 6* 3*   CREATININE mg/dL <0.17* 0.20* <0.17*   CALCIUM mg/dL 8.1* 8.2* 7.9*   BILIRUBIN mg/dL 0.3  --   --    ALK PHOS U/L 297*  --   --    ALT (SGPT) U/L 36*  --   --    AST (SGOT) U/L 68*  --   --    GLUCOSE mg/dL 102* 102* 111*       Culture Data:   Blood Culture   Date Value Ref Range Status   09/24/2024 Enterococcus hirae (C)  Final     Comment:       Infectious disease consultation is highly recommended.   09/24/2024 No growth at 5 days  Final       Radiology Data:   Imaging Results (Last 24 Hours)       ** No results found for the last 24 hours. **            I have reviewed the patient's current medications.     Assessment/Plan   Assessment  Active Hospital Problems    Diagnosis     **Dysphagia     Thrombocytopenia     Metastatic carcinoma     Weight loss     Cancer associated pain     Bacteremia due to Enterococcus     Aspiration pneumonia     Chronic pain     Severe malnutrition     Lymphadenopathy     Vocal cord weakness     COPD (chronic obstructive pulmonary disease)     Adenocarcinoma of unknown origin        Treatment Plan  Follow-up pathology from excisional biopsy of right cervical lymph node:  Right neck mass:  A.  Consistent with complete replacement of a lymph node by metastatic pulmonary adenocarcinoma.  B.  Extranodal extension of tumor and perineural invasion identified.  C.  Incidental adjacent benign lymph node.  Currently on 2LNC -  recently started on 2 L by nasal cannula in the outpatient setting.  Patient has been on IV Zosyn for coverage of aspiration pneumonia in the setting of known dysphagia.  1 blood culture has returned positive for Enterococcus hirae -- likely contaminant.  ID input noted.  IV Unasyn for  suspected aspiration pneumonia.  She had 3 days of IV Zosyn and now on day 7 of IV Unasyn  Repeat CXR reviewed mild improvement in   the left basilar infiltrates   Trial of Metanebs ongoing  Added flutter valve  Chest physiotherapy  Nutrition supplements  ST following - modified diet including pureed diet with honey thick liquids  Platelet trend noted.  Patient had chemotherapy with Gemzar on 9/20/2024.  Hem/Onc following.  Repeat CBC with diff in AM to monitor counts.  Remain mindful that Abxs, including Unasyn, can also contribute to thrombocytopenia.  Currently on no Lovenox or heparin products. Dr Zambrano input noted.   12.  Palliative care following - discussed with Nika Castaneda  13.  Pain control.  oral Roxicodone; Neurontin.  She does have an implanted pain pump administering fentanyl-followed by Dr. Chahal of pain management  14.  XR of T and L spine with no acute findings  15.  PT and OT        Medical Decision Making  Number and Complexity of problems: 3 acute; moderate complexity        Conditions and Status        Condition is unchanged.     MDM Data  External documents reviewed: none  Cardiac tracing (EKG, telemetry) interpretation: no new EKGs  Radiology interpretation: as above  Labs reviewed: as above  Any tests that were considered but not ordered: none     Decision rules/scores evaluated (example [id number], Wells, etc): none     Discussed with: Nika aguila with Palliative Care     Care Planning  Shared decision making: Discussed with patient with agreement to proceed with treatment plan as outlined  Code status and discussions: Full code     Disposition  Social Determinants of Health that impact treatment or disposition: None apparent at this time  I expect the patient to be discharged to home where she lives with her daughter hopefully in 2-3 days    Electronically signed by Flynn Quinones MD, 10/03/24, 08:48 CDT.

## 2024-10-04 ENCOUNTER — TELEPHONE (OUTPATIENT)
Dept: OTHER | Age: 64
End: 2024-10-04

## 2024-10-04 LAB
ANION GAP SERPL CALCULATED.3IONS-SCNC: 10 MMOL/L (ref 5–15)
BASOPHILS # BLD MANUAL: 0.04 10*3/MM3 (ref 0–0.2)
BASOPHILS NFR BLD MANUAL: 1 % (ref 0–1.5)
BUN SERPL-MCNC: 6 MG/DL (ref 8–23)
BUN/CREAT SERPL: 24 (ref 7–25)
CALCIUM SPEC-SCNC: 8.7 MG/DL (ref 8.6–10.5)
CHLORIDE SERPL-SCNC: 99 MMOL/L (ref 98–107)
CO2 SERPL-SCNC: 30 MMOL/L (ref 22–29)
CREAT SERPL-MCNC: 0.25 MG/DL (ref 0.57–1)
DEPRECATED RDW RBC AUTO: 49.4 FL (ref 37–54)
EGFRCR SERPLBLD CKD-EPI 2021: 124 ML/MIN/1.73
EOSINOPHIL # BLD MANUAL: 0.16 10*3/MM3 (ref 0–0.4)
EOSINOPHIL NFR BLD MANUAL: 4.1 % (ref 0.3–6.2)
ERYTHROCYTE [DISTWIDTH] IN BLOOD BY AUTOMATED COUNT: 14.3 % (ref 12.3–15.4)
GIANT PLATELETS: ABNORMAL
GLUCOSE SERPL-MCNC: 94 MG/DL (ref 65–99)
HCT VFR BLD AUTO: 27.5 % (ref 34–46.6)
HGB BLD-MCNC: 8.2 G/DL (ref 12–15.9)
HYPOCHROMIA BLD QL: ABNORMAL
LYMPHOCYTES # BLD MANUAL: 0.96 10*3/MM3 (ref 0.7–3.1)
LYMPHOCYTES NFR BLD MANUAL: 20.4 % (ref 5–12)
MCH RBC QN AUTO: 29.2 PG (ref 26.6–33)
MCHC RBC AUTO-ENTMCNC: 29.8 G/DL (ref 31.5–35.7)
MCV RBC AUTO: 97.9 FL (ref 79–97)
MONOCYTES # BLD: 0.8 10*3/MM3 (ref 0.1–0.9)
NEUTROPHILS # BLD AUTO: 1.96 10*3/MM3 (ref 1.7–7)
NEUTROPHILS NFR BLD MANUAL: 41.8 % (ref 42.7–76)
NEUTS BAND NFR BLD MANUAL: 8.2 % (ref 0–5)
PLATELET # BLD AUTO: 306 10*3/MM3 (ref 140–450)
PMV BLD AUTO: 10.9 FL (ref 6–12)
POLYCHROMASIA BLD QL SMEAR: ABNORMAL
POTASSIUM SERPL-SCNC: 4.3 MMOL/L (ref 3.5–5.2)
RBC # BLD AUTO: 2.81 10*6/MM3 (ref 3.77–5.28)
SODIUM SERPL-SCNC: 139 MMOL/L (ref 136–145)
VARIANT LYMPHS NFR BLD MANUAL: 24.5 % (ref 19.6–45.3)
WBC MORPH BLD: NORMAL
WBC NRBC COR # BLD AUTO: 3.92 10*3/MM3 (ref 3.4–10.8)

## 2024-10-04 PROCEDURE — 80048 BASIC METABOLIC PNL TOTAL CA: CPT | Performed by: FAMILY MEDICINE

## 2024-10-04 PROCEDURE — 92526 ORAL FUNCTION THERAPY: CPT | Performed by: SPEECH-LANGUAGE PATHOLOGIST

## 2024-10-04 PROCEDURE — 94799 UNLISTED PULMONARY SVC/PX: CPT

## 2024-10-04 PROCEDURE — 85025 COMPLETE CBC W/AUTO DIFF WBC: CPT | Performed by: FAMILY MEDICINE

## 2024-10-04 PROCEDURE — 94664 DEMO&/EVAL PT USE INHALER: CPT

## 2024-10-04 PROCEDURE — 97535 SELF CARE MNGMENT TRAINING: CPT

## 2024-10-04 PROCEDURE — 25010000002 HYDROMORPHONE PER 4 MG: Performed by: FAMILY MEDICINE

## 2024-10-04 PROCEDURE — 94761 N-INVAS EAR/PLS OXIMETRY MLT: CPT

## 2024-10-04 PROCEDURE — 97110 THERAPEUTIC EXERCISES: CPT

## 2024-10-04 PROCEDURE — 25010000002 AMPICILLIN-SULBACTAM PER 1.5 G: Performed by: OTOLARYNGOLOGY

## 2024-10-04 PROCEDURE — 99232 SBSQ HOSP IP/OBS MODERATE 35: CPT | Performed by: INTERNAL MEDICINE

## 2024-10-04 PROCEDURE — 85007 BL SMEAR W/DIFF WBC COUNT: CPT | Performed by: FAMILY MEDICINE

## 2024-10-04 RX ADMIN — OXYCODONE HYDROCHLORIDE 15 MG: 5 TABLET ORAL at 14:01

## 2024-10-04 RX ADMIN — FAMOTIDINE 20 MG: 20 TABLET, FILM COATED ORAL at 16:58

## 2024-10-04 RX ADMIN — GUAIFENESIN 1200 MG: 600 TABLET, EXTENDED RELEASE ORAL at 21:59

## 2024-10-04 RX ADMIN — DILTIAZEM HYDROCHLORIDE 30 MG: 30 TABLET, FILM COATED ORAL at 21:58

## 2024-10-04 RX ADMIN — GUAIFENESIN 1200 MG: 600 TABLET, EXTENDED RELEASE ORAL at 09:16

## 2024-10-04 RX ADMIN — IPRATROPIUM BROMIDE AND ALBUTEROL SULFATE 3 ML: 2.5; .5 SOLUTION RESPIRATORY (INHALATION) at 06:31

## 2024-10-04 RX ADMIN — Medication 10 ML: at 21:59

## 2024-10-04 RX ADMIN — OXYCODONE HYDROCHLORIDE 15 MG: 5 TABLET ORAL at 05:35

## 2024-10-04 RX ADMIN — AMPICILLIN SODIUM, SULBACTAM SODIUM 3 G: 2; 1 INJECTION, POWDER, FOR SOLUTION INTRAMUSCULAR; INTRAVENOUS at 11:30

## 2024-10-04 RX ADMIN — GABAPENTIN 800 MG: 400 CAPSULE ORAL at 17:01

## 2024-10-04 RX ADMIN — GABAPENTIN 800 MG: 400 CAPSULE ORAL at 05:27

## 2024-10-04 RX ADMIN — ESCITALOPRAM OXALATE 20 MG: 10 TABLET ORAL at 09:16

## 2024-10-04 RX ADMIN — HYDROMORPHONE HYDROCHLORIDE 0.5 MG: 1 INJECTION, SOLUTION INTRAMUSCULAR; INTRAVENOUS; SUBCUTANEOUS at 09:21

## 2024-10-04 RX ADMIN — GUAIFENESIN 600 MG: 600 TABLET, EXTENDED RELEASE ORAL at 11:35

## 2024-10-04 RX ADMIN — Medication 10 ML: at 09:17

## 2024-10-04 RX ADMIN — GABAPENTIN 800 MG: 400 CAPSULE ORAL at 11:30

## 2024-10-04 RX ADMIN — HYDROMORPHONE HYDROCHLORIDE 0.5 MG: 1 INJECTION, SOLUTION INTRAMUSCULAR; INTRAVENOUS; SUBCUTANEOUS at 16:08

## 2024-10-04 RX ADMIN — FAMOTIDINE 20 MG: 20 TABLET, FILM COATED ORAL at 05:27

## 2024-10-04 RX ADMIN — DILTIAZEM HYDROCHLORIDE 30 MG: 30 TABLET, FILM COATED ORAL at 14:01

## 2024-10-04 RX ADMIN — IPRATROPIUM BROMIDE AND ALBUTEROL SULFATE 3 ML: 2.5; .5 SOLUTION RESPIRATORY (INHALATION) at 14:40

## 2024-10-04 RX ADMIN — DILTIAZEM HYDROCHLORIDE 30 MG: 30 TABLET, FILM COATED ORAL at 05:27

## 2024-10-04 RX ADMIN — IPRATROPIUM BROMIDE AND ALBUTEROL SULFATE 3 ML: 2.5; .5 SOLUTION RESPIRATORY (INHALATION) at 19:27

## 2024-10-04 RX ADMIN — AMPICILLIN SODIUM, SULBACTAM SODIUM 3 G: 2; 1 INJECTION, POWDER, FOR SOLUTION INTRAMUSCULAR; INTRAVENOUS at 05:27

## 2024-10-04 RX ADMIN — MUPIROCIN 1 APPLICATION: 20 OINTMENT TOPICAL at 21:58

## 2024-10-04 RX ADMIN — Medication 400 MG: at 09:16

## 2024-10-04 RX ADMIN — MUPIROCIN 1 APPLICATION: 20 OINTMENT TOPICAL at 09:16

## 2024-10-04 RX ADMIN — IPRATROPIUM BROMIDE AND ALBUTEROL SULFATE 3 ML: 2.5; .5 SOLUTION RESPIRATORY (INHALATION) at 11:38

## 2024-10-04 NOTE — THERAPY TREATMENT NOTE
Acute Care - Physical Therapy Treatment Note  [facility]     Patient Name: Arminda Jiménez  : 1960  MRN: [id number]  Today's Date: 10/4/2024      Visit Dx:     ICD-10-CM ICD-9-CM   1. Dysphagia, unspecified type  R13.10 787.20   2. Unable to care for self  Z78.9 V49.89   3. Adenocarcinoma  C80.1 199.1   4. Elevated TSH  R79.89 794.5   5. Pulmonary emphysema, unspecified emphysema type  J43.9 492.8   6. Patchy atelectasis  J98.11 518.0   7. Adenocarcinoma of unknown origin  C80.1 199.1   8. Lymphadenopathy  R59.1 785.6   9. Vocal cord weakness  J38.00 478.30   10. Impaired mobility [Z74.09]  Z74.09 799.89     Patient Active Problem List   Diagnosis    Foraminal stenosis of lumbosacral region    Chronic back pain    Radiculopathy    Lumbar stenosis    Panlobular emphysema    Gastroesophageal reflux disease without esophagitis    Constipation due to opioid therapy    PAD (peripheral artery disease)    Primary osteoarthritis involving multiple joints    Recurrent major depressive disorder    Overweight    Personal history of nicotine dependence    Adenocarcinoma of unknown origin    Environmental allergies    Primary hypertension    Senile osteoporosis    Open left ankle fracture    Fall    Postoperative anemia due to acute blood loss    Acute foot pain, left    Impaired gait and mobility due to left ankle fracture    Chest pain, unspecified type    COPD (chronic obstructive pulmonary disease)    Overweight (BMI 25.0-29.9)    Displaced fracture of medial malleolus of left tibia, subsequent encounter for closed fracture with nonunion    Dysphagia    Lymphadenopathy    Vocal cord weakness    Metastatic carcinoma    Weight loss    Cancer associated pain    Bacteremia due to Enterococcus    Aspiration pneumonia    Chronic pain    Severe malnutrition    Thrombocytopenia     Past Medical History:   Diagnosis Date    Adenocarcinoma of unknown origin 2022    Anxiety     Arthritis     Asthma     Cancer     right  lung,abd,and pelvic area    Chronic back pain     COPD (chronic obstructive pulmonary disease)     Depression     Environmental allergies 04/11/2022    Facet arthropathy     Foraminal stenosis of lumbosacral region     GERD (gastroesophageal reflux disease)     H/O degenerative disc disease     History of blood transfusion     PVD (peripheral vascular disease)     Radiculopathy     Thyroid nodule      Past Surgical History:   Procedure Laterality Date    ANKLE ARTHROPLASTY Left 8/29/2023    Procedure: TOTAL ANKLE ARTHROPLASTY WITH INBONE IMPLANT, GASTROCNEMIUS RECESSION, REMOVAL OF HARDWARE DEEP. LEFT ANKLE;  Surgeon: Bartolome Olguin DPM;  Location:  [facility];  Service: Podiatry;  Laterality: Left;    ANKLE OPEN REDUCTION INTERNAL FIXATION Left 01/05/2023    Procedure: REPAIR OF NONUNION MEDIAL MALLEOLUS, REPAIR NONUNION FIBULA, BONE GRAFT HARVEST, LEFT ANKLE;  Surgeon: Bartolome Olguin DPM;  Location:  [facility];  Service: Podiatry;  Laterality: Left;    ANKLE SURGERY Left     ANTERIOR LUMBAR EXPOSURE N/A 05/07/2018    Procedure: ANTERIOR LUMBAR EXPOSURE;  Surgeon: Chris Haley DO;  Location:  [facility];  Service: Vascular    APPENDECTOMY      CARPAL TUNNEL RELEASE Bilateral     CERVICAL LYMPH NODE BIOPSY/EXCISION Right 9/27/2024    Procedure: RIGHT CERVICAL LYMPH NODE BIOPSY/EXCISION;  Surgeon: Marke Roberts MD;  Location:  [facility];  Service: ENT;  Laterality: Right;    INCISION AND DRAINAGE OF WOUND Left 05/08/2022    Procedure: Incision and Drainage Left Open Ankle Fracture, Open Reduction Internal Fixation Bimalleolar Ankle Fracture;  Surgeon: Leland Aranda MD;  Location:  [facility];  Service: Orthopedics;  Laterality: Left;    LUMBAR FUSION N/A 05/07/2018    Procedure: ANTERIOR DECOMPRESSION, ANTERIOR LUMBAR INTERBODY FUSION WITH INSTRUMENTATION L5-S1;  Surgeon: LUIS CARLOS Zheng MD;  Location:  [facility];  Service: Orthopedic Spine    LUMBAR FUSION Right 01/02/2019    Procedure: RIGHT LATERAL  LUMBAR INTERBODY FUSION L3-5;  Surgeon: LUIS CARLOS Zheng MD;  Location:  [facility];  Service: Orthopedic Spine    LUMBAR LAMINECTOMY WITH FUSION N/A 05/09/2018    Procedure: POSTERIOR SPINAL FUSION WITH INSTRUMENTATION L5-S1;  Surgeon: LUIS CARLOS Zheng MD;  Location:  [facility];  Service: Orthopedic Spine    LUMBAR LAMINECTOMY WITH FUSION Right 01/04/2019    Procedure: POSTERIOR SPINAL FUSION WITH INSTRUMENTATION RIGHT L3-S1;  Surgeon: LUIS CARLOS Zheng MD;  Location:  [facility];  Service: Orthopedic Spine    OTHER SURGICAL HISTORY Bilateral 2017    Stent implants [facility] Dr [facility]    PAIN PUMP INSERTION/REVISION Right     FENTANYL    WRIST FUSION Right      PT Assessment (Last 12 Hours)       PT Evaluation and Treatment       Row Name 10/04/24 1458 10/04/24 1439       Physical Therapy Time and Intention    Subjective Information complains of;weakness;fatigue  - --    Document Type therapy note (daily note)  - --    Mode of Treatment physical therapy  - --    Session Not Performed -- patient unavailable for treatment  -    Comment, Session Not Performed -- with RT  -      Row Name 10/04/24 0857          Physical Therapy Time and Intention    Subjective Information --  -     Session Not Performed patient/family declined treatment  -     Comment, Session Not Performed pt states she is waiting for family to get here to help with bath and wants to save her energy  -       Row Name 10/04/24 1458          General Information    Patient/Family/Caregiver Comments/Observations daughter  -     Existing Precautions/Restrictions fall;oxygen therapy device and L/min  -       Row Name 10/04/24 1458          Pain    Pretreatment Pain Rating 0/10 - no pain  -     Posttreatment Pain Rating 0/10 - no pain  -       Row Name 10/04/24 1458          Bed Mobility    Comment, (Bed Mobility) pt declined bed mobility or OOB activity, agreed bed ex  -       Row Name 10/04/24 1458          Motor Skills     Comments, Therapeutic Exercise BLE HEP A-AAROM x 15 reps  -     Additional Documentation Comments, Therapeutic Exercise (Row)  -       Row Name             Wound 09/27/24 1403 Right neck Incision    Wound - Properties Group Placement Date: 09/27/24  -LB Placement Time: 1403  -LB Present on Original Admission: N  -LB Side: Right  -LB Location: neck  -LB Primary Wound Type: Incision  -LB    Retired Wound - Properties Group Placement Date: 09/27/24  -LB Placement Time: 1403  -LB Present on Original Admission: N  -LB Side: Right  -LB Location: neck  -LB Primary Wound Type: Incision  -LB    Retired Wound - Properties Group Date first assessed: 09/27/24  -LB Time first assessed: 1403  -LB Present on Original Admission: N  -LB Side: Right  -LB Location: neck  -LB Primary Wound Type: Incision  -LB      Row Name 10/04/24 1458          Plan of Care Review    Plan of Care Reviewed With patient;daughter;caregiver  -     Progress no change  -     Outcome Evaluation pt declined bed mobility or OOB activity, agreed to bed ex, BLE HEP A-AAROM x 15 reps,  -       Row Name 10/04/24 1458          Positioning and Restraints    Pre-Treatment Position in bed  -     Post Treatment Position bed  -AH     In Bed fowlers;call light within reach;encouraged to call for assist;with family/caregiver  -               User Key  (r) = Recorded By, (t) = Taken By, (c) = Cosigned By      Initials Name Provider Type     Alexandra Odonnell PTA Physical Therapist Assistant    Julieta Montenegro RN Registered Nurse                    Physical Therapy Education       Title: PT OT SLP Therapies (In Progress)       Topic: Physical Therapy (In Progress)       Point: Mobility training (Done)       Learning Progress Summary             Patient Acceptance, E, VU by MS at 9/27/2024 1430    Comment: role of PT in her care                         Point: Home exercise program (Done)       Learning Progress Summary             Patient Acceptance,  E,TB,D,H, VU by  at 10/4/2024 1512    Comment: BLE HEP   Caregiver Acceptance, E,TB,D,H, VU by  at 10/4/2024 1512    Comment: BLE HEP                         Point: Body mechanics (Not Started)       Learner Progress:  Not documented in this visit.              Point: Precautions (Not Started)       Learner Progress:  Not documented in this visit.                              User Key       Initials Effective Dates Name Provider Type Discipline     02/03/23 -  Alexandra Odonnell, PTA Physical Therapist Assistant PT    MS 07/11/23 -  Betty Kuhn, PT, DPT, NCS Physical Therapist PT                  PT Recommendation and Plan     Plan of Care Reviewed With: patient, daughter, caregiver  Progress: no change  Outcome Evaluation: pt declined bed mobility or OOB activity, agreed to bed ex, BLE HEP A-AAROM x 15 reps,   Outcome Measures       Row Name 10/04/24 1500 10/02/24 1300          How much help from another person do you currently need...    Turning from your back to your side while in flat bed without using bedrails? 3  -AH --     Moving from lying on back to sitting on the side of a flat bed without bedrails? 3  -AH --     Moving to and from a bed to a chair (including a wheelchair)? 3  -AH --     Standing up from a chair using your arms (e.g., wheelchair, bedside chair)? 3  -AH --     Climbing 3-5 steps with a railing? 2  -AH --     To walk in hospital room? 2  -AH --     AM-PAC 6 Clicks Score (PT) 16  -AH --     Highest Level of Mobility Goal 5 --> Static standing  -AH --        How much help from another is currently needed...    Putting on and taking off regular lower body clothing? -- 3  -TS     Bathing (including washing, rinsing, and drying) -- 3  -TS     Toileting (which includes using toilet bed pan or urinal) -- 3  -TS     Putting on and taking off regular upper body clothing -- 3  -TS     Taking care of personal grooming (such as brushing teeth) -- 4  -TS     Eating meals -- 4  -TS     AM-PAC 6  Clicks Score (OT) -- 20  -TS        Functional Assessment    Outcome Measure Options AM-PAC 6 Clicks Basic Mobility (PT)  - --               User Key  (r) = Recorded By, (t) = Taken By, (c) = Cosigned By      Initials Name Provider Type    Alexandra Winn PTA Physical Therapist Assistant    TS Stacy Ogden COTA Occupational Therapist Assistant                     Time Calculation:    PT Charges       Row Name 10/04/24 1513             Time Calculation    Start Time 1458  -AH      Stop Time 1513  -      Time Calculation (min) 15 min  -AH      PT Received On 10/04/24  -         Time Calculation- PT    Total Timed Code Minutes- PT 15 minute(s)  -AH         Timed Charges    61655 - PT Therapeutic Exercise Minutes 15  -AH         Total Minutes    Timed Charges Total Minutes 15  -AH       Total Minutes 15  -AH                User Key  (r) = Recorded By, (t) = Taken By, (c) = Cosigned By      Initials Name Provider Type    Alexandra Winn PTA Physical Therapist Assistant                  Therapy Charges for Today       Code Description Service Date Service Provider Modifiers Qty    [id number] HC PT THER PROC EA 15 MIN 10/4/2024 Alexandra Odonnell PTA GP 1            PT G-Codes  Outcome Measure Options: AM-PAC 6 Clicks Basic Mobility (PT)  AM-PAC 6 Clicks Score (PT): 16  AM-PAC 6 Clicks Score (OT): 20    Alexandra Odonnell PTA  10/4/2024     No

## 2024-10-04 NOTE — PLAN OF CARE
Goal Outcome Evaluation:  Plan of Care Reviewed With: patient, daughter, caregiver        Progress: no change  Outcome Evaluation: pt declined bed mobility or OOB activity, agreed to bed ex, BLE HEP A-AAROM x 15 reps,

## 2024-10-04 NOTE — PROGRESS NOTES
[facility] Palliative Care Services    Palliative Care Daily Progress Note   Chief complaint-chart review    Code Status:   Code Status and Medical Interventions: CPR (Attempt to Resuscitate); Full Support   Ordered at: 09/24/24 2113     Level Of Support Discussed With:    Patient     Code Status (Patient has no pulse and is not breathing):    CPR (Attempt to Resuscitate)     Medical Interventions (Patient has pulse or is breathing):    Full Support      Advanced Directives: Advance Directive Status: Patient does not have advance directive   Goals of Care: Ongoing.     S: Medical record reviewed. Events noted.  Underwent excisional biopsy 9/27, pathology shows findings consistent with complete replacement of the lymph node by metastatic pulmonary adenocarcinoma.  Extranodal extension of tumor and perineural invasion identified.  Incidental adjacent benign lymph node. Per oncology given aspiration issues may be in best interest to hold off on systemic therapy for few weeks, plan to follow-up with Dr. Khan 10/14.  Antibiotic treatment has completed 10/4.  Speech therapy notes concerns with ability to maintain nutritional and hydration status.  Received 110 mg oral morphine equivalent oral morphine equivalent in the form of Dilaudid IV and oxycodone over the last 24 hours.  Anemia fairly stable.  Thrombocytopenia has resolved.     Pain Assessment  Preferred Pain Scale: number (Numeric Rating Pain Scale)  Nonverbal Indicators of Pain: nonverbal indicators absent  CPOT Facial Expression: 0-->relaxed, neutral  CPOT Body Movements: 0-->absence of movements  CPOT Muscle Tension: 0-->relaxed  Ventilator Compliance/Vocalization: 0-->talking in normal tone or no sound  CPOT Score: 0  Pain Location: back, neck  Pain Description: constant, aching, sore    O:     Intake/Output Summary (Last 24 hours) at 10/4/2024 1353  Last data filed at 10/4/2024 0742  Gross per 24 hour   Intake 120 ml   Output 700 ml    Net -580 ml       Diagnostics and current medications: Reviewed.      Current Facility-Administered Medications:     albuterol (PROVENTIL) nebulizer solution 0.083% 2.5 mg/3mL, 2.5 mg, Nebulization, Q4H PRN, Marek Roberts MD    sennosides-docusate (PERICOLACE) 8.6-50 MG per tablet 2 tablet, 2 tablet, Oral, BID PRN **AND** polyethylene glycol (MIRALAX) packet 17 g, 17 g, Oral, Daily PRN **AND** bisacodyl (DULCOLAX) EC tablet 5 mg, 5 mg, Oral, Daily PRN **AND** bisacodyl (DULCOLAX) suppository 10 mg, 10 mg, Rectal, Daily PRN, Marek Roberts MD    calcium carbonate (TUMS) chewable tablet 500 mg (200 mg elemental), 2 tablet, Oral, TID PRN, Jonathon Chew MD, 2 tablet at 10/01/24 0144    dextrose 5 % and sodium chloride 0.9 % with KCl 20 mEq/L infusion, 40 mL/hr, Intravenous, Continuous, Flynn Quinones MD, Last Rate: 40 mL/hr at 10/03/24 0525, 40 mL/hr at 10/03/24 0525    dilTIAZem (CARDIZEM) tablet 30 mg, 30 mg, Oral, Q8H, Flynn Quinones MD, 30 mg at 10/04/24 0527    escitalopram (LEXAPRO) tablet 20 mg, 20 mg, Oral, Daily, Nika Castaneda APRN, 20 mg at 10/04/24 0916    famotidine (PEPCID) tablet 20 mg, 20 mg, Oral, BID AC, Telly Villela MD, 20 mg at 10/04/24 0527    gabapentin (NEURONTIN) capsule 800 mg, 800 mg, Oral, Q6H, Telly Villela MD, 800 mg at 10/04/24 1130    guaiFENesin (MUCINEX) 12 hr tablet 1,200 mg, 1,200 mg, Oral, Q12H, Telly Villela MD, 600 mg at 10/04/24 1135    HYDROmorphone (DILAUDID) injection 0.5 mg, 0.5 mg, Intravenous, Q4H PRN, Flynn Quinones MD, 0.5 mg at 10/04/24 0921    ipratropium-albuterol (DUO-NEB) nebulizer solution 3 mL, 3 mL, Nebulization, 4x Daily - RT, Marek Roberts MD, 3 mL at 10/04/24 1138    magnesium oxide (MAG-OX) tablet 400 mg, 400 mg, Oral, Daily, Telly Villela MD, 400 mg at 10/04/24 0916    mupirocin (BACTROBAN) 2 % ointment 1 Application, 1 Application, Topical, Q12H, Marek Roberts  "MD Josue, 1 Application at 10/04/24 0916    ondansetron (ZOFRAN) injection 4 mg, 4 mg, Intravenous, Q6H PRN, Marek Roberts MD    oxyCODONE (ROXICODONE) immediate release tablet 15 mg, 15 mg, Oral, Q4H PRN, Flynn Quinones MD, 15 mg at 10/04/24 0535    Phosphorus Replacement - Follow Nurse / BPA Driven Protocol, , Does not apply, Stacie LUIS Mariano Ariel, MD    Potassium Replacement - Follow Nurse / BPA Driven Protocol, , Does not apply, Stacie LUIS Mariano Ariel, MD    [COMPLETED] Insert Peripheral IV, , , Once **AND** sodium chloride 0.9 % flush 10 mL, 10 mL, Intravenous, PRN, Marek Roberts MD    sodium chloride 0.9 % flush 10 mL, 10 mL, Intravenous, Q12H, Marek Roberts MD, 10 mL at 10/04/24 0917    sodium chloride 0.9 % flush 10 mL, 10 mL, Intravenous, PRN, Marek Roberts MD    sodium chloride 0.9 % infusion 40 mL, 40 mL, Intravenous, PRN, Marek Roberts MD    Allergies   Allergen Reactions    Aspirin Nausea And Vomiting     Unknown reaction    Keflex [Cephalexin] Other (See Comments)     Blisters to nose and mouth    Nitrofurantoin GI Intolerance and Nausea And Vomiting    Creatine Monohydrate Unknown - Low Severity     I have utilized all available immediate resources to obtain, update, or review the patient's current medications (including all prescriptions, over-the-counter products, herbals, cannabis/cannabidiol products, and vitamin/mineral/dietary (nutritional) supplements) for name, route of administration, type, dose and frequency.    A:    /76 (BP Location: Right arm, Patient Position: Lying)   Pulse 91   Temp 97.5 °F (36.4 °C) (Oral)   Resp 18   Ht 154.9 cm (60.98\")   Wt 50.3 kg (110 lb 14.3 oz)   SpO2 100%   BMI 20.97 kg/m²     Patient status: Disease state: Controlled with current treatments.  Functional status: Palliative Performance Scale Score: Performance 40% based on the following measures: Ambulation: Mainly in bed, Activity and " Evidence of Disease: Unable to do any work, extensive evidence of disease, Self-Care: Mainly assistance required,  Intake: Normal or reduced, LOC: Full, drowsy or confusion   ECOG Status(4) Completely disabled, unable to carry out self-care.  Totally confined to bed or chair.  Nutritional status:  Albumin 2.6 . Body mass index is 20.97 kg/m².  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 plan: Goals of care include to be determined.     Family support: The patient receives support from her daughter..  Advance Directives: Advance Directive Status: Patient does not have advance directive   POA/Healthcare surrogate-Anay barber-next of kin.     2.  Palliative care encounter  - Prognosis is poor long-term secondary to stage IV adenocarcinoma, respiratory failure, oxygen dependency, unintended weight loss, and multiple comorbidities.     -followed by Dr. Khan has received 4 courses of carboplatin, Gemzar obtaining remission x 14 years, now with progression of disease.      9/9- FNA left neck consistent with metastatic carcinoma   9/20-rechallenged with systemic therapy  carboplatin and Gemzar      -She was seen by Dr. Roberts who recommended ED evaluation leading to current hospitalization due to difficulty swallowing, getting choked on her food, pronounced weight loss, and dehydration with plans for excisional biopsy 9/27.       -Started on IV antibiotics, liquid diet, supplemental oxygen, DuoNebs.  -Evaluated by speech therapy who completed a video swallow study with findings of profound laryngeal penetration and aspiration, started on modified diet with honey thick liquid, purée consistency food on 9/25.    9/27-Underwent excisional biopsy, pathology shows findings consistent with complete replacement of the lymph node by metastatic pulmonary adenocarcinoma. Extranodal extension of tumor and perineural invasion identified.   -Evaluated by therapy who recommends home with assist and home health.   9/28-Per oncology given aspiration issues may be in best interest to hold off on systemic therapy for few weeks, appointment set 10/14 at 11 AM with Dr. Khan  -Infectious disease consulted  10/4-completed antibiotic treatment  -Speech therapy notes concerns with ability to maintain nutritional and hydration status.       9/27-Currently off unit for planned procedure.       9/30-clarified CODE STATUS.  Encouraged ongoing conversation with regard to medical priorities.  Will provide a copy of MOST decision aid.  -remains hopeful for continued efforts at systemic therapy and we discussed inherent risks to include but not limited to further decline in quality of life, increased hospitalizations and overall progressive decline despite aggressive care interventions.  -Recommended completion of living will document, a copy provided. States she would wish for her daughter to be primary healthcare surrogate.    -Anticipating home discharge as prior with her daughter as caregiver.  She is not open to SNF placement at this juncture.    10/4-continue supportive measures  -reviewed MOST  documentation and living will with patient and daughter 10/2. Encouraged ongoing discussion, aware can assist in completion of documentation if so desired.  -Speech therapy notes concerns with ability to maintain nutritional and hydration status  -Pathology shows findings consistent with complete replacement of the lymph node by metastatic pulmonary adenocarcinoma  -plan follow up 10/7    3.  Pain  -Roxicodone and Neurontin medications   -Implantable pain pump  -Dilaudid IV as needed    4.  Depression/anxiety  -Lexapro home medication restarted 9/30      Thank you for allowing us to participate in patient's plan of care. Palliative Care Team will continue to follow patient.     Nika Castaneda, APRN  10/4/2024  13:53 CDT

## 2024-10-04 NOTE — TELEPHONE ENCOUNTER
Patient's case discussed at tumor board today.   Recommendations: Awaiting outside molecular studies.

## 2024-10-04 NOTE — PLAN OF CARE
Goal Outcome Evaluation:  Plan of Care Reviewed With: patient           Outcome Evaluation: Pt AxOx4,  Prn oral x 2 and IV x 2  pain medication w/ good results.  Lungs coarse, encouraged pt to use flutter valve.  Voiding per purewick.  Call light within reach.

## 2024-10-04 NOTE — PLAN OF CARE
Goal Outcome Evaluation:  Plan of Care Reviewed With: patient           Outcome Evaluation: Pt AxOx4, Prn Dilaudid given x 2, Prn Luisa given x 1 for back / neck pain w/ good results.  Lungs coarse, encouraged pt to use flutter valve.  2L w/ O2 sats above 90%   Tele running sinus to sinus tach .  Meds in applesauce.  Call light within reach.

## 2024-10-04 NOTE — PROGRESS NOTES
Her          Progress Note    Patient name: Arminda Jiménez  Patient : 1960  VISIT # [id number]  MR #[id number]  Room:     Subjective   No new voiced complaints this morning.  Still with deep cough and upper airway secretions, having difficulty mobilizing secretions.  Appears to be reluctant on occasion to work with physical therapy.  Discussed with physical therapy team this morning.    CBC today, 10/4/2024 has a WBC of 3.92, hemoglobin 8.2 and a platelet count of 306,000  Platelets recovered from chemotherapy        POD #7 excisional biopsy, right neck 2024 by Dr. Marek Roberts  Pathology   Right neck mass:  Consistent with complete replacement of a lymph node by metastatic pulmonary adenocarcinoma.  Extranodal extension of tumor and perineural invasion identified.  Incidental adjacent benign lymph node.  Molecular studies were requested of pathology 10/2/2024, to be sent off.     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  Dorcas was found to have stage IV adenocarcinoma of unknown primary in .  She was treated successfully with 4 courses of carboplatin Gemzar obtaining a complete remission that was sustained for 14 years.  Dorcas experienced a heel fracture 2 years ago.  After that event she has had chronic abdominal pain and has lost 50 pounds.  25 of the 50 pounds were lost over the course of the previous month.  Workup has documented widespread metastatic disease with mediastinal, retroperitoneal, gastrohepatic as well as left neck adenopathy.  An FNA of the left neck performed on 2024 reported malignant cells present consistent with metastatic carcinoma.  Further tissue was unavailable for molecular studies and further special stains.   Due to delays in further workup, Dr. Khan felt the patient needed to get on with his treatment and rechallenged her with carboplatin  and Gemzar cycle #1 on 9/20/2024.   She has been referred to Dr. Marek Roberts with ENT at [facility] to obtain an excisional biopsy to be able to get tissue for molecular studies that will include tissue of origin, NGS, IHC studies etc.   Dorcas was seen by Dr. Marek Roberts who recommended that she be evaluated in the ED at [facility] for admission due to difficulty swallowing getting choked on her food, pronounced weight loss and dehydration anticipating an open excisional biopsy on Friday, 9/27/2024.  Medical oncology consultation requested in continuity of care.           Detailed history copied from Dr. Khan's office note 9/20/2024     The patient is a pleasant 64 years old female who has a history of stage IV carcinoma of unknown primary site diagnosed in 2010 status post 4 cycles of carboplatin gemcitabine with complete response.  She has been in remission for 14 years.  More recently, she was found to have widespread metastatic disease with mediastinal adenopathy, retroperitoneal adenopathy, gastrohepatic mass and neck adenopathy.  She underwent ultrasound-guided FNA biopsy.  This result only in a scant amount of tissue for analysis.  She was then sent to ENT at [facility] for excisional biopsy.  Unfortunate, the patient missed appointment.  The patient was then sent to ENT at [facility].  ENT at [facility] referred the patient to [place], ENT for excisional biopsy.     Diagnosis  History of carcinoma no primary site, 2010  Liver lesions  Gastrohepatic space mass  Mediastinal adenopathy consistent cycles normal  Metastatic carcinoma, left neck lymph node, Sept 2024     Disease target  Mediastinal adenopathy  Hepatic lobe lesions  Pancreatic body/tail hypodense mass in the gastrohepatic ligament     Treatment summary  9/20/24 Initiate Carbo AUC 4 D1, Gemzar 800mg D1, D8 every 21 days. Treatment consent signed.          Cancer history  Arminda Jiménez was first seen by me on 9/5/2024.  I was consulted for findings of  a gastric mass during patient was positioned with hospital.  The patient has a history of lung cancer, COPD.  She was a direct admit from her PCP office.  Patient has a 20 pounds weight loss.  She also had complained of dysphagia, nausea, dizziness decreased appetite.  The patient reports that she has a history of stage IV lung cancer diagnosed 10 years ago at [place].  She received chemotherapy and stay in remission.  She tells me that she did not have a biopsy performed at that time.  Apparently, review of medical record showed that she had a carcinoma of unknown primary site diagnosed October 2010.  She received 2 cycles of carboplatin and paclitaxel.  She had a reaction to Taxol during cycle #2.  She was switched to carboplatin gemcitabine, completed March 2019.  Sites of disease in the past involve the pericardial, pleural, mediastinal, and cervical involvement.   7/30/24 CT chest: Extensive necrotic mediastinal and bilateral hilar lymphadenopathy consistent with metastatic disease. Lymph node in the right right paratracheal lymph node measures 1 cm short axis axial image 34 at the thoracic inlet.  AP window lymph node measures 1.2 cm short axis on axial image 71 with additional 1 cm AP window lymph node axial image 76.  Precarinal lymph node measures 1.1 cm on axial image 85.  Partially necrotic anterior mediastinal lymph node measures 1.4 cm short axis image 88 with similar appearing anterior mediastinal lymph node measuring 1.3 cm short axis image 69.  A partially necrotic subcarinal lymph node measures 1.8 cm short axis on axial image 96.  Right hilar lymph node measures 1.1 cm short axis image 95.  Partially necrotic and calcified left hilar lymph node measures 1.4 cm short axis image 118.  Left hilar lymph node measures 1.1 cm short axis on axial image 96. These lymph nodes are new since 2022. There is a 2.2 x 1.5 cm nodule abutting the left heart border axial image 140, which is either within the  pericardial region were within the left lung parenchyma.  There is a spiculated 0.8 x 0.5 x 0.8 cm left upper lobe nodule axial image 89 and coronal  image 31 which is also new.  Probable focal bronchiectasis left upper lobe on axial image 76, less likely cavitary nodule.  0.3 cm left lower lobe nodule image 151 which may be endobronchial.  Question a 0.6 meters left lower lobe nodule along the diaphragm axial image 158.  Subpleural 0.4 cm right middle lobe nodule image 165.  Bilateral bronchial thickening some areas of subsegmental atelectasis noted in both lobes.  Moderate emphysema.  There is no pleural effusion or pneumothorax. Centrally low density gastrohepatic ligament mass measures 3.3 x 3.2 cm axial image 193 with some adjacent wall thickening suggested in the proximal stomach.   7/30/24 CT abdomen/pelvis: Marked gastric wall thickening proximal stomach and gastric body; neoplasm is suspected, Correlation with symptomatology and EGD advised, Cystic structure in the gastrohepatic ligament measuring up to 4.9 cm, may represent a necrotic lymph node or mass less likely pancreatic suggestive. Correlation with PET scan and/or MRI advised.    08/12/24 CT soft tissue neck:  Mild asymmetry of the vocal cords, secondary to the vocal cord paralysis.  No visible nodule or mass of the larynx, Mild necrotic adenopathy of the right neck, consistent with metastatic disease, Since the prior CT chest from 07/30/2024, grossly stable right paratracheal necrotic node at level of the thoracic inlet, also consistent with metastatic disease.   9/5/2024-upper EGD, GI-gastric bezoar to explain the CT scan of the abdomen findings.  No gastric tumor.  No active ulcer disease to explain patient's nausea vomiting.  Probable gastroparesis.  9/5/2024-CT chest, [facility]:  There is a hypodense nodule in the left thyroid lobe measuring 1.2 cm.  A right tracheoesophageal lymph node is 1.9 cm in short axis on axial image number 16 with interval  increase size from 1.2 cm in short axis when remeasured.  A right hilar lymph node is 1.1 cm in short axis without change.  A subcarinal lymph node is partially calcified and measures 2.5 cm in short axis without change.  This has low attenuation.  An additional right hilar lymph node is 1.5 cm in short axis and is probably unchanged.  A left hilar lymph node is 1 cm in short axis without change.  An AP window lymph node is at least 2.3 cm and has increased in size.  Anterior mediastinal lymph node is now 1.6 cm in short axis, previously 1.3 cm.  A solid nodule in the lingula abuts the free wall of the left ventricle and measures 2.9 x 1.8 cm, previously 2.4 x 1.6 cm when remeasured. There is a small cavitary nodule in the left upper lobe measuring a cavitary nodule in the left upper lobe on series 604 image number 23  is unchanged measuring 0.6 cm. A solid spiculated nodule in the left upper lobe is probably overall unchanged measuring 0.8 cm x 0.7 cm on image number 26.  On there is a new solid juxta fissural nodule in the right middle lobe measuring 0.4 cm on image number 34.  There is an enlarging juxta fissural nodule in the right middle lobe measuring 0.3 cm image number 35.  The small to moderate pleural effusion layers dependently.  No left pleural fluid.  Bibasilar infiltrates are suggested. There is a large hypodense mass arising from the pancreatic body/tail extending to the gastrohepatic ligament measuring up to 5.4 x 4.1 cm.  This has increased in size.  There are multiple new hypodense hepatic lesions.    9/6/2024-CT thoracic and lumbar spine, [facility]: Multiple chronic thoracic compression fractures, diffuse degenerative disc disease.  Postop changes of the lumbar spine.  Loosening of the right L3 transpedicular screw.  Remaining hardware appears well-positioned.  Rim-enhancing complex cystic lesion in the gastrohepatic space.  9/9/2024-FNA biopsy by radiology: Consistent metastatic carcinoma.  However,  further characterization of tumor could not be performed due to scant material.  9/10/2024-CT abdomen pelvis with contrast, [facility]: Interval development of mild to moderate dependent atelectasis of both lower  lobes and mild patchy ground-glass and consolidation of the lingula and to a lesser extent the right middle lobe.  3.1 cm left epicardial nodule, increased in size from 2.5 cm.  Interval development of multiple ill-defined hypodense liver masses.  The largest, of the left lobe near the IVC measures 3.3 cm.  Stable mild prominence of the intrahepatic biliary tree and mild distension of the common duct, measuring up to 10 mm in diameter.  No visible obstructing stone or mass.  Cholecystectomy, again noted.  Interval increase in size of the previously seen mass between the liver left lobe, stomach, aorta, and superior aspect of the pancreatic body.  Measures 6.0 x 4.5 cm cross section, increased from 3.5 x 3.3 cm.  Inferiorly, the mass abuts and blends in per separately of the pancreas.  Calcified granulomas of the spleen, again noted.  The adrenal glands have a normal appearance.  The kidneys enhance symmetrically.  0.9 cm cyst of the left kidney, again noted.  No ureteral stones or hydronephrosis.  The uterus and ovaries have a normal appearance.  No evidence of bowel obstruction.  Moderate amount of stool in the colon, similar to before.  Small fat and fluid containing right inguinal hernia, stable.  No free air.  Interval development of trace fluid in the pelvis.  Electronic device in subcutaneous fat of the right flank, with catheter entering the spinal canal, again noted.  Bone window images show no significant lytic or sclerotic bone lesions.  Surgical fixation of the lumbar spine, again noted.  Chronic mild compression fracture of the L1 superior endplate, stable.  Impression:  Interval increase in size of the mass of the epigastric region, most consistent with a malignant process, potentially metastatic  adenopathy or a pancreatic mass.  Interval development of multiple hepatic metastases. 3.  3.1 cm left epicardial nodule, increased in size, suspicious for metastatic adenopathy or metastatic pleural disease.   Interval development of small to moderate dependent right pleural effusion, with atelectasis versus pneumonia of both lower lobes.   9/20/24 Initiate palliative chemotherapy Carbo AUC 4 D1, Gemzar 800mg D1, D8 every 21 days. Treatment consent signed.    9/20/2024-I have called [facility] and refer the patient back.  She will be seen early next week.        REVIEW OF SYSTEMS:   Constitutional: Pronounced weight loss fatigue failure to thrive  HEENT:  dysphagia and hoarseness               Lungs: no cough, no shortness of breath, no wheeze  CVS: no palpitation, no chest pain, no shortness of breath  GI: no abdominal pain, no nausea , no vomiting, no constipation  MERCEDES: no dysuria, frequency and urgency, no hematuria, no kidney stones  Musculoskeletal: no joint pain, swelling , stiffness  Endocrine: no polyuria, polydipsia, no cold or heat intolerance  Hematology: no anemia, no easy bruising or bleeding, no history of clotting disorder  Dermatology: no skin rash, no eczema, no pruritus  Psychiatry: no depression, no anxiety,no panic attacks, no suicide ideation  Neurology: no syncope, no seizures, no numbness or tingling of hands, no numbness or tingling of feet, no paresis  Objective     Vital Signs  Temp:  [97.5 °F (36.4 °C)-98.9 °F (37.2 °C)] 97.5 °F (36.4 °C)  Heart Rate:  [] 96  Resp:  [18-22] 18  BP: (127-176)/(76-90) 153/76    Intake/Output Summary (Last 24 hours) at 10/4/2024 0847  Last data filed at 10/4/2024 0553  Gross per 24 hour   Intake 2480.01 ml   Output 1000 ml   Net 1480.01 ml       PHYSICAL EXAM:  CONSTITUTIONAL: Alert, appropriate, no acute distress, very thin, cachectic  EYES: Anicteric, EOM intact, pupils equal round   ENT: Mucous membranes moist, no oropharyngeal lesions   NECK: Supple,  Cervical lymphadenopathy on the right   CHEST/LUNGS: CTA bilaterally, normal respiratory effort   CARDIOVASCULAR: RRR, no murmurs  ABDOMEN: soft nontender, active bowel sounds, no HSM  EXTREMITIES: warm, moves all fours  SKIN: warm, dry with no rashes or lesions  LYMPH: No cervical, clavicular, axillary, or inguinal lymphadenopathy  NEUROLOGIC: follows commands, nonfocal   PSYCH: mood and affect appropriate      CBC  Results from last 7 days   Lab Units 10/04/24  0632 10/01/24  0519 09/30/24  0759   WBC 10*3/mm3 3.92 9.40 12.96*   HEMOGLOBIN g/dL 8.2* 8.7* 9.2*   HEMATOCRIT % 27.5* 28.1* 28.0*   PLATELETS 10*3/mm3 306 80* 45*   LYMPHOCYTE % % 24.5  --   --    MONOCYTES % % 20.4*  --   --        CMP  Lab Results   Component Value Date    GLUCOSE 94 10/04/2024    BUN 6 (L) 10/04/2024    CREATININE 0.25 (L) 10/04/2024    EGFRIFNONA 114 01/07/2019    BCR 24.0 10/04/2024    CO2 30.0 (H) 10/04/2024    CALCIUM 8.7 10/04/2024    ALBUMIN 2.7 (L) 10/01/2024    AST 68 (H) 10/01/2024    ALT 36 (H) 10/01/2024             Blood Culture   Date Value Ref Range Status   09/24/2024 Abnormal Stain (C)  Preliminary   09/24/2024 No growth at 24 hours  Preliminary       Imaging Results (Last 72 Hours)       ** No results found for the last 72 hours. 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  Dorcas was found to have stage IV adenocarcinoma of unknown primary in 2010.  She was treated successfully with 4 courses of carboplatin Gemzar obtaining a  complete remission that was sustained for 14 years.  Dorcas experienced a heel fracture 2 years ago.  After that event she has had chronic abdominal pain and has lost 50 pounds.  25 of the 50 pounds were lost over the course of the previous month.  Workup has documented widespread metastatic disease with mediastinal, retroperitoneal, gastrohepatic as well as left neck adenopathy.  An FNA of the left neck performed on 9/9/2024 reported malignant cells present consistent with metastatic carcinoma.  Further tissue was unavailable for molecular studies and further special stains.   Due to delays in further workup, Dr. Khan felt the patient needed to get on with his treatment and rechallenged her with carboplatin and Gemzar cycle #1 on 9/20/2024.   She has been referred to Dr. Marek Roberts with ENT at [facility] to obtain an excisional biopsy to be able to get tissue for molecular studies that will include tissue of origin, NGS, IHC studies etc.   Dorcas was seen by Dr. Marek Roberts who recommended that she be evaluated in the ED at [facility] for admission due to difficulty swallowing getting choked on her food, pronounced weight loss and dehydration anticipating an open excisional biopsy on Friday, 9/27/2024.      Discussed at length with her nurse  today.    CBC today, 10/4/2024 has a WBC of 3.92, hemoglobin 8.2 and a platelet count of 306,000  Platelets recovered from chemotherapy        POD #7 excisional biopsy, right neck 9/27/2024 by Dr. Marek Roberts  Pathology   Right neck mass:  Consistent with complete replacement of a lymph node by metastatic pulmonary adenocarcinoma.  Extranodal extension of tumor and perineural invasion identified.  Incidental adjacent benign lymph node.  Molecular studies were requested of pathology 10/2/2024, to be sent off. f.     Given the aspiration issues being addressed, the positive blood culture under treatment with IV antibiotics, it is in her best interest to hold off on Gemzar and resume  cycle #2 of chemotherapy on 10/14/2024 at 11 AM with Dr. Khan.  Discussed with Dr. Khan who will be seeing her in the morning and for the next week.     #2  Dysphagia and aspiration    Patient states that she coughs every time she tries to eat something and swallow  Swallow evaluation 9/25/2024-documented laryngeal penetration and aspiration during swallow.  SLP Findings: Patient presents with severe pharyngeal dysphagia.   Recommendations: Diet Textures: honey thick liquid, puree consistency food. Medications should be taken whole with puree. May have Ice between meals after oral care, under staff or family supervision and with the recommended strategies for safe swallowing.  Recommended Strategies: Upright for PO, small bites and sips, and alternate liquids and solids. Oral care before breakfast, after all meals and PRN.  Dysphagia therapy is recommended.      Discussed at length with Dr. Kentrell Villela, 9/27/2024, and her nurse Sheri DUGAN today.    Discussed at length with her nurse this morning, encouraging attempts at further suctioning to mobilize secretions.  Patient just does not have strength for cough to get her secretions up.    #3  Aspiration pneumonia    Chest x-ray, 1 view 9/24/2024 reported the following  Emphysematous changes of the lungs.   Patchy bibasilar infiltrates are present.    Blood culture 9/24/2024 reported the following:  Enterococcus hirae Critical      Discussed with Dr. Kentrell Villela, 9/27/2024  ID consult notes reviewed  She is on Unasyn     #4  Pain secondary to #1    Dilaudid 0.5 mg IV every 4 Hrs PRN  Norco 5 mg PO Q 4 Hrs PRN          Bartolome Zambrano MD  10/04/24  08:47 CDT

## 2024-10-04 NOTE — PLAN OF CARE
Goal Outcome Evaluation:           Progress: improving     VSS. A/Ox4. Tele, ST. 2L NC. IV LFA. PRN pain meds for c/o pain, some relief per pt. Meds whole with pudding/applesauce. Resting comfortably, call light in reach. Safety maintained.

## 2024-10-04 NOTE — THERAPY TREATMENT NOTE
Acute Care - Speech Language Pathology   Swallow Treatment Note [facility]     Patient Name: Arminda Jiménez  : 1960  MRN: [id number]  Today's Date: 10/4/2024               Admit Date: 2024  Swallow treatment completed. Patient asleep upon entering by woke easily with greeting. Lunch present and SLP encouraged intake; however, patient refused. She reports she did not eat breakfast but did eat some yogurt last night for supper. Voice remains weak and wet. Audible only at 1 foot. Patient encouraged to utilize spirometer and Aerobika often throughout the day to assist in increased respiratory drive for airway protection. Patient only able to complete this for a few reps prior to falling asleep in the middle of the set.     Patient with profound dysphagia and reduced airway protection and clearance ability. I am concerned with lack of nutrition and hydration given her poor intake and profound dysphagia.     Encouraged increased independent use of spirometer and Aerobika.     SLP will continue to follow.   Betty Reynolds, MS CCC-SLP 10/4/2024 13:30 CDT    Visit Dx:     ICD-10-CM ICD-9-CM   1. Dysphagia, unspecified type  R13.10 787.20   2. Unable to care for self  Z78.9 V49.89   3. Adenocarcinoma  C80.1 199.1   4. Elevated TSH  R79.89 794.5   5. Pulmonary emphysema, unspecified emphysema type  J43.9 492.8   6. Patchy atelectasis  J98.11 518.0   7. Adenocarcinoma of unknown origin  C80.1 199.1   8. Lymphadenopathy  R59.1 785.6   9. Vocal cord weakness  J38.00 478.30   10. Impaired mobility [Z74.09]  Z74.09 799.89     Patient Active Problem List   Diagnosis    Foraminal stenosis of lumbosacral region    Chronic back pain    Radiculopathy    Lumbar stenosis    Panlobular emphysema    Gastroesophageal reflux disease without esophagitis    Constipation due to opioid therapy    PAD (peripheral artery disease)    Primary osteoarthritis involving multiple joints    Recurrent major depressive disorder    Overweight     Personal history of nicotine dependence    Adenocarcinoma of unknown origin    Environmental allergies    Primary hypertension    Senile osteoporosis    Open left ankle fracture    Fall    Postoperative anemia due to acute blood loss    Acute foot pain, left    Impaired gait and mobility due to left ankle fracture    Chest pain, unspecified type    COPD (chronic obstructive pulmonary disease)    Overweight (BMI 25.0-29.9)    Displaced fracture of medial malleolus of left tibia, subsequent encounter for closed fracture with nonunion    Dysphagia    Lymphadenopathy    Vocal cord weakness    Metastatic carcinoma    Weight loss    Cancer associated pain    Bacteremia due to Enterococcus    Aspiration pneumonia    Chronic pain    Severe malnutrition    Thrombocytopenia     Past Medical History:   Diagnosis Date    Adenocarcinoma of unknown origin 04/04/2022    Anxiety     Arthritis     Asthma     Cancer     right lung,abd,and pelvic area    Chronic back pain     COPD (chronic obstructive pulmonary disease)     Depression     Environmental allergies 04/11/2022    Facet arthropathy     Foraminal stenosis of lumbosacral region     GERD (gastroesophageal reflux disease)     H/O degenerative disc disease     History of blood transfusion     PVD (peripheral vascular disease)     Radiculopathy     Thyroid nodule      Past Surgical History:   Procedure Laterality Date    ANKLE ARTHROPLASTY Left 8/29/2023    Procedure: TOTAL ANKLE ARTHROPLASTY WITH INBONE IMPLANT, GASTROCNEMIUS RECESSION, REMOVAL OF HARDWARE DEEP. LEFT ANKLE;  Surgeon: Bartolome Olguin DPM;  Location:  [facility];  Service: Podiatry;  Laterality: Left;    ANKLE OPEN REDUCTION INTERNAL FIXATION Left 01/05/2023    Procedure: REPAIR OF NONUNION MEDIAL MALLEOLUS, REPAIR NONUNION FIBULA, BONE GRAFT HARVEST, LEFT ANKLE;  Surgeon: Bartolome Olguin DPM;  Location:  [facility];  Service: Podiatry;  Laterality: Left;    ANKLE SURGERY Left     ANTERIOR LUMBAR EXPOSURE  N/A 05/07/2018    Procedure: ANTERIOR LUMBAR EXPOSURE;  Surgeon: Chris Haley DO;  Location:  [facility];  Service: Vascular    APPENDECTOMY      CARPAL TUNNEL RELEASE Bilateral     CERVICAL LYMPH NODE BIOPSY/EXCISION Right 9/27/2024    Procedure: RIGHT CERVICAL LYMPH NODE BIOPSY/EXCISION;  Surgeon: Marek Roberts MD;  Location:  [facility];  Service: ENT;  Laterality: Right;    INCISION AND DRAINAGE OF WOUND Left 05/08/2022    Procedure: Incision and Drainage Left Open Ankle Fracture, Open Reduction Internal Fixation Bimalleolar Ankle Fracture;  Surgeon: Leland Aranda MD;  Location:  [facility];  Service: Orthopedics;  Laterality: Left;    LUMBAR FUSION N/A 05/07/2018    Procedure: ANTERIOR DECOMPRESSION, ANTERIOR LUMBAR INTERBODY FUSION WITH INSTRUMENTATION L5-S1;  Surgeon: LUIS CARLOS Zheng MD;  Location:  [facility];  Service: Orthopedic Spine    LUMBAR FUSION Right 01/02/2019    Procedure: RIGHT LATERAL LUMBAR INTERBODY FUSION L3-5;  Surgeon: LUIS CARLOS Zheng MD;  Location:  [facility];  Service: Orthopedic Spine    LUMBAR LAMINECTOMY WITH FUSION N/A 05/09/2018    Procedure: POSTERIOR SPINAL FUSION WITH INSTRUMENTATION L5-S1;  Surgeon: LUIS CARLOS Zheng MD;  Location:  [facility];  Service: Orthopedic Spine    LUMBAR LAMINECTOMY WITH FUSION Right 01/04/2019    Procedure: POSTERIOR SPINAL FUSION WITH INSTRUMENTATION RIGHT L3-S1;  Surgeon: LUIS CARLOS Zheng MD;  Location:  [facility];  Service: Orthopedic Spine    OTHER SURGICAL HISTORY Bilateral 2017    Stent implants BLE [place] Dr [place]    PAIN PUMP INSERTION/REVISION Right     FENTANYL    WRIST FUSION Right        SLP Recommendation and Plan                                               Daily Summary of Progress (SLP): progress toward functional goals is gradual (10/04/24 1250)               Treatment Assessment (SLP): continued (10/04/24 1250)  Treatment Assessment Comments (SLP): See note (10/04/24 1250)  Plan for Continued Treatment (SLP): continue  treatment per plan of care (10/04/24 1250)         Plan of Care Reviewed With: patient, caregiver  Progress: no change      SWALLOW EVALUATION (Last 72 Hours)       SLP Adult Swallow Evaluation       Row Name 10/04/24 1250 10/03/24 1408 10/02/24 1400 10/01/24 1345          Rehab Evaluation    Document Type therapy note (daily note)  -MG therapy note (daily note)  -MG -- therapy note (daily note)  -MG     Subjective Information complains of;weakness;fatigue  -MG -- -- no complaints  -MG     Patient Observations alert;cooperative;agree to therapy  -MG -- -- alert;cooperative;agree to therapy  -MG     Patient/Family/Caregiver Comments/Observations No family present.  -MG -- -- Friend present.  -MG     Session Not Performed -- patient/family declined treatment  -MG patient/family declined treatment  -MD --     Patient Effort poor  -MG -- -- good  -MG     Comment -- -- refused due to not feeling well  -MD --     Symptoms Noted During/After Treatment fatigue  -MG -- -- none  -MG        Pain    Additional Documentation Pain Scale: FACES Pre/Post-Treatment (Group)  -MG -- -- Pain Scale: FACES Pre/Post-Treatment (Group)  -MG        Pain Scale: FACES Pre/Post-Treatment    Pain: FACES Scale, Pretreatment 0-->no hurt  -MG -- -- 0-->no hurt  -MG     Posttreatment Pain Rating 0-->no hurt  -MG -- -- 0-->no hurt  -MG        SLP Treatment Clinical Impressions    Treatment Assessment (SLP) continued  -MG -- -- continued  -MG     Treatment Assessment Comments (SLP) See note  -MG -- -- See note  -MG     Daily Summary of Progress (SLP) progress toward functional goals is gradual  -MG -- -- progress toward functional goals is gradual  -MG     Barriers to Overall Progress (SLP) Resistant to information  -MG -- -- --     Plan for Continued Treatment (SLP) continue treatment per plan of care  -MG -- -- continue treatment per plan of care  -MG     Care Plan Review care plan/treatment goals reviewed  -MG -- -- care plan/treatment goals  reviewed  -MG     Care Plan Review, Other Participant(s) caregiver  -MG -- -- friend  -MG        Swallow Goals (SLP)    Swallow LTGs Patient will demonstrate functional swallow for  -MG -- -- Patient will demonstrate functional swallow for  -MG     Swallow STGs diet tolerance goal selection (SLP);swallow management recall goal selection (SLP)  -MG -- -- diet tolerance goal selection (SLP);swallow management recall goal selection (SLP)  -MG     Diet Tolerance Goal Selection (SLP) Patient will tolerate trials of  -MG -- -- Patient will tolerate trials of  -MG     Swallow Management Recall Goal Selection (SLP) swallow management recall, SLP goal 1  -MG -- -- swallow management recall, SLP goal 1  -MG        (LTG) Patient will demonstrate functional swallow for    Diet Texture (Demonstrate functional swallow) soft to chew (chopped) textures  -MG -- -- soft to chew (chopped) textures  -MG     Liquid viscosity (Demonstrate functional swallow) honey/  moderately thick liquids  -MG -- -- honey/  moderately thick liquids  -MG     [place] (Demonstrate functional swallow) independently (over 90% accuracy)  -MG -- -- independently (over 90% accuracy)  -MG     Time Frame (Demonstrate functional swallow) by discharge  -MG -- -- by discharge  -MG     Progress/Outcomes (Demonstrate functional swallow) progress slower than expected  -MG -- -- continuing progress toward goal  -MG        (STG) Patient will tolerate trials of    Consistencies Trialed (Tolerate trials) pureed textures;honey/ moderately thick liquids  -MG -- -- pureed textures;honey/ moderately thick liquids  -MG     Desired Outcome (Tolerate trials) without signs/symptoms of aspiration  -MG -- -- without signs/symptoms of aspiration  -MG     [place] (Tolerate trials) independently (over 90% accuracy)  -MG -- -- independently (over 90% accuracy)  -MG     Time Frame (Tolerate trials) by discharge  -MG -- -- by discharge  -MG     Progress/Outcomes (Tolerate  trials) progress slower than expected  -MG -- -- continuing progress toward goal  -MG        (STG) Swallow Management Recall Goal 1 (SLP)    Activity (Swallow Management Recall Goal 1, SLP) recall of;aspiration precautions;oral care recommendations;safe diet level/texture;safe liquid viscosity  -MG -- -- recall of;aspiration precautions;oral care recommendations;safe diet level/texture;safe liquid viscosity  -MG     [place]/Accuracy (Swallow Management Recall Goal 1, SLP) independently (over 90% accuracy)  -MG -- -- independently (over 90% accuracy)  -MG     Time Frame (Swallow Management Recall Goal 1, SLP) by discharge  -MG -- -- by discharge  -MG     Progress/Outcomes (Swallow Management Recall Goal 1, SLP) progress slower than expected  -MG -- -- continuing progress toward goal  -MG               User Key  (r) = Recorded By, (t) = Taken By, (c) = Cosigned By      Initials Name Effective Dates    MG Betty Reynolds, MS [facility]-SLP 07/11/23 -     Allegra Layne, SLP 06/21/22 -                     EDUCATION  The patient has been educated in the following areas:   Dysphagia (Swallowing Impairment) Oral Care/Hydration Modified Diet Instruction.        SLP GOALS       Row Name 10/04/24 1250 10/01/24 1345          (LTG) Patient will demonstrate functional swallow for    Diet Texture (Demonstrate functional swallow) soft to chew (chopped) textures  -MG soft to chew (chopped) textures  -MG     Liquid viscosity (Demonstrate functional swallow) honey/  moderately thick liquids  -MG honey/  moderately thick liquids  -MG     [place] (Demonstrate functional swallow) independently (over 90% accuracy)  -MG independently (over 90% accuracy)  -MG     Time Frame (Demonstrate functional swallow) by discharge  -MG by discharge  -MG     Progress/Outcomes (Demonstrate functional swallow) progress slower than expected  -MG continuing progress toward goal  -MG        (STG) Patient will tolerate trials of    Consistencies  Trialed (Tolerate trials) pureed textures;honey/ moderately thick liquids  -MG pureed textures;honey/ moderately thick liquids  -MG     Desired Outcome (Tolerate trials) without signs/symptoms of aspiration  -MG without signs/symptoms of aspiration  -MG     [place] (Tolerate trials) independently (over 90% accuracy)  -MG independently (over 90% accuracy)  -MG     Time Frame (Tolerate trials) by discharge  -MG by discharge  -MG     Progress/Outcomes (Tolerate trials) progress slower than expected  -MG continuing progress toward goal  -MG        (STG) Swallow Management Recall Goal 1 (SLP)    Activity (Swallow Management Recall Goal 1, SLP) recall of;aspiration precautions;oral care recommendations;safe diet level/texture;safe liquid viscosity  -MG recall of;aspiration precautions;oral care recommendations;safe diet level/texture;safe liquid viscosity  -MG     [place]/Accuracy (Swallow Management Recall Goal 1, SLP) independently (over 90% accuracy)  -MG independently (over 90% accuracy)  -MG     Time Frame (Swallow Management Recall Goal 1, SLP) by discharge  -MG by discharge  -MG     Progress/Outcomes (Swallow Management Recall Goal 1, SLP) progress slower than expected  -MG continuing progress toward goal  -MG               User Key  (r) = Recorded By, (t) = Taken By, (c) = Cosigned By      Initials Name Provider Type    MG Betty Reynolds MS CCC-SLP Speech and Language Pathologist                         Time Calculation:    Time Calculation- SLP       Row Name 10/04/24 1329             Time Calculation- SLP    SLP Start Time 1250  -MG      SLP Stop Time 1329  -MG      SLP Time Calculation (min) 39 min  -MG      SLP Received On 10/04/24  -MG         Untimed Charges    34526-[id number] Treatment Swallow Minutes 39  -MG         Total Minutes    Untimed Charges Total Minutes 39  -MG       Total Minutes 39  -MG                User Key  (r) = Recorded By, (t) = Taken By, (c) = Cosigned By      Initials Name  Provider Type     Betty Reynolds, MS CCC-SLP Speech and Language Pathologist                    Therapy Charges for Today       Code Description Service Date Service Provider Modifiers Qty    [id number] HC ST TREATMENT SWALLOW 3 10/4/2024 Betty Reynolds, MS CCC-SLP GN 1                 Betty Reynolds MS CCC-SLP  10/4/2024

## 2024-10-04 NOTE — PLAN OF CARE
Goal Outcome Evaluation:  Plan of Care Reviewed With: patient        Progress: no change  Outcome Evaluation: Pt just getting BTB with PCT when MCELROY/L entered room. Had completed shower task with assistance. Pt required RB in bed prior to further ADL activity. PCT had soaked dentures. Pt able to perform oral care with toothbrush and care of dentures while upright in bed. Grooming hair s/u increased time d/t fatigue with overhead task. Pt demo's ability to weight shift and reposition self in bed. Declined further OOB activity d/t fatigue, requests up > chair when family arrives. Educated to call for assist. Continue OT POC      Anticipated Discharge Disposition (OT): home with assist, home with home health

## 2024-10-04 NOTE — PLAN OF CARE
Goal Outcome Evaluation:  Plan of Care Reviewed With: patient, caregiver        Progress: no change                       Treatment Assessment (SLP): continued (10/04/24 1250)  Treatment Assessment Comments (SLP): See note (10/04/24 1250)  Plan for Continued Treatment (SLP): continue treatment per plan of care (10/04/24 1250)      Swallow treatment completed. Patient asleep upon entering by woke easily with greeting. Lunch present and SLP encouraged intake; however, patient refused. She reports she did not eat breakfast but did eat some yogurt last night for supper. Voice remains weak and wet. Audible only at 1 foot. Patient encouraged to utilize spirometer and Aerobika often throughout the day to assist in increased respiratory drive for airway protection. Patient only able to complete this for a few reps prior to falling asleep in the middle of the set.     Patient with profound dysphagia and reduced airway protection and clearance ability. I am concerned with lack of nutrition and hydration given her poor intake and profound dysphagia.     Encouraged increased independent use of spirometer and Aerobika.     SLP will continue to follow.     Betty Reynolds MS CCC-SLP 10/4/2024 13:29 CDT

## 2024-10-04 NOTE — PROGRESS NOTES
Infectious Diseases Progress Note    Patient:  Arminda Jiménez  YOB: 1960  MRN: [id number]   Admit date: 2024   Admitting Physician: Flynn Quinones,*  Primary Care Physician: Teddy Sanchez MD    Chief Complaint/Interval History: She is without new symptoms.  She is breathing comfortably.  Overall sputum production diminished.  No pain at present.  Pathology report back.  Outlined below.  Lymph node removal consistent with metastatic adenocarcinoma of pulmonary source.    Intake/Output Summary (Last 24 hours) at 10/4/2024 1515  Last data filed at 10/4/2024 0742  Gross per 24 hour   Intake 120 ml   Output 700 ml   Net -580 ml     Allergies:   Allergies   Allergen Reactions    Aspirin Nausea And Vomiting     Unknown reaction    Keflex [Cephalexin] Other (See Comments)     Blisters to nose and mouth    Nitrofurantoin GI Intolerance and Nausea And Vomiting    Creatine Monohydrate Unknown - Low Severity     Current Scheduled Medications:   dilTIAZem, 30 mg, Oral, Q8H  escitalopram, 20 mg, Oral, Daily  famotidine, 20 mg, Oral, BID AC  gabapentin, 800 mg, Oral, Q6H  guaiFENesin, 1,200 mg, Oral, Q12H  ipratropium-albuterol, 3 mL, Nebulization, 4x Daily - RT  magnesium oxide, 400 mg, Oral, Daily  mupirocin, 1 Application, Topical, Q12H  sodium chloride, 10 mL, Intravenous, Q12H      dextrose 5 % and sodium chloride 0.9 % with KCl 20 mEq, 40 mL/hr, Last Rate: 40 mL/hr (10/03/24 0525)       Current PRN Medications:    albuterol    senna-docusate sodium **AND** polyethylene glycol **AND** bisacodyl **AND** bisacodyl    calcium carbonate    HYDROmorphone    ondansetron    oxyCODONE    Phosphorus Replacement - Follow Nurse / BPA Driven Protocol    Potassium Replacement - Follow Nurse / BPA Driven Protocol    [COMPLETED] Insert Peripheral IV **AND** sodium chloride    sodium chloride    sodium chloride    Review of Systems see HPI    Vital Signs:  Temp (24hrs), Av.1 °F (36.7 °C), Min:97.5 °F  "(36.4 °C), Max:98.9 °F (37.2 °C)    /66 (BP Location: Right arm, Patient Position: Lying)   Pulse 97   Temp 97.8 °F (36.6 °C) (Oral)   Resp 18   Ht 154.9 cm (60.98\")   Wt 50.3 kg (110 lb 14.3 oz)   SpO2 97%   BMI 20.97 kg/m²     Physical Exam  Vital signs - reviewed.  Line/IV site - No erythema, warmth, induration, or tenderness.  Lungs without crackles or wheezes    Lab Results:  CBC:   Results from last 7 days   Lab Units 10/04/24  0632 10/01/24  0519 09/30/24  0759 09/29/24  1432 09/28/24  1246   WBC 10*3/mm3 3.92 9.40 12.96* 15.60* 13.75*   HEMOGLOBIN g/dL 8.2* 8.7* 9.2* 9.5* 9.6*   HEMATOCRIT % 27.5* 28.1* 28.0* 28.4* 31.6*   PLATELETS 10*3/mm3 306 80* 45* 48* 59*     BMP:  Results from last 7 days   Lab Units 10/04/24  0632 10/03/24  2047 10/03/24  0956 10/01/24  0519 09/30/24  0519 09/29/24  0443 09/28/24  1246   SODIUM mmol/L 139  --  139 138 134* 130* 129*   POTASSIUM mmol/L 4.3 4.2 3.2* 3.2* 4.2 4.1 3.2*   CHLORIDE mmol/L 99  --  100 100 96* 93* 89*   CO2 mmol/L 30.0*  --  31.0* 30.0* 21.0* 25.0 24.0   BUN mg/dL 6*  --  5* 6* 6* 3* 4*   CREATININE mg/dL 0.25*  --  0.19* <0.17* 0.20* <0.17* 0.21*   GLUCOSE mg/dL 94  --  101* 102* 102* 111* 75   CALCIUM mg/dL 8.7  --  8.3* 8.1* 8.2* 7.9* 8.4*   ALT (SGPT) U/L  --   --   --  36*  --   --   --      Culture Results:   Blood culture September 24, 2024-no growth  Blood culture September 24, 2024-Enterococcus hirae (susceptibility below)  Susceptibility                 Enterococcus hirae       SHERRIE       Ampicillin <=2 ug/ml Susceptible       Gentamicin High Level Synergy SYN-S ug/ml Susceptible       Vancomycin <=0.5 ug/ml Susceptible         Radiology: None    Additional Studies Reviewed:     Final Diagnosis  Right neck mass:  A.  Consistent with complete replacement of a lymph node by metastatic pulmonary adenocarcinoma.  B.  Extranodal extension of tumor and perineural invasion identified.  C.  Incidental adjacent benign lymph node.  Electronically " "signed by Erin Ham MD on 10/3/2024 at 1042  Gross Description  1. Neck.   Received fresh labeled with the patient's name, date of birth, and \"right neck mass\".  The specimen consists of an intact lymph node candidate measuring 1.8 x 1.5 x 1.1 cm.  The external surface has a small amount of adhesions and is otherwise pink-gray and smooth.  The cut surface is gray-white, dense with focal pockets of yellow-gray necrosis.  A touch prep slide is made and the nodule is totally submitted in blocks 1A and 1B.     Special Stains  CK7 immunohistochemical stain (block 1A)-positive for strong and diffuse staining in lesional cells  CK20 immunohistochemical stain (block 1A)-negative in lesional cells  Napsin A immunohistochemical stain (block 1A)-positive in lesional cells  TTF-1 immunohistochemical stain (block 1A)-positive in lesional cells  Mammaglobin immunohistochemical stain (block 1A)-negative in lesional cells  GATA3 immunohistochemical stain (block 1A)-negative in lesional cells  P40 immunohistochemical stain (block 1A)-negative in lesional cells  PAX8 immunohistochemical stain (block 1A)-negative in lesional cells     Only single antibody stains were used.  No cocktail stains were used.  All controls stained appropriately.  The immunohistochemical profile and morphology support a diagnosis of metastatic adenocarcinoma from pulmonary origin.  Clinical correlation is advised.  Microscopic Description  Step sections of the right neck mass reveal histologic changes compatible with complete replacement of a lymph node by metastatic adenocarcinoma.  The tumor demonstrates a very poorly differentiated appearance.  The neoplastic cells are of large size and exhibit nuclear pleomorphism.  Increased nuclear to cytoplasmic ratios are seen.  Abundant amounts of amphophilic cytoplasm are seen.  The majority of the tumor is characterized by solid nests of cells.  There are peripheral foci where glandular formations can be " seen.  Large areas of necrosis are seen.  There is essentially no normal lymph node tissue seen in that mass.  There is extranodal extension of tumor into adjacent fibroadipose tissue.  Perineural invasion is also seen.  A battery of immunohistochemical stains was performed on tissue from block 1A, supporting a diagnosis of metastatic pulmonary adenocarcinoma.  The touch preparation also demonstrates malignant cells.  An incidental adjacent benign lymph node is also seen.  Resulting Agency [facility]     Impression:   1.  Metastatic carcinoma-lymph node removal right neck consistent with metastatic pulmonary adenocarcinoma  2.  Positive blood cultures for Enterococcus-this was most likely a contaminant.  Receiving treatment with Unasyn for probable aspiration pneumonia.  This antibiotic choice will cover as well.  3.  Aspiration pneumonia-we continue treatment an additional 2 days.  Will then likely discontinue antibiotic treatment and follow off antimicrobial therapy.    Recommendations:   Continue Unasyn through October 6, 2024  Then stop antibiotic treatment and follow-up and microbial therapy  Will see again Monday  Dr. Fu available in interim if any new questions or problems for infectious diseases    Chan Martinez MD

## 2024-10-04 NOTE — THERAPY TREATMENT NOTE
Patient Name: Arminda Jiménez  : 1960    MRN: [id number]                              Today's Date: 10/4/2024       Admit Date: 2024    Visit Dx:     ICD-10-CM ICD-9-CM   1. Dysphagia, unspecified type  R13.10 787.20   2. Unable to care for self  Z78.9 V49.89   3. Adenocarcinoma  C80.1 199.1   4. Elevated TSH  R79.89 794.5   5. Pulmonary emphysema, unspecified emphysema type  J43.9 492.8   6. Patchy atelectasis  J98.11 518.0   7. Adenocarcinoma of unknown origin  C80.1 199.1   8. Lymphadenopathy  R59.1 785.6   9. Vocal cord weakness  J38.00 478.30   10. Impaired mobility [Z74.09]  Z74.09 799.89     Patient Active Problem List   Diagnosis    Foraminal stenosis of lumbosacral region    Chronic back pain    Radiculopathy    Lumbar stenosis    Panlobular emphysema    Gastroesophageal reflux disease without esophagitis    Constipation due to opioid therapy    PAD (peripheral artery disease)    Primary osteoarthritis involving multiple joints    Recurrent major depressive disorder    Overweight    Personal history of nicotine dependence    Adenocarcinoma of unknown origin    Environmental allergies    Primary hypertension    Senile osteoporosis    Open left ankle fracture    Fall    Postoperative anemia due to acute blood loss    Acute foot pain, left    Impaired gait and mobility due to left ankle fracture    Chest pain, unspecified type    COPD (chronic obstructive pulmonary disease)    Overweight (BMI 25.0-29.9)    Displaced fracture of medial malleolus of left tibia, subsequent encounter for closed fracture with nonunion    Dysphagia    Lymphadenopathy    Vocal cord weakness    Metastatic carcinoma    Weight loss    Cancer associated pain    Bacteremia due to Enterococcus    Aspiration pneumonia    Chronic pain    Severe malnutrition    Thrombocytopenia     Past Medical History:   Diagnosis Date    Adenocarcinoma of unknown origin 2022    Anxiety     Arthritis     Asthma     Cancer     right  lung,abd,and pelvic area    Chronic back pain     COPD (chronic obstructive pulmonary disease)     Depression     Environmental allergies 04/11/2022    Facet arthropathy     Foraminal stenosis of lumbosacral region     GERD (gastroesophageal reflux disease)     H/O degenerative disc disease     History of blood transfusion     PVD (peripheral vascular disease)     Radiculopathy     Thyroid nodule      Past Surgical History:   Procedure Laterality Date    ANKLE ARTHROPLASTY Left 8/29/2023    Procedure: TOTAL ANKLE ARTHROPLASTY WITH INBONE IMPLANT, GASTROCNEMIUS RECESSION, REMOVAL OF HARDWARE DEEP. LEFT ANKLE;  Surgeon: Bartolome Olguin DPM;  Location:  [facility];  Service: Podiatry;  Laterality: Left;    ANKLE OPEN REDUCTION INTERNAL FIXATION Left 01/05/2023    Procedure: REPAIR OF NONUNION MEDIAL MALLEOLUS, REPAIR NONUNION FIBULA, BONE GRAFT HARVEST, LEFT ANKLE;  Surgeon: Bartolome Olguin DPM;  Location:  [facility];  Service: Podiatry;  Laterality: Left;    ANKLE SURGERY Left     ANTERIOR LUMBAR EXPOSURE N/A 05/07/2018    Procedure: ANTERIOR LUMBAR EXPOSURE;  Surgeon: Chris Haley DO;  Location:  [facility];  Service: Vascular    APPENDECTOMY      CARPAL TUNNEL RELEASE Bilateral     CERVICAL LYMPH NODE BIOPSY/EXCISION Right 9/27/2024    Procedure: RIGHT CERVICAL LYMPH NODE BIOPSY/EXCISION;  Surgeon: Marek Roberts MD;  Location:  [facility];  Service: ENT;  Laterality: Right;    INCISION AND DRAINAGE OF WOUND Left 05/08/2022    Procedure: Incision and Drainage Left Open Ankle Fracture, Open Reduction Internal Fixation Bimalleolar Ankle Fracture;  Surgeon: Leland Aranda MD;  Location:  [facility];  Service: Orthopedics;  Laterality: Left;    LUMBAR FUSION N/A 05/07/2018    Procedure: ANTERIOR DECOMPRESSION, ANTERIOR LUMBAR INTERBODY FUSION WITH INSTRUMENTATION L5-S1;  Surgeon: LUIS CARLOS Zheng MD;  Location:  [facility];  Service: Orthopedic Spine    LUMBAR FUSION Right 01/02/2019    Procedure: RIGHT LATERAL  LUMBAR INTERBODY FUSION L3-5;  Surgeon: LUIS CARLOS Zheng MD;  Location:  [facility];  Service: Orthopedic Spine    LUMBAR LAMINECTOMY WITH FUSION N/A 05/09/2018    Procedure: POSTERIOR SPINAL FUSION WITH INSTRUMENTATION L5-S1;  Surgeon: LUIS CARLOS Zheng MD;  Location:  [facility];  Service: Orthopedic Spine    LUMBAR LAMINECTOMY WITH FUSION Right 01/04/2019    Procedure: POSTERIOR SPINAL FUSION WITH INSTRUMENTATION RIGHT L3-S1;  Surgeon: LUIS CARLOS Zheng MD;  Location:  [facility];  Service: Orthopedic Spine    OTHER SURGICAL HISTORY Bilateral 2017    Stent implants BLE [place] Dr [place]    PAIN PUMP INSERTION/REVISION Right     FENTANYL    WRIST FUSION Right       General Information       Row Name 10/04/24 1049          OT Time and Intention    Document Type therapy note (daily note)  -MT     Mode of Treatment occupational therapy  -MT       Row Name 10/04/24 1049          General Information    Patient Profile Reviewed yes  -MT     Existing Precautions/Restrictions fall;oxygen therapy device and L/min  -MT       Row Name 10/04/24 1049          Safety Issues, Functional Mobility    Impairments Affecting Function (Mobility) balance;endurance/activity tolerance;strength;pain  -MT               User Key  (r) = Recorded By, (t) = Taken By, (c) = Cosigned By      Initials Name Provider Type    MT Kamla Merchant COTA Occupational Therapist Assistant                     Mobility/ADL's       Row Name 10/04/24 1040          Bed Mobility    Bed Mobility sit-supine  -MT     Sit-Supine [place] (Bed Mobility) supervision  -MT     Comment, (Bed Mobility) Pt just getting BTB with PCT when MCELROY/L entered room. Had completed shower task with assistance. Pt required RB in bed prior to further ADL activity  -MT       Row Name 10/04/24 1041          Activities of Daily Living    BADL Assessment/Intervention grooming;feeding  -MT       Row Name 10/04/24 104          Grooming Assessment/Training    [place] Level  "(Grooming) grooming skills;set up;oral care regimen;hair care, combing/brushing  -MT     Position (Grooming) sitting up in bed  -MT     Comment, (Grooming) PCT had soaked dentures. Pt able to perform oral care with toothbrush and care of dentures while upright in bed  -MT               User Key  (r) = Recorded By, (t) = Taken By, (c) = Cosigned By      Initials Name Provider Type    Kamla Zepeda COTA Occupational Therapist Assistant                   Obj/Interventions    No documentation.                  Goals/Plan    No documentation.                  Clinical Impression       Row Name 10/04/24 1049          Pain Assessment    Pre/Posttreatment Pain Comment \"I'm just really tired right now\"  -MT       Row Name 10/04/24 1049          Plan of Care Review    Plan of Care Reviewed With patient  -MT     Progress no change  -MT       Row Name 10/04/24 1049          Therapy Plan Review/Discharge Plan (OT)    Anticipated Discharge Disposition (OT) home with assist;home with home health  -MT       Row Name 10/04/24 1049          Vital Signs    O2 Delivery Pre Treatment supplemental O2  -MT       Row Name 10/04/24 1049          Positioning and Restraints    Pre-Treatment Position in bed  -MT     Post Treatment Position bed  -MT     In Bed notified nsg;fowlers;call light within reach;encouraged to call for assist;exit alarm on;side rails up x2  -MT               User Key  (r) = Recorded By, (t) = Taken By, (c) = Cosigned By      Initials Name Provider Type    Kamla Zepeda COTA Occupational Therapist Assistant                   Outcome Measures       Row Name 10/04/24 1049          How much help from another is currently needed...    Putting on and taking off regular lower body clothing? 3  -MT     Bathing (including washing, rinsing, and drying) 3  -MT     Toileting (which includes using toilet bed pan or urinal) 3  -MT     Putting on and taking off regular upper body clothing 3  -MT     Taking care of personal " grooming (such as brushing teeth) 4  -MT     Eating meals 4  -MT     AM-PAC 6 Clicks Score (OT) 20  -MT               User Key  (r) = Recorded By, (t) = Taken By, (c) = Cosigned By      Initials Name Provider Type    MT Kamla Merchant COTA Occupational Therapist Assistant                    Occupational Therapy Education       Title: PT OT SLP Therapies (In Progress)       Topic: Occupational Therapy (In Progress)       Point: ADL training (Done)       Description:   Instruct learner(s) on proper safety adaptation and remediation techniques during self care or transfers.   Instruct in proper use of assistive devices.                  Learning Progress Summary             Patient Acceptance, E, VU by MT at 10/4/2024 1330    Comment: purpose and goal of OT tx    Acceptance, E, VU by JODI at 10/2/2024 1347    Acceptance, E, VU by JOSE at 9/26/2024 1330    Acceptance, E, VU by JOSE at 9/26/2024 1148                         Point: Home exercise program (Not Started)       Description:   Instruct learner(s) on appropriate technique for monitoring, assisting and/or progressing therapeutic exercises/activities.                  Learner Progress:  Not documented in this visit.              Point: Precautions (Done)       Description:   Instruct learner(s) on prescribed precautions during self-care and functional transfers.                  Learning Progress Summary             Patient Acceptance, E, VU by JOSE at 9/26/2024 1330    Acceptance, E, VU by JOSE at 9/26/2024 1148                         Point: Body mechanics (Done)       Description:   Instruct learner(s) on proper positioning and spine alignment during self-care, functional mobility activities and/or exercises.                  Learning Progress Summary             Patient Acceptance, E, VU by JOSE at 9/26/2024 1148                                         User Key       Initials Effective Dates Name Provider Type [facility] 02/03/23 -  Stacy Ogden COTA  Occupational Therapist Assistant OT    MT 06/16/21 -  Kamla Merchant COTA Occupational Therapist Assistant OT    J 07/11/23 -  Betsy Renee OTR/L, CSRS Occupational Therapist OT                  OT Recommendation and Plan     Plan of Care Review  Plan of Care Reviewed With: patient  Progress: no change  Outcome Evaluation: Pt just getting BTB with PCT when MCELROY/L entered room. Had completed shower task with assistance. Pt required RB in bed prior to further ADL activity. PCT had soaked dentures. Pt able to perform oral care with toothbrush and care of dentures while upright in bed. Grooming hair s/u increased time d/t fatigue with overhead task. Pt demo's ability to weight shift and reposition self in bed. Declined further OOB activity d/t fatigue, requests up > chair when family arrives. Educated to call for assist. Continue OT POC     Time Calculation:         Time Calculation- OT       Row Name 10/04/24 1049             Time Calculation- OT    OT Start Time 1049  -MT      OT Stop Time 1112  -MT      OT Time Calculation (min) 23 min  -MT      Total Timed Code Minutes- OT 23 minute(s)  -MT      OT Received On 10/04/24  -MT         Timed Charges    34634 - OT Self Care/Mgmt Minutes 23  -MT         Total Minutes    Timed Charges Total Minutes 23  -MT       Total Minutes 23  -MT                User Key  (r) = Recorded By, (t) = Taken By, (c) = Cosigned By      Initials Name Provider Type    MT Kamla Merchant COTA Occupational Therapist Assistant                  Therapy Charges for Today       Code Description Service Date Service Provider Modifiers Qty    [id number] HC OT SELF CARE/MGMT/TRAIN EA 15 MIN 10/4/2024 Kamla Merchant COTA GO 2                 NAVI Keith  10/4/2024

## 2024-10-04 NOTE — CASE MANAGEMENT/SOCIAL WORK
Continued Stay Note   Ander     Patient Name: Arminda Jiménez  MRN: [id number]  Today's Date: 10/4/2024    Admit Date: 9/24/2024    Plan: Home Health   Discharge Plan       Row Name 10/04/24 1423       Plan    Plan Comments Plan is still for return to home with dtr once medically stable. Palliative care is following. Pt has been agreeable to HH.                   Discharge Codes    No documentation.                 Expected Discharge Date and Time       Expected Discharge Date Expected Discharge Time    Oct 4, 2024               NIYA Quinn

## 2024-10-04 NOTE — PROGRESS NOTES
[facility] Medicine Services  INPATIENT PROGRESS NOTE    Patient Name: Arminda Jiménez  Date of Admission: 9/24/2024  Today's Date: 10/04/24  Length of Stay: 10  Primary Care Physician: Teddy Sanchez MD    Subjective   Chief Complaint: Pain  HPI   10/1 Sitting up in bed undergoing treatment with respiratory therapist.  10/2 Patient appears less dyspneic still very weak speaking in a soft voice and having difficulty mobilizing upper airway secretions.  10/3 Continues to make slow progress.  Still very weak cough effort and voice.    Today:  No significant change from the prior.  No new complaints.    Review of Systems   All pertinent negatives and positives are as above. All other systems have been reviewed and are negative unless otherwise stated.     Objective    Temp:  [97.5 °F (36.4 °C)-98.9 °F (37.2 °C)] 97.5 °F (36.4 °C)  Heart Rate:  [] 91  Resp:  [18-22] 18  BP: (127-176)/(76-90) 153/76  Physical Exam  Constitutional:       Appearance: She is ill-appearing.      Comments: Appears chronically ill, not distressed  HENT:      Head: Normocephalic.      Mouth/Throat:      Mouth: Mucous membranes are moist.   Neck:      Comments: Site of excisional biopsy right side of neck clean and dry  Cardiovascular:      Rate and Rhythm: Normal rate and regular rhythm.   Pulmonary:      Effort: Pulmonary effort is normal.      Breath sounds: Rhonchi present.      Comments: coarse bilateral BSs and upper airway crackles   Musculoskeletal:         General: No deformity.   Skin:     General: Skin is warm.      Findings: Bruising present.   Neurological:      Mental Status: She is alert.      Motor: Weakness present.   Psychiatric:         Mood and Affect: Mood normal.       Results Review:  I have reviewed the labs, radiology results, and diagnostic studies.    Laboratory Data:   Results from last 7 days   Lab Units 10/04/24  0632 10/01/24  0519 09/30/24  0759   WBC 10*3/mm3 3.92  9.40 12.96*   HEMOGLOBIN g/dL 8.2* 8.7* 9.2*   HEMATOCRIT % 27.5* 28.1* 28.0*   PLATELETS 10*3/mm3 306 80* 45*        Results from last 7 days   Lab Units 10/04/24  0632 10/03/24  2047 10/03/24  0956 10/01/24  0519   SODIUM mmol/L 139  --  139 138   POTASSIUM mmol/L 4.3 4.2 3.2* 3.2*   CHLORIDE mmol/L 99  --  100 100   CO2 mmol/L 30.0*  --  31.0* 30.0*   BUN mg/dL 6*  --  5* 6*   CREATININE mg/dL 0.25*  --  0.19* <0.17*   CALCIUM mg/dL 8.7  --  8.3* 8.1*   BILIRUBIN mg/dL  --   --   --  0.3   ALK PHOS U/L  --   --   --  297*   ALT (SGPT) U/L  --   --   --  36*   AST (SGOT) U/L  --   --   --  68*   GLUCOSE mg/dL 94  --  101* 102*       Culture Data:   Blood Culture   Date Value Ref Range Status   09/24/2024 Enterococcus hirae (C)  Final     Comment:       Infectious disease consultation is highly recommended.   09/24/2024 No growth at 5 days  Final       Radiology Data:   Imaging Results (Last 24 Hours)       ** No results found for the last 24 hours. **            I have reviewed the patient's current medications.     Assessment/Plan   Assessment  Active Hospital Problems    Diagnosis     **Dysphagia     Thrombocytopenia     Metastatic carcinoma     Weight loss     Cancer associated pain     Bacteremia due to Enterococcus     Aspiration pneumonia     Chronic pain     Severe malnutrition     Lymphadenopathy     Vocal cord weakness     COPD (chronic obstructive pulmonary disease)     Adenocarcinoma of unknown origin        Treatment Plan  Follow-up pathology from excisional biopsy of right cervical lymph node:  Right neck mass:  A.  Consistent with complete replacement of a lymph node by metastatic pulmonary adenocarcinoma.  B.  Extranodal extension of tumor and perineural invasion identified.  C.  Incidental adjacent benign lymph node.    Currently on 2LNC -  recently started on 2 L by nasal cannula in the outpatient setting.  Patient has been on IV Zosyn for coverage of aspiration pneumonia in the setting of known  dysphagia.  1 blood culture has returned positive for Enterococcus hirae -- likely contaminant.  ID input noted.  IV Unasyn for suspected aspiration pneumonia.  She had 3 days of IV Zosyn and now on day 8 of IV Unasyn  Repeat CXR reviewed mild improvement in   the left basilar infiltrates   Trial of Metanebs ongoing  Added flutter valve  Chest physiotherapy  Nutrition supplements  ST following - modified diet including pureed diet with honey thick liquids to regular soft to chew chopped meat and honey thick liquids  Thrombocytopenia.  Platelet trend noted.  Patient had chemotherapy with Gemzar on 9/20/2024.  Hem/Onc following.  Repeat CBC with diff in AM to monitor counts.  Remain mindful that Abxs, including Unasyn, can also contribute to thrombocytopenia.  Currently on no Lovenox or heparin products. Dr Zambrano input noted.   12.  Palliative care following - discussed with Nika Castaneda  13.  Pain control.  oral Roxicodone; Neurontin.  She does have an implanted pain pump administering fentanyl-followed by Dr. Chahal of pain management  14.  XR of T and L spine with no acute findings  15.  PT and OT        Medical Decision Making  Number and Complexity of problems: 3 acute; moderate complexity        Conditions and Status        Condition is unchanged.     [facility] Data  External documents reviewed: none  Cardiac tracing (EKG, telemetry) interpretation: no new EKGs  Radiology interpretation: as above  Labs reviewed: as above  Any tests that were considered but not ordered: none     Decision rules/scores evaluated (example [id number], [place], etc): none     Discussed with: Nika aguila with Palliative Care     Care Planning  Shared decision making: Discussed with patient with agreement to proceed with treatment plan as outlined  Code status and discussions: Full code     Disposition  Social Determinants of Health that impact treatment or disposition: None apparent at this time  I expect the patient to be  discharged to home where she lives with her daughter hopefully in 2-3 days    Electronically signed by Flynn Quinones MD, 10/04/24, 12:05 CDT.

## 2024-10-05 LAB
ANION GAP SERPL CALCULATED.3IONS-SCNC: 9 MMOL/L (ref 5–15)
ANISOCYTOSIS BLD QL: ABNORMAL
BASOPHILS # BLD MANUAL: 0 10*3/MM3 (ref 0–0.2)
BASOPHILS NFR BLD MANUAL: 0 % (ref 0–1.5)
BUN SERPL-MCNC: 10 MG/DL (ref 8–23)
BUN/CREAT SERPL: 41.7 (ref 7–25)
CALCIUM SPEC-SCNC: 8.6 MG/DL (ref 8.6–10.5)
CHLORIDE SERPL-SCNC: 97 MMOL/L (ref 98–107)
CLUMPED PLATELETS: PRESENT
CO2 SERPL-SCNC: 32 MMOL/L (ref 22–29)
CREAT SERPL-MCNC: 0.24 MG/DL (ref 0.57–1)
DEPRECATED RDW RBC AUTO: 51.2 FL (ref 37–54)
EGFRCR SERPLBLD CKD-EPI 2021: 125.2 ML/MIN/1.73
EOSINOPHIL # BLD MANUAL: 0.05 10*3/MM3 (ref 0–0.4)
EOSINOPHIL NFR BLD MANUAL: 1 % (ref 0.3–6.2)
ERYTHROCYTE [DISTWIDTH] IN BLOOD BY AUTOMATED COUNT: 14.4 % (ref 12.3–15.4)
GIANT PLATELETS: ABNORMAL
GLUCOSE SERPL-MCNC: 89 MG/DL (ref 65–99)
HCT VFR BLD AUTO: 30.2 % (ref 34–46.6)
HGB BLD-MCNC: 8.9 G/DL (ref 12–15.9)
HYPOCHROMIA BLD QL: ABNORMAL
LYMPHOCYTES # BLD MANUAL: 0.84 10*3/MM3 (ref 0.7–3.1)
LYMPHOCYTES NFR BLD MANUAL: 21.2 % (ref 5–12)
MACROCYTES BLD QL SMEAR: ABNORMAL
MCH RBC QN AUTO: 29.2 PG (ref 26.6–33)
MCHC RBC AUTO-ENTMCNC: 29.5 G/DL (ref 31.5–35.7)
MCV RBC AUTO: 99 FL (ref 79–97)
METAMYELOCYTES NFR BLD MANUAL: 1 % (ref 0–0)
MONOCYTES # BLD: 0.98 10*3/MM3 (ref 0.1–0.9)
MYELOCYTES NFR BLD MANUAL: 1 % (ref 0–0)
NEUTROPHILS # BLD AUTO: 2.67 10*3/MM3 (ref 1.7–7)
NEUTROPHILS NFR BLD MANUAL: 40.4 % (ref 42.7–76)
NEUTS BAND NFR BLD MANUAL: 17.2 % (ref 0–5)
NEUTS VAC BLD QL SMEAR: ABNORMAL
PLATELET # BLD AUTO: 353 10*3/MM3 (ref 140–450)
PMV BLD AUTO: 11 FL (ref 6–12)
POLYCHROMASIA BLD QL SMEAR: ABNORMAL
POTASSIUM SERPL-SCNC: 4.2 MMOL/L (ref 3.5–5.2)
RBC # BLD AUTO: 3.05 10*6/MM3 (ref 3.77–5.28)
SODIUM SERPL-SCNC: 138 MMOL/L (ref 136–145)
VARIANT LYMPHS NFR BLD MANUAL: 15.2 % (ref 19.6–45.3)
VARIANT LYMPHS NFR BLD MANUAL: 3 % (ref 0–5)
WBC NRBC COR # BLD AUTO: 4.64 10*3/MM3 (ref 3.4–10.8)

## 2024-10-05 PROCEDURE — 25010000002 AMPICILLIN-SULBACTAM PER 1.5 G: Performed by: INTERNAL MEDICINE

## 2024-10-05 PROCEDURE — 80048 BASIC METABOLIC PNL TOTAL CA: CPT | Performed by: FAMILY MEDICINE

## 2024-10-05 PROCEDURE — 85007 BL SMEAR W/DIFF WBC COUNT: CPT | Performed by: FAMILY MEDICINE

## 2024-10-05 PROCEDURE — 94761 N-INVAS EAR/PLS OXIMETRY MLT: CPT

## 2024-10-05 PROCEDURE — 25010000002 HYDROMORPHONE PER 4 MG: Performed by: FAMILY MEDICINE

## 2024-10-05 PROCEDURE — 85025 COMPLETE CBC W/AUTO DIFF WBC: CPT | Performed by: FAMILY MEDICINE

## 2024-10-05 PROCEDURE — 94799 UNLISTED PULMONARY SVC/PX: CPT

## 2024-10-05 PROCEDURE — 94664 DEMO&/EVAL PT USE INHALER: CPT

## 2024-10-05 RX ORDER — SACCHAROMYCES BOULARDII 250 MG
250 CAPSULE ORAL 2 TIMES DAILY
Status: DISCONTINUED | OUTPATIENT
Start: 2024-10-05 | End: 2024-10-06

## 2024-10-05 RX ADMIN — DILTIAZEM HYDROCHLORIDE 30 MG: 30 TABLET, FILM COATED ORAL at 05:42

## 2024-10-05 RX ADMIN — GUAIFENESIN 1200 MG: 600 TABLET, EXTENDED RELEASE ORAL at 09:49

## 2024-10-05 RX ADMIN — FAMOTIDINE 20 MG: 20 TABLET, FILM COATED ORAL at 16:12

## 2024-10-05 RX ADMIN — IPRATROPIUM BROMIDE AND ALBUTEROL SULFATE 3 ML: 2.5; .5 SOLUTION RESPIRATORY (INHALATION) at 10:25

## 2024-10-05 RX ADMIN — OXYCODONE HYDROCHLORIDE 15 MG: 5 TABLET ORAL at 12:50

## 2024-10-05 RX ADMIN — Medication 10 ML: at 20:16

## 2024-10-05 RX ADMIN — Medication 250 MG: at 20:15

## 2024-10-05 RX ADMIN — GABAPENTIN 800 MG: 400 CAPSULE ORAL at 00:50

## 2024-10-05 RX ADMIN — IPRATROPIUM BROMIDE AND ALBUTEROL SULFATE 3 ML: 2.5; .5 SOLUTION RESPIRATORY (INHALATION) at 19:38

## 2024-10-05 RX ADMIN — AMPICILLIN SODIUM, SULBACTAM SODIUM 3 G: 2; 1 INJECTION, POWDER, FOR SOLUTION INTRAMUSCULAR; INTRAVENOUS at 12:50

## 2024-10-05 RX ADMIN — IPRATROPIUM BROMIDE AND ALBUTEROL SULFATE 3 ML: 2.5; .5 SOLUTION RESPIRATORY (INHALATION) at 06:34

## 2024-10-05 RX ADMIN — GABAPENTIN 800 MG: 400 CAPSULE ORAL at 22:42

## 2024-10-05 RX ADMIN — OXYCODONE HYDROCHLORIDE 15 MG: 5 TABLET ORAL at 22:42

## 2024-10-05 RX ADMIN — MUPIROCIN 1 APPLICATION: 20 OINTMENT TOPICAL at 09:48

## 2024-10-05 RX ADMIN — IPRATROPIUM BROMIDE AND ALBUTEROL SULFATE 3 ML: 2.5; .5 SOLUTION RESPIRATORY (INHALATION) at 13:59

## 2024-10-05 RX ADMIN — HYDROMORPHONE HYDROCHLORIDE 0.5 MG: 1 INJECTION, SOLUTION INTRAMUSCULAR; INTRAVENOUS; SUBCUTANEOUS at 20:16

## 2024-10-05 RX ADMIN — OXYCODONE HYDROCHLORIDE 15 MG: 5 TABLET ORAL at 04:00

## 2024-10-05 RX ADMIN — MUPIROCIN 1 APPLICATION: 20 OINTMENT TOPICAL at 20:16

## 2024-10-05 RX ADMIN — DILTIAZEM HYDROCHLORIDE 30 MG: 30 TABLET, FILM COATED ORAL at 16:12

## 2024-10-05 RX ADMIN — HYDROMORPHONE HYDROCHLORIDE 0.5 MG: 1 INJECTION, SOLUTION INTRAMUSCULAR; INTRAVENOUS; SUBCUTANEOUS at 09:48

## 2024-10-05 RX ADMIN — GABAPENTIN 800 MG: 400 CAPSULE ORAL at 12:53

## 2024-10-05 RX ADMIN — DILTIAZEM HYDROCHLORIDE 30 MG: 30 TABLET, FILM COATED ORAL at 20:17

## 2024-10-05 RX ADMIN — FAMOTIDINE 20 MG: 20 TABLET, FILM COATED ORAL at 05:43

## 2024-10-05 RX ADMIN — Medication 400 MG: at 09:49

## 2024-10-05 RX ADMIN — AMPICILLIN SODIUM, SULBACTAM SODIUM 3 G: 2; 1 INJECTION, POWDER, FOR SOLUTION INTRAMUSCULAR; INTRAVENOUS at 18:22

## 2024-10-05 RX ADMIN — OXYCODONE HYDROCHLORIDE 15 MG: 5 TABLET ORAL at 17:20

## 2024-10-05 RX ADMIN — GABAPENTIN 800 MG: 400 CAPSULE ORAL at 05:42

## 2024-10-05 RX ADMIN — ESCITALOPRAM OXALATE 20 MG: 10 TABLET ORAL at 09:49

## 2024-10-05 RX ADMIN — GUAIFENESIN 1200 MG: 600 TABLET, EXTENDED RELEASE ORAL at 20:15

## 2024-10-05 RX ADMIN — Medication 10 ML: at 09:49

## 2024-10-05 RX ADMIN — GABAPENTIN 800 MG: 400 CAPSULE ORAL at 17:20

## 2024-10-05 NOTE — PLAN OF CARE
Goal Outcome Evaluation:  Plan of Care Reviewed With: patient   Pt A&Ox4, 2L NC, VSS. Pt stated that she did not feel well enough to ambulate from bed this shift. Incentive spirometer use, and weight shifting encouraged, pt voices understanding. Loss of L FA IV, LUE IV placed by VAT. PRN pain med administered for reports of chronic back pain. Suction at bedside. PRN deep oral suctioning orders. Call light within reach.

## 2024-10-05 NOTE — PROGRESS NOTES
[facility]  INPATIENT PROGRESS NOTE    Patient Name: Arminda Jiménez  Date of Admission: 9/24/2024  Today's Date: 10/05/24  Length of Stay: 11  Primary Care Physician: Teddy Sanchez MD    Subjective   Chief Complaint: Pain  HPI   Voice and coughing effort appeared may be slightly improved she is still requiring frequent suctioning and is very dysphonic.    Review of Systems   All pertinent negatives and positives are as above. All other systems have been reviewed and are negative unless otherwise stated.     Objective    Temp:  [97.6 °F (36.4 °C)-98.1 °F (36.7 °C)] 97.8 °F (36.6 °C)  Heart Rate:  [] 95  Resp:  [17-20] 20  BP: (114-144)/(64-77) 114/67  Physical Exam  Constitutional:       Appearance: She is ill-appearing.      Comments: Appears chronically ill, not distressed  HENT:      Head: Normocephalic.      Mouth/Throat:      Mouth: Mucous membranes are moist.   Neck:      Comments: Site of excisional biopsy right side of neck clean and dry  Cardiovascular:      Rate and Rhythm: Normal rate and regular rhythm.   Pulmonary:      Effort: Pulmonary effort is normal.      Breath sounds: Rhonchi present.      Comments: coarse bilateral BSs and upper airway crackles   Musculoskeletal:         General: No deformity.   Skin:     General: Skin is warm.      Findings: Bruising present.   Neurological:      Mental Status: She is alert.      Motor: Weakness present.   Psychiatric:         Mood and Affect: Mood normal.       Results Review:  I have reviewed the labs, radiology results, and diagnostic studies.    Laboratory Data:   Results from last 7 days   Lab Units 10/05/24  0353 10/04/24  0632 10/01/24  0519   WBC 10*3/mm3 4.64 3.92 9.40   HEMOGLOBIN g/dL 8.9* 8.2* 8.7*   HEMATOCRIT % 30.2* 27.5* 28.1*   PLATELETS 10*3/mm3 353 306 80*        Results from last 7 days   Lab Units 10/05/24  0353 10/04/24  0632 10/03/24  2047 10/03/24  0956 10/01/24  0519    SODIUM mmol/L 138 139  --  139 138   POTASSIUM mmol/L 4.2 4.3 4.2 3.2* 3.2*   CHLORIDE mmol/L 97* 99  --  100 100   CO2 mmol/L 32.0* 30.0*  --  31.0* 30.0*   BUN mg/dL 10 6*  --  5* 6*   CREATININE mg/dL 0.24* 0.25*  --  0.19* <0.17*   CALCIUM mg/dL 8.6 8.7  --  8.3* 8.1*   BILIRUBIN mg/dL  --   --   --   --  0.3   ALK PHOS U/L  --   --   --   --  297*   ALT (SGPT) U/L  --   --   --   --  36*   AST (SGOT) U/L  --   --   --   --  68*   GLUCOSE mg/dL 89 94  --  101* 102*       Culture Data:   Blood Culture   Date Value Ref Range Status   09/24/2024 Enterococcus hirae (C)  Final     Comment:       Infectious disease consultation is highly recommended.   09/24/2024 No growth at 5 days  Final       Radiology Data:   Imaging Results (Last 24 Hours)       ** No results found for the last 24 hours. **            I have reviewed the patient's current medications.     Assessment/Plan   Assessment  Active Hospital Problems    Diagnosis     **Dysphagia     Thrombocytopenia     Metastatic carcinoma     Weight loss     Cancer associated pain     Bacteremia due to Enterococcus     Aspiration pneumonia     Chronic pain     Severe malnutrition     Lymphadenopathy     Vocal cord weakness     COPD (chronic obstructive pulmonary disease)     Adenocarcinoma of unknown origin        Treatment Plan  Follow-up pathology from excisional biopsy of right cervical lymph node:  Right neck mass:  A.  Consistent with complete replacement of a lymph node by metastatic pulmonary adenocarcinoma.  B.  Extranodal extension of tumor and perineural invasion identified.  C.  Incidental adjacent benign lymph node.    Currently on 2LNC -  recently started on 2 L by nasal cannula in the outpatient setting.  Patient has been on IV Zosyn for coverage of aspiration pneumonia in the setting of known dysphagia.  1 blood culture has returned positive for Enterococcus hirae -- likely contaminant.  ID input noted.  IV Unasyn for suspected aspiration pneumonia.   She had 3 days of IV Zosyn and now on day 8 of IV Unasyn  Repeat CXR reviewed mild improvement in   the left basilar infiltrates   Trial of Metanebs ongoing  Added flutter valve  Chest physiotherapy  Nutrition supplements  ST following - modified diet including pureed diet with honey thick liquids to regular soft to chew chopped meat and honey thick liquids  Thrombocytopenia.  Platelet trend noted.  Patient had chemotherapy with Gemzar on 9/20/2024.  Hem/Onc following.  Repeat CBC with diff in AM to monitor counts.  Remain mindful that Abxs, including Unasyn, can also contribute to thrombocytopenia.  Currently on no Lovenox or heparin products. Dr Zambrano input noted.   12.  Palliative care following - discussed with Nika Castaneda  13.  Pain control.  oral Roxicodone; Neurontin.  She does have an implanted pain pump administering fentanyl-followed by Dr. Chahal of pain management  14.  XR of T and L spine with no acute findings  15.  PT and OT        Medical Decision Making  Number and Complexity of problems: 3 acute; moderate complexity        Conditions and Status        Condition is unchanged.     [facility] Data  External documents reviewed: none  Cardiac tracing (EKG, telemetry) interpretation: no new EKGs  Radiology interpretation: as above  Labs reviewed: as above  Any tests that were considered but not ordered: none     Decision rules/scores evaluated (example [id number], Wells, etc): none     Discussed with: Nika aguila with Palliative Care     Care Planning  Shared decision making: Discussed with patient with agreement to proceed with treatment plan as outlined  Code status and discussions: Full code     Disposition  Social Determinants of Health that impact treatment or disposition: None apparent at this time  I expect the patient to be discharged to home where she lives with her daughter hopefully in 2-3 days    Electronically signed by Flynn Quinones MD, 10/05/24, 16:00 CDT.

## 2024-10-05 NOTE — PROGRESS NOTES
Her          Progress Note    Patient name: Arminda Jiménez  Patient : 1960  VISIT # [id number]  MR #[id number]  Room:     Subjective   The patient feels about the same today.  She still has productive cough.  She has been afebrile.  Reviewed interval notes.  Discussed results of pathology with patient at length.  LORENA Mercado is in process.        S/p  excisional biopsy, right neck 2024 by Dr. Marek Roberts  Pathology   Right neck mass:  Consistent with complete replacement of a lymph node by metastatic pulmonary adenocarcinoma.  Extranodal extension of tumor and perineural invasion identified.  Incidental adjacent benign lymph node.  Molecular studies were requested of pathology 10/2/2024, to be sent off.     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  Dorcas was found to have stage IV adenocarcinoma of unknown primary in .  She was treated successfully with 4 courses of carboplatin Gemzar obtaining a complete remission that was sustained for 14 years.  Dorcas experienced a heel fracture 2 years ago.  After that event she has had chronic abdominal pain and has lost 50 pounds.  25 of the 50 pounds were lost over the course of the previous month.  Workup has documented widespread metastatic disease with mediastinal, retroperitoneal, gastrohepatic as well as left neck adenopathy.  An FNA of the left neck performed on 2024 reported malignant cells present consistent with metastatic carcinoma.  Further tissue was unavailable for molecular studies and further special stains.   Due to delays in further workup, Dr. Khan felt the patient needed to get on with his treatment and rechallenged her with carboplatin and Gemzar cycle #1 on 2024.   She has been referred to Dr. Marek Roberts with ENT at [facility] to obtain an excisional biopsy to be able to get tissue for molecular studies that  will include tissue of origin, NGS, IHC studies etc.   Dorcas was seen by Dr. Marek Roberts who recommended that she be evaluated in the ED at [facility] for admission due to difficulty swallowing getting choked on her food, pronounced weight loss and dehydration anticipating an open excisional biopsy on Friday, 9/27/2024.  Medical oncology consultation requested in continuity of care.           Detailed history copied from Dr. Khan's office note 9/20/2024     The patient is a pleasant 64 years old female who has a history of stage IV carcinoma of unknown primary site diagnosed in 2010 status post 4 cycles of carboplatin gemcitabine with complete response.  She has been in remission for 14 years.  More recently, she was found to have widespread metastatic disease with mediastinal adenopathy, retroperitoneal adenopathy, gastrohepatic mass and neck adenopathy.  She underwent ultrasound-guided FNA biopsy.  This result only in a scant amount of tissue for analysis.  She was then sent to ENT at [facility] for excisional biopsy.  Unfortunate, the patient missed appointment.  The patient was then sent to ENT at [facility].  ENT at [facility] referred the patient to [place], ENT for excisional biopsy.     Diagnosis  History of carcinoma no primary site, 2010  Liver lesions  Gastrohepatic space mass  Mediastinal adenopathy consistent cycles normal  Metastatic carcinoma, left neck lymph node, Sept 2024     Disease target  Mediastinal adenopathy  Hepatic lobe lesions  Pancreatic body/tail hypodense mass in the gastrohepatic ligament     Treatment summary  9/20/24 Initiate Carbo AUC 4 D1, Gemzar 800mg D1, D8 every 21 days. Treatment consent signed.          Cancer history  Arminda Jiménez was first seen by me on 9/5/2024.  I was consulted for findings of a gastric mass during patient was positioned with hospital.  The patient has a history of lung cancer, COPD.  She was a direct admit from her PCP office.  Patient has a 20  pounds weight loss.  She also had complained of dysphagia, nausea, dizziness decreased appetite.  The patient reports that she has a history of stage IV lung cancer diagnosed 10 years ago at [place].  She received chemotherapy and stay in remission.  She tells me that she did not have a biopsy performed at that time.  Apparently, review of medical record showed that she had a carcinoma of unknown primary site diagnosed October 2010.  She received 2 cycles of carboplatin and paclitaxel.  She had a reaction to Taxol during cycle #2.  She was switched to carboplatin gemcitabine, completed March 2019.  Sites of disease in the past involve the pericardial, pleural, mediastinal, and cervical involvement.   7/30/24 CT chest: Extensive necrotic mediastinal and bilateral hilar lymphadenopathy consistent with metastatic disease. Lymph node in the right right paratracheal lymph node measures 1 cm short axis axial image 34 at the thoracic inlet.  AP window lymph node measures 1.2 cm short axis on axial image 71 with additional 1 cm AP window lymph node axial image 76.  Precarinal lymph node measures 1.1 cm on axial image 85.  Partially necrotic anterior mediastinal lymph node measures 1.4 cm short axis image 88 with similar appearing anterior mediastinal lymph node measuring 1.3 cm short axis image 69.  A partially necrotic subcarinal lymph node measures 1.8 cm short axis on axial image 96.  Right hilar lymph node measures 1.1 cm short axis image 95.  Partially necrotic and calcified left hilar lymph node measures 1.4 cm short axis image 118.  Left hilar lymph node measures 1.1 cm short axis on axial image 96. These lymph nodes are new since 2022. There is a 2.2 x 1.5 cm nodule abutting the left heart border axial image 140, which is either within the pericardial region were within the left lung parenchyma.  There is a spiculated 0.8 x 0.5 x 0.8 cm left upper lobe nodule axial image 89 and coronal  image 31 which is also  new.  Probable focal bronchiectasis left upper lobe on axial image 76, less likely cavitary nodule.  0.3 cm left lower lobe nodule image 151 which may be endobronchial.  Question a 0.6 meters left lower lobe nodule along the diaphragm axial image 158.  Subpleural 0.4 cm right middle lobe nodule image 165.  Bilateral bronchial thickening some areas of subsegmental atelectasis noted in both lobes.  Moderate emphysema.  There is no pleural effusion or pneumothorax. Centrally low density gastrohepatic ligament mass measures 3.3 x 3.2 cm axial image 193 with some adjacent wall thickening suggested in the proximal stomach.   7/30/24 CT abdomen/pelvis: Marked gastric wall thickening proximal stomach and gastric body; neoplasm is suspected, Correlation with symptomatology and EGD advised, Cystic structure in the gastrohepatic ligament measuring up to 4.9 cm, may represent a necrotic lymph node or mass less likely pancreatic suggestive. Correlation with PET scan and/or MRI advised.    08/12/24 CT soft tissue neck:  Mild asymmetry of the vocal cords, secondary to the vocal cord paralysis.  No visible nodule or mass of the larynx, Mild necrotic adenopathy of the right neck, consistent with metastatic disease, Since the prior CT chest from 07/30/2024, grossly stable right paratracheal necrotic node at level of the thoracic inlet, also consistent with metastatic disease.   9/5/2024-upper EGD, GI-gastric bezoar to explain the CT scan of the abdomen findings.  No gastric tumor.  No active ulcer disease to explain patient's nausea vomiting.  Probable gastroparesis.  9/5/2024-CT chest, L:  There is a hypodense nodule in the left thyroid lobe measuring 1.2 cm.  A right tracheoesophageal lymph node is 1.9 cm in short axis on axial image number 16 with interval increase size from 1.2 cm in short axis when remeasured.  A right hilar lymph node is 1.1 cm in short axis without change.  A subcarinal lymph node is partially calcified and  measures 2.5 cm in short axis without change.  This has low attenuation.  An additional right hilar lymph node is 1.5 cm in short axis and is probably unchanged.  A left hilar lymph node is 1 cm in short axis without change.  An AP window lymph node is at least 2.3 cm and has increased in size.  Anterior mediastinal lymph node is now 1.6 cm in short axis, previously 1.3 cm.  A solid nodule in the lingula abuts the free wall of the left ventricle and measures 2.9 x 1.8 cm, previously 2.4 x 1.6 cm when remeasured. There is a small cavitary nodule in the left upper lobe measuring a cavitary nodule in the left upper lobe on series 604 image number 23  is unchanged measuring 0.6 cm. A solid spiculated nodule in the left upper lobe is probably overall unchanged measuring 0.8 cm x 0.7 cm on image number 26.  On there is a new solid juxta fissural nodule in the right middle lobe measuring 0.4 cm on image number 34.  There is an enlarging juxta fissural nodule in the right middle lobe measuring 0.3 cm image number 35.  The small to moderate pleural effusion layers dependently.  No left pleural fluid.  Bibasilar infiltrates are suggested. There is a large hypodense mass arising from the pancreatic body/tail extending to the gastrohepatic ligament measuring up to 5.4 x 4.1 cm.  This has increased in size.  There are multiple new hypodense hepatic lesions.    9/6/2024-CT thoracic and lumbar spine, MHL: Multiple chronic thoracic compression fractures, diffuse degenerative disc disease.  Postop changes of the lumbar spine.  Loosening of the right L3 transpedicular screw.  Remaining hardware appears well-positioned.  Rim-enhancing complex cystic lesion in the gastrohepatic space.  9/9/2024-FNA biopsy by radiology: Consistent metastatic carcinoma.  However, further characterization of tumor could not be performed due to scant material.  9/10/2024-CT abdomen pelvis with contrast, MHL: Interval development of mild to moderate  dependent atelectasis of both lower  lobes and mild patchy ground-glass and consolidation of the lingula and to a lesser extent the right middle lobe.  3.1 cm left epicardial nodule, increased in size from 2.5 cm.  Interval development of multiple ill-defined hypodense liver masses.  The largest, of the left lobe near the IVC measures 3.3 cm.  Stable mild prominence of the intrahepatic biliary tree and mild distension of the common duct, measuring up to 10 mm in diameter.  No visible obstructing stone or mass.  Cholecystectomy, again noted.  Interval increase in size of the previously seen mass between the liver left lobe, stomach, aorta, and superior aspect of the pancreatic body.  Measures 6.0 x 4.5 cm cross section, increased from 3.5 x 3.3 cm.  Inferiorly, the mass abuts and blends in per separately of the pancreas.  Calcified granulomas of the spleen, again noted.  The adrenal glands have a normal appearance.  The kidneys enhance symmetrically.  0.9 cm cyst of the left kidney, again noted.  No ureteral stones or hydronephrosis.  The uterus and ovaries have a normal appearance.  No evidence of bowel obstruction.  Moderate amount of stool in the colon, similar to before.  Small fat and fluid containing right inguinal hernia, stable.  No free air.  Interval development of trace fluid in the pelvis.  Electronic device in subcutaneous fat of the right flank, with catheter entering the spinal canal, again noted.  Bone window images show no significant lytic or sclerotic bone lesions.  Surgical fixation of the lumbar spine, again noted.  Chronic mild compression fracture of the L1 superior endplate, stable.  Impression:  Interval increase in size of the mass of the epigastric region, most consistent with a malignant process, potentially metastatic adenopathy or a pancreatic mass.  Interval development of multiple hepatic metastases. 3.  3.1 cm left epicardial nodule, increased in size, suspicious for metastatic  "adenopathy or metastatic pleural disease.   Interval development of small to moderate dependent right pleural effusion, with atelectasis versus pneumonia of both lower lobes.   9/20/24 Initiate palliative chemotherapy Carbo AUC 4 D1, Gemzar 800mg D1, D8 every 21 days. Treatment consent signed.    9/20/2024-I have called [facility] and refer the patient back.  She will be seen early next week.        Objective   /67 (BP Location: Right arm, Patient Position: Lying)   Pulse 105   Temp 97.8 °F (36.6 °C) (Oral)   Resp 18   Ht 154.9 cm (60.98\")   Wt 47.9 kg (105 lb 9.6 oz)   SpO2 93%   BMI 19.97 kg/m²   PHYSICAL EXAM:  CONSTITUTIONAL: Alert, appropriate, no acute distress, very thin, cachectic  EYES: Anicteric, EOM intact, pupils equal round   ENT: Mucous membranes moist, no oropharyngeal lesions   NECK: Supple, Cervical lymphadenopathy on the right   CHEST/LUNGS: CTA bilaterally, normal respiratory effort   CARDIOVASCULAR: RRR, no murmurs  ABDOMEN: soft nontender, active bowel sounds, no HSM  EXTREMITIES: warm, moves all fours  SKIN: warm, dry with no rashes or lesions  LYMPH: No cervical, clavicular, axillary, or inguinal lymphadenopathy  NEUROLOGIC: follows commands, nonfocal       CBC  Results from last 7 days   Lab Units 10/05/24  0353 10/04/24  0632 10/01/24  0519   WBC 10*3/mm3 4.64 3.92 9.40   HEMOGLOBIN g/dL 8.9* 8.2* 8.7*   HEMATOCRIT % 30.2* 27.5* 28.1*   PLATELETS 10*3/mm3 353 306 80*   LYMPHOCYTE % % 15.2* 24.5  --    MONOCYTES % % 21.2* 20.4*  --      Lab Results   Component Value Date    NEUTROABS 2.67 10/05/2024   CMP  Lab Results   Component Value Date    GLUCOSE 89 10/05/2024    BUN 10 10/05/2024    CREATININE 0.24 (L) 10/05/2024    EGFRIFNONA 114 01/07/2019    BCR 41.7 (H) 10/05/2024    CO2 32.0 (H) 10/05/2024    CALCIUM 8.6 10/05/2024    ALBUMIN 2.7 (L) 10/01/2024    AST 68 (H) 10/01/2024    ALT 36 (H) 10/01/2024         Blood Culture   Date Value Ref Range Status   09/24/2024 Abnormal Stain " (C)  Preliminary   09/24/2024 No growth at 24 hours  Preliminary       Imaging Results (Last 72 Hours)       ** No results found for the last 72 hours. 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  Dorcas was found to have stage IV adenocarcinoma of unknown primary in 2010.  She was treated successfully with 4 courses of carboplatin Gemzar obtaining a complete remission that was sustained for 14 years.  Dorcas experienced a heel fracture 2 years ago.  After that event she has had chronic abdominal pain and has lost 50 pounds.  25 of the 50 pounds were lost over the course of the previous month.  Workup has documented widespread metastatic disease with mediastinal, retroperitoneal, gastrohepatic as well as left neck adenopathy.  An FNA of the left neck performed on 9/9/2024 reported malignant cells present consistent with metastatic carcinoma.  Further tissue was unavailable for molecular studies and further special stains.   Due to delays in further workup, Dr. Khan felt the patient needed to get on with his treatment and rechallenged her with carboplatin and Gemzar cycle #1 on 9/20/2024.   She has been referred to Dr. Marek Roberts with ENT at [facility] to obtain an excisional biopsy to be able to get tissue for molecular studies that will include tissue of origin, NGS, IHC studies etc.   Dorcas was seen by Dr. Marek Roberts who recommended that she be evaluated in the ED at [facility] for admission due to difficulty swallowing getting choked  on her food, pronounced weight loss and dehydration anticipating an open excisional biopsy on Friday, 9/27/2024.      Lab Results   Component Value Date    WBC 4.64 10/05/2024    HGB 8.9 (L) 10/05/2024    HCT 30.2 (L) 10/05/2024    MCV 99.0 (H) 10/05/2024     10/05/2024     S/p  excisional biopsy, right neck 9/27/2024 by Dr. Marek Roberts  Pathology   Right neck mass:  Consistent with complete replacement of a lymph node by metastatic pulmonary adenocarcinoma.  Extranodal extension of tumor and perineural invasion identified.  Incidental adjacent benign lymph node.  Molecular studies were requested of pathology 10/2/2024, to be sent off. f.     Given the aspiration issues being addressed, the positive blood culture under treatment with IV antibiotics, it is in her best interest to hold off on Gemzar and resume cycle #2 of chemotherapy on 10/14/2024 at 11 AM with Dr. Khan.  Discussed with Dr. Khan who will be seeing her in the morning and for the next week.     At this point, we are awaiting results of NGS tissue to further guide her treatment options.  She has a very poor performance status.  Hopefully, NGS will show a targetable mutation or high levels PD-L1 or TMB so that she can receive more tolerable antineoplastic therapies.  Turnaround the distress is 2-3 weeks.  Discussed care plan with the patient at length today    #2  Dysphagia and aspiration    Patient states that she coughs every time she tries to eat something and swallow  Swallow evaluation 9/25/2024-documented laryngeal penetration and aspiration during swallow.  SLP Findings: Patient presents with severe pharyngeal dysphagia.   Recommendations: Diet Textures: honey thick liquid, puree consistency food. Medications should be taken whole with puree. May have Ice between meals after oral care, under staff or family supervision and with the recommended strategies for safe swallowing.  Recommended Strategies: Upright for PO, small bites and  sips, and alternate liquids and solids. Oral care before breakfast, after all meals and PRN.  Dysphagia therapy is recommended.      Discussed at length with Dr. Kentrell Villela, 9/27/2024, and her nurse Sheri DUGAN today.    Discussed at length with her nurse this morning, encouraging attempts at further suctioning to mobilize secretions.  Patient just does not have strength for cough to get her secretions up.    #3  Aspiration pneumonia    Chest x-ray, 1 view 9/24/2024 reported the following  Emphysematous changes of the lungs.   Patchy bibasilar infiltrates are present.    Blood culture 9/24/2024 reported the following:  Enterococcus hirae Critical      Discussed with Dr. Kentrell Villela, 9/27/2024  ID consult notes reviewed  She is on Unasyn, completed 7 days 10/04/24    #4  Pain secondary to #1-controlled    Dilaudid 0.5 mg IV every 4 Hrs PRN  Norco 5 mg PO Q 4 Hrs PRN          Gabino Khan MD  10/05/24  08:30 CDT

## 2024-10-05 NOTE — PLAN OF CARE
Goal Outcome Evaluation:  Plan of Care Reviewed With: patient        Progress: improving       VSS. 2L BL. Palliative. A/O. Tele, NS-ST. LFA IID. PRN pain meds given for c/ opain, effective per pt. SCDs. Meds whole in pudding/applesauce. Suction at bedside.  Resting comfortably, call light in reach. Safety maintained.

## 2024-10-06 LAB
ANION GAP SERPL CALCULATED.3IONS-SCNC: 9 MMOL/L (ref 5–15)
ANISOCYTOSIS BLD QL: ABNORMAL
BASOPHILS # BLD MANUAL: 0.06 10*3/MM3 (ref 0–0.2)
BASOPHILS NFR BLD MANUAL: 1 % (ref 0–1.5)
BUN SERPL-MCNC: 12 MG/DL (ref 8–23)
BUN/CREAT SERPL: 48 (ref 7–25)
CALCIUM SPEC-SCNC: 8.6 MG/DL (ref 8.6–10.5)
CHLORIDE SERPL-SCNC: 97 MMOL/L (ref 98–107)
CLUMPED PLATELETS: PRESENT
CO2 SERPL-SCNC: 33 MMOL/L (ref 22–29)
CREAT SERPL-MCNC: 0.25 MG/DL (ref 0.57–1)
DEPRECATED RDW RBC AUTO: 49.5 FL (ref 37–54)
EGFRCR SERPLBLD CKD-EPI 2021: 124 ML/MIN/1.73
EOSINOPHIL # BLD MANUAL: 0.06 10*3/MM3 (ref 0–0.4)
EOSINOPHIL NFR BLD MANUAL: 1 % (ref 0.3–6.2)
ERYTHROCYTE [DISTWIDTH] IN BLOOD BY AUTOMATED COUNT: 14.3 % (ref 12.3–15.4)
GIANT PLATELETS: ABNORMAL
GLUCOSE SERPL-MCNC: 101 MG/DL (ref 65–99)
HCT VFR BLD AUTO: 26.5 % (ref 34–46.6)
HGB BLD-MCNC: 8 G/DL (ref 12–15.9)
LYMPHOCYTES # BLD MANUAL: 0.94 10*3/MM3 (ref 0.7–3.1)
LYMPHOCYTES NFR BLD MANUAL: 18.4 % (ref 5–12)
MCH RBC QN AUTO: 29.3 PG (ref 26.6–33)
MCHC RBC AUTO-ENTMCNC: 30.2 G/DL (ref 31.5–35.7)
MCV RBC AUTO: 97.1 FL (ref 79–97)
METAMYELOCYTES NFR BLD MANUAL: 4.1 % (ref 0–0)
MONOCYTES # BLD: 1.13 10*3/MM3 (ref 0.1–0.9)
NEUTROPHILS # BLD AUTO: 3.57 10*3/MM3 (ref 1.7–7)
NEUTROPHILS NFR BLD MANUAL: 35.7 % (ref 42.7–76)
NEUTS BAND NFR BLD MANUAL: 22.4 % (ref 0–5)
NEUTS VAC BLD QL SMEAR: ABNORMAL
PLASMA CELL PREC NFR BLD MANUAL: 1 % (ref 0–0)
PLATELET # BLD AUTO: 403 10*3/MM3 (ref 140–450)
PMV BLD AUTO: 10.3 FL (ref 6–12)
POLYCHROMASIA BLD QL SMEAR: ABNORMAL
POTASSIUM SERPL-SCNC: 3.8 MMOL/L (ref 3.5–5.2)
PROMYELOCYTES NFR BLD MANUAL: 1 % (ref 0–0)
RBC # BLD AUTO: 2.73 10*6/MM3 (ref 3.77–5.28)
SODIUM SERPL-SCNC: 139 MMOL/L (ref 136–145)
VARIANT LYMPHS NFR BLD MANUAL: 13.3 % (ref 19.6–45.3)
VARIANT LYMPHS NFR BLD MANUAL: 2 % (ref 0–5)
WBC NRBC COR # BLD AUTO: 6.13 10*3/MM3 (ref 3.4–10.8)

## 2024-10-06 PROCEDURE — 25010000002 LORAZEPAM PER 2 MG: Performed by: FAMILY MEDICINE

## 2024-10-06 PROCEDURE — 85007 BL SMEAR W/DIFF WBC COUNT: CPT | Performed by: FAMILY MEDICINE

## 2024-10-06 PROCEDURE — 94799 UNLISTED PULMONARY SVC/PX: CPT

## 2024-10-06 PROCEDURE — 94664 DEMO&/EVAL PT USE INHALER: CPT

## 2024-10-06 PROCEDURE — 85025 COMPLETE CBC W/AUTO DIFF WBC: CPT | Performed by: FAMILY MEDICINE

## 2024-10-06 PROCEDURE — 94761 N-INVAS EAR/PLS OXIMETRY MLT: CPT

## 2024-10-06 PROCEDURE — 80048 BASIC METABOLIC PNL TOTAL CA: CPT | Performed by: FAMILY MEDICINE

## 2024-10-06 PROCEDURE — 25010000002 AMPICILLIN-SULBACTAM PER 1.5 G: Performed by: INTERNAL MEDICINE

## 2024-10-06 PROCEDURE — 25010000002 MORPHINE PER 10 MG: Performed by: FAMILY MEDICINE

## 2024-10-06 PROCEDURE — 25010000002 NALOXONE PER 1 MG

## 2024-10-06 PROCEDURE — 25010000002 HYDROMORPHONE PER 4 MG: Performed by: FAMILY MEDICINE

## 2024-10-06 RX ORDER — LORAZEPAM 2 MG/ML
0.5 INJECTION INTRAMUSCULAR
Status: DISCONTINUED | OUTPATIENT
Start: 2024-10-06 | End: 2024-10-07

## 2024-10-06 RX ORDER — LORAZEPAM 1 MG/1
2 TABLET ORAL
Status: DISCONTINUED | OUTPATIENT
Start: 2024-10-06 | End: 2024-10-07

## 2024-10-06 RX ORDER — LORAZEPAM 2 MG/ML
1 CONCENTRATE ORAL
Status: DISCONTINUED | OUTPATIENT
Start: 2024-10-06 | End: 2024-10-07

## 2024-10-06 RX ORDER — SCOLOPAMINE TRANSDERMAL SYSTEM 1 MG/1
1 PATCH, EXTENDED RELEASE TRANSDERMAL
Status: DISCONTINUED | OUTPATIENT
Start: 2024-10-06 | End: 2024-10-08 | Stop reason: HOSPADM

## 2024-10-06 RX ORDER — LORAZEPAM 2 MG/ML
2 INJECTION INTRAMUSCULAR
Status: DISCONTINUED | OUTPATIENT
Start: 2024-10-06 | End: 2024-10-07

## 2024-10-06 RX ORDER — LORAZEPAM 2 MG/ML
1 INJECTION INTRAMUSCULAR
Status: DISCONTINUED | OUTPATIENT
Start: 2024-10-06 | End: 2024-10-07

## 2024-10-06 RX ORDER — LORAZEPAM 2 MG/ML
1 INJECTION INTRAMUSCULAR EVERY 4 HOURS PRN
Status: DISCONTINUED | OUTPATIENT
Start: 2024-10-06 | End: 2024-10-07

## 2024-10-06 RX ORDER — LORAZEPAM 2 MG/ML
1 INJECTION INTRAMUSCULAR
Status: DISCONTINUED | OUTPATIENT
Start: 2024-10-06 | End: 2024-10-08 | Stop reason: HOSPADM

## 2024-10-06 RX ORDER — MORPHINE SULFATE 20 MG/ML
10 SOLUTION ORAL
Status: DISCONTINUED | OUTPATIENT
Start: 2024-10-06 | End: 2024-10-07

## 2024-10-06 RX ORDER — LORAZEPAM 2 MG/ML
0.5 CONCENTRATE ORAL
Status: DISCONTINUED | OUTPATIENT
Start: 2024-10-06 | End: 2024-10-08 | Stop reason: HOSPADM

## 2024-10-06 RX ORDER — LORAZEPAM 1 MG/1
1 TABLET ORAL
Status: DISCONTINUED | OUTPATIENT
Start: 2024-10-06 | End: 2024-10-07

## 2024-10-06 RX ORDER — LORAZEPAM 2 MG/ML
1 CONCENTRATE ORAL
Status: DISCONTINUED | OUTPATIENT
Start: 2024-10-06 | End: 2024-10-08 | Stop reason: HOSPADM

## 2024-10-06 RX ORDER — LORAZEPAM 2 MG/ML
0.5 CONCENTRATE ORAL
Status: DISCONTINUED | OUTPATIENT
Start: 2024-10-06 | End: 2024-10-07

## 2024-10-06 RX ORDER — LORAZEPAM 2 MG/ML
2 INJECTION INTRAMUSCULAR
Status: DISCONTINUED | OUTPATIENT
Start: 2024-10-06 | End: 2024-10-08 | Stop reason: HOSPADM

## 2024-10-06 RX ORDER — LORAZEPAM 0.5 MG/1
0.5 TABLET ORAL
Status: DISCONTINUED | OUTPATIENT
Start: 2024-10-06 | End: 2024-10-07

## 2024-10-06 RX ORDER — NALOXONE HYDROCHLORIDE 0.4 MG/ML
INJECTION, SOLUTION INTRAMUSCULAR; INTRAVENOUS; SUBCUTANEOUS
Status: COMPLETED
Start: 2024-10-06 | End: 2024-10-06

## 2024-10-06 RX ORDER — LORAZEPAM 2 MG/ML
2 CONCENTRATE ORAL
Status: DISCONTINUED | OUTPATIENT
Start: 2024-10-06 | End: 2024-10-08 | Stop reason: HOSPADM

## 2024-10-06 RX ORDER — LORAZEPAM 2 MG/ML
2 CONCENTRATE ORAL
Status: DISCONTINUED | OUTPATIENT
Start: 2024-10-06 | End: 2024-10-07

## 2024-10-06 RX ADMIN — AMPICILLIN SODIUM, SULBACTAM SODIUM 3 G: 2; 1 INJECTION, POWDER, FOR SOLUTION INTRAMUSCULAR; INTRAVENOUS at 06:21

## 2024-10-06 RX ADMIN — OXYCODONE HYDROCHLORIDE 15 MG: 5 TABLET ORAL at 06:20

## 2024-10-06 RX ADMIN — LORAZEPAM 0.5 MG: 2 INJECTION INTRAMUSCULAR; INTRAVENOUS at 20:44

## 2024-10-06 RX ADMIN — HYDROMORPHONE HYDROCHLORIDE 0.5 MG: 1 INJECTION, SOLUTION INTRAMUSCULAR; INTRAVENOUS; SUBCUTANEOUS at 08:23

## 2024-10-06 RX ADMIN — GABAPENTIN 800 MG: 400 CAPSULE ORAL at 06:20

## 2024-10-06 RX ADMIN — Medication 10 ML: at 08:23

## 2024-10-06 RX ADMIN — NALOXONE HYDROCHLORIDE 0.4 MG: 0.4 INJECTION, SOLUTION INTRAMUSCULAR; INTRAVENOUS; SUBCUTANEOUS at 14:31

## 2024-10-06 RX ADMIN — MUPIROCIN 1 APPLICATION: 20 OINTMENT TOPICAL at 08:24

## 2024-10-06 RX ADMIN — LORAZEPAM 1 MG: 2 INJECTION INTRAMUSCULAR; INTRAVENOUS at 14:55

## 2024-10-06 RX ADMIN — IPRATROPIUM BROMIDE AND ALBUTEROL SULFATE 3 ML: 2.5; .5 SOLUTION RESPIRATORY (INHALATION) at 10:28

## 2024-10-06 RX ADMIN — AMPICILLIN SODIUM, SULBACTAM SODIUM 3 G: 2; 1 INJECTION, POWDER, FOR SOLUTION INTRAMUSCULAR; INTRAVENOUS at 12:11

## 2024-10-06 RX ADMIN — MORPHINE SULFATE 4 MG: 4 INJECTION, SOLUTION INTRAMUSCULAR; INTRAVENOUS at 18:14

## 2024-10-06 RX ADMIN — DILTIAZEM HYDROCHLORIDE 30 MG: 30 TABLET, FILM COATED ORAL at 06:20

## 2024-10-06 RX ADMIN — Medication 10 ML: at 20:22

## 2024-10-06 RX ADMIN — Medication 400 MG: at 08:23

## 2024-10-06 RX ADMIN — AMPICILLIN SODIUM, SULBACTAM SODIUM 3 G: 2; 1 INJECTION, POWDER, FOR SOLUTION INTRAMUSCULAR; INTRAVENOUS at 00:12

## 2024-10-06 RX ADMIN — MUPIROCIN 1 APPLICATION: 20 OINTMENT TOPICAL at 20:23

## 2024-10-06 RX ADMIN — SCOLOPAMINE TRANSDERMAL SYSTEM 1 PATCH: 1 PATCH, EXTENDED RELEASE TRANSDERMAL at 18:00

## 2024-10-06 RX ADMIN — GABAPENTIN 800 MG: 400 CAPSULE ORAL at 12:11

## 2024-10-06 RX ADMIN — ESCITALOPRAM OXALATE 20 MG: 10 TABLET ORAL at 08:22

## 2024-10-06 RX ADMIN — FAMOTIDINE 20 MG: 20 TABLET, FILM COATED ORAL at 06:20

## 2024-10-06 RX ADMIN — IPRATROPIUM BROMIDE AND ALBUTEROL SULFATE 3 ML: 2.5; .5 SOLUTION RESPIRATORY (INHALATION) at 06:50

## 2024-10-06 RX ADMIN — IPRATROPIUM BROMIDE AND ALBUTEROL SULFATE 3 ML: 2.5; .5 SOLUTION RESPIRATORY (INHALATION) at 14:15

## 2024-10-06 RX ADMIN — Medication 250 MG: at 08:22

## 2024-10-06 RX ADMIN — GUAIFENESIN 1200 MG: 600 TABLET, EXTENDED RELEASE ORAL at 08:22

## 2024-10-06 NOTE — PROGRESS NOTES
[facility] Medicine Services  INPATIENT PROGRESS NOTE    Patient Name: Arminda Jiménez  Date of Admission: 9/24/2024  Today's Date: 10/06/24  Length of Stay: 12  Primary Care Physician: Teddy Sanchez MD    Subjective   Chief Complaint: Pain  HPI   Patient lethargic this morning with worsening swallowing coughing and breathing.  Rep response was called and patient requested to be changed to comfort measures family confirmed agreement with this wishes.    Review of Systems   All pertinent negatives and positives are as above. All other systems have been reviewed and are negative unless otherwise stated.     Objective    Temp:  [97.7 °F (36.5 °C)-98.1 °F (36.7 °C)] 97.7 °F (36.5 °C)  Heart Rate:  [88-98] 98  Resp:  [12-20] 12  BP: (120-149)/(68-83) 129/83  Physical Exam  Constitutional:       Appearance: She is ill-appearing.      Comments: Appears chronically ill, not distressed  HENT:      Head: Normocephalic.      Mouth/Throat:      Mouth: Mucous membranes are moist.   Neck:      Comments: Site of excisional biopsy right side of neck clean and dry  Cardiovascular:      Rate and Rhythm: Normal rate and regular rhythm.   Pulmonary:      Effort: Pulmonary effort is normal.      Breath sounds: Rhonchi present.      Comments: coarse bilateral BSs and upper airway crackles   Musculoskeletal:         General: No deformity.   Skin:     General: Skin is warm.      Findings: Bruising present.   Neurological:      Mental Status: She is alert.      Motor: Weakness present.   Psychiatric:         Mood and Affect: Mood normal.       Results Review:  I have reviewed the labs, radiology results, and diagnostic studies.    Laboratory Data:   Results from last 7 days   Lab Units 10/06/24  0427 10/05/24  0353 10/04/24  0632   WBC 10*3/mm3 6.13 4.64 3.92   HEMOGLOBIN g/dL 8.0* 8.9* 8.2*   HEMATOCRIT % 26.5* 30.2* 27.5*   PLATELETS 10*3/mm3 403 353 306        Results from last 7 days   Lab Units  10/06/24  0427 10/05/24  0353 10/04/24  0632 10/03/24  0956 10/01/24  0519   SODIUM mmol/L 139 138 139   < > 138   POTASSIUM mmol/L 3.8 4.2 4.3   < > 3.2*   CHLORIDE mmol/L 97* 97* 99   < > 100   CO2 mmol/L 33.0* 32.0* 30.0*   < > 30.0*   BUN mg/dL 12 10 6*   < > 6*   CREATININE mg/dL 0.25* 0.24* 0.25*   < > <0.17*   CALCIUM mg/dL 8.6 8.6 8.7   < > 8.1*   BILIRUBIN mg/dL  --   --   --   --  0.3   ALK PHOS U/L  --   --   --   --  297*   ALT (SGPT) U/L  --   --   --   --  36*   AST (SGOT) U/L  --   --   --   --  68*   GLUCOSE mg/dL 101* 89 94   < > 102*    < > = values in this interval not displayed.       Culture Data:   Blood Culture   Date Value Ref Range Status   09/24/2024 Enterococcus hirae (C)  Final     Comment:       Infectious disease consultation is highly recommended.   09/24/2024 No growth at 5 days  Final       Radiology Data:   Imaging Results (Last 24 Hours)       ** No results found for the last 24 hours. **            I have reviewed the patient's current medications.     Assessment/Plan   Assessment  Active Hospital Problems    Diagnosis     **Dysphagia     Thrombocytopenia     Metastatic carcinoma     Weight loss     Cancer associated pain     Bacteremia due to Enterococcus     Aspiration pneumonia     Chronic pain     Severe malnutrition     Lymphadenopathy     Vocal cord weakness     COPD (chronic obstructive pulmonary disease)     Adenocarcinoma of unknown origin        Treatment Plan  Start comfort measures.    Follow-up pathology from excisional biopsy of right cervical lymph node:  Right neck mass:  A.  Consistent with complete replacement of a lymph node by metastatic pulmonary adenocarcinoma.  B.  Extranodal extension of tumor and perineural invasion identified.  C.  Incidental adjacent benign lymph node.    Currently on 2LNC -  recently started on 2 L by nasal cannula in the outpatient setting.  Patient has been on IV Zosyn for coverage of aspiration pneumonia in the setting of known  dysphagia.  1 blood culture has returned positive for Enterococcus hirae -- likely contaminant.  ID input noted.  IV Unasyn for suspected aspiration pneumonia.  She had 3 days of IV Zosyn and now on day 8 of IV Unasyn  Repeat CXR reviewed mild improvement in   the left basilar infiltrates   Trial of Metanebs ongoing  Added flutter valve  Chest physiotherapy  Nutrition supplements  ST following - modified diet including pureed diet with honey thick liquids to regular soft to chew chopped meat and honey thick liquids  Thrombocytopenia.  Platelet trend noted.  Patient had chemotherapy with Gemzar on 9/20/2024.  Hem/Onc following.  Repeat CBC with diff in AM to monitor counts.  Remain mindful that Abxs, including Unasyn, can also contribute to thrombocytopenia.  Currently on no Lovenox or heparin products. Dr Zambrano input noted.   12.  Palliative care following - discussed with Nika Castaneda  13.  Pain control.  oral Roxicodone; Neurontin.  She does have an implanted pain pump administering fentanyl-followed by Dr. Chahal of pain management  14.  XR of T and L spine with no acute findings  15.  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  She was evaluated by ENT and had a lymph node biopsy of right cervical node.  Infectious disease was consulted for positive blood culture with Enterococcus which apparently send admission, she was treated for aspiration pneumonia with 8 days of Unasyn IV.    Concern workup documented widespread metastatic disease with mild sternal, retroperitoneal, gastrohepatic and neck adenopathy.  Right neck biopsy was consistent with metastatic pulmonary adenocarcinoma completely replacing the lymph node and perineural invasion.  It was recommended to hold Gemzar due to current  positive blood culture functional status and aspiration.      ENT recommended speech therapy evaluation.  She had a video swallow fluoroscopy and and diet recommendations.  She has continued to have poor oral intake and requiring suctioning to clear secretions.  Despite maximal efforts she has not shown much improvement and this afternoon she has become more debilitated and RRT was called.  At that time there was concern for respiratory depression and suctioning yielded significant amount of food contents.  The patient and family requested comfort measures.        Medical Decision Making  Number and Complexity of problems: 3 acute; moderate complexity        Conditions and Status        Condition is unchanged.     [facility] Data  External documents reviewed: none  Cardiac tracing (EKG, telemetry) interpretation: no new EKGs  Radiology interpretation: as above  Labs reviewed: as above  Any tests that were considered but not ordered: none     Decision rules/scores evaluated (example [id number], Wells, etc): none     Discussed with: Nika aguila with Palliative Care     Care Planning  Shared decision making: Discussed with patient with agreement to proceed with treatment plan as outlined  Code status and discussions: Full code     Disposition  Social Determinants of Health that impact treatment or disposition: None apparent at this time  I expect the patient to be discharged to home where she lives with her daughter hopefully in 2-3 days    Electronically signed by Flynn Quinones MD, 10/06/24, 10:59 CDT.

## 2024-10-06 NOTE — PROGRESS NOTES
Progress Note    Patient name: Arminda Jiménez  Patient : 1960  VISIT # [id number]  MR #[id number]  Room:     Subjective   She tells me that she feels a little bit better today.  She denies any chest pain.  Still complain of short of breath.  She has a best friend at bedside this morning.  She had questions regarding her pathology.  Discussed results of pathology with patient at length.  NGS [place] is in process.        S/p  excisional biopsy, right neck 2024 by Dr. Marek Roberts  Pathology   Right neck mass:  Consistent with complete replacement of a lymph node by metastatic pulmonary adenocarcinoma.  Extranodal extension of tumor and perineural invasion identified.  Incidental adjacent benign lymph node.  Molecular studies were requested of pathology 10/2/2024, to be sent off.     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  Dorcas was found to have stage IV adenocarcinoma of unknown primary in .  She was treated successfully with 4 courses of carboplatin Gemzar obtaining a complete remission that was sustained for 14 years.  Dorcas experienced a heel fracture 2 years ago.  After that event she has had chronic abdominal pain and has lost 50 pounds.  25 of the 50 pounds were lost over the course of the previous month.  Workup has documented widespread metastatic disease with mediastinal, retroperitoneal, gastrohepatic as well as left neck adenopathy.  An FNA of the left neck performed on 2024 reported malignant cells present consistent with metastatic carcinoma.  Further tissue was unavailable for molecular studies and further special stains.   Due to delays in further workup, Dr. Khan felt the patient needed to get on with his treatment and rechallenged her with carboplatin and Gemzar cycle #1 on 2024.   She has been referred to Dr. Marek Roberts with ENT at [facility]  to obtain an excisional biopsy to be able to get tissue for molecular studies that will include tissue of origin, NGS, IHC studies etc.   Dorcas was seen by Dr. Marek Roberts who recommended that she be evaluated in the ED at [facility] for admission due to difficulty swallowing getting choked on her food, pronounced weight loss and dehydration anticipating an open excisional biopsy on Friday, 9/27/2024.  Medical oncology consultation requested in continuity of care.           Detailed history copied from Dr. Khan's office note 9/20/2024     The patient is a pleasant 64 years old female who has a history of stage IV carcinoma of unknown primary site diagnosed in 2010 status post 4 cycles of carboplatin gemcitabine with complete response.  She has been in remission for 14 years.  More recently, she was found to have widespread metastatic disease with mediastinal adenopathy, retroperitoneal adenopathy, gastrohepatic mass and neck adenopathy.  She underwent ultrasound-guided FNA biopsy.  This result only in a scant amount of tissue for analysis.  She was then sent to ENT at [facility] for excisional biopsy.  Unfortunate, the patient missed appointment.  The patient was then sent to ENT at [facility].  ENT at [facility] referred the patient to [place], ENT for excisional biopsy.     Diagnosis  History of carcinoma no primary site, 2010  Liver lesions  Gastrohepatic space mass  Mediastinal adenopathy consistent cycles normal  Metastatic carcinoma, left neck lymph node, Sept 2024     Disease target  Mediastinal adenopathy  Hepatic lobe lesions  Pancreatic body/tail hypodense mass in the gastrohepatic ligament     Treatment summary  9/20/24 Initiate Carbo AUC 4 D1, Gemzar 800mg D1, D8 every 21 days. Treatment consent signed.          Cancer history  Arminda Jiménez was first seen by me on 9/5/2024.  I was consulted for findings of a gastric mass during patient was positioned with hospital.  The patient has a history of  lung cancer, COPD.  She was a direct admit from her PCP office.  Patient has a 20 pounds weight loss.  She also had complained of dysphagia, nausea, dizziness decreased appetite.  The patient reports that she has a history of stage IV lung cancer diagnosed 10 years ago at [place].  She received chemotherapy and stay in remission.  She tells me that she did not have a biopsy performed at that time.  Apparently, review of medical record showed that she had a carcinoma of unknown primary site diagnosed October 2010.  She received 2 cycles of carboplatin and paclitaxel.  She had a reaction to Taxol during cycle #2.  She was switched to carboplatin gemcitabine, completed March 2019.  Sites of disease in the past involve the pericardial, pleural, mediastinal, and cervical involvement.   7/30/24 CT chest: Extensive necrotic mediastinal and bilateral hilar lymphadenopathy consistent with metastatic disease. Lymph node in the right right paratracheal lymph node measures 1 cm short axis axial image 34 at the thoracic inlet.  AP window lymph node measures 1.2 cm short axis on axial image 71 with additional 1 cm AP window lymph node axial image 76.  Precarinal lymph node measures 1.1 cm on axial image 85.  Partially necrotic anterior mediastinal lymph node measures 1.4 cm short axis image 88 with similar appearing anterior mediastinal lymph node measuring 1.3 cm short axis image 69.  A partially necrotic subcarinal lymph node measures 1.8 cm short axis on axial image 96.  Right hilar lymph node measures 1.1 cm short axis image 95.  Partially necrotic and calcified left hilar lymph node measures 1.4 cm short axis image 118.  Left hilar lymph node measures 1.1 cm short axis on axial image 96. These lymph nodes are new since 2022. There is a 2.2 x 1.5 cm nodule abutting the left heart border axial image 140, which is either within the pericardial region were within the left lung parenchyma.  There is a spiculated 0.8 x 0.5 x  0.8 cm left upper lobe nodule axial image 89 and coronal  image 31 which is also new.  Probable focal bronchiectasis left upper lobe on axial image 76, less likely cavitary nodule.  0.3 cm left lower lobe nodule image 151 which may be endobronchial.  Question a 0.6 meters left lower lobe nodule along the diaphragm axial image 158.  Subpleural 0.4 cm right middle lobe nodule image 165.  Bilateral bronchial thickening some areas of subsegmental atelectasis noted in both lobes.  Moderate emphysema.  There is no pleural effusion or pneumothorax. Centrally low density gastrohepatic ligament mass measures 3.3 x 3.2 cm axial image 193 with some adjacent wall thickening suggested in the proximal stomach.   7/30/24 CT abdomen/pelvis: Marked gastric wall thickening proximal stomach and gastric body; neoplasm is suspected, Correlation with symptomatology and EGD advised, Cystic structure in the gastrohepatic ligament measuring up to 4.9 cm, may represent a necrotic lymph node or mass less likely pancreatic suggestive. Correlation with PET scan and/or MRI advised.    08/12/24 CT soft tissue neck:  Mild asymmetry of the vocal cords, secondary to the vocal cord paralysis.  No visible nodule or mass of the larynx, Mild necrotic adenopathy of the right neck, consistent with metastatic disease, Since the prior CT chest from 07/30/2024, grossly stable right paratracheal necrotic node at level of the thoracic inlet, also consistent with metastatic disease.   9/5/2024-upper EGD, GI-gastric bezoar to explain the CT scan of the abdomen findings.  No gastric tumor.  No active ulcer disease to explain patient's nausea vomiting.  Probable gastroparesis.  9/5/2024-CT chest, [facility]:  There is a hypodense nodule in the left thyroid lobe measuring 1.2 cm.  A right tracheoesophageal lymph node is 1.9 cm in short axis on axial image number 16 with interval increase size from 1.2 cm in short axis when remeasured.  A right hilar lymph node is 1.1 cm  in short axis without change.  A subcarinal lymph node is partially calcified and measures 2.5 cm in short axis without change.  This has low attenuation.  An additional right hilar lymph node is 1.5 cm in short axis and is probably unchanged.  A left hilar lymph node is 1 cm in short axis without change.  An AP window lymph node is at least 2.3 cm and has increased in size.  Anterior mediastinal lymph node is now 1.6 cm in short axis, previously 1.3 cm.  A solid nodule in the lingula abuts the free wall of the left ventricle and measures 2.9 x 1.8 cm, previously 2.4 x 1.6 cm when remeasured. There is a small cavitary nodule in the left upper lobe measuring a cavitary nodule in the left upper lobe on series 604 image number 23  is unchanged measuring 0.6 cm. A solid spiculated nodule in the left upper lobe is probably overall unchanged measuring 0.8 cm x 0.7 cm on image number 26.  On there is a new solid juxta fissural nodule in the right middle lobe measuring 0.4 cm on image number 34.  There is an enlarging juxta fissural nodule in the right middle lobe measuring 0.3 cm image number 35.  The small to moderate pleural effusion layers dependently.  No left pleural fluid.  Bibasilar infiltrates are suggested. There is a large hypodense mass arising from the pancreatic body/tail extending to the gastrohepatic ligament measuring up to 5.4 x 4.1 cm.  This has increased in size.  There are multiple new hypodense hepatic lesions.    9/6/2024-CT thoracic and lumbar spine, [facility]: Multiple chronic thoracic compression fractures, diffuse degenerative disc disease.  Postop changes of the lumbar spine.  Loosening of the right L3 transpedicular screw.  Remaining hardware appears well-positioned.  Rim-enhancing complex cystic lesion in the gastrohepatic space.  9/9/2024-FNA biopsy by radiology: Consistent metastatic carcinoma.  However, further characterization of tumor could not be performed due to scant material.  9/10/2024-CT  abdomen pelvis with contrast, [facility]: Interval development of mild to moderate dependent atelectasis of both lower  lobes and mild patchy ground-glass and consolidation of the lingula and to a lesser extent the right middle lobe.  3.1 cm left epicardial nodule, increased in size from 2.5 cm.  Interval development of multiple ill-defined hypodense liver masses.  The largest, of the left lobe near the IVC measures 3.3 cm.  Stable mild prominence of the intrahepatic biliary tree and mild distension of the common duct, measuring up to 10 mm in diameter.  No visible obstructing stone or mass.  Cholecystectomy, again noted.  Interval increase in size of the previously seen mass between the liver left lobe, stomach, aorta, and superior aspect of the pancreatic body.  Measures 6.0 x 4.5 cm cross section, increased from 3.5 x 3.3 cm.  Inferiorly, the mass abuts and blends in per separately of the pancreas.  Calcified granulomas of the spleen, again noted.  The adrenal glands have a normal appearance.  The kidneys enhance symmetrically.  0.9 cm cyst of the left kidney, again noted.  No ureteral stones or hydronephrosis.  The uterus and ovaries have a normal appearance.  No evidence of bowel obstruction.  Moderate amount of stool in the colon, similar to before.  Small fat and fluid containing right inguinal hernia, stable.  No free air.  Interval development of trace fluid in the pelvis.  Electronic device in subcutaneous fat of the right flank, with catheter entering the spinal canal, again noted.  Bone window images show no significant lytic or sclerotic bone lesions.  Surgical fixation of the lumbar spine, again noted.  Chronic mild compression fracture of the L1 superior endplate, stable.  Impression:  Interval increase in size of the mass of the epigastric region, most consistent with a malignant process, potentially metastatic adenopathy or a pancreatic mass.  Interval development of multiple hepatic metastases. 3.  3.1 cm  "left epicardial nodule, increased in size, suspicious for metastatic adenopathy or metastatic pleural disease.   Interval development of small to moderate dependent right pleural effusion, with atelectasis versus pneumonia of both lower lobes.   9/20/24 Initiate palliative chemotherapy Carbo AUC 4 D1, Gemzar 800mg D1, D8 every 21 days. Treatment consent signed.    9/20/2024-I have called [facility]NT and refer the patient back.  She will be seen early next week.        Objective   /75 (BP Location: Right arm, Patient Position: Sitting)   Pulse 93   Temp 97.7 °F (36.5 °C) (Oral)   Resp 18   Ht 154.9 cm (60.98\")   Wt 50.2 kg (110 lb 10.7 oz)   SpO2 93%   BMI 20.92 kg/m²   PHYSICAL EXAM:  CONSTITUTIONAL: Alert, appropriate, no acute distress, very thin, cachectic  EYES: Anicteric, EOM intact, pupils equal round   ENT: Mucous membranes moist, no oropharyngeal lesions   NECK: Supple, Cervical lymphadenopathy on the right   CHEST/LUNGS: CTA bilaterally, normal respiratory effort   CARDIOVASCULAR: RRR, no murmurs  ABDOMEN: soft nontender, active bowel sounds, no HSM  EXTREMITIES: warm, moves all fours  SKIN: warm, dry with no rashes or lesions  LYMPH: No cervical, clavicular, axillary, or inguinal lymphadenopathy  NEUROLOGIC: follows commands, nonfocal       CBC  Results from last 7 days   Lab Units 10/06/24  0427 10/05/24  0353 10/04/24  0632   WBC 10*3/mm3 6.13 4.64 3.92   HEMOGLOBIN g/dL 8.0* 8.9* 8.2*   HEMATOCRIT % 26.5* 30.2* 27.5*   PLATELETS 10*3/mm3 403 353 306   LYMPHOCYTE % % 13.3* 15.2* 24.5   MONOCYTES % % 18.4* 21.2* 20.4*     Lab Results   Component Value Date    NEUTROABS 3.57 10/06/2024   CMP  Lab Results   Component Value Date    GLUCOSE 101 (H) 10/06/2024    BUN 12 10/06/2024    CREATININE 0.25 (L) 10/06/2024    EGFRIFNONA 114 01/07/2019    BCR 48.0 (H) 10/06/2024    CO2 33.0 (H) 10/06/2024    CALCIUM 8.6 10/06/2024    ALBUMIN 2.7 (L) 10/01/2024    AST 68 (H) 10/01/2024    ALT 36 (H) 10/01/2024 "         Blood Culture   Date Value Ref Range Status   09/24/2024 Abnormal Stain (C)  Preliminary   09/24/2024 No growth at 24 hours  Preliminary       Imaging Results (Last 72 Hours)       ** No results found for the last 72 hours. 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  Dorcas was found to have stage IV adenocarcinoma of unknown primary in 2010.  She was treated successfully with 4 courses of carboplatin Gemzar obtaining a complete remission that was sustained for 14 years.  Dorcas experienced a heel fracture 2 years ago.  After that event she has had chronic abdominal pain and has lost 50 pounds.  25 of the 50 pounds were lost over the course of the previous month.  Workup has documented widespread metastatic disease with mediastinal, retroperitoneal, gastrohepatic as well as left neck adenopathy.  An FNA of the left neck performed on 9/9/2024 reported malignant cells present consistent with metastatic carcinoma.  Further tissue was unavailable for molecular studies and further special stains.   Due to delays in further workup, Dr. Khan felt the patient needed to get on with his treatment and rechallenged her with carboplatin and Gemzar cycle #1 on 9/20/2024.   She has been referred to Dr. Marek Roberts with ENT at [facility] to obtain an excisional biopsy to be able to get tissue for molecular studies that will include tissue of origin, NGS, IHC studies etc.   Dorcas was seen by Dr. Marek Roberts who recommended that she be  evaluated in the ED at [facility] for admission due to difficulty swallowing getting choked on her food, pronounced weight loss and dehydration anticipating an open excisional biopsy on Friday, 9/27/2024.      Lab Results   Component Value Date    WBC 6.13 10/06/2024    HGB 8.0 (L) 10/06/2024    HCT 26.5 (L) 10/06/2024    MCV 97.1 (H) 10/06/2024     10/06/2024     S/p  excisional biopsy, right neck 9/27/2024 by Dr. Marek Roberts  Pathology   Right neck mass:  Consistent with complete replacement of a lymph node by metastatic pulmonary adenocarcinoma.  Extranodal extension of tumor and perineural invasion identified.  Incidental adjacent benign lymph node.  Molecular studies were requested of pathology 10/2/2024, to be sent off. f.     Given the aspiration issues being addressed, the positive blood culture under treatment with IV antibiotics, it is in her best interest to hold off on Gemzar and resume cycle #2 of chemotherapy on 10/14/2024 at 11 AM with Dr. Khan.  Discussed with Dr. Khan who will be seeing her in the morning and for the next week.     At this point, we are awaiting results of NGS tissue to further guide her treatment options.  She has a very poor performance status.  Hopefully, NGS will show a targetable mutation or high levels PD-L1 or TMB so that she can receive more tolerable antineoplastic therapies.  Turnaround this test is 2-3 weeks.  Discussed care plan with the patient at length today    #2  Dysphagia and aspiration    Patient states that she coughs every time she tries to eat something and swallow  Swallow evaluation 9/25/2024-documented laryngeal penetration and aspiration during swallow.  SLP Findings: Patient presents with severe pharyngeal dysphagia.   Recommendations: Diet Textures: honey thick liquid, puree consistency food. Medications should be taken whole with puree. May have Ice between meals after oral care, under staff or family supervision and with the recommended  strategies for safe swallowing.  Recommended Strategies: Upright for PO, small bites and sips, and alternate liquids and solids. Oral care before breakfast, after all meals and PRN.  Dysphagia therapy is recommended.      Discussed at length with Dr. Kentrell Villela, 9/27/2024, and her nurse Sheri DUGAN today.    Discussed at length with her nurse this morning, encouraging attempts at further suctioning to mobilize secretions.  Patient just does not have strength for cough to get her secretions up.    #3  Aspiration pneumonia    Chest x-ray, 1 view 9/24/2024 reported the following  Emphysematous changes of the lungs.   Patchy bibasilar infiltrates are present.    Blood culture 9/24/2024 reported the following:  Enterococcus hirae Critical  -contaminant    Discussed with Dr. Kentrell Villela, 9/27/2024  ID consult notes reviewed  She is on Unasyn, completed 7 days 10/04/24    #4  Pain secondary to #1-controlled    Dilaudid 0.5 mg IV every 4 Hrs PRN  Norco 5 mg PO Q 4 Hrs PRN          Gabino Khan MD  10/06/24  08:43 CDT

## 2024-10-06 NOTE — PLAN OF CARE
Goal Outcome Evaluation:  Plan of Care Reviewed With: patient        Progress: no change       VSS. 2L NC. A/Ox4. Neutropenic precautions. Voiding, PW. Up with PT-dayshift. SCDs. LFA (VAT) IID. PRN pain meds given for c/o pain, some relief per pt. Meds whole, applesauce. Tele, NS-ST. Self suctions. Resting comfortably, call light in reach, safety maintained.

## 2024-10-06 NOTE — PLAN OF CARE
Goal Outcome Evaluation:           Progress: declining     Pt A/O. 3L NC. IV to LFA, IID. SCDs. Deep Suctioned PRN. C/o pain, PRN pain meds given. Nevaeh. Tele d/c. Last BM 10/5.      RRT was called on pt at 1452 due to RR of 6 per min. Gave Narcan. Pt became more aroused. MD (Mike [facility] Jessee) discussed with pt about comfort measures and code status, pt was agreeable and now DNR and comfort measures. Family was the notified. PRN ativan was given.    Pt resting comfortable at this time. Family at bedside. Safety Maintained. Call light in reach.

## 2024-10-07 ENCOUNTER — TELEPHONE (OUTPATIENT)
Dept: OTOLARYNGOLOGY | Facility: CLINIC | Age: 64
End: 2024-10-07
Payer: MEDICARE

## 2024-10-07 PROCEDURE — 25010000002 LORAZEPAM PER 2 MG: Performed by: FAMILY MEDICINE

## 2024-10-07 PROCEDURE — 25010000002 MORPHINE PER 10 MG: Performed by: CLINICAL NURSE SPECIALIST

## 2024-10-07 PROCEDURE — 99232 SBSQ HOSP IP/OBS MODERATE 35: CPT | Performed by: CLINICAL NURSE SPECIALIST

## 2024-10-07 RX ORDER — MORPHINE SULFATE 20 MG/ML
10 SOLUTION ORAL
Status: DISCONTINUED | OUTPATIENT
Start: 2024-10-07 | End: 2024-10-08 | Stop reason: HOSPADM

## 2024-10-07 RX ORDER — IPRATROPIUM BROMIDE AND ALBUTEROL SULFATE 2.5; .5 MG/3ML; MG/3ML
3 SOLUTION RESPIRATORY (INHALATION) EVERY 4 HOURS PRN
Status: DISCONTINUED | OUTPATIENT
Start: 2024-10-07 | End: 2024-10-08 | Stop reason: HOSPADM

## 2024-10-07 RX ORDER — HALOPERIDOL 5 MG/ML
1 INJECTION INTRAMUSCULAR EVERY 4 HOURS PRN
Status: DISCONTINUED | OUTPATIENT
Start: 2024-10-07 | End: 2024-10-08 | Stop reason: HOSPADM

## 2024-10-07 RX ORDER — DIPHENHYDRAMINE HYDROCHLORIDE 50 MG/ML
25 INJECTION INTRAMUSCULAR; INTRAVENOUS EVERY 6 HOURS PRN
Status: DISCONTINUED | OUTPATIENT
Start: 2024-10-07 | End: 2024-10-08 | Stop reason: HOSPADM

## 2024-10-07 RX ORDER — ACETAMINOPHEN 160 MG/5ML
650 SOLUTION ORAL EVERY 4 HOURS PRN
Status: DISCONTINUED | OUTPATIENT
Start: 2024-10-07 | End: 2024-10-08 | Stop reason: HOSPADM

## 2024-10-07 RX ORDER — PROMETHAZINE HYDROCHLORIDE 12.5 MG/1
12.5 SUPPOSITORY RECTAL EVERY 4 HOURS PRN
Status: DISCONTINUED | OUTPATIENT
Start: 2024-10-07 | End: 2024-10-08 | Stop reason: HOSPADM

## 2024-10-07 RX ORDER — FUROSEMIDE 10 MG/ML
20 INJECTION INTRAMUSCULAR; INTRAVENOUS EVERY 6 HOURS PRN
Status: DISCONTINUED | OUTPATIENT
Start: 2024-10-07 | End: 2024-10-08 | Stop reason: HOSPADM

## 2024-10-07 RX ORDER — HALOPERIDOL 5 MG/ML
2 INJECTION INTRAMUSCULAR EVERY 4 HOURS PRN
Status: DISCONTINUED | OUTPATIENT
Start: 2024-10-07 | End: 2024-10-08 | Stop reason: HOSPADM

## 2024-10-07 RX ORDER — ACETAMINOPHEN 325 MG/1
650 TABLET ORAL EVERY 4 HOURS PRN
Status: DISCONTINUED | OUTPATIENT
Start: 2024-10-07 | End: 2024-10-08 | Stop reason: HOSPADM

## 2024-10-07 RX ORDER — PROMETHAZINE HYDROCHLORIDE 25 MG/1
12.5 TABLET ORAL EVERY 4 HOURS PRN
Status: DISCONTINUED | OUTPATIENT
Start: 2024-10-07 | End: 2024-10-08 | Stop reason: HOSPADM

## 2024-10-07 RX ORDER — HALOPERIDOL 2 MG/ML
1 SOLUTION ORAL EVERY 4 HOURS PRN
Status: DISCONTINUED | OUTPATIENT
Start: 2024-10-07 | End: 2024-10-08 | Stop reason: HOSPADM

## 2024-10-07 RX ORDER — ATROPINE SULFATE 10 MG/ML
2 SOLUTION/ DROPS OPHTHALMIC 2 TIMES DAILY PRN
Status: DISCONTINUED | OUTPATIENT
Start: 2024-10-07 | End: 2024-10-08 | Stop reason: HOSPADM

## 2024-10-07 RX ORDER — HALOPERIDOL 2 MG/ML
2 SOLUTION ORAL EVERY 4 HOURS PRN
Status: DISCONTINUED | OUTPATIENT
Start: 2024-10-07 | End: 2024-10-08 | Stop reason: HOSPADM

## 2024-10-07 RX ORDER — DIPHENOXYLATE HCL/ATROPINE 2.5-.025MG
1 TABLET ORAL
Status: DISCONTINUED | OUTPATIENT
Start: 2024-10-07 | End: 2024-10-08 | Stop reason: HOSPADM

## 2024-10-07 RX ORDER — MORPHINE SULFATE 20 MG/ML
20 SOLUTION ORAL
Status: DISCONTINUED | OUTPATIENT
Start: 2024-10-07 | End: 2024-10-08 | Stop reason: HOSPADM

## 2024-10-07 RX ORDER — ACETAMINOPHEN 650 MG/1
650 SUPPOSITORY RECTAL EVERY 4 HOURS PRN
Status: DISCONTINUED | OUTPATIENT
Start: 2024-10-07 | End: 2024-10-08 | Stop reason: HOSPADM

## 2024-10-07 RX ORDER — DIPHENHYDRAMINE HCL 25 MG
25 CAPSULE ORAL EVERY 6 HOURS PRN
Status: DISCONTINUED | OUTPATIENT
Start: 2024-10-07 | End: 2024-10-08 | Stop reason: HOSPADM

## 2024-10-07 RX ADMIN — LORAZEPAM 2 MG: 2 INJECTION INTRAMUSCULAR; INTRAVENOUS at 18:57

## 2024-10-07 RX ADMIN — MUPIROCIN 1 APPLICATION: 20 OINTMENT TOPICAL at 09:23

## 2024-10-07 RX ADMIN — Medication 10 ML: at 20:45

## 2024-10-07 RX ADMIN — LORAZEPAM 2 MG: 2 INJECTION INTRAMUSCULAR; INTRAVENOUS at 15:15

## 2024-10-07 RX ADMIN — MORPHINE SULFATE 4 MG: 4 INJECTION, SOLUTION INTRAMUSCULAR; INTRAVENOUS at 23:28

## 2024-10-07 RX ADMIN — LORAZEPAM 1 MG: 2 INJECTION INTRAMUSCULAR; INTRAVENOUS at 07:24

## 2024-10-07 RX ADMIN — MORPHINE SULFATE 10 MG: 20 SOLUTION ORAL at 17:10

## 2024-10-07 RX ADMIN — Medication 10 ML: at 09:23

## 2024-10-07 RX ADMIN — ATROPINE SULFATE 2 DROP: 10 SOLUTION/ DROPS OPHTHALMIC at 23:29

## 2024-10-07 RX ADMIN — MUPIROCIN 1 APPLICATION: 20 OINTMENT TOPICAL at 20:45

## 2024-10-07 RX ADMIN — MORPHINE SULFATE 4 MG: 4 INJECTION, SOLUTION INTRAMUSCULAR; INTRAVENOUS at 10:33

## 2024-10-07 RX ADMIN — LORAZEPAM 2 MG: 2 INJECTION INTRAMUSCULAR; INTRAVENOUS at 12:35

## 2024-10-07 NOTE — DISCHARGE PLACEMENT REQUEST
"Linda  [phone]  Riley Coon \"LUH\" (64 y.o. Female)       Date of Birth   1960    Social Security Number       Address   [address] [place] [address] [address] [place], [place] [address]    Home Phone       MRN   [id number]       [facility]    Marital Status                               Admission Date   24    Admission Type   Emergency    Admitting Provider   Kevan Gil MD    Attending Provider   Kevan Gil MD    Department, Room/Bed   [facility], 329/1       Discharge Date       Discharge Disposition       Discharge Destination                                 Attending Provider: Kevan Gil MD    Allergies: Aspirin, Keflex [Cephalexin], Nitrofurantoin, Creatine Monohydrate    Isolation: None   Infection: None   Code Status: No CPR    Ht: 154.9 cm (60.98\")   Wt: 50.2 kg (110 lb 10.7 oz)    Admission Cmt: None   Principal Problem: Dysphagia [R13.10]                   Active Insurance as of 2024       Primary Coverage       Payor Plan Insurance Group Employer/Plan Group    HUMANA MEDICARE REPLACEMENT HUMANA MED ADV PPO [id number]       Payor Plan Address Payor Plan Phone Number Payor Plan Fax Number Effective Dates    [address] [address] [phone]  2022 - None Entered    Formerly [facility] [id number]         Subscriber Name Subscriber Birth Date Member ID       RILEY COON 1960 [id number]                     Emergency Contacts        (Rel.) Home Phone Work Phone Mobile Phone    Anay Dunham (Daughter) [phone] -- [phone]    MiquelRain (Sister) -- -- [phone]        [facility] [address]  [address] [place] [address]  Phone:  [phone]  Fax:  [phone]        Patient: ROOM: [facility]-1   Riley Coon MRN:  [id number]   [address] [place] [address] [address] [facility] [place] [address] :  1960  SSN:    Phone: [phone] Sex:  F   PCP: Teddy Sanchez                 Emergency " Contact Information       Name Relation Home Work Mobile     Anay Dunham Daughter [phone]     Rain Lafleur Sister     [phone]          INSURANCE PAYOR PLAN GROUP # SUBSCRIBER ID   Primary:    HUMANA MEDICARE REPLACEMENT [id number]   Admitting Diagnosis: Dysphagia [R13.10]  Order Date:  Oct 7, 2024        Inpatient Hospice / [facility] Consult       (Order ID: [id number])     Diagnosis:         Priority:  Routine Expected Date:   Expiration Date:        Interval:   Count:    Comments: Home with hospice  Reason for Consult? Outpatient Hospice        Authorizing Provider:Nika Castanead APRN  Authorizing Provider's NPI: [id number]  Order Entered By: Nika Castaneda APRN 10/7/2024  9:52 AM     Electronically signed by: Nika Castaneda APRN 10/7/2024  9:52 AM            History & Physical        Marek [facility] MD at 24 1055            H&P reviewed. The patient was examined and there are no changes to the H&P.          Electronically signed by Marek [facility] MD at 24 1055   Source Note: H&P (View-Only)          YOB: 1960  Location: [facility]  Location Address: [address] [place] [address] [address], [address] [place] [address]  Location Phone: [phone]    Chief Complaint   Patient presents with    Adenopathy       History of Present Illness  Arminda Jiménez is a 64 y.o. female.  Arminda Jiménez is here for evaluation of ENT complaints. The patient has had problems with enlarged cervical lymph nodes   Patient is currently being treated by Dr. Khan for metastatic disease with mediastinal adenopathy, retroperitoneal adenopathy, gastrohepatic mass and cervical adenopathy   Patient is currently receiving palliative chemotherapy for adenocarcinoma with unknown primary   [facility] and diagnosed with paralysis of left vocal cord. She does report ongoing hoarseness and difficulty swallowing for the past couple of  months     History of carcinoma unknown primary site, October 2010  Patient received 1 cycle carboplatin and paclitaxel  Patient was switched to carboplatin Gemzar after reaction to Taxol and completed 6 additional cycles.  Patient had a complete response at that time     US BIOPSY LYMPH NODE (09/09/2024 14:30 EDT)   CT Soft Tissue Neck With Contrast (08/06/2024 11:10 EDT) - reviewed and discussed    Tissue Pathology Exam (09/05/2024 12:41 EDT)   Past Medical History:   Diagnosis Date    Adenocarcinoma of unknown origin 04/04/2022    Anxiety     Arthritis     Asthma     Cancer     right lung,abd,and pelvic area    Chronic back pain     COPD (chronic obstructive pulmonary disease)     Depression     Environmental allergies 04/11/2022    Facet arthropathy     Foraminal stenosis of lumbosacral region     GERD (gastroesophageal reflux disease)     H/O degenerative disc disease     History of blood transfusion     PVD (peripheral vascular disease)     Radiculopathy     Thyroid nodule        Past Surgical History:   Procedure Laterality Date    ANKLE ARTHROPLASTY Left 8/29/2023    Procedure: TOTAL ANKLE ARTHROPLASTY WITH INBONE IMPLANT, GASTROCNEMIUS RECESSION, REMOVAL OF HARDWARE DEEP. LEFT ANKLE;  Surgeon: Bartolome Olguin DPM;  Location:  [facility];  Service: Podiatry;  Laterality: Left;    ANKLE OPEN REDUCTION INTERNAL FIXATION Left 01/05/2023    Procedure: REPAIR OF NONUNION MEDIAL MALLEOLUS, REPAIR NONUNION FIBULA, BONE GRAFT HARVEST, LEFT ANKLE;  Surgeon: Bartolome Olguin DPM;  Location:  [facility];  Service: Podiatry;  Laterality: Left;    ANKLE SURGERY Left     ANTERIOR LUMBAR EXPOSURE N/A 05/07/2018    Procedure: ANTERIOR LUMBAR EXPOSURE;  Surgeon: Chris Haley DO;  Location:  [facility];  Service: Vascular    APPENDECTOMY      CARPAL TUNNEL RELEASE Bilateral     INCISION AND DRAINAGE OF WOUND Left 05/08/2022    Procedure: Incision and Drainage Left Open Ankle Fracture, Open Reduction Internal Fixation  Bimalleolar Ankle Fracture;  Surgeon: Leland Aranda MD;  Location:  [facility];  Service: Orthopedics;  Laterality: Left;    LUMBAR FUSION N/A 05/07/2018    Procedure: ANTERIOR DECOMPRESSION, ANTERIOR LUMBAR INTERBODY FUSION WITH INSTRUMENTATION L5-S1;  Surgeon: LUIS CARLOS Zheng MD;  Location:  [facility];  Service: Orthopedic Spine    LUMBAR FUSION Right 01/02/2019    Procedure: RIGHT LATERAL LUMBAR INTERBODY FUSION L3-5;  Surgeon: LUIS CARLOS Zheng MD;  Location:  [facility];  Service: Orthopedic Spine    LUMBAR LAMINECTOMY WITH FUSION N/A 05/09/2018    Procedure: POSTERIOR SPINAL FUSION WITH INSTRUMENTATION L5-S1;  Surgeon: LUIS CARLOS Zheng MD;  Location:  [facility];  Service: Orthopedic Spine    LUMBAR LAMINECTOMY WITH FUSION Right 01/04/2019    Procedure: POSTERIOR SPINAL FUSION WITH INSTRUMENTATION RIGHT L3-S1;  Surgeon: LUIS CARLOS Zheng MD;  Location:  [facility];  Service: Orthopedic Spine    OTHER SURGICAL HISTORY Bilateral 2017    Stent implants BLE [place] Alvarado    PAIN PUMP INSERTION/REVISION Right     FENTANYL    WRIST FUSION Right        Outpatient Medications Marked as Taking for the 9/24/24 encounter (Office Visit) with Marek Roberts MD   Medication Sig Dispense Refill    Albuterol Sulfate (PROAIR HFA IN) Inhale 2 puffs 4 (Four) Times a Day As Needed (Wheezing/Shortness of breath).      Budeson-Glycopyrrol-Formoterol (Breztri Aerosphere) 160-9-4.8 MCG/ACT aerosol inhaler Inhale 2 puffs 2 (Two) Times a Day.      CALCIUM CITRATE PO Take 600 mg by mouth 3 (Three) Times a Day.      cholecalciferol (VITAMIN D3) 1000 units tablet Take 3 tablets by mouth Daily.      escitalopram (LEXAPRO) 20 MG tablet Take 1 tablet by mouth Daily.      esomeprazole (nexIUM) 40 MG capsule Take 1 capsule by mouth Every Morning Before Breakfast.      gabapentin (NEURONTIN) 800 MG tablet Take 1 tablet by mouth 4 (Four) Times a Day. Takes 800mg at 5am and 1pm and 1600mg at 9pm      metoclopramide (REGLAN) 5 MG tablet  Take 1 tablet by mouth.      naloxone (NARCAN) 4 MG/0.1ML nasal spray Call 911. Don't prime. Strong in 1 nostril for overdose. Repeat in 2-3 minutes in other nostril if no or minimal breathing/responsiveness. 2 each 0    oxyCODONE-acetaminophen (PERCOCET)  MG per tablet Take 1 tablet by mouth Every 4 (Four) Hours As Needed for Moderate Pain. Take one tablet every four hours first 24 hours 28 tablet 0    [DISCONTINUED] HYDROmorphone (Dilaudid) 2 MG tablet Take 1 tablet by mouth Every 4 (Four) Hours As Needed for Severe Pain (Breakthrough pain only.). 20 tablet 0       Aspirin, Keflex [cephalexin], Nitrofurantoin, and Creatine monohydrate    Family History   Problem Relation Age of Onset    Heart disease Other     Arthritis Other     Cancer Other     Alcohol abuse Other     Stroke Other     Migraines Other     COPD Other     Heart failure Other     Depression Other     Asthma Other     Heart disease Mother     Kidney disease Mother     Heart disease Father     Heart disease Sister     Kidney disease Sister     Stroke Sister     Heart disease Sister     Cancer Brother        Social History     Socioeconomic History    Marital status:    Tobacco Use    Smoking status: Former     Current packs/day: 0.00     Average packs/day: 1.5 packs/day for 30.0 years (45.0 ttl pk-yrs)     Types: Cigarettes     Start date:      Quit date:      Years since quittin.7    Smokeless tobacco: Never   Vaping Use    Vaping status: Never Used   Substance and Sexual Activity    Alcohol use: No    Drug use: No    Sexual activity: Defer       Review of Systems   Constitutional:  Positive for fatigue.   HENT:  Positive for trouble swallowing and voice change.    Respiratory:  Positive for shortness of breath.        Vitals:    24 1554   BP: 103/90   Pulse: (!) 136   Temp: 97.7 °F (36.5 °C)       Body mass index is 18.53 kg/m².    Objective    Physical Exam  Vitals reviewed.   Constitutional:       Appearance: She is  ill-appearing.   HENT:      Head: Normocephalic.      Right Ear: External ear normal.      Left Ear: External ear normal.      Nose:      Comments: Oxygen in place        Mouth/Throat:      Lips: Pink.      Mouth: Mucous membranes are dry.      Comments: Ulcerations noted to tongue and oral mucosa         Dr. Roberts has examined and assessed the patient and agrees with current treatment plan        In office patient complains of weakness, difficulty eating/drinking, shortness of breath and is requesting admission   Spoke with Dr. Khan who recommends patient going to [facility]   ER provider called and notified       Assessment & Plan  Diagnoses and all orders for this visit:    1. Adenocarcinoma of unknown origin (Primary)  -     Case Request; Standing  -     Follow Anesthesia Guidelines / Protocol; Standing  -     Verify NPO Status; Standing  -     Obtain Informed Consent; Standing  -     SCD (Sequential Compression Device) - To Be Placed on Patient in Pre-Op; Standing  -     Patient to Void Prior to Transfer to OR; Standing  -     Instructions for Nursing; Standing  -     Case Request    2. Lymphadenopathy  -     Case Request; Standing  -     Follow Anesthesia Guidelines / Protocol; Standing  -     Verify NPO Status; Standing  -     Obtain Informed Consent; Standing  -     SCD (Sequential Compression Device) - To Be Placed on Patient in Pre-Op; Standing  -     Patient to Void Prior to Transfer to OR; Standing  -     Instructions for Nursing; Standing  -     Case Request    3. Dysphagia, unspecified type  -     Case Request; Standing  -     Follow Anesthesia Guidelines / Protocol; Standing  -     Verify NPO Status; Standing  -     Obtain Informed Consent; Standing  -     SCD (Sequential Compression Device) - To Be Placed on Patient in Pre-Op; Standing  -     Patient to Void Prior to Transfer to OR; Standing  -     Instructions for Nursing; Standing  -     Case Request    4. Vocal cord weakness  -     Case  Request; Standing  -     Follow Anesthesia Guidelines / Protocol; Standing  -     Verify NPO Status; Standing  -     Obtain Informed Consent; Standing  -     SCD (Sequential Compression Device) - To Be Placed on Patient in Pre-Op; Standing  -     Patient to Void Prior to Transfer to OR; Standing  -     Instructions for Nursing; Standing  -     Case Request      RIGHT CERVICAL LYMPH NODE BIOPSY/EXCISION (Right)  No orders of the defined types were placed in this encounter.    No follow-ups on file.     Risks vs benefits of cervical lymph node excision discussed     Patient Instructions   LYMPH NODE BIOPSY: The risks, benefits, and alternatives of the procedure including but not limited to pain, numbness, nerve injury, scarring, bleeding, infection, persistent symptoms, and risks of the anesthesia were discussed full with the patient and questions were answered. No guarantees were made or implied.        Electronically signed by Carolina Fitzgerald APRN at 09/24/24 1706                 Hortencia Taylor DO at 09/24/24 2113              [facility] Medicine Services  HISTORY AND PHYSICAL    Date of Admission: 9/24/2024  Primary Care Physician: Teddy Sanchez MD    Subjective   Primary Historian: Patient    Chief Complaint: Unable to swallow    Dehydration  Associated symptoms include weakness.   64-year-old female presents the emergency department stating that she is unable to swallow.  She notes that when she tries to swallow she gets choked and can only take tiny sips.  She has had weight loss.  She feels dehydrated.  She has adenocarcinoma of unknown origin and is was to have a biopsy on Friday with ENT, Dr. Roberts.  She also notes that she is post to get chemotherapy on Friday and also received chemotherapy last Friday.  The patient chronically wears 2 L of nasal cannula oxygen at home, now is requiring 5 L to maintain O2 saturations.  The patient has generalized weakness.  3 days  ago she had a bowel movement.  She has no nausea and no vomiting.        Review of Systems   Gastrointestinal:         Choking   Neurological:  Positive for weakness.      Otherwise complete ROS reviewed and negative except as mentioned in the HPI.    Past Medical History:   Past Medical History:   Diagnosis Date    Adenocarcinoma of unknown origin 04/04/2022    Anxiety     Arthritis     Asthma     Cancer     right lung,abd,and pelvic area    Chronic back pain     COPD (chronic obstructive pulmonary disease)     Depression     Environmental allergies 04/11/2022    Facet arthropathy     Foraminal stenosis of lumbosacral region     GERD (gastroesophageal reflux disease)     H/O degenerative disc disease     History of blood transfusion     PVD (peripheral vascular disease)     Radiculopathy     Thyroid nodule      Past Surgical History:  Past Surgical History:   Procedure Laterality Date    ANKLE ARTHROPLASTY Left 8/29/2023    Procedure: TOTAL ANKLE ARTHROPLASTY WITH INBONE IMPLANT, GASTROCNEMIUS RECESSION, REMOVAL OF HARDWARE DEEP. LEFT ANKLE;  Surgeon: Bartolome Olguin DPM;  Location:  [facility];  Service: Podiatry;  Laterality: Left;    ANKLE OPEN REDUCTION INTERNAL FIXATION Left 01/05/2023    Procedure: REPAIR OF NONUNION MEDIAL MALLEOLUS, REPAIR NONUNION FIBULA, BONE GRAFT HARVEST, LEFT ANKLE;  Surgeon: Bartolome Olguin DPM;  Location:  [facility];  Service: Podiatry;  Laterality: Left;    ANKLE SURGERY Left     ANTERIOR LUMBAR EXPOSURE N/A 05/07/2018    Procedure: ANTERIOR LUMBAR EXPOSURE;  Surgeon: Chris Haley DO;  Location:  [facility];  Service: Vascular    APPENDECTOMY      CARPAL TUNNEL RELEASE Bilateral     INCISION AND DRAINAGE OF WOUND Left 05/08/2022    Procedure: Incision and Drainage Left Open Ankle Fracture, Open Reduction Internal Fixation Bimalleolar Ankle Fracture;  Surgeon: Leland Aranda MD;  Location:  [facility];  Service: Orthopedics;  Laterality: Left;    LUMBAR FUSION N/A 05/07/2018     Procedure: ANTERIOR DECOMPRESSION, ANTERIOR LUMBAR INTERBODY FUSION WITH INSTRUMENTATION L5-S1;  Surgeon: LUIS CARLOS Zheng MD;  Location:  [facility];  Service: Orthopedic Spine    LUMBAR FUSION Right 01/02/2019    Procedure: RIGHT LATERAL LUMBAR INTERBODY FUSION L3-5;  Surgeon: LUIS CARLOS Zheng MD;  Location:  [facility];  Service: Orthopedic Spine    LUMBAR LAMINECTOMY WITH FUSION N/A 05/09/2018    Procedure: POSTERIOR SPINAL FUSION WITH INSTRUMENTATION L5-S1;  Surgeon: LUIS CARLOS Zheng MD;  Location:  [facility];  Service: Orthopedic Spine    LUMBAR LAMINECTOMY WITH FUSION Right 01/04/2019    Procedure: POSTERIOR SPINAL FUSION WITH INSTRUMENTATION RIGHT L3-S1;  Surgeon: LUIS CARLOS Zheng MD;  Location:  [facility];  Service: Orthopedic Spine    OTHER SURGICAL HISTORY Bilateral 2017    Stent implants BLE [place] Dr Alvarado    PAIN PUMP INSERTION/REVISION Right     FENTANYL    WRIST FUSION Right      Social History:  reports that she quit smoking about 14 years ago. Her smoking use included cigarettes. She started smoking about 44 years ago. She has a 45 pack-year smoking history. She has never used smokeless tobacco. She reports that she does not drink alcohol and does not use drugs.    Family History: family history includes Alcohol abuse in an other family member; Arthritis in an other family member; Asthma in an other family member; COPD in an other family member; Cancer in her brother and another family member; Depression in an other family member; Heart disease in her father, mother, sister, sister, and another family member; Heart failure in an other family member; Kidney disease in her mother and sister; Migraines in an other family member; Stroke in her sister and another family member.       Allergies:  Allergies   Allergen Reactions    Aspirin Nausea And Vomiting     Unknown reaction    Keflex [Cephalexin] Other (See Comments)     Blisters to nose and mouth    Nitrofurantoin GI Intolerance and Nausea  And Vomiting    Creatine Monohydrate Unknown - Low Severity       Medications:  Prior to Admission medications    Medication Sig Start Date End Date Taking? Authorizing Provider   Albuterol Sulfate (PROAIR HFA IN) Inhale 2 puffs 4 (Four) Times a Day As Needed (Wheezing/Shortness of breath). 12/15/15   Mark Lopez MD   Budeson-Glycopyrrol-Formoterol (Breztri Aerosphere) 160-9-4.8 MCG/ACT aerosol inhaler Inhale 2 puffs 2 (Two) Times a Day. 2/25/22   Mark Lopez MD   CALCIUM CITRATE PO Take 600 mg by mouth 3 (Three) Times a Day.    Mark Lopez MD   cholecalciferol (VITAMIN D3) 1000 units tablet Take 3 tablets by mouth Daily.    Mark Lopez MD   escitalopram (LEXAPRO) 20 MG tablet Take 1 tablet by mouth Daily.    Mark Lopez MD   esomeprazole (nexIUM) 40 MG capsule Take 1 capsule by mouth Every Morning Before Breakfast.    Mark Lopez MD   fexofenadine-pseudoephedrine (ALLEGRA-D 24) 180-240 MG per 24 hr tablet Take 1 tablet by mouth Daily As Needed for Allergies.  Patient not taking: Reported on 9/24/2024    Mark Lopez MD   gabapentin (NEURONTIN) 800 MG tablet Take 1 tablet by mouth 4 (Four) Times a Day. Takes 800mg at 5am and 1pm and 1600mg at 9pm 3/29/18   Mark Lopez MD   metoclopramide (REGLAN) 5 MG tablet Take 1 tablet by mouth. 9/11/24   Mark Lopez MD   naloxone (NARCAN) 4 MG/0.1ML nasal spray Call 911. Don't prime. [place] in 1 nostril for overdose. Repeat in 2-3 minutes in other nostril if no or minimal breathing/responsiveness. 8/29/23   Bartolome Olguin, DILLAN   oxyCODONE-acetaminophen (PERCOCET)  MG per tablet Take 1 tablet by mouth Every 4 (Four) Hours As Needed for Moderate Pain. Take one tablet every four hours first 24 hours 8/29/23   Bartolome Olguin DPM   HYDROmorphone (Dilaudid) 2 MG tablet Take 1 tablet by mouth Every 4 (Four) Hours As Needed for Severe Pain (Breakthrough pain only.). 8/29/23 9/24/24   "Bartolome Olguin, DILLAN     I have utilized all available immediate resources to obtain, update, or review the patient's current medications (including all prescriptions, over-the-counter products, herbals, cannabis/cannabidiol products, and vitamin/mineral/dietary (nutritional) supplements).    Objective     Vital Signs: /60 (BP Location: Left arm, Patient Position: Sitting)   Pulse 105   Temp 98 °F (36.7 °C) (Oral)   Resp 12   Ht 154.9 cm (60.98\")   Wt 44.9 kg (99 lb)   SpO2 96%   BMI 18.72 kg/m²   Physical Exam  Vitals reviewed.   Constitutional:       Appearance: She is ill-appearing.      Comments: Thin and frail   HENT:      Head: Normocephalic and atraumatic.      Nose: Nose normal.      Mouth/Throat:      Comments: Hoarse voice  Eyes:      General: No scleral icterus.     Conjunctiva/sclera: Conjunctivae normal.   Cardiovascular:      Rate and Rhythm: Normal rate and regular rhythm.      Heart sounds: Normal heart sounds.   Pulmonary:      Effort: Pulmonary effort is normal.      Breath sounds: Normal breath sounds.   Abdominal:      General: There is no distension.      Palpations: Abdomen is soft.   Musculoskeletal:         General: No swelling or tenderness.      Cervical back: Normal range of motion and neck supple.   Skin:     General: Skin is warm and dry.   Neurological:      Mental Status: She is alert and oriented to person, place, and time.      Cranial Nerves: No cranial nerve deficit.   Psychiatric:         Mood and Affect: Mood normal.         Behavior: Behavior normal.          Results Reviewed:  Lab Results (last 24 hours)       Procedure Component Value Units Date/Time    Blood Gas, Arterial - [[id number]]  (Abnormal) Collected: 09/24/24 1951    Specimen: Arterial Blood Updated: 09/24/24 1952     Site Left Brachial     Gucci's Test Positive     pH, Arterial 7.441 pH units      pCO2, Arterial 44.4 mm Hg      pO2, Arterial 55.5 mm Hg      Comment: 84 Value below reference range       "  HCO3, Arterial 30.2 mmol/L      Comment: 83 Value above reference range        Base Excess, Arterial 5.4 mmol/L      Comment: 83 Value above reference range        O2 Saturation, Arterial 88.5 %      Comment: 84 Value below reference range        Temperature 37.0     Barometric Pressure for Blood Gas 749 mmHg      Modality Nasal Cannula     Flow Rate 5.0 lpm      Ventilator Mode NA     Collected by [id number]     Comment: Meter: [id number]     :  Brendan Lang, CRT        pCO2, Temperature Corrected 44.4 mm Hg      pH, Temp Corrected 7.441 pH Units      pO2, Temperature Corrected 55.5 mm Hg     Urinalysis With Culture If Indicated - Urine, Clean Catch [[id number]]  (Abnormal) Collected: 09/24/24 1855    Specimen: Urine, Clean Catch Updated: 09/24/24 1944     Color, UA Dark Yellow     Appearance, UA Clear     pH, UA 6.0     Specific Gravity, UA >1.030     Glucose, UA Negative     Ketones, UA 80 mg/dL (3+)     Bilirubin, UA Small (1+)     Blood, UA Negative     Protein,  mg/dL (2+)     Leuk Esterase, UA Trace     Nitrite, UA Negative     Urobilinogen, UA 1.0 E.U./dL    Narrative:      In absence of clinical symptoms, the presence of pyuria, bacteria, and/or nitrites on the urinalysis result does not correlate with infection.    Urinalysis, Microscopic Only - Urine, Clean Catch [[id number]]  (Abnormal) Collected: 09/24/24 1855    Specimen: Urine, Clean Catch Updated: 09/24/24 1944     RBC, UA None Seen /HPF      WBC, UA 3-5 /HPF      Comment: Urine culture not indicated.        Bacteria, UA None Seen /HPF      Squamous Epithelial Cells, UA 3-6 /HPF      Methodology Manual Light Microscopy    Respiratory Panel PCR w/COVID-19(SARS-CoV-2) [facility] In-House, NP Swab in UTM/VTM, 2 HR TAT - Swab, Nasopharynx [[id number]]  (Normal) Collected: 09/24/24 1814    Specimen: Swab from Nasopharynx Updated: 09/24/24 1934     ADENOVIRUS, PCR Not Detected     Coronavirus 229E Not Detected     Coronavirus  HKU1 Not Detected     Coronavirus NL63 Not Detected     Coronavirus OC43 Not Detected     COVID19 Not Detected     Human Metapneumovirus Not Detected     Human Rhinovirus/Enterovirus Not Detected     Influenza A PCR Not Detected     Influenza B PCR Not Detected     Parainfluenza Virus 1 Not Detected     Parainfluenza Virus 2 Not Detected     Parainfluenza Virus 3 Not Detected     Parainfluenza Virus 4 Not Detected     RSV, PCR Not Detected     Bordetella pertussis pcr Not Detected     Bordetella parapertussis PCR Not Detected     Chlamydophila pneumoniae PCR Not Detected     Mycoplasma pneumo by PCR Not Detected    Narrative:      In the setting of a positive respiratory panel with a viral infection PLUS a negative procalcitonin without other underlying concern for bacterial infection, consider observing off antibiotics or discontinuation of antibiotics and continue supportive care. If the respiratory panel is positive for atypical bacterial infection (Bordetella pertussis, Chlamydophila pneumoniae, or Mycoplasma pneumoniae), consider antibiotic de-escalation to target atypical bacterial infection.    CBC & Differential [[id number]]  (Abnormal) Collected: 09/24/24 1833    Specimen: Blood Updated: 09/24/24 1934    Narrative:      The following orders were created for panel order CBC & Differential.  Procedure                               Abnormality         Status                     ---------                               -----------         ------                     CBC Auto Differential[[id number]]        Abnormal            Final result                 Please view results for these tests on the individual orders.    CBC Auto Differential [[id number]]  (Abnormal) Collected: 09/24/24 1833    Specimen: Blood Updated: 09/24/24 1934     WBC 15.04 10*3/mm3      RBC 4.15 10*6/mm3      Hemoglobin 12.3 g/dL      Hematocrit 38.5 %      MCV 92.8 fL      MCH 29.6 pg      MCHC 31.9 g/dL      RDW 13.2 %      RDW-SD 44.9 fl       MPV 9.8 fL      Platelets 292 10*3/mm3     Manual Differential [[id number]]  (Abnormal) Collected: 09/24/24 1833    Specimen: Blood Updated: 09/24/24 1934     Neutrophil % 89.9 %      Lymphocyte % 7.1 %      Monocyte % 0.0 %      Eosinophil % 1.0 %      Basophil % 0.0 %      Bands %  2.0 %      Neutrophils Absolute 13.82 10*3/mm3      Lymphocytes Absolute 1.07 10*3/mm3      Monocytes Absolute 0.00 10*3/mm3      Eosinophils Absolute 0.15 10*3/mm3      Basophils Absolute 0.00 10*3/mm3      Anisocytosis Slight/1+     Hypochromia Slight/1+     Macrocytes Slight/1+     WBC Morphology Normal     Platelet Morphology Normal    Blood Culture - Blood, Arm, Right [[id number]] Collected: 09/24/24 1911    Specimen: Blood from Arm, Right Updated: 09/24/24 1919    Comprehensive Metabolic Panel [[id number]]  (Abnormal) Collected: 09/24/24 1833    Specimen: Blood Updated: 09/24/24 1914     Glucose 84 mg/dL      BUN 15 mg/dL      Creatinine 0.30 mg/dL      Sodium 135 mmol/L      Potassium 4.0 mmol/L      Chloride 92 mmol/L      CO2 26.0 mmol/L      Calcium 9.3 mg/dL      Total Protein 8.2 g/dL      Albumin 3.6 g/dL      ALT (SGPT) 33 U/L      AST (SGOT) 29 U/L      Alkaline Phosphatase 238 U/L      Total Bilirubin 0.3 mg/dL      Globulin 4.6 gm/dL      A/G Ratio 0.8 g/dL      BUN/Creatinine Ratio 50.0     Anion Gap 17.0 mmol/L      eGFR 118.6 mL/min/1.73     Narrative:      GFR Normal >60  Chronic Kidney Disease <60  Kidney Failure <15      Lipase [[id number]]  (Normal) Collected: 09/24/24 1833    Specimen: Blood Updated: 09/24/24 1914     Lipase 34 U/L     Procalcitonin [[id number]]  (Normal) Collected: 09/24/24 1833    Specimen: Blood Updated: 09/24/24 1914     Procalcitonin 0.20 ng/mL     Narrative:      As a Marker for Sepsis (Non-Neonates):    1. <0.5 ng/mL represents a low risk of severe sepsis and/or septic shock.  2. >2 ng/mL represents a high risk of severe sepsis and/or septic shock.    As a Marker for Lower Respiratory Tract  "Infections that require antibiotic therapy:    PCT on Admission    Antibiotic Therapy       6-12 Hrs later    >0.5                Strongly Recommended  >0.25 - <0.5        Recommended   0.1 - 0.25          Discouraged              Remeasure/reassess PCT  <0.1                Strongly Discouraged     Remeasure/reassess PCT    As 28 day mortality risk marker: \"Change in Procalcitonin Result\" (>80% or <=80%) if Day 0 (or Day 1) and Day 4 values are available. Refer to http://[email or web address]-pct-calculator.com    Change in PCT <=80%  A decrease of PCT levels below or equal to 80% defines a positive change in PCT test result representing a higher risk for 28-day all-cause mortality of patients diagnosed with severe sepsis for septic shock.    Change in PCT >80%  A decrease of PCT levels of more than 80% defines a negative change in PCT result representing a lower risk for 28-day all-cause mortality of patients diagnosed with severe sepsis or septic shock.       T4, Free [[id number]]  (Normal) Collected: 09/24/24 1833    Specimen: Blood Updated: 09/24/24 1914     Free T4 1.17 ng/dL     TSH [[id number]]  (Abnormal) Collected: 09/24/24 1833    Specimen: Blood Updated: 09/24/24 1914     TSH 4.770 uIU/mL     Magnesium [[id number]]  (Normal) Collected: 09/24/24 1833    Specimen: Blood Updated: 09/24/24 1914     Magnesium 1.9 mg/dL     CK [[id number]]  (Normal) Collected: 09/24/24 1833    Specimen: Blood Updated: 09/24/24 1914     Creatine Kinase 20 U/L     Lactic Acid, Plasma [[id number]]  (Normal) Collected: 09/24/24 1833    Specimen: Blood Updated: 09/24/24 1905     Lactate 1.8 mmol/L     Blood Culture - Blood, Arm, Right [[id number]] Collected: 09/24/24 1833    Specimen: Blood from Arm, Right Updated: 09/24/24 1846          Imaging Results (Last 24 Hours)       Procedure Component Value Units Date/Time    XR Chest 1 View [[id number]] Collected: 09/24/24 1927     Updated: 09/24/24 1931    Narrative:      EXAMINATION: XR CHEST 1 VW-  " 9/24/2024 7:27 PM     HISTORY: Weakness.     FINDINGS: Upright frontal projection of the chest is compared to prior  exam of 5/7/2022. There are emphysematous changes of the lungs. There  has been development of patchy bibasilar airspace disease worrisome for  pneumonia. Blunting of the right lateral costophrenic angle is also  suspicious for a small right effusion. The thoracic aorta is ectatic.       Impression:      1.. Emphysematous changes of the lungs. Patchy bibasilar infiltrates are  present.     This report was signed and finalized on 9/24/2024 7:28 PM by Dr. Bill Benitez MD.             I have personally reviewed and interpreted the radiology studies and ECG obtained at time of admission.     Assessment / Plan   Assessment:   Active Hospital Problems    Diagnosis     **Dysphagia      Impression:  Hypoxia requiring increased O2 support  Pneumonia with patchy infiltrates noted on chest x-ray  Dysphagia/choking  Adenocarcinoma of unknown origin    Treatment Plan  Admit to the hospital  O2 support  DuoNebs  Levaquin IV  Liquid diet  Nutrition consult  Speech consult  ENT consult  Oncology consult  Fall and skin precautions   PT consult    The patient will be admitted to my service here at [facility].  Primary team to take over in the morning    Medical Decision Making  Number and Complexity of problems: 4, complex  Differential Diagnosis: Dehydration    Conditions and Status        Unchanged     MDM Data  External documents reviewed: None  Cardiac tracing (EKG, telemetry) interpretation: None  Radiology interpretation: None  Labs reviewed: Reviewed  Any tests that were considered but not ordered: None     Decision rules/scores evaluated (example [id number], Wells, etc): None     Discussed with: Patient     Care Planning  Shared decision making: Patient and ED staff  Code status and discussions: Full    Disposition  Social Determinants of Health that impact treatment or disposition:  None  Estimated length of stay is 2 to 3 days.     I confirmed that the patient's advanced care plan is present, code status is documented, and a surrogate decision maker is listed in the patient's medical record.     The patient's surrogate decision maker is family.     The patient was seen and examined by me on 2024 at 9.    Electronically signed by Hortencia Taylor DO, 24, 21:14 CDT.                Electronically signed by Hortencia Taylor DO at 242       Carolina Fitzgerald APRN at 24 1530          YOB: 1960  Location: [facility]  Location Address: [address] [place] [address]  Location Phone: [phone]    Chief Complaint   Patient presents with    Adenopathy       History of Present Illness  Arminda Jiménez is a 64 y.o. female.  Arminda Jiménez is here for evaluation of ENT complaints. The patient has had problems with enlarged cervical lymph nodes   Patient is currently being treated by Dr. Khan for metastatic disease with mediastinal adenopathy, retroperitoneal adenopathy, gastrohepatic mass and cervical adenopathy   Patient is currently receiving palliative chemotherapy for adenocarcinoma with unknown primary   She was seen by [facility] ent several weeks ago and diagnosed with paralysis of left vocal cord. She does report ongoing hoarseness and difficulty swallowing for the past couple of months     History of carcinoma unknown primary site, 2010  Patient received 1 cycle carboplatin and paclitaxel  Patient was switched to carboplatin Gemzar after reaction to Taxol and completed 6 additional cycles.  Patient had a complete response at that time     US BIOPSY LYMPH NODE (2024 14:30 EDT)   CT Soft Tissue Neck With Contrast (2024 11:10 EDT) - reviewed and discussed    Tissue Pathology Exam (2024 12:41 EDT)   Past Medical History:   Diagnosis Date    Adenocarcinoma of unknown origin 2022    Anxiety     Arthritis      Asthma     Cancer     right lung,abd,and pelvic area    Chronic back pain     COPD (chronic obstructive pulmonary disease)     Depression     Environmental allergies 04/11/2022    Facet arthropathy     Foraminal stenosis of lumbosacral region     GERD (gastroesophageal reflux disease)     H/O degenerative disc disease     History of blood transfusion     PVD (peripheral vascular disease)     Radiculopathy     Thyroid nodule        Past Surgical History:   Procedure Laterality Date    ANKLE ARTHROPLASTY Left 8/29/2023    Procedure: TOTAL ANKLE ARTHROPLASTY WITH INBONE IMPLANT, GASTROCNEMIUS RECESSION, REMOVAL OF HARDWARE DEEP. LEFT ANKLE;  Surgeon: Bartolome Olguin DPM;  Location:  [facility] OR;  Service: Podiatry;  Laterality: Left;    ANKLE OPEN REDUCTION INTERNAL FIXATION Left 01/05/2023    Procedure: REPAIR OF NONUNION MEDIAL MALLEOLUS, REPAIR NONUNION FIBULA, BONE GRAFT HARVEST, LEFT ANKLE;  Surgeon: Bartolome Olguin DPM;  Location:  [facility];  Service: Podiatry;  Laterality: Left;    ANKLE SURGERY Left     ANTERIOR LUMBAR EXPOSURE N/A 05/07/2018    Procedure: ANTERIOR LUMBAR EXPOSURE;  Surgeon: Chris Haley DO;  Location:  [facility];  Service: Vascular    APPENDECTOMY      CARPAL TUNNEL RELEASE Bilateral     INCISION AND DRAINAGE OF WOUND Left 05/08/2022    Procedure: Incision and Drainage Left Open Ankle Fracture, Open Reduction Internal Fixation Bimalleolar Ankle Fracture;  Surgeon: Leland Aranda MD;  Location:  [facility];  Service: Orthopedics;  Laterality: Left;    LUMBAR FUSION N/A 05/07/2018    Procedure: ANTERIOR DECOMPRESSION, ANTERIOR LUMBAR INTERBODY FUSION WITH INSTRUMENTATION L5-S1;  Surgeon: LUIS CARLOS Zheng MD;  Location:  [facility];  Service: Orthopedic Spine    LUMBAR FUSION Right 01/02/2019    Procedure: RIGHT LATERAL LUMBAR INTERBODY FUSION L3-5;  Surgeon: LUIS CARLOS Zheng MD;  Location:  [facility];  Service: Orthopedic Spine    LUMBAR LAMINECTOMY WITH FUSION N/A 05/09/2018     Procedure: POSTERIOR SPINAL FUSION WITH INSTRUMENTATION L5-S1;  Surgeon: LUIS CARLOS Zheng MD;  Location:  [facility];  Service: Orthopedic Spine    LUMBAR LAMINECTOMY WITH FUSION Right 01/04/2019    Procedure: POSTERIOR SPINAL FUSION WITH INSTRUMENTATION RIGHT L3-S1;  Surgeon: LUIS CARLOS Zheng MD;  Location:  [facility];  Service: Orthopedic Spine    OTHER SURGICAL HISTORY Bilateral 2017    Stent implants BLE [place] [facility] Al[place]ado    PAIN PUMP INSERTION/REVISION Right     FENTANYL    WRIST FUSION Right        Outpatient Medications Marked as Taking for the 9/24/24 encounter (Office Visit) with Marek Roberts MD   Medication Sig Dispense Refill    Albuterol Sulfate (PROAIR HFA IN) Inhale 2 puffs 4 (Four) Times a Day As Needed (Wheezing/Shortness of breath).      Budeson-Glycopyrrol-Formoterol (Breztri Aerosphere) 160-9-4.8 MCG/ACT aerosol inhaler Inhale 2 puffs 2 (Two) Times a Day.      CALCIUM CITRATE PO Take 600 mg by mouth 3 (Three) Times a Day.      cholecalciferol (VITAMIN D3) 1000 units tablet Take 3 tablets by mouth Daily.      escitalopram (LEXAPRO) 20 MG tablet Take 1 tablet by mouth Daily.      esomeprazole (nexIUM) 40 MG capsule Take 1 capsule by mouth Every Morning Before Breakfast.      gabapentin (NEURONTIN) 800 MG tablet Take 1 tablet by mouth 4 (Four) Times a Day. Takes 800mg at 5am and 1pm and 1600mg at 9pm      metoclopramide (REGLAN) 5 MG tablet Take 1 tablet by mouth.      naloxone (NARCAN) 4 MG/0.1ML nasal spray Call 911. Don't prime. [place] in 1 nostril for overdose. Repeat in 2-3 minutes in other nostril if no or minimal breathing/responsiveness. 2 each 0    oxyCODONE-acetaminophen (PERCOCET)  MG per tablet Take 1 tablet by mouth Every 4 (Four) Hours As Needed for Moderate Pain. Take one tablet every four hours first 24 hours 28 tablet 0    [DISCONTINUED] HYDROmorphone (Dilaudid) 2 MG tablet Take 1 tablet by mouth Every 4 (Four) Hours As Needed for Severe Pain (Breakthrough pain  only.). 20 tablet 0       Aspirin, Keflex [cephalexin], Nitrofurantoin, and Creatine monohydrate    Family History   Problem Relation Age of Onset    Heart disease Other     Arthritis Other     Cancer Other     Alcohol abuse Other     Stroke Other     Migraines Other     COPD Other     Heart failure Other     Depression Other     Asthma Other     Heart disease Mother     Kidney disease Mother     Heart disease Father     Heart disease Sister     Kidney disease Sister     Stroke Sister     Heart disease Sister     Cancer Brother        Social History     Socioeconomic History    Marital status:    Tobacco Use    Smoking status: Former     Current packs/day: 0.00     Average packs/day: 1.5 packs/day for 30.0 years (45.0 ttl pk-yrs)     Types: Cigarettes     Start date:      Quit date:      Years since quittin.7    Smokeless tobacco: Never   Vaping Use    Vaping status: Never Used   Substance and Sexual Activity    Alcohol use: No    Drug use: No    Sexual activity: Defer       Review of Systems   Constitutional:  Positive for fatigue.   HENT:  Positive for trouble swallowing and voice change.    Respiratory:  Positive for shortness of breath.        Vitals:    24 1554   BP: 103/90   Pulse: (!) 136   Temp: 97.7 °F (36.5 °C)       Body mass index is 18.53 kg/m².    Objective    Physical Exam  Vitals reviewed.   Constitutional:       Appearance: She is ill-appearing.   HENT:      Head: Normocephalic.      Right Ear: External ear normal.      Left Ear: External ear normal.      Nose:      Comments: Oxygen in place        Mouth/Throat:      Lips: Pink.      Mouth: Mucous membranes are dry.      Comments: Ulcerations noted to tongue and oral mucosa         Dr. Roberts has examined and assessed the patient and agrees with current treatment plan        In office patient complains of weakness, difficulty eating/drinking, shortness of breath and is requesting admission   Spoke with Dr. Khan who  recommends patient [facility] to [facility]   ER provider called and notified       Assessment & Plan  Diagnoses and all orders for this visit:    1. Adenocarcinoma of unknown origin (Primary)  -     Case Request; Standing  -     Follow Anesthesia Guidelines / Protocol; Standing  -     Verify NPO Status; Standing  -     Obtain Informed Consent; Standing  -     SCD (Sequential Compression Device) - To Be Placed on Patient in Pre-Op; Standing  -     Patient to Void Prior to Transfer to OR; Standing  -     Instructions for Nursing; Standing  -     Case Request    2. Lymphadenopathy  -     Case Request; Standing  -     Follow Anesthesia Guidelines / Protocol; Standing  -     Verify NPO Status; Standing  -     Obtain Informed Consent; Standing  -     SCD (Sequential Compression Device) - To Be Placed on Patient in Pre-Op; Standing  -     Patient to Void Prior to Transfer to OR; Standing  -     Instructions for Nursing; Standing  -     Case Request    3. Dysphagia, unspecified type  -     Case Request; Standing  -     Follow Anesthesia Guidelines / Protocol; Standing  -     Verify NPO Status; Standing  -     Obtain Informed Consent; Standing  -     SCD (Sequential Compression Device) - To Be Placed on Patient in Pre-Op; Standing  -     Patient to Void Prior to Transfer to OR; Standing  -     Instructions for Nursing; Standing  -     Case Request    4. Vocal cord weakness  -     Case Request; Standing  -     Follow Anesthesia Guidelines / Protocol; Standing  -     Verify NPO Status; Standing  -     Obtain Informed Consent; Standing  -     SCD (Sequential Compression Device) - To Be Placed on Patient in Pre-Op; Standing  -     Patient to Void Prior to Transfer to OR; Standing  -     Instructions for Nursing; Standing  -     Case Request      RIGHT CERVICAL LYMPH NODE BIOPSY/EXCISION (Right)  No orders of the defined types were placed in this encounter.    No follow-ups on file.     Risks vs benefits of cervical lymph node  excision discussed     Patient Instructions   LYMPH NODE BIOPSY: The risks, benefits, and alternatives of the procedure including but not limited to pain, numbness, nerve injury, scarring, bleeding, infection, persistent symptoms, and risks of the anesthesia were discussed full with the patient and questions were answered. No guarantees were made or implied.        Electronically signed by Carolina Fitzgerald APRN at 09/24/24 1706       Current Facility-Administered Medications   Medication Dose Route Frequency Provider Last Rate Last Admin    acetaminophen (TYLENOL) tablet 650 mg  650 mg Oral Q4H PRN Nika Castaneda APRN        Or    acetaminophen (TYLENOL) 160 MG/5ML oral solution 650 mg  650 mg Oral Q4H PRN Nika Castaneda APRN        Or    acetaminophen (TYLENOL) suppository 650 mg  650 mg Rectal Q4H PRN Nika Castaneda APRN        albuterol (PROVENTIL) nebulizer solution 0.083% 2.5 mg/3mL  2.5 mg Nebulization Q4H PRN Marek Roberts MD        atropine 1 % ophthalmic solution 2 drop  2 drop Sublingual BID PRN Nika Castaneda APRN        sennosides-docusate (PERICOLACE) 8.6-50 MG per tablet 2 tablet  2 tablet Oral BID PRN Marek Roberts MD        And    polyethylene glycol (MIRALAX) packet 17 g  17 g Oral Daily PRN Marek Roberts MD        And    bisacodyl (DULCOLAX) EC tablet 5 mg  5 mg Oral Daily PRN Marek Roberts MD        And    bisacodyl (DULCOLAX) suppository 10 mg  10 mg Rectal Daily PRN Marek Roberts MD        calcium carbonate (TUMS) chewable tablet 500 mg (200 mg elemental)  2 tablet Oral TID PRN Jonathon Chew MD   2 tablet at 10/01/24 0144    diphenhydrAMINE (BENADRYL) capsule 25 mg  25 mg Oral Q6H PRN Nika Castaneda APRN        Or    diphenhydrAMINE (BENADRYL) injection 25 mg  25 mg Intravenous Q6H PRN Nika Castaneda APRN        diphenoxylate-atropine (LOMOTIL) 2.5-0.025 MG per tablet 1 tablet  1 tablet Oral Q2H PRN Nika Castaneda,  APRN        furosemide (LASIX) injection 20 mg  20 mg Intravenous Q6H PRN Castaneda, Nika L, APRN        Or    furosemide (LASIX) injection 20 mg  20 mg Subcutaneous Q6H PRN Castaneda, Nika L, APRN        haloperidol (HALDOL) 2 MG/ML solution 1 mg  1 mg Sublingual Q4H PRN Castaneda, Nika L, APRN        Or    haloperidol lactate (HALDOL) injection 1 mg  1 mg Intravenous Q4H PRN Castaneda, Nika L, APRN        haloperidol (HALDOL) 2 MG/ML solution 2 mg  2 mg Sublingual Q4H PRN Castaneda, Nika L, APRN        Or    haloperidol lactate (HALDOL) injection 2 mg  2 mg Intravenous Q4H PRN Castaneda, Nika L, APRN        ipratropium-albuterol (DUO-NEB) nebulizer solution 3 mL  3 mL Nebulization Q4H PRN Castaneda, Nika L, APRN        LORazepam (ATIVAN) 2 MG/ML concentrated solution 0.5 mg  0.5 mg Sublingual Q1H PRN Flynn Quinones MD        LORazepam (ATIVAN) injection 1 mg  1 mg Intravenous Q1H PRN Flynn Quinones MD   1 mg at 10/07/24 0724    Or    LORazepam (ATIVAN) 2 MG/ML concentrated solution 1 mg  1 mg Sublingual Q1H PRN Fylnn Quinones MD        LORazepam (ATIVAN) injection 2 mg  2 mg Intravenous Q1H PRN Flynn Quinones MD        Or    LORazepam (ATIVAN) 2 MG/ML concentrated solution 2 mg  2 mg Sublingual Q1H PRN Flynn Quinones MD        morphine injection 4 mg  4 mg Intravenous Q1H PRN Castaneda, Nika L, APRN        Or    morphine concentrated solution 10 mg  10 mg Sublingual Q1H PRN Castaneda, Nika L, APRN        morphine injection 6 mg  6 mg Intravenous Q1H PRN Castaneda, Nika L, APRN        Or    morphine concentrated solution 20 mg  20 mg Sublingual Q1H PRN Castaneda, Nika L, APRN        mupirocin (BACTROBAN) 2 % ointment 1 Application  1 Application Topical Q12H Marek Roberts MD   1 Application at 10/07/24 0923    ondansetron (ZOFRAN) injection 4 mg  4 mg Intravenous Q6H PRN Marek Roberts MD        Polyvinyl Alcohol-Povidone PF  (ARTIFICIAL TEARS) 1.4-0.6 % ophthalmic solution 1 drop  1 drop Both Eyes Q30 Min PRN Nika Castaneda APRN        promethazine (PHENERGAN) tablet 12.5 mg  12.5 mg Oral Q4H PRN Nika Castaneda APRN        Or    promethazine (PHENERGAN) suppository 12.5 mg  12.5 mg Rectal Q4H PRN Nika Castaneda APRN        scopolamine patch 1 mg/72 hr  1 patch Transdermal Q72H PRN Flynn Quinones MD   1 patch at 10/06/24 1800    sodium chloride 0.9 % flush 10 mL  10 mL Intravenous Q12H Marek Roberts MD   10 mL at 10/07/24 0923    sodium chloride 0.9 % flush 10 mL  10 mL Intravenous PRN Marek Roberts MD        sodium chloride 0.9 % infusion 40 mL  40 mL Intravenous PRN Marek Roberts MD            Physician Progress Notes (last 24 hours)        Nika Castaneda APRN at 10/07/24 0741                       Palliative Care Services    Palliative Care Daily Progress Note   Chief complaint-comfort care    Code Status:   Code Status and Medical Interventions: No CPR (Do Not Attempt to Resuscitate); Comfort Measures   Ordered at: 10/06/24 1646     Code Status (Patient has no pulse and is not breathing):    No CPR (Do Not Attempt to Resuscitate)     Medical Interventions (Patient has pulse or is breathing):    Comfort Measures      Advanced Directives: Advance Directive Status: Patient does not have advance directive   Goals of Care: Ongoing.     S: Medical record reviewed. Events noted.  RRT called over the weekend and patient transitioned to comfort measures 10/6.  Received 3 doses of Ativan and 1 dose of morphine over the last 24 hours.  A scopolamine patch is in place. Laying in bed without apparent distress, somnolent.  Friend and nephew currently at bedside.  Advance Care Planning spoke with patient's daughter, Anay via telephone with regard to plan of care.  We discussed discharge options to include home with hospice versus nursing facility with private pay and  hospice.  Daughter was under the impression patient would be able to remain in facility for comfort measures and we discussed would need to consider a discharge plan for extended comfort care needs as patient appears comfortable and vital signs remain stable at this time.  Verbalizes understanding and open to referral to hospice with goal of home with hospice.  Will plan to complete a MOST document based upon goals.    Review of Systems   Unable to perform ROS: Acuity of condition      Pain Assessment  Preferred Pain Scale: number (Numeric Rating Pain Scale)  Nonverbal Indicators of Pain: nonverbal indicators absent  CPOT Facial Expression: 0-->relaxed, neutral  CPOT Body Movements: 0-->absence of movements  CPOT Muscle Tension: 0-->relaxed  Ventilator Compliance/Vocalization: 0-->talking in normal tone or no sound  CPOT Score: 0  PAINAD Breathin-->occasional labored breathing, short period of hyperventilation  PAINAD Negative Vocalization: 0-->none  PAINAD Facial Expression: 0-->smiling or inexpressive  PAINAD Body Language: 0-->relaxed  PAINAD Consolability: 0-->no need to console  PAINAD Score: 1  Pain Location: back, neck  Pain Description: constant, aching, sore    O:   No intake or output data in the 24 hours ending 10/07/24 0741      Diagnostics and current medications: Reviewed.      Current Facility-Administered Medications:     albuterol (PROVENTIL) nebulizer solution 0.083% 2.5 mg/3mL, 2.5 mg, Nebulization, Q4H PRN, Marek Roberts MD    sennosides-docusate (PERICOLACE) 8.6-50 MG per tablet 2 tablet, 2 tablet, Oral, BID PRN **AND** polyethylene glycol (MIRALAX) packet 17 g, 17 g, Oral, Daily PRN **AND** bisacodyl (DULCOLAX) EC tablet 5 mg, 5 mg, Oral, Daily PRN **AND** bisacodyl (DULCOLAX) suppository 10 mg, 10 mg, Rectal, Daily PRN, Marek Roberts MD    calcium carbonate (TUMS) chewable tablet 500 mg (200 mg elemental), 2 tablet, Oral, TID PRN, Jonathon Chew MD, 2 tablet at 10/01/24  0144    escitalopram (LEXAPRO) tablet 20 mg, 20 mg, Oral, Daily, Nika Castaneda, APRN, 20 mg at 10/06/24 0822    famotidine (PEPCID) tablet 20 mg, 20 mg, Oral, BID AC, Telly Villela MD, 20 mg at 10/06/24 0620    gabapentin (NEURONTIN) capsule 800 mg, 800 mg, Oral, Q6H, Telly Villela MD, 800 mg at 10/06/24 1211    guaiFENesin (MUCINEX) 12 hr tablet 1,200 mg, 1,200 mg, Oral, Q12H, Telly Villela MD, 1,200 mg at 10/06/24 0822    ipratropium-albuterol (DUO-NEB) nebulizer solution 3 mL, 3 mL, Nebulization, 4x Daily - RT, Marek Roberts MD, 3 mL at 10/06/24 1415    LORazepam (ATIVAN) tablet 0.5 mg, 0.5 mg, Oral, Q1H PRN **OR** LORazepam (ATIVAN) injection 0.5 mg, 0.5 mg, Intravenous, Q1H PRN, 0.5 mg at 10/06/24 2044 **OR** LORazepam (ATIVAN) injection 0.5 mg, 0.5 mg, Intramuscular, Q1H PRN **OR** LORazepam (ATIVAN) injection 0.5 mg, 0.5 mg, Subcutaneous, Q1H PRN **OR** LORazepam (ATIVAN) 2 MG/ML concentrated solution 0.5 mg, 0.5 mg, Oral, Q1H PRN **OR** LORazepam (ATIVAN) 2 MG/ML concentrated solution 0.5 mg, 0.5 mg, Sublingual, Q1H PRN, Flynn Quinones MD    LORazepam (ATIVAN) tablet 1 mg, 1 mg, Oral, Q1H PRN **OR** LORazepam (ATIVAN) injection 1 mg, 1 mg, Intravenous, Q1H PRN, 1 mg at 10/07/24 0724 **OR** LORazepam (ATIVAN) injection 1 mg, 1 mg, Intramuscular, Q1H PRN **OR** LORazepam (ATIVAN) injection 1 mg, 1 mg, Subcutaneous, Q1H PRN **OR** LORazepam (ATIVAN) 2 MG/ML concentrated solution 1 mg, 1 mg, Oral, Q1H PRN **OR** LORazepam (ATIVAN) 2 MG/ML concentrated solution 1 mg, 1 mg, Sublingual, Q1H PRN, Flynn Quinones MD    LORazepam (ATIVAN) tablet 2 mg, 2 mg, Oral, Q1H PRN **OR** LORazepam (ATIVAN) injection 2 mg, 2 mg, Intravenous, Q1H PRN **OR** LORazepam (ATIVAN) injection 2 mg, 2 mg, Intramuscular, Q1H PRN **OR** LORazepam (ATIVAN) injection 2 mg, 2 mg, Subcutaneous, Q1H PRN **OR** LORazepam (ATIVAN) 2 MG/ML concentrated solution 2 mg, 2 mg, Oral, Q1H PRN  "**OR** LORazepam (ATIVAN) 2 MG/ML concentrated solution 2 mg, 2 mg, Sublingual, Q1H PRN, Flynn Quinones MD    LORazepam (ATIVAN) injection 1 mg, 1 mg, Intravenous, Q4H PRN, Octaviano Mckeon DO, 1 mg at 10/06/24 1455    morphine injection 4 mg, 4 mg, Intravenous, Q1H PRN, 4 mg at 10/06/24 1814 **OR** morphine concentrated solution 10 mg, 10 mg, Sublingual, Q1H PRN, Flynn Quinones MD    mupirocin (BACTROBAN) 2 % ointment 1 Application, 1 Application, Topical, Q12H, Marek Roberts MD, 1 Application at 10/06/24 2023    ondansetron (ZOFRAN) injection 4 mg, 4 mg, Intravenous, Q6H PRN, Marek Roberts MD    scopolamine patch 1 mg/72 hr, 1 patch, Transdermal, Q72H PRN, Flynn Quinones MD, 1 patch at 10/06/24 1800    sodium chloride 0.9 % flush 10 mL, 10 mL, Intravenous, Q12H, Marek Roberts MD, 10 mL at 10/06/24 2022    sodium chloride 0.9 % flush 10 mL, 10 mL, Intravenous, PRN, Marek Roberts MD    sodium chloride 0.9 % infusion 40 mL, 40 mL, Intravenous, PRN, Marek Roberts MD    Allergies   Allergen Reactions    Aspirin Nausea And Vomiting     Unknown reaction    Keflex [Cephalexin] Other (See Comments)     Blisters to nose and mouth    Nitrofurantoin GI Intolerance and Nausea And Vomiting    Creatine Monohydrate Unknown - Low Severity     I have utilized all available immediate resources to obtain, update, or review the patient's current medications (including all prescriptions, over-the-counter products, herbals, cannabis/cannabidiol products, and vitamin/mineral/dietary (nutritional) supplements) for name, route of administration, type, dose and frequency.    A:    /80 (BP Location: Right arm, Patient Position: Lying)   Pulse 105   Temp 98 °F (36.7 °C) (Oral)   Resp 24   Ht 154.9 cm (60.98\")   Wt 50.2 kg (110 lb 10.7 oz)   SpO2 97%   BMI 20.92 kg/m²     Vitals and nursing note reviewed.   Constitutional:       Appearance: Not in distress. " Cachectic. Chronically ill-appearing.      Interventions: Nasal cannula in place.      Comments: Somnolent   Eyes:      General: Lids are normal.   HENT:      Head: Normocephalic.   Pulmonary:      Breath sounds: Decreased breath sounds present. Rhonchi present. Rales present.   Cardiovascular:      Tachycardia present.   Edema:     Peripheral edema present.  Musculoskeletal: Normal range of motion.      Cervical back: Neck supple. Skin:     General: Skin is warm.   Psychiatric:      Comments: Appears calm.      Patient status: Disease state: Controlled with current treatments.  Functional status: Palliative Performance Scale Score: Performance 40% based on the following measures: Ambulation: Mainly in bed, Activity and Evidence of Disease: Unable to do any work, extensive evidence of disease, Self-Care: Mainly assistance required,  Intake: Normal or reduced, LOC: Full, drowsy or confusion   ECOG Status(4) Completely disabled, unable to carry out self-care.  Totally confined to bed or chair.  Nutritional status:  Albumin 2.6 . Body mass index is 20.92 kg/m².  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 plan: Goals of care no CPR/comfort measures     Family support: The patient receives support from her daughter..  Advance Directives: Advance Directive Status: Patient does not have advance directive   POA/Healthcare surrogate-Anay barber-next of kin.     2.  Palliative care encounter  - Prognosis is poor long-term secondary to stage IV adenocarcinoma, respiratory failure, oxygen dependency, unintended weight loss, and multiple comorbidities.     -followed by Dr. Khan has received 4 courses of carboplatin, Gemzar obtaining remission x 14 years, now with progression of disease.      9/9- FNA left neck consistent with metastatic carcinoma   9/20-rechallenged with systemic therapy carboplatin and Gemzar      -She was seen by Dr. Roberts who recommended ED evaluation leading to current hospitalization due to difficulty swallowing, getting choked on her food, pronounced weight loss, and dehydration with plans for excisional biopsy 9/27.       -Started on IV antibiotics, liquid diet, supplemental oxygen, DuoNebs.  -Evaluated by speech therapy who completed a video swallow study with findings of profound laryngeal penetration and aspiration, started on modified diet with honey thick liquid, purée consistency food on 9/25.    9/27-Underwent excisional biopsy, pathology shows findings consistent with complete replacement of the lymph node by metastatic pulmonary adenocarcinoma. Extranodal extension of tumor and perineural invasion identified.   -Evaluated by therapy who recommends home with assist and home health.   9/28-Per oncology given aspiration issues may be in best interest to hold off on systemic therapy for few weeks, appointment set 10/14 at 11 AM with Dr. Khan  -Infectious disease consulted  10/4-completed antibiotic treatment  -Speech therapy notes concerns with ability to maintain nutritional and hydration status.   10/6-RRT/transitioned to comfort measures      9/27-Currently off unit for planned procedure.       9/30-clarified CODE STATUS.  Encouraged ongoing conversation with regard to medical priorities.  Will provide a copy of MOST decision aid.  -remains hopeful for continued efforts at systemic therapy and we discussed inherent risks to include but not limited to further decline in quality of life, increased hospitalizations and overall progressive decline despite aggressive care interventions.  -Recommended completion of living will document, a copy provided. States she would wish for her daughter to be primary healthcare surrogate.    -Anticipating home discharge as prior with her daughter as caregiver.  She is not open to SNF placement at this juncture.    10/7-continue comfort measures  -plan home with hospice, electronic communication to SW  -Will plan to complete a MOST document    3.  Comfort measures  -Discontinue new treatments and adjuvants not in line with comfort  -Additional palliative adjuvants as needed    Thank you for allowing us to participate in patient's plan of care. Palliative Care Team will continue to follow patient.     ELISA Ramirez  10/7/2024  07:41 CDT     Electronically signed by Nika Castaneda APRN at 10/07/24 1009       Flynn Quinones MD at 10/06/24 1059              [facility] Medicine Services  INPATIENT PROGRESS NOTE    Patient Name: Arminda Jiménez  Date of Admission: 9/24/2024  Today's Date: 10/06/24  Length of Stay: 12  Primary Care Physician: Teddy Sanchez MD    Subjective   Chief Complaint: Pain  HPI   Patient lethargic this morning with worsening swallowing coughing and breathing.  Rep response was called and patient requested to be changed to comfort measures family confirmed agreement with this wishes.    Review of Systems   All pertinent negatives and positives are as above. All other systems have been reviewed and are negative unless otherwise stated.      Objective    Temp:  [97.7 °F (36.5 °C)-98.1 °F (36.7 °C)] 97.7 °F (36.5 °C)  Heart Rate:  [88-98] 98  Resp:  [12-20] 12  BP: (120-149)/(68-83) 129/83  Physical Exam  Constitutional:       Appearance: She is ill-appearing.      Comments: Appears chronically ill, not distressed  HENT:      Head: Normocephalic.      Mouth/Throat:      Mouth: Mucous membranes are moist.   Neck:      Comments: Site of excisional biopsy right side of neck clean and dry  Cardiovascular:      Rate and Rhythm: Normal rate and regular rhythm.   Pulmonary:      Effort: Pulmonary effort is normal.      Breath sounds: Rhonchi present.      Comments: coarse bilateral BSs and upper airway crackles   Musculoskeletal:         General: No deformity.   Skin:     General: Skin is warm.      Findings: Bruising present.   Neurological:      Mental Status: She is alert.      Motor: Weakness present.   Psychiatric:         Mood and Affect: Mood normal.       Results Review:  I have reviewed the labs, radiology results, and diagnostic studies.    Laboratory Data:   Results from last 7 days   Lab Units 10/06/24  0427 10/05/24  0353 10/04/24  0632   WBC 10*3/mm3 6.13 4.64 3.92   HEMOGLOBIN g/dL 8.0* 8.9* 8.2*   HEMATOCRIT % 26.5* 30.2* 27.5*   PLATELETS 10*3/mm3 403 353 306        Results from last 7 days   Lab Units 10/06/24  0427 10/05/24  0353 10/04/24  0632 10/03/24  0956 10/01/24  0519   SODIUM mmol/L 139 138 139   < > 138   POTASSIUM mmol/L 3.8 4.2 4.3   < > 3.2*   CHLORIDE mmol/L 97* 97* 99   < > 100   CO2 mmol/L 33.0* 32.0* 30.0*   < > 30.0*   BUN mg/dL 12 10 6*   < > 6*   CREATININE mg/dL 0.25* 0.24* 0.25*   < > <0.17*   CALCIUM mg/dL 8.6 8.6 8.7   < > 8.1*   BILIRUBIN mg/dL  --   --   --   --  0.3   ALK PHOS U/L  --   --   --   --  297*   ALT (SGPT) U/L  --   --   --   --  36*   AST (SGOT) U/L  --   --   --   --  68*   GLUCOSE mg/dL 101* 89 94   < > 102*    < > = values in this interval not displayed.       Culture Data:   Blood Culture   Date  Value Ref Range Status   09/24/2024 Enterococcus hirae (C)  Final     Comment:       Infectious disease consultation is highly recommended.   09/24/2024 No growth at 5 days  Final       Radiology Data:   Imaging Results (Last 24 Hours)       ** No results found for the last 24 hours. **            I have reviewed the patient's current medications.     Assessment/Plan   Assessment  Active Hospital Problems    Diagnosis     **Dysphagia     Thrombocytopenia     Metastatic carcinoma     Weight loss     Cancer associated pain     Bacteremia due to Enterococcus     Aspiration pneumonia     Chronic pain     Severe malnutrition     Lymphadenopathy     Vocal cord weakness     COPD (chronic obstructive pulmonary disease)     Adenocarcinoma of unknown origin        Treatment Plan  Start comfort measures.    Follow-up pathology from excisional biopsy of right cervical lymph node:  Right neck mass:  A.  Consistent with complete replacement of a lymph node by metastatic pulmonary adenocarcinoma.  B.  Extranodal extension of tumor and perineural invasion identified.  C.  Incidental adjacent benign lymph node.    Currently on 2LNC -  recently started on 2 L by nasal cannula in the outpatient setting.  Patient has been on IV Zosyn for coverage of aspiration pneumonia in the setting of known dysphagia.  1 blood culture has returned positive for Enterococcus hirae -- likely contaminant.  ID input noted.  IV Unasyn for suspected aspiration pneumonia.  She had 3 days of IV Zosyn and now on day 8 of IV Unasyn  Repeat CXR reviewed mild improvement in   the left basilar infiltrates   Trial of Metanebs ongoing  Added flutter valve  Chest physiotherapy  Nutrition supplements  ST following - modified diet including pureed diet with honey thick liquids to regular soft to chew chopped meat and honey thick liquids  Thrombocytopenia.  Platelet trend noted.  Patient had chemotherapy with Gemzar on 9/20/2024.  Hem/Onc following.  Repeat CBC with  diff in AM to monitor counts.  Remain mindful that Abxs, including Unasyn, can also contribute to thrombocytopenia.  Currently on no Lovenox or heparin products. Dr Zambrano input noted.   12.  Palliative care following - discussed with Nika Castaneda  13.  Pain control.  oral Roxicodone; Neurontin.  She does have an implanted pain pump administering fentanyl-followed by Dr. Chahal of pain management  14.  XR of T and L spine with no acute findings  15.  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  She was evaluated by ENT and had a lymph node biopsy of right cervical node.  Infectious disease was consulted for positive blood culture with Enterococcus which apparently send admission, she was treated for aspiration pneumonia with 8 days of Unasyn IV.    Concern workup documented widespread metastatic disease with mild sternal, retroperitoneal, gastrohepatic and neck adenopathy.  Right neck biopsy was consistent with metastatic pulmonary adenocarcinoma completely replacing the lymph node and perineural invasion.  It was recommended to hold Gemzar due to current positive blood culture functional status and aspiration.      ENT recommended speech therapy evaluation.  She had a video swallow fluoroscopy and and diet recommendations.  She has continued to have poor oral intake and requiring suctioning to clear secretions.  Despite maximal efforts she has not shown much improvement and this afternoon she has become more debilitated and RRT was called.  At that time there was concern for respiratory depression and suctioning yielded significant amount of food contents.  The patient and family requested comfort measures.        Medical Decision Making  Number and Complexity of problems: 3 acute; moderate  complexity        Conditions and Status        Condition is unchanged.     [facility] Data  External documents reviewed: none  Cardiac tracing (EKG, telemetry) interpretation: no new EKGs  Radiology interpretation: as above  Labs reviewed: as above  Any tests that were considered but not ordered: none     Decision rules/scores evaluated (example [id number], [place], etc): none     Discussed with: patient, Nika with Palliative Care     Care Planning  Shared decision making: Discussed with patient with agreement to proceed with treatment plan as outlined  Code status and discussions: Full code     Disposition  Social Determinants of Health that impact treatment or disposition: None apparent at this time  I expect the patient to be discharged to home where she lives with her daughter hopefully in 2-3 days    Electronically signed by Flynn Quinones MD, 10/06/24, 10:59 CDT.    Electronically signed by Flynn Quinones MD at 10/06/24 1702       Consult Notes (last 24 hours)  Notes from 10/06/24 1025 through 10/07/24 1025   No notes of this type exist for this encounter.

## 2024-10-07 NOTE — PLAN OF CARE
Goal Outcome Evaluation:  Plan of Care Reviewed With: family        Progress: declining   Pt A/O, arouses to noise. VSS on 2.5 - 3L NC. IV to LFA, IID. Comfort Measures, PRN meds given when pt allows. Restless. Incontinent. PRN suctioning. R neck sutures removed per ENT request. Incision C/D/I. [facility] came and talked with family today. Awaiting decision from  about Hospice when pt can be taken home. Resting comfortably. Safety Maintained. Call light in reach.

## 2024-10-07 NOTE — THERAPY DISCHARGE NOTE
Acute Care - Occupational Therapy Discharge Summary  [facility]     Patient Name: Arminda Jiménez  : 1960  MRN: [id number]    Today's Date: 10/7/2024                 Admit Date: 2024        OT Recommendation and Plan    Visit Dx:    ICD-10-CM ICD-9-CM   1. Dysphagia, unspecified type  R13.10 787.20   2. Unable to care for self  Z78.9 V49.89   3. Adenocarcinoma  C80.1 199.1   4. Elevated TSH  R79.89 794.5   5. Pulmonary emphysema, unspecified emphysema type  J43.9 492.8   6. Patchy atelectasis  J98.11 518.0   7. Adenocarcinoma of unknown origin  C80.1 199.1   8. Lymphadenopathy  R59.1 785.6   9. Vocal cord weakness  J38.00 478.30   10. Impaired mobility [Z74.09]  Z74.09 799.89                OT Rehab Goals       Row Name 10/07/24 0700             Bathing Goal 1 (OT)    Activity/Device (Bathing Goal 1, OT) bathing skills, all  -TS      [place] Level/Cues Needed (Bathing Goal 1, OT) standby assist  -TS      Time Frame (Bathing Goal 1, OT) long term goal (LTG);by discharge  -TS      Progress/Outcomes (Bathing Goal 1, OT) goal not met  -TS         Dressing Goal 1 (OT)    Activity/Device (Dressing Goal 1, OT) dressing skills, all  -TS      [place]/Cues Needed (Dressing Goal 1, OT) standby assist  -TS      Time Frame (Dressing Goal 1, OT) long term goal (LTG);by discharge  -TS      Progress/Outcome (Dressing Goal 1, OT) goal not met  -TS         Toileting Goal 1 (OT)    Activity/Device (Toileting Goal 1, OT) toileting skills, all  -TS      [place] Level/Cues Needed (Toileting Goal 1, OT) standby assist  -TS      Time Frame (Toileting Goal 1, OT) long term goal (LTG);by discharge  -TS      Progress/Outcome (Toileting Goal 1, OT) goal not met  -TS                User Key  (r) = Recorded By, (t) = Taken By, (c) = Cosigned By      Initials Name Provider Type Discipline    TS Stacy Ogden COTA Occupational Therapist Assistant OT                     Outcome Measures       Row Name 10/04/24 1500              How much help from another person do you currently need...    Turning from your back to your side while in flat bed without using bedrails? 3  -AH      Moving from lying on back to sitting on the side of a flat bed without bedrails? 3  -AH      Moving to and from a bed to a chair (including a wheelchair)? 3  -AH      Standing up from a chair using your arms (e.g., wheelchair, bedside chair)? 3  -AH      Climbing 3-5 steps with a railing? 2  -AH      To walk in hospital room? 2  -AH      AM-PAC 6 Clicks Score (PT) 16  -      Highest Level of Mobility Goal 5 --> Static standing  -         Functional Assessment    Outcome Measure Options AM-PAC 6 Clicks Basic Mobility (PT)  -                User Key  (r) = Recorded By, (t) = Taken By, (c) = Cosigned By      Initials Name Provider Type     Alexandra Odonnell, PTA Physical Therapist Assistant                    Timed Therapy Charges  Total Units: 2      Suggested Charges  Total Units: 2      Procedure Name Documented Minutes Units Code    HC OT SELF CARE/MGMT/TRAIN EA 15 MIN 23 2   18702 (CPT®)                 Documented Minutes  Total Minutes: 23      Therapy Provided Minutes    27616 - OT Self Care/Mgmt Minutes 23                        OT Discharge Summary  Anticipated Discharge Disposition (OT): home with assist  Reason for Discharge: Change in medical status  Outcomes Achieved: Refer to plan of care for updates on goals achieved  Discharge Destination: Home with assist      NAVI Murphy  10/7/2024

## 2024-10-07 NOTE — PROGRESS NOTES
"1         [facility] Medicine Services  INPATIENT PROGRESS NOTE    Patient Name: Arminda Jiménez  Date of Admission: 9/24/2024  Today's Date: 10/07/24  Length of Stay: 13  Primary Care Physician: Teddy Sanchez MD    Subjective   Chief Complaint: Follow-up  HPI   Patient on day 13 of hospitalization  This is my first encounter with her    Admitted on September 24 presenting with \"unable to swallow\" and weakness.  Record indicates history of adenocarcinoma of unknown origin under the care of Dr. Roberts.    Record also indicates that she typically wears 2 L of oxygen but had to uptitrated to 5 L to maintain adequate oxygenation.    She was admitted with provisional diagnosis of:    Hypoxia (I think this is acute on chronic hypoxic respiratory failure)  Pneumonia with patchy infiltrates noted on chest x-ray  Dysphagia/choking  Adenocarcinoma of unknown origin.      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  She was evaluated by ENT and had a lymph node biopsy of right cervical node.  Infectious disease was consulted for positive blood culture with Enterococcus which apparently send admission, she was treated for aspiration pneumonia with 8 days of Unasyn IV.     Concern workup documented widespread metastatic disease with mild sternal, retroperitoneal, gastrohepatic and neck adenopathy.  Right neck biopsy was consistent with metastatic pulmonary adenocarcinoma completely replacing the lymph node and perineural invasion.  It was recommended to hold Gemzar due to current positive blood culture functional status and aspiration.       ENT recommended speech therapy evaluation.  She had a video swallow fluoroscopy and and diet recommendations.  She has continued to have poor oral intake and " requiring suctioning to clear secretions.  Despite maximal efforts she has not shown much improvement and this afternoon she has become more debilitated and RRT was called.  At that time there was concern for respiratory depression and suctioning yielded significant amount of food contents.  The patient and family requested comfort measures.    Consultation:  Oncology-Dr. Zambrano  Palliative care  ENT  ID    Case management indicates that plan is to go home with hospice.  Arrangement in process.  Patient is a DO NOT RESUSCITATE DO NOT INTUBATE; comfort measures only  She is on palliative care medications.    According to nurse Sheri as she has been refusing medications.    mupirocin, 1 Application, Topical, Q12H  sodium chloride, 10 mL, Intravenous, Q12H        Patient is mouth breathing while asleep  Wet sound in her throat    Review of Systems   Patient resting.  Requiring significant stimulation to awaken according to nurse    Objective    Temp:  [98 °F (36.7 °C)-98.5 °F (36.9 °C)] 98 °F (36.7 °C)  Heart Rate:  [105-107] 105  Resp:  [24-26] 24  BP: (116-174)/(80) 174/80  Physical Exam  Thin woman with diminished body muscle mass/fat  Constitutional:       Appearance: She is ill-appearing.      Comments: Appears chronically ill, not distressed  HENT:      Head: Normocephalic.      Mouth/Throat:      Mouth: Mucous membranes are moist.   Neck:      Comments: Site of excisional biopsy right side of neck clean and dry  Cardiovascular:      Rate and Rhythm: Normal rate and regular rhythm.   Pulmonary:      Effort: Pulmonary effort is normal.      Breath sounds: Rhonchi present.      Comments: coarse bilateral BSs and upper airway crackles   Musculoskeletal:         General: No deformity.   Skin:     General: Skin is warm.      Findings: Bruising present.   Neurological:      Mental Status: Resting     Psychiatric:         Mood and Affect: Mood normal.,  Flat affect         Results Review:  I have reviewed the labs,  "radiology results, and diagnostic studies.    Laboratory Data:   Results from last 7 days   Lab Units 10/06/24  0427 10/05/24  0353 10/04/24  0632   WBC 10*3/mm3 6.13 4.64 3.92   HEMOGLOBIN g/dL 8.0* 8.9* 8.2*   HEMATOCRIT % 26.5* 30.2* 27.5*   PLATELETS 10*3/mm3 403 353 306        Results from last 7 days   Lab Units 10/06/24  0427 10/05/24  0353 10/04/24  0632 10/03/24  0956 10/01/24  0519   SODIUM mmol/L 139 138 139   < > 138   POTASSIUM mmol/L 3.8 4.2 4.3   < > 3.2*   CHLORIDE mmol/L 97* 97* 99   < > 100   CO2 mmol/L 33.0* 32.0* 30.0*   < > 30.0*   BUN mg/dL 12 10 6*   < > 6*   CREATININE mg/dL 0.25* 0.24* 0.25*   < > <0.17*   CALCIUM mg/dL 8.6 8.6 8.7   < > 8.1*   BILIRUBIN mg/dL  --   --   --   --  0.3   ALK PHOS U/L  --   --   --   --  297*   ALT (SGPT) U/L  --   --   --   --  36*   AST (SGOT) U/L  --   --   --   --  68*   GLUCOSE mg/dL 101* 89 94   < > 102*    < > = values in this interval not displayed.       Culture Data:   No results found for: \"BLOODCX\", \"URINECX\", \"WOUNDCX\", \"MRSACX\", \"RESPCX\", \"STOOLCX\"    Radiology Data:   Imaging Results (Last 24 Hours)       ** No results found for the last 24 hours. **            I have reviewed the patient's current medications.     Assessment/Plan   Assessment  Medical Decision Making  Number and Complexity of problems:   Active Hospital Problems    Diagnosis     **Dysphagia     Thrombocytopenia     Metastatic carcinoma     Weight loss     Cancer associated pain     Bacteremia due to Enterococcus     Aspiration pneumonia     Chronic pain     Severe malnutrition     Lymphadenopathy     Vocal cord weakness     COPD (chronic obstructive pulmonary disease)     Adenocarcinoma of unknown origin      Brief hospital stay summary:  64-year-old female with recurrent stage IV metastatic carcinoma of unknown primary, COPD and chronic respiratory failure, that was admitted with worsening dysphagia and difficulty with coughing and relizen air.  She was evaluated by ENT and had " a lymph node biopsy of right cervical node.  Infectious disease was consulted for positive blood culture with Enterococcus which apparently send admission, she was treated for aspiration pneumonia with 8 days of Unasyn IV.     Concern workup documented widespread metastatic disease with mild sternal, retroperitoneal, gastrohepatic and neck adenopathy.  Right neck biopsy was consistent with metastatic pulmonary adenocarcinoma completely replacing the lymph node and perineural invasion.  It was recommended to hold Gemzar due to current positive blood culture functional status and aspiration.       ENT recommended speech therapy evaluation.  She had a video swallow fluoroscopy and and diet recommendations.  She has continued to have poor oral intake and requiring suctioning to clear secretions.  Despite maximal efforts she has not shown much improvement and this afternoon she has become more debilitated and RRT was called.  At that time there was concern for respiratory depression and suctioning yielded significant amount of food contents.  The patient and family requested comfort measures.      Treatment Plan  Last laboratory interpretation dates back to October 6.  I reviewed this.  Patient now under comfort measure.  Awaiting from social service on discharge planning to home hospice    Medications reviewed  mupirocin, 1 Application, Topical, Q12H  sodium chloride, 10 mL, Intravenous, Q12H          Conditions and Status  Fair       [facility] Data  External documents reviewed: Reviewed documentation  Cardiac tracing (EKG, telemetry) interpretation: -  Radiology interpretation: Yes x-ray from October: Reviewed  pulmonary hypoventilation with mild improvement in   the left basilar infiltrates, persistent trace right pleural effusion.   No pneumothorax is identified.   Labs reviewed: Yes as noted above  Any tests that were considered but not ordered: None     Decision rules/scores evaluated (example [id number], Wells, etc):  -     Discussed with: Patient, nursing staff, social service     Care Planning  Shared decision making: Patient and consultants  Code status and discussions: Comfort measures  I confirmed that the patient's advanced care plan is present, code status is documented, and a surrogate decision maker is listed in the patient's medical record.    Disposition  Social Determinants of Health that impact treatment or disposition: None identified at this time.  I expect the patient to be discharged to home under hospice care.    Electronically signed by Kevan Gil MD, 10/07/24, 15:30 CDT.

## 2024-10-07 NOTE — TELEPHONE ENCOUNTER
----- Message from Marek Roberts sent at 10/4/2024  4:58 PM CDT -----  I believe this patient was seen by Dr. Zambrano.  Make sure that they are aware of the pathology results

## 2024-10-07 NOTE — PROGRESS NOTES
[facility] Palliative Care Services    Palliative Care Daily Progress Note   Chief complaint-comfort care    Code Status:   Code Status and Medical Interventions: No CPR (Do Not Attempt to Resuscitate); Comfort Measures   Ordered at: 10/06/24 1646     Code Status (Patient has no pulse and is not breathing):    No CPR (Do Not Attempt to Resuscitate)     Medical Interventions (Patient has pulse or is breathing):    Comfort Measures      Advanced Directives: Advance Directive Status: Patient does not have advance directive   Goals of Care: Ongoing.     S: Medical record reviewed. Events noted.  RRT called over the weekend and patient transitioned to comfort measures 10/6.  Received 3 doses of Ativan and 1 dose of morphine over the last 24 hours.  A scopolamine patch is in place. Laying in bed without apparent distress, somnolent.  Friend and nephew currently at bedside.  Advance Care Planning spoke with patient's daughterAnay via telephone with regard to plan of care.  We discussed discharge options to include home with hospice versus nursing facility with private pay and hospice.  Daughter was under the impression patient would be able to remain in facility for comfort measures and we discussed would need to consider a discharge plan for extended comfort care needs as patient appears comfortable and vital signs remain stable at this time.  Verbalizes understanding and open to referral to hospice with goal of home with hospice.  Will plan to complete a MOST document based upon goals.    Review of Systems   Unable to perform ROS: Acuity of condition       Pain Assessment  Preferred Pain Scale: number (Numeric Rating Pain Scale)  Nonverbal Indicators of Pain: nonverbal indicators absent  CPOT Facial Expression: 0-->relaxed, neutral  CPOT Body Movements: 0-->absence of movements  CPOT Muscle Tension: 0-->relaxed  Ventilator Compliance/Vocalization: 0-->talking in normal tone or no sound  CPOT  Score: 0  PAINAD Breathin-->occasional labored breathing, short period of hyperventilation  PAINAD Negative Vocalization: 0-->none  PAINAD Facial Expression: 0-->smiling or inexpressive  PAINAD Body Language: 0-->relaxed  PAINAD Consolability: 0-->no need to console  PAINAD Score: 1  Pain Location: back, neck  Pain Description: constant, aching, sore    O:   No intake or output data in the 24 hours ending 10/07/24 0741      Diagnostics and current medications: Reviewed.      Current Facility-Administered Medications:     albuterol (PROVENTIL) nebulizer solution 0.083% 2.5 mg/3mL, 2.5 mg, Nebulization, Q4H PRN, Marek Roberts MD    sennosides-docusate (PERICOLACE) 8.6-50 MG per tablet 2 tablet, 2 tablet, Oral, BID PRN **AND** polyethylene glycol (MIRALAX) packet 17 g, 17 g, Oral, Daily PRN **AND** bisacodyl (DULCOLAX) EC tablet 5 mg, 5 mg, Oral, Daily PRN **AND** bisacodyl (DULCOLAX) suppository 10 mg, 10 mg, Rectal, Daily PRN, Marek Roberts MD    calcium carbonate (TUMS) chewable tablet 500 mg (200 mg elemental), 2 tablet, Oral, TID PRN, Jonathon Chew MD, 2 tablet at 10/01/24 0144    escitalopram (LEXAPRO) tablet 20 mg, 20 mg, Oral, Daily, Nika Castaneda APRN, 20 mg at 10/06/24 0822    famotidine (PEPCID) tablet 20 mg, 20 mg, Oral, BID AC, Telly Villela MD, 20 mg at 10/06/24 0620    gabapentin (NEURONTIN) capsule 800 mg, 800 mg, Oral, Q6H, Telly Villela MD, 800 mg at 10/06/24 1211    guaiFENesin (MUCINEX) 12 hr tablet 1,200 mg, 1,200 mg, Oral, Q12H, Telly Villela MD, 1,200 mg at 10/06/24 0822    ipratropium-albuterol (DUO-NEB) nebulizer solution 3 mL, 3 mL, Nebulization, 4x Daily - RT, Marek Roberts MD, 3 mL at 10/06/24 1415    LORazepam (ATIVAN) tablet 0.5 mg, 0.5 mg, Oral, Q1H PRN **OR** LORazepam (ATIVAN) injection 0.5 mg, 0.5 mg, Intravenous, Q1H PRN, 0.5 mg at 10/06/24 2044 **OR** LORazepam (ATIVAN) injection 0.5 mg, 0.5 mg, Intramuscular, Q1H PRN  **OR** LORazepam (ATIVAN) injection 0.5 mg, 0.5 mg, Subcutaneous, Q1H PRN **OR** LORazepam (ATIVAN) 2 MG/ML concentrated solution 0.5 mg, 0.5 mg, Oral, Q1H PRN **OR** LORazepam (ATIVAN) 2 MG/ML concentrated solution 0.5 mg, 0.5 mg, Sublingual, Q1H PRN, Flynn Quinones MD    LORazepam (ATIVAN) tablet 1 mg, 1 mg, Oral, Q1H PRN **OR** LORazepam (ATIVAN) injection 1 mg, 1 mg, Intravenous, Q1H PRN, 1 mg at 10/07/24 0724 **OR** LORazepam (ATIVAN) injection 1 mg, 1 mg, Intramuscular, Q1H PRN **OR** LORazepam (ATIVAN) injection 1 mg, 1 mg, Subcutaneous, Q1H PRN **OR** LORazepam (ATIVAN) 2 MG/ML concentrated solution 1 mg, 1 mg, Oral, Q1H PRN **OR** LORazepam (ATIVAN) 2 MG/ML concentrated solution 1 mg, 1 mg, Sublingual, Q1H PRN, Flynn Quinones MD    LORazepam (ATIVAN) tablet 2 mg, 2 mg, Oral, Q1H PRN **OR** LORazepam (ATIVAN) injection 2 mg, 2 mg, Intravenous, Q1H PRN **OR** LORazepam (ATIVAN) injection 2 mg, 2 mg, Intramuscular, Q1H PRN **OR** LORazepam (ATIVAN) injection 2 mg, 2 mg, Subcutaneous, Q1H PRN **OR** LORazepam (ATIVAN) 2 MG/ML concentrated solution 2 mg, 2 mg, Oral, Q1H PRN **OR** LORazepam (ATIVAN) 2 MG/ML concentrated solution 2 mg, 2 mg, Sublingual, Q1H PRStacie ESTES Mariano Ariel, MD    LORazepam (ATIVAN) injection 1 mg, 1 mg, Intravenous, Q4H PRN, Octaviano Mckeon DO, 1 mg at 10/06/24 8338    morphine injection 4 mg, 4 mg, Intravenous, Q1H PRN, 4 mg at 10/06/24 1814 **OR** morphine concentrated solution 10 mg, 10 mg, Sublingual, Q1H PRN, Flynn Quinones MD    mupirocin (BACTROBAN) 2 % ointment 1 Application, 1 Application, Topical, Q12H, Marek Roberts MD, 1 Application at 10/06/24 2023    ondansetron (ZOFRAN) injection 4 mg, 4 mg, Intravenous, Q6H PRN, Marek Roberts MD    scopolamine patch 1 mg/72 hr, 1 patch, Transdermal, Q72H PRN, Flynn Quinones MD, 1 patch at 10/06/24 1800    sodium chloride 0.9 % flush 10 mL, 10 mL, Intravenous, Q12H, Armando  "Marek Salas MD, 10 mL at 10/06/24 2022    sodium chloride 0.9 % flush 10 mL, 10 mL, Intravenous, PRN, Marek Roberts MD    sodium chloride 0.9 % infusion 40 mL, 40 mL, Intravenous, PRN, Marek Roberts MD    Allergies   Allergen Reactions    Aspirin Nausea And Vomiting     Unknown reaction    Keflex [Cephalexin] Other (See Comments)     Blisters to nose and mouth    Nitrofurantoin GI Intolerance and Nausea And Vomiting    Creatine Monohydrate Unknown - Low Severity     I have utilized all available immediate resources to obtain, update, or review the patient's current medications (including all prescriptions, over-the-counter products, herbals, cannabis/cannabidiol products, and vitamin/mineral/dietary (nutritional) supplements) for name, route of administration, type, dose and frequency.    A:    /80 (BP Location: Right arm, Patient Position: Lying)   Pulse 105   Temp 98 °F (36.7 °C) (Oral)   Resp 24   Ht 154.9 cm (60.98\")   Wt 50.2 kg (110 lb 10.7 oz)   SpO2 97%   BMI 20.92 kg/m²     Vitals and nursing note reviewed.   Constitutional:       Appearance: Not in distress. Cachectic. Chronically ill-appearing.      Interventions: Nasal cannula in place.      Comments: Somnolent   Eyes:      General: Lids are normal.   HENT:      Head: Normocephalic.   Pulmonary:      Breath sounds: Decreased breath sounds present. Rhonchi present. Rales present.   Cardiovascular:      Tachycardia present.   Edema:     Peripheral edema present.  Musculoskeletal: Normal range of motion.      Cervical back: Neck supple. Skin:     General: Skin is warm.   Psychiatric:      Comments: Appears calm.      Patient status: Disease state: Controlled with current treatments.  Functional status: Palliative Performance Scale Score: Performance 40% based on the following measures: Ambulation: Mainly in bed, Activity and Evidence of Disease: Unable to do any work, extensive evidence of disease, Self-Care: Mainly assistance " required,  Intake: Normal or reduced, LOC: Full, drowsy or confusion   ECOG Status(4) Completely disabled, unable to carry out self-care.  Totally confined to bed or chair.  Nutritional status:  Albumin 2.6 . Body mass index is 20.92 kg/m².  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 plan: Goals of care no CPR/comfort measures     Family support: The patient receives support from her daughter..  Advance Directives: Advance Directive Status: Patient does not have advance directive   POA/Healthcare surrogate-Anay barber-next of kin.     2.  Palliative care encounter  - Prognosis is poor long-term secondary to stage IV adenocarcinoma, respiratory failure, oxygen dependency, unintended weight loss, and multiple comorbidities.     -followed by Dr. Khan has received 4 courses of carboplatin, Gemzar obtaining remission x 14 years, now with progression of disease.      9/9- FNA left neck consistent with metastatic carcinoma   9/20-rechallenged with systemic therapy carboplatin and Gemzar      -She was seen by Dr. Roberts who recommended ED evaluation leading to current  hospitalization due to difficulty swallowing, getting choked on her food, pronounced weight loss, and dehydration with plans for excisional biopsy 9/27.       -Started on IV antibiotics, liquid diet, supplemental oxygen, DuoNebs.  -Evaluated by speech therapy who completed a video swallow study with findings of profound laryngeal penetration and aspiration, started on modified diet with honey thick liquid, purée consistency food on 9/25.    9/27-Underwent excisional biopsy, pathology shows findings consistent with complete replacement of the lymph node by metastatic pulmonary adenocarcinoma. Extranodal extension of tumor and perineural invasion identified.   -Evaluated by therapy who recommends home with assist and home health.   9/28-Per oncology given aspiration issues may be in best interest to hold off on systemic therapy for few weeks, appointment set 10/14 at 11 AM with Dr. Khan  -Infectious disease consulted  10/4-completed antibiotic treatment  -Speech therapy notes concerns with ability to maintain nutritional and hydration status.   10/6-RRT/transitioned to comfort measures     9/27-Currently off unit for planned procedure.       9/30-clarified CODE STATUS.  Encouraged ongoing conversation with regard to medical priorities.  Will provide a copy of MOST decision aid.  -remains hopeful for continued efforts at systemic therapy and we discussed inherent risks to include but not limited to further decline in quality of life, increased hospitalizations and overall progressive decline despite aggressive care interventions.  -Recommended completion of living will document, a copy provided. States she would wish for her daughter to be primary healthcare surrogate.    -Anticipating home discharge as prior with her daughter as caregiver.  She is not open to SNF placement at this juncture.    10/7-continue comfort measures  -plan home with hospice, electronic communication to SW  -Will plan to complete a MOST  document    3.  Comfort measures  -Discontinue new treatments and adjuvants not in line with comfort  -Additional palliative adjuvants as needed    Thank you for allowing us to participate in patient's plan of care. Palliative Care Team will continue to follow patient.     ELISA Ramirez  10/7/2024  07:41 CDT

## 2024-10-07 NOTE — CASE MANAGEMENT/SOCIAL WORK
Continued Stay Note   [place]     Patient Name: Arminda Jiménez  MRN: [id number]  Today's Date: 10/7/2024    Admit Date: 9/24/2024    Plan: Referral to [facility]   Discharge Plan       Row Name 10/07/24 1028       Plan    Plan Referral to [facility]    Patient/Family in Agreement with Plan yes    Provided Post Acute Provider List? Yes    Post Acute Provider List Hospice    Provided Post Acute Provider Quality & Resource List? Yes    Delivered To Support Person    Method of Delivery Telephone    Plan Comments Order for home with hospice. Spoke with dtr that is saying with options they prefer [facility].  Spoke with Asha from Hospice and informed of referral, she stated Kati is here and will be getting with family.  Pt will be living with sister at address of [address] [place] [address].  Will follow.      Row Name 10/07/24 0842       Plan    Plan Comfort care    Patient/Family in Agreement with Plan yes    Plan Comments Pt is now comfort care.  Plan was to d/c home with dtr and they had denied placement in past.  Palliative care follows, pt may be able to go home with hospice. Will follow.                   Discharge Codes    No documentation.                 Expected Discharge Date and Time       Expected Discharge Date Expected Discharge Time    Oct 4, 2024               LILO CarrasquilloW

## 2024-10-07 NOTE — CASE MANAGEMENT/SOCIAL WORK
Continued Stay Note   [place]     Patient Name: Arminda Jiménez  MRN: [id number]  Today's Date: 10/7/2024    Admit Date: 9/24/2024    Plan: Comfort care   Discharge Plan       Row Name 10/07/24 0842       Plan    Plan Comfort care    Patient/Family in Agreement with Plan yes    Plan Comments Pt is now comfort care.  Plan was to d/c home with dtr and they had denied placement in past.  Palliative care follows, pt may be able to go home with hospice. Will follow.                   Discharge Codes    No documentation.                 Expected Discharge Date and Time       Expected Discharge Date Expected Discharge Time    Oct 4, 2024               MARY Carrasquillo

## 2024-10-07 NOTE — THERAPY DISCHARGE NOTE
Acute Care - Physical Therapy Discharge Summary  [facility]       Patient Name: Arminda Jiménez  : 1960  MRN: [id number]    Today's Date: 10/7/2024                 Admit Date: 2024      PT Recommendation and Plan    Visit Dx:    ICD-10-CM ICD-9-CM   1. Dysphagia, unspecified type  R13.10 787.20   2. Unable to care for self  Z78.9 V49.89   3. Adenocarcinoma  C80.1 199.1   4. Elevated TSH  R79.89 794.5   5. Pulmonary emphysema, unspecified emphysema type  J43.9 492.8   6. Patchy atelectasis  J98.11 518.0   7. Adenocarcinoma of unknown origin  C80.1 199.1   8. Lymphadenopathy  R59.1 785.6   9. Vocal cord weakness  J38.00 478.30   10. Impaired mobility [Z74.09]  Z74.09 799.89        Outcome Measures       Row Name 10/04/24 1500             How much help from another person do you currently need...    Turning from your back to your side while in flat bed without using bedrails? 3  -AH      Moving from lying on back to sitting on the side of a flat bed without bedrails? 3  -AH      Moving to and from a bed to a chair (including a wheelchair)? 3  -AH      Standing up from a chair using your arms (e.g., wheelchair, bedside chair)? 3  -AH      Climbing 3-5 steps with a railing? 2  -AH      To walk in hospital room? 2  -AH      AM-PAC 6 Clicks Score (PT) 16  -AH      Highest Level of Mobility Goal 5 --> Static standing  -         Functional Assessment    Outcome Measure Options AM-PAC 6 Clicks Basic Mobility (PT)  -                User Key  (r) = Recorded By, (t) = Taken By, (c) = Cosigned By      Initials Name Provider Type     Alexandra Odonnell PTA Physical Therapist Assistant                         PT Rehab Goals       Row Name 10/07/24 0747             Bed Mobility Goal 1 (PT)    Activity/Assistive Device (Bed Mobility Goal 1, PT) bed mobility activities, all  -AH      [place] Level/Cues Needed (Bed Mobility Goal 1, PT) independent  -AH      Time Frame (Bed Mobility Goal 1, PT) long term goal  (LTG);by discharge  -      Progress/Outcomes (Bed Mobility Goal 1, PT) goal not met  -         Transfer Goal 1 (PT)    Activity/Assistive Device (Transfer Goal 1, PT) sit-to-stand/stand-to-sit;bed-to-chair/chair-to-bed;wheelchair transfer  -      [place] Level/Cues Needed (Transfer Goal 1, PT) contact guard required  -      Time Frame (Transfer Goal 1, PT) long term goal (LTG);by discharge  -      Progress/Outcome (Transfer Goal 1, PT) goal not met  -                User Key  (r) = Recorded By, (t) = Taken By, (c) = Cosigned By      Initials Name Provider Type Discipline     Alexandra Odonnell PTA Physical Therapist Assistant PT                        PT Discharge Summary  Reason for Discharge: Per MD order, other (comment) (comfort care)  Outcomes Achieved: Other  Discharge Destination: other (comment) (unsure, comfort care)      Alexandra Odonnell PTA   10/7/2024

## 2024-10-08 VITALS
SYSTOLIC BLOOD PRESSURE: 149 MMHG | WEIGHT: 110.67 LBS | BODY MASS INDEX: 20.89 KG/M2 | HEIGHT: 61 IN | HEART RATE: 105 BPM | TEMPERATURE: 97.2 F | RESPIRATION RATE: 24 BRPM | DIASTOLIC BLOOD PRESSURE: 94 MMHG | OXYGEN SATURATION: 97 %

## 2024-10-08 PROCEDURE — 25010000002 FUROSEMIDE PER 20 MG: Performed by: INTERNAL MEDICINE

## 2024-10-08 PROCEDURE — 99232 SBSQ HOSP IP/OBS MODERATE 35: CPT | Performed by: CLINICAL NURSE SPECIALIST

## 2024-10-08 RX ORDER — LORAZEPAM 2 MG/ML
1 CONCENTRATE ORAL EVERY 4 HOURS PRN
Qty: 30 ML | Refills: 0 | Status: SHIPPED | OUTPATIENT
Start: 2024-10-08

## 2024-10-08 RX ORDER — ATROPINE SULFATE 10 MG/ML
2 SOLUTION/ DROPS OPHTHALMIC 4 TIMES DAILY
Status: DISCONTINUED | OUTPATIENT
Start: 2024-10-08 | End: 2024-10-08 | Stop reason: HOSPADM

## 2024-10-08 RX ORDER — ATROPINE SULFATE 10 MG/ML
SOLUTION/ DROPS OPHTHALMIC
Qty: 15 ML | Refills: 12 | Status: SHIPPED | OUTPATIENT
Start: 2024-10-08

## 2024-10-08 RX ORDER — FUROSEMIDE 10 MG/ML
20 INJECTION INTRAMUSCULAR; INTRAVENOUS ONCE
Status: COMPLETED | OUTPATIENT
Start: 2024-10-08 | End: 2024-10-08

## 2024-10-08 RX ORDER — LORAZEPAM 2 MG/ML
1 CONCENTRATE ORAL
Qty: 30 ML | Refills: 0 | Status: SHIPPED | OUTPATIENT
Start: 2024-10-08 | End: 2024-10-08

## 2024-10-08 RX ORDER — SCOLOPAMINE TRANSDERMAL SYSTEM 1 MG/1
1 PATCH, EXTENDED RELEASE TRANSDERMAL
Status: DISCONTINUED | OUTPATIENT
Start: 2024-10-08 | End: 2024-10-08 | Stop reason: HOSPADM

## 2024-10-08 RX ORDER — MORPHINE SULFATE 100 MG/5ML
10 SOLUTION ORAL
Qty: 30 ML | Refills: 0 | Status: SHIPPED | OUTPATIENT
Start: 2024-10-08

## 2024-10-08 RX ADMIN — MUPIROCIN 1 APPLICATION: 20 OINTMENT TOPICAL at 10:13

## 2024-10-08 RX ADMIN — SCOPALAMINE 1 PATCH: 1 PATCH, EXTENDED RELEASE TRANSDERMAL at 10:30

## 2024-10-08 RX ADMIN — Medication 10 ML: at 10:13

## 2024-10-08 RX ADMIN — FUROSEMIDE 20 MG: 10 INJECTION, SOLUTION INTRAMUSCULAR; INTRAVENOUS at 11:21

## 2024-10-08 RX ADMIN — ATROPINE SULFATE 2 DROP: 10 SOLUTION/ DROPS OPHTHALMIC at 11:21

## 2024-10-08 RX ADMIN — MORPHINE SULFATE 20 MG: 20 SOLUTION ORAL at 09:54

## 2024-10-08 RX ADMIN — LORAZEPAM 0.5 MG: 2 SOLUTION, CONCENTRATE ORAL at 10:25

## 2024-10-08 NOTE — PROGRESS NOTES
"1         [facility] Medicine Services  INPATIENT PROGRESS NOTE    Patient Name: Arminda Jiménez  Date of Admission: 9/24/2024  Today's Date: 10/08/24  Length of Stay: 14  Primary Care Physician: Teddy Sanchez MD    Subjective   Chief Complaint: Follow-up  HPI   Patient on day 14 of hospitalization      Admitted on September 24 presenting with \"unable to swallow\" and weakness.  Record indicates history of adenocarcinoma of unknown origin under the care of Dr. Roberts.    Record also indicates that she typically wears 2 L of oxygen but had to uptitrated to 5 L to maintain adequate oxygenation.  Current setting is saturation 97%    She was admitted with provisional diagnosis of:    Hypoxia (I think this is acute on chronic hypoxic respiratory failure)  Pneumonia with patchy infiltrates noted on chest x-ray  Dysphagia/choking  Adenocarcinoma of unknown origin.      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  She was evaluated by ENT and had a lymph node biopsy of right cervical node.  Infectious disease was consulted for positive blood culture with Enterococcus which apparently send admission, she was treated for aspiration pneumonia with 8 days of Unasyn IV.     Concern workup documented widespread metastatic disease with mild sternal, retroperitoneal, gastrohepatic and neck adenopathy.  Right neck biopsy was consistent with metastatic pulmonary adenocarcinoma completely replacing the lymph node and perineural invasion.  It was recommended to hold Gemzar due to current positive blood culture functional status and aspiration.       ENT recommended speech therapy evaluation.  She had a video swallow fluoroscopy and and diet recommendations.  She has continued to have poor oral intake and " requiring suctioning to clear secretions.  Despite maximal efforts she has not shown much improvement and this afternoon she has become more debilitated and RRT was called.  At that time there was concern for respiratory depression and suctioning yielded significant amount of food contents.  The patient and family requested comfort measures.    Consultation:  Oncology-Dr. Zambrano  Palliative care  ENT  ID    Case management indicates that plan is to go home with hospice.  Arrangement in process.  Patient is a DO NOT RESUSCITATE DO NOT INTUBATE; comfort measures only  She is on palliative care medications.    According to nurse Sheri as she has been refusing medications.    atropine, 2 drop, Sublingual, 4x Daily  mupirocin, 1 Application, Topical, Q12H  Scopolamine, 1 patch, Transdermal, Q72H  sodium chloride, 10 mL, Intravenous, Q12H        Patient is mouth breathing while asleep  Wet sound in her throat      October 8.  No distress  Droopy eyelids  Unable to carry out meaningful conversation but tries to communicate    Review of Systems       Unable to give any historical details given overall condition      Objective    Temp:  [97.2 °F (36.2 °C)] 97.2 °F (36.2 °C)  Heart Rate:  [102-105] 105  Resp:  [24] 24  BP: (149)/(94) 149/94  Physical Exam  Very thin  woman with diminished body muscle mass/fat  Very droopy eyelids.  Mumbled words that are incomprehensible.  Constitutional:       Appearance: She is ill-appearing.      Comments: Appears chronically ill, not distressed  HENT:      Head: Normocephalic.      Mouth/Throat:      Mouth: Mucous membranes dry    Cardiovascular:      Rate and Rhythm: Normal rate and regular rhythm.   Pulmonary:      Effort: Pulmonary effort is normal.      Breath sounds: Rhonchi present.  Left greater than right     Comments: coarse bilateral BSs and upper airway crackles   Musculoskeletal:         General: No deformity.  Presence of edema of lower extremities.  Skin:     General: Skin  "is warm.      Findings: Bruising present.   Neurological:      Mental Status: Resting     Psychiatric:         Mood and Affect: Mood normal.,  Flat affect         Results Review:  I have reviewed the labs, radiology results, and diagnostic studies.    Laboratory Data:   Results from last 7 days   Lab Units 10/06/24  0427 10/05/24  0353 10/04/24  0632   WBC 10*3/mm3 6.13 4.64 3.92   HEMOGLOBIN g/dL 8.0* 8.9* 8.2*   HEMATOCRIT % 26.5* 30.2* 27.5*   PLATELETS 10*3/mm3 403 353 306        Results from last 7 days   Lab Units 10/06/24  0427 10/05/24  0353 10/04/24  0632   SODIUM mmol/L 139 138 139   POTASSIUM mmol/L 3.8 4.2 4.3   CHLORIDE mmol/L 97* 97* 99   CO2 mmol/L 33.0* 32.0* 30.0*   BUN mg/dL 12 10 6*   CREATININE mg/dL 0.25* 0.24* 0.25*   CALCIUM mg/dL 8.6 8.6 8.7   GLUCOSE mg/dL 101* 89 94       Culture Data:   No results found for: \"BLOODCX\", \"URINECX\", \"WOUNDCX\", \"MRSACX\", \"RESPCX\", \"STOOLCX\"    Radiology Data:   Imaging Results (Last 24 Hours)       ** No results found for the last 24 hours. **            I have reviewed the patient's current medications.     Assessment/Plan   Assessment  Medical Decision Making  Number and Complexity of problems:   Active Hospital Problems    Diagnosis     **Dysphagia     Thrombocytopenia     Metastatic carcinoma     Weight loss     Cancer associated pain     Bacteremia due to Enterococcus     Aspiration pneumonia     Chronic pain     Severe malnutrition     Lymphadenopathy     Vocal cord weakness     COPD (chronic obstructive pulmonary disease)     Adenocarcinoma of unknown origin      Brief hospital stay summary:  64-year-old female with recurrent stage IV metastatic carcinoma of unknown primary, COPD and chronic respiratory failure, that was admitted with worsening dysphagia and difficulty with coughing and relizen air.  She was evaluated by ENT and had a lymph node biopsy of right cervical node.  Infectious disease was consulted for positive blood culture with Enterococcus " which apparently send admission, she was treated for aspiration pneumonia with 8 days of Unasyn IV.     Concern workup documented widespread metastatic disease with mild sternal, retroperitoneal, gastrohepatic and neck adenopathy.  Right neck biopsy was consistent with metastatic pulmonary adenocarcinoma completely replacing the lymph node and perineural invasion.  It was recommended to hold Gemzar due to current positive blood culture functional status and aspiration.       ENT recommended speech therapy evaluation.  She had a video swallow fluoroscopy and and diet recommendations.  She has continued to have poor oral intake and requiring suctioning to clear secretions.  Despite maximal efforts she has not shown much improvement and this afternoon she has become more debilitated and RRT was called.  At that time there was concern for respiratory depression and suctioning yielded significant amount of food contents.  The patient and family requested comfort measures.      Treatment Plan  Last laboratory interpretation dates back to October 6.  I reviewed this.  Patient now under comfort measure.  Awaiting from social service on discharge planning to home hospice  No new event.  Consider IV diuretic given harsh breath sounds left greater than right  Medications reviewed  atropine, 2 drop, Sublingual, 4x Daily  mupirocin, 1 Application, Topical, Q12H  Scopolamine, 1 patch, Transdermal, Q72H  sodium chloride, 10 mL, Intravenous, Q12H          Conditions and Status  Fair       [facility] Data  External documents reviewed: Reviewed documentation  Cardiac tracing (EKG, telemetry) interpretation: -  Radiology interpretation: Yes x-ray from October: Reviewed  pulmonary hypoventilation with mild improvement in   the left basilar infiltrates, persistent trace right pleural effusion.   No pneumothorax is identified.   Labs reviewed: Yes as noted above  Any tests that were considered but not ordered: None     Decision rules/scores  evaluated (example [id number], [place], etc): -     Discussed with: Patient, nursing staff, social service     Care Planning  Shared decision making: Patient and consultants  Code status and discussions: Comfort measures  I confirmed that the patient's advanced care plan is present, code status is documented, and a surrogate decision maker is listed in the patient's medical record.    Disposition  Social Determinants of Health that impact treatment or disposition: None identified at this time.  I expect the patient to be discharged to home under hospice care.    Electronically signed by Kevan Gil MD, 10/08/24, 10:21 CDT.

## 2024-10-08 NOTE — PROGRESS NOTES
[facility] Palliative [facility]    Palliative Care Daily Progress Note   Chief complaint-comfort care    Code Status:   Code Status and Medical Interventions: No CPR (Do Not Attempt to Resuscitate); Comfort Measures   Ordered at: 10/06/24 1646     Code Status (Patient has no pulse and is not breathing):    No CPR (Do Not Attempt to Resuscitate)     Medical Interventions (Patient has pulse or is breathing):    Comfort Measures      Advanced Directives: Advance Directive Status: Patient does not have advance directive   Goals of Care: Ongoing.     S: Medical record reviewed. Events noted.  Transitioned to comfort measures 10/6.  Received 3 doses of Ativan and 3 dose of morphine equaling 34 mg oral morphine equivalent over the last 24 hours.  A scopolamine patch is in place.  Course audible respiratory sounds.  Mouths answers to simple questions.  Initially request for water but then declined once presented.  No family at bedside.  Anticipating home with hospice.    Review of Systems   Unable to perform ROS: Acuity of condition       Pain Assessment  Preferred Pain Scale: number (Numeric Rating Pain Scale)  Nonverbal Indicators of Pain: nonverbal indicators absent  CPOT Facial Expression: 0-->relaxed, neutral  CPOT Body Movements: 0-->absence of movements  CPOT Muscle Tension: 0-->relaxed  Ventilator Compliance/Vocalization: 0-->talking in normal tone or no sound  CPOT Score: 0  PAINAD Breathin-->occasional labored breathing, short period of hyperventilation  PAINAD Negative Vocalization: 0-->none  PAINAD Facial Expression: 0-->smiling or inexpressive  PAINAD Body Language: 0-->relaxed  PAINAD Consolability: 0-->no need to console  PAINAD Score: 1  Pain Location: back, neck  Pain Description: constant, aching, sore    O:     Intake/Output Summary (Last 24 hours) at 10/8/2024 2221  Last data filed at 10/8/2024 4792  Gross per 24 hour   Intake --   Output 750 ml   Net -750 ml          Diagnostics and current medications: Reviewed.      Current Facility-Administered Medications:     acetaminophen (TYLENOL) tablet 650 mg, 650 mg, Oral, Q4H PRN **OR** acetaminophen (TYLENOL) 160 MG/5ML oral solution 650 mg, 650 mg, Oral, Q4H PRN **OR** acetaminophen (TYLENOL) suppository 650 mg, 650 mg, Rectal, Q4H PRN, Arianne Castanedaa ABDOULAYE, APRN    albuterol (PROVENTIL) nebulizer solution 0.083% 2.5 mg/3mL, 2.5 mg, Nebulization, Q4H PRN, Marek Roberts MD    atropine 1 % ophthalmic solution 2 drop, 2 drop, Sublingual, BID PRN, Nika Castaneda APRN, 2 drop at 10/07/24 2329    sennosides-docusate (PERICOLACE) 8.6-50 MG per tablet 2 tablet, 2 tablet, Oral, BID PRN **AND** polyethylene glycol (MIRALAX) packet 17 g, 17 g, Oral, Daily PRN **AND** bisacodyl (DULCOLAX) EC tablet 5 mg, 5 mg, Oral, Daily PRN **AND** bisacodyl (DULCOLAX) suppository 10 mg, 10 mg, Rectal, Daily PRN, Marek Roberts MD    calcium carbonate (TUMS) chewable tablet 500 mg (200 mg elemental), 2 tablet, Oral, TID PRN, Jonathon Chew MD, 2 tablet at 10/01/24 0144    diphenhydrAMINE (BENADRYL) capsule 25 mg, 25 mg, Oral, Q6H PRN **OR** diphenhydrAMINE (BENADRYL) injection 25 mg, 25 mg, Intravenous, Q6H PRN, Arianne Castanedaa ABDOULAYE, APRN    diphenoxylate-atropine (LOMOTIL) 2.5-0.025 MG per tablet 1 tablet, 1 tablet, Oral, Q2H PRN, Arianne Castanedaa ABDOULAYE, APRN    furosemide (LASIX) injection 20 mg, 20 mg, Intravenous, Q6H PRN **OR** furosemide (LASIX) injection 20 mg, 20 mg, Subcutaneous, Q6H PRN, Nika Castaneda, APRN    haloperidol (HALDOL) 2 MG/ML solution 1 mg, 1 mg, Sublingual, Q4H PRN **OR** haloperidol lactate (HALDOL) injection 1 mg, 1 mg, Intravenous, Q4H PRN, Nika Castaneda, APRN    haloperidol (HALDOL) 2 MG/ML solution 2 mg, 2 mg, Sublingual, Q4H PRN **OR** haloperidol lactate (HALDOL) injection 2 mg, 2 mg, Intravenous, Q4H PRN, Nika Castaneda, APRN    ipratropium-albuterol (DUO-NEB) nebulizer solution 3 mL,  3 mL, Nebulization, Q4H PRN, Nika Castaneda, APRN    [DISCONTINUED] LORazepam (ATIVAN) tablet 0.5 mg, 0.5 mg, Oral, Q1H PRN **OR** [DISCONTINUED] LORazepam (ATIVAN) injection 0.5 mg, 0.5 mg, Intravenous, Q1H PRN, 0.5 mg at 10/06/24 2044 **OR** [DISCONTINUED] LORazepam (ATIVAN) injection 0.5 mg, 0.5 mg, Intramuscular, Q1H PRN **OR** [DISCONTINUED] LORazepam (ATIVAN) injection 0.5 mg, 0.5 mg, Subcutaneous, Q1H PRN **OR** [DISCONTINUED] LORazepam (ATIVAN) 2 MG/ML concentrated solution 0.5 mg, 0.5 mg, Oral, Q1H PRN **OR** LORazepam (ATIVAN) 2 MG/ML concentrated solution 0.5 mg, 0.5 mg, Sublingual, Q1H PRN, Flynn Quinones MD    [DISCONTINUED] LORazepam (ATIVAN) tablet 1 mg, 1 mg, Oral, Q1H PRN **OR** LORazepam (ATIVAN) injection 1 mg, 1 mg, Intravenous, Q1H PRN, 1 mg at 10/07/24 0724 **OR** [DISCONTINUED] LORazepam (ATIVAN) injection 1 mg, 1 mg, Intramuscular, Q1H PRN **OR** [DISCONTINUED] LORazepam (ATIVAN) injection 1 mg, 1 mg, Subcutaneous, Q1H PRN **OR** [DISCONTINUED] LORazepam (ATIVAN) 2 MG/ML concentrated solution 1 mg, 1 mg, Oral, Q1H PRN **OR** LORazepam (ATIVAN) 2 MG/ML concentrated solution 1 mg, 1 mg, Sublingual, Q1H PRN, Flynn Quinones MD    [DISCONTINUED] LORazepam (ATIVAN) tablet 2 mg, 2 mg, Oral, Q1H PRN **OR** LORazepam (ATIVAN) injection 2 mg, 2 mg, Intravenous, Q1H PRN, 2 mg at 10/07/24 1857 **OR** [DISCONTINUED] LORazepam (ATIVAN) injection 2 mg, 2 mg, Intramuscular, Q1H PRN **OR** [DISCONTINUED] LORazepam (ATIVAN) injection 2 mg, 2 mg, Subcutaneous, Q1H PRN **OR** [DISCONTINUED] LORazepam (ATIVAN) 2 MG/ML concentrated solution 2 mg, 2 mg, Oral, Q1H PRN **OR** LORazepam (ATIVAN) 2 MG/ML concentrated solution 2 mg, 2 mg, Sublingual, Q1H PRN, Flynn Quinones MD    morphine injection 4 mg, 4 mg, Intravenous, Q1H PRN, 4 mg at 10/07/24 2328 **OR** morphine concentrated solution 10 mg, 10 mg, Sublingual, Q1H PRN, Nika Castaneda, APRN, 10 mg at 10/07/24 1710    morphine  "injection 6 mg, 6 mg, Intravenous, Q1H PRN **OR** morphine concentrated solution 20 mg, 20 mg, Sublingual, Q1H PRN, Nika Castaneda APRN    mupirocin (BACTROBAN) 2 % ointment 1 Application, 1 Application, Topical, Q12H, Marek Roberts MD, 1 Application at 10/07/24 2045    ondansetron (ZOFRAN) injection 4 mg, 4 mg, Intravenous, Q6H PRN, Marek Roberts MD    Polyvinyl Alcohol-Povidone PF (ARTIFICIAL TEARS) 1.4-0.6 % ophthalmic solution 1 drop, 1 drop, Both Eyes, Q30 Min PRN, Nika Castaneda APRN    promethazine (PHENERGAN) tablet 12.5 mg, 12.5 mg, Oral, Q4H PRN **OR** promethazine (PHENERGAN) suppository 12.5 mg, 12.5 mg, Rectal, Q4H PRN, Nika Castaneda APRN    scopolamine patch 1 mg/72 hr, 1 patch, Transdermal, Q72H PRN, Flynn Quinones MD, 1 patch at 10/06/24 1800    sodium chloride 0.9 % flush 10 mL, 10 mL, Intravenous, Q12H, Marek Roberts MD, 10 mL at 10/07/24 2045    sodium chloride 0.9 % flush 10 mL, 10 mL, Intravenous, PRN, Marek Roberts MD    sodium chloride 0.9 % infusion 40 mL, 40 mL, Intravenous, PRN, Marek Roberts MD    Allergies   Allergen Reactions    Aspirin Nausea And Vomiting     Unknown reaction    Keflex [Cephalexin] Other (See Comments)     Blisters to nose and mouth    Nitrofurantoin GI Intolerance and Nausea And Vomiting    Creatine Monohydrate Unknown - Low Severity     I have utilized all available immediate resources to obtain, update, or review the patient's current medications (including all prescriptions, over-the-counter products, herbals, cannabis/cannabidiol products, and vitamin/mineral/dietary (nutritional) supplements) for name, route of administration, type, dose and frequency.    A:    /94 (BP Location: Right arm, Patient Position: Lying)   Pulse 102   Temp 97.2 °F (36.2 °C) (Axillary)   Resp 24   Ht 154.9 cm (60.98\")   Wt 50.2 kg (110 lb 10.7 oz)   SpO2 100%   BMI 20.92 kg/m²     Vitals and nursing note " reviewed.   Constitutional:       Appearance: Not in distress. Cachectic. Chronically ill-appearing.      Interventions: Nasal cannula in place.      Comments: Somnolent   Eyes:      General: Lids are normal.   HENT:      Head: Normocephalic.   Pulmonary:      Breath sounds: Decreased breath sounds present. Rhonchi present. Rales present.   Cardiovascular:      Tachycardia present.   Edema:     Peripheral edema present.  Musculoskeletal: Normal range of motion.      Cervical back: Neck supple. Skin:     General: Skin is warm.   Psychiatric:      Comments: Appears calm.      Patient status: Disease state: Controlled with current treatments.  Functional status: Palliative Performance Scale Score: 20%  ECOG Status(4) Completely disabled, unable to carry out self-care.  Totally confined to bed or chair.  Nutritional status:  Albumin 2.6 . Body mass index is 20.92 kg/m².  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 plan: Goals of care no CPR/comfort measures     Family support: The patient receives support from her daughter..  Advance Directives: Advance Directive Status: Patient  does not have advance directive   POA/Healthcare surrogate-daughterAnay-next of kin.     2.  Palliative care encounter  - Prognosis is poor long-term secondary to stage IV adenocarcinoma, respiratory failure, oxygen dependency, unintended weight loss, and multiple comorbidities.     -followed by Dr. Khan has received 4 courses of carboplatin, Gemzar obtaining remission x 14 years, now with progression of disease.      9/9- FNA left neck consistent with metastatic carcinoma   9/20-rechallenged with systemic therapy carboplatin and Gemzar      -She was seen by Dr. Roberts who recommended ED evaluation leading to current hospitalization due to difficulty swallowing, getting choked on her food, pronounced weight loss, and dehydration with plans for excisional biopsy 9/27.       -Started on IV antibiotics, liquid diet, supplemental oxygen, DuoNebs.  -Evaluated by speech therapy who completed a video swallow study with findings of profound laryngeal penetration and aspiration, started on modified diet with honey thick liquid, purée consistency food on 9/25.    9/27-Underwent excisional biopsy, pathology shows findings consistent with complete replacement of the lymph node by metastatic pulmonary adenocarcinoma. Extranodal extension of tumor and perineural invasion identified.   -Evaluated by therapy who recommends home with assist and home health.   9/28-Per oncology given aspiration issues may be in best interest to hold off on systemic therapy for few weeks, appointment set 10/14 at 11 AM with Dr. Khan  -Infectious disease consulted  10/4-completed antibiotic treatment  -Speech therapy notes concerns with ability to maintain nutritional and hydration status.   10/6-RRT/transitioned to comfort measures     10/8-continue comfort measures  -plan home with hospice, electronic communication to   -a MOST document initiated, attending to complete    3.  Comfort measures  -add atropine drops scheduled and as  needed  -add additional scopolamine patch  -nursing communication to utilize concentrate solution morphine, ativan etc.  -Additional palliative adjuvants as needed    Thank you for allowing us to participate in patient's plan of care. Palliative Care Team will continue to follow patient.     Nika Castaneda, APRN  10/8/2024  07:32 CDT

## 2024-10-08 NOTE — CASE MANAGEMENT/SOCIAL WORK
Continued Stay Note  DEN Worthington     Patient Name: Arminda Jiménez  MRN: [id number]  Today's Date: 10/8/2024    Admit Date: 9/24/2024    Plan: Home with [facility]   Discharge Plan       Row Name 10/08/24 1136       Plan    Plan Home with [facility]    Plan Comments Alma with [facility] has advised that patient's DME is in place at patient's home and patient can dc today if desired.    Final Discharge Disposition Code 50 - home with hospice                   Discharge Codes    No documentation.                 Expected Discharge Date and Time       Expected Discharge Date Expected Discharge Time    Oct 4, 2024               MARY Jenkins

## 2024-10-08 NOTE — PLAN OF CARE
Goal Outcome Evaluation:  Plan of Care Reviewed With: patient        Progress: declining  Outcome Evaluation: Patient exhibiting incomprehensible speech, therefor unable to determine orientation. Very coarse, shallow breathing, on 3 L O2 NC. PRN Atropine drops given once, as well as PRN Morphine IV. Richards catheter inserted per order for comfort measures. Safety maintained.

## 2024-10-08 NOTE — DISCHARGE SUMMARY
[facility] Medicine Services  DISCHARGE SUMMARY       Date of Admission: 9/24/2024  Date of Discharge:  10/8/2024  Primary Care Physician: Teddy Sanchez MD    Presenting Problem/History of Present Illness:  Worsening dysphagia and difficulty with coughing  Final Discharge Diagnoses:  Active Hospital Problems    Diagnosis     **Dysphagia     Thrombocytopenia     Metastatic carcinoma     Weight loss     Cancer associated pain     Bacteremia due to Enterococcus     Aspiration pneumonia     Chronic pain     Severe malnutrition     Lymphadenopathy     Vocal cord weakness     COPD (chronic obstructive pulmonary disease)     Adenocarcinoma of unknown origin        Consults:   Oncology-Dr. Zambrano  Palliative care  ENT  ID       Procedures Performed:   September 27, 2024.    PRE-OPERATIVE DIAGNOSIS:    Adenocarcinoma of unknown origin [C80.1]  Lymphadenopathy [R59.1]  Dysphagia, unspecified type [R13.10]  Vocal cord weakness [J38.00]     POST-OPERATIVE DIAGNOSIS:   Post-Op Diagnosis Codes:     * Adenocarcinoma of unknown origin [C80.1]     * Lymphadenopathy [R59.1]     * Dysphagia, unspecified type [R13.10]     * Vocal cord weakness [J38.00]     PROCEDURE PERFORMED:   Excisional biopsy right cervical lymph node     SURGEON:   Marek Roberts MD       Pertinent Test Results:   Results for orders placed during the hospital encounter of 05/11/22    Adult Stress Echo W/ Cont or Stress Agent if Necessary Per Protocol    Interpretation Summary  · Baseline ECG of normal sinus rhythm noted at rest. Non-specific ST-T wave changes noted.  · There was no clinically significant ST segment deviation noted during stress.  · Post stress wall motion appears to be normal.  · Low risk dobutamine stress echocardiogram with no significant ECG or echocardiographic evidence for ischemia.      Imaging Results (All)       Procedure Component Value Units Date/Time    XR Chest 1 View [[id number]]  Collected: 10/01/24 0714     Updated: 10/01/24 0720    Narrative:      EXAMINATION: XR CHEST 1 VW- 10/1/2024 7:14 AM     HISTORY: follow-up infiltrates.     REPORT: A frontal view of the chest was obtained.     COMPARISON: Chest x-ray 9/28/2024.     The lungs remain hypoaerated, with bibasilar airspace opacities there is  mild improvement in the left basilar infiltrate, no lung consolidation  is identified. Heart size is normal. No pneumothorax is identified,  there appears to be trace right pleural effusion as before. No acute  osseous abnormality.       Impression:      Continued pulmonary hypoventilation with mild improvement in  the left basilar infiltrates, persistent trace right pleural effusion.  No pneumothorax is identified.     This report was signed and finalized on 10/1/2024 7:17 AM by Dr. Lizandro Smith MD.       XR Spine Lumbar 2 or 3 View [[id number]] Collected: 09/29/24 1427     Updated: 09/29/24 1432    Narrative:      EXAM/TECHNIQUE: XR SPINE LUMBAR 2 OR 3 VW-     INDICATION: pain; history of metastatic carcinoma;      COMPARISON: MR thoracic spine 10/11/2023.     FINDINGS:     5 lumbar-type vertebral bodies. Mild straightening of normal lumbar  lordosis. No subluxations. Chronic mild anterior wedging of T11 and L1.  Vertebral body heights are otherwise maintained. No acute fracture. No  suspicious osseous lesion.     Postoperative change of instrumented fusion spanning L3-S1. No evidence  of hardware loosening or fracture.     Mild multilevel lumbar spine degenerative change with endplate  osteophytes and facet arthropathy noted. Atherosclerotic abdominal aorta  with bilateral common iliac stents. Pain device in the right abdominal  soft tissues noted. Partially manage right-sided pleural effusion and  patchy opacities in both lower lungs.       Impression:      1. No acute osseous findings.  2. Postoperative change spanning L3-S1 without evidence of complication.  3. Mild multilevel lumbar spine  degenerative change.     This report was signed and finalized on 9/29/2024 2:29 PM by Dr. Bam Ortega MD.       XR Spine Thoracic 2 View [[id number]] Collected: 09/29/24 1425     Updated: 09/29/24 1430    Narrative:      EXAM/TECHNIQUE: XR SPINE THORACIC 2 VW-     INDICATION: pain; history of metastatic carcinoma; R13.10-Dysphagia,  unspecified; Z78.9-Other specified health status; C80.1-Malignant  (primary) neoplasm, unspecified; R79.89-Other specified abnormal  findings of blood chemistry; J43.9-Emphysema, unspecified;  J98.11-Atelectasis; C80.1-Malignant (primary) neoplasm, unspecified;  R59.1-Generalized enlarged lymph nodes; J38.00-Paralysis of vocal cords  and larynx,     COMPARISON: MR 10/11/2023     FINDINGS:     Thoracic kyphosis and alignment are maintained. Mild chronic anterior  wedging of T3, T11, and L1. No acute vertebral body height loss. No  suspicious vertebral body lesion.      Mild multilevel thoracic spine degenerative change with disc space  narrowing and endplate osteophyte development. Partially imaged  bilateral lower lobe patchy consolidation and small right pleural  effusion.       Impression:      1.  No acute fracture or subluxation.  2.  Mild multilevel thoracic spine degenerative change.     This report was signed and finalized on 9/29/2024 2:27 PM by Dr. Bam Ortega MD.       XR Chest 1 View [[id number]] Collected: 09/28/24 0833     Updated: 09/28/24 0838    Narrative:      EXAM/TECHNIQUE: XR CHEST 1 VW-     INDICATION: follow-up infiltrates     COMPARISON: 9/24/2024     FINDINGS:     Cardiac silhouette is within normal limits and stable.     Slight increase in small right-sided pleural effusion. No evidence of  left-sided effusion. No visible pneumothorax. No definite change in  patchy bilateral lower lobe consolidation. The upper lungs are clear.     No acute osseous finding.       Impression:         No significant change in patchy bilateral lower lobe  consolidation.  Slight increase in small right-sided pleural effusion.     This report was signed and finalized on 9/28/2024 8:35 AM by Dr. Bam Ortega MD.       FL Video Swallow With Speech Single Contrast [[id number]] Collected: 09/25/24 1032     Updated: 09/25/24 1037    Narrative:      EXAMINATION: FL VIDEO SWALLOW W SPEECH SINGLE-CONTRAST-     9/25/2024 9:22 AM     HISTORY: dysphagia; R13.10-Dysphagia, unspecified; Z78.9-Other specified  health status; C80.1-Malignant (primary) neoplasm, unspecified;  R79.89-Other specified abnormal findings of blood chemistry;  J43.9-Emphysema, unspecified; J98.11-Atelectasis     Fluoroscopy was performed during ingestion of thin, nectar consistency,  and honey consistency pudding consistency and mechanical soft contrast  boluses.     There is no previous study for comparison.     There is no difficulty in initiating the swallowing.     Normal propagation through oropharynx. No nasopharyngeal reflux.     There is significant laryngeal penetration and aspiration with thin and  nectar consistency contrast bolus. This was followed by cough and  expectoration.     There is moderate residue with the honey consistency, pudding  consistency and mechanical soft boluses which cleared on subsequent  swallows. No laryngeal penetration or aspiration.     The study was performed under supervision of speech therapist.       Impression:      1. Abnormal swallow function study as detailed above.  2. Fluoroscopy time: 3 minutes 14 seconds.  3. The dose: 6.83mGy.  4. Number of images: 1        This report was signed and finalized on 9/25/2024 10:34 AM by Dr. Arminda Lester MD.       XR Chest 1 View [[id number]] Collected: 09/24/24 1927     Updated: 09/24/24 1931    Narrative:      EXAMINATION: XR CHEST 1 VW-  9/24/2024 7:27 PM     HISTORY: Weakness.     FINDINGS: Upright frontal projection of the chest is compared to prior  exam of 5/7/2022. There are emphysematous changes of the lungs.  There  has been development of patchy bibasilar airspace disease worrisome for  pneumonia. Blunting of the right lateral costophrenic angle is also  suspicious for a small right effusion. The thoracic aorta is ectatic.       Impression:      1.. Emphysematous changes of the lungs. Patchy bibasilar infiltrates are  present.     This report was signed and finalized on 9/24/2024 7:28 PM by Dr. Bill Benitez MD.             LAB RESULTS:      Lab 10/06/24  0427 10/05/24  0353 10/04/24  0632   WBC 6.13 4.64 3.92   HEMOGLOBIN 8.0* 8.9* 8.2*   HEMATOCRIT 26.5* 30.2* 27.5*   PLATELETS 403 353 306   NEUTROS ABS 3.57 2.67 1.96   EOS ABS 0.06 0.05 0.16   MCV 97.1* 99.0* 97.9*         Lab 10/06/24  0427 10/05/24  0353 10/04/24  0632 10/03/24  2047 10/03/24  0956   SODIUM 139 138 139  --  139   POTASSIUM 3.8 4.2 4.3 4.2 3.2*   CHLORIDE 97* 97* 99  --  100   CO2 33.0* 32.0* 30.0*  --  31.0*   ANION GAP 9.0 9.0 10.0  --  8.0   BUN 12 10 6*  --  5*   CREATININE 0.25* 0.24* 0.25*  --  0.19*   EGFR 124.0 125.2 124.0  --  132.4   GLUCOSE 101* 89 94  --  101*   CALCIUM 8.6 8.6 8.7  --  8.3*                         Brief Urine Lab Results  (Last result in the past 365 days)        Color   Clarity   Blood   Leuk Est   Nitrite   Protein   CREAT   Urine HCG        09/24/24 1855 Dark Yellow   Clear   Negative   Trace   Negative   100 mg/dL (2+)                 Microbiology Results (last 10 days)       ** No results found for the last 240 hours. **            Hospital Course:   Alma Wesley approached me and said that she can be admitted under hospice but they will not be able to go until tomorrow.  She spoke to patient's daughter who is agreeable to go today.  I am informed that durable medical equipment has been delivered already and set up.  I run by Alma Wesley the medications for hospice.  This will be through [facility] according to her.  Given above information, I think it is reasonable to discharge her per everyone else's  "request.    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  She was evaluated by ENT and had a lymph node biopsy of right cervical node.  Infectious disease was consulted for positive blood culture with Enterococcus which apparently send admission, she was treated for aspiration pneumonia with 8 days of Unasyn IV.     Concern workup documented widespread metastatic disease with mild sternal, retroperitoneal, gastrohepatic and neck adenopathy.  Right neck biopsy was consistent with metastatic pulmonary adenocarcinoma completely replacing the lymph node and perineural invasion.  It was recommended to hold Gemzar due to current positive blood culture functional status and aspiration.       ENT recommended speech therapy evaluation.  She had a video swallow fluoroscopy and and diet recommendations.  She has continued to have poor oral intake and requiring suctioning to clear secretions.  Despite maximal efforts she has not shown much improvement and this afternoon she has become more debilitated and RRT was called.  At that time there was concern for respiratory depression and suctioning yielded significant amount of food contents.  The patient and family requested comfort measures.    Patient is appropriate for discharge.    Physical Exam on Discharge:  /94 (BP Location: Right arm, Patient Position: Lying)   Pulse 105   Temp 97.2 °F (36.2 °C) (Axillary)   Resp 24   Ht 154.9 cm (60.98\")   Wt 50.2 kg (110 lb 10.7 oz)   SpO2 97%   BMI 20.92 kg/m²   Physical Exam  Chronically ill appearing  Not in any distress  Droopy eyelid is  Mumbled incomprehensible words  Rhonchi present left greater than right  S1-S2 regular rate and rhythm  Bruising noted  Flat affect  Cachectic with absence of body fat and muscle mass.  "     Condition on Discharge: Fair  Prognosis poor      Discharge Disposition:  Hospice/Home    Discharge Medications:     Discharge Medications        New Medications        Instructions Start Date   atropine 1 % ophthalmic solution   2 drop, Sublingual, 4 Times Daily for secretion      LORazepam 2 MG/ML concentrated solution  Commonly known as: ATIVAN   1 mg, Oral, Every 4 Hours PRN      morphine 20 MG/ML concentrated solution   10 mg, Oral, Every 2 Hours PRN             Continue These Medications        Instructions Start Date   ProAir HFA   2 puffs, Inhalation, 4 Times Daily PRN             Stop These Medications      Breztri Aerosphere 160-9-4.8 MCG/ACT aerosol inhaler  Generic drug: Budeson-Glycopyrrol-Formoterol     CALCIUM CITRATE PO     escitalopram 20 MG tablet  Commonly known as: LEXAPRO     esomeprazole 40 MG capsule  Commonly known as: nexIUM     gabapentin 800 MG tablet  Commonly known as: NEURONTIN     metoclopramide 5 MG tablet  Commonly known as: REGLAN     naloxone 4 MG/0.1ML nasal spray  Commonly known as: NARCAN     oxyCODONE 15 MG immediate release tablet  Commonly known as: ROXICODONE     Vitamin D3 75 MCG (3000 UT) tablet              This patient has current or prior documentation of an left ventricular ejection fraction (LVEF) of less than or equal to 40%. -Not applicable; hospice patient      Discharge Diet:   Diet Instructions       Advance Diet As Tolerated -Target Diet: As tolerated.  Comfort measure only.      Target Diet: As tolerated.  Comfort measure only.            Activity at Discharge:   Activity Instructions       Activity as Tolerated              Follow-up Appointments:   PCP through home hospice.    Test Results Pending at Discharge: None    Electronically signed by Kevan Gil MD, 10/08/24, 12:31 CDT.    Time: Greater than 30 minutes.

## 2024-10-08 NOTE — PLAN OF CARE
Goal Outcome Evaluation:  Plan of Care Reviewed With: family        Progress: declining   Pt A/Ox4. VSS on 3L NS. Went home via ambulance on Hospice. IV removed. PRN meds given. Safety maintained.

## 2024-10-08 NOTE — PROGRESS NOTES
Nutrition Services    Patient Name:  Arminda Jiménez  YOB: 1960  MRN: [id number]  Admit Date:  9/24/2024    Inpatient nutrition services reviewed POC. Interventions align with the goal(s) to maintain comfort at this time. Inpatient nutrition services/RD available upon request.      Electronically signed by:  Rowena Hill RDN, JUAN  10/08/24 08:38 CDT

## 2024-10-08 NOTE — THERAPY DISCHARGE NOTE
Acute Care - Speech Language Pathology Discharge Summary  [facility]       Patient Name: Arminda Jiménez  : 1960  MRN: [id number]    Today's Date: 10/8/2024                   Admit Date: 2024      SLP Recommendation and Plan    Visit Dx:    ICD-10-CM ICD-9-CM   1. Dysphagia, unspecified type  R13.10 787.20   2. Unable to care for self  Z78.9 V49.89   3. Adenocarcinoma  C80.1 199.1   4. Elevated TSH  R79.89 794.5   5. Pulmonary emphysema, unspecified emphysema type  J43.9 492.8   6. Patchy atelectasis  J98.11 518.0   7. Adenocarcinoma of unknown origin  C80.1 199.1   8. Lymphadenopathy  R59.1 785.6   9. Vocal cord weakness  J38.00 478.30   10. Impaired mobility [Z74.09]  Z74.09 799.89   11. Palliative care status  Z51.5 V66.7                SLP GOALS       Row Name 10/08/24 1500             (LTG) Patient will demonstrate functional swallow for    Diet Texture (Demonstrate functional swallow) soft to chew (chopped) textures  -MB      Liquid viscosity (Demonstrate functional swallow) honey/  moderately thick liquids  -MB      Hughes (Demonstrate functional swallow) independently (over 90% accuracy)  -MB      Time Frame (Demonstrate functional swallow) by discharge  -MB      Progress/Outcomes (Demonstrate functional swallow) goal not met  -MB         (STG) Patient will tolerate trials of    Consistencies Trialed (Tolerate trials) pureed textures;honey/ moderately thick liquids  -MB      Desired Outcome (Tolerate trials) without signs/symptoms of aspiration  -MB      Hughes (Tolerate trials) independently (over 90% accuracy)  -MB      Time Frame (Tolerate trials) by discharge  -MB      Progress/Outcomes (Tolerate trials) goal not met  -MB         (STG) Swallow Management Recall Goal 1 (SLP)    Activity (Swallow Management Recall Goal 1, SLP) recall of;aspiration precautions;oral care recommendations;safe diet level/texture;safe liquid viscosity  -MB      Hughes/Accuracy (Swallow Management  HPI   Chief Complaint   Patient presents with    Dizziness       Yakov Jimenez is a 24 y.o. male with PMH of sickle cell disease, functional asplenia, chronic hemolytic anemia on treatment presenting for dizziness. Patient states symptoms started about a week ago and has been intermittent. Certain head positional changes improve and worsen the symptoms but the specific head movements are not constant. Focusing on an object helps. No changes in diet or fluid intake. Symptoms are not associated with time of day, sleeping, or fatigue. Patient denies chest pain, SOB, fever, chills, nausea/vomiting, vision changes, hearing changes. Reports one episode of mild temporal headache that went away.        History provided by:  Patient   used: No                        [place] Coma Scale Score: 15                  Patient History   No past medical history on file.  Past Surgical History:   Procedure Laterality Date    MR HEAD ANGIO WO IV CONTRAST  7/26/2019    MR HEAD ANGIO WO IV CONTRAST 7/26/2019 [facility] [place] [facility]    OTHER SURGICAL HISTORY  07/14/2022    Eye surgery     Family History   Problem Relation Name Age of Onset    Asthma Mother       Social History     Tobacco Use    Smoking status: Never    Smokeless tobacco: Never   Substance Use Topics    Alcohol use: Not on file    Drug use: Not on file       Physical Exam   ED Triage Vitals [11/30/23 1453]   Temp Heart Rate Resp BP   36.9 °C (98.4 °F) 83 16 123/61      SpO2 Temp Source Heart Rate Source Patient Position   98 % Temporal -- --      BP Location FiO2 (%)     -- --       Physical Exam  Constitutional:       Appearance: Normal appearance. He is normal weight.   HENT:      Head: Normocephalic and atraumatic.   Eyes:      Extraocular Movements: Extraocular movements intact.      Conjunctiva/sclera: Conjunctivae normal.      Pupils: Pupils are equal, round, and reactive to light.   Cardiovascular:      Rate and Rhythm: Normal rate and regular  Recall Goal 1, SLP) independently (over 90% accuracy)  -MB      Time Frame (Swallow Management Recall Goal 1, SLP) by discharge  -MB      Progress/Outcomes (Swallow Management Recall Goal 1, SLP) goal not met  -MB                User Key  (r) = Recorded By, (t) = Taken By, (c) = Cosigned By      Initials Name Provider Type    Soheila Andino CCC-SLP Speech and Language Pathologist                    [email or web address]@      SLP Discharge Summary  Anticipated Discharge Disposition (SLP): unknown  Reason for Discharge: discharge from this facility  Progress Toward Achieving Short/long Term Goals: goals not met within established timelines  Discharge Destination: home, other (see comments) (with hospice)      Soheila Cordova CCC-SLP  10/8/2024   rhythm.      Pulses: Normal pulses.      Heart sounds: Normal heart sounds.   Pulmonary:      Effort: Pulmonary effort is normal.      Breath sounds: Normal breath sounds.   Abdominal:      General: Abdomen is flat. Bowel sounds are normal.      Palpations: Abdomen is soft.   Musculoskeletal:         General: Normal range of motion.      Cervical back: Normal range of motion.   Skin:     General: Skin is warm and dry.   Neurological:      General: No focal deficit present.      Mental Status: He is alert and oriented to person, place, and time. Mental status is at baseline.      Cranial Nerves: Cranial nerves 2-12 are intact.      Sensory: Sensation is intact.      Motor: Motor function is intact.      Coordination: Coordination is intact. Romberg sign negative. Coordination normal. Finger-Nose-Finger Test and Heel to Shin Test normal.      Gait: Gait is intact.   Psychiatric:         Mood and Affect: Mood normal.         Behavior: Behavior normal.         Thought Content: Thought content normal.         ED Course & [facility]   ED Course as of 11/30/23 1910   Thu Nov 30, 2023   1902 ECG 12 lead  EKG interpreted by me.  11/30/2023 at 1503.  Sinus rhythm with sinus arrhythmia at 78 bpm.   QRS 90 QTc 408.  No ST elevation.  LVH.  No significant change when compared to previous EKG from 3/2/2022. [MC]      ED Course User Index  [MC] David Dutton MD       Medical Decision Making      Procedure  Procedures

## 2024-10-12 LAB
PD-L1 BY 22C3 TISS IMSTN DOC: NORMAL
TEMPUS PD-L1 (22C3) COMBINED POSITIVE SCORE: 99
TEMPUS PD-L1 (22C3) TUMOR PROPORTION SCORE: 99 %
TEMPUS PORTAL: NORMAL

## 2024-10-17 LAB
CYTO UR: NORMAL
DNA RANGE(S) EXAMINED NAR: NORMAL
GENE DIS ANL INTERP-IMP: POSITIVE
GENE DIS ASSESSED: NORMAL
GENE MUT TESTED BLD/T: 19.5 M/MB
LAB AP CASE REPORT: NORMAL
LAB AP SPECIAL STAINS: NORMAL
Lab: NORMAL
MLH1+MSH2+MSH6+PMS2 GN DEL+DUP+FUL M: NORMAL
MMR ENDO PMS2 CA SPEC QL IMSTN: PRESENT
MMR PROT MLH1 CA SPEC QL IMSTN: PRESENT
MMR PROT MSH2 CA SPEC QL IMSTN: PRESENT
MMR PROT MSH6 CA SPEC QL IMSTN: PRESENT
MSI CA SPEC-IMP: NORMAL
PATH REPORT.ADDENDUM SPEC: NORMAL
PATH REPORT.FINAL DX SPEC: NORMAL
PATH REPORT.GROSS SPEC: NORMAL
PD-L1 BY 22C3 TISS IMSTN DOC: NORMAL
REASON FOR STUDY: NORMAL
TEMPUS GERMLINE NOTE: NORMAL
TEMPUS LCA: NORMAL
TEMPUS PD-L1 (22C3) COMBINED POSITIVE SCORE: 99
TEMPUS PD-L1 (22C3) TUMOR PROPORTION SCORE: 99 %
TEMPUS PERTINENTNEGATIVES: NORMAL
TEMPUS PORTAL: NORMAL
TEMPUS THERAPY1: NORMAL
TEMPUS THERAPY2: NORMAL
TEMPUS THERAPY3: NORMAL
TEMPUS THERAPY4: NORMAL
TEMPUS THERAPYCOUNT: 4
TEMPUS TRIALCOUNT: 3
TEMPUS TRIALMATCHES1: NORMAL
TEMPUS TRIALMATCHES2: NORMAL
TEMPUS TRIALMATCHES3: NORMAL

## (undated) DEVICE — SUT MNCRYL 4/0 P3 18IN UD MCP494G

## (undated) DEVICE — GLV SURG TRIUMPH MICRO PF LTX 7.5 STRL

## (undated) DEVICE — BNDG ESMARK STRL 6INX12FT LF

## (undated) DEVICE — DRSNG SURESITE WNDW 4X4.5

## (undated) DEVICE — APPL DURAPREP IODOPHOR APL 26ML

## (undated) DEVICE — PK SPINE LAT 30

## (undated) DEVICE — BIPOLAR SEALER 23-113-1 AQM 2.3: Brand: AQUAMANTYS™

## (undated) DEVICE — GLV SURG NEOLON 2G PF LF 8 STRL

## (undated) DEVICE — CATH IV ANGIO FEP 12G 3IN LTBLU 10PK

## (undated) DEVICE — HANDPIECE SET WITH HIGH FLOW TIP AND SUCTION TUBE: Brand: INTERPULSE

## (undated) DEVICE — ANTIBACTERIAL UNDYED BRAIDED (POLYGLACTIN 910), SYNTHETIC ABSORBABLE SUTURE: Brand: COATED VICRYL

## (undated) DEVICE — GLV SURG SENSICARE W/ALOE PF LF 8 STRL

## (undated) DEVICE — 4-PORT MANIFOLD: Brand: NEPTUNE 2

## (undated) DEVICE — SPNG DISSCT SECTO KTTNER PK/5

## (undated) DEVICE — PK ENT HD AND NK 30

## (undated) DEVICE — GLV SURG TRIUMPH GREEN W/ALOE PF LTX 7 STRL

## (undated) DEVICE — Device: Brand: PROPHECY INBONE

## (undated) DEVICE — BNDG CURAD ADHS 4IN WHT

## (undated) DEVICE — ELECTRD BLD EXT EDGE 1P COAT 6.5IN STRL

## (undated) DEVICE — PIN FIX CAYMAN SMOTH DISP

## (undated) DEVICE — KT ACC LAT EMG/SSEP TIMBERLINE GEN2

## (undated) DEVICE — YANKAUER SUCTION INSTRUMENT WITHOUT CONTROL VENT, OPEN TIP, CLEAR: Brand: YANKAUER

## (undated) DEVICE — STEINMANN PIN
Type: IMPLANTABLE DEVICE | Site: ANKLE | Status: NON-FUNCTIONAL
Brand: INBONE
Removed: 2023-08-29

## (undated) DEVICE — SPNG GZ WOVN 4X4IN 12PLY 10/BX STRL

## (undated) DEVICE — SUT ETHLN 5/0 P3 18IN 698H

## (undated) DEVICE — DRP C/ARMOR

## (undated) DEVICE — TRAP FLD MINIVAC MEGADYNE 100ML

## (undated) DEVICE — SUT SILK 2/0 SH 75CM 30IN BLK C016D

## (undated) DEVICE — FRAZIER SUCTION INSTRUMENT 10 FR W/CONTROL VENT & OBTURATOR: Brand: FRAZIER

## (undated) DEVICE — GLV SURG BIOGEL LTX PF 7 1/2

## (undated) DEVICE — SURGICAL SUCTION CONNECTING TUBE WITH MALE CONNECTOR AND SUCTION CLAMP, 2 FT. LONG (.6 M), 5 MM I.D.: Brand: CONMED

## (undated) DEVICE — DRAPE,UTILITY,TAPE,15X26,STERILE: Brand: MEDLINE

## (undated) DEVICE — LP VESL MAXI RED 2PK

## (undated) DEVICE — Device

## (undated) DEVICE — SUT ETHLN 3/0 FS1 30IN 669H

## (undated) DEVICE — RECIPROCATING BLADE, DOUBLE SIDED, OFFSET  (70.0 X 1.0 X 12.5MM)

## (undated) DEVICE — MORSELIZING BONE GRAFT HARVESTER, AO, 10MM: Brand: BABY GORILLA®/GORILLA® PLATING SYSTEM

## (undated) DEVICE — TALAR REAMER: Brand: INBONE

## (undated) DEVICE — DUAL CUT SAGITTAL BLADE

## (undated) DEVICE — GLV SURG BIOGEL LTX PF 6 1/2

## (undated) DEVICE — ELECTRD BLD EZ CLN MOD XLNG 2.75IN

## (undated) DEVICE — DRILL,  2.8 X 140MM, SOLID, MEASURING, LONG, AO: Brand: BABY GORILLA®/GORILLA® PLATING SYSTEM

## (undated) DEVICE — SCREW, BONE REMOVER: Brand: INBONE

## (undated) DEVICE — INTENDED FOR TISSUE SEPARATION, AND OTHER PROCEDURES THAT REQUIRE A SHARP SURGICAL BLADE TO PUNCTURE OR CUT.: Brand: BARD-PARKER ® STAINLESS STEEL BLADES

## (undated) DEVICE — PK TURNOVER RM ADV

## (undated) DEVICE — BONE FENESTRATION PERFORATOR: Brand: BABY GORILLA®/GORILLA® PLATING SYSTEM

## (undated) DEVICE — ELECTRD BLD EDGE/INSUL1P 2.4X5.1MM STRL

## (undated) DEVICE — SCREW DRIVER ATTACHMENT, R3CON, SOLID, AO, HX-10, SHORT TAPER: Brand: BABY GORILLA/GORILLA PLATING SYSTEM

## (undated) DEVICE — BNDG ELAS W/CLIP 6IN 10YD LF STRL

## (undated) DEVICE — SUT MNCRYL 4/0 PS2 27IN UD MCP426H

## (undated) DEVICE — SUT SILK 4/0 SUTUPAK TIES 24IN SA73H

## (undated) DEVICE — GRFT PLS W/FLTR SM

## (undated) DEVICE — DISPOSABLE TOURNIQUET CUFF SINGLE BLADDER, SINGLE PORT AND QUICK CONNECT CONNECTOR: Brand: COLOR CUFF

## (undated) DEVICE — GLV SURG TRIUMPH GREEN W/ALOE PF LTX 8 STRL

## (undated) DEVICE — PK SPINE POST 30

## (undated) DEVICE — SUT SILK 2/0 SH CR8 18IN CR8 C012D

## (undated) DEVICE — GLV SURG SENSICARE PI ORTHO SZ8 LF STRL

## (undated) DEVICE — SUT SILK 3/0 SUTUPAK TIES 24IN SA74H

## (undated) DEVICE — TALAR PEG DRILL: Brand: INBONE

## (undated) DEVICE — POSITIONER,HEAD,MULTIRING,36CS: Brand: MEDLINE

## (undated) DEVICE — DRSNG WND SIL OPTIFOAM GNTL BRDR ADHS 1.6X2IN

## (undated) DEVICE — KT MONTR EMG/SSEP TIMBERLINE GEN2 LD2.5M

## (undated) DEVICE — GOWN,NON-REINFORCED,SIRUS,SET IN SLV,XXL: Brand: MEDLINE

## (undated) DEVICE — KWIRE TIMBERLINE BLNT
Type: IMPLANTABLE DEVICE | Status: NON-FUNCTIONAL
Removed: 2019-01-02

## (undated) DEVICE — K-WIRE
Type: IMPLANTABLE DEVICE | Site: ANKLE | Status: NON-FUNCTIONAL
Brand: INBONE
Removed: 2023-08-29

## (undated) DEVICE — GLV SURG BIOGEL M LTX PF 7 1/2

## (undated) DEVICE — GW THRD 1.25X150MM FOR 3.5 4MM CANN SCRW

## (undated) DEVICE — CLTH CLENS READYCLEANSE PERI CARE PK/5

## (undated) DEVICE — CVR UNIV C/ARM

## (undated) DEVICE — SCANLAN® SURG-I-PAW® INSTRUMENT COVERS - RED, 1/10" X 5"/ 3 MMX13 CM (2 - 5" PCS /PKG): Brand: SCANLAN® SURG-I-PAW® INSTRUMENT COVERS

## (undated) DEVICE — ENDO KIT,LOURDES HOSPITAL: Brand: MEDLINE INDUSTRIES, INC.

## (undated) DEVICE — SYS CLS SKIN PREMIERPRO EXOFINFUSION 22CM

## (undated) DEVICE — PACK,SET UP,NO DRAPES: Brand: MEDLINE

## (undated) DEVICE — FORCEPS BX L240CM JAW DIA2.4MM ORNG L CAP W/ NDL DISP RAD

## (undated) DEVICE — BAPTIST TURNOVER KIT: Brand: MEDLINE INDUSTRIES, INC.

## (undated) DEVICE — GLV SURG TRIUMPH NATURAL W/ALOE PF LTX 7 STRL

## (undated) DEVICE — GLV SURG SENSICARE W/ALOE PF LF 7 STRL

## (undated) DEVICE — GLV SURG NEOLON 2G PF LF 7.5 STRL

## (undated) DEVICE — SUT PROLN 5/0 C1 DA 24IN 8725H

## (undated) DEVICE — SPK10277 JACKSON/PRO-AXIS KIT: Brand: SPK10277 JACKSON/PRO-AXIS KIT

## (undated) DEVICE — DRILL: Brand: INBONE

## (undated) DEVICE — APPL CHLORAPREP W/TINT 26ML ORNG

## (undated) DEVICE — CVR BRD ARM 13X30

## (undated) DEVICE — 3.5 X 30 MM R3CON NON-LOCKING PLATE SCREW
Type: IMPLANTABLE DEVICE | Site: ANKLE | Status: NON-FUNCTIONAL
Brand: GORILLA PLATING SYSTEM
Removed: 2023-01-05

## (undated) DEVICE — DRSNG GZ CURAD XEROFORM NONADHS 5X9IN STRL

## (undated) DEVICE — PK EXTRM 30

## (undated) DEVICE — UNDERCAST PADDING: Brand: DEROYAL

## (undated) DEVICE — TP SILK DURAPORE 3IN

## (undated) DEVICE — NDL SPINE 18G 31/2IN PNK

## (undated) DEVICE — SUT SILK 2/0 SUTUPAK TIES 24IN SA75H

## (undated) DEVICE — 3.5 X 38 MM R3CON NON-LOCKING PLATE SCREW
Type: IMPLANTABLE DEVICE | Site: ANKLE | Status: NON-FUNCTIONAL
Brand: GORILLA PLATING SYSTEM
Removed: 2023-01-05

## (undated) DEVICE — BANDAGE,GAUZE,BULKEE II,4.5"X4.1YD,STRL: Brand: MEDLINE

## (undated) DEVICE — CONN FLX BREATHE CIRCT

## (undated) DEVICE — GOWN,NON-REINFORCED,SIRUS,SET IN SLV,XL: Brand: MEDLINE

## (undated) DEVICE — 3.0MM PRECISION NEURO (MATCH HEAD)

## (undated) DEVICE — GOWN,PREVENTION PLUS,XLONG/XLARGE,STRL: Brand: MEDLINE

## (undated) DEVICE — EVOS 2.7MM X 18MM CORTEX SCREW T8 SELF-TAPPING
Type: IMPLANTABLE DEVICE | Site: ANKLE | Status: NON-FUNCTIONAL
Brand: EVOS
Removed: 2022-05-08

## (undated) DEVICE — BIT DRL QC W DEPTH MARK 2X140MM

## (undated) DEVICE — NARROW SAW BLADE

## (undated) DEVICE — TROCAR TIP NITINOL GUIDEWIRE 18": Brand: INVICTUS

## (undated) DEVICE — PRECISION THIN (9.0 X 0.38 X 25.0MM)

## (undated) DEVICE — SUT PROLN 7/0 BV1 30IN 4PK M8703

## (undated) DEVICE — COVER,MAYO STAND,STERILE: Brand: MEDLINE

## (undated) DEVICE — TBG PENCL TELESCP MEGADYNE SMOKE EVAC 10FT

## (undated) DEVICE — DRSNG BRDR MEPILEX FLX/LITE FM 4X5CM

## (undated) DEVICE — BIT DRL QC DIA 2.5X110MM

## (undated) DEVICE — DRILL ANTI-ROTATION NOTCH: Brand: INBONE

## (undated) DEVICE — TOTAL TRAY, 16FR 10ML SIL FOLEY, URN: Brand: MEDLINE

## (undated) DEVICE — SPONGE,LAP,12"X12",XR,ST,5/PK,40PK/CS: Brand: MEDLINE

## (undated) DEVICE — VAGINAL PREP TRAY: Brand: MEDLINE INDUSTRIES, INC.

## (undated) DEVICE — DISPOSABLE TOURNIQUET CUFF 34"X4", 1-LINE, BLUE, STERILE, 1EA/PK, 10PK/CS: Brand: ASP MEDICAL

## (undated) DEVICE — GLV SURG SENSICARE W/ALOE PF LF 7.5 STRL

## (undated) DEVICE — SUT PDS 1 CTX 36IN VIO PDP371T

## (undated) DEVICE — SYS ACC IPAS3 EMG I/O PED BVL

## (undated) DEVICE — SKIN PREP TRAY W/CHG: Brand: MEDLINE INDUSTRIES, INC.

## (undated) DEVICE — SUT SILK 0 SUTUPAK TIES 24IN SA76G

## (undated) DEVICE — GLV SURG DERMASSURE GRN LF PF 8.0

## (undated) DEVICE — GLV SURG SENSICARE W/ALOE PF LF 6.5 STRL

## (undated) DEVICE — 4.0MM EGG BUR